# Patient Record
Sex: MALE | Race: WHITE | NOT HISPANIC OR LATINO | Employment: OTHER | URBAN - METROPOLITAN AREA
[De-identification: names, ages, dates, MRNs, and addresses within clinical notes are randomized per-mention and may not be internally consistent; named-entity substitution may affect disease eponyms.]

---

## 2017-01-04 ENCOUNTER — INITIAL CONSULT (OUTPATIENT)
Dept: DERMATOLOGY | Facility: CLINIC | Age: 82
End: 2017-01-04
Payer: MEDICARE

## 2017-01-04 ENCOUNTER — CLINICAL SUPPORT (OUTPATIENT)
Dept: FAMILY MEDICINE | Facility: CLINIC | Age: 82
End: 2017-01-04
Payer: MEDICARE

## 2017-01-04 VITALS
WEIGHT: 263 LBS | HEIGHT: 75 IN | BODY MASS INDEX: 32.7 KG/M2 | SYSTOLIC BLOOD PRESSURE: 160 MMHG | HEART RATE: 64 BPM | DIASTOLIC BLOOD PRESSURE: 88 MMHG

## 2017-01-04 DIAGNOSIS — E23.0 HYPOPITUITARISM: ICD-10-CM

## 2017-01-04 DIAGNOSIS — D03.4 MELANOMA IN SITU OF SCALP: Primary | ICD-10-CM

## 2017-01-04 PROCEDURE — 96372 THER/PROPH/DIAG INJ SC/IM: CPT | Mod: S$GLB,,,

## 2017-01-04 PROCEDURE — 1160F RVW MEDS BY RX/DR IN RCRD: CPT | Mod: S$GLB,,, | Performed by: DERMATOLOGY

## 2017-01-04 PROCEDURE — 3079F DIAST BP 80-89 MM HG: CPT | Mod: S$GLB,,, | Performed by: DERMATOLOGY

## 2017-01-04 PROCEDURE — 1126F AMNT PAIN NOTED NONE PRSNT: CPT | Mod: S$GLB,,, | Performed by: DERMATOLOGY

## 2017-01-04 PROCEDURE — 99999 PR PBB SHADOW E&M-EST. PATIENT-LVL III: CPT | Mod: PBBFAC,,, | Performed by: DERMATOLOGY

## 2017-01-04 PROCEDURE — 1159F MED LIST DOCD IN RCRD: CPT | Mod: S$GLB,,, | Performed by: DERMATOLOGY

## 2017-01-04 PROCEDURE — 99499 UNLISTED E&M SERVICE: CPT | Mod: S$PBB,,, | Performed by: DERMATOLOGY

## 2017-01-04 PROCEDURE — 1157F ADVNC CARE PLAN IN RCRD: CPT | Mod: S$GLB,,, | Performed by: DERMATOLOGY

## 2017-01-04 PROCEDURE — 3077F SYST BP >= 140 MM HG: CPT | Mod: S$GLB,,, | Performed by: DERMATOLOGY

## 2017-01-04 PROCEDURE — 99214 OFFICE O/P EST MOD 30 MIN: CPT | Mod: 25,S$GLB,, | Performed by: DERMATOLOGY

## 2017-01-04 RX ADMIN — TESTOSTERONE CYPIONATE 150 MG: 200 INJECTION, SOLUTION INTRAMUSCULAR at 09:01

## 2017-01-04 NOTE — PROGRESS NOTES
REFERRING MD:  Kamini Wheeler M.D.    CHIEF COMPLAINT:  New patient being consulted for Mohs' surgery evaluation.    HISTORY OF PRESENT ILLNESS:  81 y.o. male presents with a 6+ months history of growth on the crown of scalp. Pt thinks he may have had cryosurgery to the area. Pt is on Coumadin and last INR on 12/7/16 was 2.3.     Positive for scabbing.  Positive for crusting.  Negative for bleeding.  Negative for itching.    Biopsy consistent with melanoma in situ.      Possible prior treatment with cryosurgery.    Pacemaker: No  Defibrillator: No  Artificial joints: No  Artificial heart valves: No    PAST MEDICAL HISTORY:  Past Medical History   Diagnosis Date    Acute coronary syndrome 2000     NSTEMI    Anticoagulant long-term use     Basal cell carcinoma excised      left scalp vertex    Bilateral leg edema 1/6/2014    Coronary artery disease     DVT of leg (deep venous thrombosis) 09/08/2014     On coumadin for years    Encounter for blood transfusion     Hyperlipidemia     Hypertension     Melanoma 01/04/2017     crown of scalp    MI (myocardial infarction)     Panhypopituitarism     SQUAMOUS CELL CARCINOMA excised      left posterior scalp    Thyroid disease        PAST SURGICAL HISTORY:  Past Surgical History   Procedure Laterality Date    Pituitatry       hypophysectomy    Cardiac catheterization  05/09/2000     S/P stent to RCA,    Coronary angioplasty  5/9/2000     RCA    Esophageal dilation      Left femur surgery      Tonsillectomy      Appendectomy      Cardiac catheterization       stents placed    Coronary angioplasty with stent placement      Hernia repair      Brain surgery       pituitary adenoma removed    Eye surgery Bilateral      cataracts removed and new lenses placed     Fracture surgery Left      leg    Skin biopsy      Vascular surgery          SOCIAL HISTORY:  Dependencies:  never smoked    PERTINENT MEDICATIONS:  See medications list.  aspirin and  Coumadin    ALLERGIES:  Review of patient's allergies indicates no known allergies.    ROS:  Skin: See HPI  Constitutional: No fatigue, fever, malaise, weight loss, or night sweats.  Cardiovascular: No chest pain, palpitations, or edema.  Respiratory: No coughing, wheezing, SOB, or sputum production.    Physical Exam   HENT:   Head:             General: Mood and affect normal. Alert and orient X3. Normal appearance.  Scalp: R superior posterior scalp with a 10 x 10 mm pink bx site located 14 cm superiorly from the right superior ear attachment and 6.5 cm posteriorly. No pigmentation.  Head/Face:  no suspicious lesions  Neck:  no suspicious lesions  Lymph nodes: Bilateral pre-auricular, post-auricular, anterior cervical, posterior cervical, sublingual, submental, and occipital are not enlarged    IMPRESSION:  Biopsy proven superficial spreading melanoma in situ, Crown of scalp, path# [id number].    PLAN:  The diagnosis and the pathology report were discussed in detail with the patient. Treatment options were reviewed, including Mohs Micrographic Surgery, radiation, topical therapy, and standard excision.  After careful review of patient's history and physical exam, and after discussion of treatment options, the decision was made to perform 360 degree continuous en face margin controlled excision with 5 mm margins with rush permanent sections and subsequent delayed reconstruction pending negative margins.    Will schedule patient for slow Mohs. Risks, benefits, and alternatives of Mohs' surgery discussed with the patient. Discussed repair options including complex closure, skin flap, skin graft and second intention healing with the patient. Pre-operative instructions provided to the patient. Okay to stay on aspirin and Coumadin.

## 2017-01-04 NOTE — PROGRESS NOTES
Administered Depo Testosterone 150 mg IM to left ventrogluteal.  Patient tolerated injection well, no adverse reactions noted.

## 2017-01-05 ENCOUNTER — TELEPHONE (OUTPATIENT)
Dept: DERMATOLOGY | Facility: CLINIC | Age: 82
End: 2017-01-05

## 2017-01-05 NOTE — TELEPHONE ENCOUNTER
Pt was called and informed of appointment schedule for 1/23/17 at 8:00 for MMIS crown of scalp. Pt verbally confirmed date and time of procedure. Appointment letter along with preoperative instruction sheet was mailed to pt.

## 2017-01-12 ENCOUNTER — PATIENT MESSAGE (OUTPATIENT)
Dept: DERMATOLOGY | Facility: CLINIC | Age: 82
End: 2017-01-12

## 2017-01-13 ENCOUNTER — INITIAL CONSULT (OUTPATIENT)
Dept: HEMATOLOGY/ONCOLOGY | Facility: CLINIC | Age: 82
End: 2017-01-13
Payer: MEDICARE

## 2017-01-13 VITALS
HEIGHT: 75 IN | OXYGEN SATURATION: 93 % | TEMPERATURE: 98 F | RESPIRATION RATE: 16 BRPM | WEIGHT: 267 LBS | BODY MASS INDEX: 33.2 KG/M2 | SYSTOLIC BLOOD PRESSURE: 174 MMHG | HEART RATE: 55 BPM | DIASTOLIC BLOOD PRESSURE: 86 MMHG

## 2017-01-13 DIAGNOSIS — E23.0 PANHYPOPITUITARISM: Primary | ICD-10-CM

## 2017-01-13 DIAGNOSIS — L98.6 ATYPICAL LYMPHOCYTIC INFILTRATE OF SKIN: ICD-10-CM

## 2017-01-13 PROCEDURE — 99499 UNLISTED E&M SERVICE: CPT | Mod: S$PBB,,, | Performed by: INTERNAL MEDICINE

## 2017-01-13 PROCEDURE — 99999 PR PBB SHADOW E&M-EST. PATIENT-LVL IV: CPT | Mod: PBBFAC,,, | Performed by: INTERNAL MEDICINE

## 2017-01-13 PROCEDURE — 1159F MED LIST DOCD IN RCRD: CPT | Mod: S$GLB,,, | Performed by: INTERNAL MEDICINE

## 2017-01-13 PROCEDURE — 1160F RVW MEDS BY RX/DR IN RCRD: CPT | Mod: S$GLB,,, | Performed by: INTERNAL MEDICINE

## 2017-01-13 PROCEDURE — 1157F ADVNC CARE PLAN IN RCRD: CPT | Mod: S$GLB,,, | Performed by: INTERNAL MEDICINE

## 2017-01-13 PROCEDURE — 99205 OFFICE O/P NEW HI 60 MIN: CPT | Mod: S$GLB,,, | Performed by: INTERNAL MEDICINE

## 2017-01-13 PROCEDURE — 1126F AMNT PAIN NOTED NONE PRSNT: CPT | Mod: S$GLB,,, | Performed by: INTERNAL MEDICINE

## 2017-01-13 PROCEDURE — 3079F DIAST BP 80-89 MM HG: CPT | Mod: S$GLB,,, | Performed by: INTERNAL MEDICINE

## 2017-01-13 PROCEDURE — 3077F SYST BP >= 140 MM HG: CPT | Mod: S$GLB,,, | Performed by: INTERNAL MEDICINE

## 2017-01-13 NOTE — LETTER
January 13, 2017      Kamini Wheeler MD  [address]  [facility] [address]           [facility]  [address]  [facility] [address]  Phone: [phone]          Patient: Linda Skelton   MR Number: [id number]   YOB: 1935   Date of Visit: 1/13/2017       Dear Dr. Kamini Wheeler:    Thank you for referring Linda Skelton to me for evaluation. Attached you will find relevant portions of my assessment and plan of care.    If you have questions, please do not hesitate to call me. I look forward to following Linda Skelton along with you.    Sincerely,    Jean Melgar MD    Enclosure  CC:  No Recipients    If you would like to receive this communication electronically, please contact [email or web address] or [phone] to request more information on [employer] Link access.    For providers and/or their staff who would like to refer a patient to [facility], please contact us through our one-stop-shop provider referral line, [facility] , at [phone].    If you feel you have received this communication in error or would no longer like to receive these types of communications, please e-mail [email or web address]

## 2017-01-13 NOTE — PROGRESS NOTES
Subjective:       Patient ID: Linda Skelton is a 81 y.o. male.    Chief Complaint: No chief complaint on file.    HPI  Mr. 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 He is referred by Dr. Wheeler.  He believes that for about a year he has had red areas on his skin that did not itch or cause him pain.  This was noted first and most significantly on his arms with lesser on trunk, least on legs and never on his face.  No oral lesions.  Topical steroids have been used (triamcinolone) with some response and control.  He underwent skin biopsy at right upper extremity 16 which revealed an atypical dermal lymphocytic infiltrate with prominent epidermotropism.  Attempted evaluation for T-cell receptor gene rearrangement was hampered by inadequate T-cell amplification.  A blood study for T-cell receptor gene rearrangement done 16 was equivocal with overall polytipic T-cell pattern with occasional atypical peaks of uncertain significance.  He continues to use topical steroids.      Aside from some chronic health concerns including cardiac issues with metabolic syndrome and melanoma to undergo resection soon he has been doing well.  The skin rash is more unsightly for him that symptomatic.    PMH  CAD with history of MI and now with intermittent atrial fibrillation but solved post ablation  DVT in ; previous history of trauma to that thigh; wears compression hose and chronically anticoagulation since then with coumadin  Lipid disorder  HTN  Skin cancers  Superficial spreading melanoma at scalp for removal soon    PSH  Esophageal dilation  T&A  Appy  Hernia  Eye  Fracture  Skin cancers removed  Pituitary adenoma post resection    SOC  Smoke: None  EtOH: None  Significant history of sun exposure    FAM  Father with lung cancer  Mother  70s  3 brothers  2 sons A&W    Review of Systems   Constitutional: Negative for appetite change, diaphoresis, fatigue, fever and unexpected weight change.    HENT: Negative for mouth sores, postnasal drip and sore throat.    Respiratory: Negative for cough and shortness of breath.    Cardiovascular: Negative for chest pain and leg swelling.   Gastrointestinal: Negative for anal bleeding, blood in stool, constipation, diarrhea and nausea.   Genitourinary: Negative for dysuria and hematuria.   Musculoskeletal: Positive for back pain (chronic). Negative for arthralgias and neck pain.   Skin: Positive for rash.   Neurological: Negative for tremors, weakness, light-headedness and headaches.   Hematological: Negative for adenopathy.   Psychiatric/Behavioral: Negative for confusion and sleep disturbance. The patient is not nervous/anxious.        Objective:      Physical Exam   Constitutional: He is oriented to person, place, and time. He appears well-developed and well-nourished. No distress.   Obese white make with mild erythematous rash at arms   HENT:   Head: Normocephalic and atraumatic.   Mouth/Throat: Oropharynx is clear and moist. No oropharyngeal exudate.   Eyes: Conjunctivae are normal. Pupils are equal, round, and reactive to light.   Neck: Neck supple.   Cardiovascular: Normal rate and regular rhythm.    Pulmonary/Chest: Effort normal and breath sounds normal. He has no rales.   Abdominal: Soft. Bowel sounds are normal. He exhibits no mass. There is no splenomegaly. There is no tenderness.   Musculoskeletal: Normal range of motion. He exhibits no edema.   Lymphadenopathy:     He has no cervical adenopathy.     He has no axillary adenopathy.        Right: No inguinal adenopathy present.        Left: No inguinal adenopathy present.   Neurological: He is alert and oriented to person, place, and time.   Skin: Skin is warm and dry. Rash (Erythema at upper extremities and mildy at trunk.  None at face or lower extremities) noted.   Psychiatric: He has a normal mood and affect. Thought content normal.       Assessment:       1. Panhypopituitarism    2. Atypical  lymphocytic infiltrate of skin        Plan:       Mr. Skelton has a history over the past year of an erythematous skin rash of varying degree over his extremities and trunk which has responded well to topical steroids.  Previous skin biopsy (done at site of steroid use) was equivocal and blood study for T-cell receptor gene rearrangement did not show clonality.  Hence, it remains unclear whether his skin rash is related to a clonal or non-clonal T-cell process.  I have discussed this uncertainty with him today and have suggested that he stop the use of topical steroids that are likely obscuring biopsy results.  Since he does not have symptoms from his rash, I'd like to allow it to evolve to a brighter red with likely increased T-cell infiltration after which repeat skin biopsy should be more revealing and allow sufficient T-cells for T-cell receptor gene rearrangement studies.      I will discuss this with Dr. Wheeler who will be following and performing the follow up biopsy.  I don't think he needs additional blood tests with normal CBC 12/1/16 or any other imaging studies with the absence of adenopathy or organomegaly on examination today.  I have discussed my impressions in detail with the patient during our one hour visit with over 50% of that time spent in counseling.  All of his questions were answered.    I also discussed his weight and it's impact on his other medical issues and suggesting an exercise program.  He will try to comply.  He will be undergoing resection of his melanoma soon and I don't think this is related to his T-cell process.  I will see him back depending on the result of another biopsy to come.

## 2017-01-16 ENCOUNTER — OFFICE VISIT (OUTPATIENT)
Dept: OPTOMETRY | Facility: CLINIC | Age: 82
End: 2017-01-16
Payer: MEDICARE

## 2017-01-16 DIAGNOSIS — H52.4 PRESBYOPIA OU: ICD-10-CM

## 2017-01-16 DIAGNOSIS — Z96.1 PSEUDOPHAKIA OF BOTH EYES: Primary | ICD-10-CM

## 2017-01-16 DIAGNOSIS — Z13.5 GLAUCOMA SCREENING: ICD-10-CM

## 2017-01-16 PROCEDURE — 99999 PR PBB SHADOW E&M-EST. PATIENT-LVL II: CPT | Mod: PBBFAC,,, | Performed by: OPTOMETRIST

## 2017-01-16 PROCEDURE — 92004 COMPRE OPH EXAM NEW PT 1/>: CPT | Mod: S$GLB,,, | Performed by: OPTOMETRIST

## 2017-01-16 PROCEDURE — 92015 DETERMINE REFRACTIVE STATE: CPT | Mod: S$GLB,,, | Performed by: OPTOMETRIST

## 2017-01-16 NOTE — PROGRESS NOTES
HPI     SANDRA: 4+ years   Pt states no noticeable va changes   Denies f/f    No gtts        Last edited by Glory Troy on 1/16/2017  9:43 AM.     ROS     Positive for: Cardiovascular, Eyes    Negative for: Constitutional, Gastrointestinal, Neurological, Skin,   Genitourinary, Musculoskeletal, HENT, Endocrine, Respiratory, Psychiatric,   Allergic/Imm, Heme/Lymph    Last edited by Rodrigue Flores, OD on 1/16/2017  9:53 AM. (History)        Assessment /Plan     For exam results, see Encounter Report.    Pseudophakia of both eyes    Glaucoma screening    Presbyopia OU      1. Sp MFIOL/YAG OU--pt happy without spex    PLAN:    rtc 1 yr

## 2017-01-18 ENCOUNTER — CLINICAL SUPPORT (OUTPATIENT)
Dept: FAMILY MEDICINE | Facility: CLINIC | Age: 82
End: 2017-01-18
Payer: MEDICARE

## 2017-01-18 DIAGNOSIS — E34.9 TESTOSTERONE INSUFFICIENCY: Primary | ICD-10-CM

## 2017-01-18 PROCEDURE — 96372 THER/PROPH/DIAG INJ SC/IM: CPT | Mod: S$GLB,,,

## 2017-01-18 RX ADMIN — TESTOSTERONE CYPIONATE 150 MG: 200 INJECTION, SOLUTION INTRAMUSCULAR at 09:01

## 2017-01-18 NOTE — PROGRESS NOTES
Pt tolerated injection of depo-testosterone 150mg to right ventrogluteal without difficulty; no adverse reaction noted

## 2017-01-23 ENCOUNTER — PROCEDURE VISIT (OUTPATIENT)
Dept: DERMATOLOGY | Facility: CLINIC | Age: 82
End: 2017-01-23
Payer: MEDICARE

## 2017-01-23 VITALS
BODY MASS INDEX: 33.07 KG/M2 | HEART RATE: 57 BPM | WEIGHT: 266 LBS | HEIGHT: 75 IN | SYSTOLIC BLOOD PRESSURE: 169 MMHG | DIASTOLIC BLOOD PRESSURE: 85 MMHG

## 2017-01-23 DIAGNOSIS — D03.4 MELANOMA IN SITU OF SCALP: Primary | ICD-10-CM

## 2017-01-23 PROCEDURE — 99499 UNLISTED E&M SERVICE: CPT | Mod: S$PBB,,, | Performed by: DERMATOLOGY

## 2017-01-23 PROCEDURE — 11623 EXC S/N/H/F/G MAL+MRG 2.1-3: CPT | Mod: S$GLB,,, | Performed by: DERMATOLOGY

## 2017-01-23 PROCEDURE — 88305 TISSUE EXAM BY PATHOLOGIST: CPT | Performed by: PATHOLOGY

## 2017-01-23 PROCEDURE — 99499 UNLISTED E&M SERVICE: CPT | Mod: S$GLB,,, | Performed by: DERMATOLOGY

## 2017-01-23 RX ORDER — OXYCODONE AND ACETAMINOPHEN 5; 325 MG/1; MG/1
1 TABLET ORAL
Qty: 20 TABLET | Refills: 0 | Status: SHIPPED | OUTPATIENT
Start: 2017-01-23 | End: 2017-02-27

## 2017-01-23 NOTE — PROGRESS NOTES
PROCEDURE: 360-degree continuous en face margin-controlled excision with rush permanent sections and subsequent delayed repair    REFERRING MD: Kamini Wheeler M.D.    ANESTHETIC: 12 cc 1% Lidocaine with Epinephrine 1:100,000, buffered    SURGICAL PREP: Hibiclens    SURGEON: Mis Sesay MD    ASSISTANTS: Annie Slater PA-C and Arnold Yang, Surg Tech    PREOPERATIVE DIAGNOSIS: Melanoma in situ, [facility]# [id number]    POSTOPERATIVE DIAGNOSIS: Melanoma in situ    PATHOLOGIC DIAGNOSIS: Pending    LOCATION: crown of scalp (R superior posterior scalp)    INITIAL LESION SIZE: 0.8 x 0.9 cm    EXCISED MARGINS: 0.5 cm    DEFECT SIZE: 2.3 x 2.4 cm    PREPARATION:  The diagnosis, procedure, alternatives, benefits and risks, including but not limited to: drug reactions, pain, scar or cosmetic defect, local sensation disturbances, and/or recurrence of present condition were explained to the patient. The patient elected to proceed.    PROCEDURE:  The area involved by the melanoma was marked out with a surgical marking pen. The area of right superior posterior scalp was prepped, draped, and anesthetized in the usual sterile fashion. Lesional tissue was carefully marked with at least 5 mm margins of clinically normal skin in all directions. An excision was performed with a #15 blade carried down completely through the dermis to the deep subcutaneous tissue plane and galea. A short suture was placed superiorly at the 12 o'clock position and a long suture was placed right laterally at the 3 o'clock position. The lesion was then removed in total and submitted for histological margin evaluation. Then, electrocoagulation was used to obtain hemostasis. Blood loss was minimal.    The patient tolerated the procedure well.    The area was cleaned and dressed appropriately and the patient was given wound care instructions, as well as an appointment for follow-up evaluation tomorrow for possible further excision pending pathology report.  "Patient was placed on Percocet 5 prn postop pain.    Vitals:    01/23/17 0722   BP: (!) 169/85   BP Location: Left arm   Patient Position: Sitting   BP Method: Automatic   Pulse: (!) 57   Weight: 120.7 kg (266 lb)   Height: 6' 3" (1.905 m)     "

## 2017-01-24 ENCOUNTER — PROCEDURE VISIT (OUTPATIENT)
Dept: DERMATOLOGY | Facility: CLINIC | Age: 82
End: 2017-01-24
Payer: MEDICARE

## 2017-01-24 VITALS — SYSTOLIC BLOOD PRESSURE: 153 MMHG | HEART RATE: 67 BPM | DIASTOLIC BLOOD PRESSURE: 87 MMHG

## 2017-01-24 DIAGNOSIS — D03.4 MELANOMA IN SITU OF SCALP: Primary | ICD-10-CM

## 2017-01-24 PROCEDURE — 99499 UNLISTED E&M SERVICE: CPT | Mod: S$GLB,,, | Performed by: DERMATOLOGY

## 2017-01-24 PROCEDURE — 13121 CMPLX RPR S/A/L 2.6-7.5 CM: CPT | Mod: 79,S$GLB,, | Performed by: DERMATOLOGY

## 2017-01-24 PROCEDURE — 99499 UNLISTED E&M SERVICE: CPT | Mod: S$PBB,,, | Performed by: DERMATOLOGY

## 2017-01-24 RX ORDER — CEPHALEXIN 500 MG/1
500 CAPSULE ORAL 3 TIMES DAILY
Qty: 30 CAPSULE | Refills: 0 | Status: SHIPPED | OUTPATIENT
Start: 2017-01-24 | End: 2017-02-03

## 2017-01-24 NOTE — PROGRESS NOTES
PROCEDURE: Complex Linear Repair    INDICATION: Status post 360 degree continuous en face excision with 5 mm margins for malignant melanoma in situ performed yesterday. Verbal of clear margins per Dr. Collado today.    REFERRING MD: Kamini Wheeler M.D.    SURGEON: Mis Sesay MD    ASSISTANTS: Annie Slater PA-C, Jaqui Argueta MA and Arnold Yang, Surg Tech    ANESTHETIC: 12 cc 1% Lidocaine with Epinephrine 1:100,000, buffered    SURGICAL PREP: Hibiclens    LOCATION: crown of scalp (R superior posterior scalp)    DEFECT SIZE: 2.3 x 2.4 cm    WOUND REPAIR/DISPOSITION:  After the patient's melanoma had been completely removed yesterday, a repair of the surgical defect was undertaken today. The patient was returned to the operating suite where the area of right superior posterior scalp was prepped, draped, and anesthetized in the usual sterile fashion. The wound was widely undermined in all directions. Then, electrocoagulation was used to obtain meticulous hemostasis. 3-0 Vicryl buried vertical mattress sutures were placed into the subcutaneous and dermal plane to close the wound and melo the cutaneous wound edge. Bilateral dog ears were identified and were removed by a standard Burow's triangle technique. The cutaneous wound edges were closed using interrupted 3-0 Prolene suture.    The patient tolerated the procedure well.    The area was cleaned and dressed appropriately and the patient was given wound care instructions, as well as appointment for follow-up evaluation. Pt already has Percocet 5 prn postop pain and was placed on Keflex 500 mg TID x 10 days.    LENGTH OF REPAIR: 6.7 cm    Vitals:    01/24/17 1332   BP: (!) 153/87   BP Location: Left arm   Patient Position: Sitting   BP Method: Automatic   Pulse: 67

## 2017-01-24 NOTE — MR AVS SNAPSHOT
[facility] - Dermatology Surgery  [address] [facility] [address]  Phone: [phone]  Fax: [phone]                  Linda Skelton   2017 10:10 AM   Procedure visit    Description:  Male : 1935   Provider:  Mis Sesay MD   Department:  [facility] - Dermatology Surgery           Diagnoses this Visit        Comments    Melanoma in situ of scalp    -  Primary            To Do List           Future Appointments        Provider Department Dept Phone    2017 9:00 AM LAB, [facility] [phone]    2017 9:15 AM INJECTION, ALG [facility] [phone]    2017 8:10 AM Mis Sesay MD [facility] [phone]    2/15/2017 9:15 AM INJECTION, ALG [facility] [phone]    2017 10:00 AM Kamini Wheeler MD [place] - Dermatology [phone]      Goals (5 Years of Data)     None       These Medications        Disp Refills Start End    cephALEXin (KEFLEX) 500 MG capsule 30 capsule 0 2017 2/3/2017    Take 1 capsule (500 mg total) by mouth 3 (three) times daily. - Oral    Pharmacy: [id number] - [place], [place] - [address] Ph #: [phone]         [facility]     [facility] Nurse Care Line -  Assistance  Registered nurses in the [facility] provide clinical advisement, health education, appointment booking, and other advisory services.  Call for this free service at [phone].             Medications           Message regarding Medications     Verify the changes and/or additions to your medication regime listed below are the same as discussed with your clinician today.  If any of these changes or additions are incorrect, please notify your healthcare provider.        START taking these NEW medications        Refills    cephALEXin (KEFLEX) 500 MG capsule 0    Sig: Take 1 capsule (500 mg total) by mouth 3 (three) times daily.    Class:  Normal    Route: Oral           Verify that the below list of medications is an accurate representation of the medications you are currently taking.  If none reported, the list may be blank. If incorrect, please contact your healthcare provider. Carry this list with you in case of emergency.           Current Medications     aspirin 81 mg Tab Take 81 mg by mouth every evening. 1 Tablet Oral Every night    atenolol (TENORMIN) 25 MG tablet TAKE ONE TABLET BY MOUTH ONCE DAILY IN THE EVENING    cephALEXin (KEFLEX) 500 MG capsule Take 1 capsule (500 mg total) by mouth 3 (three) times daily.    dofetilide (TIKOSYN) 500 MCG Cap Take 1 capsule (500 mcg total) by mouth 2 (two) times daily.    folic acid (FOLVITE) 1 MG tablet TAKE ONE TABLET BY MOUTH ONCE DAILY    levothyroxine (SYNTHROID) 112 MCG tablet TAKE ONE TABLET BY MOUTH BEFORE BREAKFAST    nitroGLYCERIN (NITROSTAT) 0.4 MG SL tablet Place 1 tablet (0.4 mg total) under the tongue every 5 (five) minutes as needed for Chest pain. 1 Tablet, Sublingual Sublingual As directed.  take as directed    omeprazole (PRILOSEC) 20 MG capsule Take 20 mg by mouth every other day.    oxycodone-acetaminophen (PERCOCET) 5-325 mg per tablet Take 1 tablet by mouth every 4 to 6 hours as needed for Pain.    predniSONE (DELTASONE) 5 MG tablet TAKE ONE TABLET BY MOUTH ONCE DAILY    simvastatin (ZOCOR) 80 MG tablet Take 1 tablet (80 mg total) by mouth nightly.    tamsulosin (FLOMAX) 0.4 mg Cp24 TAKE ONE CAPSULE BY MOUTH ONCE DAILY    triamcinolone acetonide 0.1% (KENALOG) 0.1 % cream AAA bid    warfarin (COUMADIN) 5 MG tablet Take 1-1.5 tablets (5-7.5 mg total) by mouth Daily.           Clinical Reference Information           Vital Signs - Last Recorded  Most recent update: 1/24/2017  1:32 PM by Arnold Yang CST    BP Pulse                (!) 153/87 (BP Location: Left arm, Patient Position: Sitting, BP Method: Automatic) 67          Blood Pressure          Most Recent Value    BP  (!)   153/87      Allergies as of 1/24/2017     No Known Allergies      Immunizations Administered on Date of Encounter - 1/24/2017     None

## 2017-02-01 ENCOUNTER — LAB VISIT (OUTPATIENT)
Dept: LAB | Facility: HOSPITAL | Age: 82
End: 2017-02-01
Attending: DERMATOLOGY
Payer: MEDICARE

## 2017-02-01 ENCOUNTER — ANTI-COAG VISIT (OUTPATIENT)
Dept: CARDIOLOGY | Facility: CLINIC | Age: 82
End: 2017-02-01

## 2017-02-01 ENCOUNTER — CLINICAL SUPPORT (OUTPATIENT)
Dept: FAMILY MEDICINE | Facility: CLINIC | Age: 82
End: 2017-02-01
Payer: MEDICARE

## 2017-02-01 DIAGNOSIS — Z79.01 ANTICOAGULATION MONITORING BY PHARMACIST: ICD-10-CM

## 2017-02-01 DIAGNOSIS — I48.19 PERSISTENT ATRIAL FIBRILLATION: ICD-10-CM

## 2017-02-01 DIAGNOSIS — E29.1 TESTICULAR HYPOFUNCTION: Primary | ICD-10-CM

## 2017-02-01 LAB
INR PPP: 2
PROTHROMBIN TIME: 19.9 SEC

## 2017-02-01 PROCEDURE — 96372 THER/PROPH/DIAG INJ SC/IM: CPT | Mod: S$GLB,,,

## 2017-02-01 RX ADMIN — TESTOSTERONE CYPIONATE 150 MG: 200 INJECTION, SOLUTION INTRAMUSCULAR at 09:02

## 2017-02-01 NOTE — PROGRESS NOTES
Pt tolerated injection of depo-testosterone 150mg to left ventrogluteal without difficulty; no adverse reaction noted

## 2017-02-14 ENCOUNTER — OFFICE VISIT (OUTPATIENT)
Dept: DERMATOLOGY | Facility: CLINIC | Age: 82
End: 2017-02-14
Payer: MEDICARE

## 2017-02-14 DIAGNOSIS — Z09 POSTOP CHECK: Primary | ICD-10-CM

## 2017-02-14 PROCEDURE — 99024 POSTOP FOLLOW-UP VISIT: CPT | Mod: S$GLB,,, | Performed by: DERMATOLOGY

## 2017-02-14 PROCEDURE — 99999 PR PBB SHADOW E&M-EST. PATIENT-LVL III: CPT | Mod: PBBFAC,,, | Performed by: DERMATOLOGY

## 2017-02-14 NOTE — PROGRESS NOTES
81 y.o. male patient is here for suture removal following Mohs' surgery.    Patient reports no problems.    WOUND PE:  The scalp sutures intact. Wound healing well. Good skin edges. No signs or symptoms of infection.    IMPRESSION:  Healing operative site s/p 360 degree continuous en face excision of melanoma in situ, scalp with CLC, postop day # 14.    PLAN:  Sutures removed today. Steri-strips applied.  Continue wound care.  Keep moist with Aquaphor.    RTC:  In 1 month.

## 2017-02-15 ENCOUNTER — CLINICAL SUPPORT (OUTPATIENT)
Dept: FAMILY MEDICINE | Facility: CLINIC | Age: 82
End: 2017-02-15
Payer: MEDICARE

## 2017-02-15 ENCOUNTER — PATIENT MESSAGE (OUTPATIENT)
Dept: DERMATOLOGY | Facility: CLINIC | Age: 82
End: 2017-02-15

## 2017-02-15 DIAGNOSIS — E29.1 TESTICULAR HYPOFUNCTION: Primary | ICD-10-CM

## 2017-02-15 PROCEDURE — 96372 THER/PROPH/DIAG INJ SC/IM: CPT | Mod: S$GLB,,,

## 2017-02-15 RX ADMIN — TESTOSTERONE CYPIONATE 150 MG: 200 INJECTION, SOLUTION INTRAMUSCULAR at 10:02

## 2017-02-15 NOTE — PROGRESS NOTES
Pt tolerated injection of depo-testosterone 150mg to right ventrolguteal without difficulty; no adverse reaction noted

## 2017-02-23 ENCOUNTER — OFFICE VISIT (OUTPATIENT)
Dept: DERMATOLOGY | Facility: CLINIC | Age: 82
End: 2017-02-23
Payer: MEDICARE

## 2017-02-23 DIAGNOSIS — D48.9 NEOPLASM OF UNCERTAIN BEHAVIOR: ICD-10-CM

## 2017-02-23 DIAGNOSIS — L57.0 AK (ACTINIC KERATOSIS): ICD-10-CM

## 2017-02-23 DIAGNOSIS — L82.1 SK (SEBORRHEIC KERATOSIS): ICD-10-CM

## 2017-02-23 DIAGNOSIS — C84.00 MYCOSIS FUNGOIDES, UNSPECIFIED BODY REGION: ICD-10-CM

## 2017-02-23 DIAGNOSIS — L90.5 SCAR: Primary | ICD-10-CM

## 2017-02-23 DIAGNOSIS — Z86.006 HISTORY OF MELANOMA IN SITU: ICD-10-CM

## 2017-02-23 DIAGNOSIS — D22.9 NEVUS: ICD-10-CM

## 2017-02-23 DIAGNOSIS — L81.4 LENTIGO: ICD-10-CM

## 2017-02-23 PROCEDURE — 99214 OFFICE O/P EST MOD 30 MIN: CPT | Mod: 25,S$GLB,, | Performed by: DERMATOLOGY

## 2017-02-23 PROCEDURE — 1160F RVW MEDS BY RX/DR IN RCRD: CPT | Mod: S$GLB,,, | Performed by: DERMATOLOGY

## 2017-02-23 PROCEDURE — 99999 PR PBB SHADOW E&M-EST. PATIENT-LVL II: CPT | Mod: PBBFAC,,, | Performed by: DERMATOLOGY

## 2017-02-23 PROCEDURE — 99499 UNLISTED E&M SERVICE: CPT | Mod: S$GLB,,, | Performed by: DERMATOLOGY

## 2017-02-23 PROCEDURE — 88342 IMHCHEM/IMCYTCHM 1ST ANTB: CPT | Mod: 26,,, | Performed by: PATHOLOGY

## 2017-02-23 PROCEDURE — 1159F MED LIST DOCD IN RCRD: CPT | Mod: S$GLB,,, | Performed by: DERMATOLOGY

## 2017-02-23 PROCEDURE — 88305 TISSUE EXAM BY PATHOLOGIST: CPT | Mod: 26,,, | Performed by: PATHOLOGY

## 2017-02-23 PROCEDURE — 17000 DESTRUCT PREMALG LESION: CPT | Mod: S$GLB,,, | Performed by: DERMATOLOGY

## 2017-02-23 PROCEDURE — 88305 TISSUE EXAM BY PATHOLOGIST: CPT | Performed by: PATHOLOGY

## 2017-02-23 PROCEDURE — 1157F ADVNC CARE PLAN IN RCRD: CPT | Mod: S$GLB,,, | Performed by: DERMATOLOGY

## 2017-02-23 PROCEDURE — 11100 PR BIOPSY OF SKIN LESION: CPT | Mod: 59,S$GLB,, | Performed by: DERMATOLOGY

## 2017-02-23 PROCEDURE — 17003 DESTRUCT PREMALG LES 2-14: CPT | Mod: S$GLB,,, | Performed by: DERMATOLOGY

## 2017-02-23 NOTE — PROGRESS NOTES
Subjective:       Patient ID:  Linda Skelton is a 81 y.o. male who presents for   Chief Complaint   Patient presents with    Skin Check     HPI Comments: Pt here today for a TBSE. Pt denies any new, dry, scaly or bleeding lesions.   S/p mohs for MIS in scalp diagnosed 1/2017  Pt has mole on left forehead for over 32 years.  Pt looked at photographs and it has not changed .   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  Arms have improved with tac 0.1% cream and so has trunk.  Pt felt he was using tac 0.1% cream all over and felt it was making him anxious.  He stopped the tac cream for over 2 weeks and rash has not recurred.    Pt c/o rash in groin.  Pt using clotrimazole cream and it is much improved.        Review of Systems     Objective:    Physical Exam   Constitutional: He appears well-developed and well-nourished. No distress.   Lymphadenopathy: No supraclavicular adenopathy is present.     He has no cervical adenopathy.     He has no axillary adenopathy.   Neurological: He is alert and oriented to person, place, and time. He is not disoriented.   Psychiatric: He has a normal mood and affect.   Skin:   Areas Examined (abnormalities noted in diagram):   Scalp / Hair Palpated and Inspected  Head / Face Inspection Performed  Neck Inspection Performed  Chest / Axilla Inspection Performed  Abdomen Inspection Performed  Genitals / Buttocks / Groin Inspection Performed  Back Inspection Performed  RUE Inspected  LUE Inspection Performed  RLE Inspected  LLE Inspection Performed  Nails and Digits Inspection Performed                       Diagram Legend     Erythematous scaling macule/papule c/w actinic keratosis       Vascular papule c/w angioma      Pigmented verrucoid papule/plaque c/w seborrheic keratosis      Yellow umbilicated papule c/w sebaceous hyperplasia      Irregularly shaped tan macule c/w lentigo     1-2 mm smooth white papules consistent with Milia      Movable subcutaneous cyst with punctum c/w  epidermal inclusion cyst      Subcutaneous movable cyst c/w pilar cyst      Firm pink to brown papule c/w dermatofibroma      Pedunculated fleshy papule(s) c/w skin tag(s)      Evenly pigmented macule c/w junctional nevus     Mildly variegated pigmented, slightly irregular-bordered macule c/w mildly atypical nevus      Flesh colored to evenly pigmented papule c/w intradermal nevus       Pink pearly papule/plaque c/w basal cell carcinoma      Erythematous hyperkeratotic cursted plaque c/w SCC      Surgical scar with no sign of skin cancer recurrence      Open and closed comedones      Inflammatory papules and pustules      Verrucoid papule consistent consistent with wart     Erythematous eczematous patches and plaques     Dystrophic onycholytic nail with subungual debris c/w onychomycosis     Umbilicated papule    Erythematous-base heme-crusted tan verrucoid plaque consistent with inflamed seborrheic keratosis     Erythematous Silvery Scaling Plaque c/w Psoriasis     See annotation      Assessment / Plan:      Pathology Orders:      Normal Orders This Visit    Tissue Specimen To Pathology, Dermatology     Questions:    Directional Terms:  Other(comment)    Clinical information:  r/o atypical nevus    Specific Site:  left upper arm        Scar  History of melanoma in situ- scalp  tx with mohs   Area of previous melanoma examined. Site well healed with no signs of recurrence.    Total body skin examination performed today including at least 12 points as noted in physical examination. lesion suspicious for malignancy noted.    Neoplasm of uncertain behavior  Punch biopsy procedure note:  Punch biopsy performed after verbal consent obtained. Area marked and prepped with alcohol. Approximately 1cc of 1% lidocaine with epinephrine injected. 6 mm disposable punch used to remove lesion. Hemostasis obtained and biopsy site closed with 1 - 2 Prolene sutures. Wound care instructions reviewed with patient and handout given.    -      Tissue Specimen To Pathology, Dermatology    Mycosis fungoides, unspecified body region  Continue tac cream 0.1% to affected areas.  Avoid left upper volar forearm as we will consider repeat bx for Gene rearrangement in the future since was unable to be tested in the past     Lentigo  This is a benign hyperpigmented sun induced lesion. Daily sun protection will reduce the number of new lesions. Treatment of these benign lesions are considered cosmetic.    AK (actinic keratosis)  Cryosurgery Procedure Note    Verbal consent from the patient is obtained and the patient is aware of the precancerous quality and need for treatment of these lesions. Liquid nitrogen cryosurgery is applied to the 7 actinic keratoses, as detailed in the physical exam, to produce a freeze injury. The patient is aware that blisters may form and is instructed on wound care with gentle cleansing and use of vaseline ointment to keep moist until healed. The patient is supplied a handout on cryosurgery and is instructed to call if lesions do not completely resolve.    SK (seborrheic keratosis)  These are benign inherited growths without a malignant potential. Reassurance given to patient. No treatment is necessary.     Nevus  Discussed ABCDE's of nevi.  Monitor for new mole or moles that are becoming bigger, darker, irritated, or developing irregular borders. Brochure provided.           Return in about 2 weeks (around 3/9/2017) for po and 3 mo.

## 2017-02-27 ENCOUNTER — OFFICE VISIT (OUTPATIENT)
Dept: FAMILY MEDICINE | Facility: CLINIC | Age: 82
End: 2017-02-27
Payer: MEDICARE

## 2017-02-27 VITALS
SYSTOLIC BLOOD PRESSURE: 168 MMHG | HEART RATE: 60 BPM | DIASTOLIC BLOOD PRESSURE: 79 MMHG | OXYGEN SATURATION: 95 % | BODY MASS INDEX: 32.81 KG/M2 | WEIGHT: 263.88 LBS | HEIGHT: 75 IN | TEMPERATURE: 98 F

## 2017-02-27 DIAGNOSIS — J31.0 RHINITIS, UNSPECIFIED TYPE: Primary | ICD-10-CM

## 2017-02-27 PROCEDURE — 1160F RVW MEDS BY RX/DR IN RCRD: CPT | Mod: S$GLB,,, | Performed by: NURSE PRACTITIONER

## 2017-02-27 PROCEDURE — 99214 OFFICE O/P EST MOD 30 MIN: CPT | Mod: S$GLB,,, | Performed by: NURSE PRACTITIONER

## 2017-02-27 PROCEDURE — 1125F AMNT PAIN NOTED PAIN PRSNT: CPT | Mod: S$GLB,,, | Performed by: NURSE PRACTITIONER

## 2017-02-27 PROCEDURE — 1157F ADVNC CARE PLAN IN RCRD: CPT | Mod: S$GLB,,, | Performed by: NURSE PRACTITIONER

## 2017-02-27 PROCEDURE — 3077F SYST BP >= 140 MM HG: CPT | Mod: S$GLB,,, | Performed by: NURSE PRACTITIONER

## 2017-02-27 PROCEDURE — 3078F DIAST BP <80 MM HG: CPT | Mod: S$GLB,,, | Performed by: NURSE PRACTITIONER

## 2017-02-27 PROCEDURE — 99999 PR PBB SHADOW E&M-EST. PATIENT-LVL IV: CPT | Mod: PBBFAC,,, | Performed by: NURSE PRACTITIONER

## 2017-02-27 PROCEDURE — 99499 UNLISTED E&M SERVICE: CPT | Mod: S$GLB,,, | Performed by: NURSE PRACTITIONER

## 2017-02-27 PROCEDURE — 1159F MED LIST DOCD IN RCRD: CPT | Mod: S$GLB,,, | Performed by: NURSE PRACTITIONER

## 2017-02-27 RX ORDER — AZELASTINE 1 MG/ML
1 SPRAY, METERED NASAL 2 TIMES DAILY
Qty: 30 ML | Refills: 0 | Status: SHIPPED | OUTPATIENT
Start: 2017-02-27 | End: 2017-04-26

## 2017-02-27 RX ORDER — LEVOCETIRIZINE DIHYDROCHLORIDE 5 MG/1
5 TABLET, FILM COATED ORAL NIGHTLY
Qty: 30 TABLET | Refills: 0 | Status: SHIPPED | OUTPATIENT
Start: 2017-02-27 | End: 2017-04-26

## 2017-02-27 NOTE — MR AVS SNAPSHOT
[facility]  [address] [place] [place] [address]  Phone: [phone]  Fax: [phone]                  Linda Skelton   2017 10:30 AM   Office Visit    Description:  Male : 1935   Provider:  CRISTIAN MOROCHO IN   Department:  [facility] - Family Medicine           Reason for Visit     URI                To Do List           Future Appointments        Provider Department Dept Phone    3/1/2017 9:15 AM INJECTION, ALG [facility] [phone]    3/9/2017 9:00 AM JGD NURSE, DERM [place] - Dermatology [phone]    3/14/2017 1:30 PM Mis Sesay MD [facility] - Dermatology Surgery [phone]    3/15/2017 9:15 AM INJECTION, ALG [facility] [phone]    3/29/2017 8:00 AM LAB, ALGIERS [facility] [phone]      Goals (5 Years of Data)     None      Follow-Up and Disposition     Return if symptoms worsen or fail to improve.       These Medications        Disp Refills Start End    levocetirizine (XYZAL) 5 MG tablet 30 tablet 0 2017    Take 1 tablet (5 mg total) by mouth every evening. - Oral    Pharmacy: [id number] - [place], [place] - [address] Ph #: [phone]       azelastine (ASTELIN) 137 mcg (0.1 %) nasal spray 30 mL 0 2017    1 spray (137 mcg total) by Nasal route 2 (two) times daily. - Nasal    Pharmacy: [facility] - [place], [place] - [address] Ph #: [phone]         [facility] On Call     [facility] Nurse Care Line -  Assistance  Registered nurses in the [facility] provide clinical advisement, health education, appointment booking, and other advisory services.  Call for this free service at [phone].             Medications           Message regarding Medications     Verify the changes and/or additions to your medication regime listed below are the same as discussed with your clinician today.  If any of these changes or  additions are incorrect, please notify your healthcare provider.        START taking these NEW medications        Refills    levocetirizine (XYZAL) 5 MG tablet 0    Sig: Take 1 tablet (5 mg total) by mouth every evening.    Class: Normal    Route: Oral    azelastine (ASTELIN) 137 mcg (0.1 %) nasal spray 0    Si spray (137 mcg total) by Nasal route 2 (two) times daily.    Class: Normal    Route: Nasal      STOP taking these medications     oxycodone-acetaminophen (PERCOCET) 5-325 mg per tablet Take 1 tablet by mouth every 4 to 6 hours as needed for Pain.    triamcinolone acetonide injection 40 mg     testosterone cypionate injection 150 mg            Verify that the below list of medications is an accurate representation of the medications you are currently taking.  If none reported, the list may be blank. If incorrect, please contact your healthcare provider. Carry this list with you in case of emergency.           Current Medications     aspirin 81 mg Tab Take 81 mg by mouth every evening. 1 Tablet Oral Every night    atenolol (TENORMIN) 25 MG tablet TAKE ONE TABLET BY MOUTH ONCE DAILY IN THE EVENING    dofetilide (TIKOSYN) 500 MCG Cap Take 1 capsule (500 mcg total) by mouth 2 (two) times daily.    folic acid (FOLVITE) 1 MG tablet TAKE ONE TABLET BY MOUTH ONCE DAILY    levothyroxine (SYNTHROID) 112 MCG tablet TAKE ONE TABLET BY MOUTH BEFORE BREAKFAST    nitroGLYCERIN (NITROSTAT) 0.4 MG SL tablet Place 1 tablet (0.4 mg total) under the tongue every 5 (five) minutes as needed for Chest pain. 1 Tablet, Sublingual Sublingual As directed.  take as directed    omeprazole (PRILOSEC) 20 MG capsule Take 20 mg by mouth every other day.    predniSONE (DELTASONE) 5 MG tablet TAKE ONE TABLET BY MOUTH ONCE DAILY    simvastatin (ZOCOR) 80 MG tablet Take 1 tablet (80 mg total) by mouth nightly.    tamsulosin (FLOMAX) 0.4 mg Cp24 TAKE ONE CAPSULE BY MOUTH ONCE DAILY    triamcinolone acetonide 0.1% (KENALOG) 0.1 % cream AAA bid     warfarin (COUMADIN) 5 MG tablet Take 1-1.5 Tablets (5-7.5mg) by mouth daily, Or as directed by [facility]    azelastine (ASTELIN) 137 mcg (0.1 %) nasal spray 1 spray (137 mcg total) by Nasal route 2 (two) times daily.    levocetirizine (XYZAL) 5 MG tablet Take 1 tablet (5 mg total) by mouth every evening.           Clinical Reference Information           Your Vitals Were     BP                   168/79 (BP Location: Left arm, Patient Position: Sitting, BP Method: Manual)           Blood Pressure          Most Recent Value    BP  (!)  168/79      Allergies as of 2/27/2017     No Known Allergies      Immunizations Administered on Date of Encounter - 2/27/2017     None      Instructions      Viral Upper Respiratory Illness (Adult)  You have a viral upper respiratory illness (URI), which is another term for the common cold. This illness is contagious during the first few days. It is spread through the air by coughing and sneezing. It may also be spread by direct contact (touching the sick person and then touching your own eyes, nose, or mouth). Frequent handwashing will decrease risk of spread. Most viral illnesses go away within 7 to 10 days with rest and simple home remedies. Sometimes the illness may last for several weeks. Antibiotics will not kill a virus, and they are generally not prescribed for this condition.    Home care  · If symptoms are severe, rest at home for the first 2 to 3 days. When you resume activity, don't let yourself get too tired.  · Avoid being exposed to cigarette smoke (yours or others).  · You may use acetaminophen or ibuprofen to control pain and fever, unless another medicine was prescribed. (Note: If you have chronic liver or kidney disease, have ever had a stomach ulcer or gastrointestinal bleeding, or are taking blood-thinning medicines, talk with your healthcare provider before using these medicines.) Aspirin should never be given to anyone under 18 years of age who is ill  with a viral infection or fever. It may cause severe liver or brain damage.  · Your appetite may be poor, so a light diet is fine. Avoid dehydration by drinking 6 to 8 glasses of fluids per day (water, soft drinks, juices, tea, or soup). Extra fluids will help loosen secretions in the nose and lungs.  · Over-the-counter cold medicines will not shorten the length of time youre sick, but they may be helpful for the following symptoms: cough, sore throat, and nasal and sinus congestion. (Note: Do not use decongestants if you have high blood pressure.)  Follow-up care  Follow up with your healthcare provider, or as advised.  When to seek medical advice  Call your healthcare provider right away if any of these occur:  · Cough with lots of colored sputum (mucus)  · Severe headache; face, neck, or ear pain  · Difficulty swallowing due to throat pain  · Fever of 100.4°F (38°C)  Call 911, or get immediate medical care  Call emergency services right away if any of these occur:  · Chest pain, shortness of breath, wheezing, or difficulty breathing  · Coughing up blood  · Inability to swallow due to throat pain  Date Last Reviewed: 9/13/2015  © 1099-4194 [employer]. [address] [place] [address]. All rights reserved. This information is not intended as a substitute for professional medical care. Always follow your healthcare professional's instructions.             Language Assistance Services     ATTENTION: Language assistance services are available, free of charge. Please call [phone].      ATENCIÓN: Si habla español, tiene a mcneil disposición servicios gratuitos de asistencia lingüística. Llame al [phone].     [facility] Ý: N?u b?n nói Ti?ng Vi?t, có các d?ch v? h? tr? ngôn ng? mi?n phí dành cho b?n. G?i s? [phone].         [facility] [place] - Family Medicine complies with applicable Federal civil rights laws and does not discriminate on the basis of race, color, national origin, age, disability,  or sex.

## 2017-02-27 NOTE — PROGRESS NOTES
Subjective:       Patient ID: Linda Skelton is a 81 y.o. male.    Chief Complaint: URI (cough,runny nose,clear mucus)    URI    This is a new problem. The current episode started in the past 7 days. The problem has been gradually worsening. There has been no fever. Associated symptoms include headaches, rhinorrhea and sneezing. Pertinent negatives include no congestion, coughing, ear pain, plugged ear sensation, sinus pain, sore throat or wheezing. He has tried acetaminophen for the symptoms. The treatment provided mild relief.       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     Social History Main Topics    Smoking status: Never Smoker    Smokeless tobacco: Never Used      Comment: .  Two sons.  Shinto .      Alcohol use No    Drug use: No    Sexual activity: Not on file     Other Topics Concern    Not on file     Social History Narrative       Past Surgical History:   Procedure Laterality Date    APPENDECTOMY      BRAIN SURGERY      pituitary adenoma removed    CARDIAC CATHETERIZATION  05/09/2000    S/P stent to RCA,    CARDIAC CATHETERIZATION      stents placed    CORONARY ANGIOPLASTY   "5/9/2000    RCA    CORONARY ANGIOPLASTY WITH STENT PLACEMENT      ESOPHAGEAL DILATION      EYE SURGERY Bilateral     cataracts removed and new lenses placed     FRACTURE SURGERY Left     leg    HERNIA REPAIR      left femur surgery      pituitatry      hypophysectomy    SKIN BIOPSY      TONSILLECTOMY      VASCULAR SURGERY         Review of Systems   Constitutional: Negative for chills and fever.   HENT: Positive for postnasal drip, rhinorrhea and sneezing. Negative for congestion, ear pain, sinus pressure and sore throat.    Respiratory: Negative for cough, shortness of breath and wheezing.    Neurological: Positive for headaches.   All other systems reviewed and are negative.      Objective:   BP (!) 168/79 (BP Location: Left arm, Patient Position: Sitting, BP Method: Manual)  Pulse 60  Temp 98 °F (36.7 °C) (Oral)   Ht 6' 3" (1.905 m)  Wt 119.7 kg (263 lb 14.3 oz)  SpO2 95%  BMI 32.98 kg/m2     Physical Exam   Constitutional: He is oriented to person, place, and time. He appears well-developed and well-nourished.   HENT:   Head: Normocephalic and atraumatic.   Right Ear: Hearing, tympanic membrane, external ear and ear canal normal.   Left Ear: Hearing, tympanic membrane, external ear and ear canal normal.   Nose: Rhinorrhea present.   Mouth/Throat: No posterior oropharyngeal erythema.   Cardiovascular: Normal rate and regular rhythm.    Pulmonary/Chest: Effort normal and breath sounds normal. No respiratory distress. He has no decreased breath sounds.   Neurological: He is alert and oriented to person, place, and time.   Skin: Skin is warm, dry and intact. He is not diaphoretic. No pallor.   Psychiatric: He has a normal mood and affect. His speech is normal and behavior is normal.       Assessment:       1. Rhinitis, unspecified type        Plan:       Lnida was seen today for uri.    Diagnoses and all orders for this visit:    Rhinitis, unspecified type  -     levocetirizine (XYZAL) 5 MG tablet; " "Take 1 tablet (5 mg total) by mouth every evening.  -     azelastine (ASTELIN) 137 mcg (0.1 %) nasal spray; 1 spray (137 mcg total) by Nasal route 2 (two) times daily.  -     Suggested symptomatic OTC remedies.   -     Nasal Saline spray (Over the counter [place] spray or [place])  2 sprays each nostril 2-3 times a day for nasal congestion  -     Tylenol 500 mg 2 tablets or Ibuprofen 200 mg 2 tablets every 4-6 hours as needed for fever, headaches, sore throat, ear pain, bodyaches, and/or nasal/sinus inflammation  -     Warm salt water gargles with 1/2 cup water and 1 tablespoon salt every 4 hours  -     Warm tea with honey and lemon      Return if symptoms worsen or fail to improve.  "This note will not be shared with the patient."  "

## 2017-02-27 NOTE — PATIENT INSTRUCTIONS
Viral Upper Respiratory Illness (Adult)  You have a viral upper respiratory illness (URI), which is another term for the common cold. This illness is contagious during the first few days. It is spread through the air by coughing and sneezing. It may also be spread by direct contact (touching the sick person and then touching your own eyes, nose, or mouth). Frequent handwashing will decrease risk of spread. Most viral illnesses go away within 7 to 10 days with rest and simple home remedies. Sometimes the illness may last for several weeks. Antibiotics will not kill a virus, and they are generally not prescribed for this condition.    Home care  · If symptoms are severe, rest at home for the first 2 to 3 days. When you resume activity, don't let yourself get too tired.  · Avoid being exposed to cigarette smoke (yours or others).  · You may use acetaminophen or ibuprofen to control pain and fever, unless another medicine was prescribed. (Note: If you have chronic liver or kidney disease, have ever had a stomach ulcer or gastrointestinal bleeding, or are taking blood-thinning medicines, talk with your healthcare provider before using these medicines.) Aspirin should never be given to anyone under 18 years of age who is ill with a viral infection or fever. It may cause severe liver or brain damage.  · Your appetite may be poor, so a light diet is fine. Avoid dehydration by drinking 6 to 8 glasses of fluids per day (water, soft drinks, juices, tea, or soup). Extra fluids will help loosen secretions in the nose and lungs.  · Over-the-counter cold medicines will not shorten the length of time youre sick, but they may be helpful for the following symptoms: cough, sore throat, and nasal and sinus congestion. (Note: Do not use decongestants if you have high blood pressure.)  Follow-up care  Follow up with your healthcare provider, or as advised.  When to seek medical advice  Call your healthcare provider right away if any  of these occur:  · Cough with lots of colored sputum (mucus)  · Severe headache; face, neck, or ear pain  · Difficulty swallowing due to throat pain  · Fever of 100.4°F (38°C)  Call 911, or get immediate medical care  Call emergency services right away if any of these occur:  · Chest pain, shortness of breath, wheezing, or difficulty breathing  · Coughing up blood  · Inability to swallow due to throat pain  Date Last Reviewed: 9/13/2015  © 0593-5764 [employer]. [address] [place] [address]. All rights reserved. This information is not intended as a substitute for professional medical care. Always follow your healthcare professional's instructions.

## 2017-03-01 ENCOUNTER — PATIENT MESSAGE (OUTPATIENT)
Dept: FAMILY MEDICINE | Facility: CLINIC | Age: 82
End: 2017-03-01

## 2017-03-01 ENCOUNTER — CLINICAL SUPPORT (OUTPATIENT)
Dept: FAMILY MEDICINE | Facility: CLINIC | Age: 82
End: 2017-03-01
Payer: MEDICARE

## 2017-03-01 ENCOUNTER — TELEPHONE (OUTPATIENT)
Dept: FAMILY MEDICINE | Facility: CLINIC | Age: 82
End: 2017-03-01

## 2017-03-01 DIAGNOSIS — E29.1 TESTICULAR HYPOFUNCTION: Primary | ICD-10-CM

## 2017-03-01 RX ORDER — TESTOSTERONE CYPIONATE 200 MG/ML
150 INJECTION, SOLUTION INTRAMUSCULAR
Status: CANCELLED | OUTPATIENT
Start: 2017-03-01 | End: 2017-08-16

## 2017-03-02 PROCEDURE — 96372 THER/PROPH/DIAG INJ SC/IM: CPT | Mod: S$GLB,,,

## 2017-03-02 RX ORDER — TESTOSTERONE CYPIONATE 200 MG/ML
150 INJECTION, SOLUTION INTRAMUSCULAR
Status: DISCONTINUED | OUTPATIENT
Start: 2017-03-02 | End: 2017-07-13

## 2017-03-02 RX ADMIN — TESTOSTERONE CYPIONATE 150 MG: 200 INJECTION, SOLUTION INTRAMUSCULAR at 07:03

## 2017-03-06 ENCOUNTER — PATIENT MESSAGE (OUTPATIENT)
Dept: DERMATOLOGY | Facility: CLINIC | Age: 82
End: 2017-03-06

## 2017-03-14 ENCOUNTER — OFFICE VISIT (OUTPATIENT)
Dept: DERMATOLOGY | Facility: CLINIC | Age: 82
End: 2017-03-14
Payer: MEDICARE

## 2017-03-14 DIAGNOSIS — Z09 POSTOP CHECK: Primary | ICD-10-CM

## 2017-03-14 PROCEDURE — 99024 POSTOP FOLLOW-UP VISIT: CPT | Mod: S$GLB,,, | Performed by: DERMATOLOGY

## 2017-03-14 PROCEDURE — 99999 PR PBB SHADOW E&M-EST. PATIENT-LVL III: CPT | Mod: PBBFAC,,, | Performed by: DERMATOLOGY

## 2017-03-14 NOTE — PROGRESS NOTES
81 y.o. male patient is here for wound check after surgery.    Patient reports no problems.    WOUND PE:  The right crown of scalp incision is well healed. Mild firmness and erythema of scar. No nodularity or satellite pigmentation.    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    PLAN:   Recommended massage tid.  Reassured patient that redness and firmness will fade with time.  Daily SPF.  Regular skin checks.    RTC:  In 3 months with Kamini Wheeler M.D. for skin check or sooner if new concern arises.

## 2017-03-15 ENCOUNTER — CLINICAL SUPPORT (OUTPATIENT)
Dept: FAMILY MEDICINE | Facility: CLINIC | Age: 82
End: 2017-03-15
Payer: MEDICARE

## 2017-03-15 DIAGNOSIS — E29.1 TESTICULAR HYPOFUNCTION: Primary | ICD-10-CM

## 2017-03-15 PROCEDURE — 96372 THER/PROPH/DIAG INJ SC/IM: CPT | Mod: S$GLB,,,

## 2017-03-15 RX ADMIN — TESTOSTERONE CYPIONATE 150 MG: 200 INJECTION, SOLUTION INTRAMUSCULAR at 09:03

## 2017-03-29 ENCOUNTER — LAB VISIT (OUTPATIENT)
Dept: LAB | Facility: HOSPITAL | Age: 82
End: 2017-03-29
Attending: INTERNAL MEDICINE
Payer: MEDICARE

## 2017-03-29 ENCOUNTER — CLINICAL SUPPORT (OUTPATIENT)
Dept: FAMILY MEDICINE | Facility: CLINIC | Age: 82
End: 2017-03-29
Payer: MEDICARE

## 2017-03-29 ENCOUNTER — ANTI-COAG VISIT (OUTPATIENT)
Dept: CARDIOLOGY | Facility: CLINIC | Age: 82
End: 2017-03-29

## 2017-03-29 DIAGNOSIS — E29.1 TESTICULAR HYPOFUNCTION: Primary | ICD-10-CM

## 2017-03-29 DIAGNOSIS — I48.0 PAROXYSMAL ATRIAL FIBRILLATION: ICD-10-CM

## 2017-03-29 DIAGNOSIS — Z79.01 ANTICOAGULATION MONITORING BY PHARMACIST: ICD-10-CM

## 2017-03-29 LAB
INR PPP: 2.3
PROTHROMBIN TIME: 22.7 SEC

## 2017-03-29 PROCEDURE — 85610 PROTHROMBIN TIME: CPT

## 2017-03-29 PROCEDURE — 36415 COLL VENOUS BLD VENIPUNCTURE: CPT | Mod: PO

## 2017-03-29 PROCEDURE — 96372 THER/PROPH/DIAG INJ SC/IM: CPT | Mod: S$GLB,,,

## 2017-03-29 RX ORDER — WARFARIN SODIUM 5 MG/1
TABLET ORAL
Qty: 145 TABLET | Refills: 3 | Status: CANCELLED
Start: 2017-03-29

## 2017-03-29 RX ADMIN — TESTOSTERONE CYPIONATE 150 MG: 200 INJECTION, SOLUTION INTRAMUSCULAR at 09:03

## 2017-03-30 RX ORDER — WARFARIN SODIUM 5 MG/1
7.5 TABLET ORAL DAILY
Qty: 40 TABLET | Refills: 0 | Status: SHIPPED | OUTPATIENT
Start: 2017-03-30 | End: 2018-01-10 | Stop reason: SDUPTHER

## 2017-04-12 ENCOUNTER — CLINICAL SUPPORT (OUTPATIENT)
Dept: FAMILY MEDICINE | Facility: CLINIC | Age: 82
End: 2017-04-12
Payer: MEDICARE

## 2017-04-12 DIAGNOSIS — E29.1 TESTICULAR HYPOFUNCTION: Primary | ICD-10-CM

## 2017-04-12 PROCEDURE — 96372 THER/PROPH/DIAG INJ SC/IM: CPT | Mod: S$GLB,,,

## 2017-04-12 RX ADMIN — TESTOSTERONE CYPIONATE 150 MG: 200 INJECTION, SOLUTION INTRAMUSCULAR at 09:04

## 2017-04-12 NOTE — PROGRESS NOTES
..Patient given testosterone 150 mg injection left ventrogluteal, tolerated well, no complaints, no reaction noted

## 2017-04-20 ENCOUNTER — LAB VISIT (OUTPATIENT)
Dept: LAB | Facility: HOSPITAL | Age: 82
End: 2017-04-20
Attending: INTERNAL MEDICINE
Payer: MEDICARE

## 2017-04-20 ENCOUNTER — PATIENT MESSAGE (OUTPATIENT)
Dept: CARDIOLOGY | Facility: CLINIC | Age: 82
End: 2017-04-20

## 2017-04-20 DIAGNOSIS — I25.10 CORONARY ARTERY DISEASE INVOLVING NATIVE CORONARY ARTERY OF NATIVE HEART WITHOUT ANGINA PECTORIS: ICD-10-CM

## 2017-04-20 DIAGNOSIS — I10 ESSENTIAL HYPERTENSION: ICD-10-CM

## 2017-04-20 DIAGNOSIS — Z79.01 LONG TERM CURRENT USE OF ANTICOAGULANT: ICD-10-CM

## 2017-04-20 LAB
ALBUMIN SERPL BCP-MCNC: 3.7 G/DL
ALP SERPL-CCNC: 53 U/L
ALT SERPL W/O P-5'-P-CCNC: 17 U/L
ANION GAP SERPL CALC-SCNC: 5 MMOL/L
AST SERPL-CCNC: 21 U/L
BASOPHILS # BLD AUTO: 0 K/UL
BASOPHILS NFR BLD: 0 %
BILIRUB SERPL-MCNC: 1.1 MG/DL
BUN SERPL-MCNC: 18 MG/DL
CALCIUM SERPL-MCNC: 9 MG/DL
CHLORIDE SERPL-SCNC: 104 MMOL/L
CHOLEST/HDLC SERPL: 3.5 {RATIO}
CO2 SERPL-SCNC: 30 MMOL/L
CREAT SERPL-MCNC: 1.3 MG/DL
DIFFERENTIAL METHOD: ABNORMAL
EOSINOPHIL # BLD AUTO: 0.1 K/UL
EOSINOPHIL NFR BLD: 1.2 %
ERYTHROCYTE [DISTWIDTH] IN BLOOD BY AUTOMATED COUNT: 13.9 %
EST. GFR  (AFRICAN AMERICAN): 59.2 ML/MIN/1.73 M^2
EST. GFR  (NON AFRICAN AMERICAN): 51.2 ML/MIN/1.73 M^2
GLUCOSE SERPL-MCNC: 99 MG/DL
HCT VFR BLD AUTO: 46.8 %
HDL/CHOLESTEROL RATIO: 28.6 %
HDLC SERPL-MCNC: 126 MG/DL
HDLC SERPL-MCNC: 36 MG/DL
HGB BLD-MCNC: 15.3 G/DL
LDLC SERPL CALC-MCNC: 65 MG/DL
LYMPHOCYTES # BLD AUTO: 1.2 K/UL
LYMPHOCYTES NFR BLD: 27.6 %
MCH RBC QN AUTO: 31.5 PG
MCHC RBC AUTO-ENTMCNC: 32.7 %
MCV RBC AUTO: 96 FL
MONOCYTES # BLD AUTO: 0.9 K/UL
MONOCYTES NFR BLD: 19.9 %
NEUTROPHILS # BLD AUTO: 2.2 K/UL
NEUTROPHILS NFR BLD: 51.3 %
NONHDLC SERPL-MCNC: 90 MG/DL
PLATELET # BLD AUTO: 111 K/UL
PMV BLD AUTO: 12.9 FL
POTASSIUM SERPL-SCNC: 4.6 MMOL/L
PROT SERPL-MCNC: 6.6 G/DL
RBC # BLD AUTO: 4.86 M/UL
SODIUM SERPL-SCNC: 139 MMOL/L
TRIGL SERPL-MCNC: 125 MG/DL
WBC # BLD AUTO: 4.27 K/UL

## 2017-04-20 PROCEDURE — 85025 COMPLETE CBC W/AUTO DIFF WBC: CPT

## 2017-04-20 PROCEDURE — 36415 COLL VENOUS BLD VENIPUNCTURE: CPT | Mod: PO

## 2017-04-20 PROCEDURE — 80053 COMPREHEN METABOLIC PANEL: CPT

## 2017-04-20 PROCEDURE — 80061 LIPID PANEL: CPT

## 2017-04-26 ENCOUNTER — OFFICE VISIT (OUTPATIENT)
Dept: FAMILY MEDICINE | Facility: CLINIC | Age: 82
End: 2017-04-26
Payer: MEDICARE

## 2017-04-26 ENCOUNTER — CLINICAL SUPPORT (OUTPATIENT)
Dept: FAMILY MEDICINE | Facility: CLINIC | Age: 82
End: 2017-04-26
Payer: MEDICARE

## 2017-04-26 VITALS
TEMPERATURE: 98 F | OXYGEN SATURATION: 96 % | WEIGHT: 266.13 LBS | HEIGHT: 75 IN | BODY MASS INDEX: 33.09 KG/M2 | HEART RATE: 52 BPM | SYSTOLIC BLOOD PRESSURE: 142 MMHG | DIASTOLIC BLOOD PRESSURE: 74 MMHG

## 2017-04-26 DIAGNOSIS — I48.20 CHRONIC ATRIAL FIBRILLATION: ICD-10-CM

## 2017-04-26 DIAGNOSIS — E29.1 TESTICULAR HYPOFUNCTION: ICD-10-CM

## 2017-04-26 DIAGNOSIS — I82.401 ACUTE DEEP VEIN THROMBOSIS (DVT) OF RIGHT LOWER EXTREMITY, UNSPECIFIED VEIN: ICD-10-CM

## 2017-04-26 DIAGNOSIS — I10 ESSENTIAL HYPERTENSION: ICD-10-CM

## 2017-04-26 DIAGNOSIS — E23.0 PANHYPOPITUITARISM: ICD-10-CM

## 2017-04-26 DIAGNOSIS — Z00.00 ROUTINE MEDICAL EXAM: Primary | ICD-10-CM

## 2017-04-26 PROCEDURE — 3077F SYST BP >= 140 MM HG: CPT | Mod: S$GLB,,,

## 2017-04-26 PROCEDURE — 99499 UNLISTED E&M SERVICE: CPT | Mod: S$GLB,,,

## 2017-04-26 PROCEDURE — 96372 THER/PROPH/DIAG INJ SC/IM: CPT | Mod: S$GLB,,,

## 2017-04-26 PROCEDURE — 99397 PER PM REEVAL EST PAT 65+ YR: CPT | Mod: S$GLB,,,

## 2017-04-26 PROCEDURE — 3078F DIAST BP <80 MM HG: CPT | Mod: S$GLB,,,

## 2017-04-26 PROCEDURE — 99999 PR PBB SHADOW E&M-EST. PATIENT-LVL III: CPT | Mod: PBBFAC,,,

## 2017-04-26 RX ADMIN — TESTOSTERONE CYPIONATE 150 MG: 200 INJECTION, SOLUTION INTRAMUSCULAR at 10:04

## 2017-04-26 NOTE — PROGRESS NOTES
Chief Complaint   Patient presents with    Annual Exam       HPI  Linda Skelton is a 81 y.o. male who presents to the office for annual physical examination and health care review.  Patient has history of panhypopituitarism, he has coronary artery disease, had NSTEMI in the past, well-controlled hypertension, hyperlipidemia, and is basically in stable condition.  All of his endocrine abnormalities have been appropriately treated.  Pt is known to me.    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    SQUAMOUS CELL CARCINOMA excised     left posterior scalp    Thyroid disease        PAST SURGICAL HISTORY:  Past Surgical History:   Procedure Laterality Date    APPENDECTOMY      BRAIN SURGERY      pituitary adenoma removed    CARDIAC CATHETERIZATION  05/09/2000    S/P stent to RCA,    CARDIAC CATHETERIZATION      stents placed    CORONARY ANGIOPLASTY  5/9/2000    RCA    CORONARY ANGIOPLASTY WITH STENT PLACEMENT      ESOPHAGEAL DILATION      EYE SURGERY Bilateral     cataracts removed and new lenses placed     FRACTURE SURGERY Left     leg    HERNIA REPAIR      left femur surgery      pituitatry      hypophysectomy    SKIN BIOPSY      TONSILLECTOMY      VASCULAR SURGERY         SOCIAL HISTORY:  Social History     Social History    Marital status:      Spouse name: N/A    Number of children: N/A    Years of education: N/A      Occupational History    Not on file.     Social History Main Topics    Smoking status: Never Smoker    Smokeless tobacco: Never Used      Comment: .  Two sons.  Judaism .      Alcohol use No    Drug use: No    Sexual activity: Not on file     Other Topics Concern    Not on file     Social History Narrative     and a retired [employer] .  Non smoker.        FAMILY HISTORY:  Family History   Problem Relation Age of Onset    Cancer Father     Skin cancer Neg Hx     Melanoma Neg Hx     Heart disease Neg Hx     Hypertension Neg Hx        ALLERGIES AND MEDICATIONS: updated and reviewed.  Review of patient's allergies indicates:  No Known Allergies  Current Outpatient Prescriptions   Medication Sig Dispense Refill    aspirin 81 mg Tab Take 81 mg by mouth every evening. 1 Tablet Oral Every night      atenolol (TENORMIN) 25 MG tablet TAKE ONE TABLET BY MOUTH ONCE DAILY IN THE EVENING 90 tablet 1    dofetilide (TIKOSYN) 500 MCG Cap Take 1 capsule (500 mcg total) by mouth 2 (two) times daily. 180 capsule 3    folic acid (FOLVITE) 1 MG tablet TAKE ONE TABLET BY MOUTH ONCE DAILY 90 tablet 1    levothyroxine (SYNTHROID) 112 MCG tablet TAKE ONE TABLET BY MOUTH BEFORE BREAKFAST 90 tablet 3    nitroGLYCERIN (NITROSTAT) 0.4 MG SL tablet Place 1 tablet (0.4 mg total) under the tongue every 5 (five) minutes as needed for Chest pain. 1 Tablet, Sublingual Sublingual As directed.  take as directed 25 tablet 3    omeprazole (PRILOSEC) 20 MG capsule Take 20 mg by mouth every other day.      predniSONE (DELTASONE) 5 MG tablet TAKE ONE TABLET BY MOUTH ONCE DAILY 90 tablet 3    simvastatin (ZOCOR) 80 MG tablet Take 1 tablet (80 mg total) by mouth nightly. 90 tablet 3    tamsulosin (FLOMAX) 0.4 mg Cp24 TAKE ONE CAPSULE BY MOUTH ONCE DAILY 90 capsule 1    triamcinolone acetonide 0.1% (KENALOG) 0.1 % cream AAA bid 454 g 3    warfarin (COUMADIN) 5 MG tablet Take 1.5 tablets (7.5 mg total) by  "mouth Daily. Current Dose ( 5 mg on Tue; 7.5 mg all other days) or as directed by coumadin clinic 40 tablet 0     Current Facility-Administered Medications   Medication Dose Route Frequency Provider Last Rate Last Dose    testosterone cypionate injection 150 mg  150 mg Intramuscular Q14 Days Chun Boudreaux Jr., MD   150 mg at 04/26/17 1018       ROS  Review of Systems   Constitutional: Negative.    HENT: Negative.    Eyes: Negative.  Negative for visual disturbance.   Respiratory: Negative.    Cardiovascular: Negative.    Gastrointestinal: Negative.    Genitourinary: Negative.    Musculoskeletal: Negative.    Neurological: Negative for headaches.   Psychiatric/Behavioral: Negative.        Physical Exam  Vitals:    04/26/17 1003   BP: (!) 142/74   Pulse:    Temp:     Body mass index is 33.26 kg/(m^2).  Weight: 120.7 kg (266 lb 1.5 oz)   Height: 6' 3" (190.5 cm)     Physical Exam   Constitutional: He is oriented to person, place, and time. He appears well-developed and well-nourished. No distress.   HENT:   TMs normal.  Oropharynx is clear.   Eyes: Conjunctivae are normal. Pupils are equal, round, and reactive to light.   Neck: Normal range of motion. Neck supple. No thyromegaly present.   Cardiovascular: Normal rate and regular rhythm.    Pulmonary/Chest: Effort normal and breath sounds normal.   Abdominal: Soft. Bowel sounds are normal.   Genitourinary:   Genitourinary Comments: NORMA: Prostate is enlarged, no nodules or masses identified.  Nontender.   Musculoskeletal: Normal range of motion. He exhibits no edema.   Neurological: He is alert and oriented to person, place, and time.   Psychiatric: He has a normal mood and affect. His behavior is normal.   Vitals reviewed.      Health Maintenance       Date Due Completion Date    Zoster Vaccine 5/31/1995 ---    Pneumococcal (65+) (2 of 2 - PPSV23) 10/30/2016 10/30/2015    Lipid Panel 4/20/2022 4/20/2017    TETANUS VACCINE 8/31/2025 8/31/2015          Assessment & " Plan    1. Routine medical exam  Non diagnostic.    2. Chronic atrial fibrillation  Patient follows with cardiology.    3. Essential hypertension      4. Panhypopituitarism  Recent laboratory work satisfactory.    5. Acute deep vein thrombosis (DVT) of right lower extremity, unspecified vein  On anticoagulation.      No Follow-up on file.   Continue present healthy lifestyle, medication, etc.

## 2017-04-26 NOTE — MR AVS SNAPSHOT
[facility]  [address]  [place] [address]  Phone: [phone]  Fax: [phone]                  Linda Skelton   2017 9:00 AM   Office Visit    Description:  Male : 1935   Provider:  Chun Boudreaux Jr., MD   Department:  [facility]           Reason for Visit     Annual Exam           Diagnoses this Visit        Comments    Routine medical exam    -  Primary     Chronic atrial fibrillation         Essential hypertension         Panhypopituitarism         Acute deep vein thrombosis (DVT) of right lower extremity, unspecified vein                To Do List           Future Appointments        Provider Department Dept Phone    5/10/2017 9:15 AM INJECTION, [facility] [phone]    2017 9:00 AM Kamini Wheeler MD [place] - Dermatology [phone]    2017 8:30 AM LAB, [facility] [phone]    2017 9:15 AM INJECTION, [facility] [phone]    2017 8:00 AM Boyd Ahuja MD [address] - Arrhythmia [phone]      Goals (5 Years of Data)     None      [facility]     [facility] Care [facility] -  Assistance  Unless otherwise directed by your provider, please contact [facility], our nurse care line that is available for  assistance.     Registered nurses in the [facility] provide: appointment scheduling, clinical advisement, health education, and other advisory services.  Call: [phone] (toll free)               Medications           Message regarding Medications     Verify the changes and/or additions to your medication regime listed below are the same as discussed with your clinician today.  If any of these changes or additions are incorrect, please notify your healthcare provider.        STOP taking these medications     azelastine (ASTELIN) 137 mcg (0.1 %) nasal spray 1 spray (137 mcg total) by Nasal route 2 (two) times  "daily.    levocetirizine (XYZAL) 5 MG tablet Take 1 tablet (5 mg total) by mouth every evening.           Verify that the below list of medications is an accurate representation of the medications you are currently taking.  If none reported, the list may be blank. If incorrect, please contact your healthcare provider. Carry this list with you in case of emergency.           Current Medications     aspirin 81 mg Tab Take 81 mg by mouth every evening. 1 Tablet Oral Every night    atenolol (TENORMIN) 25 MG tablet TAKE ONE TABLET BY MOUTH ONCE DAILY IN THE EVENING    dofetilide (TIKOSYN) 500 MCG Cap Take 1 capsule (500 mcg total) by mouth 2 (two) times daily.    folic acid (FOLVITE) 1 MG tablet TAKE ONE TABLET BY MOUTH ONCE DAILY    levothyroxine (SYNTHROID) 112 MCG tablet TAKE ONE TABLET BY MOUTH BEFORE BREAKFAST    nitroGLYCERIN (NITROSTAT) 0.4 MG SL tablet Place 1 tablet (0.4 mg total) under the tongue every 5 (five) minutes as needed for Chest pain. 1 Tablet, Sublingual Sublingual As directed.  take as directed    omeprazole (PRILOSEC) 20 MG capsule Take 20 mg by mouth every other day.    predniSONE (DELTASONE) 5 MG tablet TAKE ONE TABLET BY MOUTH ONCE DAILY    simvastatin (ZOCOR) 80 MG tablet Take 1 tablet (80 mg total) by mouth nightly.    tamsulosin (FLOMAX) 0.4 mg Cp24 TAKE ONE CAPSULE BY MOUTH ONCE DAILY    triamcinolone acetonide 0.1% (KENALOG) 0.1 % cream AAA bid    warfarin (COUMADIN) 5 MG tablet Take 1.5 tablets (7.5 mg total) by mouth Daily. Current Dose ( 5 mg on Tue; 7.5 mg all other days) or as directed by coumadin clinic           Clinical Reference Information           Your Vitals Were     BP Pulse Temp Height Weight SpO2    142/74 52 98.4 °F (36.9 °C) (Oral) 6' 3" (1.905 m) 120.7 kg (266 lb 1.5 oz) 96%    BMI                33.26 kg/m2          Blood Pressure          Most Recent Value    BP  (!)  142/74      Allergies as of 4/26/2017     No Known Allergies      Immunizations Administered on Date of " Encounter - 4/26/2017     None      Language Assistance Services     ATTENTION: Language assistance services are available, free of charge. Please call [phone].      ATENCIÓN: Si habla yevgeniy, tiene a mcneil disposición servicios gratuitos de asistencia lingüística. Llame al [phone].     CHÚ Ý: N?u b?n nói Ti?ng Vi?t, có các d?ch v? h? tr? ngôn ng? mi?n phí dành cho b?n. G?i s? [phone].         [facility] complies with applicable Federal civil rights laws and does not discriminate on the basis of race, color, national origin, age, disability, or sex.

## 2017-04-29 DIAGNOSIS — E78.5 HYPERLIPIDEMIA: ICD-10-CM

## 2017-04-30 RX ORDER — TAMSULOSIN HYDROCHLORIDE 0.4 MG/1
CAPSULE ORAL
Qty: 90 CAPSULE | Refills: 1 | Status: SHIPPED | OUTPATIENT
Start: 2017-04-30 | End: 2017-06-29 | Stop reason: SDUPTHER

## 2017-05-01 RX ORDER — SIMVASTATIN 80 MG/1
TABLET, FILM COATED ORAL
Qty: 90 TABLET | Refills: 0 | Status: SHIPPED | OUTPATIENT
Start: 2017-05-01 | End: 2017-07-30 | Stop reason: SDUPTHER

## 2017-05-10 ENCOUNTER — CLINICAL SUPPORT (OUTPATIENT)
Dept: FAMILY MEDICINE | Facility: CLINIC | Age: 82
End: 2017-05-10
Payer: MEDICARE

## 2017-05-10 PROCEDURE — 96372 THER/PROPH/DIAG INJ SC/IM: CPT | Mod: S$GLB,,,

## 2017-05-10 RX ADMIN — TESTOSTERONE CYPIONATE 150 MG: 200 INJECTION, SOLUTION INTRAMUSCULAR at 09:05

## 2017-05-10 NOTE — PROGRESS NOTES
Administered Depo-Testosterone 150 mg IM to left ventrogluteal.  Patient tolerated injection well, no adverse reactions noted.

## 2017-05-22 ENCOUNTER — OFFICE VISIT (OUTPATIENT)
Dept: DERMATOLOGY | Facility: CLINIC | Age: 82
End: 2017-05-22
Payer: MEDICARE

## 2017-05-22 DIAGNOSIS — L81.4 LENTIGO: ICD-10-CM

## 2017-05-22 DIAGNOSIS — C84.A0 PLEOMORPHIC SMALL OR MEDIUM-SIZED CELL CUTANEOUS T-CELL LYMPHOMA: ICD-10-CM

## 2017-05-22 DIAGNOSIS — Z86.006 HISTORY OF MELANOMA IN SITU: ICD-10-CM

## 2017-05-22 DIAGNOSIS — L82.1 SK (SEBORRHEIC KERATOSIS): ICD-10-CM

## 2017-05-22 DIAGNOSIS — Z85.828 PERSONAL HISTORY OF SKIN CANCER: ICD-10-CM

## 2017-05-22 DIAGNOSIS — L90.5 SCAR: ICD-10-CM

## 2017-05-22 DIAGNOSIS — L57.0 AK (ACTINIC KERATOSIS): Primary | ICD-10-CM

## 2017-05-22 DIAGNOSIS — D18.00 ANGIOMA: ICD-10-CM

## 2017-05-22 PROCEDURE — 99999 PR PBB SHADOW E&M-EST. PATIENT-LVL III: CPT | Mod: PBBFAC,,, | Performed by: DERMATOLOGY

## 2017-05-22 PROCEDURE — 1159F MED LIST DOCD IN RCRD: CPT | Mod: S$GLB,,, | Performed by: DERMATOLOGY

## 2017-05-22 PROCEDURE — 99499 UNLISTED E&M SERVICE: CPT | Mod: S$GLB,,, | Performed by: DERMATOLOGY

## 2017-05-22 PROCEDURE — 17003 DESTRUCT PREMALG LES 2-14: CPT | Mod: S$GLB,,, | Performed by: DERMATOLOGY

## 2017-05-22 PROCEDURE — 99214 OFFICE O/P EST MOD 30 MIN: CPT | Mod: 25,S$GLB,, | Performed by: DERMATOLOGY

## 2017-05-22 PROCEDURE — 1160F RVW MEDS BY RX/DR IN RCRD: CPT | Mod: S$GLB,,, | Performed by: DERMATOLOGY

## 2017-05-22 PROCEDURE — 17000 DESTRUCT PREMALG LESION: CPT | Mod: S$GLB,,, | Performed by: DERMATOLOGY

## 2017-05-22 NOTE — PATIENT INSTRUCTIONS
Natural sunlight one time per day before 10 am or after 3 pm for 10 minutes for the rash  . Keep head and face protected

## 2017-05-22 NOTE — PROGRESS NOTES
Subjective:       Patient ID:  Linda Skelton is a 81 y.o. male who presents for   Chief Complaint   Patient presents with    Skin Check     tbse     Very high risk pt with hx lentigo maligna crown of scalp  and hx NMSC here to check for the development of new lesions.  C/o lesions on scalp x 3 months.  Denies pain or bleeding. No tx.    Pt has been using tac 0.1% cream on all flared areas except right volar forearm. Does feel rash is stable and thinks area he did not treat is actually better,.         Review of Systems   Constitutional: Negative for fever, chills, weight loss, weight gain, fatigue, night sweats and malaise.   Skin: Negative for daily sunscreen use and activity-related sunscreen use.   Hematologic/Lymphatic: Bruises/bleeds easily.        Objective:    Physical Exam   Constitutional: He appears well-developed and well-nourished. No distress.   Lymphadenopathy: No supraclavicular adenopathy is present.     He has no cervical adenopathy.     He has no axillary adenopathy.     He has no inguinal adenopathy.   Neurological: He is alert and oriented to person, place, and time. He is not disoriented.   Psychiatric: He has a normal mood and affect.   Skin:   Areas Examined (abnormalities noted in diagram):   Scalp / Hair Palpated and Inspected  Head / Face Inspection Performed  Neck Inspection Performed  Chest / Axilla Inspection Performed  Abdomen Inspection Performed  Genitals / Buttocks / Groin Inspection Performed  Back Inspection Performed  RUE Inspected  LUE Inspection Performed  RLE Inspected  LLE Inspection Performed  Nails and Digits Inspection Performed                       Diagram Legend     Erythematous scaling macule/papule c/w actinic keratosis       Vascular papule c/w angioma      Pigmented verrucoid papule/plaque c/w seborrheic keratosis      Yellow umbilicated papule c/w sebaceous hyperplasia      Irregularly shaped tan macule c/w lentigo     1-2 mm smooth white papules  consistent with Milia      Movable subcutaneous cyst with punctum c/w epidermal inclusion cyst      Subcutaneous movable cyst c/w pilar cyst      Firm pink to brown papule c/w dermatofibroma      Pedunculated fleshy papule(s) c/w skin tag(s)      Evenly pigmented macule c/w junctional nevus     Mildly variegated pigmented, slightly irregular-bordered macule c/w mildly atypical nevus      Flesh colored to evenly pigmented papule c/w intradermal nevus       Pink pearly papule/plaque c/w basal cell carcinoma      Erythematous hyperkeratotic cursted plaque c/w SCC      Surgical scar with no sign of skin cancer recurrence      Open and closed comedones      Inflammatory papules and pustules      Verrucoid papule consistent consistent with wart     Erythematous eczematous patches and plaques     Dystrophic onycholytic nail with subungual debris c/w onychomycosis     Umbilicated papule    Erythematous-base heme-crusted tan verrucoid plaque consistent with inflamed seborrheic keratosis     Erythematous Silvery Scaling Plaque c/w Psoriasis     See annotation      Assessment / Plan:        AK (actinic keratosis)  Cryosurgery Procedure Note    Verbal consent from the patient is obtained and the patient is aware of the precancerous quality and need for treatment of these lesions. Liquid nitrogen cryosurgery is applied to the 8 actinic keratoses, as detailed in the physical exam, to produce a freeze injury. The patient is aware that blisters may form and is instructed on wound care with gentle cleansing and use of vaseline ointment to keep moist until healed. The patient is supplied a handout on cryosurgery and is instructed to call if lesions do not completely resolve.    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 Keep head and face protected   Original bx 11/2016.    Continue  tac cream 0.1% bid to affected areas and will consider valachlor on f/u    Lentigo  This is a benign hyperpigmented sun induced lesion. Daily sun protection will reduce the number of new lesions. Treatment of these benign lesions are considered cosmetic.  The nature of sun-induced photo-aging and skin cancers is discussed.  Sun avoidance, protective clothing, and the use of 30-SPF sunscreens is advised. Observe closely for skin damage/changes, and call if such occurs.    Angioma  These are benign vascular lesions that are inherited.  Treatment is not necessary.    SK (seborrheic keratosis)  These are benign inherited growths without a malignant potential. Reassurance given to patient. No treatment is necessary.     History of melanoma in situ  Personal history of skin cancer  Scar  Area of previous melanoma examined. Site well healed with no signs of recurrence.    Total body skin examination performed today including at least 12 points as noted in physical examination. No lesions suspicious for malignancy noted.             Return in about 3 months (around 8/22/2017).

## 2017-05-24 ENCOUNTER — ANTI-COAG VISIT (OUTPATIENT)
Dept: CARDIOLOGY | Facility: CLINIC | Age: 82
End: 2017-05-24

## 2017-05-24 ENCOUNTER — LAB VISIT (OUTPATIENT)
Dept: LAB | Facility: HOSPITAL | Age: 82
End: 2017-05-24
Attending: INTERNAL MEDICINE
Payer: MEDICARE

## 2017-05-24 ENCOUNTER — CLINICAL SUPPORT (OUTPATIENT)
Dept: FAMILY MEDICINE | Facility: CLINIC | Age: 82
End: 2017-05-24
Payer: MEDICARE

## 2017-05-24 DIAGNOSIS — I48.20 CHRONIC ATRIAL FIBRILLATION: ICD-10-CM

## 2017-05-24 DIAGNOSIS — E29.1 TESTICULAR HYPOFUNCTION: Primary | ICD-10-CM

## 2017-05-24 DIAGNOSIS — Z79.01 ANTICOAGULATION MONITORING BY PHARMACIST: ICD-10-CM

## 2017-05-24 LAB
INR PPP: 2.4
PROTHROMBIN TIME: 23.6 SEC

## 2017-05-24 PROCEDURE — 36415 COLL VENOUS BLD VENIPUNCTURE: CPT | Mod: PO

## 2017-05-24 PROCEDURE — 96372 THER/PROPH/DIAG INJ SC/IM: CPT | Mod: S$GLB,,,

## 2017-05-24 PROCEDURE — 85610 PROTHROMBIN TIME: CPT

## 2017-05-24 RX ADMIN — TESTOSTERONE CYPIONATE 150 MG: 200 INJECTION, SOLUTION INTRAMUSCULAR at 09:05

## 2017-05-24 NOTE — PROGRESS NOTES
..Patient given depo testosterone 150 mg injection right ventrogluteal, tolerated well, no complaints, no reaction noted

## 2017-05-25 NOTE — ADDENDUM NOTE
Encounter addended by: Ela Ross PharmD on: 5/25/2017  8:27 AM<BR>    Actions taken:  activity accessed

## 2017-06-07 ENCOUNTER — CLINICAL SUPPORT (OUTPATIENT)
Dept: FAMILY MEDICINE | Facility: CLINIC | Age: 82
End: 2017-06-07
Payer: MEDICARE

## 2017-06-07 DIAGNOSIS — E29.1 TESTICULAR HYPOFUNCTION: Primary | ICD-10-CM

## 2017-06-07 PROCEDURE — 96372 THER/PROPH/DIAG INJ SC/IM: CPT | Mod: S$GLB,,,

## 2017-06-07 RX ADMIN — TESTOSTERONE CYPIONATE 150 MG: 200 INJECTION, SOLUTION INTRAMUSCULAR at 08:06

## 2017-06-21 ENCOUNTER — CLINICAL SUPPORT (OUTPATIENT)
Dept: FAMILY MEDICINE | Facility: CLINIC | Age: 82
End: 2017-06-21
Payer: MEDICARE

## 2017-06-21 ENCOUNTER — TELEPHONE (OUTPATIENT)
Dept: FAMILY MEDICINE | Facility: CLINIC | Age: 82
End: 2017-06-21

## 2017-06-21 DIAGNOSIS — N52.9 ERECTILE DYSFUNCTION, UNSPECIFIED ERECTILE DYSFUNCTION TYPE: Primary | ICD-10-CM

## 2017-06-21 PROCEDURE — 96372 THER/PROPH/DIAG INJ SC/IM: CPT | Mod: S$GLB,,,

## 2017-06-21 RX ADMIN — TESTOSTERONE CYPIONATE 150 MG: 200 INJECTION, SOLUTION INTRAMUSCULAR at 08:06

## 2017-06-21 NOTE — PROGRESS NOTES
..Patient given testosterone 150 mg injection right ventrogluteal, tolerated well, no complaints, no reaction noted

## 2017-06-21 NOTE — TELEPHONE ENCOUNTER
----- Message from Chioma Emmanuel sent at 2017  8:59 AM CDT -----  Pt requesting order for testosterone injection, he states Dr. Boudreaux is leaving next month and his ordrers  in August so he wants to get orders for another 6 months until he get settled with a PCP.

## 2017-06-29 ENCOUNTER — PATIENT MESSAGE (OUTPATIENT)
Dept: FAMILY MEDICINE | Facility: CLINIC | Age: 82
End: 2017-06-29

## 2017-06-29 RX ORDER — TAMSULOSIN HYDROCHLORIDE 0.4 MG/1
1 CAPSULE ORAL DAILY
Qty: 90 CAPSULE | Refills: 1 | Status: SHIPPED | OUTPATIENT
Start: 2017-06-29 | End: 2017-07-05 | Stop reason: SDUPTHER

## 2017-06-29 RX ORDER — ATENOLOL 25 MG/1
TABLET ORAL
Qty: 90 TABLET | Refills: 1 | Status: SHIPPED | OUTPATIENT
Start: 2017-06-29 | End: 2017-07-05 | Stop reason: SDUPTHER

## 2017-06-29 RX ORDER — FOLIC ACID 1 MG/1
1000 TABLET ORAL DAILY
Qty: 90 TABLET | Refills: 1 | Status: SHIPPED | OUTPATIENT
Start: 2017-06-29 | End: 2018-01-03 | Stop reason: SDUPTHER

## 2017-07-05 ENCOUNTER — OFFICE VISIT (OUTPATIENT)
Dept: FAMILY MEDICINE | Facility: CLINIC | Age: 82
End: 2017-07-05
Payer: MEDICARE

## 2017-07-05 ENCOUNTER — CLINICAL SUPPORT (OUTPATIENT)
Dept: FAMILY MEDICINE | Facility: CLINIC | Age: 82
End: 2017-07-05
Payer: MEDICARE

## 2017-07-05 VITALS
DIASTOLIC BLOOD PRESSURE: 74 MMHG | HEART RATE: 66 BPM | OXYGEN SATURATION: 98 % | TEMPERATURE: 98 F | BODY MASS INDEX: 32.62 KG/M2 | WEIGHT: 262.38 LBS | SYSTOLIC BLOOD PRESSURE: 124 MMHG | HEIGHT: 75 IN

## 2017-07-05 DIAGNOSIS — E23.0 PANHYPOPITUITARISM: ICD-10-CM

## 2017-07-05 DIAGNOSIS — E29.1 HYPOGONADISM IN MALE: ICD-10-CM

## 2017-07-05 DIAGNOSIS — E29.1 TESTICULAR HYPOFUNCTION: Primary | ICD-10-CM

## 2017-07-05 DIAGNOSIS — S39.012A LOW BACK STRAIN, INITIAL ENCOUNTER: Primary | ICD-10-CM

## 2017-07-05 DIAGNOSIS — I10 ESSENTIAL HYPERTENSION: ICD-10-CM

## 2017-07-05 DIAGNOSIS — Z23 NEED FOR SHINGLES VACCINE: ICD-10-CM

## 2017-07-05 DIAGNOSIS — N40.0 BENIGN NON-NODULAR PROSTATIC HYPERPLASIA WITHOUT LOWER URINARY TRACT SYMPTOMS: ICD-10-CM

## 2017-07-05 PROCEDURE — 1125F AMNT PAIN NOTED PAIN PRSNT: CPT | Mod: S$GLB,,, | Performed by: INTERNAL MEDICINE

## 2017-07-05 PROCEDURE — 99999 PR PBB SHADOW E&M-EST. PATIENT-LVL III: CPT | Mod: PBBFAC,,, | Performed by: INTERNAL MEDICINE

## 2017-07-05 PROCEDURE — 99214 OFFICE O/P EST MOD 30 MIN: CPT | Mod: S$GLB,,, | Performed by: INTERNAL MEDICINE

## 2017-07-05 PROCEDURE — 96372 THER/PROPH/DIAG INJ SC/IM: CPT | Mod: S$GLB,,,

## 2017-07-05 PROCEDURE — 1159F MED LIST DOCD IN RCRD: CPT | Mod: S$GLB,,, | Performed by: INTERNAL MEDICINE

## 2017-07-05 PROCEDURE — 99499 UNLISTED E&M SERVICE: CPT | Mod: S$GLB,,, | Performed by: INTERNAL MEDICINE

## 2017-07-05 RX ORDER — CYCLOBENZAPRINE HCL 10 MG
10 TABLET ORAL 2 TIMES DAILY PRN
Qty: 14 TABLET | Refills: 0 | Status: SHIPPED | OUTPATIENT
Start: 2017-07-05 | End: 2017-07-12

## 2017-07-05 RX ORDER — PREDNISONE 5 MG/1
5 TABLET ORAL DAILY
Qty: 90 TABLET | Refills: 3 | Status: SHIPPED | OUTPATIENT
Start: 2017-07-05 | End: 2018-04-27 | Stop reason: SDUPTHER

## 2017-07-05 RX ORDER — ATENOLOL 25 MG/1
TABLET ORAL
Qty: 90 TABLET | Refills: 1 | Status: SHIPPED | OUTPATIENT
Start: 2017-07-05 | End: 2017-10-31 | Stop reason: SDUPTHER

## 2017-07-05 RX ORDER — LEVOTHYROXINE SODIUM 112 UG/1
112 TABLET ORAL
Qty: 90 TABLET | Refills: 3 | Status: SHIPPED | OUTPATIENT
Start: 2017-07-05 | End: 2018-04-27 | Stop reason: SDUPTHER

## 2017-07-05 RX ORDER — TAMSULOSIN HYDROCHLORIDE 0.4 MG/1
1 CAPSULE ORAL DAILY
Qty: 90 CAPSULE | Refills: 1 | Status: SHIPPED | OUTPATIENT
Start: 2017-07-05 | End: 2018-04-27 | Stop reason: SDUPTHER

## 2017-07-05 RX ADMIN — TESTOSTERONE CYPIONATE 150 MG: 200 INJECTION, SOLUTION INTRAMUSCULAR at 08:07

## 2017-07-05 NOTE — PROGRESS NOTES
This note was created by combination of typed  and Dragon dictation.  Transcription errors may be present.  If there are any questions, please contact me.    Assessment & Plan  Low back strain, initial encounter-gets episodic flares of low back pain, has found Flexeril helpful in the past, prescription sent to the pharmacy.  Cautioned him regarding side effect profile including sedation.  He does not drink alcohol.  -     cyclobenzaprine (FLEXERIL) 10 MG tablet; Take 1 tablet (10 mg total) by mouth 2 (two) times daily as needed for Muscle spasms.  Dispense: 14 tablet; Refill: 0    Need for shingles vaccine  -     zoster vaccine live, PF, (ZOSTAVAX, PF,) 19,400 unit/0.65 mL injection; Inject 19,400 Units into the skin once.  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  Also refilled his levothyroxine.  -     predniSONE (DELTASONE) 5 MG tablet; Take 1 tablet (5 mg total) by mouth once daily.  Dispense: 90 tablet; Refill: 3  -     levothyroxine (SYNTHROID) 112 MCG tablet; Take 1 tablet (112 mcg total) by mouth before breakfast.  Dispense: 90 tablet; Refill: 3    Essential hypertension-refilled his atenolol.  -     atenolol (TENORMIN) 25 MG tablet; TAKE ONE TABLET BY MOUTH ONCE DAILY IN THE EVENING  Dispense: 90 tablet; Refill: 1    Benign non-nodular prostatic hyperplasia without lower urinary tract symptoms-refilled tamsulosin  -     tamsulosin (FLOMAX) 0.4 mg Cp24; Take 1 capsule (0.4 mg total) by mouth once daily.  Dispense: 90 capsule; Refill: 1    Hypogonadism in male-he gets testosterone injections every 2 weeks, 150 mg.  He got his last dose this morning.  It's been a while since we checked labs so in one week return for lab check CBC, CMP and testosterone level on 150 mg every 2 weeks.  -     CBC auto differential; Future; Expected date: 07/12/2017  -     Comprehensive metabolic panel; Future; Expected date: 07/12/2017  -     Testosterone; Future;  Expected date: 07/12/2017        Medications Discontinued During This Encounter   Medication Reason    atenolol (TENORMIN) 25 MG tablet Reorder    tamsulosin (FLOMAX) 0.4 mg Cp24 Reorder    predniSONE (DELTASONE) 5 MG tablet Reorder    levothyroxine (SYNTHROID) 112 MCG tablet Reorder       Follow-up: No Follow-up on file. plan for follow-up 6 months      =================================================================      Chief Complaint   Patient presents with    Back Pain       HPI  ROSALEE is a 82 y.o. male, last appointment with this clinic was 4/26/2017.    history of panhypopituitarism, he has coronary artery disease s/p stenting to RCA, had NSTEMI in the past, well-controlled hypertension, hyperlipidemia, AFib on anticoagulation.    Has had episodic back pain flares, starting since his teenage years.  Will have a flare every few years. Unclear trigger this time.  Sometimes with tweaking his back with movements with yardwork but this time - no reason.  This episode, started several days with low level pain and this morning more intense with difficulty getting out of bed.  On the R side of the back which is typical.  No radiation, this time.  No stomach pain or radiation in to the groin.  No fever.  Has found cyclobenzaprine helpful.     Thinks he had pneumovax 23     Needs refills on his medications.  His previous doctor, Dr. Boudreaux, has retired.  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  Takes chronic steroid, L-thyroxine, and gets testosterone injection.  He had his testosterone injection done this morning.    Also needs refills on his atenolol for his hypertension as well as the tamsulosin for BPH.    Patient Care Team:  Chun Boudreaux Jr., MD as PCP - General (Internal Medicine)    Patient Active Problem List    Diagnosis Date Noted    History of melanoma in situ 02/23/2017     Scalp 1/2017 tx with mohs       Atypical lymphocytic infiltrate of skin 01/13/2017    Status post catheter ablation of  atrial fibrillation 07/08/2016    Hyperlipidemia 10/23/2014    Sore throat 09/15/2014    DVT (deep venous thrombosis) 09/08/2014    Post PTCA 04/04/2014    Obesity 04/04/2014    ED (erectile dysfunction) 04/04/2014    HTN (hypertension) 04/04/2014    Bilateral leg edema 01/06/2014    Mitral regurgitation 02/14/2013    Coronary artery disease     MI (myocardial infarction) 12/26/2012    Chronic kidney disease, stage II (mild) 12/26/2012    Thyroid nodule 08/06/2012    Panhypopituitarism 08/06/2012    Fatigue 08/06/2012    Atrial fibrillation 07/27/2012    Anticoagulation monitoring by pharmacist 07/27/2012       PAST MEDICAL HISTORY:  Past Medical History:   Diagnosis Date    Acute coronary syndrome 2000    NSTEMI    Anticoagulant long-term use     Basal cell carcinoma excised     left scalp vertex    Bilateral leg edema 1/6/2014    Coronary artery disease     DVT of leg (deep venous thrombosis) 09/08/2014    On coumadin for years    Encounter for blood transfusion     Hyperlipidemia     Hypertension     Melanoma 01/04/2017     in situ crown of scalp    MI (myocardial infarction)     Panhypopituitarism     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    SQUAMOUS CELL CARCINOMA excised     left posterior scalp    Thyroid disease        PAST SURGICAL HISTORY:  Past Surgical History:   Procedure Laterality Date    APPENDECTOMY      BRAIN SURGERY      pituitary adenoma removed    CARDIAC CATHETERIZATION  05/09/2000    S/P stent to RCA,    CARDIAC CATHETERIZATION      stents placed    CORONARY ANGIOPLASTY  5/9/2000    RCA    CORONARY ANGIOPLASTY WITH STENT PLACEMENT      ESOPHAGEAL DILATION      EYE SURGERY Bilateral     cataracts removed and new lenses placed     FRACTURE SURGERY Left     leg    HERNIA REPAIR      left femur surgery      pituitatry      hypophysectomy    SKIN BIOPSY      TONSILLECTOMY      VASCULAR SURGERY          SOCIAL HISTORY:  Social History     Social History    Marital status:      Spouse name: N/A    Number of children: N/A    Years of education: N/A     Occupational History    Not on file.     Social History Main Topics    Smoking status: Never Smoker    Smokeless tobacco: Never Used      Comment: .  Two sons.  Bahai .      Alcohol use No    Drug use: No    Sexual activity: Not on file     Other Topics Concern    Not on file     Social History Narrative     and a retired [employer] .  Non smoker.        ALLERGIES AND MEDICATIONS: updated and reviewed.  Review of patient's allergies indicates:  No Known Allergies  Current Outpatient Prescriptions   Medication Sig Dispense Refill    aspirin 81 mg Tab Take 81 mg by mouth every evening. 1 Tablet Oral Every night      atenolol (TENORMIN) 25 MG tablet TAKE ONE TABLET BY MOUTH ONCE DAILY IN THE EVENING 90 tablet 1    dofetilide (TIKOSYN) 500 MCG Cap Take 1 capsule (500 mcg total) by mouth 2 (two) times daily. 180 capsule 3    folic acid (FOLVITE) 1 MG tablet Take 1 tablet (1,000 mcg total) by mouth once daily. 90 tablet 1    levothyroxine (SYNTHROID) 112 MCG tablet TAKE ONE TABLET BY MOUTH BEFORE BREAKFAST 90 tablet 3    nitroGLYCERIN (NITROSTAT) 0.4 MG SL tablet Place 1 tablet (0.4 mg total) under the tongue every 5 (five) minutes as needed for Chest pain. 1 Tablet, Sublingual Sublingual As directed.  take as directed 25 tablet 3    omeprazole (PRILOSEC) 20 MG capsule Take 20 mg by mouth every other day.      predniSONE (DELTASONE) 5 MG tablet TAKE ONE TABLET BY MOUTH ONCE DAILY 90 tablet 3    simvastatin (ZOCOR) 80 MG tablet TAKE ONE TABLET BY MOUTH IN THE EVENING 90 tablet 0    tamsulosin (FLOMAX) 0.4 mg Cp24 Take 1 capsule (0.4 mg total) by mouth once daily. 90 capsule 1    triamcinolone acetonide 0.1% (KENALOG) 0.1 % cream AAA bid 454 g 3    warfarin (COUMADIN) 5 MG tablet Take 1.5 tablets (7.5 mg total) by  "mouth Daily. Current Dose ( 5 mg on Tue; 7.5 mg all other days) or as directed by coumadin clinic 40 tablet 0     Current Facility-Administered Medications   Medication Dose Route Frequency Provider Last Rate Last Dose    testosterone cypionate injection 150 mg  150 mg Intramuscular Q14 Days Chun Boudreaux Jr., MD   150 mg at 07/05/17 0850       Review of Systems   Constitutional: Negative for chills and fever.   Gastrointestinal: Negative for abdominal pain, constipation and diarrhea.   Genitourinary: Negative for dysuria.   Musculoskeletal: Positive for back pain.       Physical Exam   Vitals:    07/05/17 1036   BP: 124/74   Pulse: 66   Temp: 98.4 °F (36.9 °C)   SpO2: 98%   Weight: 119 kg (262 lb 5.6 oz)   Height: 6' 3" (1.905 m)    Body mass index is 32.79 kg/m².  Weight: 119 kg (262 lb 5.6 oz)   Height: 6' 3" (190.5 cm)     Physical Exam   Constitutional: He is oriented to person, place, and time. He appears well-developed and well-nourished. No distress.   Eyes: EOM are normal.   Cardiovascular: Normal rate, regular rhythm and normal heart sounds.    No murmur heard.  Pulmonary/Chest: Effort normal and breath sounds normal.   Musculoskeletal: Normal range of motion.   Gingerly transferring from sitting to standing.  Some mild tenderness on palpation of the paraspinal muscles to the right of the lumbar spine, the right SI joint area is also tender.  Straight leg raise is actually pretty unremarkable   Neurological: He is alert and oriented to person, place, and time. Coordination normal.   Skin: Skin is warm and dry.   Psychiatric: He has a normal mood and affect. His behavior is normal. Thought content normal.     "

## 2017-07-06 NOTE — PROGRESS NOTES
Patient, Linda Skelton (MRN #[id number]), presented with a recent Platelet count less than 150 K/uL consistent with the definition of thrombocytopenia (ICD10 - D69.6).    Platelets   Date Value Ref Range Status   04/20/2017 111 (L) 150 - 350 K/uL Final     The patient's thrombocytopenia was monitored, evaluated, addressed and/or treated. This addendum to the medical record is made on 07/06/2017.

## 2017-07-12 ENCOUNTER — LAB VISIT (OUTPATIENT)
Dept: LAB | Facility: HOSPITAL | Age: 82
End: 2017-07-12
Attending: INTERNAL MEDICINE
Payer: MEDICARE

## 2017-07-12 DIAGNOSIS — E29.1 HYPOGONADISM IN MALE: ICD-10-CM

## 2017-07-12 LAB
ALBUMIN SERPL BCP-MCNC: 3.5 G/DL
ALP SERPL-CCNC: 47 U/L
ALT SERPL W/O P-5'-P-CCNC: 15 U/L
ANION GAP SERPL CALC-SCNC: 8 MMOL/L
AST SERPL-CCNC: 21 U/L
BASOPHILS # BLD AUTO: 0 K/UL
BASOPHILS NFR BLD: 0 %
BILIRUB SERPL-MCNC: 0.9 MG/DL
BUN SERPL-MCNC: 19 MG/DL
CALCIUM SERPL-MCNC: 8.8 MG/DL
CHLORIDE SERPL-SCNC: 102 MMOL/L
CO2 SERPL-SCNC: 27 MMOL/L
CREAT SERPL-MCNC: 1.3 MG/DL
DIFFERENTIAL METHOD: ABNORMAL
EOSINOPHIL # BLD AUTO: 0.1 K/UL
EOSINOPHIL NFR BLD: 1.2 %
ERYTHROCYTE [DISTWIDTH] IN BLOOD BY AUTOMATED COUNT: 13.6 %
EST. GFR  (AFRICAN AMERICAN): 58.7 ML/MIN/1.73 M^2
EST. GFR  (NON AFRICAN AMERICAN): 50.8 ML/MIN/1.73 M^2
GLUCOSE SERPL-MCNC: 92 MG/DL
HCT VFR BLD AUTO: 46.6 %
HGB BLD-MCNC: 15.4 G/DL
LYMPHOCYTES # BLD AUTO: 1.1 K/UL
LYMPHOCYTES NFR BLD: 22.4 %
MCH RBC QN AUTO: 32.2 PG
MCHC RBC AUTO-ENTMCNC: 33 %
MCV RBC AUTO: 98 FL
MONOCYTES # BLD AUTO: 0.9 K/UL
MONOCYTES NFR BLD: 17.6 %
NEUTROPHILS # BLD AUTO: 2.9 K/UL
NEUTROPHILS NFR BLD: 58.6 %
PLATELET # BLD AUTO: 118 K/UL
PMV BLD AUTO: 12.5 FL
POTASSIUM SERPL-SCNC: 3.8 MMOL/L
PROT SERPL-MCNC: 6.5 G/DL
RBC # BLD AUTO: 4.78 M/UL
SODIUM SERPL-SCNC: 137 MMOL/L
TESTOST SERPL-MCNC: 1251 NG/DL
WBC # BLD AUTO: 4.9 K/UL

## 2017-07-12 PROCEDURE — 36415 COLL VENOUS BLD VENIPUNCTURE: CPT | Mod: PO

## 2017-07-12 PROCEDURE — 84403 ASSAY OF TOTAL TESTOSTERONE: CPT

## 2017-07-12 PROCEDURE — 80053 COMPREHEN METABOLIC PANEL: CPT

## 2017-07-12 PROCEDURE — 85025 COMPLETE CBC W/AUTO DIFF WBC: CPT

## 2017-07-13 DIAGNOSIS — E29.1 TESTICULAR HYPOFUNCTION: ICD-10-CM

## 2017-07-13 RX ORDER — TESTOSTERONE CYPIONATE 200 MG/ML
100 INJECTION, SOLUTION INTRAMUSCULAR
Status: COMPLETED | OUTPATIENT
Start: 2017-07-20 | End: 2017-11-08

## 2017-07-13 NOTE — PROGRESS NOTES
Testosterone level high on 150 mg q2 weeks; decrease to 100 mg every 2 weeks and repeat labs in 3 months.

## 2017-07-13 NOTE — PROGRESS NOTES
Testosterone high on 150 mg every 2 weeks.  Decrease to 100 mg every 2 weeks.  CBC with thrombocytopenia, mild, chronic, no change.  CKD seen previously otherwise CMP stable.    Plan to repeat in 3 months

## 2017-07-19 ENCOUNTER — CLINICAL SUPPORT (OUTPATIENT)
Dept: FAMILY MEDICINE | Facility: CLINIC | Age: 82
End: 2017-07-19
Payer: MEDICARE

## 2017-07-19 ENCOUNTER — TELEPHONE (OUTPATIENT)
Dept: FAMILY MEDICINE | Facility: CLINIC | Age: 82
End: 2017-07-19

## 2017-07-19 ENCOUNTER — LAB VISIT (OUTPATIENT)
Dept: LAB | Facility: HOSPITAL | Age: 82
End: 2017-07-19
Attending: INTERNAL MEDICINE
Payer: MEDICARE

## 2017-07-19 ENCOUNTER — ANTI-COAG VISIT (OUTPATIENT)
Dept: CARDIOLOGY | Facility: CLINIC | Age: 82
End: 2017-07-19

## 2017-07-19 DIAGNOSIS — I48.20 CHRONIC ATRIAL FIBRILLATION: ICD-10-CM

## 2017-07-19 DIAGNOSIS — E29.1 OTHER TESTICULAR HYPOFUNCTION: Primary | ICD-10-CM

## 2017-07-19 DIAGNOSIS — Z79.01 ANTICOAGULATION MONITORING BY PHARMACIST: ICD-10-CM

## 2017-07-19 LAB
INR PPP: 2.3
PROTHROMBIN TIME: 23.3 SEC

## 2017-07-19 PROCEDURE — 36415 COLL VENOUS BLD VENIPUNCTURE: CPT | Mod: PO

## 2017-07-19 PROCEDURE — 96372 THER/PROPH/DIAG INJ SC/IM: CPT | Mod: S$GLB,,, | Performed by: INTERNAL MEDICINE

## 2017-07-19 PROCEDURE — 85610 PROTHROMBIN TIME: CPT

## 2017-07-19 RX ADMIN — TESTOSTERONE CYPIONATE 100 MG: 200 INJECTION, SOLUTION INTRAMUSCULAR at 08:07

## 2017-07-19 NOTE — TELEPHONE ENCOUNTER
Dear Mr. Skelton,     Your labs are back and they show that your testosterone level was a little high.  You're getting 150 mg every 2 weeks, and I would decrease this to 100 mg every 2 weeks.     The rest of your labs were stable.     Let me know if you have any questions.     Wishing you good health,     Anderson Jerome MD     Result mailed to home address, patient did not read his e-mail from portal.

## 2017-07-20 ENCOUNTER — HOSPITAL ENCOUNTER (OUTPATIENT)
Dept: CARDIOLOGY | Facility: CLINIC | Age: 82
Discharge: HOME | End: 2017-07-20
Payer: MEDICARE

## 2017-07-20 ENCOUNTER — OFFICE VISIT (OUTPATIENT)
Dept: ELECTROPHYSIOLOGY | Facility: CLINIC | Age: 82
End: 2017-07-20
Payer: MEDICARE

## 2017-07-20 VITALS
BODY MASS INDEX: 32.58 KG/M2 | SYSTOLIC BLOOD PRESSURE: 136 MMHG | DIASTOLIC BLOOD PRESSURE: 80 MMHG | HEART RATE: 56 BPM | WEIGHT: 262 LBS | HEIGHT: 75 IN

## 2017-07-20 DIAGNOSIS — I10 ESSENTIAL HYPERTENSION: ICD-10-CM

## 2017-07-20 DIAGNOSIS — I25.2 HISTORY OF MYOCARDIAL INFARCTION: ICD-10-CM

## 2017-07-20 DIAGNOSIS — I82.401 ACUTE DEEP VEIN THROMBOSIS (DVT) OF RIGHT LOWER EXTREMITY, UNSPECIFIED VEIN: ICD-10-CM

## 2017-07-20 DIAGNOSIS — I25.10 CORONARY ARTERY DISEASE INVOLVING NATIVE CORONARY ARTERY OF NATIVE HEART WITHOUT ANGINA PECTORIS: ICD-10-CM

## 2017-07-20 DIAGNOSIS — I48.20 CHRONIC ATRIAL FIBRILLATION: Primary | ICD-10-CM

## 2017-07-20 DIAGNOSIS — I34.0 MITRAL VALVE INSUFFICIENCY, UNSPECIFIED ETIOLOGY: ICD-10-CM

## 2017-07-20 DIAGNOSIS — E66.9 OBESITY, CLASS I, BMI 30-34.9: ICD-10-CM

## 2017-07-20 DIAGNOSIS — Z98.61 POST PTCA: ICD-10-CM

## 2017-07-20 DIAGNOSIS — E78.5 HYPERLIPIDEMIA, UNSPECIFIED HYPERLIPIDEMIA TYPE: ICD-10-CM

## 2017-07-20 DIAGNOSIS — Z79.01 CURRENT USE OF LONG TERM ANTICOAGULATION: ICD-10-CM

## 2017-07-20 DIAGNOSIS — N18.2 CHRONIC KIDNEY DISEASE, STAGE II (MILD): ICD-10-CM

## 2017-07-20 DIAGNOSIS — Z79.899 VISIT FOR MONITORING TIKOSYN THERAPY: ICD-10-CM

## 2017-07-20 DIAGNOSIS — I48.91 ATRIAL FIBRILLATION, UNSPECIFIED TYPE: ICD-10-CM

## 2017-07-20 DIAGNOSIS — Z51.81 VISIT FOR MONITORING TIKOSYN THERAPY: ICD-10-CM

## 2017-07-20 DIAGNOSIS — Z98.890 STATUS POST CATHETER ABLATION OF ATRIAL FIBRILLATION: ICD-10-CM

## 2017-07-20 PROCEDURE — 1159F MED LIST DOCD IN RCRD: CPT | Mod: S$GLB,,, | Performed by: NURSE PRACTITIONER

## 2017-07-20 PROCEDURE — 93000 ELECTROCARDIOGRAM COMPLETE: CPT | Mod: S$GLB,,, | Performed by: INTERNAL MEDICINE

## 2017-07-20 PROCEDURE — 1126F AMNT PAIN NOTED NONE PRSNT: CPT | Mod: S$GLB,,, | Performed by: NURSE PRACTITIONER

## 2017-07-20 PROCEDURE — 99214 OFFICE O/P EST MOD 30 MIN: CPT | Mod: S$GLB,,, | Performed by: NURSE PRACTITIONER

## 2017-07-20 PROCEDURE — 99999 PR PBB SHADOW E&M-EST. PATIENT-LVL III: CPT | Mod: PBBFAC,,, | Performed by: NURSE PRACTITIONER

## 2017-07-20 PROCEDURE — 99499 UNLISTED E&M SERVICE: CPT | Mod: S$GLB,,, | Performed by: NURSE PRACTITIONER

## 2017-07-20 NOTE — PROGRESS NOTES
Subjective:    Patient ID:  Linda Skelton is a 82 y.o. male who presents for follow-up of paroxysmal atrial fibrillation    Linda Skelton is a patient of Dr. Acacia Lopez.      HPI     Mr. Skelton is an 82 y.o. male with a hx of AF s/p PVI, CAD s/p NSTEMI s/p PTCA, a hx of DVT, HTN, panhypopituitarism, CKD II, HLD, and obesity.  Mr. Skelton was initially diagnosed with AF in 2001, and has been on Tikosyn since 2004. CAD-s/p stent to RCA. on BB, statin, ASA-- EF 60, some diastolic dysfunction. On Coumadin, CHADS2 score=2 (HTN, age), CHADSVASC=3.   He reports being in AF (09/29/15); longest time consistently in AF>>tired/fatigued, SOB/HSIEH, not happy with quality of life.     Mr. Skelton underwent a PVI (02/26/16) with successful cryoablative isolation of all four pulmonary veins with demonstration of disappearance of pulmonary vein firing as well as bidirectional conduction block across the sleeves with large WACA type ablation as demonstrated by scar level of 0.3 millivolts. Following the procedure, Mr. Skelton reported symptomatic improvement and an increase in his energy level. He continued to experience stable HSIEH.     Mr. Skelton discontinued Tikosyn on 11/30/16. As directed, but experienced AF recurrence shortly thereafter, and Tikosyn was re-started. Since re-starting the medication, Mr. Skelton reports feeling well; he only had recurrence in May (<2 day duration); no recurrence since. He denies experiencing chest pain, SOB/HSIEH, dizziness, palpitations, or syncope. Stable energy.     Recent cardiac studies:  [facility] (02/24/16) revealed an EF of 55-60%, biatrial enlargement, mild MS with evidence of posterior leaflet prolapse and flail P 3 segement, mild MR, mild TR, and grade 2 atheroma disease of aorta.     I reviewed today's ECG which demonstrated SB at 56 bpm; , , and QTc 447.      Review of Systems   Constitution: Negative for diaphoresis and malaise/fatigue.   HENT: Negative for headaches and  nosebleeds.    Eyes: Negative for double vision.   Cardiovascular: Positive for irregular heartbeat. Negative for chest pain, dyspnea on exertion, near-syncope, palpitations and syncope.   Respiratory: Negative for shortness of breath.    Skin: Negative.    Musculoskeletal: Positive for stiffness.   Gastrointestinal: Negative for abdominal pain, hematemesis and hematochezia.   Genitourinary: Negative for hematuria.   Neurological: Negative for dizziness and light-headedness.   Psychiatric/Behavioral: Negative for altered mental status.        Objective:    Physical Exam   Constitutional: He is oriented to person, place, and time. He appears well-developed and well-nourished.   HENT:   Head: Normocephalic and atraumatic.   Eyes: Pupils are equal, round, and reactive to light.   Cardiovascular: Normal rate, regular rhythm, normal heart sounds and intact distal pulses.    Pulmonary/Chest: Effort normal and breath sounds normal.   Neurological: He is alert and oriented to person, place, and time.   Vitals reviewed.        Assessment:       1. Chronic atrial fibrillation    2. Status post catheter ablation of atrial fibrillation    3. Visit for monitoring Tikosyn therapy    4. Current use of long term anticoagulation    5. Coronary artery disease involving native coronary artery of native heart without angina pectoris    6. History of myocardial infarction    7. Post PTCA    8. Mitral valve insufficiency, unspecified etiology    9. Acute deep vein thrombosis (DVT) of right lower extremity, unspecified vein    10. Hyperlipidemia, unspecified hyperlipidemia type    11. Essential hypertension    12. Chronic kidney disease, stage II (mild)    13. Obesity, Class I, BMI 30-34.9         Plan:       In summary, Mr. Skelton is an 82 y.o. male with a hx of AF s/p PVI, CAD s/p NSTEMI s/p PTCA, a hx of DVT, HTN, panhypopituitarism, CKD II, HLD, and obesity. He is doing well from a rhythm perspective s/p PVI, with only one short-lived  episode; rhythm-controlled on Tikosyn and anticoagulated on warfarin.     Continue current medication regimen.   Follow up in clinic in 1 year, sooner as needed.     AAMIR Aceves, APRN, FNP-C        (A copy of today's note was sent to Dr. Acacia Lopez.)

## 2017-07-21 ENCOUNTER — TELEPHONE (OUTPATIENT)
Dept: ELECTROPHYSIOLOGY | Facility: CLINIC | Age: 82
End: 2017-07-21

## 2017-07-21 DIAGNOSIS — I48.91 ATRIAL FIBRILLATION, UNSPECIFIED TYPE: Primary | ICD-10-CM

## 2017-07-30 DIAGNOSIS — E78.5 HYPERLIPIDEMIA: ICD-10-CM

## 2017-07-30 RX ORDER — SIMVASTATIN 80 MG/1
TABLET, FILM COATED ORAL
Qty: 90 TABLET | Refills: 0 | Status: SHIPPED | OUTPATIENT
Start: 2017-07-30 | End: 2017-10-30 | Stop reason: SDUPTHER

## 2017-08-02 ENCOUNTER — CLINICAL SUPPORT (OUTPATIENT)
Dept: FAMILY MEDICINE | Facility: CLINIC | Age: 82
End: 2017-08-02
Payer: MEDICARE

## 2017-08-02 PROCEDURE — 96372 THER/PROPH/DIAG INJ SC/IM: CPT | Mod: S$GLB,,, | Performed by: INTERNAL MEDICINE

## 2017-08-02 RX ADMIN — TESTOSTERONE CYPIONATE 100 MG: 200 INJECTION, SOLUTION INTRAMUSCULAR at 09:08

## 2017-08-02 NOTE — PROGRESS NOTES
Administered Depo Testosterone 100 mg IM to left ventrogluteal.  Patient tolerated injection well, no adverse reactions noted.

## 2017-08-16 ENCOUNTER — CLINICAL SUPPORT (OUTPATIENT)
Dept: FAMILY MEDICINE | Facility: CLINIC | Age: 82
End: 2017-08-16
Payer: MEDICARE

## 2017-08-16 PROCEDURE — 96372 THER/PROPH/DIAG INJ SC/IM: CPT | Mod: S$GLB,,, | Performed by: INTERNAL MEDICINE

## 2017-08-16 RX ADMIN — TESTOSTERONE CYPIONATE 100 MG: 200 INJECTION, SOLUTION INTRAMUSCULAR at 09:08

## 2017-08-16 NOTE — PROGRESS NOTES
Administered Depo - Testosterone 100 mg IM to right ventrogluteal.  Patient tolerated injection well, no adverse reactions noted.

## 2017-08-21 ENCOUNTER — OFFICE VISIT (OUTPATIENT)
Dept: DERMATOLOGY | Facility: CLINIC | Age: 82
End: 2017-08-21
Payer: MEDICARE

## 2017-08-21 DIAGNOSIS — D18.00 ANGIOMA: ICD-10-CM

## 2017-08-21 DIAGNOSIS — C84.A0 PLEOMORPHIC SMALL OR MEDIUM-SIZED CELL CUTANEOUS T-CELL LYMPHOMA: Primary | ICD-10-CM

## 2017-08-21 DIAGNOSIS — L82.1 SK (SEBORRHEIC KERATOSIS): ICD-10-CM

## 2017-08-21 DIAGNOSIS — L90.5 SCAR: ICD-10-CM

## 2017-08-21 DIAGNOSIS — Z85.828 PERSONAL HISTORY OF SKIN CANCER: ICD-10-CM

## 2017-08-21 DIAGNOSIS — Z86.006 HISTORY OF MELANOMA IN SITU: ICD-10-CM

## 2017-08-21 DIAGNOSIS — L57.0 AK (ACTINIC KERATOSIS): ICD-10-CM

## 2017-08-21 DIAGNOSIS — D22.9 NEVUS: ICD-10-CM

## 2017-08-21 PROCEDURE — 99499 UNLISTED E&M SERVICE: CPT | Mod: S$GLB,,, | Performed by: DERMATOLOGY

## 2017-08-21 PROCEDURE — 99999 PR PBB SHADOW E&M-EST. PATIENT-LVL II: CPT | Mod: PBBFAC,,, | Performed by: DERMATOLOGY

## 2017-08-21 PROCEDURE — 1159F MED LIST DOCD IN RCRD: CPT | Mod: S$GLB,,, | Performed by: DERMATOLOGY

## 2017-08-21 PROCEDURE — 17000 DESTRUCT PREMALG LESION: CPT | Mod: S$GLB,,, | Performed by: DERMATOLOGY

## 2017-08-21 PROCEDURE — 99214 OFFICE O/P EST MOD 30 MIN: CPT | Mod: 25,S$GLB,, | Performed by: DERMATOLOGY

## 2017-08-21 PROCEDURE — 17003 DESTRUCT PREMALG LES 2-14: CPT | Mod: S$GLB,,, | Performed by: DERMATOLOGY

## 2017-08-21 PROCEDURE — 3008F BODY MASS INDEX DOCD: CPT | Mod: S$GLB,,, | Performed by: DERMATOLOGY

## 2017-08-21 NOTE — PROGRESS NOTES
Subjective:       Patient ID:  Linda Skelton is a 82 y.o. male who presents for   Chief Complaint   Patient presents with    Lesion     Pt here today for a TBSE. Pt c/o lesion on right thigh. No bleeding or pain. No pre vtx.      Lesion         Review of Systems   Constitutional: Negative for fever, chills, weight loss, weight gain, fatigue, night sweats and malaise.   HENT: Negative for headaches.    Respiratory: Negative for cough and shortness of breath.    Gastrointestinal: Negative for indigestion.   Skin: Positive for wears hat. Negative for daily sunscreen use and recent sunburn.   Neurological: Negative for headaches.   Hematologic/Lymphatic: Negative for adenopathy. Does not bruise/bleed easily.        Objective:    Physical Exam   Constitutional: He appears well-developed and well-nourished. No distress.   Lymphadenopathy: No supraclavicular adenopathy is present.     He has no cervical adenopathy.     He has no axillary adenopathy.     He has no inguinal adenopathy.   Neurological: He is alert and oriented to person, place, and time. He is not disoriented.   Psychiatric: He has a normal mood and affect.   Skin:   Areas Examined (abnormalities noted in diagram):   Scalp / Hair Palpated and Inspected  Head / Face Inspection Performed  Neck Inspection Performed  Chest / Axilla Inspection Performed  Abdomen Inspection Performed  Genitals / Buttocks / Groin Inspection Performed  Back Inspection Performed  RUE Inspected  LUE Inspection Performed  RLE Inspected  LLE Inspection Performed  Nails and Digits Inspection Performed                           Diagram Legend     Erythematous scaling macule/papule c/w actinic keratosis       Vascular papule c/w angioma      Pigmented verrucoid papule/plaque c/w seborrheic keratosis      Yellow umbilicated papule c/w sebaceous hyperplasia      Irregularly shaped tan macule c/w lentigo     1-2 mm smooth white papules consistent with Milia      Movable subcutaneous  cyst with punctum c/w epidermal inclusion cyst      Subcutaneous movable cyst c/w pilar cyst      Firm pink to brown papule c/w dermatofibroma      Pedunculated fleshy papule(s) c/w skin tag(s)      Evenly pigmented macule c/w junctional nevus     Mildly variegated pigmented, slightly irregular-bordered macule c/w mildly atypical nevus      Flesh colored to evenly pigmented papule c/w intradermal nevus       Pink pearly papule/plaque c/w basal cell carcinoma      Erythematous hyperkeratotic cursted plaque c/w SCC      Surgical scar with no sign of skin cancer recurrence      Open and closed comedones      Inflammatory papules and pustules      Verrucoid papule consistent consistent with wart     Erythematous eczematous patches and plaques     Dystrophic onycholytic nail with subungual debris c/w onychomycosis     Umbilicated papule    Erythematous-base heme-crusted tan verrucoid plaque consistent with inflamed seborrheic keratosis     Erythematous Silvery Scaling Plaque c/w Psoriasis     See annotation      Assessment / Plan:        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 Liquid nitrogen cryosurgery is applied to the 5 actinic keratoses, as detailed in the physical exam, to produce a freeze injury. The patient is aware that blisters may form and is instructed on wound care with gentle cleansing and use of vaseline ointment to keep moist until healed. The patient is supplied a handout on cryosurgery and is instructed to call if lesions do not  completely resolve.    Angioma  These are benign vascular lesions that are inherited.  Treatment is not necessary.    SK (seborrheic keratosis)  These are benign inherited growths without a malignant potential. Reassurance given to patient. No treatment is necessary.     Nevus  Discussed ABCDE's of nevi.  Monitor for new mole or moles that are becoming bigger, darker, irritated, or developing irregular borders. Brochure provided.    History of melanoma in situ- crown of scalp 1/2017  Personal history of skin cancer  Scar  Area of previous melanoma examined. Site well healed with no signs of recurrence.    Total body skin examination performed today including at least 12 points as noted in physical examination. No lesions suspicious for malignancy noted.             Return in about 3 months (around 11/21/2017).

## 2017-08-28 ENCOUNTER — OFFICE VISIT (OUTPATIENT)
Dept: FAMILY MEDICINE | Facility: CLINIC | Age: 82
End: 2017-08-28
Payer: MEDICARE

## 2017-08-28 VITALS
HEIGHT: 75 IN | BODY MASS INDEX: 33 KG/M2 | OXYGEN SATURATION: 94 % | SYSTOLIC BLOOD PRESSURE: 160 MMHG | DIASTOLIC BLOOD PRESSURE: 80 MMHG | HEART RATE: 56 BPM | WEIGHT: 265.44 LBS

## 2017-08-28 DIAGNOSIS — E29.1 HYPOGONADISM IN MALE: ICD-10-CM

## 2017-08-28 DIAGNOSIS — R07.89 MUSCULOSKELETAL CHEST PAIN: Primary | ICD-10-CM

## 2017-08-28 DIAGNOSIS — C84.A0 PLEOMORPHIC SMALL OR MEDIUM-SIZED CELL CUTANEOUS T-CELL LYMPHOMA: ICD-10-CM

## 2017-08-28 DIAGNOSIS — I25.10 CORONARY ARTERY DISEASE INVOLVING NATIVE CORONARY ARTERY OF NATIVE HEART WITHOUT ANGINA PECTORIS: ICD-10-CM

## 2017-08-28 DIAGNOSIS — E23.0 PANHYPOPITUITARISM: ICD-10-CM

## 2017-08-28 PROCEDURE — 99999 PR PBB SHADOW E&M-EST. PATIENT-LVL III: CPT | Mod: PBBFAC,,, | Performed by: INTERNAL MEDICINE

## 2017-08-28 PROCEDURE — 3079F DIAST BP 80-89 MM HG: CPT | Mod: S$GLB,,, | Performed by: INTERNAL MEDICINE

## 2017-08-28 PROCEDURE — 3008F BODY MASS INDEX DOCD: CPT | Mod: S$GLB,,, | Performed by: INTERNAL MEDICINE

## 2017-08-28 PROCEDURE — 99499 UNLISTED E&M SERVICE: CPT | Mod: S$GLB,,, | Performed by: INTERNAL MEDICINE

## 2017-08-28 PROCEDURE — 1126F AMNT PAIN NOTED NONE PRSNT: CPT | Mod: S$GLB,,, | Performed by: INTERNAL MEDICINE

## 2017-08-28 PROCEDURE — 99214 OFFICE O/P EST MOD 30 MIN: CPT | Mod: S$GLB,,, | Performed by: INTERNAL MEDICINE

## 2017-08-28 PROCEDURE — 1159F MED LIST DOCD IN RCRD: CPT | Mod: S$GLB,,, | Performed by: INTERNAL MEDICINE

## 2017-08-28 PROCEDURE — 3077F SYST BP >= 140 MM HG: CPT | Mod: S$GLB,,, | Performed by: INTERNAL MEDICINE

## 2017-08-28 NOTE — PROGRESS NOTES
This note was created by combination of typed  and Dragon dictation.  Transcription errors may be present.  If there are any questions, please contact me.    Assessment & Plan  Musculoskeletal chest pain-exam inconsistent with cardiac etiology, pulmonary etiology, or hepatic etiology.  No flank pain.  Reassurance.  He is on multiple medications for numerous conditions and is okay for no additional medications but just knowing that this is musculoskeletal.    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  -     Testosterone; Future; Expected date: 09/20/2017    Coronary artery disease involving native coronary artery of native heart without angina pectoris- he notes upcoming cardiology eval in Oct and I'll put in orders for CMP and FLP to get done all at once with lab draw.  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  -     TSH; Future; Expected date: 09/20/2017  -     T4, free; Future; Expected date: 09/20/2017        There are no discontinued medications.    Follow-up: No Follow-up on file.      =================================================================      Chief Complaint   Patient presents with    pain in chest due to lifting       HPI  ROSALEE is a 82 y.o. male, last appointment with this clinic was 7/5/2017.    I previously saw him early July.  history of panhypopituitarism  coronary artery disease s/p stenting to RCA, had NSTEMI in the past  Hypertension  Hyperlipidemia  AFib on anticoagulation.    2 weeks ago - granddaughter came to visit - 40 pounds.  She kept jumping in to his arms.  2 days after leaving, he started getting pain int he RUQ area.  Pain with getting up; not with bending over.  Pulling  sensation. Maybe some in the back too.  No pain with eating.  BMs are normal.  No pain with deep inspiration.  No pain with physical exertion.  Though with walking can feel pulling.  Heat pad and ice pack with temporary relief.  No bruising no rash. No swelling. Intensity of pain - 3/10. No pain with rest.  Only with certain movements.  Twisting the trunk - pain.    Home BP cuff readings - systolic 130s.      Not interested in medicine for this but to make sure/confirm that it is musculoskeletal.     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    Visit for monitoring Tikosyn therapy 07/20/2017    Current use of long term anticoagulation 07/20/2017    Benign non-nodular prostatic hyperplasia without lower urinary tract symptoms 07/05/2017    Hypogonadism in male 07/05/2017    History of melanoma in situ 02/23/2017     Scalp 1/2017 tx with mohs       Atypical lymphocytic infiltrate of skin 01/13/2017    Status post catheter ablation of atrial fibrillation 07/08/2016    Hyperlipidemia 10/23/2014    Sore throat 09/15/2014    DVT (deep venous thrombosis) 09/08/2014    Post PTCA 04/04/2014    Obesity, Class I, BMI 30-34.9 04/04/2014    ED (erectile dysfunction) 04/04/2014    HTN (hypertension) 04/04/2014    Bilateral leg edema 01/06/2014    Mitral regurgitation 02/14/2013    Coronary artery disease     History of myocardial infarction 12/26/2012    Chronic kidney disease, stage II (mild) 12/26/2012    Thyroid nodule 08/06/2012    Panhypopituitarism 08/06/2012    Fatigue 08/06/2012    Atrial fibrillation 07/27/2012       PAST MEDICAL HISTORY:  Past Medical History:   Diagnosis Date    Acute coronary syndrome 2000    NSTEMI    Anticoagulant long-term use     Basal cell carcinoma excised     left scalp vertex    Bilateral leg  edema 1/6/2014    Coronary artery disease     DVT of leg (deep venous thrombosis) 09/08/2014    On coumadin for years    Encounter for blood transfusion     Hyperlipidemia     Hypertension     Melanoma 01/04/2017     in situ crown of scalp    MI (myocardial infarction)     Panhypopituitarism     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    SQUAMOUS CELL CARCINOMA excised     left posterior scalp    Thyroid disease        PAST SURGICAL HISTORY:  Past Surgical History:   Procedure Laterality Date    APPENDECTOMY      BRAIN SURGERY      pituitary adenoma removed    CARDIAC CATHETERIZATION  05/09/2000    S/P stent to RCA,    CARDIAC CATHETERIZATION      stents placed    CORONARY ANGIOPLASTY  5/9/2000    RCA    CORONARY ANGIOPLASTY WITH STENT PLACEMENT      ESOPHAGEAL DILATION      EYE SURGERY Bilateral     cataracts removed and new lenses placed     FRACTURE SURGERY Left     leg    HERNIA REPAIR      left femur surgery      pituitatry      hypophysectomy    SKIN BIOPSY      TONSILLECTOMY      VASCULAR SURGERY         SOCIAL HISTORY:  Social History     Social History    Marital status:      Spouse name: N/A    Number of children: N/A    Years of education: N/A     Occupational History    Not on file.     Social History Main Topics    Smoking status: Never Smoker    Smokeless tobacco: Never Used      Comment: .  Two sons.  [employer] .      Alcohol use No    Drug use: No    Sexual activity: Not on file     Other Topics Concern    Not on file     Social History Narrative     and a retired [employer] .  Non smoker.        ALLERGIES AND MEDICATIONS: updated and reviewed.  Review of patient's allergies indicates:  No Known Allergies  Current Outpatient Prescriptions   Medication Sig Dispense Refill    aspirin 81 mg Tab Take 81 mg by mouth every evening. 1 Tablet Oral Every night      atenolol (TENORMIN) 25 MG tablet  TAKE ONE TABLET BY MOUTH ONCE DAILY IN THE EVENING 90 tablet 1    dofetilide (TIKOSYN) 500 MCG Cap Take 1 capsule (500 mcg total) by mouth 2 (two) times daily. 180 capsule 3    folic acid (FOLVITE) 1 MG tablet Take 1 tablet (1,000 mcg total) by mouth once daily. 90 tablet 1    levothyroxine (SYNTHROID) 112 MCG tablet Take 1 tablet (112 mcg total) by mouth before breakfast. 90 tablet 3    nitroGLYCERIN (NITROSTAT) 0.4 MG SL tablet Place 1 tablet (0.4 mg total) under the tongue every 5 (five) minutes as needed for Chest pain. 1 Tablet, Sublingual Sublingual As directed.  take as directed 25 tablet 3    omeprazole (PRILOSEC) 20 MG capsule Take 20 mg by mouth every other day.      predniSONE (DELTASONE) 5 MG tablet Take 1 tablet (5 mg total) by mouth once daily. 90 tablet 3    simvastatin (ZOCOR) 80 MG tablet TAKE ONE TABLET BY MOUTH IN THE EVENING 90 tablet 0    tamsulosin (FLOMAX) 0.4 mg Cp24 Take 1 capsule (0.4 mg total) by mouth once daily. 90 capsule 1    triamcinolone acetonide 0.1% (KENALOG) 0.1 % cream AAA bid 454 g 3    warfarin (COUMADIN) 5 MG tablet Take 1.5 tablets (7.5 mg total) by mouth Daily. Current Dose ( 5 mg on Tue; 7.5 mg all other days) or as directed by coumadin clinic 40 tablet 0     Current Facility-Administered Medications   Medication Dose Route Frequency Provider Last Rate Last Dose    testosterone cypionate injection 100 mg  100 mg Intramuscular Q14 Days Anderson Jerome MD   100 mg at 08/16/17 0928       Review of Systems   Constitutional: Negative for fever, malaise/fatigue and weight loss.   HENT: Negative for congestion.    Eyes: Negative for blurred vision and pain.   Respiratory: Negative for shortness of breath and wheezing.    Cardiovascular: Negative for chest pain, palpitations and leg swelling.   Gastrointestinal: Negative for abdominal pain, blood in stool, constipation, diarrhea and heartburn.   Genitourinary: Negative for dysuria, hematuria and urgency.  "  Musculoskeletal: Negative for joint pain.   Neurological: Negative for tingling, focal weakness, weakness and headaches.   Psychiatric/Behavioral: Negative for depression. The patient is not nervous/anxious.        Physical Exam   Vitals:    08/28/17 0853 08/28/17 0905   BP: (!) 179/89 (!) 160/80   BP Location:  Left arm   Patient Position:  Sitting   BP Method:  Large (Manual)   Pulse: (!) 56    SpO2: (!) 94%    Weight: 120.4 kg (265 lb 6.9 oz)    Height: 6' 3" (1.905 m)     Body mass index is 33.18 kg/m².  Weight: 120.4 kg (265 lb 6.9 oz)   Height: 6' 3" (190.5 cm)     Physical Exam   Constitutional: He is oriented to person, place, and time. He appears well-developed and well-nourished. No distress.   Eyes: EOM are normal.   Cardiovascular: Normal rate, regular rhythm and normal heart sounds.    No murmur heard.  Pulmonary/Chest: Effort normal and breath sounds normal.   Abdominal: Soft. He exhibits no distension and no mass. There is no tenderness. There is no guarding.   Basurto sign negative.  Right flank area nontender   Musculoskeletal: Normal range of motion.   No tenderness on palpation of the thoracic or lumbar spine, no tenderness on palpation of the paraspinal muscles  The ribs of the right lower rib cage are unremarkable without tenderness or deformity or crepitus or instability or asymmetry   Neurological: He is alert and oriented to person, place, and time. Coordination normal.   Skin: Skin is warm and dry.   Specifically no rash over the area of complaint   Psychiatric: He has a normal mood and affect. His behavior is normal. Thought content normal.     "

## 2017-08-30 ENCOUNTER — CLINICAL SUPPORT (OUTPATIENT)
Dept: FAMILY MEDICINE | Facility: CLINIC | Age: 82
End: 2017-08-30
Payer: MEDICARE

## 2017-08-30 DIAGNOSIS — E29.1 TESTICULAR HYPOFUNCTION: Primary | ICD-10-CM

## 2017-08-30 PROCEDURE — 96372 THER/PROPH/DIAG INJ SC/IM: CPT | Mod: S$GLB,,, | Performed by: INTERNAL MEDICINE

## 2017-08-30 RX ADMIN — TESTOSTERONE CYPIONATE 100 MG: 200 INJECTION, SOLUTION INTRAMUSCULAR at 09:08

## 2017-08-30 NOTE — PROGRESS NOTES
Pt tolerated injection of depo-testosterone 100mg to left ventrogluteal without difficulty; no adverse reaction noted

## 2017-09-05 ENCOUNTER — PATIENT MESSAGE (OUTPATIENT)
Dept: FAMILY MEDICINE | Facility: CLINIC | Age: 82
End: 2017-09-05

## 2017-09-13 ENCOUNTER — ANTI-COAG VISIT (OUTPATIENT)
Dept: CARDIOLOGY | Facility: CLINIC | Age: 82
End: 2017-09-13

## 2017-09-13 ENCOUNTER — CLINICAL SUPPORT (OUTPATIENT)
Dept: FAMILY MEDICINE | Facility: CLINIC | Age: 82
End: 2017-09-13
Payer: MEDICARE

## 2017-09-13 ENCOUNTER — LAB VISIT (OUTPATIENT)
Dept: LAB | Facility: HOSPITAL | Age: 82
End: 2017-09-13
Attending: INTERNAL MEDICINE
Payer: MEDICARE

## 2017-09-13 DIAGNOSIS — Z79.01 ANTICOAGULATION MONITORING BY PHARMACIST: ICD-10-CM

## 2017-09-13 DIAGNOSIS — I48.91 ATRIAL FIBRILLATION, UNSPECIFIED TYPE: ICD-10-CM

## 2017-09-13 DIAGNOSIS — I48.20 CHRONIC ATRIAL FIBRILLATION: ICD-10-CM

## 2017-09-13 LAB
INR PPP: 2
PROTHROMBIN TIME: 20.5 SEC

## 2017-09-13 PROCEDURE — 96372 THER/PROPH/DIAG INJ SC/IM: CPT | Mod: S$GLB,,, | Performed by: INTERNAL MEDICINE

## 2017-09-13 PROCEDURE — 85610 PROTHROMBIN TIME: CPT

## 2017-09-13 PROCEDURE — 36415 COLL VENOUS BLD VENIPUNCTURE: CPT | Mod: PO

## 2017-09-13 RX ORDER — DOFETILIDE 0.5 MG/1
CAPSULE ORAL
Qty: 180 CAPSULE | Refills: 3 | Status: SHIPPED | OUTPATIENT
Start: 2017-09-13 | End: 2018-10-02 | Stop reason: SDUPTHER

## 2017-09-13 RX ADMIN — TESTOSTERONE CYPIONATE 100 MG: 200 INJECTION, SOLUTION INTRAMUSCULAR at 08:09

## 2017-09-13 NOTE — PROGRESS NOTES
Administered Depo Testosterone 100 mg IM to right ventrogluteal.  Patient tolerated injection well, no adverse reactions noted.

## 2017-09-20 ENCOUNTER — LAB VISIT (OUTPATIENT)
Dept: LAB | Facility: HOSPITAL | Age: 82
End: 2017-09-20
Attending: INTERNAL MEDICINE
Payer: MEDICARE

## 2017-09-20 DIAGNOSIS — E23.0 PANHYPOPITUITARISM: ICD-10-CM

## 2017-09-20 DIAGNOSIS — E29.1 TESTICULAR HYPOFUNCTION: ICD-10-CM

## 2017-09-20 DIAGNOSIS — I25.10 CORONARY ARTERY DISEASE INVOLVING NATIVE CORONARY ARTERY OF NATIVE HEART WITHOUT ANGINA PECTORIS: ICD-10-CM

## 2017-09-20 DIAGNOSIS — E29.1 HYPOGONADISM IN MALE: ICD-10-CM

## 2017-09-20 LAB
ALBUMIN SERPL BCP-MCNC: 3.7 G/DL
ALP SERPL-CCNC: 54 U/L
ALT SERPL W/O P-5'-P-CCNC: 14 U/L
ANION GAP SERPL CALC-SCNC: 7 MMOL/L
AST SERPL-CCNC: 20 U/L
BASOPHILS # BLD AUTO: 0 K/UL
BASOPHILS NFR BLD: 0 %
BILIRUB SERPL-MCNC: 0.9 MG/DL
BUN SERPL-MCNC: 21 MG/DL
CALCIUM SERPL-MCNC: 9.1 MG/DL
CHLORIDE SERPL-SCNC: 105 MMOL/L
CHOLEST SERPL-MCNC: 121 MG/DL
CHOLEST/HDLC SERPL: 3.8 {RATIO}
CO2 SERPL-SCNC: 28 MMOL/L
CREAT SERPL-MCNC: 1.2 MG/DL
DIFFERENTIAL METHOD: ABNORMAL
EOSINOPHIL # BLD AUTO: 0 K/UL
EOSINOPHIL NFR BLD: 1 %
ERYTHROCYTE [DISTWIDTH] IN BLOOD BY AUTOMATED COUNT: 13.8 %
EST. GFR  (AFRICAN AMERICAN): >60 ML/MIN/1.73 M^2
EST. GFR  (NON AFRICAN AMERICAN): 56 ML/MIN/1.73 M^2
GLUCOSE SERPL-MCNC: 87 MG/DL
HCT VFR BLD AUTO: 47.4 %
HDLC SERPL-MCNC: 32 MG/DL
HDLC SERPL: 26.4 %
HGB BLD-MCNC: 15.7 G/DL
LDLC SERPL CALC-MCNC: 59 MG/DL
LYMPHOCYTES # BLD AUTO: 1.3 K/UL
LYMPHOCYTES NFR BLD: 31.7 %
MCH RBC QN AUTO: 32 PG
MCHC RBC AUTO-ENTMCNC: 33.1 G/DL
MCV RBC AUTO: 97 FL
MONOCYTES # BLD AUTO: 0.8 K/UL
MONOCYTES NFR BLD: 18.8 %
NEUTROPHILS # BLD AUTO: 2 K/UL
NEUTROPHILS NFR BLD: 48.5 %
NONHDLC SERPL-MCNC: 89 MG/DL
PLATELET # BLD AUTO: 105 K/UL
PMV BLD AUTO: 12.2 FL
POTASSIUM SERPL-SCNC: 4.1 MMOL/L
PROT SERPL-MCNC: 6.7 G/DL
RBC # BLD AUTO: 4.91 M/UL
SODIUM SERPL-SCNC: 140 MMOL/L
T4 FREE SERPL-MCNC: 0.96 NG/DL
TESTOST SERPL-MCNC: 954 NG/DL
TRIGL SERPL-MCNC: 150 MG/DL
TSH SERPL DL<=0.005 MIU/L-ACNC: 0.53 UIU/ML
WBC # BLD AUTO: 4.1 K/UL

## 2017-09-20 PROCEDURE — 80053 COMPREHEN METABOLIC PANEL: CPT

## 2017-09-20 PROCEDURE — 84443 ASSAY THYROID STIM HORMONE: CPT

## 2017-09-20 PROCEDURE — 85025 COMPLETE CBC W/AUTO DIFF WBC: CPT

## 2017-09-20 PROCEDURE — 84403 ASSAY OF TOTAL TESTOSTERONE: CPT

## 2017-09-20 PROCEDURE — 80061 LIPID PANEL: CPT

## 2017-09-20 PROCEDURE — 36415 COLL VENOUS BLD VENIPUNCTURE: CPT | Mod: PO

## 2017-09-20 PROCEDURE — 84439 ASSAY OF FREE THYROXINE: CPT

## 2017-09-27 ENCOUNTER — CLINICAL SUPPORT (OUTPATIENT)
Dept: FAMILY MEDICINE | Facility: CLINIC | Age: 82
End: 2017-09-27
Payer: MEDICARE

## 2017-09-27 DIAGNOSIS — Z23 NEED FOR INFLUENZA VACCINATION: Primary | ICD-10-CM

## 2017-09-27 PROCEDURE — 96372 THER/PROPH/DIAG INJ SC/IM: CPT | Mod: S$GLB,,, | Performed by: FAMILY MEDICINE

## 2017-09-27 PROCEDURE — 90662 IIV NO PRSV INCREASED AG IM: CPT | Mod: S$GLB,,, | Performed by: FAMILY MEDICINE

## 2017-09-27 PROCEDURE — G0008 ADMIN INFLUENZA VIRUS VAC: HCPCS | Mod: 59,S$GLB,, | Performed by: FAMILY MEDICINE

## 2017-09-27 RX ADMIN — TESTOSTERONE CYPIONATE 100 MG: 200 INJECTION, SOLUTION INTRAMUSCULAR at 09:09

## 2017-09-27 NOTE — PROGRESS NOTES
..Patient given testosterone 100 mg left ventrogluteal  Injection and influenza vaccine right deltoid, tolerated well, no complaints, no reaction noted

## 2017-10-11 ENCOUNTER — CLINICAL SUPPORT (OUTPATIENT)
Dept: FAMILY MEDICINE | Facility: CLINIC | Age: 82
End: 2017-10-11
Payer: MEDICARE

## 2017-10-11 PROCEDURE — 96372 THER/PROPH/DIAG INJ SC/IM: CPT | Mod: S$GLB,,, | Performed by: INTERNAL MEDICINE

## 2017-10-11 RX ADMIN — TESTOSTERONE CYPIONATE 100 MG: 200 INJECTION, SOLUTION INTRAMUSCULAR at 09:10

## 2017-10-20 ENCOUNTER — OFFICE VISIT (OUTPATIENT)
Dept: CARDIOLOGY | Facility: CLINIC | Age: 82
End: 2017-10-20
Payer: MEDICARE

## 2017-10-20 VITALS
HEIGHT: 75 IN | HEART RATE: 55 BPM | WEIGHT: 264.75 LBS | DIASTOLIC BLOOD PRESSURE: 75 MMHG | BODY MASS INDEX: 32.92 KG/M2 | SYSTOLIC BLOOD PRESSURE: 157 MMHG

## 2017-10-20 DIAGNOSIS — I25.2 HISTORY OF MYOCARDIAL INFARCTION: ICD-10-CM

## 2017-10-20 DIAGNOSIS — E78.00 PURE HYPERCHOLESTEROLEMIA: ICD-10-CM

## 2017-10-20 DIAGNOSIS — Z98.61 POST PTCA: ICD-10-CM

## 2017-10-20 DIAGNOSIS — E78.5 HYPERLIPIDEMIA, UNSPECIFIED HYPERLIPIDEMIA TYPE: ICD-10-CM

## 2017-10-20 DIAGNOSIS — I48.20 CHRONIC ATRIAL FIBRILLATION: ICD-10-CM

## 2017-10-20 DIAGNOSIS — N18.2 CHRONIC KIDNEY DISEASE, STAGE II (MILD): ICD-10-CM

## 2017-10-20 DIAGNOSIS — I25.10 CORONARY ARTERY DISEASE INVOLVING NATIVE CORONARY ARTERY OF NATIVE HEART WITHOUT ANGINA PECTORIS: ICD-10-CM

## 2017-10-20 DIAGNOSIS — I10 ESSENTIAL HYPERTENSION: Primary | ICD-10-CM

## 2017-10-20 DIAGNOSIS — Z79.01 CURRENT USE OF LONG TERM ANTICOAGULATION: ICD-10-CM

## 2017-10-20 DIAGNOSIS — I34.0 MITRAL VALVE INSUFFICIENCY, UNSPECIFIED ETIOLOGY: ICD-10-CM

## 2017-10-20 PROCEDURE — 99214 OFFICE O/P EST MOD 30 MIN: CPT | Mod: S$GLB,,, | Performed by: INTERNAL MEDICINE

## 2017-10-20 PROCEDURE — 99999 PR PBB SHADOW E&M-EST. PATIENT-LVL IV: CPT | Mod: PBBFAC,,, | Performed by: INTERNAL MEDICINE

## 2017-10-20 PROCEDURE — 99499 UNLISTED E&M SERVICE: CPT | Mod: S$GLB,,, | Performed by: INTERNAL MEDICINE

## 2017-10-20 RX ORDER — TESTOSTERONE CYPIONATE 1000 MG/10ML
INJECTION, SOLUTION INTRAMUSCULAR
COMMUNITY
End: 2017-11-22 | Stop reason: SDUPTHER

## 2017-10-20 NOTE — PROGRESS NOTES
Subjective:    Patient ID:  Linda Skelton is a 82 y.o. male who presents for follow-up of Paroxysmal Atrial Fibrillation (1 yr fu)      HPI   81 year old male followed with CAD post NSTEMI/PCI to RCA 2000, hypertension, hyperlipidemia and paroxsymal arial fibrillation. He under went AF ablation 2/25/16. He has had 2 episodes of AF and had resumed coumadin and tikosyn. He denies chest pain or HSIEH. No symptoms of TOLU.  Lab Results   Component Value Date     09/20/2017    K 4.1 09/20/2017     09/20/2017    CO2 28 09/20/2017    BUN 21 09/20/2017    CREATININE 1.2 09/20/2017    GLU 87 09/20/2017    HGBA1C 6.1 09/18/2013    MG 2.2 05/11/2005    AST 20 09/20/2017    ALT 14 09/20/2017    ALBUMIN 3.7 09/20/2017    PROT 6.7 09/20/2017    BILITOT 0.9 09/20/2017    WBC 4.10 09/20/2017    HGB 15.7 09/20/2017    HCT 47.4 09/20/2017    MCV 97 09/20/2017     (L) 09/20/2017    INR 2.0 (H) 09/13/2017    INR 2.6 06/08/2016    INR 2.3 (H) 07/25/2012    PSA 10.6 (H) 04/25/2016    TSH 0.531 09/20/2017         Lab Results   Component Value Date    CHOL 121 09/20/2017    HDL 32 (L) 09/20/2017    TRIG 150 09/20/2017       Lab Results   Component Value Date    LDLCALC 59.0 (L) 09/20/2017       Past Medical History:   Diagnosis Date    Acute coronary syndrome 2000    NSTEMI    Anticoagulant long-term use     Basal cell carcinoma excised     left scalp vertex    Bilateral leg edema 1/6/2014    Coronary artery disease     DVT of leg (deep venous thrombosis) 09/08/2014    On coumadin for years    Encounter for blood transfusion     Hyperlipidemia     Hypertension     Melanoma 01/04/2017     in situ crown of scalp    MI (myocardial infarction)     Panhypopituitarism     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 1 Tablet Oral Every night, Disp: , Rfl:     atenolol (TENORMIN) 25 MG tablet, TAKE ONE TABLET BY MOUTH ONCE DAILY IN THE EVENING, Disp: 90 tablet, Rfl: 1    dofetilide (TIKOSYN) 500 MCG Cap, TAKE 1 CAPSULE 2 TIMES DAILY, Disp: 180 capsule, Rfl: 3    folic acid (FOLVITE) 1 MG tablet, Take 1 tablet (1,000 mcg total) by mouth once daily., Disp: 90 tablet, Rfl: 1    levothyroxine (SYNTHROID) 112 MCG tablet, Take 1 tablet (112 mcg total) by mouth before breakfast., Disp: 90 tablet, Rfl: 3    nitroGLYCERIN (NITROSTAT) 0.4 MG SL tablet, Place 1 tablet (0.4 mg total) under the tongue every 5 (five) minutes as needed for Chest pain. 1 Tablet, Sublingual Sublingual As directed.  take as directed, Disp: 25 tablet, Rfl: 3    omeprazole (PRILOSEC) 20 MG capsule, Take 20 mg by mouth every other day., Disp: , Rfl:     predniSONE (DELTASONE) 5 MG tablet, Take 1 tablet (5 mg total) by mouth once daily., Disp: 90 tablet, Rfl: 3    simvastatin (ZOCOR) 80 MG tablet, TAKE ONE TABLET BY MOUTH IN THE EVENING, Disp: 90 tablet, Rfl: 0    tamsulosin (FLOMAX) 0.4 mg Cp24, Take 1 capsule (0.4 mg total) by mouth once daily., Disp: 90 capsule, Rfl: 1    testosterone cypionate (DEPOTESTOTERONE CYPIONATE) 100 mg/mL injection, Inject into the muscle every 14 (fourteen) days., Disp: , Rfl:     triamcinolone acetonide 0.1% (KENALOG) 0.1 % cream, AAA bid, Disp: 454 g, Rfl: 3    warfarin (COUMADIN) 5 MG tablet, Take 1.5 tablets (7.5 mg total) by mouth Daily. Current Dose ( 5 mg on Tue; 7.5 mg all other days) or as directed by coumadin clinic, Disp: 40 tablet, Rfl: 0    Current Facility-Administered Medications:     testosterone cypionate injection 100 mg, 100 mg, Intramuscular, Q14 Days, Anderson Jerome MD, 100 mg at 10/11/17 0903        Review of Systems   Constitution: Negative for decreased appetite, diaphoresis, fever, weakness, malaise/fatigue, weight gain and weight loss.   HENT: Negative for congestion, ear  "discharge, ear pain and nosebleeds.    Eyes: Negative for blurred vision, double vision and visual disturbance.   Cardiovascular: Negative for chest pain, claudication, cyanosis, dyspnea on exertion, irregular heartbeat, leg swelling, near-syncope, orthopnea, palpitations, paroxysmal nocturnal dyspnea and syncope.   Respiratory: Negative for cough, hemoptysis, shortness of breath, sleep disturbances due to breathing, snoring, sputum production and wheezing.    Endocrine: Negative for polydipsia, polyphagia and polyuria.   Hematologic/Lymphatic: Negative for adenopathy and bleeding problem. Does not bruise/bleed easily.   Skin: Negative for color change, nail changes, poor wound healing and rash.   Musculoskeletal: Negative for muscle cramps and muscle weakness.   Gastrointestinal: Negative for abdominal pain, anorexia, change in bowel habit, hematochezia, nausea and vomiting.   Genitourinary: Negative for dysuria, frequency and hematuria.   Neurological: Negative for brief paralysis, difficulty with concentration, excessive daytime sleepiness, dizziness, focal weakness, headaches, light-headedness, seizures and vertigo.   Psychiatric/Behavioral: Negative for altered mental status and depression.   Allergic/Immunologic: Negative for persistent infections.        Objective:BP (!) 157/75 (BP Location: Left arm, Patient Position: Sitting, BP Method: X-Large (Automatic))   Pulse (!) 55   Ht 6' 3" (1.905 m)   Wt 120.1 kg (264 lb 12.4 oz)   BMI 33.09 kg/m²           Physical Exam   Constitutional: He is oriented to person, place, and time. He appears well-developed and well-nourished.   obese   HENT:   Head: Normocephalic.   Right Ear: External ear normal.   Left Ear: External ear normal.   Nose: Nose normal.   Inspection of lips, teeth and gums normal   Eyes: EOM are normal. Pupils are equal, round, and reactive to light. No scleral icterus.   Neck: Normal range of motion. Neck supple. No JVD present. No tracheal " deviation present. No thyromegaly present.   Cardiovascular: Normal rate, regular rhythm and intact distal pulses.  Exam reveals no gallop and no friction rub.    No murmur heard.  Pulses:       Carotid pulses are 2+ on the right side, and 2+ on the left side.       Dorsalis pedis pulses are 2+ on the right side, and 2+ on the left side.        Posterior tibial pulses are 2+ on the right side, and 2+ on the left side.   Pulmonary/Chest: Effort normal and breath sounds normal.   Abdominal: Bowel sounds are normal. He exhibits no distension. There is no hepatosplenomegaly. There is no tenderness. There is no guarding.   Musculoskeletal: Normal range of motion. He exhibits no edema or tenderness.   Lymphadenopathy:   Palpation of neck and groin lymph nodes normal   Neurological: He is alert and oriented to person, place, and time. No cranial nerve deficit. He exhibits normal muscle tone. Coordination normal.   Skin: Skin is dry.   Palpation of skin normal   Psychiatric: His behavior is normal. Judgment and thought content normal.         Assessment:       1. Essential hypertension    2. Coronary artery disease involving native coronary artery of native heart without angina pectoris    3. History of myocardial infarction    4. Chronic atrial fibrillation    5. Hyperlipidemia, unspecified hyperlipidemia type    6. Post PTCA    7. Mitral valve insufficiency, unspecified etiology    8. Chronic kidney disease, stage II (mild)    9. Current use of long term anticoagulation    10. Pure hypercholesterolemia         Plan:       Linda was seen today for paroxysmal atrial fibrillation.    Diagnoses and all orders for this visit:    Essential hypertension  -     Comprehensive metabolic panel; Future; Expected date: 10/20/2018    Coronary artery disease involving native coronary artery of native heart without angina pectoris  -     Lipid panel; Future; Expected date: 10/20/2018    History of myocardial infarction    Chronic atrial  fibrillation    Hyperlipidemia, unspecified hyperlipidemia type    Post PTCA    Mitral valve insufficiency, unspecified etiology    Chronic kidney disease, stage II (mild)    Current use of long term anticoagulation  -     CBC auto differential; Future; Expected date: 10/20/2018    Pure hypercholesterolemia  -     Lipid panel; Future; Expected date: 10/20/2018    Other orders  -     testosterone cypionate (DEPOTESTOTERONE CYPIONATE) 100 mg/mL injection; Inject into the muscle every 14 (fourteen) days.

## 2017-10-24 ENCOUNTER — OFFICE VISIT (OUTPATIENT)
Dept: FAMILY MEDICINE | Facility: CLINIC | Age: 82
End: 2017-10-24
Payer: MEDICARE

## 2017-10-24 VITALS
BODY MASS INDEX: 32.83 KG/M2 | TEMPERATURE: 98 F | HEART RATE: 88 BPM | SYSTOLIC BLOOD PRESSURE: 134 MMHG | WEIGHT: 264 LBS | DIASTOLIC BLOOD PRESSURE: 70 MMHG | HEIGHT: 75 IN

## 2017-10-24 DIAGNOSIS — Z23 NEED FOR VACCINATION FOR STREP PNEUMONIAE: ICD-10-CM

## 2017-10-24 DIAGNOSIS — S39.012A STRAIN OF LUMBAR REGION, INITIAL ENCOUNTER: Primary | ICD-10-CM

## 2017-10-24 PROCEDURE — 99213 OFFICE O/P EST LOW 20 MIN: CPT | Mod: 25,S$GLB,, | Performed by: INTERNAL MEDICINE

## 2017-10-24 PROCEDURE — 90732 PPSV23 VACC 2 YRS+ SUBQ/IM: CPT | Mod: S$GLB,,, | Performed by: INTERNAL MEDICINE

## 2017-10-24 PROCEDURE — 96372 THER/PROPH/DIAG INJ SC/IM: CPT | Mod: S$GLB,,, | Performed by: INTERNAL MEDICINE

## 2017-10-24 PROCEDURE — G0009 ADMIN PNEUMOCOCCAL VACCINE: HCPCS | Mod: 59,S$GLB,, | Performed by: INTERNAL MEDICINE

## 2017-10-24 PROCEDURE — 99999 PR PBB SHADOW E&M-EST. PATIENT-LVL III: CPT | Mod: PBBFAC,,, | Performed by: INTERNAL MEDICINE

## 2017-10-24 PROCEDURE — 99499 UNLISTED E&M SERVICE: CPT | Mod: S$GLB,,, | Performed by: INTERNAL MEDICINE

## 2017-10-24 RX ORDER — KETOROLAC TROMETHAMINE 30 MG/ML
30 INJECTION, SOLUTION INTRAMUSCULAR; INTRAVENOUS
Status: COMPLETED | OUTPATIENT
Start: 2017-10-24 | End: 2017-10-24

## 2017-10-24 RX ORDER — CYCLOBENZAPRINE HCL 10 MG
10 TABLET ORAL 3 TIMES DAILY PRN
Qty: 30 TABLET | Refills: 1 | Status: SHIPPED | OUTPATIENT
Start: 2017-10-24 | End: 2017-11-03

## 2017-10-24 RX ADMIN — KETOROLAC TROMETHAMINE 30 MG: 30 INJECTION, SOLUTION INTRAMUSCULAR; INTRAVENOUS at 03:10

## 2017-10-24 NOTE — PROGRESS NOTES
Pneumonia-23 vaccine administered, mynor well. Instructed to wait 15mins for observation, no reaction noted @ time of discharge.

## 2017-10-24 NOTE — PROGRESS NOTES
This note was created by combination of typed  and Dragon dictation.  Transcription errors may be present.  If there are any questions, please contact me.    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 He's requesting injection.  One time injection of toradol 30.  Flexeril TID caution as has sedating effects.  Tylenol may continue.  If persisting - XR.  -     ketorolac injection 30 mg; Inject 1 mL (30 mg total) into the muscle one time.  -     cyclobenzaprine (FLEXERIL) 10 MG tablet; Take 1 tablet (10 mg total) by mouth 3 (three) times daily as needed for Muscle spasms.  Dispense: 30 tablet; Refill: 1    Need for vaccination for Strep pneumoniae  -     Pneumococcal Polysaccharide Vaccine (23 Valent) (SQ/IM)        There are no discontinued medications.    Follow-up: No Follow-up on file.      =================================================================      Chief Complaint   Patient presents with    Back Pain       CHAGO TURK is a 82 y.o. male, last appointment with this clinic was 8/28/2017.    history of panhypopituitarism  coronary artery disease s/p NSTEMI/PCI to RCA 2000  Hypertension  Hyperlipidemia  AFib on anticoagulation. 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    9/20/2017 testosterone level high end of normal on testosterone.    Last PSA 4/2016 10.6.  Diagnostic PSA 7/2016 2.0. Last seen by urology summer 2016.    Complaining of low back pain, gets this episodically and has done so for the past several decades actually.  Typically his flares last for 5 days and this episode has lasted about a week.  Has not been this severe for over 10 years he thinks.  It's in the midline back radiating to the sides, no associated incontinence, no numbness or tingling, no radiation into his legs.  No fevers no chills.  Has some leftover  Flexeril and has been taking it twice a day along with Tylenol without relief.  Severe pain.    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    Visit for monitoring Tikosyn therapy 07/20/2017    Current use of long term anticoagulation 07/20/2017    Benign non-nodular prostatic hyperplasia without lower urinary tract symptoms 07/05/2017    Hypogonadism in male 07/05/2017    History of melanoma in situ 02/23/2017     Scalp 1/2017 tx with mohs       Atypical lymphocytic infiltrate of skin 01/13/2017    Status post catheter ablation of atrial fibrillation 07/08/2016    Hyperlipidemia 10/23/2014    Sore throat 09/15/2014    DVT (deep venous thrombosis) 09/08/2014    Post PTCA 04/04/2014    Obesity, Class I, BMI 30-34.9 04/04/2014    ED (erectile dysfunction) 04/04/2014    HTN (hypertension) 04/04/2014    Bilateral leg edema 01/06/2014    Mitral regurgitation 02/14/2013    Coronary artery disease     History of myocardial infarction 12/26/2012    Chronic kidney disease, stage II (mild) 12/26/2012    Thyroid nodule 08/06/2012    Panhypopituitarism 08/06/2012    Fatigue 08/06/2012    Atrial fibrillation 07/27/2012       PAST MEDICAL HISTORY:  Past Medical History:   Diagnosis Date    Acute coronary syndrome 2000    NSTEMI    Anticoagulant long-term use     Basal cell carcinoma excised     left scalp vertex    Bilateral leg edema 1/6/2014    Coronary artery disease     DVT of leg (deep venous thrombosis) 09/08/2014    On coumadin for years    Encounter for blood transfusion     Hyperlipidemia     Hypertension     Melanoma 01/04/2017     in situ crown of scalp    MI (myocardial infarction)     Panhypopituitarism     Pleomorphic small or medium-sized cell  cutaneous T-cell lymphoma 01/2017    SQUAMOUS CELL CARCINOMA excised     left posterior scalp    Thyroid disease        PAST SURGICAL HISTORY:  Past Surgical History:   Procedure Laterality Date    APPENDECTOMY      BRAIN SURGERY      pituitary adenoma removed    CARDIAC CATHETERIZATION  05/09/2000    S/P stent to RCA,    CARDIAC CATHETERIZATION      stents placed    CORONARY ANGIOPLASTY  5/9/2000    RCA    CORONARY ANGIOPLASTY WITH STENT PLACEMENT      ESOPHAGEAL DILATION      EYE SURGERY Bilateral     cataracts removed and new lenses placed     FRACTURE SURGERY Left     leg    HERNIA REPAIR      left femur surgery      pituitatry      hypophysectomy    SKIN BIOPSY      TONSILLECTOMY      VASCULAR SURGERY         SOCIAL HISTORY:  Social History     Social History    Marital status:      Spouse name: N/A    Number of children: N/A    Years of education: N/A     Occupational History    Not on file.     Social History Main Topics    Smoking status: Never Smoker    Smokeless tobacco: Never Used      Comment: .  Two sons.  [employer] .      Alcohol use No    Drug use: No    Sexual activity: Not on file     Other Topics Concern    Not on file     Social History Narrative     and a retired [employer] .  Non smoker.        ALLERGIES AND MEDICATIONS: updated and reviewed.  Review of patient's allergies indicates:  No Known Allergies  Current Outpatient Prescriptions   Medication Sig Dispense Refill    aspirin 81 mg Tab Take 81 mg by mouth every evening. 1 Tablet Oral Every night      atenolol (TENORMIN) 25 MG tablet TAKE ONE TABLET BY MOUTH ONCE DAILY IN THE EVENING 90 tablet 1    dofetilide (TIKOSYN) 500 MCG Cap TAKE 1 CAPSULE 2 TIMES DAILY 180 capsule 3    folic acid (FOLVITE) 1 MG tablet Take 1 tablet (1,000 mcg total) by mouth once daily. 90 tablet 1    levothyroxine (SYNTHROID) 112 MCG tablet Take 1 tablet (112 mcg total) by mouth before breakfast.  "90 tablet 3    nitroGLYCERIN (NITROSTAT) 0.4 MG SL tablet Place 1 tablet (0.4 mg total) under the tongue every 5 (five) minutes as needed for Chest pain. 1 Tablet, Sublingual Sublingual As directed.  take as directed 25 tablet 3    omeprazole (PRILOSEC) 20 MG capsule Take 20 mg by mouth every other day.      predniSONE (DELTASONE) 5 MG tablet Take 1 tablet (5 mg total) by mouth once daily. 90 tablet 3    simvastatin (ZOCOR) 80 MG tablet TAKE ONE TABLET BY MOUTH IN THE EVENING 90 tablet 0    tamsulosin (FLOMAX) 0.4 mg Cp24 Take 1 capsule (0.4 mg total) by mouth once daily. 90 capsule 1    testosterone cypionate (DEPOTESTOTERONE CYPIONATE) 100 mg/mL injection Inject into the muscle every 14 (fourteen) days.      triamcinolone acetonide 0.1% (KENALOG) 0.1 % cream AAA bid 454 g 3    warfarin (COUMADIN) 5 MG tablet Take 1.5 tablets (7.5 mg total) by mouth Daily. Current Dose ( 5 mg on Tue; 7.5 mg all other days) or as directed by coumadin clinic 40 tablet 0     Current Facility-Administered Medications   Medication Dose Route Frequency Provider Last Rate Last Dose    testosterone cypionate injection 100 mg  100 mg Intramuscular Q14 Days Anderson Jerome MD   100 mg at 10/11/17 0903       Review of Systems   Constitutional: Negative for chills and fever.   Gastrointestinal: Negative for abdominal pain.   Musculoskeletal: Positive for back pain. Negative for joint pain.       Physical Exam   Vitals:    10/24/17 1440   BP: 134/70   Pulse: 88   Temp: 98.4 °F (36.9 °C)   Weight: 119.7 kg (264 lb)   Height: 6' 3" (1.905 m)    Body mass index is 33 kg/m².  Weight: 119.7 kg (264 lb)   Height: 6' 3" (190.5 cm)     Physical Exam   Constitutional: He appears well-developed and well-nourished. No distress.   Cardiovascular: Normal rate and regular rhythm.    Pulmonary/Chest: Effort normal and breath sounds normal.   Musculoskeletal:   Tender on moderate pressure palpation of the lower lumbar spine around L4-L5, no deformity, " some mild tenderness on left and right paraspinal muscles.  Straight leg raise on the right elicits pain, not on the left.

## 2017-10-25 ENCOUNTER — LAB VISIT (OUTPATIENT)
Dept: LAB | Facility: HOSPITAL | Age: 82
End: 2017-10-25
Attending: INTERNAL MEDICINE
Payer: MEDICARE

## 2017-10-25 ENCOUNTER — CLINICAL SUPPORT (OUTPATIENT)
Dept: FAMILY MEDICINE | Facility: CLINIC | Age: 82
End: 2017-10-25
Payer: MEDICARE

## 2017-10-25 DIAGNOSIS — E04.1 THYROID NODULE: ICD-10-CM

## 2017-10-25 DIAGNOSIS — E29.1 HYPOGONADISM MALE: ICD-10-CM

## 2017-10-25 DIAGNOSIS — E29.1 TESTICULAR HYPOFUNCTION: Primary | ICD-10-CM

## 2017-10-25 LAB
ALBUMIN SERPL BCP-MCNC: 3.5 G/DL
ALP SERPL-CCNC: 54 U/L
ALT SERPL W/O P-5'-P-CCNC: 12 U/L
ANION GAP SERPL CALC-SCNC: 8 MMOL/L
AST SERPL-CCNC: 18 U/L
BASOPHILS # BLD AUTO: 0.01 K/UL
BASOPHILS NFR BLD: 0.2 %
BILIRUB SERPL-MCNC: 0.8 MG/DL
BUN SERPL-MCNC: 25 MG/DL
CALCIUM SERPL-MCNC: 9.2 MG/DL
CHLORIDE SERPL-SCNC: 104 MMOL/L
CO2 SERPL-SCNC: 28 MMOL/L
CREAT SERPL-MCNC: 1.3 MG/DL
DIFFERENTIAL METHOD: ABNORMAL
EOSINOPHIL # BLD AUTO: 0.1 K/UL
EOSINOPHIL NFR BLD: 1.1 %
ERYTHROCYTE [DISTWIDTH] IN BLOOD BY AUTOMATED COUNT: 13.8 %
EST. GFR  (AFRICAN AMERICAN): 58.7 ML/MIN/1.73 M^2
EST. GFR  (NON AFRICAN AMERICAN): 50.8 ML/MIN/1.73 M^2
GLUCOSE SERPL-MCNC: 86 MG/DL
HCT VFR BLD AUTO: 47.7 %
HGB BLD-MCNC: 15.5 G/DL
IMM GRANULOCYTES # BLD AUTO: 0.01 K/UL
IMM GRANULOCYTES NFR BLD AUTO: 0.2 %
LYMPHOCYTES # BLD AUTO: 1.2 K/UL
LYMPHOCYTES NFR BLD: 20.4 %
MCH RBC QN AUTO: 32.3 PG
MCHC RBC AUTO-ENTMCNC: 32.5 G/DL
MCV RBC AUTO: 99 FL
MONOCYTES # BLD AUTO: 1.1 K/UL
MONOCYTES NFR BLD: 17.2 %
NEUTROPHILS # BLD AUTO: 3.7 K/UL
NEUTROPHILS NFR BLD: 60.9 %
NRBC BLD-RTO: 0 /100 WBC
PLATELET # BLD AUTO: 108 K/UL
PMV BLD AUTO: 12.4 FL
POTASSIUM SERPL-SCNC: 3.7 MMOL/L
PROT SERPL-MCNC: 6.6 G/DL
RBC # BLD AUTO: 4.8 M/UL
SODIUM SERPL-SCNC: 140 MMOL/L
T4 FREE SERPL-MCNC: 0.92 NG/DL
WBC # BLD AUTO: 6.09 K/UL

## 2017-10-25 PROCEDURE — 84439 ASSAY OF FREE THYROXINE: CPT

## 2017-10-25 PROCEDURE — 36415 COLL VENOUS BLD VENIPUNCTURE: CPT | Mod: PO

## 2017-10-25 PROCEDURE — 96372 THER/PROPH/DIAG INJ SC/IM: CPT | Mod: S$GLB,,, | Performed by: INTERNAL MEDICINE

## 2017-10-25 PROCEDURE — 85025 COMPLETE CBC W/AUTO DIFF WBC: CPT

## 2017-10-25 PROCEDURE — 80053 COMPREHEN METABOLIC PANEL: CPT

## 2017-10-25 RX ADMIN — TESTOSTERONE CYPIONATE 100 MG: 200 INJECTION, SOLUTION INTRAMUSCULAR at 08:10

## 2017-10-25 NOTE — PROGRESS NOTES
..Patient given testosterone 100 mg injection left ventrogluteal, tolerated well, no complaints, no reaction noted

## 2017-10-30 DIAGNOSIS — E78.5 HYPERLIPIDEMIA: ICD-10-CM

## 2017-10-30 RX ORDER — SIMVASTATIN 80 MG/1
TABLET, FILM COATED ORAL
Qty: 90 TABLET | Refills: 3 | Status: SHIPPED | OUTPATIENT
Start: 2017-10-30 | End: 2018-10-29 | Stop reason: SDUPTHER

## 2017-10-31 ENCOUNTER — HOSPITAL ENCOUNTER (OUTPATIENT)
Dept: ENDOCRINOLOGY | Facility: CLINIC | Age: 82
Discharge: HOME | End: 2017-10-31
Attending: INTERNAL MEDICINE
Payer: MEDICARE

## 2017-10-31 ENCOUNTER — OFFICE VISIT (OUTPATIENT)
Dept: ENDOCRINOLOGY | Facility: CLINIC | Age: 82
End: 2017-10-31
Payer: MEDICARE

## 2017-10-31 VITALS
SYSTOLIC BLOOD PRESSURE: 186 MMHG | DIASTOLIC BLOOD PRESSURE: 100 MMHG | HEART RATE: 64 BPM | BODY MASS INDEX: 32.81 KG/M2 | WEIGHT: 263.88 LBS | HEIGHT: 75 IN

## 2017-10-31 DIAGNOSIS — I10 ESSENTIAL HYPERTENSION: ICD-10-CM

## 2017-10-31 DIAGNOSIS — E04.1 THYROID NODULE: ICD-10-CM

## 2017-10-31 PROCEDURE — 99214 OFFICE O/P EST MOD 30 MIN: CPT | Mod: S$GLB,,, | Performed by: INTERNAL MEDICINE

## 2017-10-31 PROCEDURE — 99499 UNLISTED E&M SERVICE: CPT | Mod: S$GLB,,, | Performed by: INTERNAL MEDICINE

## 2017-10-31 PROCEDURE — 99999 PR PBB SHADOW E&M-EST. PATIENT-LVL III: CPT | Mod: PBBFAC,,, | Performed by: INTERNAL MEDICINE

## 2017-10-31 PROCEDURE — 76536 US EXAM OF HEAD AND NECK: CPT | Mod: S$GLB,,, | Performed by: INTERNAL MEDICINE

## 2017-10-31 RX ORDER — ATENOLOL 25 MG/1
TABLET ORAL
Qty: 90 TABLET | Refills: 3 | Status: SHIPPED | OUTPATIENT
Start: 2017-10-31 | End: 2018-10-29 | Stop reason: SDUPTHER

## 2017-10-31 NOTE — PROGRESS NOTES
Subjective:      Patient ID: Linda Skelton is a 82 y.o. male.    Chief Complaint:  Thyroid Nodule      History of Present Illness  Last seen 10/26/15  Thyroid nodule FNA benign x1 Thyroid last done 2013 and had microcalcifications but lesion smaller than originally    Panhypopit   Taking thyroid and taking prednisone and testosterone   No sex    Since least seen ablation for atrial fibrillation    HTN high today, at home variable but good    Obesity     Review of Systems   Constitutional: Negative for fatigue.   HENT: Positive for hearing loss.    Eyes: Negative for visual disturbance.   Respiratory: Negative for cough, chest tightness and shortness of breath.    Cardiovascular: Negative for palpitations and leg swelling.   Gastrointestinal: Negative for constipation and diarrhea.   Genitourinary: Positive for enuresis. Negative for dysuria, hematuria and urgency.   Musculoskeletal: Positive for back pain. Negative for arthralgias, joint swelling and myalgias.   Neurological: Negative for syncope, speech difficulty and numbness.   Psychiatric/Behavioral: Negative for sleep disturbance. The patient is not nervous/anxious.        Objective:   Physical Exam   Constitutional: He is oriented to person, place, and time. He appears well-developed and well-nourished.   Eyes: Conjunctivae and EOM are normal. No scleral icterus.   Neck: No tracheal deviation present.   Cardiovascular: Normal rate, regular rhythm and normal heart sounds.  Exam reveals no gallop and no friction rub.    No murmur heard.  Pulmonary/Chest: No respiratory distress. He has no wheezes. He has no rales.   Abdominal: He exhibits no distension and no mass. There is no tenderness. There is no guarding. A hernia is present.   No abdominal bruits  Umbilical hernia   Musculoskeletal: Normal range of motion.   Lymphadenopathy:     He has no cervical adenopathy.   Neurological: He is alert and oriented to person, place, and time. He has normal reflexes.    Skin: Skin is warm and dry.   Psychiatric: He has a normal mood and affect. His behavior is normal.   Vitals reviewed.    R /100  L /100  After lying down 186/88  Lab Review:   Results for orders placed or performed in visit on 10/25/17   CBC auto differential   Result Value Ref Range    WBC 6.09 3.90 - 12.70 K/uL    RBC 4.80 4.60 - 6.20 M/uL    Hemoglobin 15.5 14.0 - 18.0 g/dL    Hematocrit 47.7 40.0 - 54.0 %    MCV 99 (H) 82 - 98 fL    MCH 32.3 (H) 27.0 - 31.0 pg    MCHC 32.5 32.0 - 36.0 g/dL    RDW 13.8 11.5 - 14.5 %    Platelets 108 (L) 150 - 350 K/uL    MPV 12.4 9.2 - 12.9 fL    Immature Granulocytes 0.2 0.0 - 0.5 %    Gran # 3.7 1.8 - 7.7 K/uL    Immature Grans (Abs) 0.01 0.00 - 0.04 K/uL    Lymph # 1.2 1.0 - 4.8 K/uL    Mono # 1.1 (H) 0.3 - 1.0 K/uL    Eos # 0.1 0.0 - 0.5 K/uL    Baso # 0.01 0.00 - 0.20 K/uL    nRBC 0 0 /100 WBC    Gran% 60.9 38.0 - 73.0 %    Lymph% 20.4 18.0 - 48.0 %    Mono% 17.2 (H) 4.0 - 15.0 %    Eosinophil% 1.1 0.0 - 8.0 %    Basophil% 0.2 0.0 - 1.9 %    Differential Method Automated    Comprehensive metabolic panel   Result Value Ref Range    Sodium 140 136 - 145 mmol/L    Potassium 3.7 3.5 - 5.1 mmol/L    Chloride 104 95 - 110 mmol/L    CO2 28 23 - 29 mmol/L    Glucose 86 70 - 110 mg/dL    BUN, Bld 25 (H) 8 - 23 mg/dL    Creatinine 1.3 0.5 - 1.4 mg/dL    Calcium 9.2 8.7 - 10.5 mg/dL    Total Protein 6.6 6.0 - 8.4 g/dL    Albumin 3.5 3.5 - 5.2 g/dL    Total Bilirubin 0.8 0.1 - 1.0 mg/dL    Alkaline Phosphatase 54 (L) 55 - 135 U/L    AST 18 10 - 40 U/L    ALT 12 10 - 44 U/L    Anion Gap 8 8 - 16 mmol/L    eGFR if African American 58.7 (A) >60 mL/min/1.73 m^2    eGFR if non African American 50.8 (A) >60 mL/min/1.73 m^2   T4, free   Result Value Ref Range    Free T4 0.92 0.71 - 1.51 ng/dL         Assessment:     Thyroid nodule   Rec Thyroid US today stable  Patient preference to get next year  Panhypopit   Discussed stopping testosterone   Patient preference to continue    Recent prostate normal and Hb normal  Hypertension increase atenolol to BID and follow up with primary.      Chol controlled on statin    Hypogonadism on testosterone and doing well    Plan:     No orders of the defined types were placed in this encounter.

## 2017-11-08 ENCOUNTER — CLINICAL SUPPORT (OUTPATIENT)
Dept: FAMILY MEDICINE | Facility: CLINIC | Age: 82
End: 2017-11-08
Payer: MEDICARE

## 2017-11-08 ENCOUNTER — ANTI-COAG VISIT (OUTPATIENT)
Dept: CARDIOLOGY | Facility: CLINIC | Age: 82
End: 2017-11-08

## 2017-11-08 ENCOUNTER — LAB VISIT (OUTPATIENT)
Dept: LAB | Facility: HOSPITAL | Age: 82
End: 2017-11-08
Attending: INTERNAL MEDICINE
Payer: MEDICARE

## 2017-11-08 DIAGNOSIS — I48.0 PAROXYSMAL ATRIAL FIBRILLATION: ICD-10-CM

## 2017-11-08 DIAGNOSIS — E29.1 HYPOGONADISM IN MALE: Primary | ICD-10-CM

## 2017-11-08 DIAGNOSIS — Z79.01 ANTICOAGULATION MONITORING BY PHARMACIST: ICD-10-CM

## 2017-11-08 DIAGNOSIS — I48.20 CHRONIC ATRIAL FIBRILLATION: ICD-10-CM

## 2017-11-08 LAB
INR PPP: 2.2
PROTHROMBIN TIME: 22.3 SEC

## 2017-11-08 PROCEDURE — 36415 COLL VENOUS BLD VENIPUNCTURE: CPT | Mod: PO

## 2017-11-08 PROCEDURE — 85610 PROTHROMBIN TIME: CPT

## 2017-11-08 PROCEDURE — 96372 THER/PROPH/DIAG INJ SC/IM: CPT | Mod: S$GLB,,, | Performed by: INTERNAL MEDICINE

## 2017-11-08 RX ADMIN — TESTOSTERONE CYPIONATE 100 MG: 200 INJECTION, SOLUTION INTRAMUSCULAR at 08:11

## 2017-11-08 NOTE — PROGRESS NOTES
Testosterone vaccine administered IM to Right ventrogluteal. Patient tolerated well. Advised to wait 15 minutes to monitor for possible reactions.

## 2017-11-16 ENCOUNTER — OFFICE VISIT (OUTPATIENT)
Dept: DERMATOLOGY | Facility: CLINIC | Age: 82
End: 2017-11-16
Payer: MEDICARE

## 2017-11-16 DIAGNOSIS — L81.4 LENTIGO: ICD-10-CM

## 2017-11-16 DIAGNOSIS — D22.9 NEVUS: ICD-10-CM

## 2017-11-16 DIAGNOSIS — C84.A0 PLEOMORPHIC SMALL OR MEDIUM-SIZED CELL CUTANEOUS T-CELL LYMPHOMA: ICD-10-CM

## 2017-11-16 DIAGNOSIS — L82.1 SK (SEBORRHEIC KERATOSIS): ICD-10-CM

## 2017-11-16 DIAGNOSIS — L57.0 AK (ACTINIC KERATOSIS): Primary | ICD-10-CM

## 2017-11-16 DIAGNOSIS — D18.00 ANGIOMA: ICD-10-CM

## 2017-11-16 DIAGNOSIS — Z86.006 HISTORY OF MELANOMA IN SITU: ICD-10-CM

## 2017-11-16 PROCEDURE — 17000 DESTRUCT PREMALG LESION: CPT | Mod: S$GLB,,, | Performed by: DERMATOLOGY

## 2017-11-16 PROCEDURE — 99214 OFFICE O/P EST MOD 30 MIN: CPT | Mod: 25,S$GLB,, | Performed by: DERMATOLOGY

## 2017-11-16 PROCEDURE — 99499 UNLISTED E&M SERVICE: CPT | Mod: S$GLB,,, | Performed by: DERMATOLOGY

## 2017-11-16 PROCEDURE — 99999 PR PBB SHADOW E&M-EST. PATIENT-LVL II: CPT | Mod: PBBFAC,,, | Performed by: DERMATOLOGY

## 2017-11-16 PROCEDURE — 17003 DESTRUCT PREMALG LES 2-14: CPT | Mod: S$GLB,,, | Performed by: DERMATOLOGY

## 2017-11-16 NOTE — PROGRESS NOTES
Subjective:       Patient ID:  Linda Skelton is a 82 y.o. male who presents for   Chief Complaint   Patient presents with    Skin Check     High risk pt with CTCL, hx MIS 1-2017 here for 3 month tbse.  Still with rash on legs. Worse on left than right.  Pt uses cerave cream then tac cream to affected areas. Helps minimally              Review of Systems   Constitutional: Negative for fever, chills, weight loss, weight gain, fatigue, night sweats and malaise.   Skin: Positive for itching, rash, dry skin and activity-related sunscreen use. Negative for daily sunscreen use.   Hematologic/Lymphatic: Bruises/bleeds easily.        Objective:    Physical Exam   Constitutional: He appears well-developed and well-nourished. No distress.   Lymphadenopathy: No supraclavicular adenopathy is present.     He has no cervical adenopathy.     He has no axillary adenopathy.     He has no inguinal adenopathy.   Neurological: He is alert and oriented to person, place, and time. He is not disoriented.   Psychiatric: He has a normal mood and affect.   Skin:   Areas Examined (abnormalities noted in diagram):   Scalp / Hair Palpated and Inspected  Head / Face Inspection Performed  Neck Inspection Performed  Chest / Axilla Inspection Performed  Abdomen Inspection Performed  Genitals / Buttocks / Groin Inspection Performed  Back Inspection Performed  RUE Inspected  LUE Inspection Performed  RLE Inspected  LLE Inspection Performed  Nails and Digits Inspection Performed                       Diagram Legend     Erythematous scaling macule/papule c/w actinic keratosis       Vascular papule c/w angioma      Pigmented verrucoid papule/plaque c/w seborrheic keratosis      Yellow umbilicated papule c/w sebaceous hyperplasia      Irregularly shaped tan macule c/w lentigo     1-2 mm smooth white papules consistent with Milia      Movable subcutaneous cyst with punctum c/w epidermal inclusion cyst      Subcutaneous movable cyst c/w pilar cyst       Firm pink to brown papule c/w dermatofibroma      Pedunculated fleshy papule(s) c/w skin tag(s)      Evenly pigmented macule c/w junctional nevus     Mildly variegated pigmented, slightly irregular-bordered macule c/w mildly atypical nevus      Flesh colored to evenly pigmented papule c/w intradermal nevus       Pink pearly papule/plaque c/w basal cell carcinoma      Erythematous hyperkeratotic cursted plaque c/w SCC      Surgical scar with no sign of skin cancer recurrence      Open and closed comedones      Inflammatory papules and pustules      Verrucoid papule consistent consistent with wart     Erythematous eczematous patches and plaques     Dystrophic onycholytic nail with subungual debris c/w onychomycosis     Umbilicated papule    Erythematous-base heme-crusted tan verrucoid plaque consistent with inflamed seborrheic keratosis     Erythematous Silvery Scaling Plaque c/w Psoriasis     See annotation      Assessment / Plan:        AK (actinic keratosis)  Cryosurgery Procedure Note    Verbal consent from the patient is obtained and the patient is aware of the precancerous quality and need for treatment of these lesions. Liquid nitrogen cryosurgery is applied to the 4 actinic keratoses, as detailed in the physical exam, to produce a freeze injury. The patient is aware that blisters may form and is instructed on wound care with gentle cleansing and use of vaseline ointment to keep moist until healed. The patient is supplied a handout on cryosurgery and is instructed to call if lesions do not completely resolve.    Nevus  Discussed ABCDE's of nevi.  Monitor for new mole or moles that are becoming bigger, darker, irritated, or developing irregular borders. Brochure provided.    Lentigo  This is a benign hyperpigmented sun induced lesion. Daily sun protection will reduce the number of new lesions. Treatment of these benign lesions are considered cosmetic.  The nature of sun-induced photo-aging and skin cancers is  discussed.  Sun avoidance, protective clothing, and the use of 30-SPF sunscreens is advised. Observe closely for skin damage/changes, and call if such occurs.    SK (seborrheic keratosis)  These are benign inherited growths without a malignant potential. Reassurance given to patient. No treatment is necessary.     Angioma  These are benign vascular lesions that are inherited.  Treatment is not necessary.    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 Site well healed with no signs of recurrence.    Total body skin examination performed today including at least 12 points as noted in physical examination. No lesions suspicious for malignancy noted.             Return in about 3 months (around 2/16/2018). recheck nevus left  Upper back

## 2017-11-22 ENCOUNTER — CLINICAL SUPPORT (OUTPATIENT)
Dept: FAMILY MEDICINE | Facility: CLINIC | Age: 82
End: 2017-11-22
Payer: MEDICARE

## 2017-11-22 ENCOUNTER — TELEPHONE (OUTPATIENT)
Dept: FAMILY MEDICINE | Facility: CLINIC | Age: 82
End: 2017-11-22

## 2017-11-22 DIAGNOSIS — E29.1 HYPOGONADISM MALE: Primary | ICD-10-CM

## 2017-11-22 DIAGNOSIS — E29.1 HYPOGONADISM IN MALE: Primary | ICD-10-CM

## 2017-11-22 PROCEDURE — 96372 THER/PROPH/DIAG INJ SC/IM: CPT | Mod: S$GLB,,, | Performed by: INTERNAL MEDICINE

## 2017-11-22 RX ORDER — TESTOSTERONE CYPIONATE 200 MG/ML
100 INJECTION, SOLUTION INTRAMUSCULAR
Status: CANCELLED | OUTPATIENT
Start: 2017-11-22

## 2017-11-22 RX ORDER — TESTOSTERONE CYPIONATE 200 MG/ML
100 INJECTION, SOLUTION INTRAMUSCULAR
Status: COMPLETED | OUTPATIENT
Start: 2017-11-22 | End: 2018-04-25

## 2017-11-22 RX ORDER — TESTOSTERONE CYPIONATE 1000 MG/10ML
100 INJECTION, SOLUTION INTRAMUSCULAR
Qty: 10 ML | Refills: 2 | Status: ON HOLD | OUTPATIENT
Start: 2017-11-22 | End: 2019-06-12 | Stop reason: ALTCHOICE

## 2017-11-22 RX ADMIN — TESTOSTERONE CYPIONATE 100 MG: 200 INJECTION, SOLUTION INTRAMUSCULAR at 09:11

## 2017-11-22 NOTE — PROGRESS NOTES
Patient given testosterone 100 mg injection right ventrogluteal, tolerated well, no complaints, no reaction noted

## 2017-12-06 ENCOUNTER — CLINICAL SUPPORT (OUTPATIENT)
Dept: FAMILY MEDICINE | Facility: CLINIC | Age: 82
End: 2017-12-06
Payer: MEDICARE

## 2017-12-06 DIAGNOSIS — E29.1 HYPOGONADISM IN MALE: Primary | ICD-10-CM

## 2017-12-06 PROCEDURE — 96372 THER/PROPH/DIAG INJ SC/IM: CPT | Mod: S$GLB,,, | Performed by: INTERNAL MEDICINE

## 2017-12-06 RX ADMIN — TESTOSTERONE CYPIONATE 100 MG: 200 INJECTION, SOLUTION INTRAMUSCULAR at 08:12

## 2017-12-20 ENCOUNTER — CLINICAL SUPPORT (OUTPATIENT)
Dept: FAMILY MEDICINE | Facility: CLINIC | Age: 82
End: 2017-12-20
Payer: MEDICARE

## 2017-12-20 DIAGNOSIS — E29.1 HYPOGONADISM IN MALE: Primary | ICD-10-CM

## 2017-12-20 PROCEDURE — 96372 THER/PROPH/DIAG INJ SC/IM: CPT | Mod: S$GLB,,, | Performed by: INTERNAL MEDICINE

## 2017-12-20 RX ADMIN — TESTOSTERONE CYPIONATE 100 MG: 200 INJECTION, SOLUTION INTRAMUSCULAR at 09:12

## 2017-12-20 NOTE — PROGRESS NOTES
Pt tolerated injection of depo-testosterone 100mg to right ventrogluteal without difficulty; no adverse reaction noted

## 2018-01-03 ENCOUNTER — CLINICAL SUPPORT (OUTPATIENT)
Dept: FAMILY MEDICINE | Facility: CLINIC | Age: 83
End: 2018-01-03
Payer: MEDICARE

## 2018-01-03 DIAGNOSIS — E29.1 HYPOGONADISM IN MALE: Primary | ICD-10-CM

## 2018-01-03 PROCEDURE — 96372 THER/PROPH/DIAG INJ SC/IM: CPT | Mod: S$GLB,,, | Performed by: INTERNAL MEDICINE

## 2018-01-03 RX ORDER — FOLIC ACID 1 MG/1
1000 TABLET ORAL DAILY
Qty: 90 TABLET | Refills: 1 | Status: SHIPPED | OUTPATIENT
Start: 2018-01-03 | End: 2018-06-30 | Stop reason: SDUPTHER

## 2018-01-03 RX ADMIN — TESTOSTERONE CYPIONATE 100 MG: 200 INJECTION, SOLUTION INTRAMUSCULAR at 08:01

## 2018-01-10 ENCOUNTER — LAB VISIT (OUTPATIENT)
Dept: LAB | Facility: HOSPITAL | Age: 83
End: 2018-01-10
Attending: INTERNAL MEDICINE
Payer: MEDICARE

## 2018-01-10 DIAGNOSIS — I48.20 CHRONIC ATRIAL FIBRILLATION: ICD-10-CM

## 2018-01-10 DIAGNOSIS — Z79.01 ANTICOAGULATION MONITORING BY PHARMACIST: ICD-10-CM

## 2018-01-10 LAB
INR PPP: 2.3
PROTHROMBIN TIME: 23 SEC

## 2018-01-10 PROCEDURE — 85610 PROTHROMBIN TIME: CPT

## 2018-01-10 PROCEDURE — 36415 COLL VENOUS BLD VENIPUNCTURE: CPT | Mod: PO

## 2018-01-10 RX ORDER — WARFARIN SODIUM 5 MG/1
7.5 TABLET ORAL DAILY
Qty: 135 TABLET | Refills: 3 | Status: SHIPPED | OUTPATIENT
Start: 2018-01-10 | End: 2018-12-28 | Stop reason: SDUPTHER

## 2018-01-11 ENCOUNTER — ANTI-COAG VISIT (OUTPATIENT)
Dept: CARDIOLOGY | Facility: CLINIC | Age: 83
End: 2018-01-11

## 2018-01-11 DIAGNOSIS — Z79.01 LONG TERM (CURRENT) USE OF ANTICOAGULANTS: Primary | ICD-10-CM

## 2018-01-19 ENCOUNTER — CLINICAL SUPPORT (OUTPATIENT)
Dept: FAMILY MEDICINE | Facility: CLINIC | Age: 83
End: 2018-01-19
Payer: MEDICARE

## 2018-01-19 DIAGNOSIS — E29.1 HYPOGONADISM IN MALE: Primary | ICD-10-CM

## 2018-01-19 PROCEDURE — 96372 THER/PROPH/DIAG INJ SC/IM: CPT | Mod: S$GLB,,, | Performed by: INTERNAL MEDICINE

## 2018-01-19 RX ADMIN — TESTOSTERONE CYPIONATE 100 MG: 200 INJECTION, SOLUTION INTRAMUSCULAR at 09:01

## 2018-01-31 ENCOUNTER — CLINICAL SUPPORT (OUTPATIENT)
Dept: FAMILY MEDICINE | Facility: CLINIC | Age: 83
End: 2018-01-31
Payer: MEDICARE

## 2018-01-31 DIAGNOSIS — E29.1 HYPOGONADISM IN MALE: Primary | ICD-10-CM

## 2018-01-31 PROCEDURE — 96372 THER/PROPH/DIAG INJ SC/IM: CPT | Mod: S$GLB,,, | Performed by: FAMILY MEDICINE

## 2018-01-31 RX ADMIN — TESTOSTERONE CYPIONATE 100 MG: 200 INJECTION, SOLUTION INTRAMUSCULAR at 09:01

## 2018-01-31 NOTE — PROGRESS NOTES
Depo-testosterone 100 mg administered IM to left ventrogluteal. Tolerated well. No adverse reaction noted.

## 2018-02-14 ENCOUNTER — CLINICAL SUPPORT (OUTPATIENT)
Dept: FAMILY MEDICINE | Facility: CLINIC | Age: 83
End: 2018-02-14
Payer: MEDICARE

## 2018-02-14 DIAGNOSIS — E29.1 HYPOGONADISM IN MALE: Primary | ICD-10-CM

## 2018-02-14 PROCEDURE — 96372 THER/PROPH/DIAG INJ SC/IM: CPT | Mod: S$GLB,,, | Performed by: FAMILY MEDICINE

## 2018-02-14 RX ADMIN — TESTOSTERONE CYPIONATE 100 MG: 200 INJECTION, SOLUTION INTRAMUSCULAR at 09:02

## 2018-02-14 NOTE — PROGRESS NOTES
Depo-Testosterone 100 mg administered IM to right ventrolgluteal. Tolerated well. No adverse reaction noted.

## 2018-02-28 ENCOUNTER — CLINICAL SUPPORT (OUTPATIENT)
Dept: FAMILY MEDICINE | Facility: CLINIC | Age: 83
End: 2018-02-28
Payer: MEDICARE

## 2018-02-28 DIAGNOSIS — E29.1 HYPOGONADISM IN MALE: Primary | ICD-10-CM

## 2018-02-28 PROCEDURE — 96372 THER/PROPH/DIAG INJ SC/IM: CPT | Mod: S$GLB,,, | Performed by: INTERNAL MEDICINE

## 2018-02-28 RX ADMIN — TESTOSTERONE CYPIONATE 100 MG: 200 INJECTION, SOLUTION INTRAMUSCULAR at 08:02

## 2018-03-08 ENCOUNTER — OFFICE VISIT (OUTPATIENT)
Dept: DERMATOLOGY | Facility: CLINIC | Age: 83
End: 2018-03-08
Payer: MEDICARE

## 2018-03-08 DIAGNOSIS — Z86.006 HISTORY OF MELANOMA IN SITU: ICD-10-CM

## 2018-03-08 DIAGNOSIS — L82.1 SEBORRHEIC KERATOSES: ICD-10-CM

## 2018-03-08 DIAGNOSIS — C84.A0 CUTANEOUS T-CELL LYMPHOMA, UNSPECIFIED BODY REGION: Primary | ICD-10-CM

## 2018-03-08 DIAGNOSIS — Z85.828 PERSONAL HISTORY OF SKIN CANCER: ICD-10-CM

## 2018-03-08 DIAGNOSIS — L90.5 SCAR: ICD-10-CM

## 2018-03-08 DIAGNOSIS — D22.9 NEVUS: ICD-10-CM

## 2018-03-08 DIAGNOSIS — L57.0 AK (ACTINIC KERATOSIS): ICD-10-CM

## 2018-03-08 DIAGNOSIS — D18.00 ANGIOMA: ICD-10-CM

## 2018-03-08 PROCEDURE — 99214 OFFICE O/P EST MOD 30 MIN: CPT | Mod: 25,S$GLB,, | Performed by: DERMATOLOGY

## 2018-03-08 PROCEDURE — 99999 PR PBB SHADOW E&M-EST. PATIENT-LVL II: CPT | Mod: PBBFAC,,, | Performed by: DERMATOLOGY

## 2018-03-08 PROCEDURE — 99499 UNLISTED E&M SERVICE: CPT | Mod: S$GLB,,, | Performed by: DERMATOLOGY

## 2018-03-08 PROCEDURE — 17000 DESTRUCT PREMALG LESION: CPT | Mod: S$GLB,,, | Performed by: DERMATOLOGY

## 2018-03-08 PROCEDURE — 17003 DESTRUCT PREMALG LES 2-14: CPT | Mod: S$GLB,,, | Performed by: DERMATOLOGY

## 2018-03-08 NOTE — PROGRESS NOTES
Subjective:       Patient ID:  Linda Skelton is a 82 y.o. male who presents for   Chief Complaint   Patient presents with    Skin Check    Lesion     High risk pt with hx MIS 1-2017 here to check for the dvelopment of new lesions.  C/o lesion forehead x 2 weeks.  Asymptomatic.  tx with TAC. No change.    Pt also has hx of mycosis fungoides.  Using tac cream.  Has also been getting natural sunlight. Still has rash over trunk B LE , B UE. Not itching. No systemic symptoms.  Has not wanted to do NBUVB In the past bc of cost and transportation issues.       Lesion         Review of Systems   Constitutional: Negative for fever, chills, weight loss, fatigue, night sweats and malaise.   HENT: Negative for headaches.    Respiratory: Negative for cough and shortness of breath.    Gastrointestinal: Negative for indigestion.   Skin: Positive for activity-related sunscreen use and wears hat. Negative for daily sunscreen use and recent sunburn.   Neurological: Negative for headaches.   Hematologic/Lymphatic: Negative for adenopathy. Does not bruise/bleed easily.        Objective:    Physical Exam   Constitutional: He appears well-developed and well-nourished. No distress.   Lymphadenopathy: No supraclavicular adenopathy is present.     He has no cervical adenopathy.     He has no axillary adenopathy.     He has no inguinal adenopathy.   Neurological: He is alert and oriented to person, place, and time. He is not disoriented.   Psychiatric: He has a normal mood and affect.   Skin:   Areas Examined (abnormalities noted in diagram):   Scalp / Hair Palpated and Inspected  Head / Face Inspection Performed  Neck Inspection Performed  Chest / Axilla Inspection Performed  Abdomen Inspection Performed  Genitals / Buttocks / Groin Inspection Performed  Back Inspection Performed  RUE Inspected  LUE Inspection Performed  RLE Inspected  LLE Inspection Performed  Nails and Digits Inspection Performed                       Diagram  Legend     Erythematous scaling macule/papule c/w actinic keratosis       Vascular papule c/w angioma      Pigmented verrucoid papule/plaque c/w seborrheic keratosis      Yellow umbilicated papule c/w sebaceous hyperplasia      Irregularly shaped tan macule c/w lentigo     1-2 mm smooth white papules consistent with Milia      Movable subcutaneous cyst with punctum c/w epidermal inclusion cyst      Subcutaneous movable cyst c/w pilar cyst      Firm pink to brown papule c/w dermatofibroma      Pedunculated fleshy papule(s) c/w skin tag(s)      Evenly pigmented macule c/w junctional nevus     Mildly variegated pigmented, slightly irregular-bordered macule c/w mildly atypical nevus      Flesh colored to evenly pigmented papule c/w intradermal nevus       Pink pearly papule/plaque c/w basal cell carcinoma      Erythematous hyperkeratotic cursted plaque c/w SCC      Surgical scar with no sign of skin cancer recurrence      Open and closed comedones      Inflammatory papules and pustules      Verrucoid papule consistent consistent with wart     Erythematous eczematous patches and plaques     Dystrophic onycholytic nail with subungual debris c/w onychomycosis     Umbilicated papule    Erythematous-base heme-crusted tan verrucoid plaque consistent with inflamed seborrheic keratosis     Erythematous Silvery Scaling Plaque c/w Psoriasis     See annotation      Assessment / Plan:        Cutaneous T-cell lymphoma, unspecified body region  Pt has diffuse disease,, stage 1B.  He is not much improved with tac cream 0.1% though he is stable.  No LAD and no systemic symptoms  I do think he would improve with NBUVB. I will try to get home light unit approved.    Continue tac cream for now and natural sun light before 10 am and after 3 pm for 10-15 min and strict sun precautions to face and scalp where he had MIS and NMSC    AK (actinic keratosis)  Cryosurgery Procedure Note    Verbal consent from the patient is obtained and the  patient is aware of the precancerous quality and need for treatment of these lesions. Liquid nitrogen cryosurgery is applied to the 4 actinic keratoses, as detailed in the physical exam, to produce a freeze injury. The patient is aware that blisters may form and is instructed on wound care with gentle cleansing and use of vaseline ointment to keep moist until healed. The patient is supplied a handout on cryosurgery and is instructed to call if lesions do not completely resolve.    Nevus  Discussed ABCDE's of nevi.  Monitor for new mole or moles that are becoming bigger, darker, irritated, or developing irregular borders. Brochure provided.    Seborrheic keratoses  These are benign inherited growths without a malignant potential. Reassurance given to patient. No treatment is necessary.     Angioma  These are benign vascular lesions that are inherited.  Treatment is not necessary.    History of melanoma in situ- scalp  Personal history of skin cancer- scalp bcc, scc  Scar  ,Area of previous melanoma  And NMSC examined. Site well healed with no signs of recurrence.    Total body skin examination performed today including at least 12 points as noted in physical examination. No lesions suspicious for malignancy noted.             Follow-up in about 4 months (around 7/8/2018).

## 2018-03-14 ENCOUNTER — CLINICAL SUPPORT (OUTPATIENT)
Dept: FAMILY MEDICINE | Facility: CLINIC | Age: 83
End: 2018-03-14
Payer: MEDICARE

## 2018-03-14 ENCOUNTER — LAB VISIT (OUTPATIENT)
Dept: LAB | Facility: HOSPITAL | Age: 83
End: 2018-03-14
Attending: INTERNAL MEDICINE
Payer: MEDICARE

## 2018-03-14 DIAGNOSIS — E29.1 HYPOGONADISM IN MALE: Primary | ICD-10-CM

## 2018-03-14 DIAGNOSIS — Z79.01 LONG TERM (CURRENT) USE OF ANTICOAGULANTS: ICD-10-CM

## 2018-03-14 LAB
INR PPP: 2.7
PROTHROMBIN TIME: 25.8 SEC

## 2018-03-14 PROCEDURE — 36415 COLL VENOUS BLD VENIPUNCTURE: CPT | Mod: PO

## 2018-03-14 PROCEDURE — 96372 THER/PROPH/DIAG INJ SC/IM: CPT | Mod: S$GLB,,, | Performed by: INTERNAL MEDICINE

## 2018-03-14 PROCEDURE — 85610 PROTHROMBIN TIME: CPT

## 2018-03-14 RX ADMIN — TESTOSTERONE CYPIONATE 100 MG: 200 INJECTION, SOLUTION INTRAMUSCULAR at 08:03

## 2018-03-15 ENCOUNTER — ANTI-COAG VISIT (OUTPATIENT)
Dept: CARDIOLOGY | Facility: CLINIC | Age: 83
End: 2018-03-15

## 2018-03-28 ENCOUNTER — CLINICAL SUPPORT (OUTPATIENT)
Dept: FAMILY MEDICINE | Facility: CLINIC | Age: 83
End: 2018-03-28
Payer: MEDICARE

## 2018-03-28 DIAGNOSIS — E29.1 HYPOGONADISM IN MALE: Primary | ICD-10-CM

## 2018-03-28 PROCEDURE — 96372 THER/PROPH/DIAG INJ SC/IM: CPT | Mod: S$GLB,,, | Performed by: INTERNAL MEDICINE

## 2018-03-28 RX ADMIN — TESTOSTERONE CYPIONATE 100 MG: 200 INJECTION, SOLUTION INTRAMUSCULAR at 09:03

## 2018-03-28 NOTE — PROGRESS NOTES
Patient given testosterone 100 mg injection left ventrogluteal, tolerated well, no complaints, no reaction noted

## 2018-04-05 DIAGNOSIS — I25.10 CORONARY ARTERY DISEASE: ICD-10-CM

## 2018-04-06 RX ORDER — NITROGLYCERIN 0.4 MG/1
TABLET SUBLINGUAL
Qty: 25 TABLET | Refills: 3 | Status: SHIPPED | OUTPATIENT
Start: 2018-04-06

## 2018-04-09 DIAGNOSIS — C84.00 MYCOSIS FUNGOIDES, UNSPECIFIED BODY REGION: ICD-10-CM

## 2018-04-11 ENCOUNTER — CLINICAL SUPPORT (OUTPATIENT)
Dept: FAMILY MEDICINE | Facility: CLINIC | Age: 83
End: 2018-04-11
Payer: MEDICARE

## 2018-04-11 DIAGNOSIS — E29.1 HYPOGONADISM IN MALE: Primary | ICD-10-CM

## 2018-04-11 PROCEDURE — 96372 THER/PROPH/DIAG INJ SC/IM: CPT | Mod: S$GLB,,, | Performed by: INTERNAL MEDICINE

## 2018-04-11 RX ORDER — TRIAMCINOLONE ACETONIDE 1 MG/G
CREAM TOPICAL
Qty: 454 G | Refills: 3 | Status: SHIPPED | OUTPATIENT
Start: 2018-04-11 | End: 2018-07-26

## 2018-04-11 RX ADMIN — TESTOSTERONE CYPIONATE 100 MG: 200 INJECTION, SOLUTION INTRAMUSCULAR at 08:04

## 2018-04-25 ENCOUNTER — CLINICAL SUPPORT (OUTPATIENT)
Dept: FAMILY MEDICINE | Facility: CLINIC | Age: 83
End: 2018-04-25
Payer: MEDICARE

## 2018-04-25 DIAGNOSIS — E29.1 HYPOGONADISM IN MALE: Primary | ICD-10-CM

## 2018-04-25 PROCEDURE — 96372 THER/PROPH/DIAG INJ SC/IM: CPT | Mod: S$GLB,,, | Performed by: INTERNAL MEDICINE

## 2018-04-25 RX ADMIN — TESTOSTERONE CYPIONATE 100 MG: 200 INJECTION, SOLUTION INTRAMUSCULAR at 08:04

## 2018-04-26 PROBLEM — E03.8 OTHER SPECIFIED HYPOTHYROIDISM: Status: ACTIVE | Noted: 2018-04-26

## 2018-04-26 NOTE — PROGRESS NOTES
This note was created by combination of typed  and Dragon dictation.  Transcription errors may be present.  If there are any questions, please contact me.    Assessment & Plan:   Normal physical exam    Essential hypertension  Permanent atrial fibrillation  Mixed hyperlipidemia  Coronary artery disease involving native coronary artery of native heart without angina pectoris  -managed by cardiology. On anticoagulation with warfarin.  On dofetilide, low-dose atenolol, simvastatin.  Check upcoming labs  -     Comprehensive metabolic panel; Future; Expected date: 04/27/2018  -     Lipid panel; Future; Expected date: 04/27/2018    Chronic kidney disease, stage 3-discussed avoiding nephrotoxic over-the-counter medications.  Also discussed limiting protein intake with meals.  -     CBC auto differential; Future; Expected date: 04/27/2018  -     Comprehensive metabolic panel; Future; Expected date: 04/27/2018    History of deep 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, 1997  - on anticoagulation for this and the AFib.    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  He also takes prednisone, refilled.  -     TSH; Future; Expected date: 04/27/2018  -     levothyroxine (SYNTHROID) 112 MCG tablet; Take 1 tablet (112 mcg total) by mouth before breakfast.  Dispense: 90 tablet; Refill: 3  -     predniSONE (DELTASONE) 5 MG tablet; Take 1 tablet (5 mg total) by mouth once daily.  Dispense: 90 tablet; Refill: 3  -     TSH; Future; Expected date: 04/27/2018    Hypogonadism in male - he felt better with testosterone 200 every 14 days.  Currently 100 mg every 14 days.  Last dose this Wed, return for labs next Wed (mid point of labs). If low, increase to 200.  -     Testosterone; Future; Expected date: 04/27/2018    Prostate nodule  Benign non-nodular prostatic hyperplasia without lower urinary tract symptoms - refilled flomax  Last check with urology normal NORMA but I felt a nodule left  of midline.  On testo therapy. Refer back to urology for examination.  -     tamsulosin (FLOMAX) 0.4 mg Cp24; Take 1 capsule (0.4 mg total) by mouth once daily.  Dispense: 90 capsule; Refill: 3    Medications Discontinued During This Encounter   Medication Reason    levothyroxine (SYNTHROID) 112 MCG tablet Reorder    predniSONE (DELTASONE) 5 MG tablet Reorder    tamsulosin (FLOMAX) 0.4 mg Cp24 Reorder     Modified Medications    Modified Medication Previous Medication    LEVOTHYROXINE (SYNTHROID) 112 MCG TABLET levothyroxine (SYNTHROID) 112 MCG tablet       Take 1 tablet (112 mcg total) by mouth before breakfast.    Take 1 tablet (112 mcg total) by mouth before breakfast.    PREDNISONE (DELTASONE) 5 MG TABLET predniSONE (DELTASONE) 5 MG tablet       Take 1 tablet (5 mg total) by mouth once daily.    Take 1 tablet (5 mg total) by mouth once daily.    TAMSULOSIN (FLOMAX) 0.4 MG CP24 tamsulosin (FLOMAX) 0.4 mg Cp24       Take 1 capsule (0.4 mg total) by mouth once daily.    Take 1 capsule (0.4 mg total) by mouth once daily.     New Prescriptions    No medications on file       Follow Up: No Follow-up on file.        Subjective:     Chief Complaint   Patient presents with    Annual Exam       HPI  ROSALEE is a 82 y.o. male, last appointment with this clinic was Visit date not found.    history of panhypopituitarism  hypothyroid  coronary artery disease s/p NSTEMI/PCI to RCA 2000  Hypertension  Hyperlipidemia  AFib on anticoagulation. 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  CKD stage 3.    Last night after going to the bathroom he went in to AFib.  He feels miserable when this happens.  But checked his pulse earlier this morning and was back in sinus by his estimate.  Occurs 2-3 times a year.  Fairplay duration after the ablation, fortunately.      Cut down on testosterone and resultant weakness.  Currently on testosterone 100.  Gets it every 2 weeks.  Last dose was this  past Wednesday.     Needs NORMA.  Urology back in 2016 recommended no more PSA testing.     Answers for HPI/ROS submitted by the patient on 4/25/2018   activity change: No  unexpected weight change: No  rhinorrhea: No  trouble swallowing: No  visual disturbance: No  chest tightness: No  polyuria: No  difficulty urinating: No  joint swelling: No  arthralgias: No  confusion: No  dysphoric mood: No      Patient Care Team:  Anderson Jerome MD as PCP - General (Internal Medicine)  Octaviano Núñez Jr., MD as Consulting Physician (Urology)  Jac Madison II, MD as Consulting Physician (Endocrinology)    Patient Active Problem List    Diagnosis Date Noted    Other specified hypothyroidism 04/26/2018    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    Visit for monitoring Tikosyn therapy 07/20/2017    Current use of long term anticoagulation 07/20/2017    Benign non-nodular prostatic hyperplasia without lower urinary tract symptoms 07/05/2017    Hypogonadism in male 07/05/2017    History of melanoma in situ 02/23/2017     Scalp 1/2017 tx with mohs       Atypical lymphocytic infiltrate of skin 01/13/2017    Status post catheter ablation of atrial fibrillation 07/08/2016    Mixed hyperlipidemia 10/23/2014    Sore throat 09/15/2014    History of deep 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, 1997 09/08/2014    Post PTCA 04/04/2014    Obesity, Class I, BMI 30-34.9 04/04/2014    ED (erectile dysfunction) 04/04/2014    Essential hypertension 04/04/2014    Bilateral leg edema 01/06/2014    Mitral regurgitation 02/14/2013    Coronary artery disease with hx of MI     Chronic kidney disease, stage II (mild) 12/26/2012    Thyroid nodule 08/06/2012     10/31/2017 thyroid US: Stable left lobe nodule      Panhypopituitarism 08/06/2012    Atrial fibrillation 07/27/2012       PAST MEDICAL HISTORY:  Past Medical History:   Diagnosis Date    Acute coronary syndrome 2000    NSTEMI     Anticoagulant long-term use     Basal cell carcinoma excised     left scalp vertex    Bilateral leg edema 1/6/2014    Coronary artery disease     DVT of leg (deep venous thrombosis) 09/08/2014    On coumadin for years    Encounter for blood transfusion     Hyperlipidemia     Hypertension     Melanoma 01/04/2017     in situ crown of scalp    MI (myocardial infarction)     Panhypopituitarism     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    SQUAMOUS CELL CARCINOMA excised     left posterior scalp    Thyroid disease        PAST SURGICAL HISTORY:  Past Surgical History:   Procedure Laterality Date    APPENDECTOMY      BRAIN SURGERY      pituitary adenoma removed    CARDIAC CATHETERIZATION  05/09/2000    S/P stent to RCA,    CARDIAC CATHETERIZATION      stents placed    CORONARY ANGIOPLASTY  5/9/2000    RCA    CORONARY ANGIOPLASTY WITH STENT PLACEMENT      ESOPHAGEAL DILATION      EYE SURGERY Bilateral     cataracts removed and new lenses placed     FRACTURE SURGERY Left     leg    HERNIA REPAIR      left femur surgery      pituitatry      hypophysectomy    SKIN BIOPSY      TONSILLECTOMY      VASCULAR SURGERY         SOCIAL HISTORY:  Social History     Social History    Marital status:      Spouse name: N/A    Number of children: N/A    Years of education: N/A     Occupational History    Not on file.     Social History Main Topics    Smoking status: Never Smoker    Smokeless tobacco: Never Used      Comment: .  Two sons.  [employer] .      Alcohol use No    Drug use: No    Sexual activity: Not on file     Other Topics Concern    Not on file     Social History Narrative     and a retired [employer] .  Non smoker.        ALLERGIES AND MEDICATIONS: updated and reviewed.  Review of patient's allergies indicates:  No Known Allergies  Current Outpatient Prescriptions   Medication Sig Dispense Refill    aspirin  81 mg Tab Take 81 mg by mouth every evening. 1 Tablet Oral Every night      atenolol (TENORMIN) 25 MG tablet TAKE ONE TABLET BY MOUTH twice DAILY 90 tablet 3    dofetilide (TIKOSYN) 500 MCG Cap TAKE 1 CAPSULE 2 TIMES DAILY 180 capsule 3    folic acid (FOLVITE) 1 MG tablet Take 1 tablet (1,000 mcg total) by mouth once daily. 90 tablet 1    levothyroxine (SYNTHROID) 112 MCG tablet Take 1 tablet (112 mcg total) by mouth before breakfast. 90 tablet 3    NITROSTAT 0.4 mg SL tablet DISSOLVE ONE TABLET UNDER THE TONGUE EVERY 5 MINUTES AS NEEDED FOR CHEST PAIN.  DO NOT EXCEED A TOTAL OF 3 DOSES IN 15 MINUTES NOW 25 tablet 3    omeprazole (PRILOSEC) 20 MG capsule Take 20 mg by mouth every other day.      predniSONE (DELTASONE) 5 MG tablet Take 1 tablet (5 mg total) by mouth once daily. 90 tablet 3    simvastatin (ZOCOR) 80 MG tablet TAKE ONE TABLET BY MOUTH IN THE EVENING 90 tablet 3    tamsulosin (FLOMAX) 0.4 mg Cp24 Take 1 capsule (0.4 mg total) by mouth once daily. 90 capsule 1    testosterone cypionate (DEPOTESTOTERONE CYPIONATE) 100 mg/mL injection Inject 1 mL (100 mg total) into the muscle every 14 (fourteen) days. 10 mL 2    triamcinolone acetonide 0.1% (KENALOG) 0.1 % cream APPLY  CREAM TOPICALLY TO AFFECTED AREA TWICE DAILY 454 g 3    warfarin (COUMADIN) 5 MG tablet Take 1.5 tablets (7.5 mg total) by mouth Daily. Current Dose ( 5 mg on Tue; 7.5 mg all other days) or as directed by coumadin clinic 135 tablet 3     No current facility-administered medications for this visit.        Review of Systems   HENT: Negative for hearing loss.    Eyes: Negative for discharge.   Respiratory: Negative for wheezing.    Cardiovascular: Negative for chest pain and palpitations.   Gastrointestinal: Negative for blood in stool, constipation, diarrhea and vomiting.   Genitourinary: Negative for hematuria and urgency.   Musculoskeletal: Negative for neck pain.   Neurological: Positive for weakness. Negative for headaches.  "  Endo/Heme/Allergies: Negative for polydipsia.       Objective:   Physical Exam   Vitals:    04/27/18 0914 04/27/18 1002   BP:  110/70   BP Location:  Left arm   Patient Position:  Sitting   BP Method:  Large (Manual)   Pulse: 64    Temp: 97.6 °F (36.4 °C)    SpO2: 96%    Weight: 118.5 kg (261 lb 2.2 oz)    Height: 6' 3" (1.905 m)     Body mass index is 32.64 kg/m².            Physical Exam   Constitutional: He is oriented to person, place, and time. He appears well-developed and well-nourished. No distress.   Eyes: EOM are normal.   Cardiovascular: Normal rate, regular rhythm and normal heart sounds.    No murmur heard.  Pulmonary/Chest: Effort normal and breath sounds normal.   Abdominal: He exhibits no distension. There is no tenderness. There is no guarding.   Genitourinary: Rectum normal.   Genitourinary Comments: Prostate moderately enlarged but nodule just to the left of midline   Musculoskeletal: Normal range of motion.   Neurological: He is alert and oriented to person, place, and time. Coordination normal.   Skin: Skin is warm and dry.   Psychiatric: He has a normal mood and affect. His behavior is normal. Thought content normal.     "

## 2018-04-27 ENCOUNTER — OFFICE VISIT (OUTPATIENT)
Dept: FAMILY MEDICINE | Facility: CLINIC | Age: 83
End: 2018-04-27
Payer: MEDICARE

## 2018-04-27 VITALS
SYSTOLIC BLOOD PRESSURE: 110 MMHG | OXYGEN SATURATION: 96 % | BODY MASS INDEX: 32.47 KG/M2 | HEART RATE: 64 BPM | DIASTOLIC BLOOD PRESSURE: 70 MMHG | WEIGHT: 261.13 LBS | HEIGHT: 75 IN | TEMPERATURE: 98 F

## 2018-04-27 DIAGNOSIS — E03.8 OTHER SPECIFIED HYPOTHYROIDISM: ICD-10-CM

## 2018-04-27 DIAGNOSIS — Z86.718 HISTORY OF DEEP VEIN THROMBOSIS (DVT) OF LOWER EXTREMITY: ICD-10-CM

## 2018-04-27 DIAGNOSIS — I25.10 CORONARY ARTERY DISEASE INVOLVING NATIVE CORONARY ARTERY OF NATIVE HEART WITHOUT ANGINA PECTORIS: ICD-10-CM

## 2018-04-27 DIAGNOSIS — E78.2 MIXED HYPERLIPIDEMIA: ICD-10-CM

## 2018-04-27 DIAGNOSIS — Z00.00 NORMAL PHYSICAL EXAM: Primary | ICD-10-CM

## 2018-04-27 DIAGNOSIS — N40.2 PROSTATE NODULE: ICD-10-CM

## 2018-04-27 DIAGNOSIS — E23.0 PANHYPOPITUITARISM: ICD-10-CM

## 2018-04-27 DIAGNOSIS — N40.0 BENIGN NON-NODULAR PROSTATIC HYPERPLASIA WITHOUT LOWER URINARY TRACT SYMPTOMS: ICD-10-CM

## 2018-04-27 DIAGNOSIS — I48.21 PERMANENT ATRIAL FIBRILLATION: ICD-10-CM

## 2018-04-27 DIAGNOSIS — E29.1 HYPOGONADISM IN MALE: ICD-10-CM

## 2018-04-27 DIAGNOSIS — I10 ESSENTIAL HYPERTENSION: ICD-10-CM

## 2018-04-27 DIAGNOSIS — N18.30 CHRONIC KIDNEY DISEASE, STAGE 3: ICD-10-CM

## 2018-04-27 PROCEDURE — 99214 OFFICE O/P EST MOD 30 MIN: CPT | Mod: S$GLB,,, | Performed by: INTERNAL MEDICINE

## 2018-04-27 PROCEDURE — 3074F SYST BP LT 130 MM HG: CPT | Mod: CPTII,S$GLB,, | Performed by: INTERNAL MEDICINE

## 2018-04-27 PROCEDURE — 3078F DIAST BP <80 MM HG: CPT | Mod: CPTII,S$GLB,, | Performed by: INTERNAL MEDICINE

## 2018-04-27 PROCEDURE — 99499 UNLISTED E&M SERVICE: CPT | Mod: S$GLB,,, | Performed by: INTERNAL MEDICINE

## 2018-04-27 PROCEDURE — 99999 PR PBB SHADOW E&M-EST. PATIENT-LVL III: CPT | Mod: PBBFAC,,, | Performed by: INTERNAL MEDICINE

## 2018-04-27 RX ORDER — PREDNISONE 5 MG/1
5 TABLET ORAL DAILY
Qty: 90 TABLET | Refills: 3 | Status: SHIPPED | OUTPATIENT
Start: 2018-04-27 | End: 2019-04-29 | Stop reason: SDUPTHER

## 2018-04-27 RX ORDER — TAMSULOSIN HYDROCHLORIDE 0.4 MG/1
1 CAPSULE ORAL DAILY
Qty: 90 CAPSULE | Refills: 3 | Status: SHIPPED | OUTPATIENT
Start: 2018-04-27 | End: 2019-06-22 | Stop reason: SDUPTHER

## 2018-04-27 RX ORDER — LEVOTHYROXINE SODIUM 112 UG/1
112 TABLET ORAL
Qty: 90 TABLET | Refills: 3 | Status: SHIPPED | OUTPATIENT
Start: 2018-04-27 | End: 2019-06-22 | Stop reason: SDUPTHER

## 2018-05-01 ENCOUNTER — OFFICE VISIT (OUTPATIENT)
Dept: UROLOGY | Facility: CLINIC | Age: 83
End: 2018-05-01
Payer: MEDICARE

## 2018-05-01 VITALS
BODY MASS INDEX: 32.95 KG/M2 | WEIGHT: 265 LBS | HEART RATE: 59 BPM | HEIGHT: 75 IN | DIASTOLIC BLOOD PRESSURE: 79 MMHG | SYSTOLIC BLOOD PRESSURE: 154 MMHG

## 2018-05-01 DIAGNOSIS — I49.9 CARDIAC ARRHYTHMIA, UNSPECIFIED CARDIAC ARRHYTHMIA TYPE: ICD-10-CM

## 2018-05-01 DIAGNOSIS — N40.2 PROSTATE NODULE: Primary | ICD-10-CM

## 2018-05-01 PROCEDURE — 99999 PR PBB SHADOW E&M-EST. PATIENT-LVL III: CPT | Mod: PBBFAC,,, | Performed by: UROLOGY

## 2018-05-01 PROCEDURE — 99214 OFFICE O/P EST MOD 30 MIN: CPT | Mod: S$GLB,,, | Performed by: UROLOGY

## 2018-05-01 PROCEDURE — 3077F SYST BP >= 140 MM HG: CPT | Mod: CPTII,S$GLB,, | Performed by: UROLOGY

## 2018-05-01 PROCEDURE — 99499 UNLISTED E&M SERVICE: CPT | Mod: S$GLB,,, | Performed by: UROLOGY

## 2018-05-01 PROCEDURE — 3078F DIAST BP <80 MM HG: CPT | Mod: CPTII,S$GLB,, | Performed by: UROLOGY

## 2018-05-01 RX ORDER — SULFAMETHOXAZOLE AND TRIMETHOPRIM 800; 160 MG/1; MG/1
TABLET ORAL
Qty: 4 TABLET | Refills: 0 | Status: ON HOLD | OUTPATIENT
Start: 2018-05-01 | End: 2018-06-06 | Stop reason: HOSPADM

## 2018-05-01 NOTE — PROGRESS NOTES
Subjective:       Patient ID: Linda Skelton is a 82 y.o. male.    Chief Complaint: prostate nodule (referred by DR Jerome)    HPI.  Patient is referred for evaluation of a prostate nodule.  He has hypopituitarism and Dr. Berman found a small prostate nodule.  Recently his testosterone level was elevated and he had a dose adjustment in the testosterone and the testosterone levels will be repeated tomorrow.  He takes Coumadin for atrial fibrillation.  He is voiding well and complains of feeling like he has low testosterone with the new dose.  We discussed the pros and cons of testosterone and adenocarcinoma prostate.  I think he needs to have a truss with biopsy and of course we will need to hold the Coumadin for at least a week     Past Medical History:   Diagnosis Date    Acute coronary syndrome 2000    NSTEMI    Anticoagulant long-term use     Basal cell carcinoma excised     left scalp vertex    Bilateral leg edema 1/6/2014    Coronary artery disease     DVT of leg (deep venous thrombosis) 09/08/2014    On coumadin for years    Encounter for blood transfusion     Hyperlipidemia     Hypertension     Melanoma 01/04/2017     in situ crown of scalp    MI (myocardial infarction)     Panhypopituitarism     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    SQUAMOUS CELL CARCINOMA excised     left posterior scalp    Thyroid disease        Past Surgical History:   Procedure Laterality Date    APPENDECTOMY      BRAIN SURGERY      pituitary adenoma removed    CARDIAC CATHETERIZATION  05/09/2000    S/P stent to RCA,    CARDIAC CATHETERIZATION      stents placed    CORONARY ANGIOPLASTY  5/9/2000    RCA    CORONARY ANGIOPLASTY WITH STENT PLACEMENT      ESOPHAGEAL DILATION      EYE SURGERY Bilateral     cataracts removed and new lenses placed     FRACTURE SURGERY Left     leg    HERNIA REPAIR      left femur surgery      pituitatry      hypophysectomy    SKIN  BIOPSY      TONSILLECTOMY      VASCULAR SURGERY         Family History   Problem Relation Age of Onset    Cancer Father     Skin cancer Neg Hx     Melanoma Neg Hx     Heart disease Neg Hx     Hypertension Neg Hx        Social History     Social History    Marital status:      Spouse name: N/A    Number of children: N/A    Years of education: N/A     Occupational History    Not on file.     Social History Main Topics    Smoking status: Never Smoker    Smokeless tobacco: Never Used      Comment: .  Two sons.  [employer] .      Alcohol use No    Drug use: No    Sexual activity: Not on file     Other Topics Concern    Not on file     Social History Narrative     and a retired [employer] .  Non smoker.        Allergies:  Patient has no known allergies.    Medications:    Current Outpatient Prescriptions:     aspirin 81 mg Tab, Take 81 mg by mouth every evening. 1 Tablet Oral Every night, Disp: , Rfl:     atenolol (TENORMIN) 25 MG tablet, TAKE ONE TABLET BY MOUTH twice DAILY, Disp: 90 tablet, Rfl: 3    dofetilide (TIKOSYN) 500 MCG Cap, TAKE 1 CAPSULE 2 TIMES DAILY, Disp: 180 capsule, Rfl: 3    folic acid (FOLVITE) 1 MG tablet, Take 1 tablet (1,000 mcg total) by mouth once daily., Disp: 90 tablet, Rfl: 1    levothyroxine (SYNTHROID) 112 MCG tablet, Take 1 tablet (112 mcg total) by mouth before breakfast., Disp: 90 tablet, Rfl: 3    NITROSTAT 0.4 mg SL tablet, DISSOLVE ONE TABLET UNDER THE TONGUE EVERY 5 MINUTES AS NEEDED FOR CHEST PAIN.  DO NOT EXCEED A TOTAL OF 3 DOSES IN 15 MINUTES NOW, Disp: 25 tablet, Rfl: 3    omeprazole (PRILOSEC) 20 MG capsule, Take 20 mg by mouth every other day., Disp: , Rfl:     predniSONE (DELTASONE) 5 MG tablet, Take 1 tablet (5 mg total) by mouth once daily., Disp: 90 tablet, Rfl: 3    simvastatin (ZOCOR) 80 MG tablet, TAKE ONE TABLET BY MOUTH IN THE EVENING, Disp: 90 tablet, Rfl: 3    tamsulosin (FLOMAX) 0.4 mg Cp24, Take 1 capsule  (0.4 mg total) by mouth once daily., Disp: 90 capsule, Rfl: 3    testosterone cypionate (DEPOTESTOTERONE CYPIONATE) 100 mg/mL injection, Inject 1 mL (100 mg total) into the muscle every 14 (fourteen) days., Disp: 10 mL, Rfl: 2    triamcinolone acetonide 0.1% (KENALOG) 0.1 % cream, APPLY  CREAM TOPICALLY TO AFFECTED AREA TWICE DAILY, Disp: 454 g, Rfl: 3    warfarin (COUMADIN) 5 MG tablet, Take 1.5 tablets (7.5 mg total) by mouth Daily. Current Dose ( 5 mg on Tue; 7.5 mg all other days) or as directed by coumadin clinic, Disp: 135 tablet, Rfl: 3    sulfamethoxazole-trimethoprim 800-160mg (BACTRIM DS) 800-160 mg Tab, Start night before prostate biopsy every 12 hours for 4 doses only, Disp: 4 tablet, Rfl: 0    Review of Systems   Constitutional: Negative for activity change, appetite change, chills, diaphoresis, fatigue, fever and unexpected weight change.   HENT: Negative for congestion, dental problem, hearing loss, mouth sores, postnasal drip, rhinorrhea, sinus pressure and trouble swallowing.    Eyes: Negative for pain, discharge and itching.   Respiratory: Negative for apnea, cough, choking, chest tightness, shortness of breath and wheezing.    Cardiovascular: Negative for chest pain, palpitations and leg swelling.   Gastrointestinal: Negative for abdominal distention, abdominal pain, anal bleeding, blood in stool, constipation, diarrhea, nausea, rectal pain and vomiting.   Endocrine: Negative for polydipsia and polyuria.   Genitourinary: Negative for decreased urine volume, difficulty urinating, discharge, dysuria, enuresis, flank pain, frequency, genital sores, hematuria, penile pain, penile swelling, scrotal swelling, testicular pain and urgency.   Musculoskeletal: Negative for arthralgias, back pain and myalgias.   Skin: Negative for color change, rash and wound.   Neurological: Negative for dizziness, syncope, speech difficulty, light-headedness and headaches.   Hematological: Negative for adenopathy. Does  not bruise/bleed easily.   Psychiatric/Behavioral: Negative for behavioral problems, confusion, hallucinations and sleep disturbance.       Objective:      Physical Exam   Constitutional: He appears well-developed.   HENT:   Head: Normocephalic.   Cardiovascular: Normal rate.    Pulmonary/Chest: Effort normal.   Abdominal: Soft.   Genitourinary: Prostate normal.   Genitourinary Comments: A midline tiny nodule.  Prostate 30 g.   Neurological: He is alert.   Skin: Skin is warm.     Psychiatric: He has a normal mood and affect.       Assessment:       1. Prostate nodule        Plan:       Linda was seen today for prostate nodule.    Diagnoses and all orders for this visit:    Prostate nodule  -     Transrectal Ultrasound w/ Biopsy    Other orders  -     sulfamethoxazole-trimethoprim 800-160mg (BACTRIM DS) 800-160 mg Tab; Start night before prostate biopsy every 12 hours for 4 doses only        will need to hold Coumadin for 7 days prior if okay with cardiology and will need an INR 1 day prior

## 2018-05-02 ENCOUNTER — LAB VISIT (OUTPATIENT)
Dept: LAB | Facility: HOSPITAL | Age: 83
End: 2018-05-02
Attending: INTERNAL MEDICINE
Payer: MEDICARE

## 2018-05-02 DIAGNOSIS — N13.8 BPH WITH URINARY OBSTRUCTION: ICD-10-CM

## 2018-05-02 DIAGNOSIS — E23.0 PANHYPOPITUITARISM: ICD-10-CM

## 2018-05-02 DIAGNOSIS — E29.1 HYPOGONADISM IN MALE: ICD-10-CM

## 2018-05-02 DIAGNOSIS — E29.1 MALE HYPOGONADISM: ICD-10-CM

## 2018-05-02 DIAGNOSIS — N13.8 BPH WITH URINARY OBSTRUCTION: Primary | ICD-10-CM

## 2018-05-02 DIAGNOSIS — I10 ESSENTIAL HYPERTENSION: ICD-10-CM

## 2018-05-02 DIAGNOSIS — E03.8 OTHER SPECIFIED HYPOTHYROIDISM: ICD-10-CM

## 2018-05-02 DIAGNOSIS — N40.1 BPH WITH URINARY OBSTRUCTION: Primary | ICD-10-CM

## 2018-05-02 DIAGNOSIS — E78.2 MIXED HYPERLIPIDEMIA: ICD-10-CM

## 2018-05-02 DIAGNOSIS — N40.1 BPH WITH URINARY OBSTRUCTION: ICD-10-CM

## 2018-05-02 DIAGNOSIS — N18.30 CHRONIC KIDNEY DISEASE, STAGE 3: ICD-10-CM

## 2018-05-02 LAB
ALBUMIN SERPL BCP-MCNC: 3.7 G/DL
ALP SERPL-CCNC: 52 U/L
ALT SERPL W/O P-5'-P-CCNC: 14 U/L
ANION GAP SERPL CALC-SCNC: 4 MMOL/L
AST SERPL-CCNC: 19 U/L
BASOPHILS # BLD AUTO: 0.01 K/UL
BASOPHILS NFR BLD: 0.2 %
BILIRUB SERPL-MCNC: 1 MG/DL
BUN SERPL-MCNC: 19 MG/DL
CALCIUM SERPL-MCNC: 9.6 MG/DL
CHLORIDE SERPL-SCNC: 104 MMOL/L
CHOLEST SERPL-MCNC: 138 MG/DL
CHOLEST/HDLC SERPL: 4.3 {RATIO}
CO2 SERPL-SCNC: 32 MMOL/L
COMPLEXED PSA SERPL-MCNC: 3 NG/ML
CREAT SERPL-MCNC: 1.3 MG/DL
DIFFERENTIAL METHOD: ABNORMAL
EOSINOPHIL # BLD AUTO: 0 K/UL
EOSINOPHIL NFR BLD: 1 %
ERYTHROCYTE [DISTWIDTH] IN BLOOD BY AUTOMATED COUNT: 14 %
EST. GFR  (AFRICAN AMERICAN): 58.7 ML/MIN/1.73 M^2
EST. GFR  (NON AFRICAN AMERICAN): 50.8 ML/MIN/1.73 M^2
GLUCOSE SERPL-MCNC: 100 MG/DL
HCT VFR BLD AUTO: 47 %
HDLC SERPL-MCNC: 32 MG/DL
HDLC SERPL: 23.2 %
HGB BLD-MCNC: 15.2 G/DL
IMM GRANULOCYTES # BLD AUTO: 0.02 K/UL
IMM GRANULOCYTES NFR BLD AUTO: 0.5 %
LDLC SERPL CALC-MCNC: 80.2 MG/DL
LYMPHOCYTES # BLD AUTO: 1 K/UL
LYMPHOCYTES NFR BLD: 22.8 %
MCH RBC QN AUTO: 32.2 PG
MCHC RBC AUTO-ENTMCNC: 32.3 G/DL
MCV RBC AUTO: 100 FL
MONOCYTES # BLD AUTO: 0.8 K/UL
MONOCYTES NFR BLD: 19.7 %
NEUTROPHILS # BLD AUTO: 2.4 K/UL
NEUTROPHILS NFR BLD: 55.8 %
NONHDLC SERPL-MCNC: 106 MG/DL
NRBC BLD-RTO: 0 /100 WBC
PLATELET # BLD AUTO: 113 K/UL
PMV BLD AUTO: 12.8 FL
POTASSIUM SERPL-SCNC: 4.9 MMOL/L
PROT SERPL-MCNC: 6.7 G/DL
RBC # BLD AUTO: 4.72 M/UL
SODIUM SERPL-SCNC: 140 MMOL/L
TESTOST SERPL-MCNC: 922 NG/DL
TRIGL SERPL-MCNC: 129 MG/DL
TSH SERPL DL<=0.005 MIU/L-ACNC: 0.57 UIU/ML
WBC # BLD AUTO: 4.21 K/UL

## 2018-05-02 PROCEDURE — 36415 COLL VENOUS BLD VENIPUNCTURE: CPT | Mod: PO

## 2018-05-02 PROCEDURE — 85025 COMPLETE CBC W/AUTO DIFF WBC: CPT

## 2018-05-02 PROCEDURE — 80053 COMPREHEN METABOLIC PANEL: CPT

## 2018-05-02 PROCEDURE — 84153 ASSAY OF PSA TOTAL: CPT

## 2018-05-02 PROCEDURE — 84403 ASSAY OF TOTAL TESTOSTERONE: CPT

## 2018-05-02 PROCEDURE — 80061 LIPID PANEL: CPT

## 2018-05-02 PROCEDURE — 84443 ASSAY THYROID STIM HORMONE: CPT

## 2018-05-03 DIAGNOSIS — D53.1 OTHER MEGALOBLASTIC ANEMIAS, NOT ELSEWHERE CLASSIFIED: ICD-10-CM

## 2018-05-03 DIAGNOSIS — E29.1 HYPOGONADISM IN MALE: ICD-10-CM

## 2018-05-03 DIAGNOSIS — E23.0 PANHYPOPITUITARISM: Primary | ICD-10-CM

## 2018-05-03 DIAGNOSIS — D75.89 MACROCYTOSIS: ICD-10-CM

## 2018-05-03 NOTE — PROGRESS NOTES
Testosterone WNL on current dose.  Felt poorly on current lower dose but would not increase the dose.    Macrocytosis, takes folate, recommended OTC B12 as well would check with next labs.    PSA stable.  Seen by urology - prostate nodule.    FLP OK on statin    Results to pt via [facility]. Labs 3 months CBC, B12, CMP, testosterone

## 2018-05-04 ENCOUNTER — TELEPHONE (OUTPATIENT)
Dept: UROLOGY | Facility: CLINIC | Age: 83
End: 2018-05-04

## 2018-05-04 NOTE — PROGRESS NOTES
Received request for patient to hold coumadin for prostate biopsy. CHADS-vasc=3 (age, htn, CAD/MI) He also has a h/o DVT in 1997. No need for bridging. He can hold 5 days per guidelines. Date not set.

## 2018-05-04 NOTE — TELEPHONE ENCOUNTER
Dr bustamante would like to do a prostate biopsy on my natalia. He is on coumadin. Dr bustamante would like to hold coumadin x 7 days.he can continue his asa. Please review and advise

## 2018-05-09 ENCOUNTER — ANTI-COAG VISIT (OUTPATIENT)
Dept: CARDIOLOGY | Facility: CLINIC | Age: 83
End: 2018-05-09

## 2018-05-09 ENCOUNTER — TELEPHONE (OUTPATIENT)
Dept: FAMILY MEDICINE | Facility: CLINIC | Age: 83
End: 2018-05-09

## 2018-05-09 ENCOUNTER — CLINICAL SUPPORT (OUTPATIENT)
Dept: FAMILY MEDICINE | Facility: CLINIC | Age: 83
End: 2018-05-09
Payer: MEDICARE

## 2018-05-09 ENCOUNTER — LAB VISIT (OUTPATIENT)
Dept: LAB | Facility: HOSPITAL | Age: 83
End: 2018-05-09
Attending: INTERNAL MEDICINE
Payer: MEDICARE

## 2018-05-09 DIAGNOSIS — E29.1 HYPOGONADISM IN MALE: Primary | ICD-10-CM

## 2018-05-09 DIAGNOSIS — Z79.01 LONG TERM (CURRENT) USE OF ANTICOAGULANTS: ICD-10-CM

## 2018-05-09 LAB
INR PPP: 3.1
PROTHROMBIN TIME: 29.6 SEC

## 2018-05-09 PROCEDURE — 36415 COLL VENOUS BLD VENIPUNCTURE: CPT | Mod: PO

## 2018-05-09 PROCEDURE — 85610 PROTHROMBIN TIME: CPT

## 2018-05-09 PROCEDURE — 96372 THER/PROPH/DIAG INJ SC/IM: CPT | Mod: S$GLB,,, | Performed by: INTERNAL MEDICINE

## 2018-05-09 RX ORDER — TESTOSTERONE CYPIONATE 200 MG/ML
100 INJECTION, SOLUTION INTRAMUSCULAR
Status: COMPLETED | OUTPATIENT
Start: 2018-05-09 | End: 2018-10-24

## 2018-05-09 RX ADMIN — TESTOSTERONE CYPIONATE 100 MG: 200 INJECTION, SOLUTION INTRAMUSCULAR at 08:05

## 2018-05-09 NOTE — TELEPHONE ENCOUNTER
Pt here at [facility] office for testosterone injection but orders have ; please enter new orders,

## 2018-05-09 NOTE — TELEPHONE ENCOUNTER
I put in order for clinic administered testosterone.  Do you need me to order rx for testosterone?

## 2018-05-10 NOTE — TELEPHONE ENCOUNTER
Received: 6 days ago   Message Contents   Ольга Rich, SandieD  Rosalina Conn, LPN   Caller: Unspecified (6 days ago, 11:48 AM)             Patient can hold coumadin 5 days. Please let us know once procedure date is scheduled. Thanks!    Previous Messages

## 2018-05-23 ENCOUNTER — ANTI-COAG VISIT (OUTPATIENT)
Dept: CARDIOLOGY | Facility: CLINIC | Age: 83
End: 2018-05-23

## 2018-05-23 ENCOUNTER — CLINICAL SUPPORT (OUTPATIENT)
Dept: FAMILY MEDICINE | Facility: CLINIC | Age: 83
End: 2018-05-23
Payer: MEDICARE

## 2018-05-23 ENCOUNTER — LAB VISIT (OUTPATIENT)
Dept: LAB | Facility: HOSPITAL | Age: 83
End: 2018-05-23
Attending: INTERNAL MEDICINE
Payer: MEDICARE

## 2018-05-23 DIAGNOSIS — Z79.01 LONG TERM (CURRENT) USE OF ANTICOAGULANTS: ICD-10-CM

## 2018-05-23 DIAGNOSIS — E29.1 HYPOGONADISM IN MALE: Primary | ICD-10-CM

## 2018-05-23 LAB
INR PPP: 2.4
PROTHROMBIN TIME: 22.9 SEC

## 2018-05-23 PROCEDURE — 36415 COLL VENOUS BLD VENIPUNCTURE: CPT | Mod: PO

## 2018-05-23 PROCEDURE — 85610 PROTHROMBIN TIME: CPT

## 2018-05-23 PROCEDURE — 96372 THER/PROPH/DIAG INJ SC/IM: CPT | Mod: S$GLB,,, | Performed by: INTERNAL MEDICINE

## 2018-05-23 RX ADMIN — TESTOSTERONE CYPIONATE 100 MG: 200 INJECTION, SOLUTION INTRAMUSCULAR at 08:05

## 2018-05-30 ENCOUNTER — HOSPITAL ENCOUNTER (INPATIENT)
Facility: HOSPITAL | Age: 83
LOS: 7 days | Discharge: HOME HEALTH | DRG: 853 | End: 2018-06-06
Attending: EMERGENCY MEDICINE | Admitting: HOSPITALIST
Payer: MEDICARE

## 2018-05-30 DIAGNOSIS — K81.0 GANGRENOUS CHOLECYSTITIS: Primary | ICD-10-CM

## 2018-05-30 DIAGNOSIS — R07.9 CHEST PAIN, UNSPECIFIED TYPE: ICD-10-CM

## 2018-05-30 DIAGNOSIS — R10.13 ACUTE EPIGASTRIC PAIN: ICD-10-CM

## 2018-05-30 DIAGNOSIS — R79.1 ELEVATED INR: ICD-10-CM

## 2018-05-30 DIAGNOSIS — I48.0 PAROXYSMAL ATRIAL FIBRILLATION: ICD-10-CM

## 2018-05-30 DIAGNOSIS — R55 SYNCOPE: ICD-10-CM

## 2018-05-30 DIAGNOSIS — I48.91 A-FIB: ICD-10-CM

## 2018-05-30 LAB
ALBUMIN SERPL BCP-MCNC: 3.7 G/DL
ALP SERPL-CCNC: 81 U/L
ALT SERPL W/O P-5'-P-CCNC: 95 U/L
ANION GAP SERPL CALC-SCNC: 7 MMOL/L
APTT BLDCRRT: 32.4 SEC
AST SERPL-CCNC: 205 U/L
BASOPHILS # BLD AUTO: 0 K/UL
BASOPHILS NFR BLD: 0 %
BILIRUB SERPL-MCNC: 1.4 MG/DL
BILIRUB UR QL STRIP: NEGATIVE
BUN SERPL-MCNC: 22 MG/DL
CALCIUM SERPL-MCNC: 8.9 MG/DL
CHLORIDE SERPL-SCNC: 108 MMOL/L
CLARITY UR: CLEAR
CO2 SERPL-SCNC: 26 MMOL/L
COLOR UR: YELLOW
CREAT SERPL-MCNC: 1.2 MG/DL
DIFFERENTIAL METHOD: ABNORMAL
EOSINOPHIL # BLD AUTO: 0 K/UL
EOSINOPHIL NFR BLD: 0.1 %
ERYTHROCYTE [DISTWIDTH] IN BLOOD BY AUTOMATED COUNT: 14 %
EST. GFR  (AFRICAN AMERICAN): >60 ML/MIN/1.73 M^2
EST. GFR  (NON AFRICAN AMERICAN): 56 ML/MIN/1.73 M^2
GLUCOSE SERPL-MCNC: 124 MG/DL
GLUCOSE UR QL STRIP: NEGATIVE
HCT VFR BLD AUTO: 44.1 %
HGB BLD-MCNC: 14.7 G/DL
HGB UR QL STRIP: NEGATIVE
INR PPP: 2.2
KETONES UR QL STRIP: NEGATIVE
LEUKOCYTE ESTERASE UR QL STRIP: NEGATIVE
LIPASE SERPL-CCNC: 43 U/L
LYMPHOCYTES # BLD AUTO: 0.6 K/UL
LYMPHOCYTES NFR BLD: 8.5 %
MCH RBC QN AUTO: 32.2 PG
MCHC RBC AUTO-ENTMCNC: 33.3 G/DL
MCV RBC AUTO: 97 FL
MONOCYTES # BLD AUTO: 1.5 K/UL
MONOCYTES NFR BLD: 21.5 %
NEUTROPHILS # BLD AUTO: 5 K/UL
NEUTROPHILS NFR BLD: 69.9 %
NITRITE UR QL STRIP: NEGATIVE
PH UR STRIP: 7 [PH] (ref 5–8)
PLATELET # BLD AUTO: 113 K/UL
PMV BLD AUTO: 12.7 FL
POTASSIUM SERPL-SCNC: 4 MMOL/L
PROT SERPL-MCNC: 6.3 G/DL
PROT UR QL STRIP: NEGATIVE
PROTHROMBIN TIME: 23.3 SEC
RBC # BLD AUTO: 4.57 M/UL
SODIUM SERPL-SCNC: 141 MMOL/L
SP GR UR STRIP: 1.02 (ref 1–1.03)
TROPONIN I SERPL DL<=0.01 NG/ML-MCNC: <0.006 NG/ML
URN SPEC COLLECT METH UR: ABNORMAL
UROBILINOGEN UR STRIP-ACNC: ABNORMAL EU/DL
WBC # BLD AUTO: 7.17 K/UL

## 2018-05-30 PROCEDURE — 21400001 HC TELEMETRY ROOM

## 2018-05-30 PROCEDURE — 81003 URINALYSIS AUTO W/O SCOPE: CPT

## 2018-05-30 PROCEDURE — 96374 THER/PROPH/DIAG INJ IV PUSH: CPT

## 2018-05-30 PROCEDURE — 83690 ASSAY OF LIPASE: CPT

## 2018-05-30 PROCEDURE — 93005 ELECTROCARDIOGRAM TRACING: CPT

## 2018-05-30 PROCEDURE — 80320 DRUG SCREEN QUANTALCOHOLS: CPT

## 2018-05-30 PROCEDURE — 93010 ELECTROCARDIOGRAM REPORT: CPT | Mod: ,,, | Performed by: INTERNAL MEDICINE

## 2018-05-30 PROCEDURE — 99285 EMERGENCY DEPT VISIT HI MDM: CPT | Mod: 25

## 2018-05-30 PROCEDURE — 96375 TX/PRO/DX INJ NEW DRUG ADDON: CPT

## 2018-05-30 PROCEDURE — 85730 THROMBOPLASTIN TIME PARTIAL: CPT

## 2018-05-30 PROCEDURE — 80053 COMPREHEN METABOLIC PANEL: CPT

## 2018-05-30 PROCEDURE — 25000003 PHARM REV CODE 250: Performed by: EMERGENCY MEDICINE

## 2018-05-30 PROCEDURE — 84484 ASSAY OF TROPONIN QUANT: CPT

## 2018-05-30 PROCEDURE — G0378 HOSPITAL OBSERVATION PER HR: HCPCS

## 2018-05-30 PROCEDURE — 63600175 PHARM REV CODE 636 W HCPCS: Performed by: EMERGENCY MEDICINE

## 2018-05-30 PROCEDURE — 80307 DRUG TEST PRSMV CHEM ANLYZR: CPT

## 2018-05-30 PROCEDURE — 85025 COMPLETE CBC W/AUTO DIFF WBC: CPT

## 2018-05-30 PROCEDURE — 85610 PROTHROMBIN TIME: CPT

## 2018-05-30 PROCEDURE — 25500020 PHARM REV CODE 255: Performed by: EMERGENCY MEDICINE

## 2018-05-30 RX ORDER — MORPHINE SULFATE 10 MG/ML
6 INJECTION INTRAMUSCULAR; INTRAVENOUS; SUBCUTANEOUS
Status: COMPLETED | OUTPATIENT
Start: 2018-05-30 | End: 2018-05-30

## 2018-05-30 RX ORDER — FOLIC ACID 1 MG/1
1000 TABLET ORAL DAILY
Status: DISCONTINUED | OUTPATIENT
Start: 2018-05-31 | End: 2018-06-06 | Stop reason: HOSPADM

## 2018-05-30 RX ORDER — ATENOLOL 25 MG/1
25 TABLET ORAL DAILY
Status: DISCONTINUED | OUTPATIENT
Start: 2018-05-31 | End: 2018-06-06 | Stop reason: HOSPADM

## 2018-05-30 RX ORDER — SIMVASTATIN 40 MG/1
80 TABLET, FILM COATED ORAL NIGHTLY
Status: DISCONTINUED | OUTPATIENT
Start: 2018-05-30 | End: 2018-05-31

## 2018-05-30 RX ORDER — ACETAMINOPHEN 325 MG/1
650 TABLET ORAL EVERY 8 HOURS PRN
Status: DISCONTINUED | OUTPATIENT
Start: 2018-05-30 | End: 2018-05-30

## 2018-05-30 RX ORDER — TAMSULOSIN HYDROCHLORIDE 0.4 MG/1
1 CAPSULE ORAL DAILY
Status: DISCONTINUED | OUTPATIENT
Start: 2018-05-31 | End: 2018-06-06 | Stop reason: HOSPADM

## 2018-05-30 RX ORDER — LEVOTHYROXINE SODIUM 112 UG/1
112 TABLET ORAL
Status: DISCONTINUED | OUTPATIENT
Start: 2018-05-31 | End: 2018-06-06 | Stop reason: HOSPADM

## 2018-05-30 RX ORDER — DOCUSATE SODIUM 100 MG/1
100 CAPSULE, LIQUID FILLED ORAL DAILY
Status: DISCONTINUED | OUTPATIENT
Start: 2018-05-31 | End: 2018-06-06 | Stop reason: HOSPADM

## 2018-05-30 RX ORDER — NAPROXEN SODIUM 220 MG/1
81 TABLET, FILM COATED ORAL DAILY
Status: DISCONTINUED | OUTPATIENT
Start: 2018-05-31 | End: 2018-06-06 | Stop reason: HOSPADM

## 2018-05-30 RX ORDER — DOFETILIDE 0.25 MG/1
500 CAPSULE ORAL EVERY 12 HOURS
Status: DISCONTINUED | OUTPATIENT
Start: 2018-05-30 | End: 2018-06-04

## 2018-05-30 RX ORDER — ACETAMINOPHEN 325 MG/1
650 TABLET ORAL EVERY 6 HOURS PRN
Status: DISCONTINUED | OUTPATIENT
Start: 2018-05-30 | End: 2018-06-06 | Stop reason: HOSPADM

## 2018-05-30 RX ORDER — PANTOPRAZOLE SODIUM 40 MG/1
40 TABLET, DELAYED RELEASE ORAL DAILY
Status: DISCONTINUED | OUTPATIENT
Start: 2018-05-31 | End: 2018-06-06 | Stop reason: HOSPADM

## 2018-05-30 RX ORDER — HYDRALAZINE HYDROCHLORIDE 20 MG/ML
10 INJECTION INTRAMUSCULAR; INTRAVENOUS EVERY 6 HOURS PRN
Status: DISCONTINUED | OUTPATIENT
Start: 2018-05-31 | End: 2018-06-06 | Stop reason: HOSPADM

## 2018-05-30 RX ORDER — HYDRALAZINE HYDROCHLORIDE 20 MG/ML
10 INJECTION INTRAMUSCULAR; INTRAVENOUS
Status: COMPLETED | OUTPATIENT
Start: 2018-05-30 | End: 2018-05-30

## 2018-05-30 RX ORDER — ONDANSETRON 8 MG/1
8 TABLET, ORALLY DISINTEGRATING ORAL EVERY 8 HOURS PRN
Status: DISCONTINUED | OUTPATIENT
Start: 2018-05-30 | End: 2018-05-30

## 2018-05-30 RX ORDER — ONDANSETRON 2 MG/ML
4 INJECTION INTRAMUSCULAR; INTRAVENOUS EVERY 6 HOURS PRN
Status: DISCONTINUED | OUTPATIENT
Start: 2018-05-30 | End: 2018-06-03

## 2018-05-30 RX ORDER — WARFARIN 7.5 MG/1
7.5 TABLET ORAL DAILY
Status: DISCONTINUED | OUTPATIENT
Start: 2018-05-31 | End: 2018-05-30

## 2018-05-30 RX ORDER — MORPHINE SULFATE 10 MG/ML
4 INJECTION INTRAMUSCULAR; INTRAVENOUS; SUBCUTANEOUS EVERY 4 HOURS PRN
Status: DISCONTINUED | OUTPATIENT
Start: 2018-05-30 | End: 2018-05-31

## 2018-05-30 RX ORDER — PROCHLORPERAZINE EDISYLATE 5 MG/ML
10 INJECTION INTRAMUSCULAR; INTRAVENOUS ONCE
Status: COMPLETED | OUTPATIENT
Start: 2018-05-30 | End: 2018-05-30

## 2018-05-30 RX ORDER — MIRTAZAPINE 7.5 MG/1
7.5 TABLET, FILM COATED ORAL NIGHTLY PRN
Status: DISCONTINUED | OUTPATIENT
Start: 2018-05-30 | End: 2018-05-31

## 2018-05-30 RX ORDER — PREDNISONE 5 MG/1
5 TABLET ORAL DAILY
Status: DISCONTINUED | OUTPATIENT
Start: 2018-05-31 | End: 2018-06-06 | Stop reason: HOSPADM

## 2018-05-30 RX ADMIN — SIMVASTATIN 80 MG: 40 TABLET, FILM COATED ORAL at 10:05

## 2018-05-30 RX ADMIN — IOHEXOL 100 ML: 350 INJECTION, SOLUTION INTRAVENOUS at 06:05

## 2018-05-30 RX ADMIN — MORPHINE SULFATE 6 MG: 10 INJECTION INTRAVENOUS at 05:05

## 2018-05-30 RX ADMIN — PROCHLORPERAZINE EDISYLATE 10 MG: 5 INJECTION INTRAMUSCULAR; INTRAVENOUS at 05:05

## 2018-05-30 RX ADMIN — HYDRALAZINE HYDROCHLORIDE 10 MG: 20 INJECTION INTRAMUSCULAR; INTRAVENOUS at 07:05

## 2018-05-30 NOTE — ED PROVIDER NOTES
Encounter Date: 5/30/2018    SCRIBE #1 NOTE: I, Everardo Baldev, am scribing for, and in the presence of, Eric Gonzalez MD. Other sections scribed: HPI, ROS, PE.       History     Chief Complaint   Patient presents with    Abdominal Pain     reports epigastric pain that started at midnight but subsided. Took 1 nitro at 130pm. Pt vomiting while on stretcher     CC: Chest Pain  HPI: This 82 y.o. male with HTN, HLD, CAD s/p PCI, A-Fib s/p ablation, long-term anticoagulation on Coumadin and Hx of DVT, hypophysectomy, presents to the ED via EMS c/o severe constant substernal chest pain radiating into his upper back acute onset 0000 today. Pt states that this first episodes lasted for approximately 15 minutes. He states that the pain came back suddenly at 1530 and has remained constant since. He reports 2 episodes of emesis since activating EMS. He reports associated diaphoresis. He denies any SOB, dizziness, lightheadedness, palpitations. He denies any alcohol, tobacco, or drug use. He states that he was out in he sun working for 6 hours yesterday and believes he overexerted himself. Patient took 4 baby Aspirins at home.      The history is provided by the patient.     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    SQUAMOUS CELL CARCINOMA excised     left posterior scalp    Thyroid disease      Past Surgical History:   Procedure  Laterality Date    APPENDECTOMY      BRAIN SURGERY      pituitary adenoma removed    CARDIAC CATHETERIZATION  05/09/2000    S/P stent to RCA,    CARDIAC CATHETERIZATION      stents placed    CORONARY ANGIOPLASTY  5/9/2000    RCA    CORONARY ANGIOPLASTY WITH STENT PLACEMENT      ESOPHAGEAL DILATION      EYE SURGERY Bilateral     cataracts removed and new lenses placed     FRACTURE SURGERY Left     leg    HERNIA REPAIR      left femur surgery      pituitatry      hypophysectomy    SKIN BIOPSY      TONSILLECTOMY      VASCULAR SURGERY       Family History   Problem Relation Age of Onset    Cancer Father     Skin cancer Neg Hx     Melanoma Neg Hx     Heart disease Neg Hx     Hypertension Neg Hx      Social History   Substance Use Topics    Smoking status: Never Smoker    Smokeless tobacco: Never Used      Comment: .  Two sons.  Jainism .      Alcohol use No     Review of Systems   Constitutional: Positive for diaphoresis. Negative for chills and fever.   HENT: Negative for ear pain, rhinorrhea and sore throat.    Eyes: Negative for pain and visual disturbance.   Respiratory: Negative for cough and shortness of breath.    Cardiovascular: Positive for chest pain. Negative for palpitations.   Gastrointestinal: Positive for nausea and vomiting. Negative for abdominal pain and diarrhea.   Genitourinary: Negative for dysuria and flank pain.   Musculoskeletal: Positive for back pain. Negative for arthralgias, myalgias and neck pain.   Skin: Negative for rash.   Neurological: Negative for dizziness, syncope and light-headedness.       Physical Exam     Initial Vitals [05/30/18 1535]   BP Pulse Resp Temp SpO2   136/67 60 20 97.6 °F (36.4 °C) 95 %      MAP       90         Physical Exam    Vitals reviewed.  Constitutional: He appears well-developed and well-nourished. He is not diaphoretic. He appears distressed.   HENT:   Head: Normocephalic and atraumatic.   Mouth/Throat: Oropharynx is clear  and moist.   Eyes: Conjunctivae and EOM are normal. Pupils are equal, round, and reactive to light.   Neck: Normal range of motion. Neck supple.   Cardiovascular: Normal rate, regular rhythm and normal heart sounds. Exam reveals no gallop.    No murmur heard.  Pulmonary/Chest: Breath sounds normal. No respiratory distress. He exhibits no tenderness.   Abdominal: Soft. Bowel sounds are normal. He exhibits no mass. There is no tenderness. There is no rebound and no guarding.   Musculoskeletal: Normal range of motion. He exhibits no edema or tenderness.   Neurological: He is alert and oriented to person, place, and time. He has normal strength and normal reflexes. No cranial nerve deficit or sensory deficit.   Skin: Skin is warm and dry.   Psychiatric: He has a normal mood and affect. His behavior is normal. Judgment and thought content normal.         ED Course   Procedures  Labs Reviewed   PROTIME-INR - Abnormal; Notable for the following:        Result Value    Prothrombin Time 23.3 (*)     INR 2.2 (*)     All other components within normal limits   CBC W/ AUTO DIFFERENTIAL - Abnormal; Notable for the following:     RBC 4.57 (*)     MCH 32.2 (*)     Platelets 113 (*)     Lymph # 0.6 (*)     Mono # 1.5 (*)     Lymph% 8.5 (*)     Mono% 21.5 (*)     All other components within normal limits   COMPREHENSIVE METABOLIC PANEL - Abnormal; Notable for the following:     Glucose 124 (*)     Total Bilirubin 1.4 (*)      (*)     ALT 95 (*)     Anion Gap 7 (*)     eGFR if non  56 (*)     All other components within normal limits   APTT - Abnormal; Notable for the following:     aPTT 32.4 (*)     All other components within normal limits   TROPONIN I   LIPASE   URINALYSIS, REFLEX TO URINE CULTURE     EKG Readings: (Independently Interpreted)   Initial Reading: No STEMI. Rhythm: Sinus Bradycardia. Heart Rate: 58. Ectopy: No Ectopy. Conduction: LBBB. ST Segments: Normal ST Segments. T Waves: Normal. Axis:  Left Axis Deviation. Other Findings: Prolonged QT Interval. Clinical Impression: Sinus Bradycardia          Medical Decision Making:   Clinical Tests:        <> Summary of Lab: Results for ROSALEE MELVIN (MRN [id number]) as of 5/30/2018 18:24    5/30/2018 16:34    5/30/2018 17:17    5/30/2018 17:28  WBC: 7.17  RBC: 4.57 (L)  Hemoglobin: 14.7  Hematocrit: 44.1  MCV: 97  MCH: 32.2 (H)  MCHC: 33.3  RDW: 14.0  Platelets: 113 (L)  MPV: 12.7  Gran%: 69.9  Gran # (ANC): 5.0  Lymph%: 8.5 (L)  Lymph #: 0.6 (L)  Mono%: 21.5 (H)  Mono #: 1.5 (H)  Eosinophil%: 0.1  Eos #: 0.0  Basophil%: 0.0  Baso #: 0.00  Protime: 23.3 (H)  Coumadin Monitoring INR: 2.2 (H)  aPTT: 32.4 (H)  Sodium: 141  Potassium: 4.0  Chloride: 108  CO2: 26  Anion Gap: 7 (L)  BUN, Bld: 22  Creatinine: 1.2  eGFR if non : 56 (A)  eGFR if African American: >60  Glucose: 124 (H)  Calcium: 8.9  Alkaline Phosphatase: 81  Total Protein: 6.3  Albumin: 3.7  Total Bilirubin: 1.4 (H)  AST: 205 (H)  ALT: 95 (H)  Lipase: 43  Troponin I: <0.006    Radiological Study: Reviewed  ED Management:  EXAMINATION:  CTA CHEST ABDOMEN NON CORONARY (XPD)    CLINICAL HISTORY:  Aortic dz, non-traumatic, known or suspect;    TECHNIQUE:  Axial images of the chest and abdomen were obtained at 2.5 mm intervals during administration of 100 cc omni 350 IV contrast.  Precontrast images were acquired.  Coronal and sagittal reformatted images were reviewed.    COMPARISON:  CT 01/12/2013    FINDINGS:  The structures at the base of the neck are remarkable for a subcentimeter left thyroid nodule, nonspecific.  Degenerative changes are noted at the sternoclavicular articulations.  No significant mediastinal lymphadenopathy.  There is atherosclerotic calcification of the aorta and its branches.  The heart is mildly prominent without significant pericardial effusion.    The airways are grossly patent noting motion artifact limits evaluation of the airways and pulmonary parenchyma.   There is bilateral basilar dependent atelectasis.  No significant volume of pleural fluid.  There is right lower lobe atelectasis or scarring.  No pneumothorax.  No large focal consolidation.    Allowing for motion artifact, no convincing pulmonary arterial filling defect to the level of the proximal segmental arteries bilaterally to suggest pulmonary thromboembolism.  Please note, given motion artifact, especially involving the bilateral lower lobes, pulmonary thromboembolism distal to the segmental arteries cannot be excluded within these regions bilaterally although no definite defect seen.    The spleen, pancreas and adrenal glands are grossly unremarkable.  The gallbladder is mildly distended noting there may be cholelithiasis or sludge near the gallbladder neck.  If there is right upper quadrant pain, consider ultrasound for further evaluation.  There are several lobular low attenuating lesions within the hepatic parenchyma, attenuation of these foci are most consistent with cysts, others are too small for characterization.  Largest within the right hepatic lobe measures approximately 3.4 cm.  Of note, this lesion has decreased in size since the previous exam suggesting waxing and waning cysts.  The a patent parenchyma is low attenuating suggesting steatosis, there is probable sparing about the gallbladder fossa.  The common duct is not dilated.  The pancreatic duct is not dilated.  Scattered shotty periaortic and paracaval lymph nodes are noted.    The kidneys enhance symmetrically.  There is right nonobstructive nephrolithiasis.  Multiple low attenuating lesions are noted throughout the kidneys bilaterally, left greater than right, attenuation of the larger lesions measures that of cystic attenuation, the smaller foci are too small for characterization.  Previously described high attenuating lesions on CT 01/12/2013 may be present although administration of contrast limits evaluation.  Overall, ultrasound  could be performed of the kidneys to confirm cystic nature of the lesions.  Several left parapelvic cysts are additionally noted.  The visualized portions of the ureters are grossly unremarkable.  There is an IVC filter.  The visualized bowel is grossly unremarkable.    There is atherosclerotic calcification of the aorta and its branches.  The thoracic aorta tapers normally without aneurysm.  The SMA bilateral main renal arteries, accessory right renal artery, accessory left renal artery, and SURYA all are patent.  There is stenosis at the origin of the celiac artery, noting post a not dilation.  Distally, the vessel is patent although there is high-grade stenosis at its origin.    Degenerative changes are noted of the spine.  There is dextroscoliotic curvature of the lumbar spine.  No significant axillary lymphadenopathy.  Impression     Impression:    1. Allowing for exam limitations, no convincing pulmonary thromboembolism.  2. 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  3. Hepatic steatosis, correlation with LFTs recommended.  4. Mild indistinctness about the gallbladder noting cholelithiasis or sludge.  If there is right upper quadrant pain, consider ultrasound for further evaluation.  5. Right hepatic cysts and right renal cysts.  The renal lesions can be further evaluated with ultrasound, on an outpatient basis, to confirm cystic nature as warranted.  6. Please see above for several additional findings.      Electronically signed by: Abhijeet Castillo MD  Date: 05/30/2018  Time: 18:40  EXAMINATION:  XR CHEST 1 VIEW    CLINICAL HISTORY:  Epigastric pain    TECHNIQUE:  Single frontal view of the chest was performed.    COMPARISON:  09/15/2014    FINDINGS:  The cardiomediastinal silhouette is not enlarged.  There is no pleural effusion.  The trachea is midline.  The lungs are symmetrically expanded bilaterally with coarse interstitial  attenuation, similar to the previous exam.  There is minimal left basilar subsegmental atelectasis.  There is elevation of the right hemidiaphragm..  No large focal consolidation seen.  There is no pneumothorax.  The osseous structures are remarkable for degenerative changes..  Impression       No acute cardiopulmonary process, stable chronic findings.      Electronically signed by: Abhijeet Castillo MD  Date: 05/30/2018  Time: 17:09  1925h patient is feeling better and resting comfortably his troponin was negative, ECG no acute changes, Chest XRAY NAD and CTA chest abd/pelvis no dissection no pulmonary embolus.  1929h Dr. Kirkland admitted the patient for Dr. Marty Glover Attestation:   Scribe #1: I performed the above scribed service and the documentation accurately describes the services I performed. I attest to the accuracy of the note.    Attending Attestation:           Physician Attestation for Scribe:  Physician Attestation Statement for Scribe #1: I, Eric Gonzalez MD, reviewed documentation, as scribed by Everardo De Paz in my presence, and it is both accurate and complete.                    Clinical Impression:   The primary encounter diagnosis was Chest pain, unspecified type. Diagnoses of Acute epigastric pain and Syncope were also pertinent to this visit.    Disposition:   Disposition: Placed in Observation  Condition: Stable                        Eric Gonzalez MD  05/30/18 1939

## 2018-05-31 PROBLEM — R07.9 CHEST PAIN: Status: ACTIVE | Noted: 2017-08-28

## 2018-05-31 PROBLEM — R10.9 ABDOMINAL PAIN: Status: ACTIVE | Noted: 2018-05-30

## 2018-05-31 PROBLEM — R79.89 ABNORMAL LFTS: Status: ACTIVE | Noted: 2018-05-31

## 2018-05-31 LAB
ALBUMIN SERPL BCP-MCNC: 4 G/DL
ALP SERPL-CCNC: 149 U/L
ALT SERPL W/O P-5'-P-CCNC: 355 U/L
AMPHET+METHAMPHET UR QL: NEGATIVE
AMYLASE SERPL-CCNC: 66 U/L
ANION GAP SERPL CALC-SCNC: 9 MMOL/L
AST SERPL-CCNC: 358 U/L
BARBITURATES UR QL SCN>200 NG/ML: NEGATIVE
BASOPHILS # BLD AUTO: 0 K/UL
BASOPHILS NFR BLD: 0 %
BENZODIAZ UR QL SCN>200 NG/ML: NEGATIVE
BILIRUB SERPL-MCNC: 3.3 MG/DL
BUN SERPL-MCNC: 16 MG/DL
BZE UR QL SCN: NEGATIVE
CALCIUM SERPL-MCNC: 9.3 MG/DL
CANNABINOIDS UR QL SCN: NEGATIVE
CHLORIDE SERPL-SCNC: 101 MMOL/L
CO2 SERPL-SCNC: 26 MMOL/L
CREAT SERPL-MCNC: 1.2 MG/DL
CREAT UR-MCNC: 43.7 MG/DL
CRP SERPL-MCNC: 96.6 MG/L
DIFFERENTIAL METHOD: ABNORMAL
EOSINOPHIL # BLD AUTO: 0 K/UL
EOSINOPHIL NFR BLD: 0.1 %
ERYTHROCYTE [DISTWIDTH] IN BLOOD BY AUTOMATED COUNT: 14.5 %
ERYTHROCYTE [SEDIMENTATION RATE] IN BLOOD BY WESTERGREN METHOD: 7 MM/HR
EST. GFR  (AFRICAN AMERICAN): >60 ML/MIN/1.73 M^2
EST. GFR  (NON AFRICAN AMERICAN): 56 ML/MIN/1.73 M^2
ETHANOL SERPL-MCNC: <10 MG/DL
GLUCOSE SERPL-MCNC: 117 MG/DL
HAV IGM SERPL QL IA: NEGATIVE
HBV CORE IGM SERPL QL IA: NEGATIVE
HBV SURFACE AG SERPL QL IA: NEGATIVE
HCT VFR BLD AUTO: 48.8 %
HCV AB SERPL QL IA: NEGATIVE
HGB BLD-MCNC: 16 G/DL
INR PPP: 2.1
LACTATE SERPL-SCNC: 2.1 MMOL/L
LYMPHOCYTES # BLD AUTO: 0.4 K/UL
LYMPHOCYTES NFR BLD: 5.2 %
MAGNESIUM SERPL-MCNC: 1.9 MG/DL
MCH RBC QN AUTO: 31 PG
MCHC RBC AUTO-ENTMCNC: 32.8 G/DL
MCV RBC AUTO: 95 FL
METHADONE UR QL SCN>300 NG/ML: NEGATIVE
MONOCYTES # BLD AUTO: 2.3 K/UL
MONOCYTES NFR BLD: 30.3 %
NEUTROPHILS # BLD AUTO: 4.8 K/UL
NEUTROPHILS NFR BLD: 64.1 %
OPIATES UR QL SCN: NORMAL
PCP UR QL SCN>25 NG/ML: NEGATIVE
PHOSPHATE SERPL-MCNC: 2.7 MG/DL
PLATELET # BLD AUTO: 105 K/UL
PMV BLD AUTO: 12.3 FL
POCT GLUCOSE: 114 MG/DL (ref 70–110)
POCT GLUCOSE: 120 MG/DL (ref 70–110)
POCT GLUCOSE: 122 MG/DL (ref 70–110)
POCT GLUCOSE: 93 MG/DL (ref 70–110)
POTASSIUM SERPL-SCNC: 3.8 MMOL/L
PROCALCITONIN SERPL IA-MCNC: 0.76 NG/ML
PROT SERPL-MCNC: 7.2 G/DL
PROTHROMBIN TIME: 21.9 SEC
RBC # BLD AUTO: 5.16 M/UL
SODIUM SERPL-SCNC: 136 MMOL/L
TOXICOLOGY INFORMATION: NORMAL
TROPONIN I SERPL DL<=0.01 NG/ML-MCNC: 0.04 NG/ML
TROPONIN I SERPL DL<=0.01 NG/ML-MCNC: 0.04 NG/ML
TROPONIN I SERPL DL<=0.01 NG/ML-MCNC: 0.06 NG/ML
WBC # BLD AUTO: 7.5 K/UL

## 2018-05-31 PROCEDURE — 21400001 HC TELEMETRY ROOM

## 2018-05-31 PROCEDURE — 63600175 PHARM REV CODE 636 W HCPCS: Performed by: EMERGENCY MEDICINE

## 2018-05-31 PROCEDURE — 87077 CULTURE AEROBIC IDENTIFY: CPT

## 2018-05-31 PROCEDURE — 85610 PROTHROMBIN TIME: CPT

## 2018-05-31 PROCEDURE — 63600175 PHARM REV CODE 636 W HCPCS: Performed by: INTERNAL MEDICINE

## 2018-05-31 PROCEDURE — 63600175 PHARM REV CODE 636 W HCPCS: Performed by: HOSPITALIST

## 2018-05-31 PROCEDURE — 84484 ASSAY OF TROPONIN QUANT: CPT

## 2018-05-31 PROCEDURE — 80074 ACUTE HEPATITIS PANEL: CPT

## 2018-05-31 PROCEDURE — 85652 RBC SED RATE AUTOMATED: CPT

## 2018-05-31 PROCEDURE — 87040 BLOOD CULTURE FOR BACTERIA: CPT | Mod: 59

## 2018-05-31 PROCEDURE — 25000003 PHARM REV CODE 250: Performed by: INTERNAL MEDICINE

## 2018-05-31 PROCEDURE — 87186 SC STD MICRODIL/AGAR DIL: CPT

## 2018-05-31 PROCEDURE — 84145 PROCALCITONIN (PCT): CPT

## 2018-05-31 PROCEDURE — 36415 COLL VENOUS BLD VENIPUNCTURE: CPT

## 2018-05-31 PROCEDURE — 25000003 PHARM REV CODE 250: Performed by: HOSPITALIST

## 2018-05-31 PROCEDURE — 83735 ASSAY OF MAGNESIUM: CPT

## 2018-05-31 PROCEDURE — 83605 ASSAY OF LACTIC ACID: CPT

## 2018-05-31 PROCEDURE — 84484 ASSAY OF TROPONIN QUANT: CPT | Mod: 91

## 2018-05-31 PROCEDURE — 80053 COMPREHEN METABOLIC PANEL: CPT

## 2018-05-31 PROCEDURE — 25000003 PHARM REV CODE 250: Performed by: EMERGENCY MEDICINE

## 2018-05-31 PROCEDURE — 63600175 PHARM REV CODE 636 W HCPCS: Performed by: NURSE PRACTITIONER

## 2018-05-31 PROCEDURE — 84100 ASSAY OF PHOSPHORUS: CPT

## 2018-05-31 PROCEDURE — 93005 ELECTROCARDIOGRAM TRACING: CPT

## 2018-05-31 PROCEDURE — 82150 ASSAY OF AMYLASE: CPT

## 2018-05-31 PROCEDURE — 86140 C-REACTIVE PROTEIN: CPT

## 2018-05-31 PROCEDURE — 85025 COMPLETE CBC W/AUTO DIFF WBC: CPT

## 2018-05-31 PROCEDURE — 93010 ELECTROCARDIOGRAM REPORT: CPT | Mod: ,,, | Performed by: INTERNAL MEDICINE

## 2018-05-31 RX ORDER — MORPHINE SULFATE 10 MG/ML
4 INJECTION INTRAMUSCULAR; INTRAVENOUS; SUBCUTANEOUS
Status: DISCONTINUED | OUTPATIENT
Start: 2018-05-31 | End: 2018-06-02

## 2018-05-31 RX ORDER — MORPHINE SULFATE 10 MG/ML
1 INJECTION INTRAMUSCULAR; INTRAVENOUS; SUBCUTANEOUS
Status: DISCONTINUED | OUTPATIENT
Start: 2018-05-31 | End: 2018-06-02

## 2018-05-31 RX ORDER — GLUCAGON 1 MG
1 KIT INJECTION
Status: DISCONTINUED | OUTPATIENT
Start: 2018-05-31 | End: 2018-06-06 | Stop reason: HOSPADM

## 2018-05-31 RX ORDER — SODIUM CHLORIDE 9 MG/ML
INJECTION, SOLUTION INTRAVENOUS CONTINUOUS
Status: DISCONTINUED | OUTPATIENT
Start: 2018-05-31 | End: 2018-06-05

## 2018-05-31 RX ORDER — IBUPROFEN 200 MG
16 TABLET ORAL
Status: DISCONTINUED | OUTPATIENT
Start: 2018-05-31 | End: 2018-06-06 | Stop reason: HOSPADM

## 2018-05-31 RX ORDER — MORPHINE SULFATE 10 MG/ML
4 INJECTION INTRAMUSCULAR; INTRAVENOUS; SUBCUTANEOUS ONCE
Status: DISCONTINUED | OUTPATIENT
Start: 2018-05-31 | End: 2018-05-31

## 2018-05-31 RX ORDER — IBUPROFEN 200 MG
24 TABLET ORAL
Status: DISCONTINUED | OUTPATIENT
Start: 2018-05-31 | End: 2018-06-06 | Stop reason: HOSPADM

## 2018-05-31 RX ADMIN — ACETAMINOPHEN 650 MG: 325 TABLET, FILM COATED ORAL at 07:05

## 2018-05-31 RX ADMIN — PIPERACILLIN AND TAZOBACTAM 4.5 G: 4; .5 INJECTION, POWDER, LYOPHILIZED, FOR SOLUTION INTRAVENOUS; PARENTERAL at 09:05

## 2018-05-31 RX ADMIN — HYDRALAZINE HYDROCHLORIDE 10 MG: 20 INJECTION INTRAMUSCULAR; INTRAVENOUS at 02:05

## 2018-05-31 RX ADMIN — MIRTAZAPINE 7.5 MG: 7.5 TABLET ORAL at 03:05

## 2018-05-31 RX ADMIN — SODIUM CHLORIDE: 0.9 INJECTION, SOLUTION INTRAVENOUS at 02:05

## 2018-05-31 RX ADMIN — LEVOTHYROXINE SODIUM 112 MCG: 112 TABLET ORAL at 07:05

## 2018-05-31 RX ADMIN — DOFETILIDE 500 MCG: 0.25 CAPSULE ORAL at 11:05

## 2018-05-31 RX ADMIN — MORPHINE SULFATE 4 MG: 10 INJECTION INTRAVENOUS at 04:05

## 2018-05-31 RX ADMIN — DOFETILIDE 500 MCG: 0.25 CAPSULE ORAL at 01:05

## 2018-05-31 NOTE — ASSESSMENT & PLAN NOTE
5/31/18 - worsening transaminase in the presence of abnormal US significant for gallbladder involvment + abdominal pain and distension - will obtain a HIDA scan and consult to general surgery for opinion - awaiting GI opinion, escaped stone????/transaminitis   -supportive care for pain - hold all pain medications until patient is fully awake after remeron in early morning  -crp, sed rate, procalcitonin level, lactic acid  -HIDA scan  -GI and general surgery consult  -NPO for now  -NS at 125 cc/hr  -glucose checks/hypoglycemic protocol while NPO    05/30/18-Son  Pt reported mid-epigastric abdominal pain, and not chest pain  Given abnormal LFTS (previously normal 4 weeks ago) along with CTA findings that showed gallbladder is mildly distended noting there may be cholelithiasis or sludge near the gallbladder neck which is concerning for cholecystitis as source of pain  Lipase is normal  Will check RUQ abdominal U/S for initial workup, and follow CMP  Supportive care with IVF, pain control and anti-emetics  Consult GI (canceled consult to Cardiology)

## 2018-05-31 NOTE — ASSESSMENT & PLAN NOTE
S/p ablation  He was therapeutic on coumadin, but will continue to hold coumadin in anticipation for surgery

## 2018-05-31 NOTE — NURSING
Report received from MIRIAN Hess. Patient resting comfortably, no complaints, no acute distress noted. Plan of care reviewed with patient. Instructed patient to call for assistance before ambulating, side rails up x3, bed alarm set, call light in reach, non skid socks in use. Patient verbalized understanding of instructions. Will continue to monitor.    8:05 am- Patient confused, very sleepy, unable to follow simple commands, and appears to be shaking. Upon further awakening of patient, patient now able to answer orientation questions appropriately, NP Lashonda notified of episode. Will continue to monitor.

## 2018-05-31 NOTE — NURSING
Patient transported to Ultrasound via bed. Complaints of abdominal pain noted. No acute distress noted. Will monitor upon return to floor.

## 2018-05-31 NOTE — PROGRESS NOTES
Received report. Patient arrived via stretcher by transport from ED. Patient AAOx4, telemetry monitoring in place, no distress noted. Patient oriented to room and plan of care reviewed. Blue folder at bedside, call light in reach.

## 2018-05-31 NOTE — ASSESSMENT & PLAN NOTE
S/p ablation  Therapeutic on coumadin, but will hold coumadin until abdominal pain source delineated

## 2018-05-31 NOTE — NURSING
Patient Arrived back to room from scan. No complaints, no acute distress noted. Will continue to monitor.

## 2018-05-31 NOTE — H&P
"[facility] Medicine  History & Physical    Patient Name: Linda Skelton  MRN: [id number]  Admission Date: 5/30/2018  Attending Physician: Mehdi Ferguson MD   Primary Care Provider: Anderson Jerome MD         Patient information was obtained from patient and ER records.     Subjective:     Principal Problem:Abdominal pain    Chief Complaint:   Chief Complaint   Patient presents with    Abdominal Pain     reports epigastric pain that started at midnight but subsided. Took 1 nitro at 130pm. Pt vomiting while on stretcher        HPI: 81 y/o male with CAD, Afib s/p ablation, coumadin for anticoagulationn, HTN, HLP, panhypopituitarism, and h/o DVT who presented with c/o mid-epigastric abdominal pain that started last night. He reported an episode of sharp pain last night around midnight that self resolved and he went to sleep. Then around 1:30 this afternoon, the mid-epigastric pain returned, described as "pressure" and intensity 8/10, associated with +N/V and +diaphoresis. He reports subjective chills, but no fever. Denies chest pain and SOB. Reports pain with N/V worse with attempt at eating, and he feels constant nausea and just "feeling bad" and not improved at all since coming to the hospital. No recent sick contacts; and no reported unusual food ingestions.  In the ER, workup was remarkable for negative cardiac enzyme, LFTs with Tbil of 1.4, AST/ALT=205/95, CTA of chest negative for PE but did show that the gallbladder is mildly distended noting there may be cholelithiasis or sludge near the gallbladder neck.     Interval History: lethargy; abdominal pain; elevated transamase, gallbladder sludge    Review of Systems   Constitutional: Negative for appetite change, chills, diaphoresis and fever.   HENT: Negative for congestion, hearing loss, sore throat, tinnitus and trouble swallowing.    Eyes: Negative for photophobia, discharge, itching and visual disturbance.   Respiratory: Positive " for chest tightness. Negative for apnea, cough, wheezing and stridor.    Cardiovascular: Positive for chest pain. Negative for palpitations and leg swelling.   Gastrointestinal: Positive for abdominal distention, abdominal pain, nausea and vomiting. Negative for blood in stool, constipation and diarrhea.   Endocrine: Negative for polydipsia, polyphagia and polyuria.   Genitourinary: Negative for difficulty urinating, dysuria, flank pain and frequency.   Musculoskeletal: Negative for arthralgias, joint swelling and neck stiffness.   Skin: Negative for color change, rash and wound.   Neurological: Negative for dizziness, tremors, seizures, light-headedness, numbness and headaches.   Hematological: Negative for adenopathy.   Psychiatric/Behavioral: Negative for hallucinations and self-injury.     Objective:     Vital Signs (Most Recent):  Temp: 98.8 °F (37.1 °C) (05/31/18 1132)  Pulse: 101 (05/31/18 1132)  Resp: 18 (05/31/18 1132)  BP: (!) 154/88 (05/31/18 1132)  SpO2: 96 % (05/31/18 1132) Vital Signs (24h Range):  Temp:  [97.6 °F (36.4 °C)-99 °F (37.2 °C)] 98.8 °F (37.1 °C)  Pulse:  [] 101  Resp:  [18-20] 18  SpO2:  [93 %-97 %] 96 %  BP: (136-192)/(67-92) 154/88     Weight: 115.2 kg (253 lb 15.5 oz)  Body mass index is 31.74 kg/m².    Intake/Output Summary (Last 24 hours) at 05/31/18 1218  Last data filed at 05/30/18 2300   Gross per 24 hour   Intake                0 ml   Output              300 ml   Net             -300 ml      Physical Exam   Constitutional: He appears well-developed and well-nourished. He is cooperative.   HENT:   Head: Normocephalic and atraumatic.   Eyes: Conjunctivae, EOM and lids are normal. Pupils are equal, round, and reactive to light.   Neck: Normal range of motion and full passive range of motion without pain. Neck supple. No JVD present. No edema present. No thyroid mass present.   Cardiovascular: Normal rate, S1 normal, S2 normal and intact distal pulses.    No murmur  heard.  Pulmonary/Chest: Effort normal.   Abdominal: Soft. Bowel sounds are normal. He exhibits no distension and no abdominal bruit. There is no splenomegaly or hepatomegaly. There is no tenderness. There is no CVA tenderness.   Musculoskeletal: Normal range of motion.   Lymphadenopathy:     He has no cervical adenopathy.     He has no axillary adenopathy.   Neurological: He has normal reflexes. He displays no tremor. He displays no seizure activity.   Slowed responses - arousal with tactile stimuli   Skin: Skin is warm, dry and intact.   Psychiatric: He has a normal mood and affect. His speech is normal. Thought content normal. Cognition and memory are normal.       Significant Labs:   CBC:   Recent Labs  Lab 05/30/18 1728 05/31/18  0804   WBC 7.17 7.50   HGB 14.7 16.0   HCT 44.1 48.8   * 105*     CMP:   Recent Labs  Lab 05/30/18 1728 05/31/18  0804    136   K 4.0 3.8    101   CO2 26 26   * 117*   BUN 22 16   CREATININE 1.2 1.2   CALCIUM 8.9 9.3   PROT 6.3 7.2   ALBUMIN 3.7 4.0   BILITOT 1.4* 3.3*   ALKPHOS 81 149*   * 358*   ALT 95* 355*   ANIONGAP 7* 9   EGFRNONAA 56* 56*     Cardiac Markers: No results for input(s): CKMB, MYOGLOBIN, BNP, TROPISTAT in the last 48 hours.  Lactic Acid: No results for input(s): LACTATE in the last 48 hours.  Lipase:   Recent Labs  Lab 05/30/18  1728   LIPASE 43     Lipid Panel: No results for input(s): CHOL, HDL, LDLCALC, TRIG, CHOLHDL in the last 48 hours.  Magnesium:   Recent Labs  Lab 05/31/18  0230   MG 1.9     Troponin:   Recent Labs  Lab 05/30/18  1728 05/31/18  0230 05/31/18  0804   TROPONINI <0.006 0.036* 0.044*     TSH:   Recent Labs  Lab 05/02/18  0817   TSH 0.573     Urine Culture: No results for input(s): LABURIN in the last 48 hours.  Urine Studies:   Recent Labs  Lab 05/30/18  1923   COLORU Yellow   APPEARANCEUA Clear   PHUR 7.0   SPECGRAV 1.025   PROTEINUA Negative   GLUCUA Negative   KETONESU Negative   BILIRUBINUA Negative    OCCULTUA Negative   NITRITE Negative   UROBILINOGEN 4.0-6.0*   LEUKOCYTESUR Negative       Significant Imaging:   Imaging Results          US Abdomen Complete (Final result)  Result time 05/31/18 10:47:29    Final result by Neil Augustine MD (05/31/18 10:47:29)                 Impression:      Mild hepatomegaly with multiple hepatic cysts.    Bilateral renal cysts.    Cholelithiasis with minimal gallbladder wall thickening.  Note that there is no evidence for sonographic Basurto sign to strongly suggest acute cholecystitis.    Pancreas and portions of the aorta are obscure by overlying bowel gas.      Electronically signed by: Neil Augustine MD  Date:    05/31/2018  Time:    10:47             Narrative:    EXAMINATION:  US ABDOMEN COMPLETE    CLINICAL HISTORY:  abnormal LFTS with CT with gallstones, r/o cholecystitis;    TECHNIQUE:  Complete abdominal ultrasound (including pancreas, liver, gallbladder, common bile duct, spleen, aorta, IVC, and kidneys) was performed.    COMPARISON:  None    FINDINGS:  Liver: Mildly enlarged measuring 19.1 cm in length.  There are multiple hepatic cysts present with the largest located within the right lobe of the liver measuring 3.7 cm in greatest dimension.    Gallbladder: There are multiple small gallstones present.  The gallbladder wall is minimally thickened measuring 3.5 mm.  This is nonspecific.  There is no evidence for sonographic Basurto sign to strongly suggest acute cholecystitis.    Biliary system: The common duct is not dilated, measuring 3 mm.  No intrahepatic ductal dilatation.    Spleen: Normal in size with a homogeneous echotexture, measuring 9.9 cm in length.    Pancreas: The pancreas is obscured by overlying bowel gas.    Right kidney: Normal in size with no hydronephrosis, measuring 12.7 x 6.7 x 6.0 cm.  There is a 7 mm cyst within the mid kidney.    Left kidney:  Normal in size with no hydronephrosis, measuring 14.0 x 8.7 x 6.9 cm.  There are parapelvic renal  cysts present on the left.    Aorta: Much of the abdominal aorta is obscure diet overlying bowel gas.  Visualized portions are normal in caliber.    Inferior vena cava: Normal in appearance.    Miscellaneous: No ascites.                               CTA Chest Abdomen Non Coronary (Final result)  Result time 05/30/18 18:40:21    Final result by Abhijeet Castillo MD (05/30/18 18:40:21)                 Impression:    Impression:    1. Allowing for exam limitations, no convincing pulmonary thromboembolism.  2. 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  3. Hepatic steatosis, correlation with LFTs recommended.  4. Mild indistinctness about the gallbladder noting cholelithiasis or sludge.  If there is right upper quadrant pain, consider ultrasound for further evaluation.  5. Right hepatic cysts and right renal cysts.  The renal lesions can be further evaluated with ultrasound, on an outpatient basis, to confirm cystic nature as warranted.  6. Please see above for several additional findings.      Electronically signed by: Abhijeet Castillo MD  Date:    05/30/2018  Time:    18:40             Narrative:    EXAMINATION:  CTA CHEST ABDOMEN NON CORONARY (XPD)    CLINICAL HISTORY:  Aortic dz, non-traumatic, known or suspect;    TECHNIQUE:  Axial images of the chest and abdomen were obtained at 2.5 mm intervals during administration of 100 cc omni 350 IV contrast.  Precontrast images were acquired.  Coronal and sagittal reformatted images were reviewed.    COMPARISON:  CT 01/12/2013    FINDINGS:  The structures at the base of the neck are remarkable for a subcentimeter left thyroid nodule, nonspecific.  Degenerative changes are noted at the sternoclavicular articulations.  No significant mediastinal lymphadenopathy.  There is atherosclerotic calcification of the aorta and its branches.  The heart is mildly prominent without significant pericardial  effusion.    The airways are grossly patent noting motion artifact limits evaluation of the airways and pulmonary parenchyma.  There is bilateral basilar dependent atelectasis.  No significant volume of pleural fluid.  There is right lower lobe atelectasis or scarring.  No pneumothorax.  No large focal consolidation.    Allowing for motion artifact, no convincing pulmonary arterial filling defect to the level of the proximal segmental arteries bilaterally to suggest pulmonary thromboembolism.  Please note, given motion artifact, especially involving the bilateral lower lobes, pulmonary thromboembolism distal to the segmental arteries cannot be excluded within these regions bilaterally although no definite defect seen.    The spleen, pancreas and adrenal glands are grossly unremarkable.  The gallbladder is mildly distended noting there may be cholelithiasis or sludge near the gallbladder neck.  If there is right upper quadrant pain, consider ultrasound for further evaluation.  There are several lobular low attenuating lesions within the hepatic parenchyma, attenuation of these foci are most consistent with cysts, others are too small for characterization.  Largest within the right hepatic lobe measures approximately 3.4 cm.  Of note, this lesion has decreased in size since the previous exam suggesting waxing and waning cysts.  The a patent parenchyma is low attenuating suggesting steatosis, there is probable sparing about the gallbladder fossa.  The common duct is not dilated.  The pancreatic duct is not dilated.  Scattered shotty periaortic and paracaval lymph nodes are noted.    The kidneys enhance symmetrically.  There is right nonobstructive nephrolithiasis.  Multiple low attenuating lesions are noted throughout the kidneys bilaterally, left greater than right, attenuation of the larger lesions measures that of cystic attenuation, the smaller foci are too small for characterization.  Previously described high  attenuating lesions on CT 01/12/2013 may be present although administration of contrast limits evaluation.  Overall, ultrasound could be performed of the kidneys to confirm cystic nature of the lesions.  Several left parapelvic cysts are additionally noted.  The visualized portions of the ureters are grossly unremarkable.  There is an IVC filter.  The visualized bowel is grossly unremarkable.    There is atherosclerotic calcification of the aorta and its branches.  The thoracic aorta tapers normally without aneurysm.  The SMA bilateral main renal arteries, accessory right renal artery, accessory left renal artery, and SURYA all are patent.  There is stenosis at the origin of the celiac artery, noting post a not dilation.  Distally, the vessel is patent although there is high-grade stenosis at its origin.    Degenerative changes are noted of the spine.  There is dextroscoliotic curvature of the lumbar spine.  No significant axillary lymphadenopathy.                               X-Ray Chest 1 View (Final result)  Result time 05/30/18 17:09:54    Final result by Abhijeet Castillo MD (05/30/18 17:09:54)                 Impression:      No acute cardiopulmonary process, stable chronic findings.      Electronically signed by: Abhijeet Castillo MD  Date:    05/30/2018  Time:    17:09             Narrative:    EXAMINATION:  XR CHEST 1 VIEW    CLINICAL HISTORY:  Epigastric pain    TECHNIQUE:  Single frontal view of the chest was performed.    COMPARISON:  09/15/2014    FINDINGS:  The cardiomediastinal silhouette is not enlarged.  There is no pleural effusion.  The trachea is midline.  The lungs are symmetrically expanded bilaterally with coarse interstitial attenuation, similar to the previous exam.  There is minimal left basilar subsegmental atelectasis.  There is elevation of the right hemidiaphragm..  No large focal consolidation seen.  There is no pneumothorax.  The osseous structures are remarkable for degenerative  changes..                                  Assessment/Plan:     * Abdominal pain    5/31/18 - worsening transaminase in the presence of abnormal US significant for gallbladder involvment + abdominal pain and distension - will obtain a HIDA scan and consult to general surgery for opinion - awaiting GI opinion, escaped stone????/transaminitis   -supportive care for pain - hold all pain medications until patient is fully awake after remeron in early morning  -crp, sed rate, procalcitonin level, lactic acid  -HIDA scan  -GI and general surgery consult  -NPO for now  -NS at 125 cc/hr  -glucose checks/hypoglycemic protocol while NPO    05/30/18-Son  Pt reported mid-epigastric abdominal pain, and not chest pain  Given abnormal LFTS (previously normal 4 weeks ago) along with CTA findings that showed gallbladder is mildly distended noting there may be cholelithiasis or sludge near the gallbladder neck which is concerning for cholecystitis as source of pain  Lipase is normal  Will check RUQ abdominal U/S for initial workup, and follow CMP  Supportive care with IVF, pain control and anti-emetics  Consult GI (canceled consult to Cardiology)        Abnormal LFTs    As discussed above  Will also check acute hepatitis panel for completeness of workup although less likely          Benign non-nodular prostatic hyperplasia without lower urinary tract symptoms    Continue tamulosin        Status post catheter ablation of atrial fibrillation    As discussed above        Mixed hyperlipidemia    Hold statin for now given increased LFTs        History of deep 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, 1997    Treated and currently anticoagulated with coumadin  As discussed above        Essential hypertension    Continue atenolol        Coronary artery disease with hx of MI    Pt denies chest pain and initial cardiac marker is negative  Continue home medication regimen        Chronic kidney disease, stage 3    Creatinine at  baseline  Will monitor        Panhypopituitarism    Continue home medication regimen        Atrial fibrillation    S/p ablation  Therapeutic on coumadin, but will hold coumadin until abdominal pain source delineated - INR 2.1 today            Hold Warfarin 2/2 abdominal fuentes workup  VTE Risk Mitigation     None             CONTRERAS Avery, FNP-C  Hospitalist - Department of Hospital Medicine  [address], [place] [address]  Office [phone]; Pager [phone]

## 2018-05-31 NOTE — H&P
"[facility] Medicine  History & Physical    Patient Name: Linda Skelton  MRN: [id number]  Admission Date: 5/30/2018  Attending Physician: Mehdi Ferguson MD   Primary Care Provider: Anderson Jerome MD         Patient information was obtained from patient, past medical records and ER records.     Subjective:     Principal Problem:Abdominal pain    Chief Complaint:   Chief Complaint   Patient presents with    Abdominal Pain     reports epigastric pain that started at midnight but subsided. Took 1 nitro at 130pm. Pt vomiting while on stretcher        HPI: 83 y/o male with CAD, Afib s/p ablation, coumadin for anticoagulationn, HTN, HLP, panhypopituitarism, and h/o DVT who presented with c/o mid-epigastric abdominal pain that started last night. He reported an episode of sharp pain last night around midnight that self resolved and he went to sleep. Then around 1:30 this afternoon, the mid-epigastric pain returned, described as "pressure" and intensity 8/10, associated with +N/V and +diaphoresis. He reports subjective chills, but no fever. Denies chest pain and SOB. Reports pain with N/V worse with attempt at eating, and he feels constant nausea and just "feeling bad" and not improved at all since coming to the hospital. No recent sick contacts; and no reported unusual food ingestions.  In the ER, workup was remarkable for negative cardiac enzyme, LFTs with Tbil of 1.4, AST/ALT=205/95, CTA of chest negative for PE but did show that the gallbladder is mildly distended noting there may be cholelithiasis or sludge near the gallbladder neck.     Past Medical History:   Diagnosis Date    ACS (acute coronary syndrome)     Acute coronary syndrome 2000    NSTEMI    Anticoagulant long-term use     Coumadin    Atrial fibrillation     Basal cell carcinoma excised     left scalp vertex    Bilateral leg edema 1/6/2014    BPH (benign prostatic hyperplasia)     CKD (chronic kidney disease)  "    Coronary artery disease     DVT of leg (deep venous thrombosis) 09/08/2014    On coumadin for years    Encounter for blood transfusion     Hyperlipidemia     Hypertension     Hypogonadism, male     Leg fracture, left     Melanoma 01/04/2017     in situ crown of scalp    MI (myocardial infarction)     Panhypopituitarism     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    SQUAMOUS CELL CARCINOMA excised     left posterior scalp    Thyroid disease        Past Surgical History:   Procedure Laterality Date    APPENDECTOMY      BASAL CELL CARCINOMA EXCISION  01/2008    BRAIN SURGERY      pituitary adenoma removed    CARDIAC CATHETERIZATION  05/09/2000    S/P stent to RCA,    CARDIAC CATHETERIZATION      stents placed    CARDIAC ELECTROPHYSIOLOGY STUDY AND ABLATION      CORONARY ANGIOPLASTY  5/9/2000    RCA    CORONARY ANGIOPLASTY WITH STENT PLACEMENT      ESOPHAGEAL DILATION      EYE SURGERY Bilateral     cataracts removed and new lenses placed     FRACTURE SURGERY Left     leg    HERNIA REPAIR      left femur surgery      pituitatry      hypophysectomy    SKIN BIOPSY      TONSILLECTOMY      VASCULAR SURGERY         Review of patient's allergies indicates:  No Known Allergies    No current facility-administered medications on file prior to encounter.      Current Outpatient Prescriptions on File Prior to Encounter   Medication Sig    aspirin 81 mg Tab Take 81 mg by mouth every evening. 1 Tablet Oral Every night    atenolol (TENORMIN) 25 MG tablet TAKE ONE TABLET BY MOUTH twice DAILY    dofetilide (TIKOSYN) 500 MCG Cap TAKE 1 CAPSULE 2 TIMES DAILY    folic acid (FOLVITE) 1 MG tablet Take 1 tablet (1,000 mcg total) by mouth once daily.    levothyroxine (SYNTHROID) 112 MCG tablet Take 1 tablet (112 mcg total) by mouth before breakfast.    NITROSTAT 0.4 mg SL tablet DISSOLVE ONE TABLET UNDER THE TONGUE EVERY 5 MINUTES AS NEEDED FOR CHEST PAIN.  DO NOT  EXCEED A TOTAL OF 3 DOSES IN 15 MINUTES NOW    omeprazole (PRILOSEC) 20 MG capsule Take 20 mg by mouth every other day.    predniSONE (DELTASONE) 5 MG tablet Take 1 tablet (5 mg total) by mouth once daily.    simvastatin (ZOCOR) 80 MG tablet TAKE ONE TABLET BY MOUTH IN THE EVENING    tamsulosin (FLOMAX) 0.4 mg Cp24 Take 1 capsule (0.4 mg total) by mouth once daily.    triamcinolone acetonide 0.1% (KENALOG) 0.1 % cream APPLY  CREAM TOPICALLY TO AFFECTED AREA TWICE DAILY    warfarin (COUMADIN) 5 MG tablet Take 1.5 tablets (7.5 mg total) by mouth Daily. Current Dose ( 5 mg on Tue; 7.5 mg all other days) or as directed by coumadin clinic    sulfamethoxazole-trimethoprim 800-160mg (BACTRIM DS) 800-160 mg Tab Start night before prostate biopsy every 12 hours for 4 doses only    testosterone cypionate (DEPOTESTOTERONE CYPIONATE) 100 mg/mL injection Inject 1 mL (100 mg total) into the muscle every 14 (fourteen) days.     Family History     Problem Relation (Age of Onset)    Cancer Father        Social History Main Topics    Smoking status: Never Smoker    Smokeless tobacco: Never Used      Comment: .  Two sons.  [place] .      Alcohol use No    Drug use: No    Sexual activity: Not Currently     Partners: Female     Review of Systems   Constitutional: Positive for chills and diaphoresis. Negative for fever.   HENT: Negative for sore throat.    Eyes: Negative for visual disturbance.   Respiratory: Negative for shortness of breath.    Cardiovascular: Negative for chest pain.   Gastrointestinal: Positive for abdominal pain, nausea and vomiting.   Endocrine: Negative for polyuria.   Genitourinary: Negative for dysuria.   Musculoskeletal: Negative for arthralgias.   Skin: Negative for rash.   Neurological: Negative for dizziness, syncope and light-headedness.   Hematological: Does not bruise/bleed easily.   Psychiatric/Behavioral: Negative for confusion.     Objective:     Vital Signs (Most  Recent):  Temp: 98 °F (36.7 °C) (05/30/18 2000)  Pulse: 89 (05/30/18 2035)  Resp: 20 (05/30/18 2035)  BP: (!) 172/83 (05/30/18 2035)  SpO2: 97 % (05/30/18 2035) Vital Signs (24h Range):  Temp:  [97.6 °F (36.4 °C)-98 °F (36.7 °C)] 98 °F (36.7 °C)  Pulse:  [] 89  Resp:  [18-20] 20  SpO2:  [95 %-97 %] 97 %  BP: (136-192)/(67-89) 172/83     Weight: (!) 164.2 kg (362 lb)  Body mass index is 45.25 kg/m².    Physical Exam   Constitutional: He is oriented to person, place, and time. He appears well-developed.   Appears uncomfortable and ill feeling, but not in acute distress   HENT:   Head: Normocephalic and atraumatic.   Eyes: EOM are normal. Pupils are equal, round, and reactive to light.   Neck: Normal range of motion. Neck supple.   Cardiovascular: Normal rate and regular rhythm.    Pulmonary/Chest: Effort normal and breath sounds normal. He has no wheezes.   Abdominal:   Soft, ND, + TTP in the mid-epigastric area with extension to the RUQ, no rebound or guarding, +BS   Musculoskeletal: Normal range of motion. He exhibits no edema.   Neurological: He is alert and oriented to person, place, and time.   Skin: Skin is warm and dry. Capillary refill takes less than 2 seconds. He is not diaphoretic. No erythema.   Psychiatric: His behavior is normal. Judgment and thought content normal.         CRANIAL NERVES     CN III, IV, VI   Pupils are equal, round, and reactive to light.  Extraocular motions are normal.        Significant Labs: All pertinent labs within the past 24 hours have been reviewed.    Significant Imaging: I have reviewed and interpreted all pertinent imaging results/findings within the past 24 hours.    Assessment/Plan:     * Abdominal pain    Pt reported mid-epigastric abdominal pain, and not chest pain  Given abnormal LFTS (previously normal 4 weeks ago) along with CTA findings that showed gallbladder is mildly distended noting there may be cholelithiasis or sludge near the gallbladder neck which is  concerning for cholecystitis as source of pain  Lipase is normal  Will check RUQ abdominal U/S for initial workup, and follow CMP  Supportive care with IVF, pain control and anti-emetics  Consult GI (canceled consult to Cardiology)        Abnormal LFTs    As discussed above  Will also check acute hepatitis panel for completeness of workup although less likely        Coronary artery disease with hx of MI    Pt denies chest pain and initial cardiac marker is negative  Continue home medication regimen        Atrial fibrillation    S/p ablation  Therapeutic on coumadin, but will hold coumadin until abdominal pain source delineated        Panhypopituitarism    Continue home medication regimen        Mixed hyperlipidemia    Hold statin for now given increased LFTs        Essential hypertension    Continue atenolol        Benign non-nodular prostatic hyperplasia without lower urinary tract symptoms    Continue tamulosin        Status post catheter ablation of atrial fibrillation    As discussed above        History of deep 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, 1997    Treated and currently anticoagulated with coumadin  As discussed above        Chronic kidney disease, stage 3    Creatinine at baseline  Will monitor          VTE Risk Mitigation     None             Lisa Kirkland MD  Department of Hospital Medicine   [facility] [place]

## 2018-05-31 NOTE — SUBJECTIVE & OBJECTIVE
Interval History: lethargy; abdominal pain; elevated transamase, gallbladder sludge    Review of Systems   Constitutional: Negative for appetite change, chills, diaphoresis and fever.   HENT: Negative for congestion, hearing loss, sore throat, tinnitus and trouble swallowing.    Eyes: Negative for photophobia, discharge, itching and visual disturbance.   Respiratory: Positive for chest tightness. Negative for apnea, cough, wheezing and stridor.    Cardiovascular: Positive for chest pain. Negative for palpitations and leg swelling.   Gastrointestinal: Positive for abdominal distention, abdominal pain, nausea and vomiting. Negative for blood in stool, constipation and diarrhea.   Endocrine: Negative for polydipsia, polyphagia and polyuria.   Genitourinary: Negative for difficulty urinating, dysuria, flank pain and frequency.   Musculoskeletal: Negative for arthralgias, joint swelling and neck stiffness.   Skin: Negative for color change, rash and wound.   Neurological: Negative for dizziness, tremors, seizures, light-headedness, numbness and headaches.   Hematological: Negative for adenopathy.   Psychiatric/Behavioral: Negative for hallucinations and self-injury.     Objective:     Vital Signs (Most Recent):  Temp: 98.8 °F (37.1 °C) (05/31/18 1132)  Pulse: 101 (05/31/18 1132)  Resp: 18 (05/31/18 1132)  BP: (!) 154/88 (05/31/18 1132)  SpO2: 96 % (05/31/18 1132) Vital Signs (24h Range):  Temp:  [97.6 °F (36.4 °C)-99 °F (37.2 °C)] 98.8 °F (37.1 °C)  Pulse:  [] 101  Resp:  [18-20] 18  SpO2:  [93 %-97 %] 96 %  BP: (136-192)/(67-92) 154/88     Weight: 115.2 kg (253 lb 15.5 oz)  Body mass index is 31.74 kg/m².    Intake/Output Summary (Last 24 hours) at 05/31/18 1218  Last data filed at 05/30/18 2300   Gross per 24 hour   Intake                0 ml   Output              300 ml   Net             -300 ml      Physical Exam   Constitutional: He appears well-developed and well-nourished. He is cooperative.   HENT:   Head:  Normocephalic and atraumatic.   Eyes: Conjunctivae, EOM and lids are normal. Pupils are equal, round, and reactive to light.   Neck: Normal range of motion and full passive range of motion without pain. Neck supple. No JVD present. No edema present. No thyroid mass present.   Cardiovascular: Normal rate, S1 normal, S2 normal and intact distal pulses.    No murmur heard.  Pulmonary/Chest: Effort normal.   Abdominal: Soft. Bowel sounds are normal. He exhibits no distension and no abdominal bruit. There is no splenomegaly or hepatomegaly. There is no tenderness. There is no CVA tenderness.   Musculoskeletal: Normal range of motion.   Lymphadenopathy:     He has no cervical adenopathy.     He has no axillary adenopathy.   Neurological: He has normal reflexes. He displays no tremor. He displays no seizure activity.   Slowed responses - arousal with tactile stimuli   Skin: Skin is warm, dry and intact.   Psychiatric: He has a normal mood and affect. His speech is normal. Thought content normal. Cognition and memory are normal.       Significant Labs:   CBC:   Recent Labs  Lab 05/30/18 1728 05/31/18  0804   WBC 7.17 7.50   HGB 14.7 16.0   HCT 44.1 48.8   * 105*     CMP:   Recent Labs  Lab 05/30/18 1728 05/31/18  0804    136   K 4.0 3.8    101   CO2 26 26   * 117*   BUN 22 16   CREATININE 1.2 1.2   CALCIUM 8.9 9.3   PROT 6.3 7.2   ALBUMIN 3.7 4.0   BILITOT 1.4* 3.3*   ALKPHOS 81 149*   * 358*   ALT 95* 355*   ANIONGAP 7* 9   EGFRNONAA 56* 56*     Cardiac Markers: No results for input(s): CKMB, MYOGLOBIN, BNP, TROPISTAT in the last 48 hours.  Lactic Acid: No results for input(s): LACTATE in the last 48 hours.  Lipase:   Recent Labs  Lab 05/30/18  1728   LIPASE 43     Lipid Panel: No results for input(s): CHOL, HDL, LDLCALC, TRIG, CHOLHDL in the last 48 hours.  Magnesium:   Recent Labs  Lab 05/31/18  0230   MG 1.9     Troponin:   Recent Labs  Lab 05/30/18  1728 05/31/18  0230 05/31/18  0804    TROPONINI <0.006 0.036* 0.044*     TSH:   Recent Labs  Lab 05/02/18  0817   TSH 0.573     Urine Culture: No results for input(s): LABURIN in the last 48 hours.  Urine Studies:   Recent Labs  Lab 05/30/18  1923   COLORU Yellow   APPEARANCEUA Clear   PHUR 7.0   SPECGRAV 1.025   PROTEINUA Negative   GLUCUA Negative   KETONESU Negative   BILIRUBINUA Negative   OCCULTUA Negative   NITRITE Negative   UROBILINOGEN 4.0-6.0*   LEUKOCYTESUR Negative       Significant Imaging:   Imaging Results          US Abdomen Complete (Final result)  Result time 05/31/18 10:47:29    Final result by Neil Augustine MD (05/31/18 10:47:29)                 Impression:      Mild hepatomegaly with multiple hepatic cysts.    Bilateral renal cysts.    Cholelithiasis with minimal gallbladder wall thickening.  Note that there is no evidence for sonographic Basurto sign to strongly suggest acute cholecystitis.    Pancreas and portions of the aorta are obscure by overlying bowel gas.      Electronically signed by: Neil Augustine MD  Date:    05/31/2018  Time:    10:47             Narrative:    EXAMINATION:  US ABDOMEN COMPLETE    CLINICAL HISTORY:  abnormal LFTS with CT with gallstones, r/o cholecystitis;    TECHNIQUE:  Complete abdominal ultrasound (including pancreas, liver, gallbladder, common bile duct, spleen, aorta, IVC, and kidneys) was performed.    COMPARISON:  None    FINDINGS:  Liver: Mildly enlarged measuring 19.1 cm in length.  There are multiple hepatic cysts present with the largest located within the right lobe of the liver measuring 3.7 cm in greatest dimension.    Gallbladder: There are multiple small gallstones present.  The gallbladder wall is minimally thickened measuring 3.5 mm.  This is nonspecific.  There is no evidence for sonographic Basurto sign to strongly suggest acute cholecystitis.    Biliary system: The common duct is not dilated, measuring 3 mm.  No intrahepatic ductal dilatation.    Spleen: Normal in size with a  homogeneous echotexture, measuring 9.9 cm in length.    Pancreas: The pancreas is obscured by overlying bowel gas.    Right kidney: Normal in size with no hydronephrosis, measuring 12.7 x 6.7 x 6.0 cm.  There is a 7 mm cyst within the mid kidney.    Left kidney:  Normal in size with no hydronephrosis, measuring 14.0 x 8.7 x 6.9 cm.  There are parapelvic renal cysts present on the left.    Aorta: Much of the abdominal aorta is obscure diet overlying bowel gas.  Visualized portions are normal in caliber.    Inferior vena cava: Normal in appearance.    Miscellaneous: No ascites.                               CTA Chest Abdomen Non Coronary (Final result)  Result time 05/30/18 18:40:21    Final result by Abhijeet Castillo MD (05/30/18 18:40:21)                 Impression:    Impression:    1. Allowing for exam limitations, no convincing pulmonary thromboembolism.  2. 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  3. Hepatic steatosis, correlation with LFTs recommended.  4. Mild indistinctness about the gallbladder noting cholelithiasis or sludge.  If there is right upper quadrant pain, consider ultrasound for further evaluation.  5. Right hepatic cysts and right renal cysts.  The renal lesions can be further evaluated with ultrasound, on an outpatient basis, to confirm cystic nature as warranted.  6. Please see above for several additional findings.      Electronically signed by: Abhijeet Castillo MD  Date:    05/30/2018  Time:    18:40             Narrative:    EXAMINATION:  CTA CHEST ABDOMEN NON CORONARY (XPD)    CLINICAL HISTORY:  Aortic dz, non-traumatic, known or suspect;    TECHNIQUE:  Axial images of the chest and abdomen were obtained at 2.5 mm intervals during administration of 100 cc omni 350 IV contrast.  Precontrast images were acquired.  Coronal and sagittal reformatted images were reviewed.    COMPARISON:  CT 01/12/2013    FINDINGS:  The  structures at the base of the neck are remarkable for a subcentimeter left thyroid nodule, nonspecific.  Degenerative changes are noted at the sternoclavicular articulations.  No significant mediastinal lymphadenopathy.  There is atherosclerotic calcification of the aorta and its branches.  The heart is mildly prominent without significant pericardial effusion.    The airways are grossly patent noting motion artifact limits evaluation of the airways and pulmonary parenchyma.  There is bilateral basilar dependent atelectasis.  No significant volume of pleural fluid.  There is right lower lobe atelectasis or scarring.  No pneumothorax.  No large focal consolidation.    Allowing for motion artifact, no convincing pulmonary arterial filling defect to the level of the proximal segmental arteries bilaterally to suggest pulmonary thromboembolism.  Please note, given motion artifact, especially involving the bilateral lower lobes, pulmonary thromboembolism distal to the segmental arteries cannot be excluded within these regions bilaterally although no definite defect seen.    The spleen, pancreas and adrenal glands are grossly unremarkable.  The gallbladder is mildly distended noting there may be cholelithiasis or sludge near the gallbladder neck.  If there is right upper quadrant pain, consider ultrasound for further evaluation.  There are several lobular low attenuating lesions within the hepatic parenchyma, attenuation of these foci are most consistent with cysts, others are too small for characterization.  Largest within the right hepatic lobe measures approximately 3.4 cm.  Of note, this lesion has decreased in size since the previous exam suggesting waxing and waning cysts.  The a patent parenchyma is low attenuating suggesting steatosis, there is probable sparing about the gallbladder fossa.  The common duct is not dilated.  The pancreatic duct is not dilated.  Scattered shotty periaortic and paracaval lymph nodes  are noted.    The kidneys enhance symmetrically.  There is right nonobstructive nephrolithiasis.  Multiple low attenuating lesions are noted throughout the kidneys bilaterally, left greater than right, attenuation of the larger lesions measures that of cystic attenuation, the smaller foci are too small for characterization.  Previously described high attenuating lesions on CT 01/12/2013 may be present although administration of contrast limits evaluation.  Overall, ultrasound could be performed of the kidneys to confirm cystic nature of the lesions.  Several left parapelvic cysts are additionally noted.  The visualized portions of the ureters are grossly unremarkable.  There is an IVC filter.  The visualized bowel is grossly unremarkable.    There is atherosclerotic calcification of the aorta and its branches.  The thoracic aorta tapers normally without aneurysm.  The SMA bilateral main renal arteries, accessory right renal artery, accessory left renal artery, and SURYA all are patent.  There is stenosis at the origin of the celiac artery, noting post a not dilation.  Distally, the vessel is patent although there is high-grade stenosis at its origin.    Degenerative changes are noted of the spine.  There is dextroscoliotic curvature of the lumbar spine.  No significant axillary lymphadenopathy.                               X-Ray Chest 1 View (Final result)  Result time 05/30/18 17:09:54    Final result by Abhijeet Castillo MD (05/30/18 17:09:54)                 Impression:      No acute cardiopulmonary process, stable chronic findings.      Electronically signed by: Abhijeet Castillo MD  Date:    05/30/2018  Time:    17:09             Narrative:    EXAMINATION:  XR CHEST 1 VIEW    CLINICAL HISTORY:  Epigastric pain    TECHNIQUE:  Single frontal view of the chest was performed.    COMPARISON:  09/15/2014    FINDINGS:  The cardiomediastinal silhouette is not enlarged.  There is no pleural effusion.  The trachea is  midline.  The lungs are symmetrically expanded bilaterally with coarse interstitial attenuation, similar to the previous exam.  There is minimal left basilar subsegmental atelectasis.  There is elevation of the right hemidiaphragm..  No large focal consolidation seen.  There is no pneumothorax.  The osseous structures are remarkable for degenerative changes..

## 2018-05-31 NOTE — SUBJECTIVE & OBJECTIVE
Past Medical History:   Diagnosis Date    ACS (acute coronary syndrome)     Acute coronary syndrome 2000    NSTEMI    Anticoagulant long-term use     Coumadin    Atrial fibrillation     Basal cell carcinoma excised     left scalp vertex    Bilateral leg edema 1/6/2014    BPH (benign prostatic hyperplasia)     CKD (chronic kidney disease)     Coronary artery disease     DVT of leg (deep venous thrombosis) 09/08/2014    On coumadin for years    Encounter for blood transfusion     Hyperlipidemia     Hypertension     Hypogonadism, male     Leg fracture, left     Melanoma 01/04/2017     in situ crown of scalp    MI (myocardial infarction)     Panhypopituitarism     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    SQUAMOUS CELL CARCINOMA excised     left posterior scalp    Thyroid disease        Past Surgical History:   Procedure Laterality Date    APPENDECTOMY      BASAL CELL CARCINOMA EXCISION  01/2008    BRAIN SURGERY      pituitary adenoma removed    CARDIAC CATHETERIZATION  05/09/2000    S/P stent to RCA,    CARDIAC CATHETERIZATION      stents placed    CARDIAC ELECTROPHYSIOLOGY STUDY AND ABLATION      CORONARY ANGIOPLASTY  5/9/2000    RCA    CORONARY ANGIOPLASTY WITH STENT PLACEMENT      ESOPHAGEAL DILATION      EYE SURGERY Bilateral     cataracts removed and new lenses placed     FRACTURE SURGERY Left     leg    HERNIA REPAIR      left femur surgery      pituitatry      hypophysectomy    SKIN BIOPSY      TONSILLECTOMY      VASCULAR SURGERY         Review of patient's allergies indicates:  No Known Allergies    No current facility-administered medications on file prior to encounter.      Current Outpatient Prescriptions on File Prior to Encounter   Medication Sig    aspirin 81 mg Tab Take 81 mg by mouth every evening. 1 Tablet Oral Every night    atenolol (TENORMIN) 25 MG tablet TAKE ONE TABLET BY MOUTH twice DAILY    dofetilide  (TIKOSYN) 500 MCG Cap TAKE 1 CAPSULE 2 TIMES DAILY    folic acid (FOLVITE) 1 MG tablet Take 1 tablet (1,000 mcg total) by mouth once daily.    levothyroxine (SYNTHROID) 112 MCG tablet Take 1 tablet (112 mcg total) by mouth before breakfast.    NITROSTAT 0.4 mg SL tablet DISSOLVE ONE TABLET UNDER THE TONGUE EVERY 5 MINUTES AS NEEDED FOR CHEST PAIN.  DO NOT EXCEED A TOTAL OF 3 DOSES IN 15 MINUTES NOW    omeprazole (PRILOSEC) 20 MG capsule Take 20 mg by mouth every other day.    predniSONE (DELTASONE) 5 MG tablet Take 1 tablet (5 mg total) by mouth once daily.    simvastatin (ZOCOR) 80 MG tablet TAKE ONE TABLET BY MOUTH IN THE EVENING    tamsulosin (FLOMAX) 0.4 mg Cp24 Take 1 capsule (0.4 mg total) by mouth once daily.    triamcinolone acetonide 0.1% (KENALOG) 0.1 % cream APPLY  CREAM TOPICALLY TO AFFECTED AREA TWICE DAILY    warfarin (COUMADIN) 5 MG tablet Take 1.5 tablets (7.5 mg total) by mouth Daily. Current Dose ( 5 mg on Tue; 7.5 mg all other days) or as directed by coumadin clinic    sulfamethoxazole-trimethoprim 800-160mg (BACTRIM DS) 800-160 mg Tab Start night before prostate biopsy every 12 hours for 4 doses only    testosterone cypionate (DEPOTESTOTERONE CYPIONATE) 100 mg/mL injection Inject 1 mL (100 mg total) into the muscle every 14 (fourteen) days.     Family History     Problem Relation (Age of Onset)    Cancer Father        Social History Main Topics    Smoking status: Never Smoker    Smokeless tobacco: Never Used      Comment: .  Two sons.  [facility] .      Alcohol use No    Drug use: No    Sexual activity: Not Currently     Partners: Female     Review of Systems   Constitutional: Positive for chills and diaphoresis. Negative for fever.   HENT: Negative for sore throat.    Eyes: Negative for visual disturbance.   Respiratory: Negative for shortness of breath.    Cardiovascular: Negative for chest pain.   Gastrointestinal: Positive for abdominal pain, nausea and vomiting.    Endocrine: Negative for polyuria.   Genitourinary: Negative for dysuria.   Musculoskeletal: Negative for arthralgias.   Skin: Negative for rash.   Neurological: Negative for dizziness, syncope and light-headedness.   Hematological: Does not bruise/bleed easily.   Psychiatric/Behavioral: Negative for confusion.     Objective:     Vital Signs (Most Recent):  Temp: 98 °F (36.7 °C) (05/30/18 2000)  Pulse: 89 (05/30/18 2035)  Resp: 20 (05/30/18 2035)  BP: (!) 172/83 (05/30/18 2035)  SpO2: 97 % (05/30/18 2035) Vital Signs (24h Range):  Temp:  [97.6 °F (36.4 °C)-98 °F (36.7 °C)] 98 °F (36.7 °C)  Pulse:  [] 89  Resp:  [18-20] 20  SpO2:  [95 %-97 %] 97 %  BP: (136-192)/(67-89) 172/83     Weight: (!) 164.2 kg (362 lb)  Body mass index is 45.25 kg/m².    Physical Exam   Constitutional: He is oriented to person, place, and time. He appears well-developed.   Appears uncomfortable and ill feeling, but not in acute distress   HENT:   Head: Normocephalic and atraumatic.   Eyes: EOM are normal. Pupils are equal, round, and reactive to light.   Neck: Normal range of motion. Neck supple.   Cardiovascular: Normal rate and regular rhythm.    Pulmonary/Chest: Effort normal and breath sounds normal. He has no wheezes.   Abdominal:   Soft, ND, + TTP in the mid-epigastric area with extension to the RUQ, no rebound or guarding, +BS   Musculoskeletal: Normal range of motion. He exhibits no edema.   Neurological: He is alert and oriented to person, place, and time.   Skin: Skin is warm and dry. Capillary refill takes less than 2 seconds. He is not diaphoretic. No erythema.   Psychiatric: His behavior is normal. Judgment and thought content normal.         CRANIAL NERVES     CN III, IV, VI   Pupils are equal, round, and reactive to light.  Extraocular motions are normal.        Significant Labs: All pertinent labs within the past 24 hours have been reviewed.    Significant Imaging: I have reviewed and interpreted all pertinent imaging  results/findings within the past 24 hours.

## 2018-05-31 NOTE — PLAN OF CARE
Problem: Cardiac Rhythm Management Device (Adult)  Intervention: Prevent/Manage DVT/VTE Risk   05/31/18 0823   OTHER   VTE Required Core Measure Pharmacological prophylaxis initiated/maintained   Minimize Embolism Risk   VTE Prevention/Management bleeding precautions maintained;bleeding risk assessed;bleeding risk factor(s) identified, provider notified;remove, assess skin and reapply compression stockings;dorsiflexion/plantar flexion performed;intravenous hydration;ROM (active) performed;ROM (passive) performed     Intervention: Monitor/Manage Pain Considering Device Effect   05/31/18 0823   Pain/Comfort/Sleep Interventions   Pain Management Interventions medication;provider informed;quiet environment facilitated;relaxation promoted   Safety Interventions   Medication Review/Management medications reviewed;high risk medications identified;infusion initiated     Intervention: Support/Optimize Psychosocial Response to Condition   05/31/18 0823   Coping/Psychosocial Interventions   Supportive Measures counseling provided;positive reinforcement provided;relaxation techniques promoted;self-care encouraged;verbalization of feelings encouraged   Environmental Support calm environment promoted;distractions minimized;environmental consistency promoted;rest periods encouraged   Psychosocial Support   Family/Support System Care support provided

## 2018-05-31 NOTE — CONSULTS
"    [facility]          General Surgery Consultation Report    05/31/2018   2:38 PM     Linda Skelton  [id number]    Consultant: Dr. Jacinto    Consultation For: Cholelithiasis. R/O Cholecystitis.    HPI: Case of 83 y.o. male with h/o CAD, AMI, and HLD who experienced acute-onset chest pain on 05/29. As per wife, patient took several nitro, which alleviated his pain. Later during the day, patient began to experience similar pain with some nausea. Wife denies him complaining of associated abdominal pain, vomiting (prior to admission to B, diarrhea, fevers, chills, changes in eye or urine color, bowel habit changes. She does note he complained of chest pain many years ago at which time he was diagnosed with an AMI.     SUBJECTIVE:                                                                                                   "I am not doing well."    ROS - Unable to get a full ROS due to patient somnolence. Most pertinent positives/negatives answered and all listed in HPI.    OBJECTIVE:                                                                                                     Past Medical History:   Diagnosis Date    ACS (acute coronary syndrome)     Acute coronary syndrome 2000    NSTEMI    Anticoagulant long-term use     Coumadin    Atrial fibrillation     Basal cell carcinoma excised     left scalp vertex    Bilateral leg edema 1/6/2014    BPH (benign prostatic hyperplasia)     CKD (chronic kidney disease)     Coronary artery disease     DVT of leg (deep venous thrombosis) 09/08/2014    On coumadin for years    Encounter for blood transfusion     Hyperlipidemia     Hypertension     Hypogonadism, male     Leg fracture, left     Melanoma 01/04/2017     in situ crown of scalp    MI (myocardial infarction)     Panhypopituitarism     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    SQUAMOUS CELL CARCINOMA excised     left posterior " scalp    Thyroid disease        Past Surgical History:   Procedure Laterality Date    APPENDECTOMY      BASAL CELL CARCINOMA EXCISION  01/2008    BRAIN SURGERY      pituitary adenoma removed    CARDIAC CATHETERIZATION  05/09/2000    S/P stent to RCA,    CARDIAC CATHETERIZATION      stents placed    CARDIAC ELECTROPHYSIOLOGY STUDY AND ABLATION      CORONARY ANGIOPLASTY  5/9/2000    RCA    CORONARY ANGIOPLASTY WITH STENT PLACEMENT      ESOPHAGEAL DILATION      EYE SURGERY Bilateral     cataracts removed and new lenses placed     FRACTURE SURGERY Left     leg    HERNIA REPAIR      left femur surgery      pituitatry      hypophysectomy    SKIN BIOPSY      TONSILLECTOMY      VASCULAR SURGERY         Social History     Social History    Marital status:      Spouse name: N/A    Number of children: N/A    Years of education: N/A     Occupational History    Not on file.     Social History Main Topics    Smoking status: Never Smoker    Smokeless tobacco: Never Used      Comment: .  Two sons.  [employer] .      Alcohol use No    Drug use: No    Sexual activity: Not Currently     Partners: Female     Other Topics Concern    Not on file     Social History Narrative     and a retired [employer] .  Non smoker.        Family History   Problem Relation Age of Onset    Cancer Father     Skin cancer Neg Hx     Melanoma Neg Hx     Heart disease Neg Hx     Hypertension Neg Hx        Review of patient's allergies indicates:  No Known Allergies    Vitals:    05/31/18 1132   BP: (!) 154/88   Pulse: 101   Resp: 18   Temp: 98.8 °F (37.1 °C)       Recent Labs      05/31/18   0804   WBC  7.50   HGB  16.0   HCT  48.8   PLT  105*        Recent Labs      05/31/18   0804   NA  136   K  3.8   CL  101   CO2  26   BUN  16   CREATININE  1.2   AST  358*   ALT  355*        Imaging Results          US Abdomen Complete (Final result)  Result time 05/31/18 10:47:29    Final result by Neil MOYA  MD Davide (05/31/18 10:47:29)                 Impression:      Mild hepatomegaly with multiple hepatic cysts.    Bilateral renal cysts.    Cholelithiasis with minimal gallbladder wall thickening.  Note that there is no evidence for sonographic Basurto sign to strongly suggest acute cholecystitis.    Pancreas and portions of the aorta are obscure by overlying bowel gas.      Electronically signed by: Neil Augustine MD  Date:    05/31/2018  Time:    10:47             Narrative:    EXAMINATION:  US ABDOMEN COMPLETE    CLINICAL HISTORY:  abnormal LFTS with CT with gallstones, r/o cholecystitis;    TECHNIQUE:  Complete abdominal ultrasound (including pancreas, liver, gallbladder, common bile duct, spleen, aorta, IVC, and kidneys) was performed.    COMPARISON:  None    FINDINGS:  Liver: Mildly enlarged measuring 19.1 cm in length.  There are multiple hepatic cysts present with the largest located within the right lobe of the liver measuring 3.7 cm in greatest dimension.    Gallbladder: There are multiple small gallstones present.  The gallbladder wall is minimally thickened measuring 3.5 mm.  This is nonspecific.  There is no evidence for sonographic Basurto sign to strongly suggest acute cholecystitis.    Biliary system: The common duct is not dilated, measuring 3 mm.  No intrahepatic ductal dilatation.    Spleen: Normal in size with a homogeneous echotexture, measuring 9.9 cm in length.    Pancreas: The pancreas is obscured by overlying bowel gas.    Right kidney: Normal in size with no hydronephrosis, measuring 12.7 x 6.7 x 6.0 cm.  There is a 7 mm cyst within the mid kidney.    Left kidney:  Normal in size with no hydronephrosis, measuring 14.0 x 8.7 x 6.9 cm.  There are parapelvic renal cysts present on the left.    Aorta: Much of the abdominal aorta is obscure diet overlying bowel gas.  Visualized portions are normal in caliber.    Inferior vena cava: Normal in appearance.    Miscellaneous: No ascites.                                CTA Chest Abdomen Non Coronary (Final result)  Result time 05/30/18 18:40:21    Final result by Abhijeet Castillo MD (05/30/18 18:40:21)                 Impression:    Impression:    1. Allowing for exam limitations, no convincing pulmonary thromboembolism.  2. 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  3. Hepatic steatosis, correlation with LFTs recommended.  4. Mild indistinctness about the gallbladder noting cholelithiasis or sludge.  If there is right upper quadrant pain, consider ultrasound for further evaluation.  5. Right hepatic cysts and right renal cysts.  The renal lesions can be further evaluated with ultrasound, on an outpatient basis, to confirm cystic nature as warranted.  6. Please see above for several additional findings.      Electronically signed by: Abhijeet Castillo MD  Date:    05/30/2018  Time:    18:40             Narrative:    EXAMINATION:  CTA CHEST ABDOMEN NON CORONARY (XPD)    CLINICAL HISTORY:  Aortic dz, non-traumatic, known or suspect;    TECHNIQUE:  Axial images of the chest and abdomen were obtained at 2.5 mm intervals during administration of 100 cc omni 350 IV contrast.  Precontrast images were acquired.  Coronal and sagittal reformatted images were reviewed.    COMPARISON:  CT 01/12/2013    FINDINGS:  The structures at the base of the neck are remarkable for a subcentimeter left thyroid nodule, nonspecific.  Degenerative changes are noted at the sternoclavicular articulations.  No significant mediastinal lymphadenopathy.  There is atherosclerotic calcification of the aorta and its branches.  The heart is mildly prominent without significant pericardial effusion.    The airways are grossly patent noting motion artifact limits evaluation of the airways and pulmonary parenchyma.  There is bilateral basilar dependent atelectasis.  No significant volume of pleural fluid.  There is right lower lobe  atelectasis or scarring.  No pneumothorax.  No large focal consolidation.    Allowing for motion artifact, no convincing pulmonary arterial filling defect to the level of the proximal segmental arteries bilaterally to suggest pulmonary thromboembolism.  Please note, given motion artifact, especially involving the bilateral lower lobes, pulmonary thromboembolism distal to the segmental arteries cannot be excluded within these regions bilaterally although no definite defect seen.    The spleen, pancreas and adrenal glands are grossly unremarkable.  The gallbladder is mildly distended noting there may be cholelithiasis or sludge near the gallbladder neck.  If there is right upper quadrant pain, consider ultrasound for further evaluation.  There are several lobular low attenuating lesions within the hepatic parenchyma, attenuation of these foci are most consistent with cysts, others are too small for characterization.  Largest within the right hepatic lobe measures approximately 3.4 cm.  Of note, this lesion has decreased in size since the previous exam suggesting waxing and waning cysts.  The a patent parenchyma is low attenuating suggesting steatosis, there is probable sparing about the gallbladder fossa.  The common duct is not dilated.  The pancreatic duct is not dilated.  Scattered shotty periaortic and paracaval lymph nodes are noted.    The kidneys enhance symmetrically.  There is right nonobstructive nephrolithiasis.  Multiple low attenuating lesions are noted throughout the kidneys bilaterally, left greater than right, attenuation of the larger lesions measures that of cystic attenuation, the smaller foci are too small for characterization.  Previously described high attenuating lesions on CT 01/12/2013 may be present although administration of contrast limits evaluation.  Overall, ultrasound could be performed of the kidneys to confirm cystic nature of the lesions.  Several left parapelvic cysts are  additionally noted.  The visualized portions of the ureters are grossly unremarkable.  There is an IVC filter.  The visualized bowel is grossly unremarkable.    There is atherosclerotic calcification of the aorta and its branches.  The thoracic aorta tapers normally without aneurysm.  The SMA bilateral main renal arteries, accessory right renal artery, accessory left renal artery, and SURYA all are patent.  There is stenosis at the origin of the celiac artery, noting post a not dilation.  Distally, the vessel is patent although there is high-grade stenosis at its origin.    Degenerative changes are noted of the spine.  There is dextroscoliotic curvature of the lumbar spine.  No significant axillary lymphadenopathy.                               X-Ray Chest 1 View (Final result)  Result time 05/30/18 17:09:54    Final result by Abhijeet Castillo MD (05/30/18 17:09:54)                 Impression:      No acute cardiopulmonary process, stable chronic findings.      Electronically signed by: Abhijeet Castillo MD  Date:    05/30/2018  Time:    17:09             Narrative:    EXAMINATION:  XR CHEST 1 VIEW    CLINICAL HISTORY:  Epigastric pain    TECHNIQUE:  Single frontal view of the chest was performed.    COMPARISON:  09/15/2014    FINDINGS:  The cardiomediastinal silhouette is not enlarged.  There is no pleural effusion.  The trachea is midline.  The lungs are symmetrically expanded bilaterally with coarse interstitial attenuation, similar to the previous exam.  There is minimal left basilar subsegmental atelectasis.  There is elevation of the right hemidiaphragm..  No large focal consolidation seen.  There is no pneumothorax.  The osseous structures are remarkable for degenerative changes..                                 Physical Exam:    GEN: Very somnolent borderline lethargic. NAD. No apparent pain/discomfort at rest.   HEENT: NC. AT. EOMI. Anicteric sclera. DOM.   RESP: No SOB, dyspnea or tachypnea  CV: RRR  ABD:  Soft, depressible, and non-distended. Reducible umbilical hernia without any overlying skin changes. Abdomen is NTTP throughout. Basurto's negative. There is a round, firm, smooth, round, non-tender palpable mass at the RUQ right at the liver edge   EXT: No asymmetries. No calf tenderness.  SKIN: No appreciable jaundice, rashes or lacerations.   NEURO: No speech impediments. Comprehends parts of the conversation he is alert and awake for.   PSYCH: Unable to assess due to present sedated state.     ASSESSMENT / PLAN:                                                                                      Case of 83 y.o. male with above history and findings. Patient, labs and imaging reviewed. History is unclear, as patient alleges he had chest pain and not abdominal pain. Abdominal exam is very unremarkable other than what I believe is a distended gallbladder on exam.    Pt definitely has cholelithiasis on imaging and may have possible choledocholithiasis given bilirubinemia and transaminitis, although this too is questionable with a CBD diameter of 3mm. Agree with GI consult and evaluation. Cholecystitis is lower in the differential given no signs of wall thickening (3.5 mm is not thickened) or pericholecystic fluid on ultrasound and a negative basurto's sign. Nonetheless, patient also presented with elevated troponins that have increased on repeat. Given his cardiac history, these findings warrant cardiology evaluation without question. Coumadin is presently being held due to concerns for gallbladder pathology and would continue to hold it, but cardiology input on this matter warranted as well. Will continue to follow and monitor trends with AM labs.      Aliya Beckett MD  [facility] Surgery PGY-IV

## 2018-05-31 NOTE — ASSESSMENT & PLAN NOTE
5/31/18 - worsening transaminase in the presence of abnormal US significant for gallbladder involvment + abdominal pain and distension - will obtain a HIDA scan and consult to general surgery for opinion - awaiting GI opinion, escaped stone????/transaminitis   -supportive care for pain - hold all pain medications until patient is fully awake after remeron in early morning  -crp, sed rate, procalcitonin level, lactic acid  -HIDA scan  -GI and general surgery consult  -NPO for now  -NS at 125 cc/hr  -glucose checks/hypoglycemic protocol while NPO      Pt reported mid-epigastric abdominal pain, and not chest pain  Given abnormal LFTS (previously normal 4 weeks ago) along with CTA findings that showed gallbladder is mildly distended noting there may be cholelithiasis or sludge near the gallbladder neck which is concerning for cholecystitis as source of pain  Lipase is normal  Will check RUQ abdominal U/S for initial workup, and follow CMP  Supportive care with IVF, pain control and anti-emetics  Consult GI (canceled consult to Cardiology)

## 2018-05-31 NOTE — HOSPITAL COURSE
Mr. Skelton was admitted with abdominal pain with CTA that was negative for PE, but showed gallbladder that was mildly distended noting there may be cholelithiasis or sludge near the gallbladder neck which was concerning for cholecystitis as source of pain. He was worked up with U/S which was remarkable for cholelithiasis with minimal gallbladder wall thickening but then HIDA scan that was consistent with cholecysititis. GI and Surgery are following. Pt the spiked a fever and he was started on Zosyn. Blood cultures returned with GNR. He had his coumadin reversed and was worked up with MRCP which was negative. Plan to take patient to OR for cholecystectomy on 6/2.  Patient's blood cultures from 6/2 were positive as well and repeat blood cultures were sent on 6/3 that were positive as well.  ID was consulted.  Renal was consulted on 6/4 for worsening renal function. The patient's renal failure was likely ATN from dye. It improved over the course of several days. Blood cultures remained negative.  PT/OT rec: home with H/H. The patient will be discharged to home today. Activity as tolerated. Diet- low NA. Follow up with PCP and surgery in one week.  Follow up with Dr. Pepper in one week.     I will call the patient at home on 6/7 to check on.   New Medication:  Cipro x 12 days.

## 2018-05-31 NOTE — CONSULTS
Reason for Consult:  Abd. Pain, Abnormal LFT's    HPI:  Pt is a 83 y.o. male who presents with abd. Pain x 2 days.  Pt. States his symptoms began acutely, without obvious precipitating factor.  Located across upper abdomen, severe, sharp, non-radiating.  + associated N/V, non-bloody.  Now alleviated with conservative therapy.  No associated F/C, wt. Loss, dysphagia, GERD sx's, yellowing of eyes/skin.  No diarrhea/constipation.  No blood in his stool nor black/tarry stools.      Past Medical History:   Diagnosis Date    ACS (acute coronary syndrome)     Acute coronary syndrome 2000    NSTEMI    Anticoagulant long-term use     Coumadin    Atrial fibrillation     Basal cell carcinoma excised     left scalp vertex    Bilateral leg edema 1/6/2014    BPH (benign prostatic hyperplasia)     CKD (chronic kidney disease)     Coronary artery disease     DVT of leg (deep venous thrombosis) 09/08/2014    On coumadin for years    Encounter for blood transfusion     Hyperlipidemia     Hypertension     Hypogonadism, male     Leg fracture, left     Melanoma 01/04/2017     in situ crown of scalp    MI (myocardial infarction)     Panhypopituitarism     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    SQUAMOUS CELL CARCINOMA excised     left posterior scalp    Thyroid disease        Past Surgical History:   Procedure Laterality Date    APPENDECTOMY      BASAL CELL CARCINOMA EXCISION  01/2008    BRAIN SURGERY      pituitary adenoma removed    CARDIAC CATHETERIZATION  05/09/2000    S/P stent to RCA,    CARDIAC CATHETERIZATION      stents placed    CARDIAC ELECTROPHYSIOLOGY STUDY AND ABLATION      CORONARY ANGIOPLASTY  5/9/2000    RCA    CORONARY ANGIOPLASTY WITH STENT PLACEMENT      ESOPHAGEAL DILATION      EYE SURGERY Bilateral     cataracts removed and new lenses placed     FRACTURE SURGERY Left     leg    HERNIA REPAIR      left femur surgery      pituitatry       hypophysectomy    SKIN BIOPSY      TONSILLECTOMY      VASCULAR SURGERY         Family History   Problem Relation Age of Onset    Cancer Father     Skin cancer Neg Hx     Melanoma Neg Hx     Heart disease Neg Hx     Hypertension Neg Hx        Social History     Social History    Marital status:      Spouse name: N/A    Number of children: N/A    Years of education: N/A     Occupational History    Not on file.     Social History Main Topics    Smoking status: Never Smoker    Smokeless tobacco: Never Used      Comment: .  Two sons.  [employer] .      Alcohol use No    Drug use: No    Sexual activity: Not Currently     Partners: Female     Other Topics Concern    Not on file     Social History Narrative     and a retired [employer] .  Non smoker.        Endoscopic History:  C-scope - 01/2014 - Few small polyps    Review of patient's allergies indicates:  No Known Allergies    No current facility-administered medications on file prior to encounter.      Current Outpatient Prescriptions on File Prior to Encounter   Medication Sig Dispense Refill    aspirin 81 mg Tab Take 81 mg by mouth every evening. 1 Tablet Oral Every night      atenolol (TENORMIN) 25 MG tablet TAKE ONE TABLET BY MOUTH twice DAILY 90 tablet 3    dofetilide (TIKOSYN) 500 MCG Cap TAKE 1 CAPSULE 2 TIMES DAILY 180 capsule 3    folic acid (FOLVITE) 1 MG tablet Take 1 tablet (1,000 mcg total) by mouth once daily. 90 tablet 1    levothyroxine (SYNTHROID) 112 MCG tablet Take 1 tablet (112 mcg total) by mouth before breakfast. 90 tablet 3    NITROSTAT 0.4 mg SL tablet DISSOLVE ONE TABLET UNDER THE TONGUE EVERY 5 MINUTES AS NEEDED FOR CHEST PAIN.  DO NOT EXCEED A TOTAL OF 3 DOSES IN 15 MINUTES NOW 25 tablet 3    omeprazole (PRILOSEC) 20 MG capsule Take 20 mg by mouth every other day.      predniSONE (DELTASONE) 5 MG tablet Take 1 tablet (5 mg total) by mouth once daily. 90 tablet 3    simvastatin  (ZOCOR) 80 MG tablet TAKE ONE TABLET BY MOUTH IN THE EVENING 90 tablet 3    tamsulosin (FLOMAX) 0.4 mg Cp24 Take 1 capsule (0.4 mg total) by mouth once daily. 90 capsule 3    triamcinolone acetonide 0.1% (KENALOG) 0.1 % cream APPLY  CREAM TOPICALLY TO AFFECTED AREA TWICE DAILY 454 g 3    warfarin (COUMADIN) 5 MG tablet Take 1.5 tablets (7.5 mg total) by mouth Daily. Current Dose ( 5 mg on Tue; 7.5 mg all other days) or as directed by coumadin clinic 135 tablet 3    sulfamethoxazole-trimethoprim 800-160mg (BACTRIM DS) 800-160 mg Tab Start night before prostate biopsy every 12 hours for 4 doses only 4 tablet 0    testosterone cypionate (DEPOTESTOTERONE CYPIONATE) 100 mg/mL injection Inject 1 mL (100 mg total) into the muscle every 14 (fourteen) days. 10 mL 2     Scheduled Meds:   aspirin  81 mg Oral Daily    atenolol  25 mg Oral Daily    docusate sodium  100 mg Oral Daily    dofetilide  500 mcg Oral Q12H    folic acid  1,000 mcg Oral Daily    levothyroxine  112 mcg Oral Before breakfast    pantoprazole  40 mg Oral Daily    predniSONE  5 mg Oral Daily    tamsulosin  1 capsule Oral Daily     Continuous Infusions:   sodium chloride 0.9% 100 mL/hr at 05/31/18 0250     PRN Meds:.acetaminophen, dextrose 50%, dextrose 50%, glucagon (human recombinant), glucose, glucose, hydrALAZINE, ondansetron    Review of Systems:  CONSTITUTIONAL: Negative for weakness, fever, weight loss, weight gain.  HEENT: Eyes: Negative for double/blurred vision. Ears: Negative pain or loss of hearing. Nose:Negative for nasal congestion. Negative for rhinorrhea Mouth: Negative for dry mouth/pain Throat: Negative for masses or hoarseness.  CARDIOVASCULAR: Negative for chest pain or palpitations.  RESPIRATORY: Negative for SOB, wheezes  GASTROINTESTINAL: See HPI  GENITOURINARY: Negative for dysuria/hematuria.  MUSCULOSKELETAL: Negative for osteoarthritis, muscle pain.  SKIN: Negative for rashes, + Skin Ca  NEUROLOGIC: Negative for  "headaches, numbness/tingling.  ENDOCRINE: Negative for diabetes, +  thyroid abnormalities.  HEMATOLOGIC: Negative for anemia or blood dyscrasias.    Patient Vitals for the past 24 hrs:   BP Temp Temp src Pulse Resp SpO2 Height Weight   05/31/18 1132 (!) 154/88 98.8 °F (37.1 °C) - 101 18 96 % - -   05/31/18 0715 - - - 100 - - - -   05/31/18 0713 (!) 172/92 98.2 °F (36.8 °C) - 99 18 95 % - -   05/31/18 0422 (!) 179/90 98.5 °F (36.9 °C) Oral 85 18 95 % - 115.2 kg (253 lb 15.5 oz)   05/31/18 0144 (!) 181/92 99 °F (37.2 °C) Oral 93 18 (!) 93 % - -   05/30/18 2300 - - - - - - 6' 3" (1.905 m) -   05/30/18 2035 (!) 172/83 - - 89 20 97 % - -   05/30/18 2000 (!) 192/89 98 °F (36.7 °C) Oral 102 18 97 % 6' 3" (1.905 m) (!) 164.2 kg (362 lb)   05/30/18 1946 (!) 192/89 - - - - - - -   05/30/18 1739 (!) 178/85 - - 76 19 95 % - -   05/30/18 1732 - - - 77 20 95 % - -   05/30/18 1535 136/67 97.6 °F (36.4 °C) Oral 60 20 95 % 6' 3" (1.905 m) (!) 164.2 kg (362 lb)       Gen: Well developed, well nourished, no apparent distress, cooperative  HEENT: Anicteric, PERRLA, normocephalic, neck symmetrical  CV: S1, S2, no murmers/rubs, non-displaced PMI  Lungs: CTA-B, normal excursion  Abd: Soft, NT, ND, normal BS's, no HSM  Ext: No c/c/e, 1+ DP pulses to BLE's  Neuro: CN II-XII grossly intact, no asterixis.  Skin: No rashes/lesions, normal texture  Psych: AA&O x 4, normal affect    Labs:    Recent Labs  Lab 05/31/18  0804   CALCIUM 9.3   PROT 7.2      K 3.8   CO2 26      BUN 16   CREATININE 1.2   ALKPHOS 149*   *   *   BILITOT 3.3*     Recent Results (from the past 336 hour(s))   CBC auto differential    Collection Time: 05/31/18  8:04 AM   Result Value Ref Range    WBC 7.50 3.90 - 12.70 K/uL    Hemoglobin 16.0 14.0 - 18.0 g/dL    Hematocrit 48.8 40.0 - 54.0 %    Platelets 105 (L) 150 - 350 K/uL   CBC auto differential    Collection Time: 05/30/18  5:28 PM   Result Value Ref Range    WBC 7.17 3.90 - 12.70 K/uL    " Hemoglobin 14.7 14.0 - 18.0 g/dL    Hematocrit 44.1 40.0 - 54.0 %    Platelets 113 (L) 150 - 350 K/uL       Recent Labs  Lab 05/30/18  1728 05/31/18  0230   INR 2.2* 2.1*   APTT 32.4*  --      Lipase - 43  Negative Urine EtOH  Negative Hep A/B/C    Imaging:  U/S:  FINDINGS:  Liver: Mildly enlarged measuring 19.1 cm in length.  There are multiple hepatic cysts present with the largest located within the right lobe of the liver measuring 3.7 cm in greatest dimension.    Gallbladder: There are multiple small gallstones present.  The gallbladder wall is minimally thickened measuring 3.5 mm.  This is nonspecific.  There is no evidence for sonographic Basurto sign to strongly suggest acute cholecystitis.    Biliary system: The common duct is not dilated, measuring 3 mm.  No intrahepatic ductal dilatation.    Spleen: Normal in size with a homogeneous echotexture, measuring 9.9 cm in length.    Pancreas: The pancreas is obscured by overlying bowel gas.    Right kidney: Normal in size with no hydronephrosis, measuring 12.7 x 6.7 x 6.0 cm.  There is a 7 mm cyst within the mid kidney.    Left kidney:  Normal in size with no hydronephrosis, measuring 14.0 x 8.7 x 6.9 cm.  There are parapelvic renal cysts present on the left.    Aorta: Much of the abdominal aorta is obscure diet overlying bowel gas.  Visualized portions are normal in caliber.    Inferior vena cava: Normal in appearance.    Miscellaneous: No ascites.    Assessment:  Pt. Is a 83 y.o. male with:  1. Abnormal LFT's - Unclear etiology.  Possibly related to cholecystitis.  No imaging evidence of choledocholithiasis, though Bili rising to 3.3.  Viral Hep serologies, Negative.    2. Abd. Pain - Improved  3. CAD, A-fib (On Coumadin), HTN, Hyperlipidemia, Panhypopit, Hx of DVT.....    Recommendations:  1. Monitor abd. Exam.  2. Follow LFT's.  3. F/U with HIDA results.  4. Consider MRI/MRCP.  5. Gen Surg evaluation for possible cholecystectomy.    6. Would hold coumadin, if  OK with PCP, in preparation for possible intervention.        I would like to take this opportunity to thank you for this consult.  If you have any questions or concerns, please do not hesitate to contact me.

## 2018-05-31 NOTE — ASSESSMENT & PLAN NOTE
As discussed above  Will also check acute hepatitis panel for completeness of workup although less likely

## 2018-05-31 NOTE — HPI
"83 y/o male with CAD, Afib s/p ablation, coumadin for anticoagulationn, HTN, HLP, panhypopituitarism, and h/o DVT who presented with c/o mid-epigastric abdominal pain that started last night. He reported an episode of sharp pain last night around midnight that self resolved and he went to sleep. Then around 1:30 this afternoon, the mid-epigastric pain returned, described as "pressure" and intensity 8/10, associated with +N/V and +diaphoresis. He reports subjective chills, but no fever. Denies chest pain and SOB. Reports pain with N/V worse with attempt at eating, and he feels constant nausea and just "feeling bad" and not improved at all since coming to the hospital. No recent sick contacts; and no reported unusual food ingestions.  In the ER, workup was remarkable for negative cardiac enzyme, LFTs with Tbil of 1.4, AST/ALT=205/95, CTA of chest negative for PE but did show that the gallbladder is mildly distended noting there may be cholelithiasis or sludge near the gallbladder neck.   "

## 2018-06-01 PROBLEM — K80.00 CHOLELITHIASIS WITH ACUTE CHOLECYSTITIS: Status: ACTIVE | Noted: 2018-05-30

## 2018-06-01 LAB
ABO + RH BLD: NORMAL
ALBUMIN SERPL BCP-MCNC: 3.3 G/DL
ALBUMIN SERPL BCP-MCNC: 3.4 G/DL
ALP SERPL-CCNC: 125 U/L
ALP SERPL-CCNC: 127 U/L
ALT SERPL W/O P-5'-P-CCNC: 196 U/L
ALT SERPL W/O P-5'-P-CCNC: 198 U/L
ANION GAP SERPL CALC-SCNC: 8 MMOL/L
AST SERPL-CCNC: 124 U/L
AST SERPL-CCNC: 125 U/L
BASOPHILS # BLD AUTO: 0.01 K/UL
BASOPHILS NFR BLD: 0.1 %
BILIRUB DIRECT SERPL-MCNC: 2.3 MG/DL
BILIRUB SERPL-MCNC: 4.9 MG/DL
BILIRUB SERPL-MCNC: 4.9 MG/DL
BLD GP AB SCN CELLS X3 SERPL QL: NORMAL
BLD PROD TYP BPU: NORMAL
BLOOD UNIT EXPIRATION DATE: NORMAL
BLOOD UNIT TYPE CODE: 8400
BLOOD UNIT TYPE: NORMAL
BUN SERPL-MCNC: 24 MG/DL
CALCIUM SERPL-MCNC: 9 MG/DL
CHLORIDE SERPL-SCNC: 101 MMOL/L
CO2 SERPL-SCNC: 27 MMOL/L
CODING SYSTEM: NORMAL
CREAT SERPL-MCNC: 1.6 MG/DL
DIASTOLIC DYSFUNCTION: NO
DIFFERENTIAL METHOD: ABNORMAL
DISPENSE STATUS: NORMAL
EOSINOPHIL # BLD AUTO: 0 K/UL
EOSINOPHIL NFR BLD: 0.1 %
ERYTHROCYTE [DISTWIDTH] IN BLOOD BY AUTOMATED COUNT: 14.9 %
EST. GFR  (AFRICAN AMERICAN): 45 ML/MIN/1.73 M^2
EST. GFR  (NON AFRICAN AMERICAN): 39 ML/MIN/1.73 M^2
ESTIMATED PA SYSTOLIC PRESSURE: 8.86
GLUCOSE SERPL-MCNC: 119 MG/DL
HCT VFR BLD AUTO: 47.3 %
HGB BLD-MCNC: 16.3 G/DL
INR PPP: 1.4
INR PPP: 1.8
LACTATE SERPL-SCNC: 1.7 MMOL/L
LYMPHOCYTES # BLD AUTO: 0.5 K/UL
LYMPHOCYTES NFR BLD: 4.1 %
MAGNESIUM SERPL-MCNC: 1.7 MG/DL
MCH RBC QN AUTO: 32.1 PG
MCHC RBC AUTO-ENTMCNC: 34.5 G/DL
MCV RBC AUTO: 93 FL
MITRAL VALVE REGURGITATION: NORMAL
MONOCYTES # BLD AUTO: 3 K/UL
MONOCYTES NFR BLD: 23 %
NEUTROPHILS # BLD AUTO: 9.5 K/UL
NEUTROPHILS NFR BLD: 72.2 %
NUM UNITS TRANS FFP: NORMAL
PHOSPHATE SERPL-MCNC: 2.2 MG/DL
PLATELET # BLD AUTO: 93 K/UL
PMV BLD AUTO: 12.6 FL
POCT GLUCOSE: 111 MG/DL (ref 70–110)
POCT GLUCOSE: 118 MG/DL (ref 70–110)
POCT GLUCOSE: 120 MG/DL (ref 70–110)
POCT GLUCOSE: 124 MG/DL (ref 70–110)
POTASSIUM SERPL-SCNC: 4.2 MMOL/L
PROT SERPL-MCNC: 6.6 G/DL
PROT SERPL-MCNC: 6.6 G/DL
PROTHROMBIN TIME: 14.5 SEC
PROTHROMBIN TIME: 19.1 SEC
RBC # BLD AUTO: 5.07 M/UL
RETIRED EF AND QEF - SEE NOTES: 60 (ref 55–65)
SODIUM SERPL-SCNC: 136 MMOL/L
TRICUSPID VALVE REGURGITATION: NORMAL
WBC # BLD AUTO: 13.1 K/UL

## 2018-06-01 PROCEDURE — 85025 COMPLETE CBC W/AUTO DIFF WBC: CPT

## 2018-06-01 PROCEDURE — 85610 PROTHROMBIN TIME: CPT

## 2018-06-01 PROCEDURE — 63600175 PHARM REV CODE 636 W HCPCS: Performed by: INTERNAL MEDICINE

## 2018-06-01 PROCEDURE — 85610 PROTHROMBIN TIME: CPT | Mod: 91

## 2018-06-01 PROCEDURE — 25000003 PHARM REV CODE 250: Performed by: SURGERY

## 2018-06-01 PROCEDURE — 63600175 PHARM REV CODE 636 W HCPCS: Performed by: NURSE PRACTITIONER

## 2018-06-01 PROCEDURE — 63600175 PHARM REV CODE 636 W HCPCS: Performed by: EMERGENCY MEDICINE

## 2018-06-01 PROCEDURE — 93306 TTE W/DOPPLER COMPLETE: CPT | Mod: 26,,, | Performed by: INTERNAL MEDICINE

## 2018-06-01 PROCEDURE — 25000003 PHARM REV CODE 250: Performed by: INTERNAL MEDICINE

## 2018-06-01 PROCEDURE — 83605 ASSAY OF LACTIC ACID: CPT

## 2018-06-01 PROCEDURE — 25000003 PHARM REV CODE 250: Performed by: EMERGENCY MEDICINE

## 2018-06-01 PROCEDURE — 86850 RBC ANTIBODY SCREEN: CPT

## 2018-06-01 PROCEDURE — 80076 HEPATIC FUNCTION PANEL: CPT

## 2018-06-01 PROCEDURE — 99223 1ST HOSP IP/OBS HIGH 75: CPT | Mod: ,,, | Performed by: INTERNAL MEDICINE

## 2018-06-01 PROCEDURE — 21400001 HC TELEMETRY ROOM

## 2018-06-01 PROCEDURE — 63600175 PHARM REV CODE 636 W HCPCS: Performed by: SURGERY

## 2018-06-01 PROCEDURE — 25000003 PHARM REV CODE 250: Performed by: HOSPITALIST

## 2018-06-01 PROCEDURE — 83735 ASSAY OF MAGNESIUM: CPT

## 2018-06-01 PROCEDURE — P9017 PLASMA 1 DONOR FRZ W/IN 8 HR: HCPCS

## 2018-06-01 PROCEDURE — 93306 TTE W/DOPPLER COMPLETE: CPT

## 2018-06-01 PROCEDURE — 80053 COMPREHEN METABOLIC PANEL: CPT

## 2018-06-01 PROCEDURE — 84100 ASSAY OF PHOSPHORUS: CPT

## 2018-06-01 PROCEDURE — 87086 URINE CULTURE/COLONY COUNT: CPT

## 2018-06-01 PROCEDURE — 36415 COLL VENOUS BLD VENIPUNCTURE: CPT

## 2018-06-01 RX ORDER — HYDROCODONE BITARTRATE AND ACETAMINOPHEN 500; 5 MG/1; MG/1
TABLET ORAL
Status: DISCONTINUED | OUTPATIENT
Start: 2018-06-01 | End: 2018-06-03

## 2018-06-01 RX ADMIN — PREDNISONE 5 MG: 5 TABLET ORAL at 09:06

## 2018-06-01 RX ADMIN — FOLIC ACID 1000 MCG: 1 TABLET ORAL at 09:06

## 2018-06-01 RX ADMIN — PIPERACILLIN AND TAZOBACTAM 4.5 G: 4; .5 INJECTION, POWDER, LYOPHILIZED, FOR SOLUTION INTRAVENOUS; PARENTERAL at 05:06

## 2018-06-01 RX ADMIN — PANTOPRAZOLE SODIUM 40 MG: 40 TABLET, DELAYED RELEASE ORAL at 09:06

## 2018-06-01 RX ADMIN — DOCUSATE SODIUM 100 MG: 100 CAPSULE, LIQUID FILLED ORAL at 09:06

## 2018-06-01 RX ADMIN — DOFETILIDE 500 MCG: 0.25 CAPSULE ORAL at 09:06

## 2018-06-01 RX ADMIN — MORPHINE SULFATE 1 MG: 10 INJECTION INTRAVENOUS at 09:06

## 2018-06-01 RX ADMIN — TAMSULOSIN HYDROCHLORIDE 0.4 MG: 0.4 CAPSULE ORAL at 09:06

## 2018-06-01 RX ADMIN — LEVOTHYROXINE SODIUM 112 MCG: 112 TABLET ORAL at 05:06

## 2018-06-01 RX ADMIN — SODIUM CHLORIDE: 0.9 INJECTION, SOLUTION INTRAVENOUS at 09:06

## 2018-06-01 RX ADMIN — PIPERACILLIN AND TAZOBACTAM 4.5 G: 4; .5 INJECTION, POWDER, LYOPHILIZED, FOR SOLUTION INTRAVENOUS; PARENTERAL at 06:06

## 2018-06-01 RX ADMIN — PHYTONADIONE 10 MG: 10 INJECTION, EMULSION INTRAMUSCULAR; INTRAVENOUS; SUBCUTANEOUS at 11:06

## 2018-06-01 RX ADMIN — ATENOLOL 25 MG: 25 TABLET ORAL at 09:06

## 2018-06-01 NOTE — SUBJECTIVE & OBJECTIVE
Past Medical History:   Diagnosis Date    ACS (acute coronary syndrome)     Acute coronary syndrome 2000    NSTEMI    Anticoagulant long-term use     Coumadin    Atrial fibrillation     Basal cell carcinoma excised     left scalp vertex    Bilateral leg edema 1/6/2014    BPH (benign prostatic hyperplasia)     CKD (chronic kidney disease)     Coronary artery disease     DVT of leg (deep venous thrombosis) 09/08/2014    On coumadin for years    Encounter for blood transfusion     Hyperlipidemia     Hypertension     Hypogonadism, male     Leg fracture, left     Melanoma 01/04/2017     in situ crown of scalp    MI (myocardial infarction)     Panhypopituitarism     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    SQUAMOUS CELL CARCINOMA excised     left posterior scalp    Thyroid disease        Past Surgical History:   Procedure Laterality Date    APPENDECTOMY      BASAL CELL CARCINOMA EXCISION  01/2008    BRAIN SURGERY      pituitary adenoma removed    CARDIAC CATHETERIZATION  05/09/2000    S/P stent to RCA,    CARDIAC CATHETERIZATION      stents placed    CARDIAC ELECTROPHYSIOLOGY STUDY AND ABLATION      CORONARY ANGIOPLASTY  5/9/2000    RCA    CORONARY ANGIOPLASTY WITH STENT PLACEMENT      ESOPHAGEAL DILATION      EYE SURGERY Bilateral     cataracts removed and new lenses placed     FRACTURE SURGERY Left     leg    HERNIA REPAIR      left femur surgery      pituitatry      hypophysectomy    SKIN BIOPSY      TONSILLECTOMY      VASCULAR SURGERY         Review of patient's allergies indicates:  No Known Allergies    No current facility-administered medications on file prior to encounter.      Current Outpatient Prescriptions on File Prior to Encounter   Medication Sig    aspirin 81 mg Tab Take 81 mg by mouth every evening. 1 Tablet Oral Every night    atenolol (TENORMIN) 25 MG tablet TAKE ONE TABLET BY MOUTH twice DAILY    dofetilide  (TIKOSYN) 500 MCG Cap TAKE 1 CAPSULE 2 TIMES DAILY    folic acid (FOLVITE) 1 MG tablet Take 1 tablet (1,000 mcg total) by mouth once daily.    levothyroxine (SYNTHROID) 112 MCG tablet Take 1 tablet (112 mcg total) by mouth before breakfast.    NITROSTAT 0.4 mg SL tablet DISSOLVE ONE TABLET UNDER THE TONGUE EVERY 5 MINUTES AS NEEDED FOR CHEST PAIN.  DO NOT EXCEED A TOTAL OF 3 DOSES IN 15 MINUTES NOW    omeprazole (PRILOSEC) 20 MG capsule Take 20 mg by mouth every other day.    predniSONE (DELTASONE) 5 MG tablet Take 1 tablet (5 mg total) by mouth once daily.    simvastatin (ZOCOR) 80 MG tablet TAKE ONE TABLET BY MOUTH IN THE EVENING    tamsulosin (FLOMAX) 0.4 mg Cp24 Take 1 capsule (0.4 mg total) by mouth once daily.    triamcinolone acetonide 0.1% (KENALOG) 0.1 % cream APPLY  CREAM TOPICALLY TO AFFECTED AREA TWICE DAILY    warfarin (COUMADIN) 5 MG tablet Take 1.5 tablets (7.5 mg total) by mouth Daily. Current Dose ( 5 mg on Tue; 7.5 mg all other days) or as directed by coumadin clinic    sulfamethoxazole-trimethoprim 800-160mg (BACTRIM DS) 800-160 mg Tab Start night before prostate biopsy every 12 hours for 4 doses only    testosterone cypionate (DEPOTESTOTERONE CYPIONATE) 100 mg/mL injection Inject 1 mL (100 mg total) into the muscle every 14 (fourteen) days.     Family History     Problem Relation (Age of Onset)    Cancer Father        Social History Main Topics    Smoking status: Never Smoker    Smokeless tobacco: Never Used      Comment: .  Two sons.  [facility], [place] .      Alcohol use No    Drug use: No    Sexual activity: Not Currently     Partners: Female     Review of Systems   Constitution: Negative for decreased appetite.   HENT: Negative for ear discharge.    Eyes: Negative for blurred vision.   Endocrine: Negative for polyphagia.   Skin: Negative for nail changes.   Neurological: Negative for aphonia.   Psychiatric/Behavioral: Negative for hallucinations.     Objective:     Vital  Signs (Most Recent):  Temp: 97.6 °F (36.4 °C) (06/01/18 0734)  Pulse: 97 (06/01/18 0734)  Resp: 18 (06/01/18 0734)  BP: 133/64 (06/01/18 0734)  SpO2: 96 % (06/01/18 0734) Vital Signs (24h Range):  Temp:  [97.6 °F (36.4 °C)-101.4 °F (38.6 °C)] 97.6 °F (36.4 °C)  Pulse:  [] 97  Resp:  [18-24] 18  SpO2:  [94 %-99 %] 96 %  BP: ()/(57-88) 133/64     Weight: 115 kg (253 lb 8.5 oz)  Body mass index is 31.69 kg/m².    SpO2: 96 %  O2 Device (Oxygen Therapy): nasal cannula      Intake/Output Summary (Last 24 hours) at 06/01/18 0801  Last data filed at 05/31/18 1900   Gross per 24 hour   Intake              780 ml   Output              825 ml   Net              -45 ml       Lines/Drains/Airways     Peripheral Intravenous Line                 Peripheral IV - Single Lumen 05/30/18 1537 Left Antecubital 1 day         Peripheral IV - Single Lumen 05/30/18 1700 Left Antecubital 1 day                Physical Exam   Constitutional: He is oriented to person, place, and time. He appears well-developed and well-nourished.   HENT:   Head: Normocephalic and atraumatic.   Eyes: Conjunctivae are normal. Pupils are equal, round, and reactive to light.   Neck: Normal range of motion. Neck supple.   Cardiovascular: Normal rate, normal heart sounds and intact distal pulses.    Pulmonary/Chest: Effort normal and breath sounds normal.   Abdominal: Soft. Bowel sounds are normal.   Musculoskeletal: Normal range of motion.   Neurological: He is alert and oriented to person, place, and time.   Skin: Skin is warm and dry.       Significant Labs: All pertinent lab results from the last 24 hours have been reviewed.    Significant Imaging: Echocardiogram:   2D echo with color flow doppler:   Results for orders placed or performed during the hospital encounter of 01/28/13   2D echo with color flow doppler   Result Value Ref Range    EF  60

## 2018-06-01 NOTE — PROGRESS NOTES
Discuss patient with staff, Dr. Bell.    Updated Plan:    - Transfuse 1 U FFP  - Vit K IV  - Repeat INR  - If INR < 1.5, patient on-call to the OR for lap vs open jaiden.    Discussed indications and risks versus benefits with patient. Consents for transfusion of blood products and surgery obtained.     Aliya Beckett MD  [facility] Surgery PGY-IV

## 2018-06-01 NOTE — PLAN OF CARE
Problem: Cardiac: ACS (Acute Coronary Syndrome) (Adult)  Intervention: Manage Acute Chest Pain   06/01/18 0820   Chest Pain Assessment   Chest Pain Location epigastric;upper back, left;upper back, right;substernal   Chest Pain Radiation shoulder;abdomen;back   Character aching;pressure;sharp;squeezing;throbbing;tightness   Precipitating Factors activity;at rest;eating   Associated Signs/Symptoms dizziness;dyspnea;hypertension;nausea;rhythm change;tachycardia   Intervention cardiac enzymes drawn;cardiac monitoring continued;cardiac monitor placed;12-lead ECG obtained;activity minimized;aspirin given;morphine given;nitroglycerin drip;nitroglycerin SL given;oxygen applied     Intervention: Monitor ECG for Changes/Signs of Ischemia   06/01/18 0820   ECG   Lead Monitored Lead II   Rhythm atrial rhythm   Atrial Rhythm atrial fibrillation     Intervention: Optimize Myocardial Oxygenation/Perfusion   06/01/18 0820   Activity   Activity Type activity clustered for rest period;bedrest with commode;dorsiflexion, plantar flexion encouraged;ROM, active encouraged;stand at bedside   Respiratory Interventions   Airway/Ventilation Management calming measures promoted     Intervention: Prevent/Manage Thrombi/Emboli   06/01/18 0820   OTHER   VTE Required Core Measure Pharmacological prophylaxis initiated/maintained   Minimize Embolism Risk   VTE Prevention/Management bleeding precautions maintained;bleeding risk assessed;dorsiflexion/plantar flexion performed;ROM (passive) performed;ROM (active) performed;intravenous hydration   Safety Interventions   Bleeding Precautions blood pressure closely monitored;coagulation study results reviewed;monitored for signs of bleeding     Intervention: Monitor/Manage Fluid Balance   06/01/18 0820   Nutrition Interventions   Fluid/Electrolyte Management intravenous fluid replacement initiated

## 2018-06-01 NOTE — NURSING
Patient IV re-started by house supervisor. FFP now infusing. Will re-adjust times for antibiotics.

## 2018-06-01 NOTE — ASSESSMENT & PLAN NOTE
Remote PCI 2000. Denies CP. Minimal troponin increase - likely demand ischemia and not ACS. Repeat echo. Consider out patient stress test. If GB surgery needed - cleared at moderate risk

## 2018-06-01 NOTE — PROGRESS NOTES
Received bedside report. Patient resting quietly in bed, arouses easily to voice, No distress noted. IV Lt AC infusing, site intact. Telemetry monitoring in place, bed in low position w/ bed alarm on, call light in reach.

## 2018-06-01 NOTE — NURSING
Patient arrived back to unit from MRI. No complaints, no acute distress noted.     11:48 am - patient transported to 2d-echo. No complaints, no acute distress noted. Vitamin K infusing.

## 2018-06-01 NOTE — NURSING
Report received from MIRIAN Hess. Patient resting comfortably, no complaints, no acute distress noted. Plan of care reviewed with patient. Instructed patient to call for assistance before getting out of bed, side rails up x3, bed alarm set, call light in reach, non skid socks in use. Patient verbalized understanding of instructions.

## 2018-06-01 NOTE — PLAN OF CARE
Problem: Fall Risk (Adult)  Intervention: Reduce Risk/Promote Restraint Free Environment   18   Safety Interventions   Environmental Safety Modification --  assistive device/personal items within reach;clutter free environment maintained;lighting adjusted;room organization consistent;room near unit station   Prevent  Drop/Fall   Safety/Security Measures --  bed alarm set   Safety Interventions   Safety Precautions emergency equipment at bedside --      Intervention: Review Medications/Identify Contributors to Fall Risk   18 0823   Safety Interventions   Medication Review/Management medications reviewed;high risk medications identified;infusion initiated     Intervention: Patient Rounds   18   Safety Interventions   Patient Rounds bed in low position;bed wheels locked;call light in reach     Intervention: Safety Promotion/Fall Prevention   18   Safety Interventions   Safety Promotion/Fall Prevention side rails raised x 2;bed alarm set       Goal: Identify Related Risk Factors and Signs and Symptoms  Related risk factors and signs and symptoms are identified upon initiation of Human Response Clinical Practice Guideline (CPG)   Outcome: Ongoing (interventions implemented as appropriate)   18   Fall Risk   Related Risk Factors (Fall Risk) age-related changes;fatigue/slow reaction;gait/mobility problems;slipper/uneven surfaces;environment unfamiliar   Signs and Symptoms (Fall Risk) presence of risk factors     Goal: Absence of Falls  Patient will demonstrate the desired outcomes by discharge/transition of care.   Outcome: Ongoing (interventions implemented as appropriate)   18   Fall Risk (Adult)   Absence of Falls making progress toward outcome

## 2018-06-01 NOTE — HPI
"   HPI: 83 y/o male with CAD, Afib s/p ablation, coumadin for anticoagulationn, HTN, HLP, panhypopituitarism, and h/o DVT who presented with c/o mid-epigastric abdominal pain that started last night. He reported an episode of sharp pain last night around midnight that self resolved and he went to sleep. Then around 1:30 this afternoon, the mid-epigastric pain returned, described as "pressure" and intensity 8/10, associated with +N/V and +diaphoresis. He reports subjective chills, but no fever. Denies chest pain and SOB. Reports pain with N/V worse with attempt at eating, and he feels constant nausea and just "feeling bad" and not improved at all since coming to the hospital. No recent sick contacts; and no reported unusual food ingestions.  In the ER, workup was remarkable for negative cardiac enzyme, LFTs with Tbil of 1.4, AST/ALT=205/95, CTA of chest negative for PE but did show that the gallbladder is mildly distended noting there may be cholelithiasis or sludge near the gallbladder neck.     Denies CP  EKG NSR IVCD  Peak troponin 0.06    Followed at [facility] [place] - Dr Manning  81 year old male followed with CAD post NSTEMI/PCI to RCA 2000, hypertension, hyperlipidemia and paroxsymal arial fibrillation. He under went AF ablation 2/25/16. He has had 2 episodes of AF and had resumed coumadin and tikosyn. He denies chest pain or HSIEH. No symptoms of TOLU.    EUGENE 2/24/16    1 - Normal left ventricular systolic function (EF 55-60%).     2 - Left atrial appendage is single lobed with no visualized thrombus.     3 - Biatrial enlargement.     4 - The mitral valve is mildly sclerotic with evidence of posterior leaflet prolapse and flail P 3 segement.     5 - Mild mitral regurgitation.     6 - Mild tricuspid regurgitation.     7 - Grade 2 atheroma disease of aorta.      "

## 2018-06-01 NOTE — PROGRESS NOTES
"[facility] Medicine  Progress Note    Patient Name: Linda Skelton  MRN: [id number]  Patient Class: IP- Inpatient   Admission Date: 5/30/2018  Length of Stay: 0  Attending Physician: Lisa Kirkland MD  Primary Care Provider: Anderson Jerome MD        Subjective:     Principal Problem:Abdominal pain    HPI:  83 y/o male with CAD, Afib s/p ablation, coumadin for anticoagulationn, HTN, HLP, panhypopituitarism, and h/o DVT who presented with c/o mid-epigastric abdominal pain that started last night. He reported an episode of sharp pain last night around midnight that self resolved and he went to sleep. Then around 1:30 this afternoon, the mid-epigastric pain returned, described as "pressure" and intensity 8/10, associated with +N/V and +diaphoresis. He reports subjective chills, but no fever. Denies chest pain and SOB. Reports pain with N/V worse with attempt at eating, and he feels constant nausea and just "feeling bad" and not improved at all since coming to the hospital. No recent sick contacts; and no reported unusual food ingestions.  In the ER, workup was remarkable for negative cardiac enzyme, LFTs with Tbil of 1.4, AST/ALT=205/95, CTA of chest negative for PE but did show that the gallbladder is mildly distended noting there may be cholelithiasis or sludge near the gallbladder neck.     Hospital Course:  83 y.o. elderly male placed in observation for evaluation of epigastric/chest pain that extended across his upper epigstric area and radiated straight to his back. CT abdomen without evidence of PE or dissection of aneurysm, however, high-grade stenosis without occlusion of the celiac artery is noted - in addition concerning mild indistinctness about the gallbladder noting cholelithiasis or sludge -  Worsening transamidase AST//95-->358/355 respectively    Patient remains lethargic 2/2 Remeron received at 330am but is easily aroused, he does fall back asleep immediately after " answering questions - difficult to assess pain - Holding oral medications until more awake. oxygen saturations remain >95% - He is on warfarin/INR=2.1 (this will need to be held if procedures are planned); awaiting GI and Surgery input.     Interval History: lethargic; worsening transaminates; abnormal inflammatory markers (procalcitonin)    Review of Systems   Constitutional: Positive for activity change, appetite change and fatigue. Negative for chills, diaphoresis and fever.   HENT: Negative for congestion, hearing loss, sore throat, tinnitus and trouble swallowing.    Eyes: Negative for photophobia, discharge, itching and visual disturbance.   Respiratory: Positive for chest tightness. Negative for apnea, cough, wheezing and stridor.    Cardiovascular: Negative for chest pain, palpitations and leg swelling.   Gastrointestinal: Positive for abdominal pain and nausea. Negative for abdominal distention, blood in stool and constipation.   Endocrine: Negative for polydipsia, polyphagia and polyuria.   Genitourinary: Negative for difficulty urinating, dysuria, flank pain and frequency.   Musculoskeletal: Negative for arthralgias, joint swelling and neck stiffness.   Skin: Negative for color change, rash and wound.   Neurological: Positive for weakness. Negative for dizziness, tremors, seizures, light-headedness, numbness and headaches.   Hematological: Negative for adenopathy.   Psychiatric/Behavioral: Negative for hallucinations and self-injury.     Objective:     Vital Signs (Most Recent):  Temp: 97.6 °F (36.4 °C) (06/01/18 0734)  Pulse: 97 (06/01/18 0734)  Resp: 18 (06/01/18 0734)  BP: 133/64 (06/01/18 0734)  SpO2: 96 % (06/01/18 0734) Vital Signs (24h Range):  Temp:  [97.6 °F (36.4 °C)-101.4 °F (38.6 °C)] 97.6 °F (36.4 °C)  Pulse:  [] 97  Resp:  [18-24] 18  SpO2:  [94 %-99 %] 96 %  BP: ()/(57-88) 133/64     Weight: 115 kg (253 lb 8.5 oz)  Body mass index is 31.69 kg/m².    Intake/Output Summary (Last  24 hours) at 06/01/18 0839  Last data filed at 05/31/18 1900   Gross per 24 hour   Intake              720 ml   Output              725 ml   Net               -5 ml      Physical Exam   Constitutional: He is oriented to person, place, and time. He appears well-developed and well-nourished. He is cooperative.   HENT:   Head: Normocephalic and atraumatic.   Eyes: Conjunctivae, EOM and lids are normal. Pupils are equal, round, and reactive to light.   Neck: Normal range of motion and full passive range of motion without pain. Neck supple. No JVD present. No edema present. No thyroid mass present.   Cardiovascular: Normal rate, S1 normal, S2 normal and intact distal pulses.    No murmur heard.  Abdominal: He exhibits distension. He exhibits no abdominal bruit. There is no splenomegaly or hepatomegaly. There is tenderness. There is no CVA tenderness.   Musculoskeletal: Normal range of motion.   Lymphadenopathy:     He has no cervical adenopathy.     He has no axillary adenopathy.   Neurological: He is alert and oriented to person, place, and time. He has normal reflexes. He displays no tremor. He displays no seizure activity.   Skin: Skin is warm, dry and intact.   Psychiatric: He has a normal mood and affect. His speech is normal. Thought content normal. Cognition and memory are normal.       Significant Labs:   CBC:   Recent Labs  Lab 05/30/18  1728 05/31/18  0804 06/01/18  0638   WBC 7.17 7.50 13.10*   HGB 14.7 16.0 16.3   HCT 44.1 48.8 47.3   * 105* 93*     CMP:   Recent Labs  Lab 05/30/18  1728 05/31/18  0804 06/01/18  0638    136 136   K 4.0 3.8 4.2    101 101   CO2 26 26 27   * 117* 119*   BUN 22 16 24*   CREATININE 1.2 1.2 1.6*   CALCIUM 8.9 9.3 9.0   PROT 6.3 7.2 6.6  6.6   ALBUMIN 3.7 4.0 3.4*  3.3*   BILITOT 1.4* 3.3* 4.9*  4.9*   ALKPHOS 81 149* 125  127   * 358* 125*  124*   ALT 95* 355* 198*  196*   ANIONGAP 7* 9 8   EGFRNONAA 56* 56* 39*     Cardiac Markers: No  results for input(s): CKMB, MYOGLOBIN, BNP, TROPISTAT in the last 48 hours.  Lipase:   Recent Labs  Lab 05/30/18  1728   LIPASE 43     Troponin:   Recent Labs  Lab 05/31/18  0230 05/31/18  0804 05/31/18  1827   TROPONINI 0.036* 0.044* 0.060*     TSH:   Recent Labs  Lab 05/02/18  0817   TSH 0.573     Urine Culture: No results for input(s): LABURIN in the last 48 hours.  Urine Studies:   Recent Labs  Lab 05/30/18  1923   COLORU Yellow   APPEARANCEUA Clear   PHUR 7.0   SPECGRAV 1.025   PROTEINUA Negative   GLUCUA Negative   KETONESU Negative   BILIRUBINUA Negative   OCCULTUA Negative   NITRITE Negative   UROBILINOGEN 4.0-6.0*   LEUKOCYTESUR Negative       Significant Imaging:   Imaging Results          US Abdomen Complete (Final result)  Result time 05/31/18 10:47:29    Final result by Neil Augustine MD (05/31/18 10:47:29)                 Impression:      Mild hepatomegaly with multiple hepatic cysts.    Bilateral renal cysts.    Cholelithiasis with minimal gallbladder wall thickening.  Note that there is no evidence for sonographic Basurto sign to strongly suggest acute cholecystitis.    Pancreas and portions of the aorta are obscure by overlying bowel gas.      Electronically signed by: Neil Augustine MD  Date:    05/31/2018  Time:    10:47             Narrative:    EXAMINATION:  US ABDOMEN COMPLETE    CLINICAL HISTORY:  abnormal LFTS with CT with gallstones, r/o cholecystitis;    TECHNIQUE:  Complete abdominal ultrasound (including pancreas, liver, gallbladder, common bile duct, spleen, aorta, IVC, and kidneys) was performed.    COMPARISON:  None    FINDINGS:  Liver: Mildly enlarged measuring 19.1 cm in length.  There are multiple hepatic cysts present with the largest located within the right lobe of the liver measuring 3.7 cm in greatest dimension.    Gallbladder: There are multiple small gallstones present.  The gallbladder wall is minimally thickened measuring 3.5 mm.  This is nonspecific.  There is no evidence  for sonographic Basurto sign to strongly suggest acute cholecystitis.    Biliary system: The common duct is not dilated, measuring 3 mm.  No intrahepatic ductal dilatation.    Spleen: Normal in size with a homogeneous echotexture, measuring 9.9 cm in length.    Pancreas: The pancreas is obscured by overlying bowel gas.    Right kidney: Normal in size with no hydronephrosis, measuring 12.7 x 6.7 x 6.0 cm.  There is a 7 mm cyst within the mid kidney.    Left kidney:  Normal in size with no hydronephrosis, measuring 14.0 x 8.7 x 6.9 cm.  There are parapelvic renal cysts present on the left.    Aorta: Much of the abdominal aorta is obscure diet overlying bowel gas.  Visualized portions are normal in caliber.    Inferior vena cava: Normal in appearance.    Miscellaneous: No ascites.                               CTA Chest Abdomen Non Coronary (Final result)  Result time 05/30/18 18:40:21    Final result by Abhijeet Castillo MD (05/30/18 18:40:21)                 Impression:    Impression:    1. Allowing for exam limitations, no convincing pulmonary thromboembolism.  2. 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  3. Hepatic steatosis, correlation with LFTs recommended.  4. Mild indistinctness about the gallbladder noting cholelithiasis or sludge.  If there is right upper quadrant pain, consider ultrasound for further evaluation.  5. Right hepatic cysts and right renal cysts.  The renal lesions can be further evaluated with ultrasound, on an outpatient basis, to confirm cystic nature as warranted.  6. Please see above for several additional findings.      Electronically signed by: Abhijeet Castillo MD  Date:    05/30/2018  Time:    18:40             Narrative:    EXAMINATION:  CTA CHEST ABDOMEN NON CORONARY (XPD)    CLINICAL HISTORY:  Aortic dz, non-traumatic, known or suspect;    TECHNIQUE:  Axial images of the chest and abdomen were obtained at 2.5 mm  intervals during administration of 100 cc omni 350 IV contrast.  Precontrast images were acquired.  Coronal and sagittal reformatted images were reviewed.    COMPARISON:  CT 01/12/2013    FINDINGS:  The structures at the base of the neck are remarkable for a subcentimeter left thyroid nodule, nonspecific.  Degenerative changes are noted at the sternoclavicular articulations.  No significant mediastinal lymphadenopathy.  There is atherosclerotic calcification of the aorta and its branches.  The heart is mildly prominent without significant pericardial effusion.    The airways are grossly patent noting motion artifact limits evaluation of the airways and pulmonary parenchyma.  There is bilateral basilar dependent atelectasis.  No significant volume of pleural fluid.  There is right lower lobe atelectasis or scarring.  No pneumothorax.  No large focal consolidation.    Allowing for motion artifact, no convincing pulmonary arterial filling defect to the level of the proximal segmental arteries bilaterally to suggest pulmonary thromboembolism.  Please note, given motion artifact, especially involving the bilateral lower lobes, pulmonary thromboembolism distal to the segmental arteries cannot be excluded within these regions bilaterally although no definite defect seen.    The spleen, pancreas and adrenal glands are grossly unremarkable.  The gallbladder is mildly distended noting there may be cholelithiasis or sludge near the gallbladder neck.  If there is right upper quadrant pain, consider ultrasound for further evaluation.  There are several lobular low attenuating lesions within the hepatic parenchyma, attenuation of these foci are most consistent with cysts, others are too small for characterization.  Largest within the right hepatic lobe measures approximately 3.4 cm.  Of note, this lesion has decreased in size since the previous exam suggesting waxing and waning cysts.  The a patent parenchyma is low attenuating  suggesting steatosis, there is probable sparing about the gallbladder fossa.  The common duct is not dilated.  The pancreatic duct is not dilated.  Scattered shotty periaortic and paracaval lymph nodes are noted.    The kidneys enhance symmetrically.  There is right nonobstructive nephrolithiasis.  Multiple low attenuating lesions are noted throughout the kidneys bilaterally, left greater than right, attenuation of the larger lesions measures that of cystic attenuation, the smaller foci are too small for characterization.  Previously described high attenuating lesions on CT 01/12/2013 may be present although administration of contrast limits evaluation.  Overall, ultrasound could be performed of the kidneys to confirm cystic nature of the lesions.  Several left parapelvic cysts are additionally noted.  The visualized portions of the ureters are grossly unremarkable.  There is an IVC filter.  The visualized bowel is grossly unremarkable.    There is atherosclerotic calcification of the aorta and its branches.  The thoracic aorta tapers normally without aneurysm.  The SMA bilateral main renal arteries, accessory right renal artery, accessory left renal artery, and SURYA all are patent.  There is stenosis at the origin of the celiac artery, noting post a not dilation.  Distally, the vessel is patent although there is high-grade stenosis at its origin.    Degenerative changes are noted of the spine.  There is dextroscoliotic curvature of the lumbar spine.  No significant axillary lymphadenopathy.                               X-Ray Chest 1 View (Final result)  Result time 05/30/18 17:09:54    Final result by Abhijeet Castillo MD (05/30/18 17:09:54)                 Impression:      No acute cardiopulmonary process, stable chronic findings.      Electronically signed by: Abhijeet Castillo MD  Date:    05/30/2018  Time:    17:09             Narrative:    EXAMINATION:  XR CHEST 1 VIEW    CLINICAL HISTORY:  Epigastric  pain    TECHNIQUE:  Single frontal view of the chest was performed.    COMPARISON:  09/15/2014    FINDINGS:  The cardiomediastinal silhouette is not enlarged.  There is no pleural effusion.  The trachea is midline.  The lungs are symmetrically expanded bilaterally with coarse interstitial attenuation, similar to the previous exam.  There is minimal left basilar subsegmental atelectasis.  There is elevation of the right hemidiaphragm..  No large focal consolidation seen.  There is no pneumothorax.  The osseous structures are remarkable for degenerative changes..                                  Assessment/Plan:      * Abdominal pain    5/31/18 - worsening transaminase in the presence of abnormal US significant for gallbladder involvment + abdominal pain and distension - will obtain a HIDA scan and consult to general surgery for opinion - awaiting GI opinion, escaped stone????/transaminitis   -supportive care for pain - hold all pain medications until patient is fully awake after remeron in early morning  -crp, sed rate, procalcitonin level, lactic acid  -HIDA scan  -GI and general surgery consult  -NPO for now  -NS at 125 cc/hr  -glucose checks/hypoglycemic protocol while NPO    05/30/18-Son  Pt reported mid-epigastric abdominal pain, and not chest pain  Given abnormal LFTS (previously normal 4 weeks ago) along with CTA findings that showed gallbladder is mildly distended noting there may be cholelithiasis or sludge near the gallbladder neck which is concerning for cholecystitis as source of pain  Lipase is normal  Will check RUQ abdominal U/S for initial workup, and follow CMP  Supportive care with IVF, pain control and anti-emetics  Consult GI (canceled consult to Cardiology)        Abnormal LFTs    As discussed above  Will also check acute hepatitis panel for completeness of workup although less likely          Benign non-nodular prostatic hyperplasia without lower urinary tract symptoms    Continue  tamulosin        Status post catheter ablation of atrial fibrillation    As discussed above        Mixed hyperlipidemia    Hold statin for now given increased LFTs        History of deep 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, 1997    Treated and currently anticoagulated with coumadin  As discussed above        Essential hypertension    Continue atenolol        Coronary artery disease with hx of MI    Pt denies chest pain and initial cardiac marker is negative  Continue home medication regimen        Chronic kidney disease, stage 3    Creatinine at baseline  Will monitor        Panhypopituitarism    Continue home medication regimen        Atrial fibrillation    S/p ablation  Therapeutic on coumadin, but will hold coumadin until abdominal pain source delineated - INR 2.1 today          Fully anticoagulated was on coumadin - holding?? procedures  VTE Risk Mitigation     None            CONTRERAS Avery, FNP-C  Hospitalist - Department of Hospital Medicine  [address], [place] [address]  Office [phone]; Pager [phone]

## 2018-06-01 NOTE — NURSING
Patient transported to Scheduled MRI. No complaints, no acute distress noted. Will monitor upon return to unit.

## 2018-06-01 NOTE — PROGRESS NOTES
"Gastroenterology    CC: abd pain    HPI 83 y.o. male with persistent pain at RUQ.  No bleeding.  No N/V.         Past Medical History:   Diagnosis Date    ACS (acute coronary syndrome)     Acute coronary syndrome 2000    NSTEMI    Anticoagulant long-term use     Coumadin    Atrial fibrillation     Basal cell carcinoma excised     left scalp vertex    Bilateral leg edema 1/6/2014    BPH (benign prostatic hyperplasia)     CKD (chronic kidney disease)     Coronary artery disease     DVT of leg (deep venous thrombosis) 09/08/2014    On coumadin for years    Encounter for blood transfusion     Hyperlipidemia     Hypertension     Hypogonadism, male     Leg fracture, left     Melanoma 01/04/2017     in situ crown of scalp    MI (myocardial infarction)     Panhypopituitarism     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    SQUAMOUS CELL CARCINOMA excised     left posterior scalp    Thyroid disease          Review of Systems  General ROS: negative for chills, + fever  Cardiovascular ROS: no chest pain or dyspnea     Physical Examination  /64   Pulse 97   Temp 97.6 °F (36.4 °C)   Resp 18   Ht 6' 3" (1.905 m)   Wt 115 kg (253 lb 8.5 oz)   SpO2 96%   BMI 31.69 kg/m²   General appearance: alert, cooperative, no distress  HENT: Normocephalic, atraumatic, neck symmetrical, no nasal discharge   Eyes: conjunctivae/corneas clear, no icterus, EOM's intact  Lungs: resp non-labored  Heart: regular rate   Abdomen: soft, TTP upper/RUQ, no rebound  Extremities: extremities symmetric; no clubbing, cyanosis, or edema  Neurologic: Alert and oriented X 3, MAEW    Labs:  Bili up    Imaging:  HIDA reviewed    Assessment:     Pt. Is a 83 y.o. male with:  1. Abnormal LFT's - Cholecystitis based on HIDA.  No imaging evidence of choledocholithiasis, though Bili rising.  Viral Hep serologies, Negative.  CBD 3mm and patent CBD on HIDA  2. Abd. Pain - Improved  3. CAD, A-fib " (On Coumadin), HTN, Hyperlipidemia, Panhypopit, Hx of DVT.....    Plan:   - Discussed with Dr. Bell regarding cholecystectomy with IOC vs pre-op MRCP.  MRCP ordered.  Cont abx.

## 2018-06-01 NOTE — CONSULTS
"[facility]  Cardiology  Consult Note    Patient Name: Linda Skelton  MRN: [id number]  Admission Date: 5/30/2018  Hospital Length of Stay: 1 days  Code Status: Full Code   Attending Provider: Lisa Kirkland MD   Consulting Provider: Jac Walker MD  Primary Care Physician: Anderson Jerome MD  Principal Problem:Abdominal pain    Patient information was obtained from patient and ER records.     Consults  Subjective:     Chief Complaint:  Elevated troponin        HPI: 81 y/o male with CAD, Afib s/p ablation, coumadin for anticoagulationn, HTN, HLP, panhypopituitarism, and h/o DVT who presented with c/o mid-epigastric abdominal pain that started last night. He reported an episode of sharp pain last night around midnight that self resolved and he went to sleep. Then around 1:30 this afternoon, the mid-epigastric pain returned, described as "pressure" and intensity 8/10, associated with +N/V and +diaphoresis. He reports subjective chills, but no fever. Denies chest pain and SOB. Reports pain with N/V worse with attempt at eating, and he feels constant nausea and just "feeling bad" and not improved at all since coming to the hospital. No recent sick contacts; and no reported unusual food ingestions.  In the ER, workup was remarkable for negative cardiac enzyme, LFTs with Tbil of 1.4, AST/ALT=205/95, CTA of chest negative for PE but did show that the gallbladder is mildly distended noting there may be cholelithiasis or sludge near the gallbladder neck.     Denies CP  EKG NSR IVCD  Peak troponin 0.06    Followed at [facility] - Dr Manning  81 year old male followed with CAD post NSTEMI/PCI to RCA 2000, hypertension, hyperlipidemia and paroxsymal arial fibrillation. He under went AF ablation 2/25/16. He has had 2 episodes of AF and had resumed coumadin and tikosyn. He denies chest pain or HSIEH. No symptoms of TOLU.    EUGENE 2/24/16    1 - Normal left ventricular systolic function (EF 55-60%).     2 - Left atrial " appendage is single lobed with no visualized thrombus.     3 - Biatrial enlargement.     4 - The mitral valve is mildly sclerotic with evidence of posterior leaflet prolapse and flail P 3 segement.     5 - Mild mitral regurgitation.     6 - Mild tricuspid regurgitation.     7 - Grade 2 atheroma disease of aorta.        Past Medical History:   Diagnosis Date    ACS (acute coronary syndrome)     Acute coronary syndrome 2000    NSTEMI    Anticoagulant long-term use     Coumadin    Atrial fibrillation     Basal cell carcinoma excised     left scalp vertex    Bilateral leg edema 1/6/2014    BPH (benign prostatic hyperplasia)     CKD (chronic kidney disease)     Coronary artery disease     DVT of leg (deep venous thrombosis) 09/08/2014    On coumadin for years    Encounter for blood transfusion     Hyperlipidemia     Hypertension     Hypogonadism, male     Leg fracture, left     Melanoma 01/04/2017     in situ crown of scalp    MI (myocardial infarction)     Panhypopituitarism     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    SQUAMOUS CELL CARCINOMA excised     left posterior scalp    Thyroid disease        Past Surgical History:   Procedure Laterality Date    APPENDECTOMY      BASAL CELL CARCINOMA EXCISION  01/2008    BRAIN SURGERY      pituitary adenoma removed    CARDIAC CATHETERIZATION  05/09/2000    S/P stent to RCA,    CARDIAC CATHETERIZATION      stents placed    CARDIAC ELECTROPHYSIOLOGY STUDY AND ABLATION      CORONARY ANGIOPLASTY  5/9/2000    RCA    CORONARY ANGIOPLASTY WITH STENT PLACEMENT      ESOPHAGEAL DILATION      EYE SURGERY Bilateral     cataracts removed and new lenses placed     FRACTURE SURGERY Left     leg    HERNIA REPAIR      left femur surgery      pituitatry      hypophysectomy    SKIN BIOPSY      TONSILLECTOMY      VASCULAR SURGERY         Review of patient's allergies indicates:  No Known Allergies    No current  facility-administered medications on file prior to encounter.      Current Outpatient Prescriptions on File Prior to Encounter   Medication Sig    aspirin 81 mg Tab Take 81 mg by mouth every evening. 1 Tablet Oral Every night    atenolol (TENORMIN) 25 MG tablet TAKE ONE TABLET BY MOUTH twice DAILY    dofetilide (TIKOSYN) 500 MCG Cap TAKE 1 CAPSULE 2 TIMES DAILY    folic acid (FOLVITE) 1 MG tablet Take 1 tablet (1,000 mcg total) by mouth once daily.    levothyroxine (SYNTHROID) 112 MCG tablet Take 1 tablet (112 mcg total) by mouth before breakfast.    NITROSTAT 0.4 mg SL tablet DISSOLVE ONE TABLET UNDER THE TONGUE EVERY 5 MINUTES AS NEEDED FOR CHEST PAIN.  DO NOT EXCEED A TOTAL OF 3 DOSES IN 15 MINUTES NOW    omeprazole (PRILOSEC) 20 MG capsule Take 20 mg by mouth every other day.    predniSONE (DELTASONE) 5 MG tablet Take 1 tablet (5 mg total) by mouth once daily.    simvastatin (ZOCOR) 80 MG tablet TAKE ONE TABLET BY MOUTH IN THE EVENING    tamsulosin (FLOMAX) 0.4 mg Cp24 Take 1 capsule (0.4 mg total) by mouth once daily.    triamcinolone acetonide 0.1% (KENALOG) 0.1 % cream APPLY  CREAM TOPICALLY TO AFFECTED AREA TWICE DAILY    warfarin (COUMADIN) 5 MG tablet Take 1.5 tablets (7.5 mg total) by mouth Daily. Current Dose ( 5 mg on Tue; 7.5 mg all other days) or as directed by coumadin clinic    sulfamethoxazole-trimethoprim 800-160mg (BACTRIM DS) 800-160 mg Tab Start night before prostate biopsy every 12 hours for 4 doses only    testosterone cypionate (DEPOTESTOTERONE CYPIONATE) 100 mg/mL injection Inject 1 mL (100 mg total) into the muscle every 14 (fourteen) days.     Family History     Problem Relation (Age of Onset)    Cancer Father        Social History Main Topics    Smoking status: Never Smoker    Smokeless tobacco: Never Used      Comment: .  Two sons.  Evangelical .      Alcohol use No    Drug use: No    Sexual activity: Not Currently     Partners: Female     Review of Systems    Constitution: Negative for decreased appetite.   HENT: Negative for ear discharge.    Eyes: Negative for blurred vision.   Endocrine: Negative for polyphagia.   Skin: Negative for nail changes.   Neurological: Negative for aphonia.   Psychiatric/Behavioral: Negative for hallucinations.     Objective:     Vital Signs (Most Recent):  Temp: 97.6 °F (36.4 °C) (06/01/18 0734)  Pulse: 97 (06/01/18 0734)  Resp: 18 (06/01/18 0734)  BP: 133/64 (06/01/18 0734)  SpO2: 96 % (06/01/18 0734) Vital Signs (24h Range):  Temp:  [97.6 °F (36.4 °C)-101.4 °F (38.6 °C)] 97.6 °F (36.4 °C)  Pulse:  [] 97  Resp:  [18-24] 18  SpO2:  [94 %-99 %] 96 %  BP: ()/(57-88) 133/64     Weight: 115 kg (253 lb 8.5 oz)  Body mass index is 31.69 kg/m².    SpO2: 96 %  O2 Device (Oxygen Therapy): nasal cannula      Intake/Output Summary (Last 24 hours) at 06/01/18 0801  Last data filed at 05/31/18 1900   Gross per 24 hour   Intake              780 ml   Output              825 ml   Net              -45 ml       Lines/Drains/Airways     Peripheral Intravenous Line                 Peripheral IV - Single Lumen 05/30/18 1537 Left Antecubital 1 day         Peripheral IV - Single Lumen 05/30/18 1700 Left Antecubital 1 day                Physical Exam   Constitutional: He is oriented to person, place, and time. He appears well-developed and well-nourished.   HENT:   Head: Normocephalic and atraumatic.   Eyes: Conjunctivae are normal. Pupils are equal, round, and reactive to light.   Neck: Normal range of motion. Neck supple.   Cardiovascular: Normal rate, normal heart sounds and intact distal pulses.    Pulmonary/Chest: Effort normal and breath sounds normal.   Abdominal: Soft. Bowel sounds are normal.   Musculoskeletal: Normal range of motion.   Neurological: He is alert and oriented to person, place, and time.   Skin: Skin is warm and dry.       Significant Labs: All pertinent lab results from the last 24 hours have been reviewed.    Significant  Imaging: Echocardiogram:   2D echo with color flow doppler:   Results for orders placed or performed during the hospital encounter of 01/28/13   2D echo with color flow doppler   Result Value Ref Range    EF  60      Assessment and Plan:     * Abdominal pain    Per GI        Mixed hyperlipidemia             History of deep 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, 1997    Coumadin on hold as above        Essential hypertension             Coronary artery disease with hx of MI    Remote PCI 2000. Denies CP. Minimal troponin increase - likely demand ischemia and not ACS. Repeat echo. Consider out patient stress test. If GB surgery needed - cleared at moderate risk        Atrial fibrillation    Currently NSR. On Tikosyn. Coumadin on hold for possible GB surgery            VTE Risk Mitigation     None          Thank you for your consult. I will follow-up with patient. Please contact us if you have any additional questions.    Jac Walker MD  Cardiology   [facility]

## 2018-06-01 NOTE — NURSING
Lab called to notify that Plasma is ready. IV site that was continuously infusing is now noted to have leaking around site. Attempted new IV, unsuccessful, Charge nurse Letty attempted, un-successful. House supervisor notified of need for IV. Will start plasma as soon as IV access started.

## 2018-06-01 NOTE — PLAN OF CARE
TN explained role of CM//Discharge Planner in detail; importance of keeping all scheduled appts; taking all medicines as prescribed and the importance of who will help manage his care at home. Explained the Discharge Planning Help at Home Sheet in detail and placed in Health information Packet.    Pt will schedule his appts.      [facility] - [place], [place] - [address]  [address] [place] [address]  Phone: [phone] Fax: [phone]    [facility] - [place], [place] - [address] [place] [address]  [address],  [place] [address]  [facility] [address]  Phone: [phone] Fax: [phone]    [facility] - [facility] [address] [facility]  [address] [facility] [address]  Phone: [phone] Fax: [phone]       06/01/18 1152   Discharge Assessment   Assessment Type Discharge Planning Assessment   Confirmed/corrected address and phone number on facesheet? Yes   Assessment information obtained from? Patient   Communicated expected length of stay with patient/caregiver no   Prior to hospitilization cognitive status: Alert/Oriented   Prior to hospitalization functional status: Independent   Current cognitive status: Alert/Oriented   Current Functional Status: Independent   Lives With spouse   Able to Return to Prior Arrangements yes   Is patient able to care for self after discharge? Yes;Unable to determine at this time (comments)   Who are your caregiver(s) and their phone number(s)? (Spouse, Jessica Skelton [phone])   Patient's perception of discharge disposition home or selfcare   Readmission Within The Last 30 Days no previous admission in last 30 days   Patient currently being followed by outpatient case management? No   Patient currently receives any other outside agency services? No   Equipment Currently Used at Home none   Do you have any problems affording any of your prescribed medications? No   Is the patient taking  medications as prescribed? yes   Does the patient have transportation home? Yes   Transportation Available car;family or friend will provide   Does the patient receive services at the Coumadin Clinic? No  (Pt is on warfarin; followed by Dr. Angel Cruz )   Discharge Plan A Home with family   Patient/Family In Agreement With Plan yes

## 2018-06-01 NOTE — NURSING
Report given to MIRIAN Hess. Patient resting comfortably, patient still very sleepy, PRN Morphine given for HIDA scan. Patient awakens easily. No acute distress noted.

## 2018-06-01 NOTE — SUBJECTIVE & OBJECTIVE
Interval History: lethargic; worsening transaminates; abnormal inflammatory markers (procalcitonin)    Review of Systems   Constitutional: Positive for activity change, appetite change and fatigue. Negative for chills, diaphoresis and fever.   HENT: Negative for congestion, hearing loss, sore throat, tinnitus and trouble swallowing.    Eyes: Negative for photophobia, discharge, itching and visual disturbance.   Respiratory: Positive for chest tightness. Negative for apnea, cough, wheezing and stridor.    Cardiovascular: Negative for chest pain, palpitations and leg swelling.   Gastrointestinal: Positive for abdominal pain and nausea. Negative for abdominal distention, blood in stool and constipation.   Endocrine: Negative for polydipsia, polyphagia and polyuria.   Genitourinary: Negative for difficulty urinating, dysuria, flank pain and frequency.   Musculoskeletal: Negative for arthralgias, joint swelling and neck stiffness.   Skin: Negative for color change, rash and wound.   Neurological: Positive for weakness. Negative for dizziness, tremors, seizures, light-headedness, numbness and headaches.   Hematological: Negative for adenopathy.   Psychiatric/Behavioral: Negative for hallucinations and self-injury.     Objective:     Vital Signs (Most Recent):  Temp: 97.6 °F (36.4 °C) (06/01/18 0734)  Pulse: 97 (06/01/18 0734)  Resp: 18 (06/01/18 0734)  BP: 133/64 (06/01/18 0734)  SpO2: 96 % (06/01/18 0734) Vital Signs (24h Range):  Temp:  [97.6 °F (36.4 °C)-101.4 °F (38.6 °C)] 97.6 °F (36.4 °C)  Pulse:  [] 97  Resp:  [18-24] 18  SpO2:  [94 %-99 %] 96 %  BP: ()/(57-88) 133/64     Weight: 115 kg (253 lb 8.5 oz)  Body mass index is 31.69 kg/m².    Intake/Output Summary (Last 24 hours) at 06/01/18 0839  Last data filed at 05/31/18 1900   Gross per 24 hour   Intake              720 ml   Output              725 ml   Net               -5 ml      Physical Exam   Constitutional: He is oriented to person, place, and  time. He appears well-developed and well-nourished. He is cooperative.   HENT:   Head: Normocephalic and atraumatic.   Eyes: Conjunctivae, EOM and lids are normal. Pupils are equal, round, and reactive to light.   Neck: Normal range of motion and full passive range of motion without pain. Neck supple. No JVD present. No edema present. No thyroid mass present.   Cardiovascular: Normal rate, S1 normal, S2 normal and intact distal pulses.    No murmur heard.  Abdominal: He exhibits distension. He exhibits no abdominal bruit. There is no splenomegaly or hepatomegaly. There is tenderness. There is no CVA tenderness.   Musculoskeletal: Normal range of motion.   Lymphadenopathy:     He has no cervical adenopathy.     He has no axillary adenopathy.   Neurological: He is alert and oriented to person, place, and time. He has normal reflexes. He displays no tremor. He displays no seizure activity.   Skin: Skin is warm, dry and intact.   Psychiatric: He has a normal mood and affect. His speech is normal. Thought content normal. Cognition and memory are normal.       Significant Labs:   CBC:   Recent Labs  Lab 05/30/18 1728 05/31/18  0804 06/01/18  0638   WBC 7.17 7.50 13.10*   HGB 14.7 16.0 16.3   HCT 44.1 48.8 47.3   * 105* 93*     CMP:   Recent Labs  Lab 05/30/18 1728 05/31/18  0804 06/01/18  0638    136 136   K 4.0 3.8 4.2    101 101   CO2 26 26 27   * 117* 119*   BUN 22 16 24*   CREATININE 1.2 1.2 1.6*   CALCIUM 8.9 9.3 9.0   PROT 6.3 7.2 6.6  6.6   ALBUMIN 3.7 4.0 3.4*  3.3*   BILITOT 1.4* 3.3* 4.9*  4.9*   ALKPHOS 81 149* 125  127   * 358* 125*  124*   ALT 95* 355* 198*  196*   ANIONGAP 7* 9 8   EGFRNONAA 56* 56* 39*     Cardiac Markers: No results for input(s): CKMB, MYOGLOBIN, BNP, TROPISTAT in the last 48 hours.  Lipase:   Recent Labs  Lab 05/30/18  1728   LIPASE 43     Troponin:   Recent Labs  Lab 05/31/18  0230 05/31/18  0804 05/31/18  1827   TROPONINI 0.036* 0.044* 0.060*      TSH:   Recent Labs  Lab 05/02/18  0817   TSH 0.573     Urine Culture: No results for input(s): LABURIN in the last 48 hours.  Urine Studies:   Recent Labs  Lab 05/30/18  1923   COLORU Yellow   APPEARANCEUA Clear   PHUR 7.0   SPECGRAV 1.025   PROTEINUA Negative   GLUCUA Negative   KETONESU Negative   BILIRUBINUA Negative   OCCULTUA Negative   NITRITE Negative   UROBILINOGEN 4.0-6.0*   LEUKOCYTESUR Negative       Significant Imaging:   Imaging Results          US Abdomen Complete (Final result)  Result time 05/31/18 10:47:29    Final result by Neil Augustine MD (05/31/18 10:47:29)                 Impression:      Mild hepatomegaly with multiple hepatic cysts.    Bilateral renal cysts.    Cholelithiasis with minimal gallbladder wall thickening.  Note that there is no evidence for sonographic Basurto sign to strongly suggest acute cholecystitis.    Pancreas and portions of the aorta are obscure by overlying bowel gas.      Electronically signed by: Neil Augustine MD  Date:    05/31/2018  Time:    10:47             Narrative:    EXAMINATION:  US ABDOMEN COMPLETE    CLINICAL HISTORY:  abnormal LFTS with CT with gallstones, r/o cholecystitis;    TECHNIQUE:  Complete abdominal ultrasound (including pancreas, liver, gallbladder, common bile duct, spleen, aorta, IVC, and kidneys) was performed.    COMPARISON:  None    FINDINGS:  Liver: Mildly enlarged measuring 19.1 cm in length.  There are multiple hepatic cysts present with the largest located within the right lobe of the liver measuring 3.7 cm in greatest dimension.    Gallbladder: There are multiple small gallstones present.  The gallbladder wall is minimally thickened measuring 3.5 mm.  This is nonspecific.  There is no evidence for sonographic Basurto sign to strongly suggest acute cholecystitis.    Biliary system: The common duct is not dilated, measuring 3 mm.  No intrahepatic ductal dilatation.    Spleen: Normal in size with a homogeneous echotexture, measuring  9.9 cm in length.    Pancreas: The pancreas is obscured by overlying bowel gas.    Right kidney: Normal in size with no hydronephrosis, measuring 12.7 x 6.7 x 6.0 cm.  There is a 7 mm cyst within the mid kidney.    Left kidney:  Normal in size with no hydronephrosis, measuring 14.0 x 8.7 x 6.9 cm.  There are parapelvic renal cysts present on the left.    Aorta: Much of the abdominal aorta is obscure diet overlying bowel gas.  Visualized portions are normal in caliber.    Inferior vena cava: Normal in appearance.    Miscellaneous: No ascites.                               CTA Chest Abdomen Non Coronary (Final result)  Result time 05/30/18 18:40:21    Final result by Abhijeet Castillo MD (05/30/18 18:40:21)                 Impression:    Impression:    1. Allowing for exam limitations, no convincing pulmonary thromboembolism.  2. 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  3. Hepatic steatosis, correlation with LFTs recommended.  4. Mild indistinctness about the gallbladder noting cholelithiasis or sludge.  If there is right upper quadrant pain, consider ultrasound for further evaluation.  5. Right hepatic cysts and right renal cysts.  The renal lesions can be further evaluated with ultrasound, on an outpatient basis, to confirm cystic nature as warranted.  6. Please see above for several additional findings.      Electronically signed by: Abhijeet Castillo MD  Date:    05/30/2018  Time:    18:40             Narrative:    EXAMINATION:  CTA CHEST ABDOMEN NON CORONARY (XPD)    CLINICAL HISTORY:  Aortic dz, non-traumatic, known or suspect;    TECHNIQUE:  Axial images of the chest and abdomen were obtained at 2.5 mm intervals during administration of 100 cc omni 350 IV contrast.  Precontrast images were acquired.  Coronal and sagittal reformatted images were reviewed.    COMPARISON:  CT 01/12/2013    FINDINGS:  The structures at the base of the neck  are remarkable for a subcentimeter left thyroid nodule, nonspecific.  Degenerative changes are noted at the sternoclavicular articulations.  No significant mediastinal lymphadenopathy.  There is atherosclerotic calcification of the aorta and its branches.  The heart is mildly prominent without significant pericardial effusion.    The airways are grossly patent noting motion artifact limits evaluation of the airways and pulmonary parenchyma.  There is bilateral basilar dependent atelectasis.  No significant volume of pleural fluid.  There is right lower lobe atelectasis or scarring.  No pneumothorax.  No large focal consolidation.    Allowing for motion artifact, no convincing pulmonary arterial filling defect to the level of the proximal segmental arteries bilaterally to suggest pulmonary thromboembolism.  Please note, given motion artifact, especially involving the bilateral lower lobes, pulmonary thromboembolism distal to the segmental arteries cannot be excluded within these regions bilaterally although no definite defect seen.    The spleen, pancreas and adrenal glands are grossly unremarkable.  The gallbladder is mildly distended noting there may be cholelithiasis or sludge near the gallbladder neck.  If there is right upper quadrant pain, consider ultrasound for further evaluation.  There are several lobular low attenuating lesions within the hepatic parenchyma, attenuation of these foci are most consistent with cysts, others are too small for characterization.  Largest within the right hepatic lobe measures approximately 3.4 cm.  Of note, this lesion has decreased in size since the previous exam suggesting waxing and waning cysts.  The a patent parenchyma is low attenuating suggesting steatosis, there is probable sparing about the gallbladder fossa.  The common duct is not dilated.  The pancreatic duct is not dilated.  Scattered shotty periaortic and paracaval lymph nodes are noted.    The kidneys enhance  symmetrically.  There is right nonobstructive nephrolithiasis.  Multiple low attenuating lesions are noted throughout the kidneys bilaterally, left greater than right, attenuation of the larger lesions measures that of cystic attenuation, the smaller foci are too small for characterization.  Previously described high attenuating lesions on CT 01/12/2013 may be present although administration of contrast limits evaluation.  Overall, ultrasound could be performed of the kidneys to confirm cystic nature of the lesions.  Several left parapelvic cysts are additionally noted.  The visualized portions of the ureters are grossly unremarkable.  There is an IVC filter.  The visualized bowel is grossly unremarkable.    There is atherosclerotic calcification of the aorta and its branches.  The thoracic aorta tapers normally without aneurysm.  The SMA bilateral main renal arteries, accessory right renal artery, accessory left renal artery, and SURYA all are patent.  There is stenosis at the origin of the celiac artery, noting post a not dilation.  Distally, the vessel is patent although there is high-grade stenosis at its origin.    Degenerative changes are noted of the spine.  There is dextroscoliotic curvature of the lumbar spine.  No significant axillary lymphadenopathy.                               X-Ray Chest 1 View (Final result)  Result time 05/30/18 17:09:54    Final result by Abhijeet Castillo MD (05/30/18 17:09:54)                 Impression:      No acute cardiopulmonary process, stable chronic findings.      Electronically signed by: Abhijeet Castillo MD  Date:    05/30/2018  Time:    17:09             Narrative:    EXAMINATION:  XR CHEST 1 VIEW    CLINICAL HISTORY:  Epigastric pain    TECHNIQUE:  Single frontal view of the chest was performed.    COMPARISON:  09/15/2014    FINDINGS:  The cardiomediastinal silhouette is not enlarged.  There is no pleural effusion.  The trachea is midline.  The lungs are symmetrically  expanded bilaterally with coarse interstitial attenuation, similar to the previous exam.  There is minimal left basilar subsegmental atelectasis.  There is elevation of the right hemidiaphragm..  No large focal consolidation seen.  There is no pneumothorax.  The osseous structures are remarkable for degenerative changes..

## 2018-06-01 NOTE — PROGRESS NOTES
06/01/18 1100   Critical Value Communication   Notified Physician/Designee Dr. Kirkland   Date Result Received 06/01/18   Time Result Received 1116   Resulting Department of Critical Value lab   Who communicated critical value from resulting department? suzanne Gonzalez   Critical Test #1 3/4 bottles pos. for gram negative rods   Date Notified 06/01/18   Time Notified 1129   Read Back Verification Yes   Physician Directive continue current antibiotic therapy    Notified MD Kirkland of positive blood culture 3/4 bottles with gram negative rods. OK to continue zosyn therapy.

## 2018-06-01 NOTE — NURSING
Transport at bedside to transport patient to IR. Patient states he does not want to go, he wishes to wait until surgery can be performed to remove gallbladder. IR notified

## 2018-06-01 NOTE — PROGRESS NOTES
"   [facility]      General Surgery Progress Note    06/01/2018  8:32 AM      Patient: Linda Skelton  MRN: [id number]  Admit Date: 5/30/2018  LOS: 1    RUQ Pain. R/O Cholecystitis vs Choledocholithiasis.     Subjective                                                                                                        "I am not feeling too good." Pt alleges having RUQ abdominal discomfort.     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    Mitral regurgitation    Bilateral leg edema    Post PTCA    Obesity, Class I, BMI 30-34.9    ED (erectile dysfunction)    Essential hypertension    History of deep 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, 1997    Sore throat    Mixed hyperlipidemia    Status post catheter ablation of atrial fibrillation    Atypical lymphocytic infiltrate of skin    History of melanoma in situ    Benign non-nodular prostatic hyperplasia without lower urinary tract symptoms    Hypogonadism in male    Visit for monitoring Tikosyn therapy    Current use of long term anticoagulation    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      Current Outpatient Prescriptions on File Prior to Encounter   Medication Sig Dispense Refill    aspirin 81 mg Tab Take 81 mg by mouth every evening. 1 Tablet Oral Every night      atenolol (TENORMIN) 25 MG tablet TAKE ONE TABLET BY MOUTH twice DAILY 90 tablet 3    dofetilide (TIKOSYN) 500 MCG Cap TAKE 1 CAPSULE 2 TIMES DAILY 180 capsule 3    folic acid (FOLVITE) 1 MG tablet Take 1 tablet (1,000 mcg total) by mouth once daily. 90 tablet 1    " levothyroxine (SYNTHROID) 112 MCG tablet Take 1 tablet (112 mcg total) by mouth before breakfast. 90 tablet 3    NITROSTAT 0.4 mg SL tablet DISSOLVE ONE TABLET UNDER THE TONGUE EVERY 5 MINUTES AS NEEDED FOR CHEST PAIN.  DO NOT EXCEED A TOTAL OF 3 DOSES IN 15 MINUTES NOW 25 tablet 3    omeprazole (PRILOSEC) 20 MG capsule Take 20 mg by mouth every other day.      predniSONE (DELTASONE) 5 MG tablet Take 1 tablet (5 mg total) by mouth once daily. 90 tablet 3    simvastatin (ZOCOR) 80 MG tablet TAKE ONE TABLET BY MOUTH IN THE EVENING 90 tablet 3    tamsulosin (FLOMAX) 0.4 mg Cp24 Take 1 capsule (0.4 mg total) by mouth once daily. 90 capsule 3    triamcinolone acetonide 0.1% (KENALOG) 0.1 % cream APPLY  CREAM TOPICALLY TO AFFECTED AREA TWICE DAILY 454 g 3    warfarin (COUMADIN) 5 MG tablet Take 1.5 tablets (7.5 mg total) by mouth Daily. Current Dose ( 5 mg on Tue; 7.5 mg all other days) or as directed by coumadin clinic 135 tablet 3    sulfamethoxazole-trimethoprim 800-160mg (BACTRIM DS) 800-160 mg Tab Start night before prostate biopsy every 12 hours for 4 doses only 4 tablet 0    testosterone cypionate (DEPOTESTOTERONE CYPIONATE) 100 mg/mL injection Inject 1 mL (100 mg total) into the muscle every 14 (fourteen) days. 10 mL 2       Objective                                                                                                          Vitals:    05/31/18 2250 06/01/18 0417 06/01/18 0715 06/01/18 0734   BP: (!) 98/57 (!) 153/79  133/64   BP Location: Left arm Left arm     Patient Position: Lying Lying     Pulse: 82 88 94 97   Resp: 20 20  18   Temp: 99.1 °F (37.3 °C) 97.7 °F (36.5 °C)  97.6 °F (36.4 °C)   TempSrc: Oral Oral     SpO2: 97% 96%  96%   Weight:  115 kg (253 lb 8.5 oz)     Height:           CBC   Recent Labs  Lab 05/31/18  0804 06/01/18  0638   WBC 7.50 13.10*   HGB 16.0 16.3   HCT 48.8 47.3   * 93*       CHEM   Recent Labs  Lab 05/31/18  0804 06/01/18  0638    136   K 3.8 4.2     101   CO2 26 27   BUN 16 24*   CREATININE 1.2 1.6*   * 119*       PHYSICAL EXAM:    GEN: Initially asleep. Later on alert and awake. NAD. Appears to be uncomfortable at rest.   HEENT: NC. AT. EOMI. Anicteric sclera.   RESP: No SOB, dyspnea or tachypnea.  CV: RRR  ABD: Prominent and mildly distended. Soft, depressible and TTP at RUQ. Round fullness by liver edge is felt once again. Negative Basurto's. No voluntary guarding, rebound or peritoneal signs.     Assessment / Plan:                                                                                           A/P: Case of 83 y.o. M with the above history and findings. Pt febrile overnight to 101.4 F. Labs also show worsening of bilirubin and acute rise in WBC. Pt is more awake today for exam and complains of increased RUQ pain. Pt is moderate risk for surgical intervention as per cardiology. Will discuss with staff whether he still would want to obtain MRCP to r/o choledocholithiasis in spite of HIDA scan not showing CBD obstruction (since it is not most sensitive) and whether he believes safest option is to acutely decompress and plan on interval cholecystectomy vs cholecystectomy today.      Aliya Beckett MD  [facility] Surgery PGY-IV

## 2018-06-02 ENCOUNTER — ANESTHESIA (OUTPATIENT)
Dept: SURGERY | Facility: HOSPITAL | Age: 83
DRG: 853 | End: 2018-06-02
Payer: MEDICARE

## 2018-06-02 ENCOUNTER — ANESTHESIA EVENT (OUTPATIENT)
Dept: SURGERY | Facility: HOSPITAL | Age: 83
DRG: 853 | End: 2018-06-02
Payer: MEDICARE

## 2018-06-02 PROBLEM — K81.0 GANGRENOUS CHOLECYSTITIS: Status: ACTIVE | Noted: 2018-06-02

## 2018-06-02 LAB
ALBUMIN SERPL BCP-MCNC: 3 G/DL
ALP SERPL-CCNC: 105 U/L
ALT SERPL W/O P-5'-P-CCNC: 111 U/L
ANION GAP SERPL CALC-SCNC: 7 MMOL/L
AST SERPL-CCNC: 56 U/L
BASOPHILS # BLD AUTO: 0 K/UL
BASOPHILS NFR BLD: 0 %
BILIRUB SERPL-MCNC: 2.4 MG/DL
BUN SERPL-MCNC: 30 MG/DL
CALCIUM SERPL-MCNC: 8.8 MG/DL
CHLORIDE SERPL-SCNC: 103 MMOL/L
CO2 SERPL-SCNC: 26 MMOL/L
CREAT SERPL-MCNC: 1.6 MG/DL
DIFFERENTIAL METHOD: ABNORMAL
EOSINOPHIL # BLD AUTO: 0 K/UL
EOSINOPHIL NFR BLD: 0.1 %
ERYTHROCYTE [DISTWIDTH] IN BLOOD BY AUTOMATED COUNT: 14.6 %
EST. GFR  (AFRICAN AMERICAN): 45 ML/MIN/1.73 M^2
EST. GFR  (NON AFRICAN AMERICAN): 39 ML/MIN/1.73 M^2
GLUCOSE SERPL-MCNC: 112 MG/DL
HCT VFR BLD AUTO: 44 %
HGB BLD-MCNC: 14.9 G/DL
INR PPP: 1.2
LYMPHOCYTES # BLD AUTO: 1 K/UL
LYMPHOCYTES NFR BLD: 8.7 %
MAGNESIUM SERPL-MCNC: 2 MG/DL
MCH RBC QN AUTO: 32.3 PG
MCHC RBC AUTO-ENTMCNC: 33.9 G/DL
MCV RBC AUTO: 95 FL
MONOCYTES # BLD AUTO: 1.4 K/UL
MONOCYTES NFR BLD: 11.9 %
NEUTROPHILS # BLD AUTO: 9.2 K/UL
NEUTROPHILS NFR BLD: 79.3 %
PHOSPHATE SERPL-MCNC: 1.6 MG/DL
PLATELET # BLD AUTO: 73 K/UL
PMV BLD AUTO: 12.9 FL
POCT GLUCOSE: 109 MG/DL (ref 70–110)
POCT GLUCOSE: 121 MG/DL (ref 70–110)
POCT GLUCOSE: 124 MG/DL (ref 70–110)
POCT GLUCOSE: 133 MG/DL (ref 70–110)
POTASSIUM SERPL-SCNC: 3.7 MMOL/L
PROT SERPL-MCNC: 6.4 G/DL
PROTHROMBIN TIME: 12.3 SEC
RBC # BLD AUTO: 4.61 M/UL
SODIUM SERPL-SCNC: 136 MMOL/L
WBC # BLD AUTO: 11.64 K/UL

## 2018-06-02 PROCEDURE — 63600175 PHARM REV CODE 636 W HCPCS: Performed by: EMERGENCY MEDICINE

## 2018-06-02 PROCEDURE — 21400001 HC TELEMETRY ROOM

## 2018-06-02 PROCEDURE — 63600175 PHARM REV CODE 636 W HCPCS: Performed by: INTERNAL MEDICINE

## 2018-06-02 PROCEDURE — 27201423 OPTIME MED/SURG SUP & DEVICES STERILE SUPPLY: Performed by: SURGERY

## 2018-06-02 PROCEDURE — 83735 ASSAY OF MAGNESIUM: CPT

## 2018-06-02 PROCEDURE — 63600175 PHARM REV CODE 636 W HCPCS: Performed by: ANESTHESIOLOGY

## 2018-06-02 PROCEDURE — 25000003 PHARM REV CODE 250: Performed by: NURSE ANESTHETIST, CERTIFIED REGISTERED

## 2018-06-02 PROCEDURE — 80053 COMPREHEN METABOLIC PANEL: CPT

## 2018-06-02 PROCEDURE — 87040 BLOOD CULTURE FOR BACTERIA: CPT

## 2018-06-02 PROCEDURE — 25000003 PHARM REV CODE 250: Performed by: HOSPITALIST

## 2018-06-02 PROCEDURE — 36000708 HC OR TIME LEV III 1ST 15 MIN: Performed by: SURGERY

## 2018-06-02 PROCEDURE — 88304 TISSUE EXAM BY PATHOLOGIST: CPT | Performed by: PATHOLOGY

## 2018-06-02 PROCEDURE — 25000003 PHARM REV CODE 250: Performed by: INTERNAL MEDICINE

## 2018-06-02 PROCEDURE — 84100 ASSAY OF PHOSPHORUS: CPT

## 2018-06-02 PROCEDURE — 71000033 HC RECOVERY, INTIAL HOUR: Performed by: SURGERY

## 2018-06-02 PROCEDURE — 25000003 PHARM REV CODE 250: Performed by: SURGERY

## 2018-06-02 PROCEDURE — 87186 SC STD MICRODIL/AGAR DIL: CPT

## 2018-06-02 PROCEDURE — 88304 TISSUE EXAM BY PATHOLOGIST: CPT | Mod: 26,,, | Performed by: PATHOLOGY

## 2018-06-02 PROCEDURE — 85610 PROTHROMBIN TIME: CPT

## 2018-06-02 PROCEDURE — 37000008 HC ANESTHESIA 1ST 15 MINUTES: Performed by: SURGERY

## 2018-06-02 PROCEDURE — 63600175 PHARM REV CODE 636 W HCPCS: Performed by: NURSE ANESTHETIST, CERTIFIED REGISTERED

## 2018-06-02 PROCEDURE — S0020 INJECTION, BUPIVICAINE HYDRO: HCPCS | Performed by: SURGERY

## 2018-06-02 PROCEDURE — 37000009 HC ANESTHESIA EA ADD 15 MINS: Performed by: SURGERY

## 2018-06-02 PROCEDURE — 25000003 PHARM REV CODE 250: Performed by: EMERGENCY MEDICINE

## 2018-06-02 PROCEDURE — 85025 COMPLETE CBC W/AUTO DIFF WBC: CPT

## 2018-06-02 PROCEDURE — 87077 CULTURE AEROBIC IDENTIFY: CPT

## 2018-06-02 PROCEDURE — 36000709 HC OR TIME LEV III EA ADD 15 MIN: Performed by: SURGERY

## 2018-06-02 PROCEDURE — D9220A PRA ANESTHESIA: Mod: ANES,,, | Performed by: ANESTHESIOLOGY

## 2018-06-02 PROCEDURE — D9220A PRA ANESTHESIA: Mod: CRNA,,, | Performed by: NURSE ANESTHETIST, CERTIFIED REGISTERED

## 2018-06-02 PROCEDURE — 0FT44ZZ RESECTION OF GALLBLADDER, PERCUTANEOUS ENDOSCOPIC APPROACH: ICD-10-PCS | Performed by: SURGERY

## 2018-06-02 PROCEDURE — 63600175 PHARM REV CODE 636 W HCPCS: Performed by: SURGERY

## 2018-06-02 PROCEDURE — 71000039 HC RECOVERY, EACH ADD'L HOUR: Performed by: SURGERY

## 2018-06-02 RX ORDER — MORPHINE SULFATE 10 MG/ML
1 INJECTION INTRAMUSCULAR; INTRAVENOUS; SUBCUTANEOUS
Status: DISCONTINUED | OUTPATIENT
Start: 2018-06-02 | End: 2018-06-06 | Stop reason: HOSPADM

## 2018-06-02 RX ORDER — ACETAMINOPHEN 10 MG/ML
1000 INJECTION, SOLUTION INTRAVENOUS ONCE
Status: COMPLETED | OUTPATIENT
Start: 2018-06-02 | End: 2018-06-02

## 2018-06-02 RX ORDER — NEOSTIGMINE METHYLSULFATE 1 MG/ML
INJECTION, SOLUTION INTRAVENOUS
Status: DISCONTINUED | OUTPATIENT
Start: 2018-06-02 | End: 2018-06-02

## 2018-06-02 RX ORDER — SUCCINYLCHOLINE CHLORIDE 20 MG/ML
INJECTION INTRAMUSCULAR; INTRAVENOUS
Status: DISCONTINUED | OUTPATIENT
Start: 2018-06-02 | End: 2018-06-02

## 2018-06-02 RX ORDER — HYDROMORPHONE HYDROCHLORIDE 1 MG/ML
0.2 INJECTION, SOLUTION INTRAMUSCULAR; INTRAVENOUS; SUBCUTANEOUS EVERY 5 MIN PRN
Status: DISCONTINUED | OUTPATIENT
Start: 2018-06-02 | End: 2018-06-02 | Stop reason: HOSPADM

## 2018-06-02 RX ORDER — MORPHINE SULFATE 10 MG/ML
3 INJECTION INTRAMUSCULAR; INTRAVENOUS; SUBCUTANEOUS
Status: DISCONTINUED | OUTPATIENT
Start: 2018-06-02 | End: 2018-06-03

## 2018-06-02 RX ORDER — MORPHINE SULFATE 10 MG/ML
2 INJECTION INTRAMUSCULAR; INTRAVENOUS; SUBCUTANEOUS
Status: DISCONTINUED | OUTPATIENT
Start: 2018-06-02 | End: 2018-06-06 | Stop reason: HOSPADM

## 2018-06-02 RX ORDER — FENTANYL CITRATE 50 UG/ML
25 INJECTION, SOLUTION INTRAMUSCULAR; INTRAVENOUS EVERY 5 MIN PRN
Status: DISCONTINUED | OUTPATIENT
Start: 2018-06-02 | End: 2018-06-02 | Stop reason: HOSPADM

## 2018-06-02 RX ORDER — BUPIVACAINE HYDROCHLORIDE 2.5 MG/ML
INJECTION, SOLUTION INFILTRATION; PERINEURAL
Status: DISCONTINUED | OUTPATIENT
Start: 2018-06-02 | End: 2018-06-02 | Stop reason: HOSPADM

## 2018-06-02 RX ORDER — ONDANSETRON 2 MG/ML
INJECTION INTRAMUSCULAR; INTRAVENOUS
Status: DISCONTINUED | OUTPATIENT
Start: 2018-06-02 | End: 2018-06-02

## 2018-06-02 RX ORDER — GLYCOPYRROLATE 0.2 MG/ML
INJECTION INTRAMUSCULAR; INTRAVENOUS
Status: DISCONTINUED | OUTPATIENT
Start: 2018-06-02 | End: 2018-06-02

## 2018-06-02 RX ORDER — FENTANYL CITRATE 50 UG/ML
INJECTION, SOLUTION INTRAMUSCULAR; INTRAVENOUS
Status: DISCONTINUED | OUTPATIENT
Start: 2018-06-02 | End: 2018-06-02

## 2018-06-02 RX ORDER — MORPHINE SULFATE 10 MG/ML
1 INJECTION INTRAMUSCULAR; INTRAVENOUS; SUBCUTANEOUS
Status: DISCONTINUED | OUTPATIENT
Start: 2018-06-02 | End: 2018-06-03

## 2018-06-02 RX ORDER — SODIUM CHLORIDE, SODIUM LACTATE, POTASSIUM CHLORIDE, CALCIUM CHLORIDE 600; 310; 30; 20 MG/100ML; MG/100ML; MG/100ML; MG/100ML
INJECTION, SOLUTION INTRAVENOUS CONTINUOUS PRN
Status: DISCONTINUED | OUTPATIENT
Start: 2018-06-02 | End: 2018-06-02

## 2018-06-02 RX ORDER — SODIUM CHLORIDE 0.9 % (FLUSH) 0.9 %
3 SYRINGE (ML) INJECTION
Status: DISCONTINUED | OUTPATIENT
Start: 2018-06-02 | End: 2018-06-02 | Stop reason: HOSPADM

## 2018-06-02 RX ORDER — PHENYLEPHRINE HYDROCHLORIDE 10 MG/ML
INJECTION INTRAVENOUS
Status: DISCONTINUED | OUTPATIENT
Start: 2018-06-02 | End: 2018-06-02

## 2018-06-02 RX ORDER — LIDOCAINE HCL/PF 100 MG/5ML
SYRINGE (ML) INTRAVENOUS
Status: DISCONTINUED | OUTPATIENT
Start: 2018-06-02 | End: 2018-06-02

## 2018-06-02 RX ORDER — ROCURONIUM BROMIDE 10 MG/ML
INJECTION, SOLUTION INTRAVENOUS
Status: DISCONTINUED | OUTPATIENT
Start: 2018-06-02 | End: 2018-06-02

## 2018-06-02 RX ORDER — EPHEDRINE SULFATE 50 MG/ML
INJECTION, SOLUTION INTRAVENOUS
Status: DISCONTINUED | OUTPATIENT
Start: 2018-06-02 | End: 2018-06-02

## 2018-06-02 RX ORDER — PROPOFOL 10 MG/ML
VIAL (ML) INTRAVENOUS
Status: DISCONTINUED | OUTPATIENT
Start: 2018-06-02 | End: 2018-06-02

## 2018-06-02 RX ADMIN — EPHEDRINE SULFATE 10 MG: 50 INJECTION, SOLUTION INTRAMUSCULAR; INTRAVENOUS; SUBCUTANEOUS at 01:06

## 2018-06-02 RX ADMIN — ROCURONIUM BROMIDE 20 MG: 10 INJECTION, SOLUTION INTRAVENOUS at 01:06

## 2018-06-02 RX ADMIN — PHENYLEPHRINE HYDROCHLORIDE 200 MCG: 10 INJECTION INTRAVENOUS at 01:06

## 2018-06-02 RX ADMIN — PHENYLEPHRINE HYDROCHLORIDE 200 MCG: 10 INJECTION INTRAVENOUS at 02:06

## 2018-06-02 RX ADMIN — FENTANYL CITRATE 25 MCG: 50 INJECTION, SOLUTION INTRAMUSCULAR; INTRAVENOUS at 04:06

## 2018-06-02 RX ADMIN — PHENYLEPHRINE HYDROCHLORIDE 300 MCG: 10 INJECTION INTRAVENOUS at 01:06

## 2018-06-02 RX ADMIN — FOLIC ACID 1000 MCG: 1 TABLET ORAL at 09:06

## 2018-06-02 RX ADMIN — DOFETILIDE 500 MCG: 0.25 CAPSULE ORAL at 09:06

## 2018-06-02 RX ADMIN — FENTANYL CITRATE 100 MCG: 50 INJECTION INTRAMUSCULAR; INTRAVENOUS at 12:06

## 2018-06-02 RX ADMIN — ONDANSETRON 4 MG: 2 INJECTION, SOLUTION INTRAMUSCULAR; INTRAVENOUS at 01:06

## 2018-06-02 RX ADMIN — ACETAMINOPHEN 650 MG: 325 TABLET, FILM COATED ORAL at 10:06

## 2018-06-02 RX ADMIN — ROCURONIUM BROMIDE 15 MG: 10 INJECTION, SOLUTION INTRAVENOUS at 01:06

## 2018-06-02 RX ADMIN — PHENYLEPHRINE HYDROCHLORIDE 100 MCG: 10 INJECTION INTRAVENOUS at 01:06

## 2018-06-02 RX ADMIN — Medication 0.2 MG: at 03:06

## 2018-06-02 RX ADMIN — ATENOLOL 25 MG: 25 TABLET ORAL at 09:06

## 2018-06-02 RX ADMIN — ACETAMINOPHEN 1000 MG: 10 INJECTION, SOLUTION INTRAVENOUS at 04:06

## 2018-06-02 RX ADMIN — NEOSTIGMINE METHYLSULFATE 4 MG: 1 INJECTION INTRAVENOUS at 03:06

## 2018-06-02 RX ADMIN — FENTANYL CITRATE 50 MCG: 50 INJECTION INTRAMUSCULAR; INTRAVENOUS at 03:06

## 2018-06-02 RX ADMIN — ROCURONIUM BROMIDE 25 MG: 10 INJECTION, SOLUTION INTRAVENOUS at 01:06

## 2018-06-02 RX ADMIN — FENTANYL CITRATE 50 MCG: 50 INJECTION INTRAMUSCULAR; INTRAVENOUS at 02:06

## 2018-06-02 RX ADMIN — SODIUM CHLORIDE, SODIUM LACTATE, POTASSIUM CHLORIDE, AND CALCIUM CHLORIDE: .6; .31; .03; .02 INJECTION, SOLUTION INTRAVENOUS at 12:06

## 2018-06-02 RX ADMIN — PROPOFOL 100 MG: 10 INJECTION, EMULSION INTRAVENOUS at 12:06

## 2018-06-02 RX ADMIN — EPHEDRINE SULFATE 15 MG: 50 INJECTION, SOLUTION INTRAMUSCULAR; INTRAVENOUS; SUBCUTANEOUS at 01:06

## 2018-06-02 RX ADMIN — GLYCOPYRROLATE 0.8 MG: 0.2 INJECTION, SOLUTION INTRAMUSCULAR; INTRAVENOUS at 03:06

## 2018-06-02 RX ADMIN — FENTANYL CITRATE 25 MCG: 50 INJECTION, SOLUTION INTRAMUSCULAR; INTRAVENOUS at 03:06

## 2018-06-02 RX ADMIN — PIPERACILLIN AND TAZOBACTAM 4.5 G: 4; .5 INJECTION, POWDER, LYOPHILIZED, FOR SOLUTION INTRAVENOUS; PARENTERAL at 11:06

## 2018-06-02 RX ADMIN — LEVOTHYROXINE SODIUM 112 MCG: 112 TABLET ORAL at 06:06

## 2018-06-02 RX ADMIN — PIPERACILLIN AND TAZOBACTAM 4.5 G: 4; .5 INJECTION, POWDER, LYOPHILIZED, FOR SOLUTION INTRAVENOUS; PARENTERAL at 08:06

## 2018-06-02 RX ADMIN — PANTOPRAZOLE SODIUM 40 MG: 40 TABLET, DELAYED RELEASE ORAL at 09:06

## 2018-06-02 RX ADMIN — ROCURONIUM BROMIDE 10 MG: 10 INJECTION, SOLUTION INTRAVENOUS at 12:06

## 2018-06-02 RX ADMIN — PIPERACILLIN AND TAZOBACTAM 4.5 G: 4; .5 INJECTION, POWDER, LYOPHILIZED, FOR SOLUTION INTRAVENOUS; PARENTERAL at 03:06

## 2018-06-02 RX ADMIN — LIDOCAINE HYDROCHLORIDE 75 MG: 20 INJECTION, SOLUTION INTRAVENOUS at 12:06

## 2018-06-02 RX ADMIN — SUCCINYLCHOLINE CHLORIDE 160 MG: 20 INJECTION, SOLUTION INTRAMUSCULAR; INTRAVENOUS at 12:06

## 2018-06-02 RX ADMIN — TAMSULOSIN HYDROCHLORIDE 0.4 MG: 0.4 CAPSULE ORAL at 09:06

## 2018-06-02 RX ADMIN — DOFETILIDE 500 MCG: 0.25 CAPSULE ORAL at 10:06

## 2018-06-02 RX ADMIN — PREDNISONE 5 MG: 5 TABLET ORAL at 09:06

## 2018-06-02 NOTE — NURSING
Called MD Marie for update on patients plan of care per patients request. MD orders IR consult for tube placement. Consult ordered.

## 2018-06-02 NOTE — ASSESSMENT & PLAN NOTE
Pt denies chest pain and initial cardiac marker is negative  Subsequent repeat troponin with mild elevation  Cleared by Cardiology with moderate risk for surgery  Continue home medication regimen

## 2018-06-02 NOTE — PLAN OF CARE
Problem: Fall Risk (Adult)  Intervention: Reduce Risk/Promote Restraint Free Environment   18   Safety Interventions   Environmental Safety Modification --  assistive device/personal items within reach;clutter free environment maintained;lighting adjusted;room organization consistent;room near unit station   Prevent  Drop/Fall   Safety/Security Measures --  bed alarm set   Safety Interventions   Safety Precautions emergency equipment at bedside --      Intervention: Review Medications/Identify Contributors to Fall Risk   18 0823   Safety Interventions   Medication Review/Management medications reviewed;high risk medications identified;infusion initiated     Intervention: Patient Rounds   18 1700   Safety Interventions   Patient Rounds bed in low position;bed wheels locked;clutter free environment maintained;call light in reach;ID band on;placement of personal items at bedside;toileting offered;visualized patient     Intervention: Safety Promotion/Fall Prevention   18 1700   Safety Interventions   Safety Promotion/Fall Prevention assistive device/personal item within reach;bed alarm set;commode/urinal/bedpan at bedside;Fall Risk reviewed with patient/family;lighting adjusted;medications reviewed;nonskid shoes/socks when out of bed;instructed to call staff for mobility       Goal: Identify Related Risk Factors and Signs and Symptoms  Related risk factors and signs and symptoms are identified upon initiation of Human Response Clinical Practice Guideline (CPG)   Outcome: Ongoing (interventions implemented as appropriate)   18   Fall Risk   Related Risk Factors (Fall Risk) age-related changes;fatigue/slow reaction;gait/mobility problems;slipper/uneven surfaces;environment unfamiliar   Signs and Symptoms (Fall Risk) presence of risk factors     Goal: Absence of Falls  Patient will demonstrate the desired outcomes by discharge/transition of care.   Outcome:  Ongoing (interventions implemented as appropriate)   05/31/18 1930   Fall Risk (Adult)   Absence of Falls making progress toward outcome

## 2018-06-02 NOTE — PROGRESS NOTES
"[facility] Medicine  Progress Note    Patient Name: Linda Skelton  MRN: [id number]  Patient Class: IP- Inpatient   Admission Date: 5/30/2018  Length of Stay: 2 days  Attending Physician: Lisa Kirkland MD  Primary Care Provider: Anderson Jerome MD        Subjective:     Principal Problem:Cholelithiasis with acute cholecystitis    HPI:  81 y/o male with CAD, Afib s/p ablation, coumadin for anticoagulationn, HTN, HLP, panhypopituitarism, and h/o DVT who presented with c/o mid-epigastric abdominal pain that started last night. He reported an episode of sharp pain last night around midnight that self resolved and he went to sleep. Then around 1:30 this afternoon, the mid-epigastric pain returned, described as "pressure" and intensity 8/10, associated with +N/V and +diaphoresis. He reports subjective chills, but no fever. Denies chest pain and SOB. Reports pain with N/V worse with attempt at eating, and he feels constant nausea and just "feeling bad" and not improved at all since coming to the hospital. No recent sick contacts; and no reported unusual food ingestions.  In the ER, workup was remarkable for negative cardiac enzyme, LFTs with Tbil of 1.4, AST/ALT=205/95, CTA of chest negative for PE but did show that the gallbladder is mildly distended noting there may be cholelithiasis or sludge near the gallbladder neck.     Hospital Course:  Mr. Skelton was admitted with abdominal pain with CTA that was negative for PE, but showed gallbladder that was mildly distended noting there may be cholelithiasis or sludge near the gallbladder neck which was concerning for cholecystitis as source of pain. He was worked up with U/S which was remarkable for cholelithiasis with minimal gallbladder wall thickening but then HIDA scan that was consistent with cholecysititis. GI and Surgery are following. Pt the spiked a fever and he was started on Zosyn. Blood cultures returned with GNR. He had his coumadin " reversed and was worked up with MRCP which was negative. Plan to take patient to OR for cholecystectomy.    Interval History: Pt still with RUQ pain and nausea, little better today. Still with some chills.    Review of Systems   Constitutional: Positive for chills. Negative for fever.   Respiratory: Negative for shortness of breath.    Cardiovascular: Negative for chest pain.   Gastrointestinal: Positive for abdominal pain and nausea. Negative for vomiting.     Objective:     Vital Signs (Most Recent):  Temp: 100 °F (37.8 °C) (06/02/18 1119)  Pulse: 84 (06/02/18 1119)  Resp: 16 (06/02/18 1119)  BP: 115/62 (06/02/18 1119)  SpO2: (!) 87 % (06/02/18 1119) Vital Signs (24h Range):  Temp:  [97.7 °F (36.5 °C)-100 °F (37.8 °C)] 100 °F (37.8 °C)  Pulse:  [80-98] 84  Resp:  [16-18] 16  SpO2:  [87 %-98 %] 87 %  BP: ()/(53-74) 115/62     Weight: 114.5 kg (252 lb 6.8 oz)  Body mass index is 31.55 kg/m².    Intake/Output Summary (Last 24 hours) at 06/02/18 1259  Last data filed at 06/02/18 0900   Gross per 24 hour   Intake           891.33 ml   Output              600 ml   Net           291.33 ml      Physical Exam   Constitutional: He is oriented to person, place, and time. He appears well-developed.   Appears uncomfortable, but not in acute distress and improved from yesterday   HENT:   Head: Normocephalic and atraumatic.   Eyes: EOM are normal. Pupils are equal, round, and reactive to light.   Neck: Normal range of motion. Neck supple.   Cardiovascular: Normal rate and regular rhythm.    Pulmonary/Chest: Effort normal and breath sounds normal. He has no wheezes.   Abdominal:   Soft, ND, + TTP in the mid-epigastric area with extension to the RUQ, no rebound or guarding, +BS   Musculoskeletal: Normal range of motion. He exhibits no edema.   Neurological: He is alert and oriented to person, place, and time.   Skin: Skin is warm and dry. Capillary refill takes less than 2 seconds. He is not diaphoretic. No erythema.    Psychiatric: His behavior is normal. Judgment and thought content normal.       Significant Labs: All pertinent labs within the past 24 hours have been reviewed.    Significant Imaging: I have reviewed and interpreted all pertinent imaging results/findings within the past 24 hours.    Assessment/Plan:      * Cholelithiasis with acute cholecystitis    Pt reported mid-epigastric abdominal pain, and not chest pain  Given abnormal LFTS (previously normal 4 weeks ago) along with CTA findings that showed gallbladder is mildly distended noting there may be cholelithiasis or sludge near the gallbladder neck which is concerning for cholecystitis as source of pain  Lipase was normal  RUQ abdominal U/S and HIDA consistent with cholecystitis  Pt spiked fever and was started on empiric Zosyn  Blood culture positive for GNR from 5/31  Supportive care with IVF, pain control and anti-emetics  Appreciate GI following and MRCP was negative for any retained stones  Plan was for cholecystectomy yesterday but delayed until today  Repeat blood culture were done today to assess for clearance        Abnormal LFTs    As discussed above  Hepatitis panel negative        Coronary artery disease with hx of MI    Pt denies chest pain and initial cardiac marker is negative  Subsequent repeat troponin with mild elevation  Cleared by Cardiology with moderate risk for surgery  Continue home medication regimen        Atrial fibrillation    S/p ablation  He was therapeutic on coumadin on admission  S/p reversal with Vitamin K and FFP for surgery  Will resume coumadin after appropriate time as passed post op        Panhypopituitarism    Continue home medication regimen        Mixed hyperlipidemia    Hold statin for now given increased LFTs        Essential hypertension    Continue atenolol        Benign non-nodular prostatic hyperplasia without lower urinary tract symptoms    Continue tamulosin        Status post catheter ablation of atrial  fibrillation    As discussed above        History of deep 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, 1997    Treated and he was anticoagulated with coumadin  As discussed above        Chronic kidney disease, stage 3    Creatinine at baseline  Will monitor          VTE Risk Mitigation     None              Lisa Kirkland MD  Department of Hospital Medicine   [facility]

## 2018-06-02 NOTE — SUBJECTIVE & OBJECTIVE
Interval History: Pt still with RUQ pain and nausea, little better today. Still with some chills.    Review of Systems   Constitutional: Positive for chills. Negative for fever.   Respiratory: Negative for shortness of breath.    Cardiovascular: Negative for chest pain.   Gastrointestinal: Positive for abdominal pain and nausea. Negative for vomiting.     Objective:     Vital Signs (Most Recent):  Temp: 100 °F (37.8 °C) (06/02/18 1119)  Pulse: 84 (06/02/18 1119)  Resp: 16 (06/02/18 1119)  BP: 115/62 (06/02/18 1119)  SpO2: (!) 87 % (06/02/18 1119) Vital Signs (24h Range):  Temp:  [97.7 °F (36.5 °C)-100 °F (37.8 °C)] 100 °F (37.8 °C)  Pulse:  [80-98] 84  Resp:  [16-18] 16  SpO2:  [87 %-98 %] 87 %  BP: ()/(53-74) 115/62     Weight: 114.5 kg (252 lb 6.8 oz)  Body mass index is 31.55 kg/m².    Intake/Output Summary (Last 24 hours) at 06/02/18 1259  Last data filed at 06/02/18 0900   Gross per 24 hour   Intake           891.33 ml   Output              600 ml   Net           291.33 ml      Physical Exam   Constitutional: He is oriented to person, place, and time. He appears well-developed.   Appears uncomfortable, but not in acute distress and improved from yesterday   HENT:   Head: Normocephalic and atraumatic.   Eyes: EOM are normal. Pupils are equal, round, and reactive to light.   Neck: Normal range of motion. Neck supple.   Cardiovascular: Normal rate and regular rhythm.    Pulmonary/Chest: Effort normal and breath sounds normal. He has no wheezes.   Abdominal:   Soft, ND, + TTP in the mid-epigastric area with extension to the RUQ, no rebound or guarding, +BS   Musculoskeletal: Normal range of motion. He exhibits no edema.   Neurological: He is alert and oriented to person, place, and time.   Skin: Skin is warm and dry. Capillary refill takes less than 2 seconds. He is not diaphoretic. No erythema.   Psychiatric: His behavior is normal. Judgment and thought content normal.       Significant Labs: All pertinent  labs within the past 24 hours have been reviewed.    Significant Imaging: I have reviewed and interpreted all pertinent imaging results/findings within the past 24 hours.

## 2018-06-02 NOTE — NURSING
Received pt from OR per bed. Respirations even and unlabored. Abdomen soft , distended, jazmin drains X 2 on right lower abdomen with small amt of bloody drainage,  dressings to sites dry and intact. Pt alert, drowsy but easily aroused at this time. Vitals stable.

## 2018-06-02 NOTE — ASSESSMENT & PLAN NOTE
Pt reported mid-epigastric abdominal pain, and not chest pain  Given abnormal LFTS (previously normal 4 weeks ago) along with CTA findings that showed gallbladder is mildly distended noting there may be cholelithiasis or sludge near the gallbladder neck which is concerning for cholecystitis as source of pain  Lipase is normal  RUQ abdominal U/S and HIDA consistent with cholecystitis  Continue empiric Zosyn  Blood culture positive for GNR, and will repeat cultures  Supportive care with IVF, pain control and anti-emetics  Appreciate GI and Surgery input with plans for surgery today

## 2018-06-02 NOTE — PROGRESS NOTES
"[facility] Medicine  Progress Note    Patient Name: Linda Skelton  MRN: [id number]  Patient Class: IP- Inpatient   Admission Date: 5/30/2018  Length of Stay: 1 days  Attending Physician: Lisa Kirkland MD  Primary Care Provider: Anderson Jerome MD        Subjective:     Principal Problem:Cholelithiasis with acute cholecystitis    HPI:  83 y/o male with CAD, Afib s/p ablation, coumadin for anticoagulationn, HTN, HLP, panhypopituitarism, and h/o DVT who presented with c/o mid-epigastric abdominal pain that started last night. He reported an episode of sharp pain last night around midnight that self resolved and he went to sleep. Then around 1:30 this afternoon, the mid-epigastric pain returned, described as "pressure" and intensity 8/10, associated with +N/V and +diaphoresis. He reports subjective chills, but no fever. Denies chest pain and SOB. Reports pain with N/V worse with attempt at eating, and he feels constant nausea and just "feeling bad" and not improved at all since coming to the hospital. No recent sick contacts; and no reported unusual food ingestions.  In the ER, workup was remarkable for negative cardiac enzyme, LFTs with Tbil of 1.4, AST/ALT=205/95, CTA of chest negative for PE but did show that the gallbladder is mildly distended noting there may be cholelithiasis or sludge near the gallbladder neck.     Hospital Course:  Mr. Skelton was admitted with abdominal pain with CTA that was negative for PE, but showed gallbladder is mildly distended noting there may be cholelithiasis or sludge near the gallbladder neck which is concerning for cholecystitis as source of pain. He was worked up with U/S which was remarkable for cholelithiasis with minimal gallbladder wall thickening but then HIDA scan was consistent with cholecysititis. GI and Surgery are following. Pt the spiked a fever and he was started on Zosyn. Blood cultures returned with GNR.    Interval History: Pt still with " RUQ pain and nausea, little better today.    Review of Systems   Constitutional: Positive for chills. Negative for fever.   Respiratory: Negative for shortness of breath.    Cardiovascular: Negative for chest pain.   Gastrointestinal: Positive for abdominal pain and nausea. Negative for vomiting.     Objective:     Vital Signs (Most Recent):  Temp: 99.7 °F (37.6 °C) (06/01/18 1930)  Pulse: 98 (06/01/18 1930)  Resp: 18 (06/01/18 1930)  BP: 134/74 (06/01/18 2100)  SpO2: 96 % (06/01/18 1930) Vital Signs (24h Range):  Temp:  [97.6 °F (36.4 °C)-99.7 °F (37.6 °C)] 99.7 °F (37.6 °C)  Pulse:  [87-98] 98  Resp:  [16-20] 18  SpO2:  [94 %-98 %] 96 %  BP: ()/(53-79) 134/74     Weight: 115 kg (253 lb 8.5 oz)  Body mass index is 31.69 kg/m².    Intake/Output Summary (Last 24 hours) at 06/01/18 2313  Last data filed at 06/01/18 1846   Gross per 24 hour   Intake          1011.33 ml   Output              700 ml   Net           311.33 ml      Physical Exam   Constitutional: He is oriented to person, place, and time. He appears well-developed.   Appears uncomfortable and ill feeling, but not in acute distress   HENT:   Head: Normocephalic and atraumatic.   Eyes: EOM are normal. Pupils are equal, round, and reactive to light.   Neck: Normal range of motion. Neck supple.   Cardiovascular: Normal rate and regular rhythm.    Pulmonary/Chest: Effort normal and breath sounds normal. He has no wheezes.   Abdominal:   Soft, ND, + TTP in the mid-epigastric area with extension to the RUQ, no rebound or guarding, +BS   Musculoskeletal: Normal range of motion. He exhibits no edema.   Neurological: He is alert and oriented to person, place, and time.   Skin: Skin is warm and dry. Capillary refill takes less than 2 seconds. He is not diaphoretic. No erythema.   Psychiatric: His behavior is normal. Judgment and thought content normal.       Significant Labs: All pertinent labs within the past 24 hours have been reviewed.    Significant  Imaging: I have reviewed and interpreted all pertinent imaging results/findings within the past 24 hours.    Assessment/Plan:      * Cholelithiasis with acute cholecystitis    Pt reported mid-epigastric abdominal pain, and not chest pain  Given abnormal LFTS (previously normal 4 weeks ago) along with CTA findings that showed gallbladder is mildly distended noting there may be cholelithiasis or sludge near the gallbladder neck which is concerning for cholecystitis as source of pain  Lipase is normal  RUQ abdominal U/S and HIDA consistent with cholecystitis  Continue empiric Zosyn  Blood culture positive for GNR, and will repeat cultures  Supportive care with IVF, pain control and anti-emetics  Appreciate GI and Surgery input with plans for surgery today        Abnormal LFTs    As discussed above  F/u on acute hepatitis panel for completeness of workup although less likely          Coronary artery disease with hx of MI    Pt denies chest pain and initial cardiac marker is negative  Subsequent repeat troponin with mild elevation  Cleared by Cardiology with moderate risk for surgery  Continue home medication regimen        Atrial fibrillation    S/p ablation  He was therapeutic on coumadin, but will continue to hold coumadin in anticipation for surgery        Panhypopituitarism    Continue home medication regimen        Mixed hyperlipidemia    Hold statin for now given increased LFTs        Essential hypertension    Continue atenolol        Benign non-nodular prostatic hyperplasia without lower urinary tract symptoms    Continue tamulosin        Status post catheter ablation of atrial fibrillation    As discussed above        History of deep 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, 1997    Treated and currently anticoagulated with coumadin  As discussed above        Chronic kidney disease, stage 3    Creatinine at baseline  Will monitor          VTE Risk Mitigation     None              Lisa Kirkland,  MD  Department of Hospital Medicine   [facility]

## 2018-06-02 NOTE — NURSING
Report given to MIRIAN Brothers. Patients FFP just finished, ordered lab to draw INR. Patient resting comfortably, no complaints, no acute distress noted.

## 2018-06-02 NOTE — PLAN OF CARE
Problem: Patient Care Overview  Goal: Plan of Care Review  Outcome: Ongoing (interventions implemented as appropriate)   06/02/18 1617   Coping/Psychosocial   Plan Of Care Reviewed With patient;spouse     Dereck 8/10. VSS per flow sheet. Drowsy; awakens to voice. Oriented x 3. Lap sites to abdomen x 2 with island dressings cdi + FRANCISCA drains x 2 intact. See chart for full assessment.   Pain level tolerable per PAINAD score. Spouse updated in waiting room per RN. Transport pending.     Problem: Cholecystectomy (Adult)  Goal: Signs and Symptoms of Listed Potential Problems Will be Absent, Minimized or Managed (Cholecystectomy)  Signs and symptoms of listed potential problems will be absent, minimized or managed by discharge/transition of care (reference Cholecystectomy (Adult) CPG).  Outcome: Ongoing (interventions implemented as appropriate)   06/02/18 1617   Cholecystectomy   Problems Assessed (Cholecystectomy) bleeding;pain;postoperative nausea and vomiting;respiratory compromise;situational response   Problems Present (Cholecystectomy) pain     Goal: Anesthesia/Sedation Recovery  Outcome: Outcome(s) achieved Date Met: 06/02/18 06/02/18 1617   Goal/Outcome Evaluation   Anesthesia/Sedation Recovery progressing toward baseline;criteria met for transfer

## 2018-06-02 NOTE — TRANSFER OF CARE
"Anesthesia Transfer of Care Note    Patient: Linda Skelton    Procedure(s) Performed: Procedure(s) (LRB):  CHOLECYSTECTOMY, LAPAROSCOPIC (N/A)    Patient location: PACU    Anesthesia Type: general    Transport from OR: Transported from OR on room air with adequate spontaneous ventilation    Post pain: adequate analgesia    Post assessment: no apparent anesthetic complications and tolerated procedure well    Post vital signs: stable    Level of consciousness: sedated and responds to stimulation    Nausea/Vomiting: no nausea/vomiting    Complications: none    Transfer of care protocol was followed      Last vitals:   Visit Vitals  BP (!) 98/58 (BP Location: Right arm, Patient Position: Lying)   Pulse 88   Temp 36.4 °C (97.5 °F) (Tympanic)   Resp 16   Ht 6' 3" (1.905 m)   Wt 114.5 kg (252 lb 6.8 oz)   SpO2 95%   BMI 31.55 kg/m²     "

## 2018-06-02 NOTE — BRIEF OP NOTE
[facility]          General Surgery Brief Post-Op Note    06/02/2018  3:05 PM    Linda Skelton  [id number]    SURGEON: Dr. Smith    ASSISTANT(S): Dr. AIDAN Beckett    PRE-PROCEDURE DX: Acute Cholecystitis     POST-PROCEDURE DX:  Gangrenous Cholecystitis    PROCEDURE: Laparoscopic Cholecystectomy    ANESTHESIA: GETA    EBL: 20 mL    COMPLICATIONS: None    SPECIMENS: Gallbladder    FINDINGS: Gangrenous gallbladder and cystic duct. Extremely friable tissue. See dictated report by Dr. Smith for more details.    Aliya Beckett MD  [facility] Surgery PGY-IV

## 2018-06-02 NOTE — PLAN OF CARE
Problem: Cardiac Rhythm Management Device (Adult)  Intervention: Prevent/Manage DVT/VTE Risk   06/01/18 1930   OTHER   VTE Required Core Measure Pharmacological prophylaxis initiated/maintained   Minimize Embolism Risk   VTE Prevention/Management bleeding precautions maintained     Intervention: Monitor/Manage Pain Considering Device Effect   05/31/18 0823   Pain/Comfort/Sleep Interventions   Pain Management Interventions medication;provider informed;quiet environment facilitated;relaxation promoted   Safety Interventions   Medication Review/Management medications reviewed;high risk medications identified;infusion initiated     Intervention: Support/Optimize Psychosocial Response to Condition   05/31/18 0823 06/01/18 0715   Coping/Psychosocial Interventions   Supportive Measures --  active listening utilized;positive reinforcement provided;relaxation techniques promoted;verbalization of feelings encouraged   Environmental Support --  calm environment promoted   Psychosocial Support   Family/Support System Care support provided --

## 2018-06-02 NOTE — ASSESSMENT & PLAN NOTE
Pt reported mid-epigastric abdominal pain, and not chest pain  Given abnormal LFTS (previously normal 4 weeks ago) along with CTA findings that showed gallbladder is mildly distended noting there may be cholelithiasis or sludge near the gallbladder neck which is concerning for cholecystitis as source of pain  Lipase was normal  RUQ abdominal U/S and HIDA consistent with cholecystitis  Pt spiked fever and was started on empiric Zosyn  Blood culture positive for GNR from 5/31  Supportive care with IVF, pain control and anti-emetics  Appreciate GI following and MRCP was negative for any retained stones  Plan was for cholecystectomy yesterday but delayed until today  Repeat blood culture were done today to assess for clearance

## 2018-06-02 NOTE — ASSESSMENT & PLAN NOTE
S/p ablation  He was therapeutic on coumadin on admission  S/p reversal with Vitamin K and FFP for surgery  Will resume coumadin after appropriate time as passed post op

## 2018-06-02 NOTE — PROGRESS NOTES
"   [facility]      General Surgery Progress Note    06/02/2018  7:39 AM      Patient: Linda Skelton  MRN: [id number]  Admit Date: 5/30/2018  LOS: 2    RUQ Pain. Cholecystitis. Elevated INR.     Subjective                                                                                                        "How long would I have to keep the tube?"    Events Overnight: None    Interval Events: Pt received Vit K and FFP in attempts to normalize the INR for procecedure. INR last night was down to 1.4.     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    Mitral regurgitation    Bilateral leg edema    Post PTCA    Obesity, Class I, BMI 30-34.9    ED (erectile dysfunction)    Essential hypertension    History of deep 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, 1997    Sore throat    Mixed hyperlipidemia    Status post catheter ablation of atrial fibrillation    Atypical lymphocytic infiltrate of skin    History of melanoma in situ    Benign non-nodular prostatic hyperplasia without lower urinary tract symptoms    Hypogonadism in male    Visit for monitoring Tikosyn therapy    Current use of long term anticoagulation    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      Current Outpatient Prescriptions on File Prior to Encounter   Medication Sig Dispense Refill    aspirin 81 mg Tab Take 81 mg by mouth every evening. 1 Tablet Oral Every night      atenolol (TENORMIN) 25 MG tablet TAKE ONE TABLET BY MOUTH twice DAILY 90 tablet 3    dofetilide (TIKOSYN) 500 MCG Cap TAKE 1 CAPSULE 2 TIMES DAILY 180 capsule 3    folic acid (FOLVITE) 1 MG " tablet Take 1 tablet (1,000 mcg total) by mouth once daily. 90 tablet 1    levothyroxine (SYNTHROID) 112 MCG tablet Take 1 tablet (112 mcg total) by mouth before breakfast. 90 tablet 3    NITROSTAT 0.4 mg SL tablet DISSOLVE ONE TABLET UNDER THE TONGUE EVERY 5 MINUTES AS NEEDED FOR CHEST PAIN.  DO NOT EXCEED A TOTAL OF 3 DOSES IN 15 MINUTES NOW 25 tablet 3    omeprazole (PRILOSEC) 20 MG capsule Take 20 mg by mouth every other day.      predniSONE (DELTASONE) 5 MG tablet Take 1 tablet (5 mg total) by mouth once daily. 90 tablet 3    simvastatin (ZOCOR) 80 MG tablet TAKE ONE TABLET BY MOUTH IN THE EVENING 90 tablet 3    tamsulosin (FLOMAX) 0.4 mg Cp24 Take 1 capsule (0.4 mg total) by mouth once daily. 90 capsule 3    triamcinolone acetonide 0.1% (KENALOG) 0.1 % cream APPLY  CREAM TOPICALLY TO AFFECTED AREA TWICE DAILY 454 g 3    warfarin (COUMADIN) 5 MG tablet Take 1.5 tablets (7.5 mg total) by mouth Daily. Current Dose ( 5 mg on Tue; 7.5 mg all other days) or as directed by coumadin clinic 135 tablet 3    sulfamethoxazole-trimethoprim 800-160mg (BACTRIM DS) 800-160 mg Tab Start night before prostate biopsy every 12 hours for 4 doses only 4 tablet 0    testosterone cypionate (DEPOTESTOTERONE CYPIONATE) 100 mg/mL injection Inject 1 mL (100 mg total) into the muscle every 14 (fourteen) days. 10 mL 2       Objective                                                                                                          Vitals:    06/01/18 2100 06/01/18 2340 06/02/18 0100 06/02/18 0449   BP: 134/74 (!) 113/56  (!) 107/58   BP Location: Right arm Left arm  Right arm   Patient Position: Sitting Lying  Lying   Pulse:  86  82   Resp:  16  16   Temp:  98.5 °F (36.9 °C)  98.7 °F (37.1 °C)   TempSrc:  Oral  Oral   SpO2:  (!) 92% 97% 97%   Weight:    114.5 kg (252 lb 6.8 oz)   Height:           CBC   Recent Labs  Lab 05/31/18  0804 06/01/18  0638   WBC 7.50 13.10*   HGB 16.0 16.3   HCT 48.8 47.3   * 93*       CHEM    Recent Labs  Lab 05/31/18  0804 06/01/18  0638    136   K 3.8 4.2    101   CO2 26 27   BUN 16 24*   CREATININE 1.2 1.6*   * 119*       PHYSICAL EXAM:    GEN: Initially asleep. Later on alert and awake. NAD. Appears to be uncomfortable at rest.   HEENT: NC. AT. EOMI. Anicteric sclera.   RESP: No SOB, dyspnea or tachypnea.  CV: RRR  ABD: Prominent and mildly distended. Soft, depressible and mildly-TTP at RUQ. Round fullness by liver edge is felt once again. Negative Basurto's. No voluntary guarding, rebound or peritoneal signs.          Assessment / Plan:                                                                                           A/P: Case of 83 y.o. M with acute cholecystitis and elevated INR. Transfused FFP and gave Vit K to correct his hypocoagulability. Repeat INR and labs pending this morning. Exam unchanged. Plan on discussing with staff cholecystectomy vs cholecystostomy today. Keep on strict NPO.    Aliya Beckett MD  [facility] Surgery PGY-IV

## 2018-06-02 NOTE — PROGRESS NOTES
"Progress Note    Subjective:  MRI completed with cbd clear, remains with some pain    /71 (BP Location: Left arm, Patient Position: Lying)   Pulse 85   Temp 98.8 °F (37.1 °C) (Oral)   Resp 16   Ht 6' 3" (1.905 m)   Wt 114.5 kg (252 lb 6.8 oz)   SpO2 (!) 93%   BMI 31.55 kg/m²     Current Facility-Administered Medications   Medication Dose Route Frequency Provider Last Rate Last Dose    0.9%  NaCl infusion (for blood administration)   Intravenous Q24H PRN Aliya Beckett MD        0.9%  NaCl infusion   Intravenous Continuous Lisa Kirkland  mL/hr at 06/01/18 0915      acetaminophen tablet 650 mg  650 mg Oral Q6H PRN Lisa Kirkland MD   650 mg at 05/31/18 1958    aspirin chewable tablet 81 mg  81 mg Oral Daily Eric Gonzalez MD        atenolol tablet 25 mg  25 mg Oral Daily Eric Gonzalez MD   25 mg at 06/01/18 0907    dextrose 50% injection 12.5 g  12.5 g Intravenous PRN Lashonda V. Timmons, FNP        dextrose 50% injection 25 g  25 g Intravenous PRN Lashonda V. Timmons, FNP        docusate sodium capsule 100 mg  100 mg Oral Daily Lisa Kirkland MD   100 mg at 06/01/18 0907    dofetilide capsule 500 mcg  500 mcg Oral Q12H Eric Gonzalez MD   500 mcg at 06/01/18 2111    folic acid tablet 1,000 mcg  1,000 mcg Oral Daily Eric Gonzalez MD   1,000 mcg at 06/01/18 0907    glucagon (human recombinant) injection 1 mg  1 mg Intramuscular PRN Lashonda V. Timmons, FNP        glucose chewable tablet 16 g  16 g Oral PRN Lashonda V. Timmons, FNP        glucose chewable tablet 24 g  24 g Oral PRN Lashonda V. Timmons, FNP        hydrALAZINE injection 10 mg  10 mg Intravenous Q6H PRN Lisa Kirkland MD   10 mg at 05/31/18 0249    levothyroxine tablet 112 mcg  112 mcg Oral Before breakfast Eric Gonzalez MD   112 mcg at 06/02/18 0612    morphine injection 1 mg  1 mg Intravenous On Call Procedure TAL Avery   1 mg at 06/01/18 0916    morphine injection 4 mg  4 mg Intravenous On " Call Procedure Lashonda Timmons, FNP   4 mg at 05/31/18 1653    ondansetron injection 4 mg  4 mg Intravenous Q6H PRN Lisa Kirkland MD        pantoprazole EC tablet 40 mg  40 mg Oral Daily Eric Gonzalez MD   40 mg at 06/01/18 0907    piperacillin-tazobactam 4.5 g in sodium chloride 0.9% 100 mL IVPB (ready to mix system)  4.5 g Intravenous Q8H Brian Valverde MD 25 mL/hr at 06/02/18 0315 4.5 g at 06/02/18 0315    predniSONE tablet 5 mg  5 mg Oral Daily Eric Gonzalez MD   5 mg at 06/01/18 0907    tamsulosin 24 hr capsule 0.4 mg  1 capsule Oral Daily Eric Gonzalez MD   0.4 mg at 06/01/18 0907       Objective:  gen alert no acute distress  abd- soft,mild tender,no rebound or guarding, no masses      Labs:  Lab Results   Component Value Date    WBC 11.64 06/02/2018    HGB 14.9 06/02/2018    HCT 44.0 06/02/2018    MCV 95 06/02/2018    PLT 73 (L) 06/02/2018       CMP    Recent Labs  Lab 06/02/18  0716   *   CALCIUM 8.8   ALBUMIN 3.0*   PROT 6.4      K 3.7   CO2 26      BUN 30*   CREATININE 1.6*   ALKPHOS 105   *   AST 56*   BILITOT 2.4*       Lab Results   Component Value Date    LIPASE 43 05/30/2018       Lab Results   Component Value Date    AMYLASE 66 05/31/2018       Assessment:   1. cholycystitis  2. MRCP clear cbd  3. abd pain  4.ab liver panel      Plan:  1.As per surgery  2.reconsult as needed

## 2018-06-02 NOTE — ASSESSMENT & PLAN NOTE
As discussed above  F/u on acute hepatitis panel for completeness of workup although less likely

## 2018-06-02 NOTE — SUBJECTIVE & OBJECTIVE
Interval History: Pt still with RUQ pain and nausea, little better today.    Review of Systems   Constitutional: Positive for chills. Negative for fever.   Respiratory: Negative for shortness of breath.    Cardiovascular: Negative for chest pain.   Gastrointestinal: Positive for abdominal pain and nausea. Negative for vomiting.     Objective:     Vital Signs (Most Recent):  Temp: 99.7 °F (37.6 °C) (06/01/18 1930)  Pulse: 98 (06/01/18 1930)  Resp: 18 (06/01/18 1930)  BP: 134/74 (06/01/18 2100)  SpO2: 96 % (06/01/18 1930) Vital Signs (24h Range):  Temp:  [97.6 °F (36.4 °C)-99.7 °F (37.6 °C)] 99.7 °F (37.6 °C)  Pulse:  [87-98] 98  Resp:  [16-20] 18  SpO2:  [94 %-98 %] 96 %  BP: ()/(53-79) 134/74     Weight: 115 kg (253 lb 8.5 oz)  Body mass index is 31.69 kg/m².    Intake/Output Summary (Last 24 hours) at 06/01/18 2313  Last data filed at 06/01/18 1846   Gross per 24 hour   Intake          1011.33 ml   Output              700 ml   Net           311.33 ml      Physical Exam   Constitutional: He is oriented to person, place, and time. He appears well-developed.   Appears uncomfortable and ill feeling, but not in acute distress   HENT:   Head: Normocephalic and atraumatic.   Eyes: EOM are normal. Pupils are equal, round, and reactive to light.   Neck: Normal range of motion. Neck supple.   Cardiovascular: Normal rate and regular rhythm.    Pulmonary/Chest: Effort normal and breath sounds normal. He has no wheezes.   Abdominal:   Soft, ND, + TTP in the mid-epigastric area with extension to the RUQ, no rebound or guarding, +BS   Musculoskeletal: Normal range of motion. He exhibits no edema.   Neurological: He is alert and oriented to person, place, and time.   Skin: Skin is warm and dry. Capillary refill takes less than 2 seconds. He is not diaphoretic. No erythema.   Psychiatric: His behavior is normal. Judgment and thought content normal.       Significant Labs: All pertinent labs within the past 24 hours have been  reviewed.    Significant Imaging: I have reviewed and interpreted all pertinent imaging results/findings within the past 24 hours.

## 2018-06-03 PROBLEM — K81.0 GANGRENOUS CHOLECYSTITIS: Status: ACTIVE | Noted: 2018-06-03

## 2018-06-03 LAB
ALBUMIN SERPL BCP-MCNC: 2.6 G/DL
ALP SERPL-CCNC: 82 U/L
ALT SERPL W/O P-5'-P-CCNC: 88 U/L
ANION GAP SERPL CALC-SCNC: 8 MMOL/L
AST SERPL-CCNC: 61 U/L
BACTERIA BLD CULT: NORMAL
BACTERIA UR CULT: NO GROWTH
BASOPHILS # BLD AUTO: 0 K/UL
BASOPHILS NFR BLD: 0 %
BILIRUB SERPL-MCNC: 1.6 MG/DL
BUN SERPL-MCNC: 41 MG/DL
CALCIUM SERPL-MCNC: 8.6 MG/DL
CHLORIDE SERPL-SCNC: 104 MMOL/L
CO2 SERPL-SCNC: 25 MMOL/L
CREAT SERPL-MCNC: 2.6 MG/DL
DIFFERENTIAL METHOD: ABNORMAL
EOSINOPHIL # BLD AUTO: 0 K/UL
EOSINOPHIL NFR BLD: 0 %
ERYTHROCYTE [DISTWIDTH] IN BLOOD BY AUTOMATED COUNT: 14.7 %
EST. GFR  (AFRICAN AMERICAN): 25 ML/MIN/1.73 M^2
EST. GFR  (NON AFRICAN AMERICAN): 22 ML/MIN/1.73 M^2
GLUCOSE SERPL-MCNC: 124 MG/DL
HCT VFR BLD AUTO: 40.5 %
HGB BLD-MCNC: 13.7 G/DL
INR PPP: 1.1
LYMPHOCYTES # BLD AUTO: 0.8 K/UL
LYMPHOCYTES NFR BLD: 8.9 %
MAGNESIUM SERPL-MCNC: 2 MG/DL
MCH RBC QN AUTO: 32.5 PG
MCHC RBC AUTO-ENTMCNC: 33.8 G/DL
MCV RBC AUTO: 96 FL
MONOCYTES # BLD AUTO: 1.6 K/UL
MONOCYTES NFR BLD: 17.9 %
NEUTROPHILS # BLD AUTO: 6.4 K/UL
NEUTROPHILS NFR BLD: 73.2 %
PHOSPHATE SERPL-MCNC: 2.5 MG/DL
PLATELET # BLD AUTO: 93 K/UL
PMV BLD AUTO: 13.4 FL
POCT GLUCOSE: 117 MG/DL (ref 70–110)
POCT GLUCOSE: 133 MG/DL (ref 70–110)
POCT GLUCOSE: 151 MG/DL (ref 70–110)
POTASSIUM SERPL-SCNC: 4.3 MMOL/L
PROT SERPL-MCNC: 6 G/DL
PROTHROMBIN TIME: 11.4 SEC
RBC # BLD AUTO: 4.22 M/UL
SODIUM SERPL-SCNC: 137 MMOL/L
WBC # BLD AUTO: 8.78 K/UL

## 2018-06-03 PROCEDURE — 85610 PROTHROMBIN TIME: CPT

## 2018-06-03 PROCEDURE — 84100 ASSAY OF PHOSPHORUS: CPT

## 2018-06-03 PROCEDURE — 63600175 PHARM REV CODE 636 W HCPCS: Performed by: SURGERY

## 2018-06-03 PROCEDURE — 25000003 PHARM REV CODE 250: Performed by: EMERGENCY MEDICINE

## 2018-06-03 PROCEDURE — 80053 COMPREHEN METABOLIC PANEL: CPT

## 2018-06-03 PROCEDURE — 87186 SC STD MICRODIL/AGAR DIL: CPT

## 2018-06-03 PROCEDURE — 21400001 HC TELEMETRY ROOM

## 2018-06-03 PROCEDURE — 25000003 PHARM REV CODE 250: Performed by: INTERNAL MEDICINE

## 2018-06-03 PROCEDURE — S0030 INJECTION, METRONIDAZOLE: HCPCS | Performed by: SURGERY

## 2018-06-03 PROCEDURE — 83735 ASSAY OF MAGNESIUM: CPT

## 2018-06-03 PROCEDURE — 87040 BLOOD CULTURE FOR BACTERIA: CPT

## 2018-06-03 PROCEDURE — 63600175 PHARM REV CODE 636 W HCPCS: Performed by: INTERNAL MEDICINE

## 2018-06-03 PROCEDURE — 25000003 PHARM REV CODE 250: Performed by: HOSPITALIST

## 2018-06-03 PROCEDURE — 25000003 PHARM REV CODE 250: Performed by: SURGERY

## 2018-06-03 PROCEDURE — 36415 COLL VENOUS BLD VENIPUNCTURE: CPT

## 2018-06-03 PROCEDURE — 63600175 PHARM REV CODE 636 W HCPCS: Performed by: EMERGENCY MEDICINE

## 2018-06-03 PROCEDURE — 85025 COMPLETE CBC W/AUTO DIFF WBC: CPT

## 2018-06-03 RX ORDER — OXYCODONE AND ACETAMINOPHEN 5; 325 MG/1; MG/1
1 TABLET ORAL EVERY 4 HOURS PRN
Status: DISCONTINUED | OUTPATIENT
Start: 2018-06-03 | End: 2018-06-06 | Stop reason: HOSPADM

## 2018-06-03 RX ORDER — ENOXAPARIN SODIUM 100 MG/ML
40 INJECTION SUBCUTANEOUS EVERY 24 HOURS
Status: DISCONTINUED | OUTPATIENT
Start: 2018-06-03 | End: 2018-06-03

## 2018-06-03 RX ORDER — OXYCODONE AND ACETAMINOPHEN 7.5; 325 MG/1; MG/1
1 TABLET ORAL EVERY 4 HOURS PRN
Status: DISCONTINUED | OUTPATIENT
Start: 2018-06-03 | End: 2018-06-06 | Stop reason: HOSPADM

## 2018-06-03 RX ORDER — METRONIDAZOLE 500 MG/100ML
500 INJECTION, SOLUTION INTRAVENOUS ONCE
Status: COMPLETED | OUTPATIENT
Start: 2018-06-03 | End: 2018-06-03

## 2018-06-03 RX ORDER — ENOXAPARIN SODIUM 100 MG/ML
30 INJECTION SUBCUTANEOUS EVERY 24 HOURS
Status: DISCONTINUED | OUTPATIENT
Start: 2018-06-03 | End: 2018-06-06 | Stop reason: HOSPADM

## 2018-06-03 RX ORDER — SODIUM,POTASSIUM PHOSPHATES 280-250MG
2 POWDER IN PACKET (EA) ORAL ONCE
Status: COMPLETED | OUTPATIENT
Start: 2018-06-03 | End: 2018-06-03

## 2018-06-03 RX ORDER — CEFOXITIN SODIUM 2 G/50ML
2 INJECTION, SOLUTION INTRAVENOUS ONCE
Status: COMPLETED | OUTPATIENT
Start: 2018-06-03 | End: 2018-06-03

## 2018-06-03 RX ORDER — OXYCODONE AND ACETAMINOPHEN 10; 325 MG/1; MG/1
1 TABLET ORAL EVERY 4 HOURS PRN
Status: DISCONTINUED | OUTPATIENT
Start: 2018-06-03 | End: 2018-06-06 | Stop reason: HOSPADM

## 2018-06-03 RX ADMIN — SODIUM CHLORIDE 500 ML: 0.9 INJECTION, SOLUTION INTRAVENOUS at 09:06

## 2018-06-03 RX ADMIN — PREDNISONE 5 MG: 5 TABLET ORAL at 09:06

## 2018-06-03 RX ADMIN — DOFETILIDE 500 MCG: 0.25 CAPSULE ORAL at 08:06

## 2018-06-03 RX ADMIN — ASPIRIN 81 MG 81 MG: 81 TABLET ORAL at 09:06

## 2018-06-03 RX ADMIN — POTASSIUM & SODIUM PHOSPHATES POWDER PACK 280-160-250 MG 2 PACKET: 280-160-250 PACK at 12:06

## 2018-06-03 RX ADMIN — TAMSULOSIN HYDROCHLORIDE 0.4 MG: 0.4 CAPSULE ORAL at 09:06

## 2018-06-03 RX ADMIN — ENOXAPARIN SODIUM 30 MG: 100 INJECTION SUBCUTANEOUS at 05:06

## 2018-06-03 RX ADMIN — PIPERACILLIN AND TAZOBACTAM 4.5 G: 4; .5 INJECTION, POWDER, LYOPHILIZED, FOR SOLUTION INTRAVENOUS; PARENTERAL at 04:06

## 2018-06-03 RX ADMIN — OXYCODONE HYDROCHLORIDE AND ACETAMINOPHEN 1 TABLET: 5; 325 TABLET ORAL at 09:06

## 2018-06-03 RX ADMIN — CEFOXITIN SODIUM 2 G: 2 INJECTION, SOLUTION INTRAVENOUS at 04:06

## 2018-06-03 RX ADMIN — DOCUSATE SODIUM 100 MG: 100 CAPSULE, LIQUID FILLED ORAL at 09:06

## 2018-06-03 RX ADMIN — PANTOPRAZOLE SODIUM 40 MG: 40 TABLET, DELAYED RELEASE ORAL at 09:06

## 2018-06-03 RX ADMIN — METRONIDAZOLE 500 MG: 500 INJECTION, SOLUTION INTRAVENOUS at 05:06

## 2018-06-03 RX ADMIN — OXYCODONE HYDROCHLORIDE AND ACETAMINOPHEN 1 TABLET: 7.5; 325 TABLET ORAL at 08:06

## 2018-06-03 RX ADMIN — LEVOTHYROXINE SODIUM 112 MCG: 112 TABLET ORAL at 06:06

## 2018-06-03 RX ADMIN — FOLIC ACID 1000 MCG: 1 TABLET ORAL at 09:06

## 2018-06-03 RX ADMIN — DOFETILIDE 500 MCG: 0.25 CAPSULE ORAL at 09:06

## 2018-06-03 RX ADMIN — ATENOLOL 25 MG: 25 TABLET ORAL at 09:06

## 2018-06-03 NOTE — NURSING
Bedside rounding report received from iris sun rn on patients progress and updated handoff report sheet received too. Assessment completed and plan of care reviewed with patient. Drain #1 intact Drain # 2 intact two scope sites above belly button of abdomen. Serous drainage noted. Tolerating iv fluids.

## 2018-06-03 NOTE — SUBJECTIVE & OBJECTIVE
Interval History: No new issues.     Review of Systems   Constitutional: Negative for activity change.   Respiratory: Negative for chest tightness.    Cardiovascular: Negative for chest pain.   Gastrointestinal: Positive for abdominal pain. Negative for nausea and vomiting.   Genitourinary: Negative for difficulty urinating.   Musculoskeletal: Negative for arthralgias.     Objective:     Vital Signs (Most Recent):  Temp: 98.3 °F (36.8 °C) (06/03/18 0814)  Pulse: 76 (06/03/18 0814)  Resp: 18 (06/03/18 0814)  BP: 128/75 (06/03/18 0814)  SpO2: 96 % (06/03/18 0814) Vital Signs (24h Range):  Temp:  [96.5 °F (35.8 °C)-100 °F (37.8 °C)] 98.3 °F (36.8 °C)  Pulse:  [64-88] 76  Resp:  [16-28] 18  SpO2:  [87 %-97 %] 96 %  BP: ()/(58-79) 128/75     Weight: 117.8 kg (259 lb 11.2 oz)  Body mass index is 32.46 kg/m².    Intake/Output Summary (Last 24 hours) at 06/03/18 1116  Last data filed at 06/03/18 1000   Gross per 24 hour   Intake             1900 ml   Output             1120 ml   Net              780 ml      Physical Exam   Constitutional: He appears well-developed and well-nourished.   HENT:   Head: Normocephalic and atraumatic.   Abdominal: There is tenderness.   Neurological: He is alert.   Psychiatric: He has a normal mood and affect. His behavior is normal.   Vitals reviewed.      Significant Labs:   CBC:   Recent Labs  Lab 06/02/18  0717 06/03/18  0520   WBC 11.64 8.78   HGB 14.9 13.7*   HCT 44.0 40.5   PLT 73* 93*     CMP:   Recent Labs  Lab 06/02/18  0716 06/03/18  0520    137   K 3.7 4.3    104   CO2 26 25   * 124*   BUN 30* 41*   CREATININE 1.6* 2.6*   CALCIUM 8.8 8.6*   PROT 6.4 6.0   ALBUMIN 3.0* 2.6*   BILITOT 2.4* 1.6*   ALKPHOS 105 82   AST 56* 61*   * 88*   ANIONGAP 7* 8   EGFRNONAA 39* 22*       Significant Imaging:

## 2018-06-03 NOTE — OP NOTE
DATE OF PROCEDURE:  06/02/2018    SURGEON:  Dickson Smith Jr, MD    ASSISTANT:  Dr. Aliya Marie.    PREOPERATIVE DIAGNOSES:  Acute cholecystitis and cholelithiasis.    POSTOPERATIVE DIAGNOSES:  Acute cholecystitis and cholelithiasis with gangrenous   cholecystitis.    PROCEDURE PERFORMED:  Laparoscopic cholecystectomy.    PROCEDURE IN DETAIL:  The patient was taken to the Operating Room after   obtaining informed consent and placed on the operating table in the supine   position.  Following the achievement of adequate general endotracheal   anesthesia, the patient's abdomen was prepped in its entirety with ChloraPrep   solution and sterilely draped.    A 4-port standard technique was used after insufflating with carbon dioxide gas   through a Veress needle.  The gallbladder was virtually completely gangrenous,   but had not ruptured.  Adhesions to the gallbladder surface from the omentum   were  bluntly.  Grasping the gallbladder was facilitated by aspirating   the fluid contents prior to grasping.  With the fluid contents removed, and   grasping the gallbladder reflecting cephalad, the infundibulum was dissected   from the fatty tissues with great difficulty and tedious dissection.    We were able to dissect down to the junction of the gallbladder and the cystic   duct and the cystic duct was clipped and divided at that point.  The cystic   artery was probably obliterated, but there were several structures in the   triangle of Calot, which were anatomically identified as possible cystic artery   or swollen lymphatics.  These structures were all clipped and divided.  The   gallbladder was then taken down from its bed in the liver utilizing blunt and   electrocautery dissection.  Several gallstones that had been spilled into the   subhepatic space by the deteriorating gallbladder were retrieved.  Search for   additional gallstones was negative.    The gallbladder was extracted from the abdominal cavity  through the 10 mm trocar   site within a specimen retrieval bag.  Two drains were brought out through the   lateral trocar sites, one was placed between the abdominal wall and the right   lobe of the liver and the second drain was placed in the subhepatic space.    The pneumoperitoneum was evacuated.  The trocars and instruments were withdrawn.    The wounds were then closed at the skin level with a subcuticular Vicryl   suture and Steri-Strips.    The patient tolerated the procedure well, was awakened from general anesthesia   without complication and transferred to the Recovery Room in satisfactory   condition.      DCT/IN  dd: 06/02/2018 15:12:28 (CDT)  td: 06/02/2018 20:25:20 (CDT)  Doc ID   #[id number]  Job ID #[id number]    CC:

## 2018-06-03 NOTE — PLAN OF CARE
Problem: Fall Risk (Adult)  Goal: Identify Related Risk Factors and Signs and Symptoms  Related risk factors and signs and symptoms are identified upon initiation of Human Response Clinical Practice Guideline (CPG)   Outcome: Ongoing (interventions implemented as appropriate)   05/31/18 1939   Fall Risk   Related Risk Factors (Fall Risk) age-related changes;fatigue/slow reaction;gait/mobility problems;slipper/uneven surfaces;environment unfamiliar   Signs and Symptoms (Fall Risk) presence of risk factors     Goal: Absence of Falls  Patient will demonstrate the desired outcomes by discharge/transition of care.   Outcome: Ongoing (interventions implemented as appropriate)   05/31/18 1939   Fall Risk (Adult)   Absence of Falls making progress toward outcome       Problem: Pain, Acute (Adult)  Goal: Identify Related Risk Factors and Signs and Symptoms  Related risk factors and signs and symptoms are identified upon initiation of Human Response Clinical Practice Guideline (CPG)  Outcome: Ongoing (interventions implemented as appropriate)   06/03/18 0755   Pain, Acute   Related Risk Factors (Acute Pain) positioning;persistent pain;patient perception;knowledge deficit;procedure/treatment   Signs and Symptoms (Acute Pain) verbalization of pain descriptors;questions meaning of pain;fatigue/weakness;facial mask of pain/grimace;guarding/abnormal posturing/positioning     Goal: Acceptable Pain Control/Comfort Level  Patient will demonstrate the desired outcomes by discharge/transition of care.  Outcome: Ongoing (interventions implemented as appropriate)   06/03/18 0755   Pain, Acute (Adult)   Acceptable Pain Control/Comfort Level making progress toward outcome

## 2018-06-03 NOTE — PROGRESS NOTES
"[facility] Medicine  Progress Note    Patient Name: Linda Skelton  MRN: [id number]  Patient Class: IP- Inpatient   Admission Date: 5/30/2018  Length of Stay: 3 days  Attending Physician: Angel Mckinnon MD  Primary Care Provider: Anderson Jerome MD        Subjective:     Principal Problem:Gangrenous cholecystitis    HPI:  81 y/o male with CAD, Afib s/p ablation, coumadin for anticoagulationn, HTN, HLP, panhypopituitarism, and h/o DVT who presented with c/o mid-epigastric abdominal pain that started last night. He reported an episode of sharp pain last night around midnight that self resolved and he went to sleep. Then around 1:30 this afternoon, the mid-epigastric pain returned, described as "pressure" and intensity 8/10, associated with +N/V and +diaphoresis. He reports subjective chills, but no fever. Denies chest pain and SOB. Reports pain with N/V worse with attempt at eating, and he feels constant nausea and just "feeling bad" and not improved at all since coming to the hospital. No recent sick contacts; and no reported unusual food ingestions.  In the ER, workup was remarkable for negative cardiac enzyme, LFTs with Tbil of 1.4, AST/ALT=205/95, CTA of chest negative for PE but did show that the gallbladder is mildly distended noting there may be cholelithiasis or sludge near the gallbladder neck.     Hospital Course:  Mr. Skelton was admitted with abdominal pain with CTA that was negative for PE, but showed gallbladder that was mildly distended noting there may be cholelithiasis or sludge near the gallbladder neck which was concerning for cholecystitis as source of pain. He was worked up with U/S which was remarkable for cholelithiasis with minimal gallbladder wall thickening but then HIDA scan that was consistent with cholecysititis. GI and Surgery are following. Pt the spiked a fever and he was started on Zosyn. Blood cultures returned with GNR. He had his coumadin reversed and " was worked up with MRCP which was negative. Plan to take patient to OR for cholecystectomy on 6/2.  Patient's blood cultures from 6/2 were positive as well and repeat blood cultures were sent on 6/3.     Interval History: No new issues.     Review of Systems   Constitutional: Negative for activity change.   Respiratory: Negative for chest tightness.    Cardiovascular: Negative for chest pain.   Gastrointestinal: Positive for abdominal pain. Negative for nausea and vomiting.   Genitourinary: Negative for difficulty urinating.   Musculoskeletal: Negative for arthralgias.     Objective:     Vital Signs (Most Recent):  Temp: 98.3 °F (36.8 °C) (06/03/18 0814)  Pulse: 76 (06/03/18 0814)  Resp: 18 (06/03/18 0814)  BP: 128/75 (06/03/18 0814)  SpO2: 96 % (06/03/18 0814) Vital Signs (24h Range):  Temp:  [96.5 °F (35.8 °C)-100 °F (37.8 °C)] 98.3 °F (36.8 °C)  Pulse:  [64-88] 76  Resp:  [16-28] 18  SpO2:  [87 %-97 %] 96 %  BP: ()/(58-79) 128/75     Weight: 117.8 kg (259 lb 11.2 oz)  Body mass index is 32.46 kg/m².    Intake/Output Summary (Last 24 hours) at 06/03/18 1116  Last data filed at 06/03/18 1000   Gross per 24 hour   Intake             1900 ml   Output             1120 ml   Net              780 ml      Physical Exam   Constitutional: He appears well-developed and well-nourished.   HENT:   Head: Normocephalic and atraumatic.   Abdominal: There is tenderness.   Neurological: He is alert.   Psychiatric: He has a normal mood and affect. His behavior is normal.   Vitals reviewed.      Significant Labs:   CBC:   Recent Labs  Lab 06/02/18  0717 06/03/18  0520   WBC 11.64 8.78   HGB 14.9 13.7*   HCT 44.0 40.5   PLT 73* 93*     CMP:   Recent Labs  Lab 06/02/18  0716 06/03/18  0520    137   K 3.7 4.3    104   CO2 26 25   * 124*   BUN 30* 41*   CREATININE 1.6* 2.6*   CALCIUM 8.8 8.6*   PROT 6.4 6.0   ALBUMIN 3.0* 2.6*   BILITOT 2.4* 1.6*   ALKPHOS 105 82   AST 56* 61*   * 88*   ANIONGAP 7* 8    EGFRNONAA 39* 22*       Significant Imaging:     Assessment/Plan:      * Gangrenous cholecystitis    S/p surgery on 6/2.           Cholelithiasis with acute cholecystitis    Pt reported mid-epigastric abdominal pain, and not chest pain  Given abnormal LFTS (previously normal 4 weeks ago) along with CTA findings that showed gallbladder is mildly distended noting there may be cholelithiasis or sludge near the gallbladder neck which is concerning for cholecystitis as source of pain  Lipase was normal  RUQ abdominal U/S and HIDA consistent with cholecystitis  Pt spiked fever and was started on empiric Zosyn  Blood culture positive for GNR from 5/31  Supportive care with IVF, pain control and anti-emetics  Appreciate GI following and MRCP was negative for any retained stones  Plan was for cholecystectomy yesterday but delayed until today  Repeat blood culture were done today to assess for clearance- + on 6/2. Will repeat today- 6/3.         Abnormal LFTs    As discussed above  Hepatitis panel negative        Benign non-nodular prostatic hyperplasia without lower urinary tract symptoms    Continue tamulosin        Status post catheter ablation of atrial fibrillation    As discussed above        Mixed hyperlipidemia    Hold statin for now given increased LFTs        History of deep 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, 1997    Treated and he was anticoagulated with coumadin  As discussed above        Essential hypertension    Continue atenolol        Coronary artery disease with hx of MI    Pt denies chest pain and initial cardiac marker is negative  Subsequent repeat troponin with mild elevation  Cleared by Cardiology with moderate risk for surgery  Continue home medication regimen        Chronic kidney disease, stage 3    Creatinine at baseline  Will monitor        Panhypopituitarism    Continue home medication regimen        Atrial fibrillation    S/p ablation  He was therapeutic on coumadin on admission  S/p  reversal with Vitamin K and FFP for surgery  Will resume coumadin after appropriate time as passed post op        hypophosphatemia- will replace.   VTE Risk Mitigation     None              Angel Garcia MD  Department of Hospital Medicine   [facility]

## 2018-06-03 NOTE — PROGRESS NOTES
"   [facility]      General Surgery Progress Note    06/03/2018  9:15 AM      Patient: Linda Skelton  MRN: [id number]  Admit Date: 5/30/2018  LOS: 3    POD # 1. Laparoscopic Cholecystectomy     Subjective                                                                                                        "I am very cold." Denies N/V, PO intolerance. Alleges pain that is suboptimally controlled.     Events Overnight: None    Interval Events: As per RN, patient has been struggling with incisional pain because he refuses to take MSO4 and Tylnol isn't strong enough.     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    Mitral regurgitation    Bilateral leg edema    Post PTCA    Obesity, Class I, BMI 30-34.9    ED (erectile dysfunction)    Essential hypertension    History of deep 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, 1997    Sore throat    Mixed hyperlipidemia    Status post catheter ablation of atrial fibrillation    Atypical lymphocytic infiltrate of skin    History of melanoma in situ    Benign non-nodular prostatic hyperplasia without lower urinary tract symptoms    Hypogonadism in male    Visit for monitoring Tikosyn therapy    Current use of long term anticoagulation    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      Current Outpatient Prescriptions on File Prior to Encounter   Medication Sig Dispense Refill    aspirin 81 mg Tab Take 81 mg by mouth every evening. 1 Tablet Oral Every night      atenolol (TENORMIN) 25 MG tablet TAKE ONE TABLET BY MOUTH twice DAILY 90 tablet 3    dofetilide " (TIKOSYN) 500 MCG Cap TAKE 1 CAPSULE 2 TIMES DAILY 180 capsule 3    folic acid (FOLVITE) 1 MG tablet Take 1 tablet (1,000 mcg total) by mouth once daily. 90 tablet 1    levothyroxine (SYNTHROID) 112 MCG tablet Take 1 tablet (112 mcg total) by mouth before breakfast. 90 tablet 3    NITROSTAT 0.4 mg SL tablet DISSOLVE ONE TABLET UNDER THE TONGUE EVERY 5 MINUTES AS NEEDED FOR CHEST PAIN.  DO NOT EXCEED A TOTAL OF 3 DOSES IN 15 MINUTES NOW 25 tablet 3    omeprazole (PRILOSEC) 20 MG capsule Take 20 mg by mouth every other day.      predniSONE (DELTASONE) 5 MG tablet Take 1 tablet (5 mg total) by mouth once daily. 90 tablet 3    simvastatin (ZOCOR) 80 MG tablet TAKE ONE TABLET BY MOUTH IN THE EVENING 90 tablet 3    tamsulosin (FLOMAX) 0.4 mg Cp24 Take 1 capsule (0.4 mg total) by mouth once daily. 90 capsule 3    triamcinolone acetonide 0.1% (KENALOG) 0.1 % cream APPLY  CREAM TOPICALLY TO AFFECTED AREA TWICE DAILY 454 g 3    warfarin (COUMADIN) 5 MG tablet Take 1.5 tablets (7.5 mg total) by mouth Daily. Current Dose ( 5 mg on Tue; 7.5 mg all other days) or as directed by coumadin clinic 135 tablet 3    sulfamethoxazole-trimethoprim 800-160mg (BACTRIM DS) 800-160 mg Tab Start night before prostate biopsy every 12 hours for 4 doses only 4 tablet 0    testosterone cypionate (DEPOTESTOTERONE CYPIONATE) 100 mg/mL injection Inject 1 mL (100 mg total) into the muscle every 14 (fourteen) days. 10 mL 2       Objective                                                                                                          Vitals:    06/1935 06/03/18 0003 06/03/18 0417 06/03/18 0814   BP:  126/60 133/79 128/75   BP Location:  Right arm Right arm Right arm   Patient Position:  Lying Lying Lying   Pulse: 64 73 73 76   Resp:  20 18 18   Temp:  96.5 °F (35.8 °C) 98.2 °F (36.8 °C) 98.3 °F (36.8 °C)   TempSrc:  Oral Oral Oral   SpO2:  95% 95% 96%   Weight:   117.8 kg (259 lb 11.2 oz)    Height:           CBC   Recent  Labs  Lab 06/02/18  0717 06/03/18  0520   WBC 11.64 8.78   HGB 14.9 13.7*   HCT 44.0 40.5   PLT 73* 93*       CHEM   Recent Labs  Lab 06/02/18  0716 06/03/18  0520    137   K 3.7 4.3    104   CO2 26 25   BUN 30* 41*   CREATININE 1.6* 2.6*   * 124*       PHYSICAL EXAM:    GEN: AAOx3. NAD. Appears to be in some discomfort/pain at rest.   HEENT: NC. AT. No scleral icterus. EOMI. MOM.   RESP: Is mildly tachypneic and SOB after speaking. CTAB. No wheezing, rhonchi, rales, or crackles. Marginally decreased BS on the L in comparison to the R.   CV: RRR  ABD: Prominent and distended. Island dressings over laparoscopic incisions are clean, dry and intact. FRANCISCA drains from RUQ are draining SS fluid. No evidence of bile within drains. Abdomen is soft and depressible. There is appropriate TTP at RUQ.  EXT:  Full range of motion. Warm.     Assessment / Plan:                                                                                           A/P: Case of 83 y.o. s/p lap cholecystectomy POD #1 where he was found to have a necrotic gallbladder. Pt has been afebrile and hemodynamically stable overnight. His leukocytosis has resolved, but his creatinine has acutely-increased, which may be from some intra-vascular volume contraction. Will give NS 500cc bolus and monitor for response. Will get discontinue Zosyn (nephrotoxic) and give one dose of Flagyl/Cipro as part of ender-operative antibiotics. No concerning findings on exam. Pt wishes to be left on CLD, so will continue to monitor intake and tolerance. Otherwise, will try switching to Percocet as he refuses to take MSO4 for pain. Will advance patient slowly. Do not expect him to progress as a typical acute cholecystitis patient would. Please call/contact with any questions or concerns.     Aliya Beckett MD  [facility] Surgery PGY-IV

## 2018-06-03 NOTE — NURSING
No acute events. Drain number #1 decreasing in output and number #2 draining more than number one. Scope sites to abdomen had intact dressings. Tolerating iv fluid bolus and antibiotic. Report on patient given to iveth lpn on patients progress and updated handoff report sheet given to her.

## 2018-06-04 PROBLEM — A41.59 KLEBSIELLA SEPSIS: Status: ACTIVE | Noted: 2018-06-04

## 2018-06-04 LAB
ALBUMIN SERPL BCP-MCNC: 2.3 G/DL
ALP SERPL-CCNC: 69 U/L
ALT SERPL W/O P-5'-P-CCNC: 65 U/L
AMORPH CRY URNS QL MICRO: ABNORMAL
ANION GAP SERPL CALC-SCNC: 9 MMOL/L
AST SERPL-CCNC: 45 U/L
BACTERIA #/AREA URNS HPF: ABNORMAL /HPF
BACTERIA BLD CULT: NORMAL
BILIRUB SERPL-MCNC: 1 MG/DL
BILIRUB UR QL STRIP: NEGATIVE
BUN SERPL-MCNC: 47 MG/DL
CALCIUM SERPL-MCNC: 8.2 MG/DL
CHLORIDE SERPL-SCNC: 104 MMOL/L
CLARITY UR: CLEAR
CO2 SERPL-SCNC: 23 MMOL/L
COLOR UR: YELLOW
CREAT SERPL-MCNC: 3.1 MG/DL
EOSINOPHIL URNS QL WRIGHT STN: NORMAL
EST. GFR  (AFRICAN AMERICAN): 20 ML/MIN/1.73 M^2
EST. GFR  (NON AFRICAN AMERICAN): 18 ML/MIN/1.73 M^2
GLUCOSE SERPL-MCNC: 94 MG/DL
GLUCOSE UR QL STRIP: NEGATIVE
HGB UR QL STRIP: ABNORMAL
HYALINE CASTS #/AREA URNS LPF: 0 /LPF
INR PPP: 1.1
KETONES UR QL STRIP: NEGATIVE
LEUKOCYTE ESTERASE UR QL STRIP: NEGATIVE
MAGNESIUM SERPL-MCNC: 2 MG/DL
MICROSCOPIC COMMENT: ABNORMAL
NITRITE UR QL STRIP: NEGATIVE
PH UR STRIP: 5 [PH] (ref 5–8)
PHOSPHATE SERPL-MCNC: 3 MG/DL
POCT GLUCOSE: 103 MG/DL (ref 70–110)
POCT GLUCOSE: 119 MG/DL (ref 70–110)
POCT GLUCOSE: 120 MG/DL (ref 70–110)
POCT GLUCOSE: 94 MG/DL (ref 70–110)
POTASSIUM SERPL-SCNC: 4.6 MMOL/L
PROT SERPL-MCNC: 5.5 G/DL
PROT UR QL STRIP: ABNORMAL
PROTHROMBIN TIME: 11.2 SEC
RBC #/AREA URNS HPF: 2 /HPF (ref 0–4)
SODIUM SERPL-SCNC: 136 MMOL/L
SP GR UR STRIP: 1.01 (ref 1–1.03)
SQUAMOUS #/AREA URNS HPF: 4 /HPF
URN SPEC COLLECT METH UR: ABNORMAL
UROBILINOGEN UR STRIP-ACNC: NEGATIVE EU/DL
WBC #/AREA URNS HPF: 1 /HPF (ref 0–5)

## 2018-06-04 PROCEDURE — 80053 COMPREHEN METABOLIC PANEL: CPT

## 2018-06-04 PROCEDURE — 83735 ASSAY OF MAGNESIUM: CPT

## 2018-06-04 PROCEDURE — G8979 MOBILITY GOAL STATUS: HCPCS | Mod: CH

## 2018-06-04 PROCEDURE — 87205 SMEAR GRAM STAIN: CPT

## 2018-06-04 PROCEDURE — 36415 COLL VENOUS BLD VENIPUNCTURE: CPT

## 2018-06-04 PROCEDURE — 25000003 PHARM REV CODE 250: Performed by: INTERNAL MEDICINE

## 2018-06-04 PROCEDURE — 63600175 PHARM REV CODE 636 W HCPCS: Performed by: EMERGENCY MEDICINE

## 2018-06-04 PROCEDURE — 84100 ASSAY OF PHOSPHORUS: CPT

## 2018-06-04 PROCEDURE — 81000 URINALYSIS NONAUTO W/SCOPE: CPT

## 2018-06-04 PROCEDURE — 63600175 PHARM REV CODE 636 W HCPCS: Performed by: INTERNAL MEDICINE

## 2018-06-04 PROCEDURE — 85610 PROTHROMBIN TIME: CPT

## 2018-06-04 PROCEDURE — 99223 1ST HOSP IP/OBS HIGH 75: CPT | Mod: ,,, | Performed by: INTERNAL MEDICINE

## 2018-06-04 PROCEDURE — 97165 OT EVAL LOW COMPLEX 30 MIN: CPT

## 2018-06-04 PROCEDURE — G8988 SELF CARE GOAL STATUS: HCPCS | Mod: CI

## 2018-06-04 PROCEDURE — G8987 SELF CARE CURRENT STATUS: HCPCS | Mod: CJ

## 2018-06-04 PROCEDURE — 87040 BLOOD CULTURE FOR BACTERIA: CPT

## 2018-06-04 PROCEDURE — 25000003 PHARM REV CODE 250: Performed by: HOSPITALIST

## 2018-06-04 PROCEDURE — 21400001 HC TELEMETRY ROOM

## 2018-06-04 PROCEDURE — G8978 MOBILITY CURRENT STATUS: HCPCS | Mod: CJ

## 2018-06-04 PROCEDURE — 25000003 PHARM REV CODE 250: Performed by: EMERGENCY MEDICINE

## 2018-06-04 PROCEDURE — 97161 PT EVAL LOW COMPLEX 20 MIN: CPT

## 2018-06-04 RX ORDER — WARFARIN SODIUM 5 MG/1
5 TABLET ORAL DAILY
Status: DISCONTINUED | OUTPATIENT
Start: 2018-06-04 | End: 2018-06-06 | Stop reason: HOSPADM

## 2018-06-04 RX ORDER — DOFETILIDE 0.12 MG/1
125 CAPSULE ORAL EVERY 12 HOURS
Status: DISCONTINUED | OUTPATIENT
Start: 2018-06-04 | End: 2018-06-06 | Stop reason: HOSPADM

## 2018-06-04 RX ADMIN — DOFETILIDE 500 MCG: 0.25 CAPSULE ORAL at 08:06

## 2018-06-04 RX ADMIN — ASPIRIN 81 MG 81 MG: 81 TABLET ORAL at 08:06

## 2018-06-04 RX ADMIN — SODIUM CHLORIDE: 0.9 INJECTION, SOLUTION INTRAVENOUS at 07:06

## 2018-06-04 RX ADMIN — CEFTRIAXONE 2 G: 2 INJECTION, SOLUTION INTRAVENOUS at 11:06

## 2018-06-04 RX ADMIN — FOLIC ACID 1000 MCG: 1 TABLET ORAL at 08:06

## 2018-06-04 RX ADMIN — PREDNISONE 5 MG: 5 TABLET ORAL at 08:06

## 2018-06-04 RX ADMIN — PIPERACILLIN AND TAZOBACTAM 4.5 G: 4; .5 INJECTION, POWDER, LYOPHILIZED, FOR SOLUTION INTRAVENOUS; PARENTERAL at 08:06

## 2018-06-04 RX ADMIN — PANTOPRAZOLE SODIUM 40 MG: 40 TABLET, DELAYED RELEASE ORAL at 08:06

## 2018-06-04 RX ADMIN — ATENOLOL 25 MG: 25 TABLET ORAL at 08:06

## 2018-06-04 RX ADMIN — ENOXAPARIN SODIUM 30 MG: 100 INJECTION SUBCUTANEOUS at 05:06

## 2018-06-04 RX ADMIN — WARFARIN SODIUM 5 MG: 5 TABLET ORAL at 05:06

## 2018-06-04 RX ADMIN — TAMSULOSIN HYDROCHLORIDE 0.4 MG: 0.4 CAPSULE ORAL at 08:06

## 2018-06-04 RX ADMIN — DOFETILIDE 125 MCG: 0.12 CAPSULE ORAL at 07:06

## 2018-06-04 RX ADMIN — LEVOTHYROXINE SODIUM 112 MCG: 112 TABLET ORAL at 07:06

## 2018-06-04 RX ADMIN — DOCUSATE SODIUM 100 MG: 100 CAPSULE, LIQUID FILLED ORAL at 08:06

## 2018-06-04 RX ADMIN — PIPERACILLIN AND TAZOBACTAM 4.5 G: 4; .5 INJECTION, POWDER, LYOPHILIZED, FOR SOLUTION INTRAVENOUS; PARENTERAL at 01:06

## 2018-06-04 NOTE — PLAN OF CARE
Problem: Physical Therapy Goal  Goal: Physical Therapy Goal  Goals to be met by: 18     Patient will increase functional independence with mobility by performin. Supine to sit with Modified [place]  2. Rolling to Left and Right with Modified [place]  3. Sit to stand transfer with Modified [place]  4. Bed to chair transfer with Modified [place]   5. Gait  x 250 feet with Modified [place] with or without Rolling Walker   6. Lower extremity exercise program x 30 reps per handout, with independence    Pt ambulated ~80 ft with CGA-SBA/RW.

## 2018-06-04 NOTE — ASSESSMENT & PLAN NOTE
Continued positive blood cultures.  ID consulted. Repeated blood cultures on 6/4.  Abx changed to Ceftriaxone

## 2018-06-04 NOTE — PLAN OF CARE
Problem: Cholecystectomy (Adult)  Intervention: Monitor/Manage Acute Postoperative Pain   06/04/18 0210   Pain/Comfort/Sleep Interventions   Pain Management Interventions pain management plan reviewed with patient/caregiver     Intervention: Prevent/Manage DVT/VTE Risk   06/1935 06/03/18 1930   OTHER   VTE Required Core Measure --  Pharmacological prophylaxis initiated/maintained   Minimize Embolism Risk   VTE Prevention/Management ROM (active) performed;ROM (passive) performed;intravenous hydration;fluids promoted --      Intervention: Prevent/Manage Impaired Postoperative Elimination   06/04/18 0210   Manage Acute Burn Pain   Bowel Intervention ambulation promoted;commode/bedpan at bedside   Genitourinary () Interventions   Urinary Elimination Promotion frequent voiding encouraged;toileting offered;toileting device within reach     Intervention: Optimize Psychosocial Response to the Surgical Experience   06/04/18 0210   Psychosocial Support   Trust Relationship/Rapport care explained;choices provided;emotional support provided;empathic listening provided;questions answered;questions encouraged;reassurance provided;thoughts/feelings acknowledged       Goal: Signs and Symptoms of Listed Potential Problems Will be Absent, Minimized or Managed (Cholecystectomy)  Signs and symptoms of listed potential problems will be absent, minimized or managed by discharge/transition of care (reference Cholecystectomy (Adult) CPG).   Outcome: Ongoing (interventions implemented as appropriate)   06/04/18 0210   Cholecystectomy   Problems Assessed (Cholecystectomy) all   Problems Present (Cholecystectomy) infection;pain

## 2018-06-04 NOTE — CONSULTS
REQUESTING CONSULT:  Angel Mckinnon M.D.    REASON FOR CONSULTATION:  Renal failure.    HISTORY OF PRESENT ILLNESS:  This is an 83-year-old gentleman who presented with   concerns of abdominal pain on 05/31/2018 at [facility].  He has a   history of pain and also history of DVT and history of atrial fibrillation.  He   is currently on anticoagulation.  Subsequently, he had significant epigastric   pain and was found to have concerns of cholelithiasis.  Subsequently, he was   admitted to the hospital and evaluated by Surgery.  He was found to have   concerns of Klebsiella sepsis of gangrenous cholecystitis.  Subsequently, he was   taken to the OR with a cholecystectomy. During his hospital course, he also had   a CT of the chest and abdomen with contrast on 05/30/2018 with approximately   100 mL of contrast.  Subsequently after his initial presentation, his BUN and   creatinine continued to rise, his BUN and creatinine on the date of consult was   47/3.1.  He is currently getting IV fluids.  He has noticed he is feeling   thirsty and drinking a lot , but he notes that his urine output has not been   that much.  He is currently now receiving antibiotics and is postop.    PAST MEDICAL HISTORY:  Significant for ACS, history of non-STEMI, AFib, basal   cell carcinoma, history of leg edema, BPH, CKD stage III, hyperlipidemia,   hypertension, left leg fracture melanoma of the head, hypopituitarism   pleomorphic small medium-sized cutaneous T-cell lymphoma, squamous cell   carcinoma, thyroid disease.    PAST SURGICAL HISTORY:  Appendectomy, basal cell of brain cardiac   catheterization, coronary angio with stent, eye surgery, esophageal dilation,   hernia repair, skin biopsy, tonsillectomy, vascular surgery.    HOME MEDICATIONS:  Pertinent for PPI.    REVIEW OF SYSTEMS:  A 10-point review of systems, initially noted for abdominal   pain, chest tightness, nausea and vomiting.  He currently notes feeling better.     Notes fatigue, some depressed affect, decreased urine output, thirst.    CURRENT MEDICATIONS:  Aspirin, atenolol, Rocephin, Dofetilide, Lovenox, folic   acid, Synthroid, Protonix,  Prednisone, Flomax, Coumadin, normal saline.    PHYSICAL EXAMINATION:  VITAL SIGNS:  98.4, heart rate 68, respirations 18, pulse 60, blood pressure   133/60.  GENERAL APPEARANCE:  Well-developed male who appears fatigued.  HEENT:  Normocephalic.  Extraocular movements are intact.  Moist mucous   membranes.  NECK:  Supple.  CARDIOVASCULAR:  S1, S2, regular rhythm.  PULMONARY:  Diminished bases.  ABDOMEN:  Positive bowel sounds, soft, slightly distended.  EXTREMITIES:  Shows 1+ edema.  NEUROLOGICAL:  Alert.  Affect flat.    LABORATORY DATA:  White count 8.7, H and H 13.7 and 40.7, platelet count of 93   as of 06/03/2018.  INR 1.1.  Sodium 136, potassium 4.6, bicarbonate 23, BUN and   creatinine 47 and 3.1, glucose of 94, calcium 8.3, phosphorus is 3, albumin is   2.3.  UA showed no protein.    MRI without contrast shows cholelithiasis, moderate pericolonic fat stranding,   concerning for cholecystitis.  HIDA scan positive.  Ultrasound of the abdomen   showed right kidney 12.7 and left kidney is 14.  CTA of the chest, no PE, no   dissection.    ASSESSMENT AND PLAN:  1.  Acute kidney injury, history of CKD stage III.  No acute indication for   dialysis.  Most like secondary to IV contrast and also ATN.  I would continue   gentle hydration for now if he is not eating.  We will see how he does with   swelling.  No ACE or ARBs currently indicated for this patient.  Avoid   nephrotoxins.  No history of NSAIDs, avoid NSAIDs.  2.  Klebsiella sepsis.  Blood cultures from 06/04/2018 positive.  Continue   antibiotics per ID, renal dose medications appropriately to decrease in GFR.  3.  Moderate malnutrition.  Will follow clinically.  I would not push foot as   much and try to encourage patient drink as much as possible and come tomorrow.  4.   History of hypertension.  No acute indication for ACE or ARBs.  Will keep on   Tylenol for now.  5.  History of panhypopituitarism.  The patient is currently on prednisone.  I   am not sure what his home regimen is.  He may need further steroids if we are   worried about adrenal insufficency.        / [id number] blank(s)        LUISITO/KENDALL  dd: 06/04/2018 14:50:12 (CDT)  td: 06/05/2018 00:24:28 (CDT)  Doc ID   #[id number]  Job ID #[id number]    CC:     [id number], on ckd3, klebsiella lotion, moderate maln, htn, afib- needs drug adjusted with low gfr.  bo with contrast. Ck urine eos. Cont ivfs.  Avoid toxins. Should get better in 1-2days. WAtch for bph.

## 2018-06-04 NOTE — NURSING
0768- bedside report received from lian rn on patients progress and updated handoff report sheet received too. Assessment completed and plan of care updated on board in room and reviewed with patient.       0945- ate breakfast meal tolerated well. No acute changes.       1026- wife at bedside updated on patients status. Denies pain when a asked. Both drains intact and patent. Dressings clean dry and intact. No acute change on telemetry .

## 2018-06-04 NOTE — PROGRESS NOTES
"[facility]  Progress Note    Patient Name: Linda Skelton  MRN: [id number]  Patient Class: IP- Inpatient   Admission Date: 5/30/2018  Length of Stay: 4 days  Attending Physician: Angel Mckinnon MD  Primary Care Provider: Anderson Jerome MD        Subjective:     Principal Problem:Klebsiella sepsis    HPI:  81 y/o male with CAD, Afib s/p ablation, coumadin for anticoagulationn, HTN, HLP, panhypopituitarism, and h/o DVT who presented with c/o mid-epigastric abdominal pain that started last night. He reported an episode of sharp pain last night around midnight that self resolved and he went to sleep. Then around 1:30 this afternoon, the mid-epigastric pain returned, described as "pressure" and intensity 8/10, associated with +N/V and +diaphoresis. He reports subjective chills, but no fever. Denies chest pain and SOB. Reports pain with N/V worse with attempt at eating, and he feels constant nausea and just "feeling bad" and not improved at all since coming to the hospital. No recent sick contacts; and no reported unusual food ingestions.  In the ER, workup was remarkable for negative cardiac enzyme, LFTs with Tbil of 1.4, AST/ALT=205/95, CTA of chest negative for PE but did show that the gallbladder is mildly distended noting there may be cholelithiasis or sludge near the gallbladder neck.     Hospital Course:  Mr. Skelton was admitted with abdominal pain with CTA that was negative for PE, but showed gallbladder that was mildly distended noting there may be cholelithiasis or sludge near the gallbladder neck which was concerning for cholecystitis as source of pain. He was worked up with U/S which was remarkable for cholelithiasis with minimal gallbladder wall thickening but then HIDA scan that was consistent with cholecysititis. GI and Surgery are following. Pt the spiked a fever and he was started on Zosyn. Blood cultures returned with GNR. He had his coumadin reversed and was " worked up with MRCP which was negative. Plan to take patient to OR for cholecystectomy on 6/2.  Patient's blood cultures from 6/2 were positive as well and repeat blood cultures were sent on 6/3 that were positive as well.  ID was consulted.  Renal was consulted on 6/4 for worsening renal function.     Interval History: No new issues     Review of Systems   Constitutional: Negative for activity change.   Respiratory: Negative for chest tightness.    Cardiovascular: Negative for chest pain.   Gastrointestinal: Positive for abdominal pain. Negative for nausea and vomiting.   Genitourinary: Negative for difficulty urinating.   Musculoskeletal: Negative for arthralgias.     Objective:     Vital Signs (Most Recent):  Temp: 98.2 °F (36.8 °C) (06/04/18 0721)  Pulse: 68 (06/04/18 0721)  Resp: 18 (06/04/18 0721)  BP: 136/73 (06/04/18 0721)  SpO2: (!) 91 % (06/04/18 0721) Vital Signs (24h Range):  Temp:  [97.7 °F (36.5 °C)-98.7 °F (37.1 °C)] 98.2 °F (36.8 °C)  Pulse:  [63-78] 68  Resp:  [18-20] 18  SpO2:  [90 %-95 %] 91 %  BP: ()/(54-73) 136/73     Weight: 119.9 kg (264 lb 5.3 oz)  Body mass index is 33.04 kg/m².    Intake/Output Summary (Last 24 hours) at 06/04/18 1104  Last data filed at 06/04/18 0700   Gross per 24 hour   Intake             1030 ml   Output              585 ml   Net              445 ml      Physical Exam   Constitutional: He appears well-developed and well-nourished.   HENT:   Head: Normocephalic and atraumatic.   Abdominal: There is tenderness.   Neurological: He is alert.   Psychiatric: He has a normal mood and affect. His behavior is normal.   Vitals reviewed.      Significant Labs:   CBC:   Recent Labs  Lab 06/03/18 0520   WBC 8.78   HGB 13.7*   HCT 40.5   PLT 93*     CMP:   Recent Labs  Lab 06/03/18  0520 06/04/18  0528    136   K 4.3 4.6    104   CO2 25 23   * 94   BUN 41* 47*   CREATININE 2.6* 3.1*   CALCIUM 8.6* 8.2*   PROT 6.0 5.5*   ALBUMIN 2.6* 2.3*   BILITOT 1.6* 1.0    ALKPHOS 82 69   AST 61* 45*   ALT 88* 65*   ANIONGAP 8 9   EGFRNONAA 22* 18*       Significant Imaging:     Assessment/Plan:      * Klebsiella sepsis    Continued positive blood cultures.  ID consulted. Repeated blood cultures on 6/4.  Abx changed to Ceftriaxone            Gangrenous cholecystitis    S/p surgery on 6/2.           Cholelithiasis with acute cholecystitis    Pt reported mid-epigastric abdominal pain, and not chest pain  Given abnormal LFTS (previously normal 4 weeks ago) along with CTA findings that showed gallbladder is mildly distended noting there may be cholelithiasis or sludge near the gallbladder neck which is concerning for cholecystitis as source of pain  Lipase was normal  RUQ abdominal U/S and HIDA consistent with cholecystitis  Pt spiked fever and was started on empiric Zosyn  Blood culture positive for GNR from 5/31  Supportive care with IVF, pain control and anti-emetics  Appreciate GI following and MRCP was negative for any retained stones  Plan was for cholecystectomy yesterday but delayed until today  Repeat blood culture were done today to assess for clearance- + on 6/2. Will repeat today- 6/3.+.         Abnormal LFTs    As discussed above  Hepatitis panel negative        Benign non-nodular prostatic hyperplasia without lower urinary tract symptoms    Continue tamulosin        Status post catheter ablation of atrial fibrillation    As discussed above        Mixed hyperlipidemia    Hold statin for now given increased LFTs        History of deep 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, 1997    Treated and he was anticoagulated with coumadin  As discussed above        Essential hypertension    Continue atenolol        Coronary artery disease with hx of MI    Pt denies chest pain and initial cardiac marker is negative  Subsequent repeat troponin with mild elevation  Cleared by Cardiology with moderate risk for surgery  Continue home medication regimen        Chronic kidney disease,  stage 3    Creatinine at baseline  Will monitor  With acute renal failure.  Will consult nephrology. Increase IV fluids.         Panhypopituitarism    Continue home medication regimen        Atrial fibrillation    S/p ablation  He was therapeutic on coumadin on admission  S/p reversal with Vitamin K and FFP for surgery  Will resume coumadin after appropriate time as passed post op          VTE Risk Mitigation         Ordered     enoxaparin injection 30 mg  Daily      06/03/18 1119        Will start back on coumadin.  Renal consult. Increase fluids. ID consulted. Repeated blood cultures today.     Angel Garcia MD  Department of Hospital Medicine   [facility]

## 2018-06-04 NOTE — SUBJECTIVE & OBJECTIVE
Past Medical History:   Diagnosis Date    ACS (acute coronary syndrome)     Acute coronary syndrome 2000    NSTEMI    Anticoagulant long-term use     Coumadin    Atrial fibrillation     Basal cell carcinoma excised     left scalp vertex    Bilateral leg edema 1/6/2014    BPH (benign prostatic hyperplasia)     CKD (chronic kidney disease)     Coronary artery disease     DVT of leg (deep venous thrombosis) 09/08/2014    On coumadin for years    Encounter for blood transfusion     Hyperlipidemia     Hypertension     Hypogonadism, male     Leg fracture, left     Melanoma 01/04/2017     in situ crown of scalp    MI (myocardial infarction)     Panhypopituitarism     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    SQUAMOUS CELL CARCINOMA excised     left posterior scalp    Thyroid disease        Past Surgical History:   Procedure Laterality Date    APPENDECTOMY      BASAL CELL CARCINOMA EXCISION  01/2008    BRAIN SURGERY      pituitary adenoma removed    CARDIAC CATHETERIZATION  05/09/2000    S/P stent to RCA,    CARDIAC CATHETERIZATION      stents placed    CARDIAC ELECTROPHYSIOLOGY STUDY AND ABLATION      CORONARY ANGIOPLASTY  5/9/2000    RCA    CORONARY ANGIOPLASTY WITH STENT PLACEMENT      ESOPHAGEAL DILATION      EYE SURGERY Bilateral     cataracts removed and new lenses placed     FRACTURE SURGERY Left     leg    HERNIA REPAIR      left femur surgery      pituitatry      hypophysectomy    SKIN BIOPSY      TONSILLECTOMY      VASCULAR SURGERY         Review of patient's allergies indicates:  No Known Allergies    Medications:  Facility-Administered Medications Prior to Admission   Medication    testosterone cypionate injection 100 mg     Prescriptions Prior to Admission   Medication Sig    aspirin 81 mg Tab Take 81 mg by mouth every evening. 1 Tablet Oral Every night    atenolol (TENORMIN) 25 MG tablet TAKE ONE TABLET BY MOUTH twice  DAILY    dofetilide (TIKOSYN) 500 MCG Cap TAKE 1 CAPSULE 2 TIMES DAILY    folic acid (FOLVITE) 1 MG tablet Take 1 tablet (1,000 mcg total) by mouth once daily.    levothyroxine (SYNTHROID) 112 MCG tablet Take 1 tablet (112 mcg total) by mouth before breakfast.    NITROSTAT 0.4 mg SL tablet DISSOLVE ONE TABLET UNDER THE TONGUE EVERY 5 MINUTES AS NEEDED FOR CHEST PAIN.  DO NOT EXCEED A TOTAL OF 3 DOSES IN 15 MINUTES NOW    omeprazole (PRILOSEC) 20 MG capsule Take 20 mg by mouth every other day.    predniSONE (DELTASONE) 5 MG tablet Take 1 tablet (5 mg total) by mouth once daily.    simvastatin (ZOCOR) 80 MG tablet TAKE ONE TABLET BY MOUTH IN THE EVENING    tamsulosin (FLOMAX) 0.4 mg Cp24 Take 1 capsule (0.4 mg total) by mouth once daily.    triamcinolone acetonide 0.1% (KENALOG) 0.1 % cream APPLY  CREAM TOPICALLY TO AFFECTED AREA TWICE DAILY    warfarin (COUMADIN) 5 MG tablet Take 1.5 tablets (7.5 mg total) by mouth Daily. Current Dose ( 5 mg on Tue; 7.5 mg all other days) or as directed by coumadin clinic    sulfamethoxazole-trimethoprim 800-160mg (BACTRIM DS) 800-160 mg Tab Start night before prostate biopsy every 12 hours for 4 doses only    testosterone cypionate (DEPOTESTOTERONE CYPIONATE) 100 mg/mL injection Inject 1 mL (100 mg total) into the muscle every 14 (fourteen) days.     Antibiotics     Start     Stop Route Frequency Ordered    06/04/18 0900  piperacillin-tazobactam 4.5 g in sodium chloride 0.9% 100 mL IVPB (ready to mix system)      -- IV Every 8 hours (non-standard times) 06/04/18 0532        Antifungals     None        Antivirals     None           Immunization History   Administered Date(s) Administered    Influenza 10/10/2007, 10/15/2008, 10/01/2009, 10/06/2010, 10/05/2011, 10/16/2013    Influenza - High Dose 10/17/2012, 10/08/2014, 10/30/2015, 10/26/2016, 09/27/2017    Influenza A (H1N1) 2009 Monovalent - IM - PF 12/14/2009    Influenza Split 10/16/2013    Pneumococcal Conjugate -  13 Valent 10/30/2015    Pneumococcal Polysaccharide - 23 Valent 10/24/2017    Tdap 08/31/2015       Family History     Problem Relation (Age of Onset)    Cancer Father        Social History     Social History    Marital status:      Spouse name: N/A    Number of children: N/A    Years of education: N/A     Social History Main Topics    Smoking status: Never Smoker    Smokeless tobacco: Never Used      Comment: .  Two sons.  Christian .      Alcohol use No    Drug use: No    Sexual activity: Not Currently     Partners: Female     Other Topics Concern    None     Social History Narrative     and a retired Christian .  Non smoker.      Review of Systems   Constitutional: Negative for chills and fever.   Gastrointestinal: Positive for abdominal pain.   All other systems reviewed and are negative.    Objective:     Vital Signs (Most Recent):  Temp: 98.2 °F (36.8 °C) (06/04/18 0721)  Pulse: 68 (06/04/18 0721)  Resp: 18 (06/04/18 0721)  BP: 136/73 (06/04/18 0721)  SpO2: (!) 91 % (06/04/18 0721) Vital Signs (24h Range):  Temp:  [97.7 °F (36.5 °C)-98.7 °F (37.1 °C)] 98.2 °F (36.8 °C)  Pulse:  [63-78] 68  Resp:  [18-20] 18  SpO2:  [90 %-95 %] 91 %  BP: ()/(54-73) 136/73     Weight: 119.9 kg (264 lb 5.3 oz)  Body mass index is 33.04 kg/m².    Estimated Creatinine Clearance: 25.2 mL/min (A) (based on SCr of 3.1 mg/dL (H)).    Physical Exam   Constitutional: He is oriented to person, place, and time. He appears well-developed and well-nourished. No distress.   HENT:   Head: Normocephalic and atraumatic.   Right Ear: External ear normal.   Left Ear: External ear normal.   Eyes: Conjunctivae and EOM are normal. Pupils are equal, round, and reactive to light.   Cardiovascular: Normal rate and normal heart sounds.    Pulmonary/Chest: Effort normal and breath sounds normal. No respiratory distress.   Abdominal: Soft. Bowel sounds are normal. He exhibits no distension. There is no  tenderness.   Musculoskeletal: Normal range of motion.   Neurological: He is alert and oriented to person, place, and time. No cranial nerve deficit.   Skin: Skin is warm and dry. No rash noted. He is not diaphoretic.   Psychiatric: He has a normal mood and affect. His behavior is normal. Judgment and thought content normal.   Nursing note and vitals reviewed.      Significant Labs:   Blood Culture:   Recent Labs  Lab 05/31/18  2125 06/02/18  0717 06/02/18  0720 06/03/18  1135 06/03/18  1141   LABBLOO Gram stain nannette bottle: Gram negative rods   Results called to and read back by:Clif costa 06/01/2018  11:16  Gram stain aer bottle: Gram negative rods   Positive results previously called  KLEBSIELLA PNEUMONIAE Gram stain aer bottle: Gram negative rods   Results called to and read back by:  Ajay KEY  Gram stain nannette bottle: Gram negative rods   Positive results previously called t6o Ajay KEY  KLEBSIELLA PNEUMONIAE Gram stain aer bottle: Gram negative rods   Positive results previously called to Ajay KEY    Gram stain nannette bottle: Gram negative rods   Positive results previously called to Ajay KEY  KLEBSIELLA PNEUMONIAEFor susceptibility see order #[id number] Gram stain aer bottle: Gram negative rods   Results called to and read back by: Elisha KEY Gram stain aer bottle: Gram negative rods   Positive results previously called to Elisha KEY     BMP:   Recent Labs  Lab 06/04/18  0528   GLU 94      K 4.6      CO2 23   BUN 47*   CREATININE 3.1*   CALCIUM 8.2*   MG 2.0     CBC:   Recent Labs  Lab 06/03/18  0520   WBC 8.78   HGB 13.7*   HCT 40.5   PLT 93*     Wound Culture: No results for input(s): LABAERO in the last 4320 hours.    Significant Imaging: I have reviewed all pertinent imaging results/findings within the past 24 hours.

## 2018-06-04 NOTE — HPI
"81 y/o male with CAD, Afib s/p ablation, coumadin for anticoagulationn, HTN, HLP, panhypopituitarism, and h/o DVT, s/p cholecystectomy for gangrenous cholecystitis. The patient has Klebsiella bacteremia. The patient presented via EMS with c/o mid-epigastric abdominal pain that started the night PTA. He reported an episode of sharp pain the night prior around midnight that self resolved with NTG. Then around 1:30 this afternoon, the mid-epigastric pain returned, described as "pressure" and intensity 8/10, associated with +N/V and +diaphoresis. He reports subjective chills, but no fever. Denies chest pain and SOB. Reports pain with N/V worse with attempt at eating, and he feels constant nausea and just "feeling bad" and not improved at all since coming to the hospital. No recent sick contacts; and no reported unusual food ingestions.  In the ER, workup was remarkable for negative cardiac enzyme, LFTs with Tbil of 1.4, AST/ALT=205/95, CTA of chest negative for PE but did show that the gallbladder is mildly distended noting there may be cholelithiasis or sludge near the gallbladder neck. A HIDA and MRCP was performed. He was ultimately taken to surgery and was noted to have a gangrenous gallbladder. He underwent jaiden without complications. Because of persistent bacteremia, I am consulted. The patient reports no rigors since surgery.  "

## 2018-06-04 NOTE — PLAN OF CARE
Problem: Occupational Therapy Goal  Goal: Occupational Therapy Goal  Goals to be met by: 6/11/2018     Patient will increase functional independence with ADLs by performing:    LE Dressing with Supervision.  Grooming while standing at sink with Stand-by Assistance.  Toileting from toilet with Modified [place] for hygiene and clothing management.   Toilet transfer to toilet with Supervision.  Upper extremity exercise program with assistance as needed.    Outcome: Ongoing (interventions implemented as appropriate)  Patient tolerated evaluation well, good participation.  Patient will benefit from skilled OT services to address the above mentioned goals. LAMONTE Dugan, MS

## 2018-06-04 NOTE — PROGRESS NOTES
"   [facility]      General Surgery Progress Note    06/04/2018  10:45 AM      Patient: Linda Skelton  MRN: [id number]  Admit Date: 5/30/2018  LOS: 4    POD # 2. Laparoscopic Cholecystectomy.     Subjective                                                                                                        "I feel better." Pt alleges pain is much improved and he feels ready for regular diet. Denies F/C, N/V, PO intolerance to CLD.    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    Mitral regurgitation    Bilateral leg edema    Post PTCA    Obesity, Class I, BMI 30-34.9    ED (erectile dysfunction)    Essential hypertension    History of deep 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, 1997    Sore throat    Mixed hyperlipidemia    Status post catheter ablation of atrial fibrillation    Atypical lymphocytic infiltrate of skin    History of melanoma in situ    Benign non-nodular prostatic hyperplasia without lower urinary tract symptoms    Hypogonadism in male    Visit for monitoring Tikosyn therapy    Current use of long term anticoagulation    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      Current Outpatient Prescriptions on File Prior to Encounter   Medication Sig Dispense Refill    aspirin 81 mg Tab Take 81 mg by mouth every evening. 1 Tablet Oral Every night      atenolol (TENORMIN) 25 MG tablet TAKE ONE TABLET BY MOUTH twice DAILY 90 tablet 3    dofetilide (TIKOSYN) 500 MCG Cap TAKE 1 CAPSULE 2 TIMES DAILY 180 capsule 3 "    folic acid (FOLVITE) 1 MG tablet Take 1 tablet (1,000 mcg total) by mouth once daily. 90 tablet 1    levothyroxine (SYNTHROID) 112 MCG tablet Take 1 tablet (112 mcg total) by mouth before breakfast. 90 tablet 3    NITROSTAT 0.4 mg SL tablet DISSOLVE ONE TABLET UNDER THE TONGUE EVERY 5 MINUTES AS NEEDED FOR CHEST PAIN.  DO NOT EXCEED A TOTAL OF 3 DOSES IN 15 MINUTES NOW 25 tablet 3    omeprazole (PRILOSEC) 20 MG capsule Take 20 mg by mouth every other day.      predniSONE (DELTASONE) 5 MG tablet Take 1 tablet (5 mg total) by mouth once daily. 90 tablet 3    simvastatin (ZOCOR) 80 MG tablet TAKE ONE TABLET BY MOUTH IN THE EVENING 90 tablet 3    tamsulosin (FLOMAX) 0.4 mg Cp24 Take 1 capsule (0.4 mg total) by mouth once daily. 90 capsule 3    triamcinolone acetonide 0.1% (KENALOG) 0.1 % cream APPLY  CREAM TOPICALLY TO AFFECTED AREA TWICE DAILY 454 g 3    warfarin (COUMADIN) 5 MG tablet Take 1.5 tablets (7.5 mg total) by mouth Daily. Current Dose ( 5 mg on Tue; 7.5 mg all other days) or as directed by coumadin clinic 135 tablet 3    sulfamethoxazole-trimethoprim 800-160mg (BACTRIM DS) 800-160 mg Tab Start night before prostate biopsy every 12 hours for 4 doses only 4 tablet 0    testosterone cypionate (DEPOTESTOTERONE CYPIONATE) 100 mg/mL injection Inject 1 mL (100 mg total) into the muscle every 14 (fourteen) days. 10 mL 2       Objective                                                                                                          Vitals:    06/03/18 2003 06/03/18 2342 06/04/18 0456 06/04/18 0721   BP: 129/70 (!) 92/54 126/69 136/73   BP Location: Right arm Left arm Right arm    Patient Position: Lying Lying     Pulse: 68 63 66 68   Resp: 18 18 18 18   Temp: 97.7 °F (36.5 °C) 98.7 °F (37.1 °C) 98 °F (36.7 °C) 98.2 °F (36.8 °C)   TempSrc: Oral Oral Oral    SpO2: 95% 95% 95% (!) 91%   Weight:   119.9 kg (264 lb 5.3 oz)    Height:           CBC   Recent Labs  Lab 06/02/18  0717 06/03/18  0520    WBC 11.64 8.78   HGB 14.9 13.7*   HCT 44.0 40.5   PLT 73* 93*       CHEM   Recent Labs  Lab 06/03/18  0520 06/04/18  0528    136   K 4.3 4.6    104   CO2 25 23   BUN 41* 47*   CREATININE 2.6* 3.1*   * 94       PHYSICAL EXAM:    GEN: AAOx3. NAD. Appears to be in some discomfort/pain at rest.   HEENT: NC. AT. No scleral icterus. EOMI. MOM.   RESP: No SOB or labored breathing.    CV: RRR  ABD: Prominent and distended. [place] dressings over laparoscopic incisions are clean, dry and intact. Upon removal, incisions confirmed to be intact with well-opposed wound edges, no active drainage or bleeding and no surrounding skin changes. FRANCISCA drains from RUQ are draining serous fluid. No evidence of bile within drains. Abdomen is soft and depressible. There is appropriate mild-TTP at RUQ.  EXT:  Full range of motion. Warm. No significant pitting edema.     Assessment / Plan:                                                                                           A/P: Case of 83 y.o.  M s/p laparoscopic cholecystectomy POD #2 with the above history and findings. He is afebrile, hemodynamically stable and having vitals within normal limits. No CBC this morning. Creatinine continues to climb, which could be a result of his sepsis vs some borderline low BP. Pt didn't respond to IVF. Switch from Zosyn to CTX should help with this matter as well. BCx to be repeated today and see if now negative. Incisions are healing well. Drainage is more serous today. Will likely discontinue FRANCISCA's tomorrow. Will continue to follow.     Aliya Beckett MD  [facility] Surgery PGY-IV

## 2018-06-04 NOTE — CONSULTS
"[facility]  Infectious Disease  Consult Note    Patient Name: Linda Skelton  MRN: [id number]  Admission Date: 5/30/2018  Hospital Length of Stay: 4 days  Attending Physician: Angel Mckinnon MD  Primary Care Provider: Anderson Jerome MD     Isolation Status: No active isolations    Patient information was obtained from patient, spouse/SO, past medical records and ER records.      Inpatient consult to Infectious Diseases  Consult performed by: SANKET GASTELUM  Consult ordered by: ANGEL MCKINNON        Assessment/Plan:     * Klebsiella sepsis    - likely due to gangrenous cholecystitis  - blood cultures are persistently positive s/p cholecystectomy  - given complicated cardiac history, would consider a EUGENE  - will de-escalate to CTX and repeat blood cultures today            Thank you for your consult. I will follow-up with patient. Please contact us if you have any additional questions.    Sanket Gastelum MD  Infectious Disease  [facility]    Subjective:     Principal Problem: Klebsiella sepsis    HPI: 81 y/o male with CAD, Afib s/p ablation, coumadin for anticoagulationn, HTN, HLP, panhypopituitarism, and h/o DVT, s/p cholecystectomy for gangrenous cholecystitis. The patient has Klebsiella bacteremia. The patient presented via EMS with c/o mid-epigastric abdominal pain that started the night PTA. He reported an episode of sharp pain the night prior around midnight that self resolved with NTG. Then around 1:30 this afternoon, the mid-epigastric pain returned, described as "pressure" and intensity 8/10, associated with +N/V and +diaphoresis. He reports subjective chills, but no fever. Denies chest pain and SOB. Reports pain with N/V worse with attempt at eating, and he feels constant nausea and just "feeling bad" and not improved at all since coming to the hospital. No recent sick contacts; and no reported unusual food ingestions.  In the ER, workup was " remarkable for negative cardiac enzyme, LFTs with Tbil of 1.4, AST/ALT=205/95, CTA of chest negative for PE but did show that the gallbladder is mildly distended noting there may be cholelithiasis or sludge near the gallbladder neck. A HIDA and MRCP was performed. He was ultimately taken to surgery and was noted to have a gangrenous gallbladder. He underwent jaiden without complications. Because of persistent bacteremia, I am consulted. The patient reports no rigors since surgery.    Past Medical History:   Diagnosis Date    ACS (acute coronary syndrome)     Acute coronary syndrome 2000    NSTEMI    Anticoagulant long-term use     Coumadin    Atrial fibrillation     Basal cell carcinoma excised     left scalp vertex    Bilateral leg edema 1/6/2014    BPH (benign prostatic hyperplasia)     CKD (chronic kidney disease)     Coronary artery disease     DVT of leg (deep venous thrombosis) 09/08/2014    On coumadin for years    Encounter for blood transfusion     Hyperlipidemia     Hypertension     Hypogonadism, male     Leg fracture, left     Melanoma 01/04/2017     in situ crown of scalp    MI (myocardial infarction)     Panhypopituitarism     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    SQUAMOUS CELL CARCINOMA excised     left posterior scalp    Thyroid disease        Past Surgical History:   Procedure Laterality Date    APPENDECTOMY      BASAL CELL CARCINOMA EXCISION  01/2008    BRAIN SURGERY      pituitary adenoma removed    CARDIAC CATHETERIZATION  05/09/2000    S/P stent to RCA,    CARDIAC CATHETERIZATION      stents placed    CARDIAC ELECTROPHYSIOLOGY STUDY AND ABLATION      CORONARY ANGIOPLASTY  5/9/2000    RCA    CORONARY ANGIOPLASTY WITH STENT PLACEMENT      ESOPHAGEAL DILATION      EYE SURGERY Bilateral     cataracts removed and new lenses placed     FRACTURE SURGERY Left     leg    HERNIA REPAIR      left femur surgery       pituitatry      hypophysectomy    SKIN BIOPSY      TONSILLECTOMY      VASCULAR SURGERY         Review of patient's allergies indicates:  No Known Allergies    Medications:  Facility-Administered Medications Prior to Admission   Medication    testosterone cypionate injection 100 mg     Prescriptions Prior to Admission   Medication Sig    aspirin 81 mg Tab Take 81 mg by mouth every evening. 1 Tablet Oral Every night    atenolol (TENORMIN) 25 MG tablet TAKE ONE TABLET BY MOUTH twice DAILY    dofetilide (TIKOSYN) 500 MCG Cap TAKE 1 CAPSULE 2 TIMES DAILY    folic acid (FOLVITE) 1 MG tablet Take 1 tablet (1,000 mcg total) by mouth once daily.    levothyroxine (SYNTHROID) 112 MCG tablet Take 1 tablet (112 mcg total) by mouth before breakfast.    NITROSTAT 0.4 mg SL tablet DISSOLVE ONE TABLET UNDER THE TONGUE EVERY 5 MINUTES AS NEEDED FOR CHEST PAIN.  DO NOT EXCEED A TOTAL OF 3 DOSES IN 15 MINUTES NOW    omeprazole (PRILOSEC) 20 MG capsule Take 20 mg by mouth every other day.    predniSONE (DELTASONE) 5 MG tablet Take 1 tablet (5 mg total) by mouth once daily.    simvastatin (ZOCOR) 80 MG tablet TAKE ONE TABLET BY MOUTH IN THE EVENING    tamsulosin (FLOMAX) 0.4 mg Cp24 Take 1 capsule (0.4 mg total) by mouth once daily.    triamcinolone acetonide 0.1% (KENALOG) 0.1 % cream APPLY  CREAM TOPICALLY TO AFFECTED AREA TWICE DAILY    warfarin (COUMADIN) 5 MG tablet Take 1.5 tablets (7.5 mg total) by mouth Daily. Current Dose ( 5 mg on Tue; 7.5 mg all other days) or as directed by coumadin clinic    sulfamethoxazole-trimethoprim 800-160mg (BACTRIM DS) 800-160 mg Tab Start night before prostate biopsy every 12 hours for 4 doses only    testosterone cypionate (DEPOTESTOTERONE CYPIONATE) 100 mg/mL injection Inject 1 mL (100 mg total) into the muscle every 14 (fourteen) days.     Antibiotics     Start     Stop Route Frequency Ordered    06/04/18 0900  piperacillin-tazobactam 4.5 g in sodium chloride 0.9% 100 mL IVPB  (ready to mix system)      -- IV Every 8 hours (non-standard times) 06/04/18 0532        Antifungals     None        Antivirals     None           Immunization History   Administered Date(s) Administered    Influenza 10/10/2007, 10/15/2008, 10/01/2009, 10/06/2010, 10/05/2011, 10/16/2013    Influenza - High Dose 10/17/2012, 10/08/2014, 10/30/2015, 10/26/2016, 09/27/2017    Influenza A (H1N1) 2009 Monovalent - IM - PF 12/14/2009    Influenza Split 10/16/2013    Pneumococcal Conjugate - 13 Valent 10/30/2015    Pneumococcal Polysaccharide - 23 Valent 10/24/2017    Tdap 08/31/2015       Family History     Problem Relation (Age of Onset)    Cancer Father        Social History     Social History    Marital status:      Spouse name: N/A    Number of children: N/A    Years of education: N/A     Social History Main Topics    Smoking status: Never Smoker    Smokeless tobacco: Never Used      Comment: .  Two sons.  [employer] .      Alcohol use No    Drug use: No    Sexual activity: Not Currently     Partners: Female     Other Topics Concern    None     Social History Narrative     and a retired [employer] .  Non smoker.      Review of Systems   Constitutional: Negative for chills and fever.   Gastrointestinal: Positive for abdominal pain.   All other systems reviewed and are negative.    Objective:     Vital Signs (Most Recent):  Temp: 98.2 °F (36.8 °C) (06/04/18 0721)  Pulse: 68 (06/04/18 0721)  Resp: 18 (06/04/18 0721)  BP: 136/73 (06/04/18 0721)  SpO2: (!) 91 % (06/04/18 0721) Vital Signs (24h Range):  Temp:  [97.7 °F (36.5 °C)-98.7 °F (37.1 °C)] 98.2 °F (36.8 °C)  Pulse:  [63-78] 68  Resp:  [18-20] 18  SpO2:  [90 %-95 %] 91 %  BP: ()/(54-73) 136/73     Weight: 119.9 kg (264 lb 5.3 oz)  Body mass index is 33.04 kg/m².    Estimated Creatinine Clearance: 25.2 mL/min (A) (based on SCr of 3.1 mg/dL (H)).    Physical Exam   Constitutional: He is oriented to person, place, and  time. He appears well-developed and well-nourished. No distress.   HENT:   Head: Normocephalic and atraumatic.   Right Ear: External ear normal.   Left Ear: External ear normal.   Eyes: Conjunctivae and EOM are normal. Pupils are equal, round, and reactive to light.   Cardiovascular: Normal rate and normal heart sounds.    Pulmonary/Chest: Effort normal and breath sounds normal. No respiratory distress.   Abdominal: Soft. Bowel sounds are normal. He exhibits no distension. There is no tenderness.   Musculoskeletal: Normal range of motion.   Neurological: He is alert and oriented to person, place, and time. No cranial nerve deficit.   Skin: Skin is warm and dry. No rash noted. He is not diaphoretic.   Psychiatric: He has a normal mood and affect. His behavior is normal. Judgment and thought content normal.   Nursing note and vitals reviewed.      Significant Labs:   Blood Culture:   Recent Labs  Lab 05/31/18  2125 06/02/18  0717 06/02/18  0720 06/03/18  1135 06/03/18  1141   LABBLOO Gram stain nannette bottle: Gram negative rods   Results called to and read back by:Clif Richey east 06/01/2018  11:16  Gram stain aer bottle: Gram negative rods   Positive results previously called  KLEBSIELLA PNEUMONIAE Gram stain aer bottle: Gram negative rods   Results called to and read back by:  Ajay Dey 3E  Gram stain nannette bottle: Gram negative rods   Positive results previously called t6o Ajay KEY  KLEBSIELLA PNEUMONIAE Gram stain aer bottle: Gram negative rods   Positive results previously called to Ajay Dey 3E    Gram stain nannette bottle: Gram negative rods   Positive results previously called to Ajay KEY  KLEBSIELLA PNEUMONIAEFor susceptibility see order #[id number] Gram stain aer bottle: Gram negative rods   Results called to and read back by: Elisha Cat 3E Gram stain aer bottle: Gram negative rods   Positive results previously called to Elisha Cat 3E     BMP:   Recent  Labs  Lab 06/04/18  0528   GLU 94      K 4.6      CO2 23   BUN 47*   CREATININE 3.1*   CALCIUM 8.2*   MG 2.0     CBC:   Recent Labs  Lab 06/03/18  0520   WBC 8.78   HGB 13.7*   HCT 40.5   PLT 93*     Wound Culture: No results for input(s): LABAERO in the last 4320 hours.    Significant Imaging: I have reviewed all pertinent imaging results/findings within the past 24 hours.

## 2018-06-04 NOTE — ASSESSMENT & PLAN NOTE
- likely due to gangrenous cholecystitis  - blood cultures are persistently positive s/p cholecystectomy  - given complicated cardiac history, would consider a EUGENE  - will de-escalate to CTX and repeat blood cultures today

## 2018-06-04 NOTE — PT/OT/SLP EVAL
Physical Therapy Evaluation    Patient Name:  Linda Skelton   MRN:  [id number]    Recommendations:     Discharge Recommendations:  home health PT (with family assistance)   Discharge Equipment Recommendations: none   Barriers to discharge: None    Assessment:     Linda Skelton is a 83 y.o. male admitted with a medical diagnosis of Klebsiella sepsis.  He presents with the following impairments/functional limitations:  weakness, impaired endurance, impaired self care skills, impaired functional mobilty, gait instability, impaired balance, decreased safety awareness, impaired cardiopulmonary response to activity.    Rehab Prognosis:  good; patient would benefit from acute skilled PT services to address these deficits and reach maximum level of function.      Recent Surgery: Procedure(s) (LRB):  CHOLECYSTECTOMY, LAPAROSCOPIC (N/A) 2 Days Post-Op    Plan:     During this hospitalization, patient to be seen 5 x/week (M-F) to address the above listed problems via gait training, therapeutic activities, therapeutic exercises  · Plan of Care Expires:  06/18/18   Plan of Care Reviewed with: patient    Subjective     Patient found in bed upon PT entry to room, agreeable to evaluation.      Chief Complaint: weakness  Patient comments/goals: to be independent and not use a RW  Pain/Comfort:  · Pain Rating 1: 0/10    Living Environment:  Pt reported living with spouse in a 2 story house with his bedroom on the 1st floor.   Prior to admission, patients level of function was independent and driving.  Patient has the following equipment: none.  Upon discharge, patient will have assistance from spouse.    Objective:     Patient found with: peripheral IV, telemetry, FRANCISCA drain (2)     General Precautions: Standard, fall   Orthopedic Precautions:N/A   Braces: N/A     Exams:  · Cognitive Exam:  Patient was able to follow multiple commands.   · Gross Motor Coordination:  WFL  · Postural Exam:  Patient presented with the following  abnormalities:    · -       Rounded shoulders  · -       obesity  · Sensation:    · -       Intact  light/touch BLE  · Skin Integrity/Edema:      · -       Skin integrity: Visible skin intact  · -       Edema: Mild BLE  · RLE ROM: WFL  · RLE Strength: WFL  · LLE ROM: WFL  · LLE Strength: WFL     Functional Mobility:  · Bed Mobility:     · Scooting: stand by assistance  · Supine to Sit: stand by assistance  · Transfers:     · Sit to Stand:  stand by assistance with no AD  · Bed to Chair: stand by assistance with  no AD  using  Step Transfer  · Gait: Pt ambulated ~80 ft with CGA-SBA/RW.  Pt with no major deviations, however easily fatigued.   · Balance: Pt with good balance.     AM-PAC 6 CLICK MOBILITY  Total Score:22     Patient left up in chair with all lines intact, call button in reach and nurse Rose notified.    GOALS:    Physical Therapy Goals        Problem: Physical Therapy Goal    Goal Priority Disciplines Outcome Goal Variances Interventions   Physical Therapy Goal     PT/OT, PT      Description:  Goals to be met by: 18     Patient will increase functional independence with mobility by performin. Supine to sit with Modified [place]  2. Rolling to Left and Right with Modified [place]  3. Sit to stand transfer with Modified [place]  4. Bed to chair transfer with Modified [place]   5. Gait  x 250 feet with Modified [place] with or without Rolling Walker   6. Lower extremity exercise program x 30 reps per handout, with independence                      History:     Past Medical History:   Diagnosis Date    ACS (acute coronary syndrome)     Acute coronary syndrome     NSTEMI    Anticoagulant long-term use     Coumadin    Atrial fibrillation     Basal cell carcinoma excised     left scalp vertex    Bilateral leg edema 2014    BPH (benign prostatic hyperplasia)     CKD (chronic kidney disease)     Coronary artery disease     DVT of leg (deep venous  thrombosis) 09/08/2014    On coumadin for years    Encounter for blood transfusion     Hyperlipidemia     Hypertension     Hypogonadism, male     Leg fracture, left     Melanoma 01/04/2017     in situ crown of scalp    MI (myocardial infarction)     Panhypopituitarism     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    SQUAMOUS CELL CARCINOMA excised     left posterior scalp    Thyroid disease        Past Surgical History:   Procedure Laterality Date    APPENDECTOMY      BASAL CELL CARCINOMA EXCISION  01/2008    BRAIN SURGERY      pituitary adenoma removed    CARDIAC CATHETERIZATION  05/09/2000    S/P stent to RCA,    CARDIAC CATHETERIZATION      stents placed    CARDIAC ELECTROPHYSIOLOGY STUDY AND ABLATION      CORONARY ANGIOPLASTY  5/9/2000    RCA    CORONARY ANGIOPLASTY WITH STENT PLACEMENT      ESOPHAGEAL DILATION      EYE SURGERY Bilateral     cataracts removed and new lenses placed     FRACTURE SURGERY Left     leg    HERNIA REPAIR      LAPAROSCOPIC CHOLECYSTECTOMY N/A 6/2/2018    Procedure: CHOLECYSTECTOMY, LAPAROSCOPIC;  Surgeon: Dickson Smith Jr., MD;  Location: [facility];  Service: General;  Laterality: N/A;    left femur surgery      pituitatry      hypophysectomy    SKIN BIOPSY      TONSILLECTOMY      VASCULAR SURGERY         Clinical Decision Making:     History  Co-morbidities and personal factors that may impact the plan of care Examination  Body Structures and Functions, activity limitations and participation restrictions that may impact the plan of care Clinical Presentation   Decision Making/ Complexity Score   Co-morbidities:   [] Time since onset of injury / illness / exacerbation  [x] Status of current condition  [x]Patient's cognitive status and safety concerns    [x] Multiple Medical Problems (see med hx)  Personal Factors:   [] Patient's age  [] Prior Level of function   [] Patient's home situation (environment and family  support)  [] Patient's level of motivation  [] Expected progression of patient      HISTORY:(criteria)    [] 87149 - no personal factors/history    [] 61673 - has 1-2 personal factor/comorbidity     [x] 98644 - has >3 personal factor/comorbidity     Body Regions:  [] Objective examination findings  [] Head     []  Neck  [] Trunk   [] Upper Extremity  [] Lower Extremity    Body Systems:  [x] For communication ability, affect, cognition, language, and learning style: the assessment of the ability to make needs known, consciousness, orientation (person, place, and time), expected emotional /behavioral responses, and learning preferences (eg, learning barriers, education  needs)  [x] For the neuromuscular system: a general assessment of gross coordinated movement (eg, balance, gait, locomotion, transfers, and transitions) and motor function  (motor control and motor learning)  [x] For the musculoskeletal system: the assessment of gross symmetry, gross range of motion, gross strength, height, and weight  [x] For the integumentary system: the assessment of pliability(texture), presence of scar formation, skin color, and skin integrity  [] For cardiovascular/pulmonary system: the assessment of heart rate, respiratory rate, blood pressure, and edema     Activity limitations:    [x] Patient's cognitive status and saf ety concerns          [x] Status of current condition      [] Weight bearing restriction  [] Cardiopulmunary Restriction    Participation Restrictions:   [] Goals and goal agreement with the patient     [] Rehab potential (prognosis) and probable outcome      Examination of Body System: (criteria)    [] 57591 - addressing 1-2 elements    [] 04822 - addressing a total of 3 or more elements     [x] 09224 -  Addressing a total of 4 or more elements         Clinical Presentation: (criteria)  Stable - 12761     On examination of body system using standardized tests and measures patient presents with 4 or more  elements from any of the following: body structures and functions, activity limitations, and/or participation restrictions.  Leading to a clinical presentation that is considered stable and/or uncomplicated                              Clinical Decision Making  (Eval Complexity):  Low- 71135     Time Tracking:     PT Received On: 06/04/18  PT Start Time: 1419     PT Stop Time: 1436  PT Total Time (min): 17 min     Billable Minutes: Evaluation 17 min co-eval with AVERY Lema, PT  06/04/2018

## 2018-06-04 NOTE — NURSING
1245- wife at bedside patient wanted me to explain to her about his kidney disease I explained to her using epic handoff on chronic kidney disease how kidney function decreases with age and general health affects the kidneys . Patient voiced his appreciation for my input and as did his wife.     1330- patient sat up in chair with assistance from therapy tolerating well. Denies pain when asked or at times patient will report pain but refuses to take anything even if he does experience pain.     1605-drains #1 -#2 removed per md patient tolerated fairly well. No acute drainage sterile drain sponge applied to both sites and covered with paper tape. Very minimal drainage in device. No changes on telemetry .

## 2018-06-04 NOTE — ASSESSMENT & PLAN NOTE
Creatinine at baseline  Will monitor  With acute renal failure.  Will consult nephrology. Increase IV fluids.

## 2018-06-04 NOTE — NURSING
No acute distress patient on phone speaking to his wife . Tolerating iv fluids well. Denies needs. Dressings to abdomen x 2 intact scant breakthrough drainage , 5 staples above the naval intact well approximated , 3 staples at proximal naval intact and well approximated. No acute changes on telemetry .

## 2018-06-04 NOTE — PT/OT/SLP EVAL
Occupational Therapy   Evaluation    Name: Linda Skelton  MRN: [id number]  Admitting Diagnosis:  Klebsiella sepsis 2 Days Post-Op    Recommendations:     Discharge Recommendations: home with home health  Discharge Equipment Recommendations:  none  Barriers to discharge:  None    History:     Occupational Profile:  Living Environment: lives with his spouse, has no equipment, drives  Previous level of function: Independent with ADL's  Equipment Owned:  none  Assistance upon Discharge: from his wife as needed    Past Medical History:   Diagnosis Date    ACS (acute coronary syndrome)     Acute coronary syndrome 2000    NSTEMI    Anticoagulant long-term use     Coumadin    Atrial fibrillation     Basal cell carcinoma excised     left scalp vertex    Bilateral leg edema 1/6/2014    BPH (benign prostatic hyperplasia)     CKD (chronic kidney disease)     Coronary artery disease     DVT of leg (deep venous thrombosis) 09/08/2014    On coumadin for years    Encounter for blood transfusion     Hyperlipidemia     Hypertension     Hypogonadism, male     Leg fracture, left     Melanoma 01/04/2017     in situ crown of scalp    MI (myocardial infarction)     Panhypopituitarism     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    SQUAMOUS CELL CARCINOMA excised     left posterior scalp    Thyroid disease        Past Surgical History:   Procedure Laterality Date    APPENDECTOMY      BASAL CELL CARCINOMA EXCISION  01/2008    BRAIN SURGERY      pituitary adenoma removed    CARDIAC CATHETERIZATION  05/09/2000    S/P stent to RCA,    CARDIAC CATHETERIZATION      stents placed    CARDIAC ELECTROPHYSIOLOGY STUDY AND ABLATION      CORONARY ANGIOPLASTY  5/9/2000    RCA    CORONARY ANGIOPLASTY WITH STENT PLACEMENT      ESOPHAGEAL DILATION      EYE SURGERY Bilateral     cataracts removed and new lenses placed     FRACTURE SURGERY Left     leg    HERNIA REPAIR       "LAPAROSCOPIC CHOLECYSTECTOMY N/A 6/2/2018    Procedure: CHOLECYSTECTOMY, LAPAROSCOPIC;  Surgeon: Dickson Smith Jr., MD;  Location: [facility];  Service: General;  Laterality: N/A;    left femur surgery      pituitatry      hypophysectomy    SKIN BIOPSY      TONSILLECTOMY      VASCULAR SURGERY         Subjective     Chief Complaint: "I need to comb my hair before I leave the room."  Patient/Family stated goals: to go home  Communicated with: nurse prior to session.  Pain/Comfort:  · Pain Rating 1: other (see comments) (c/o gas pains while sitting EOB)    Patients cultural, spiritual, Jew conflicts given the current situation: none    Objective:     Patient found with: peripheral IV, telemetry, FRANCISCA drain (2 drains)    General Precautions: Standard, fall   Orthopedic Precautions:N/A   Braces: N/A     Occupational Performance:    Bed Mobility:    · Patient completed Rolling/Turning to Right with modified independence and with side rail  · Patient completed Scooting/Bridging with modified independence and with side rail  · Patient completed Supine to Sit with modified independence and with side rail    Functional Mobility/Transfers:  · Patient completed Sit <> Stand Transfer with contact guard assistance  with  no assistive device   · Functional Mobility: ambulated to the door without an AD, with balance issues, once to door handed him the RW, able to ambulated with CGA, no LOB    Activities of Daily Living:  · Feeding:  independence    · Grooming: contact guard assistance standing at the sink  · UB Dressing: stand by assistance seated EOB  · LB Dressing: moderate assistance able to don his L sock seated EOB, but requested assististance with the R sock    Cognitive/Visual Perceptual:  Cognitive/Psychosocial Skills:     -       Oriented to: Person, Place and Situation   -       Follows Commands/attention:Follows one-step commands  -       Communication: clear/fluent  -       Memory: No Deficits noted  -       " "Safety awareness/insight to disability: impaired   -       Mood/Affect/Coping skills/emotional control: Appropriate to situation   Patient stated that he doesn't want to use a walker, however patient with LOB without AD and able to ambulated without LOB with a RW.    Physical Exam:  Balance:    -       sit balance good; standing balance fair plus with a RW  Postural examination/scapula alignment:    -       Rounded shoulders  Skin integrity: Visible skin intact  Upper Extremity Range of Motion:     -       Right Upper Extremity: WFL  -       Left Upper Extremity: WFL  Upper Extremity Strength:    -       Right Upper Extremity: WFL  -       Left Upper Extremity: WNL    Patient left up in chair with all lines intact, call button in reach and nurse Breonna notified    [facility] 6 Click:  [facility] Total Score: 22    Treatment & Education:  Evaluation    Education:    Assessment:     Linda Skelton is a 83 y.o. male with a medical diagnosis of Klebsiella sepsis.  He presents with  independence for self-care and functional transfers.  Performance deficits affecting function are weakness, impaired endurance, impaired self care skills, impaired functional mobilty, gait instability, impaired balance, decreased safety awareness, impaired cardiopulmonary response to activity.      Rehab Prognosis:  Good; patient would benefit from acute skilled OT services to address these deficits and reach maximum level of function.         Clinical Decision Makin.  OT Low:  "Pt evaluation falls under low complexity for evaluation coding due to performance deficits noted in 1-3 areas as stated above and 0 co-morbities affecting current functional status. Data obtained from problem focused assessments. No modifications or assistance was required for completion of evaluation. Only brief occupational profile and history review completed."     Plan:     Patient to be seen 3 x/week to address the above listed problems via self-care/home " management, therapeutic activities, therapeutic exercises  · Plan of Care Expires: 06/11/18  · Plan of Care Reviewed with: patient    This Plan of care has been discussed with the patient who was involved in its development and understands and is in agreement with the identified goals and treatment plan    GOALS:    Occupational Therapy Goals        Problem: Occupational Therapy Goal    Goal Priority Disciplines Outcome Interventions   Occupational Therapy Goal     OT, PT/OT Ongoing (interventions implemented as appropriate)    Description:  Goals to be met by: 6/11/2018     Patient will increase functional independence with ADLs by performing:    LE Dressing with Supervision.  Grooming while standing at sink with Stand-by Assistance.  Toileting from toilet with Modified [place] for hygiene and clothing management.   Toilet transfer to toilet with Supervision.  Upper extremity exercise program with assistance as needed.                      Time Tracking:     OT Date of Treatment: 06/04/18  OT Start Time: 1420  OT Stop Time: 1437  OT Total Time (min): 17 min    Billable Minutes:Evaluation 17 minutes with PT    LAMONTE Dugan MS  6/4/2018

## 2018-06-04 NOTE — SUBJECTIVE & OBJECTIVE
Interval History: No new issues     Review of Systems   Constitutional: Negative for activity change.   Respiratory: Negative for chest tightness.    Cardiovascular: Negative for chest pain.   Gastrointestinal: Positive for abdominal pain. Negative for nausea and vomiting.   Genitourinary: Negative for difficulty urinating.   Musculoskeletal: Negative for arthralgias.     Objective:     Vital Signs (Most Recent):  Temp: 98.2 °F (36.8 °C) (06/04/18 0721)  Pulse: 68 (06/04/18 0721)  Resp: 18 (06/04/18 0721)  BP: 136/73 (06/04/18 0721)  SpO2: (!) 91 % (06/04/18 0721) Vital Signs (24h Range):  Temp:  [97.7 °F (36.5 °C)-98.7 °F (37.1 °C)] 98.2 °F (36.8 °C)  Pulse:  [63-78] 68  Resp:  [18-20] 18  SpO2:  [90 %-95 %] 91 %  BP: ()/(54-73) 136/73     Weight: 119.9 kg (264 lb 5.3 oz)  Body mass index is 33.04 kg/m².    Intake/Output Summary (Last 24 hours) at 06/04/18 1104  Last data filed at 06/04/18 0700   Gross per 24 hour   Intake             1030 ml   Output              585 ml   Net              445 ml      Physical Exam   Constitutional: He appears well-developed and well-nourished.   HENT:   Head: Normocephalic and atraumatic.   Abdominal: There is tenderness.   Neurological: He is alert.   Psychiatric: He has a normal mood and affect. His behavior is normal.   Vitals reviewed.      Significant Labs:   CBC:   Recent Labs  Lab 06/03/18  0520   WBC 8.78   HGB 13.7*   HCT 40.5   PLT 93*     CMP:   Recent Labs  Lab 06/03/18  0520 06/04/18  0528    136   K 4.3 4.6    104   CO2 25 23   * 94   BUN 41* 47*   CREATININE 2.6* 3.1*   CALCIUM 8.6* 8.2*   PROT 6.0 5.5*   ALBUMIN 2.6* 2.3*   BILITOT 1.6* 1.0   ALKPHOS 82 69   AST 61* 45*   ALT 88* 65*   ANIONGAP 8 9   EGFRNONAA 22* 18*       Significant Imaging:

## 2018-06-04 NOTE — NURSING
No acute distress or changes on telemetry no adverse side effects to antibiotics. Denies pain and I have offered pain medication . Tolerating iv fluid . Drain #1 serous colored scant drainage , drain #2 has small amount both drains are intact dressings to scope sites x 2 intact old drainage. No swelling or streaking.

## 2018-06-05 LAB
ALBUMIN SERPL BCP-MCNC: 2.2 G/DL
ALP SERPL-CCNC: 71 U/L
ALT SERPL W/O P-5'-P-CCNC: 53 U/L
ANION GAP SERPL CALC-SCNC: 8 MMOL/L
AST SERPL-CCNC: 36 U/L
BACTERIA BLD CULT: NORMAL
BILIRUB SERPL-MCNC: 0.7 MG/DL
BUN SERPL-MCNC: 46 MG/DL
CALCIUM SERPL-MCNC: 8.1 MG/DL
CHLORIDE SERPL-SCNC: 107 MMOL/L
CO2 SERPL-SCNC: 20 MMOL/L
CREAT SERPL-MCNC: 2.8 MG/DL
EST. GFR  (AFRICAN AMERICAN): 23 ML/MIN/1.73 M^2
EST. GFR  (NON AFRICAN AMERICAN): 20 ML/MIN/1.73 M^2
GLUCOSE SERPL-MCNC: 84 MG/DL
INR PPP: 1.1
MAGNESIUM SERPL-MCNC: 1.8 MG/DL
PHOSPHATE SERPL-MCNC: 2.4 MG/DL
POCT GLUCOSE: 104 MG/DL (ref 70–110)
POCT GLUCOSE: 145 MG/DL (ref 70–110)
POCT GLUCOSE: 98 MG/DL (ref 70–110)
POCT GLUCOSE: 99 MG/DL (ref 70–110)
POTASSIUM SERPL-SCNC: 4 MMOL/L
PROT SERPL-MCNC: 5.4 G/DL
PROTHROMBIN TIME: 11.1 SEC
SODIUM SERPL-SCNC: 135 MMOL/L

## 2018-06-05 PROCEDURE — 25000003 PHARM REV CODE 250: Performed by: INTERNAL MEDICINE

## 2018-06-05 PROCEDURE — 93005 ELECTROCARDIOGRAM TRACING: CPT

## 2018-06-05 PROCEDURE — 83735 ASSAY OF MAGNESIUM: CPT

## 2018-06-05 PROCEDURE — 80053 COMPREHEN METABOLIC PANEL: CPT

## 2018-06-05 PROCEDURE — 25000003 PHARM REV CODE 250: Performed by: HOSPITALIST

## 2018-06-05 PROCEDURE — 27000221 HC OXYGEN, UP TO 24 HOURS

## 2018-06-05 PROCEDURE — 93010 ELECTROCARDIOGRAM REPORT: CPT | Mod: ,,, | Performed by: INTERNAL MEDICINE

## 2018-06-05 PROCEDURE — G8979 MOBILITY GOAL STATUS: HCPCS | Mod: CH

## 2018-06-05 PROCEDURE — 21400001 HC TELEMETRY ROOM

## 2018-06-05 PROCEDURE — 63600175 PHARM REV CODE 636 W HCPCS: Performed by: EMERGENCY MEDICINE

## 2018-06-05 PROCEDURE — 84100 ASSAY OF PHOSPHORUS: CPT

## 2018-06-05 PROCEDURE — 97530 THERAPEUTIC ACTIVITIES: CPT

## 2018-06-05 PROCEDURE — 63600175 PHARM REV CODE 636 W HCPCS: Performed by: INTERNAL MEDICINE

## 2018-06-05 PROCEDURE — G8978 MOBILITY CURRENT STATUS: HCPCS | Mod: CJ

## 2018-06-05 PROCEDURE — 94640 AIRWAY INHALATION TREATMENT: CPT

## 2018-06-05 PROCEDURE — 36415 COLL VENOUS BLD VENIPUNCTURE: CPT

## 2018-06-05 PROCEDURE — 97116 GAIT TRAINING THERAPY: CPT

## 2018-06-05 PROCEDURE — 25000003 PHARM REV CODE 250: Performed by: EMERGENCY MEDICINE

## 2018-06-05 PROCEDURE — 94761 N-INVAS EAR/PLS OXIMETRY MLT: CPT

## 2018-06-05 PROCEDURE — G8980 MOBILITY D/C STATUS: HCPCS | Mod: CJ

## 2018-06-05 PROCEDURE — 97161 PT EVAL LOW COMPLEX 20 MIN: CPT

## 2018-06-05 PROCEDURE — 85610 PROTHROMBIN TIME: CPT

## 2018-06-05 PROCEDURE — 25000242 PHARM REV CODE 250 ALT 637 W/ HCPCS: Performed by: INTERNAL MEDICINE

## 2018-06-05 RX ORDER — SODIUM CHLORIDE 9 MG/ML
INJECTION, SOLUTION INTRAVENOUS CONTINUOUS
Status: DISCONTINUED | OUTPATIENT
Start: 2018-06-05 | End: 2018-06-05

## 2018-06-05 RX ORDER — ONDANSETRON 8 MG/1
8 TABLET, ORALLY DISINTEGRATING ORAL EVERY 8 HOURS PRN
Status: DISCONTINUED | OUTPATIENT
Start: 2018-06-05 | End: 2018-06-06 | Stop reason: HOSPADM

## 2018-06-05 RX ORDER — IPRATROPIUM BROMIDE AND ALBUTEROL SULFATE 2.5; .5 MG/3ML; MG/3ML
3 SOLUTION RESPIRATORY (INHALATION) ONCE
Status: COMPLETED | OUTPATIENT
Start: 2018-06-05 | End: 2018-06-05

## 2018-06-05 RX ADMIN — ENOXAPARIN SODIUM 30 MG: 100 INJECTION SUBCUTANEOUS at 04:06

## 2018-06-05 RX ADMIN — CEFTRIAXONE 2 G: 2 INJECTION, SOLUTION INTRAVENOUS at 12:06

## 2018-06-05 RX ADMIN — FOLIC ACID 1000 MCG: 1 TABLET ORAL at 09:06

## 2018-06-05 RX ADMIN — LEVOTHYROXINE SODIUM 112 MCG: 112 TABLET ORAL at 05:06

## 2018-06-05 RX ADMIN — DOFETILIDE 125 MCG: 0.12 CAPSULE ORAL at 11:06

## 2018-06-05 RX ADMIN — ASPIRIN 81 MG 81 MG: 81 TABLET ORAL at 09:06

## 2018-06-05 RX ADMIN — IPRATROPIUM BROMIDE AND ALBUTEROL SULFATE 3 ML: .5; 2.5 SOLUTION RESPIRATORY (INHALATION) at 11:06

## 2018-06-05 RX ADMIN — PREDNISONE 5 MG: 5 TABLET ORAL at 09:06

## 2018-06-05 RX ADMIN — WARFARIN SODIUM 5 MG: 5 TABLET ORAL at 04:06

## 2018-06-05 RX ADMIN — DOCUSATE SODIUM 100 MG: 100 CAPSULE, LIQUID FILLED ORAL at 09:06

## 2018-06-05 RX ADMIN — PANTOPRAZOLE SODIUM 40 MG: 40 TABLET, DELAYED RELEASE ORAL at 09:06

## 2018-06-05 RX ADMIN — ONDANSETRON 8 MG: 8 TABLET, ORALLY DISINTEGRATING ORAL at 12:06

## 2018-06-05 RX ADMIN — TAMSULOSIN HYDROCHLORIDE 0.4 MG: 0.4 CAPSULE ORAL at 09:06

## 2018-06-05 RX ADMIN — ATENOLOL 25 MG: 25 TABLET ORAL at 09:06

## 2018-06-05 RX ADMIN — SODIUM CHLORIDE: 0.9 INJECTION, SOLUTION INTRAVENOUS at 04:06

## 2018-06-05 RX ADMIN — DOFETILIDE 125 MCG: 0.12 CAPSULE ORAL at 09:06

## 2018-06-05 NOTE — ANESTHESIA PREPROCEDURE EVALUATION
06/05/2018  Linda Skelton is a 83 y.o., male.    Anesthesia Evaluation         Review of Systems  Social:  Non-Smoker   Cardiovascular:   Past MI CAD  CABG/stent Dysrhythmias atrial fibrillation hyperlipidemia ECG has been reviewed. Echo 6/1/2018  EF 60%  No signif valve dz   Renal/:   Chronic Renal Disease, CRI BPH    Endocrine:   Hypothyroidism hypopituitarism       Physical Exam  General:  Morbid Obesity    Airway/Jaw/Neck:  AIRWAY FINDINGS: Normal           Mental Status:  Mental Status Findings: Normal        Anesthesia Plan  Type of Anesthesia, risks & benefits discussed:  Anesthesia Type:  general  Patient's Preference:   Intra-op Monitoring Plan: standard ASA monitors  Intra-op Monitoring Plan Comments:   Post Op Pain Control Plan:   Post Op Pain Control Plan Comments:   Induction:   IV  Beta Blocker:  Patient is on a Beta-Blocker and has received one dose within the past 24 hours (No further documentation required).       Informed Consent: Patient understands risks and agrees with Anesthesia plan.  Questions answered. Anesthesia consent signed with patient.  ASA Score: 3     Day of Surgery Review of History & Physical:    H&P update referred to the surgeon.         Ready For Surgery From Anesthesia Perspective.

## 2018-06-05 NOTE — PROGRESS NOTES
"   [facility] [place]      General Surgery Progress Note    06/05/2018  8:21 AM      Patient: Linda Skelton  MRN: [id number]  Admit Date: 5/30/2018  LOS: 5    POD # 3. Laparoscopic Cholecystectomy.     Subjective                                                                                                        "I am feeling better than I have been since I got here." Denies N/V, PO intolerance, although he alleges his appetite isn't back to baseline.     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    Mitral regurgitation    Bilateral leg edema    Post PTCA    Obesity, Class I, BMI 30-34.9    ED (erectile dysfunction)    Essential hypertension    History of deep 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, 1997    Sore throat    Mixed hyperlipidemia    Status post catheter ablation of atrial fibrillation    Atypical lymphocytic infiltrate of skin    History of melanoma in situ    Benign non-nodular prostatic hyperplasia without lower urinary tract symptoms    Hypogonadism in male    Visit for monitoring Tikosyn therapy    Current use of long term anticoagulation    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      Current Outpatient Prescriptions on File Prior to Encounter   Medication Sig Dispense Refill    aspirin 81 mg Tab Take 81 mg by mouth every evening. 1 Tablet Oral Every night      atenolol (TENORMIN) 25 MG tablet TAKE ONE TABLET BY MOUTH twice DAILY 90 tablet 3    dofetilide (TIKOSYN) 500 MCG Cap TAKE 1 CAPSULE 2 TIMES DAILY " 180 capsule 3    folic acid (FOLVITE) 1 MG tablet Take 1 tablet (1,000 mcg total) by mouth once daily. 90 tablet 1    levothyroxine (SYNTHROID) 112 MCG tablet Take 1 tablet (112 mcg total) by mouth before breakfast. 90 tablet 3    NITROSTAT 0.4 mg SL tablet DISSOLVE ONE TABLET UNDER THE TONGUE EVERY 5 MINUTES AS NEEDED FOR CHEST PAIN.  DO NOT EXCEED A TOTAL OF 3 DOSES IN 15 MINUTES NOW 25 tablet 3    omeprazole (PRILOSEC) 20 MG capsule Take 20 mg by mouth every other day.      predniSONE (DELTASONE) 5 MG tablet Take 1 tablet (5 mg total) by mouth once daily. 90 tablet 3    simvastatin (ZOCOR) 80 MG tablet TAKE ONE TABLET BY MOUTH IN THE EVENING 90 tablet 3    tamsulosin (FLOMAX) 0.4 mg Cp24 Take 1 capsule (0.4 mg total) by mouth once daily. 90 capsule 3    triamcinolone acetonide 0.1% (KENALOG) 0.1 % cream APPLY  CREAM TOPICALLY TO AFFECTED AREA TWICE DAILY 454 g 3    warfarin (COUMADIN) 5 MG tablet Take 1.5 tablets (7.5 mg total) by mouth Daily. Current Dose ( 5 mg on Tue; 7.5 mg all other days) or as directed by coumadin clinic 135 tablet 3    sulfamethoxazole-trimethoprim 800-160mg (BACTRIM DS) 800-160 mg Tab Start night before prostate biopsy every 12 hours for 4 doses only 4 tablet 0    testosterone cypionate (DEPOTESTOTERONE CYPIONATE) 100 mg/mL injection Inject 1 mL (100 mg total) into the muscle every 14 (fourteen) days. 10 mL 2       Objective                                                                                                          Vitals:    06/04/18 1933 06/05/18 0003 06/05/18 0506 06/05/18 0706   BP: (!) 155/86 (!) 145/70 119/65 (!) 144/81   BP Location: Right arm Right arm Right arm    Patient Position: Lying Lying Lying    Pulse: 69 74 72 71   Resp: 18 18 18 18   Temp: 98.8 °F (37.1 °C) 98.9 °F (37.2 °C) 99.6 °F (37.6 °C) 98.9 °F (37.2 °C)   TempSrc: Oral Oral Oral    SpO2: (!) 91% (!) 91% (!) 90% (!) 90%   Weight:   120.3 kg (265 lb 3.4 oz)    Height:           CBC   Recent  Labs  Lab 06/02/18  0717 06/03/18  0520   WBC 11.64 8.78   HGB 14.9 13.7*   HCT 44.0 40.5   PLT 73* 93*       CHEM   Recent Labs  Lab 06/04/18  0528 06/05/18  0418    135*   K 4.6 4.0    107   CO2 23 20*   BUN 47* 46*   CREATININE 3.1* 2.8*   GLU 94 84       PHYSICAL EXAM:    GEN: AAOx3. NAD. Appears to be in some discomfort/pain at rest.   HEENT: NC. AT. No scleral icterus. EOMI. MOM.   RESP: No SOB or labored breathing.    CV: RRR  ABD: Prominent and distended. Incisions intact with staples in place well-opposed wound edges, no active drainage or bleeding and no surrounding skin changes. Gauze over previous FRANCISCA drains are mildly stained with serous fluid. Abdomen is soft and depressible. There is minimal TTP around incisions.  EXT:  Full range of motion. Warm. No significant pitting edema.   Assessment / Plan:                                                                                           A/P: Case of 83 y.o. s/p laparoscopic cholecystectomy POD # 3 found to have gangrenous cholecystitis. No acute events. Pt's CMP shows normal total bilirubin and normalization of LFT's. Abdominal exam is appropriate for POD # 3 and without of any signs of superimposed infection. Pt is being managed for bacteremia. Pt doing well from a surgical standpoint. Tamara will be taken out 2 weeks post-op. He may shower normally, no submerging wound in water or applying ointments/creams to the incision. Dressings over previous FRANCISCA sites can be changed as needed to prevent staining of clothing until completely healed. We will sign-off at this time. Please call/contact with any questions. Thank you for allowing us to participate in this patient's care.     Aliya Beckett MD  [facility] Surgery PGY-IV

## 2018-06-05 NOTE — PROGRESS NOTES
"[facility] Medicine  Progress Note    Patient Name: Linda Skelton  MRN: [id number]  Patient Class: IP- Inpatient   Admission Date: 5/30/2018  Length of Stay: 5 days  Attending Physician: Angel Mckinnon MD  Primary Care Provider: Anderson Jerome MD        Subjective:     Principal Problem:Klebsiella sepsis    HPI:  81 y/o male with CAD, Afib s/p ablation, coumadin for anticoagulationn, HTN, HLP, panhypopituitarism, and h/o DVT who presented with c/o mid-epigastric abdominal pain that started last night. He reported an episode of sharp pain last night around midnight that self resolved and he went to sleep. Then around 1:30 this afternoon, the mid-epigastric pain returned, described as "pressure" and intensity 8/10, associated with +N/V and +diaphoresis. He reports subjective chills, but no fever. Denies chest pain and SOB. Reports pain with N/V worse with attempt at eating, and he feels constant nausea and just "feeling bad" and not improved at all since coming to the hospital. No recent sick contacts; and no reported unusual food ingestions.  In the ER, workup was remarkable for negative cardiac enzyme, LFTs with Tbil of 1.4, AST/ALT=205/95, CTA of chest negative for PE but did show that the gallbladder is mildly distended noting there may be cholelithiasis or sludge near the gallbladder neck.     Hospital Course:  Mr. Skelton was admitted with abdominal pain with CTA that was negative for PE, but showed gallbladder that was mildly distended noting there may be cholelithiasis or sludge near the gallbladder neck which was concerning for cholecystitis as source of pain. He was worked up with U/S which was remarkable for cholelithiasis with minimal gallbladder wall thickening but then HIDA scan that was consistent with cholecysititis. GI and Surgery are following. Pt the spiked a fever and he was started on Zosyn. Blood cultures returned with GNR. He had his coumadin reversed and was " worked up with MRCP which was negative. Plan to take patient to OR for cholecystectomy on 6/2.  Patient's blood cultures from 6/2 were positive as well and repeat blood cultures were sent on 6/3 that were positive as well.  ID was consulted.  Renal was consulted on 6/4 for worsening renal function.     Interval History: No new issues.     Review of Systems   Constitutional: Negative for activity change.   Respiratory: Negative for chest tightness.    Cardiovascular: Negative for chest pain.   Gastrointestinal: Positive for abdominal pain. Negative for nausea and vomiting.   Genitourinary: Negative for difficulty urinating.   Musculoskeletal: Negative for arthralgias.     Objective:     Vital Signs (Most Recent):  Temp: 98.9 °F (37.2 °C) (06/05/18 0706)  Pulse: 71 (06/05/18 0706)  Resp: 18 (06/05/18 0706)  BP: (!) 144/81 (06/05/18 0706)  SpO2: (!) 90 % (06/05/18 0706) Vital Signs (24h Range):  Temp:  [98.4 °F (36.9 °C)-99.6 °F (37.6 °C)] 98.9 °F (37.2 °C)  Pulse:  [68-74] 71  Resp:  [18] 18  SpO2:  [89 %-94 %] 90 %  BP: (112-155)/(56-86) 144/81     Weight: 120.3 kg (265 lb 3.4 oz)  Body mass index is 33.15 kg/m².    Intake/Output Summary (Last 24 hours) at 06/05/18 1030  Last data filed at 06/05/18 0506   Gross per 24 hour   Intake             3855 ml   Output             1515 ml   Net             2340 ml      Physical Exam   Constitutional: He appears well-developed and well-nourished.   HENT:   Head: Normocephalic and atraumatic.   Abdominal: There is tenderness.   Neurological: He is alert.   Psychiatric: He has a normal mood and affect. His behavior is normal.   Vitals reviewed.      Significant Labs:   CBC: No results for input(s): WBC, HGB, HCT, PLT in the last 48 hours.  CMP:   Recent Labs  Lab 06/04/18  0528 06/05/18  0418    135*   K 4.6 4.0    107   CO2 23 20*   GLU 94 84   BUN 47* 46*   CREATININE 3.1* 2.8*   CALCIUM 8.2* 8.1*   PROT 5.5* 5.4*   ALBUMIN 2.3* 2.2*   BILITOT 1.0 0.7   ALKPHOS 69  71   AST 45* 36   ALT 65* 53*   ANIONGAP 9 8   EGFRNONAA 18* 20*       Significant Imaging:     Assessment/Plan:      * Klebsiella sepsis    Continued positive blood cultures.  ID consulted. Repeated blood cultures on 6/4.  Abx changed to Ceftriaxone    So far- blood cultures from 6/4 NG.          Gangrenous cholecystitis    S/p surgery on 6/2.           Cholelithiasis with acute cholecystitis    Pt reported mid-epigastric abdominal pain, and not chest pain  Given abnormal LFTS (previously normal 4 weeks ago) along with CTA findings that showed gallbladder is mildly distended noting there may be cholelithiasis or sludge near the gallbladder neck which is concerning for cholecystitis as source of pain  Lipase was normal  RUQ abdominal U/S and HIDA consistent with cholecystitis  Pt spiked fever and was started on empiric Zosyn  Blood culture positive for GNR from 5/31  Supportive care with IVF, pain control and anti-emetics  Appreciate GI following and MRCP was negative for any retained stones  Plan was for cholecystectomy yesterday but delayed until today  Repeat blood culture were done today to assess for clearance- + on 6/2. Will repeat today- 6/3.+.         Abnormal LFTs    As discussed above  Hepatitis panel negative        Benign non-nodular prostatic hyperplasia without lower urinary tract symptoms    Continue tamulosin        Status post catheter ablation of atrial fibrillation    As discussed above        Mixed hyperlipidemia    Hold statin for now given increased LFTs        History of deep 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, 1997    Treated and he was anticoagulated with coumadin  As discussed above        Essential hypertension    Continue atenolol        Coronary artery disease with hx of MI    Pt denies chest pain and initial cardiac marker is negative  Subsequent repeat troponin with mild elevation  Cleared by Cardiology with moderate risk for surgery  Continue home medication regimen         Chronic kidney disease, stage 3    Creatinine at baseline  Will monitor  With acute renal failure.  Will consult nephrology. Increase IV fluids.  Now leveled off.         Panhypopituitarism    Continue home medication regimen        Atrial fibrillation    S/p ablation  He was therapeutic on coumadin on admission  S/p reversal with Vitamin K and FFP for surgery  Will resume coumadin after appropriate time as passed post op          VTE Risk Mitigation         Ordered     warfarin (COUMADIN) tablet 5 mg  Daily      06/04/18 1108     enoxaparin injection 30 mg  Daily      06/03/18 1119              Angel Garcia MD  Department of Hospital Medicine   [facility]

## 2018-06-05 NOTE — PT/OT/SLP PROGRESS
Physical Therapy Treatment    Patient Name:  Linda Skelton   MRN:  [id number]    Recommendations:     Discharge Recommendations:  home with home health   Discharge Equipment Recommendations: none   Barriers to discharge: None    Assessment:     Linda Skelton is a 83 y.o. male admitted with a medical diagnosis of Klebsiella sepsis.  He presents with the following impairments/functional limitations:  weakness, impaired endurance, gait instability, impaired balance, decreased safety awareness, impaired cardiopulmonary response to activity .    Rehab Prognosis:  Good, patient would benefit from acute skilled PT services to address these deficits and reach maximum level of function.      Recent Surgery: Procedure(s) (LRB):  CHOLECYSTECTOMY, LAPAROSCOPIC (N/A) 3 Days Post-Op    Plan:     During this hospitalization, patient to be seen 5 x/week to address the above listed problems via gait training, therapeutic activities, therapeutic exercises  · Plan of Care Expires:  06/18/18   Plan of Care Reviewed with: patient    Subjective     Communicated with nurse prior to session.  Patient found lying in bed upon PT entry to room, agreeable to treatment.      Chief Complaint: Pt states that he is confused, AxO to person, place, date  Patient comments/goals: wants to go home  Pain/Comfort:  · Pain Rating 1: 0/10    Patients cultural, spiritual, Faith conflicts given the current situation: none    Objective:     Patient found with: peripheral IV     General Precautions: Standard, fall   Orthopedic Precautions:N/A   Braces: N/A     Functional Mobility:  · Bed Mobility:     · Scooting: stand by assistance  · Supine to Sit: stand by assistance  · Transfers:     · Sit to Stand:  stand by assistance with no AD  · Gait: Pt ambulated ~170 ft with RW and CGA with multiple restbreaks given secondary to fatigue. Pt with minimal VC required for safety awareness and coordination with RW.  · Balance: good sitting, good  standing.      AM-PAC 6 CLICK MOBILITY  Turning over in bed (including adjusting bedclothes, sheets and blankets)?: 4  Sitting down on and standing up from a chair with arms (e.g., wheelchair, bedside commode, etc.): 4  Moving from lying on back to sitting on the side of the bed?: 4  Moving to and from a bed to a chair (including a wheelchair)?: 4  Need to walk in hospital room?: 3  Climbing 3-5 steps with a railing?: 3  Total Score: 22       Therapeutic Activities and Exercises:  Pt performed bed mobility, transfers, and gait as listed above  Pt performed seated exercises x20: AP, LAQs, HS, hip flexion    Patient left EOB with call button in reach and nurse notified..    GOALS:    Physical Therapy Goals        Problem: Physical Therapy Goal    Goal Priority Disciplines Outcome Goal Variances Interventions   Physical Therapy Goal     PT/OT, PT      Description:  Goals to be met by: 18     Patient will increase functional independence with mobility by performin. Supine to sit with Modified [place]  2. Rolling to Left and Right with Modified [place]  3. Sit to stand transfer with Modified [place]  4. Bed to chair transfer with Modified [place]   5. Gait  x 250 feet with Modified [place] with or without Rolling Walker   6. Lower extremity exercise program x 30 reps per handout, with independence                      Time Tracking:     PT Received On: 18  PT Start Time: 1359     PT Stop Time: 1432  PT Total Time (min): 33 min     Billable Minutes: Gait Training 18 and Therapeutic Exercise 15    Treatment Type: Treatment  PT/PTA: PTA     PTA Visit Number: 1     Jonas Saenz PTA  2018

## 2018-06-05 NOTE — NURSING
Bedside rounding report given to sweetie ramírez on patient progress and updated handoff report sheet given to him as well. Patient denies needs at this time. Iv site clean dry and intact x 2 sites. Tolerating iv fluids. Call light in reach and head of bed elevated side rails up x 2 .

## 2018-06-05 NOTE — PLAN OF CARE
Problem: Fall Risk (Adult)  Goal: Identify Related Risk Factors and Signs and Symptoms  Related risk factors and signs and symptoms are identified upon initiation of Human Response Clinical Practice Guideline (CPG)    06/04/18 2216   Fall Risk   Related Risk Factors (Fall Risk) age-related changes;confusion/agitation;culprit medication(s);fatigue/slow reaction;gait/mobility problems   Signs and Symptoms (Fall Risk) presence of risk factors

## 2018-06-05 NOTE — PLAN OF CARE
Problem: Patient Care Overview  Goal: Plan of Care Review   06/05/18 1435   Coping/Psychosocial   Plan Of Care Reviewed With patient     Goal: Interdisciplinary Rounds/Family Conf   06/05/18 1435   Interdisciplinary Rounds/Family Conf   Participants nursing;patient       Problem: Cardiac Rhythm Management Device (Adult)  Intervention: Monitor/Manage Pain Considering Device Effect   06/05/18 1435   Safety Interventions   Medication Review/Management medications reviewed     Intervention: Support/Optimize Psychosocial Response to Condition   06/05/18 1435   Coping/Psychosocial Interventions   Supportive Measures active listening utilized;relaxation techniques promoted;positive reinforcement provided         Problem: Pain, Acute (Adult)  Intervention: Monitor/Manage Analgesia   06/05/18 1435   Safety Interventions   Medication Review/Management medications reviewed     Intervention: Mutually Develop/Implement Acute Pain Management Plan   06/05/18 1435   Cognitive Interventions   Sensory Stimulation Regulation music/television provided for relaxation;quiet environment promoted     Intervention: Support/Optimize Psychosocial Response to Acute Pain   06/05/18 1435   Coping/Psychosocial Interventions   Supportive Measures active listening utilized;relaxation techniques promoted;positive reinforcement provided   Psychosocial Support   Trust Relationship/Rapport care explained;choices provided;questions encouraged;questions answered;emotional support provided;thoughts/feelings acknowledged;reassurance provided;empathic listening provided       Goal: Acceptable Pain Control/Comfort Level  Patient will demonstrate the desired outcomes by discharge/transition of care.    06/05/18 1435   Pain, Acute (Adult)   Acceptable Pain Control/Comfort Level making progress toward outcome

## 2018-06-05 NOTE — NURSING
Informed Dr. Garcia that pt's O2 sat is 90% on room air and that pt is wheezing. Order given for Duone[facility]. Will place order in EPIC and continue to monitor pt. Pt denies complaints.

## 2018-06-05 NOTE — PLAN OF CARE
Problem: Physical Therapy Goal  Goal: Physical Therapy Goal  Goals to be met by: 18     Patient will increase functional independence with mobility by performin. Supine to sit with Modified [place]  2. Rolling to Left and Right with Modified [place]  3. Sit to stand transfer with Modified [place]  4. Bed to chair transfer with Modified [place]   5. Gait  x 250 feet with Modified [place] with or without Rolling Walker   6. Lower extremity exercise program x 30 reps per handout, with independence     Pt able to increase distance when ambulating with RW and CGA. Pt progressing well towards PT goals

## 2018-06-05 NOTE — PROGRESS NOTES
"Linda Skelton is a 83 y.o. male patient.    Active Hospital Problems    Diagnosis  POA    *Klebsiella sepsis [A41.4]  Yes    Gangrenous cholecystitis [K81.0]  Yes    Abnormal LFTs [R94.5]  Yes    Cholelithiasis with acute cholecystitis [K80.00]  Yes    Benign non-nodular prostatic hyperplasia without lower urinary tract symptoms [N40.0]  Yes    Status post catheter ablation of atrial fibrillation [Z98.890]  Not Applicable    Mixed hyperlipidemia [E78.2]  Yes    History of deep 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, 1997 [Z86.718]  Not Applicable    Essential hypertension [I10]  Yes    Coronary artery disease with hx of MI [I25.10]  Yes    Chronic kidney disease, stage 3 [N18.3]  Yes    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     Temp: 98.7 °F (37.1 °C) (06/05/18 1516)  Pulse: 67 (06/05/18 1516)  Resp: 18 (06/05/18 1516)  BP: 130/67 (06/05/18 1516)  SpO2: (!) 90 % (06/05/18 1516)  Weight: 120.3 kg (265 lb 3.4 oz) (06/05/18 0506)  Height: 6' 3" (190.5 cm) (05/30/18 2300)    Subjective:  Symptoms:  Stable.      Objective:  General Appearance:  Comfortable and ill-appearing.    Vital signs: (most recent): Blood pressure 130/67, pulse 67, temperature 98.7 °F (37.1 °C), resp. rate 18, height 6' 3" (1.905 m), weight 120.3 kg (265 lb 3.4 oz), SpO2 (!) 90 %.    HEENT: Normal HEENT exam.    Lungs:  There are decreased breath sounds.    Heart: S1 normal and S2 normal.    Abdomen: Abdomen is soft and distended.  (Dressings).  Bowel sounds are normal.   There is generalized tenderness.     Extremities: There is dependent edema.    Neurological: Patient is alert.    Skin:  Warm and dry.      Intake/Output - Last 3 Shifts       06/03 0700 - 06/04 0659 06/04 0700 - 06/05 0659 06/05 0700 - 06/06 0659    P.O. 1260 1440 1000    I.V. (mL/kg)  2751 (22.9)     IV Piggyback 250 150     " Total Intake(mL/kg) 1510 (12.6) 4341 (36.1) 1000 (8.3)    Urine (mL/kg/hr) 1200 (0.4) 1900 (0.7) 800 (0.7)    Emesis/NG output 0 (0) 0 (0)     Drains 105 (0) 50 (0)     Stool 0 (0) 0 (0)     Blood 0 (0) 0 (0)     Total Output 1305 1950 800    Net +205 +2391 +200           Urine Occurrence 3 x            Lab Results   Component Value Date    WBC 8.78 06/03/2018    HGB 13.7 (L) 06/03/2018    HCT 40.5 06/03/2018    MCV 96 06/03/2018    PLT 93 (L) 06/03/2018     BMP  Lab Results   Component Value Date     (L) 06/05/2018    K 4.0 06/05/2018     06/05/2018    CO2 20 (L) 06/05/2018    BUN 46 (H) 06/05/2018    CREATININE 2.8 (H) 06/05/2018    CALCIUM 8.1 (L) 06/05/2018    ANIONGAP 8 06/05/2018    ESTGFRAFRICA 23 (A) 06/05/2018    EGFRNONAA 20 (A) 06/05/2018     Current Facility-Administered Medications   Medication    acetaminophen tablet 650 mg    aspirin chewable tablet 81 mg    atenolol tablet 25 mg    cefTRIAXone (ROCEPHIN) 2 g in dextrose 5 % 50 mL IVPB    dextrose 50% injection 12.5 g    dextrose 50% injection 25 g    docusate sodium capsule 100 mg    dofetilide capsule 125 mcg    enoxaparin injection 30 mg    folic acid tablet 1,000 mcg    glucagon (human recombinant) injection 1 mg    glucose chewable tablet 16 g    glucose chewable tablet 24 g    hydrALAZINE injection 10 mg    levothyroxine tablet 112 mcg    morphine injection 1 mg    morphine injection 2 mg    ondansetron disintegrating tablet 8 mg    oxyCODONE-acetaminophen  mg per tablet 1 tablet    oxyCODONE-acetaminophen 5-325 mg per tablet 1 tablet    oxyCODONE-acetaminophen 7.5-325 mg per tablet 1 tablet    pantoprazole EC tablet 40 mg    predniSONE tablet 5 mg    tamsulosin 24 hr capsule 0.4 mg    warfarin (COUMADIN) tablet 5 mg       Assessment & Plan  1.NADIA. Creatinine is better. CKD 3. ATN better.  2.Klebsiella sepsis. Blood cx ngtd. Cont tx per primary team.  3.moderate maln. Eating more. Following.  4.HTN. SBP  ok. Dc ivfs.  5.afib. Tx per cards. Watch tx with decrease gfr.  6.met aciosis. Dc ivfs. Folllowing lytes.    If creatinine is better in am, reasonable to dc pt with bmp next week sent to me from [facility].    Meghana Pepper MD  6/5/2018

## 2018-06-05 NOTE — PLAN OF CARE
Problem: Fall Risk (Adult)  Goal: Absence of Falls  Patient will demonstrate the desired outcomes by discharge/transition of care.    06/04/18 2212   Fall Risk (Adult)   Absence of Falls making progress toward outcome

## 2018-06-05 NOTE — ASSESSMENT & PLAN NOTE
Creatinine at baseline  Will monitor  With acute renal failure.  Will consult nephrology. Increase IV fluids.  Now leveled off.

## 2018-06-05 NOTE — ASSESSMENT & PLAN NOTE
Continued positive blood cultures.  ID consulted. Repeated blood cultures on 6/4.  Abx changed to Ceftriaxone    So far- blood cultures from 6/4 NG.

## 2018-06-05 NOTE — SUBJECTIVE & OBJECTIVE
Interval History: No new issues.     Review of Systems   Constitutional: Negative for activity change.   Respiratory: Negative for chest tightness.    Cardiovascular: Negative for chest pain.   Gastrointestinal: Positive for abdominal pain. Negative for nausea and vomiting.   Genitourinary: Negative for difficulty urinating.   Musculoskeletal: Negative for arthralgias.     Objective:     Vital Signs (Most Recent):  Temp: 98.9 °F (37.2 °C) (06/05/18 0706)  Pulse: 71 (06/05/18 0706)  Resp: 18 (06/05/18 0706)  BP: (!) 144/81 (06/05/18 0706)  SpO2: (!) 90 % (06/05/18 0706) Vital Signs (24h Range):  Temp:  [98.4 °F (36.9 °C)-99.6 °F (37.6 °C)] 98.9 °F (37.2 °C)  Pulse:  [68-74] 71  Resp:  [18] 18  SpO2:  [89 %-94 %] 90 %  BP: (112-155)/(56-86) 144/81     Weight: 120.3 kg (265 lb 3.4 oz)  Body mass index is 33.15 kg/m².    Intake/Output Summary (Last 24 hours) at 06/05/18 1030  Last data filed at 06/05/18 0506   Gross per 24 hour   Intake             3855 ml   Output             1515 ml   Net             2340 ml      Physical Exam   Constitutional: He appears well-developed and well-nourished.   HENT:   Head: Normocephalic and atraumatic.   Abdominal: There is tenderness.   Neurological: He is alert.   Psychiatric: He has a normal mood and affect. His behavior is normal.   Vitals reviewed.      Significant Labs:   CBC: No results for input(s): WBC, HGB, HCT, PLT in the last 48 hours.  CMP:   Recent Labs  Lab 06/04/18  0528 06/05/18  0418    135*   K 4.6 4.0    107   CO2 23 20*   GLU 94 84   BUN 47* 46*   CREATININE 3.1* 2.8*   CALCIUM 8.2* 8.1*   PROT 5.5* 5.4*   ALBUMIN 2.3* 2.2*   BILITOT 1.0 0.7   ALKPHOS 69 71   AST 45* 36   ALT 65* 53*   ANIONGAP 9 8   EGFRNONAA 18* 20*       Significant Imaging:

## 2018-06-05 NOTE — PROGRESS NOTES
Placed patient on room air after breathing treatment.  Patient's sats are 95% on room air.  No complaint of SOB.

## 2018-06-06 VITALS
TEMPERATURE: 99 F | WEIGHT: 264.75 LBS | BODY MASS INDEX: 32.92 KG/M2 | SYSTOLIC BLOOD PRESSURE: 125 MMHG | DIASTOLIC BLOOD PRESSURE: 66 MMHG | OXYGEN SATURATION: 97 % | RESPIRATION RATE: 18 BRPM | HEART RATE: 61 BPM | HEIGHT: 75 IN

## 2018-06-06 PROBLEM — A41.59 KLEBSIELLA SEPSIS: Status: RESOLVED | Noted: 2018-06-04 | Resolved: 2018-06-06

## 2018-06-06 PROBLEM — K80.00 CHOLELITHIASIS WITH ACUTE CHOLECYSTITIS: Status: RESOLVED | Noted: 2018-05-30 | Resolved: 2018-06-06

## 2018-06-06 PROBLEM — K81.0 GANGRENOUS CHOLECYSTITIS: Status: RESOLVED | Noted: 2018-06-03 | Resolved: 2018-06-06

## 2018-06-06 PROBLEM — R79.89 ABNORMAL LFTS: Status: RESOLVED | Noted: 2018-05-31 | Resolved: 2018-06-06

## 2018-06-06 LAB
ALBUMIN SERPL BCP-MCNC: 2.3 G/DL
ALP SERPL-CCNC: 79 U/L
ALT SERPL W/O P-5'-P-CCNC: 49 U/L
ANION GAP SERPL CALC-SCNC: 7 MMOL/L
AST SERPL-CCNC: 41 U/L
BILIRUB SERPL-MCNC: 0.6 MG/DL
BUN SERPL-MCNC: 42 MG/DL
CALCIUM SERPL-MCNC: 8.8 MG/DL
CHLORIDE SERPL-SCNC: 107 MMOL/L
CO2 SERPL-SCNC: 25 MMOL/L
CREAT SERPL-MCNC: 2.5 MG/DL
EST. GFR  (AFRICAN AMERICAN): 26 ML/MIN/1.73 M^2
EST. GFR  (NON AFRICAN AMERICAN): 23 ML/MIN/1.73 M^2
GLUCOSE SERPL-MCNC: 91 MG/DL
INR PPP: 1.1
MAGNESIUM SERPL-MCNC: 1.9 MG/DL
PHOSPHATE SERPL-MCNC: 2.6 MG/DL
POCT GLUCOSE: 120 MG/DL (ref 70–110)
POCT GLUCOSE: 99 MG/DL (ref 70–110)
POTASSIUM SERPL-SCNC: 4.2 MMOL/L
PROT SERPL-MCNC: 5.7 G/DL
PROTHROMBIN TIME: 11.8 SEC
SODIUM SERPL-SCNC: 139 MMOL/L

## 2018-06-06 PROCEDURE — 83735 ASSAY OF MAGNESIUM: CPT

## 2018-06-06 PROCEDURE — 80053 COMPREHEN METABOLIC PANEL: CPT

## 2018-06-06 PROCEDURE — 25000003 PHARM REV CODE 250: Performed by: HOSPITALIST

## 2018-06-06 PROCEDURE — 85610 PROTHROMBIN TIME: CPT

## 2018-06-06 PROCEDURE — 63600175 PHARM REV CODE 636 W HCPCS: Performed by: EMERGENCY MEDICINE

## 2018-06-06 PROCEDURE — 36415 COLL VENOUS BLD VENIPUNCTURE: CPT

## 2018-06-06 PROCEDURE — 84100 ASSAY OF PHOSPHORUS: CPT

## 2018-06-06 PROCEDURE — 25000003 PHARM REV CODE 250: Performed by: INTERNAL MEDICINE

## 2018-06-06 PROCEDURE — 63600175 PHARM REV CODE 636 W HCPCS: Performed by: HOSPITALIST

## 2018-06-06 PROCEDURE — 25000003 PHARM REV CODE 250: Performed by: EMERGENCY MEDICINE

## 2018-06-06 RX ORDER — LACTULOSE 10 G/15ML
30 SOLUTION ORAL ONCE
Status: DISCONTINUED | OUTPATIENT
Start: 2018-06-06 | End: 2018-06-06 | Stop reason: HOSPADM

## 2018-06-06 RX ORDER — AMOXICILLIN AND CLAVULANATE POTASSIUM 875; 125 MG/1; MG/1
1 TABLET, FILM COATED ORAL 2 TIMES DAILY
Qty: 24 TABLET | Refills: 0 | Status: SHIPPED | OUTPATIENT
Start: 2018-06-06 | End: 2018-06-18

## 2018-06-06 RX ADMIN — DOFETILIDE 125 MCG: 0.12 CAPSULE ORAL at 08:06

## 2018-06-06 RX ADMIN — ASPIRIN 81 MG 81 MG: 81 TABLET ORAL at 08:06

## 2018-06-06 RX ADMIN — FOLIC ACID 1000 MCG: 1 TABLET ORAL at 08:06

## 2018-06-06 RX ADMIN — PREDNISONE 5 MG: 5 TABLET ORAL at 08:06

## 2018-06-06 RX ADMIN — PANTOPRAZOLE SODIUM 40 MG: 40 TABLET, DELAYED RELEASE ORAL at 08:06

## 2018-06-06 RX ADMIN — LEVOTHYROXINE SODIUM 112 MCG: 112 TABLET ORAL at 05:06

## 2018-06-06 RX ADMIN — HYDRALAZINE HYDROCHLORIDE 10 MG: 20 INJECTION INTRAMUSCULAR; INTRAVENOUS at 08:06

## 2018-06-06 RX ADMIN — TAMSULOSIN HYDROCHLORIDE 0.4 MG: 0.4 CAPSULE ORAL at 08:06

## 2018-06-06 RX ADMIN — ONDANSETRON 8 MG: 8 TABLET, ORALLY DISINTEGRATING ORAL at 05:06

## 2018-06-06 RX ADMIN — DOCUSATE SODIUM 100 MG: 100 CAPSULE, LIQUID FILLED ORAL at 08:06

## 2018-06-06 RX ADMIN — ATENOLOL 25 MG: 25 TABLET ORAL at 08:06

## 2018-06-06 NOTE — PROGRESS NOTES
"[facility] Medicine  Progress Note    Patient Name: Linda Skelton  MRN: [id number]  Patient Class: IP- Inpatient   Admission Date: 5/30/2018  Length of Stay: 6 days  Attending Physician: Angel Mckinnon MD  Primary Care Provider: Anderson Jerome MD        Subjective:     Principal Problem:Klebsiella sepsis    HPI:  81 y/o male with CAD, Afib s/p ablation, coumadin for anticoagulationn, HTN, HLP, panhypopituitarism, and h/o DVT who presented with c/o mid-epigastric abdominal pain that started last night. He reported an episode of sharp pain last night around midnight that self resolved and he went to sleep. Then around 1:30 this afternoon, the mid-epigastric pain returned, described as "pressure" and intensity 8/10, associated with +N/V and +diaphoresis. He reports subjective chills, but no fever. Denies chest pain and SOB. Reports pain with N/V worse with attempt at eating, and he feels constant nausea and just "feeling bad" and not improved at all since coming to the hospital. No recent sick contacts; and no reported unusual food ingestions.  In the ER, workup was remarkable for negative cardiac enzyme, LFTs with Tbil of 1.4, AST/ALT=205/95, CTA of chest negative for PE but did show that the gallbladder is mildly distended noting there may be cholelithiasis or sludge near the gallbladder neck.     Hospital Course:  Mr. Skelton was admitted with abdominal pain with CTA that was negative for PE, but showed gallbladder that was mildly distended noting there may be cholelithiasis or sludge near the gallbladder neck which was concerning for cholecystitis as source of pain. He was worked up with U/S which was remarkable for cholelithiasis with minimal gallbladder wall thickening but then HIDA scan that was consistent with cholecysititis. GI and Surgery are following. Pt the spiked a fever and he was started on Zosyn. Blood cultures returned with GNR. He had his coumadin reversed and was " worked up with MRCP which was negative. Plan to take patient to OR for cholecystectomy on 6/2.  Patient's blood cultures from 6/2 were positive as well and repeat blood cultures were sent on 6/3 that were positive as well.  ID was consulted.  Renal was consulted on 6/4 for worsening renal function. The patient's renal failure was likely ATN from dye. It improved over the course of several days. Blood cultures remained negative.  PT/OT rec: home with H/H. The patient will be discharged to home today. Activity as tolerated. Diet- low NA. Follow up with PCP and surgery in one week.     Interval History: No new issues.    Review of Systems   Constitutional: Negative for activity change.   Respiratory: Negative for chest tightness.    Cardiovascular: Negative for chest pain.   Gastrointestinal: Negative for abdominal pain, nausea and vomiting.   Genitourinary: Negative for difficulty urinating.   Musculoskeletal: Negative for arthralgias.     Objective:     Vital Signs (Most Recent):  Temp: 98.6 °F (37 °C) (06/06/18 0722)  Pulse: 71 (06/06/18 0722)  Resp: 18 (06/06/18 0722)  BP: (!) 182/83 (06/06/18 0722)  SpO2: (!) 92 % (06/06/18 0722) Vital Signs (24h Range):  Temp:  [97.6 °F (36.4 °C)-99.2 °F (37.3 °C)] 98.6 °F (37 °C)  Pulse:  [63-71] 71  Resp:  [18-20] 18  SpO2:  [90 %-97 %] 92 %  BP: (130-182)/(67-85) 182/83     Weight: 120.1 kg (264 lb 12.4 oz)  Body mass index is 33.09 kg/m².    Intake/Output Summary (Last 24 hours) at 06/06/18 0935  Last data filed at 06/06/18 0700   Gross per 24 hour   Intake             1200 ml   Output             2125 ml   Net             -925 ml      Physical Exam   Constitutional: He appears well-developed and well-nourished.   HENT:   Head: Normocephalic and atraumatic.   Abdominal: There is no tenderness.   Neurological: He is alert.   Psychiatric: He has a normal mood and affect. His behavior is normal.   Vitals reviewed.       Significant Labs:   CBC: No results for input(s): WBC, HGB,  HCT, PLT in the last 48 hours.  CMP:   Recent Labs  Lab 06/05/18  0418 06/06/18  0547   * 139   K 4.0 4.2    107   CO2 20* 25   GLU 84 91   BUN 46* 42*   CREATININE 2.8* 2.5*   CALCIUM 8.1* 8.8   PROT 5.4* 5.7*   ALBUMIN 2.2* 2.3*   BILITOT 0.7 0.6   ALKPHOS 71 79   AST 36 41*   ALT 53* 49*   ANIONGAP 8 7*   EGFRNONAA 20* 23*       Significant Imaging:     Assessment/Plan:      * Klebsiella sepsis    Continued positive blood cultures.  ID consulted. Repeated blood cultures on 6/4.  Abx changed to Ceftriaxone    So far- blood cultures from 6/4 NG.  Will treat with 2 weeks of cipro from 6/4.            Gangrenous cholecystitis    S/p surgery on 6/2.           Cholelithiasis with acute cholecystitis    Pt reported mid-epigastric abdominal pain, and not chest pain  Given abnormal LFTS (previously normal 4 weeks ago) along with CTA findings that showed gallbladder is mildly distended noting there may be cholelithiasis or sludge near the gallbladder neck which is concerning for cholecystitis as source of pain  Lipase was normal  RUQ abdominal U/S and HIDA consistent with cholecystitis  Pt spiked fever and was started on empiric Zosyn  Blood culture positive for GNR from 5/31  Supportive care with IVF, pain control and anti-emetics  Appreciate GI following and MRCP was negative for any retained stones  Plan was for cholecystectomy yesterday but delayed until today  Repeat blood culture were done today to assess for clearance- + on 6/2. Will repeat today- 6/3.+.         Abnormal LFTs    As discussed above  Hepatitis panel negative        Benign non-nodular prostatic hyperplasia without lower urinary tract symptoms    Continue tamulosin        Status post catheter ablation of atrial fibrillation    As discussed above        Mixed hyperlipidemia    Hold statin for now given increased LFTs        History of deep 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, 1997    Treated and he was anticoagulated with  coumadin  As discussed above        Essential hypertension    Continue atenolol        Coronary artery disease with hx of MI    Pt denies chest pain and initial cardiac marker is negative  Subsequent repeat troponin with mild elevation  Cleared by Cardiology with moderate risk for surgery  Continue home medication regimen        Chronic kidney disease, stage 3    Creatinine at baseline  Will monitor  With acute renal failure.  Will consult nephrology. Increase IV fluids.  Improving. Should improve daily. Will d/c to home. Follow up with Dr. Pepper.         Gabhypopituitarism    Continue home medication regimen        Atrial fibrillation    S/p ablation  He was therapeutic on coumadin on admission  S/p reversal with Vitamin K and FFP for surgery  Will resume coumadin after appropriate time as passed post op        moderate malnutrition- will discuss with patient  Debility- PT/OT rec: H/H.     VTE Risk Mitigation         Ordered     warfarin (COUMADIN) tablet 5 mg  Daily      06/04/18 1108     enoxaparin injection 30 mg  Daily      06/03/18 1119        Will d/c to home.       Angel Garcia MD  Department of Hospital Medicine   [facility]

## 2018-06-06 NOTE — PLAN OF CARE
Problem: Arrhythmia/Dysrhythmia (Symptomatic) (Adult)  Intervention: Prevent/Manage Thrombi/Emboli   06/06/18 0458   OTHER   VTE Required Core Measure Pharmacological prophylaxis initiated/maintained   Minimize Embolism Risk   VTE Prevention/Management bleeding precautions maintained;bleeding risk assessed;dorsiflexion/plantar flexion performed;fluids promoted;ROM (passive) performed;ROM (active) performed   Safety Interventions   Bleeding Precautions blood pressure closely monitored;coagulation study results reviewed;monitored for signs of bleeding     Intervention: Promote Hemodynamic Stability   06/06/18 0458   Cognitive Interventions   Sensory Stimulation Regulation care clustered;lighting decreased;quiet environment promoted   Positioning   Head of Bed (HOB) HOB elevated

## 2018-06-06 NOTE — ASSESSMENT & PLAN NOTE
Continued positive blood cultures.  ID consulted. Repeated blood cultures on 6/4.  Abx changed to Ceftriaxone    So far- blood cultures from 6/4 NG.  Will treat with 2 weeks of cipro from 6/4.

## 2018-06-06 NOTE — NURSING
Notified DR. Garcia that pt has not had a BM since 6/1; lactulose ordered. Also, informed MD that pt has some redness on left forearm, where IV once was , and that pt has some redness around his abdominal staples. No other orders given. Pt denies complaints. Will continue to monitor.

## 2018-06-06 NOTE — SUBJECTIVE & OBJECTIVE
Interval History: ***    Review of Systems  Objective:     Vital Signs (Most Recent):  Temp: 98.6 °F (37 °C) (06/06/18 0722)  Pulse: 77 (06/06/18 0939)  Resp: 18 (06/06/18 0722)  BP: 132/63 (06/06/18 0939)  SpO2: (!) 92 % (06/06/18 0722) Vital Signs (24h Range):  Temp:  [97.6 °F (36.4 °C)-99.2 °F (37.3 °C)] 98.6 °F (37 °C)  Pulse:  [63-77] 77  Resp:  [18-20] 18  SpO2:  [90 %-97 %] 92 %  BP: (130-182)/(63-85) 132/63     Weight: 120.1 kg (264 lb 12.4 oz)  Body mass index is 33.09 kg/m².    Estimated Creatinine Clearance: 31.3 mL/min (A) (based on SCr of 2.5 mg/dL (H)).    Physical Exam    Significant Labs: {Results:85844}    Significant Imaging: {Imaging Review:87516}

## 2018-06-06 NOTE — PROGRESS NOTES
"Linda Skelton is a 83 y.o. male patient.    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  (Eating better  Looks more active).      Objective:  General Appearance:  Comfortable and ill-appearing.    Vital signs: (most recent): Blood pressure 125/66, pulse 61, temperature 98.6 °F (37 °C), temperature source Oral, resp. rate 18, height 6' 3" (1.905 m), weight 120.1 kg (264 lb 12.4 oz), SpO2 97 %.    HEENT: Normal HEENT exam.    Lungs:  There are decreased breath sounds.    Heart: S1 normal and S2 normal.    Abdomen: Abdomen is soft.  Bowel sounds are normal.   There is generalized tenderness.     Extremities: There is dependent edema.    Neurological: Patient is alert.    Skin:  Warm and dry.      Intake/Output - Last 3 Shifts       06/04 0700 - 06/05 0659 06/05 0700 - 06/06 0659 06/06 0700 - 06/07 0659    P.O. 1440 1200     I.V. (mL/kg) 2751 (22.9)      IV " Piggyback 150      Total Intake(mL/kg) 4341 (36.1) 1200 (10)     Urine (mL/kg/hr) 1900 (0.7) 1925 (0.7) 600 (0.8)    Emesis/NG output 0 (0)      Drains 50 (0)      Stool 0 (0)      Blood 0 (0)      Total Output 1950 1925 600    Net +2391 -725 -600                 Lab Results   Component Value Date    WBC 8.78 06/03/2018    HGB 13.7 (L) 06/03/2018    HCT 40.5 06/03/2018    MCV 96 06/03/2018    PLT 93 (L) 06/03/2018     BMP  Lab Results   Component Value Date     06/06/2018    K 4.2 06/06/2018     06/06/2018    CO2 25 06/06/2018    BUN 42 (H) 06/06/2018    CREATININE 2.5 (H) 06/06/2018    CALCIUM 8.8 06/06/2018    ANIONGAP 7 (L) 06/06/2018    ESTGFRAFRICA 26 (A) 06/06/2018    EGFRNONAA 23 (A) 06/06/2018     Current Facility-Administered Medications   Medication    acetaminophen tablet 650 mg    aspirin chewable tablet 81 mg    atenolol tablet 25 mg    cefTRIAXone (ROCEPHIN) 2 g in dextrose 5 % 50 mL IVPB    dextrose 50% injection 12.5 g    dextrose 50% injection 25 g    docusate sodium capsule 100 mg    dofetilide capsule 125 mcg    enoxaparin injection 30 mg    folic acid tablet 1,000 mcg    glucagon (human recombinant) injection 1 mg    glucose chewable tablet 16 g    glucose chewable tablet 24 g    hydrALAZINE injection 10 mg    lactulose 20 gram/30 mL solution Soln 30 g    levothyroxine tablet 112 mcg    morphine injection 1 mg    morphine injection 2 mg    ondansetron disintegrating tablet 8 mg    oxyCODONE-acetaminophen  mg per tablet 1 tablet    oxyCODONE-acetaminophen 5-325 mg per tablet 1 tablet    oxyCODONE-acetaminophen 7.5-325 mg per tablet 1 tablet    pantoprazole EC tablet 40 mg    predniSONE tablet 5 mg    tamsulosin 24 hr capsule 0.4 mg    warfarin (COUMADIN) tablet 5 mg       Assessment & Plan  1.NADIA. Creatinine is better. CKD 3. Resolving.  2.Klebsiella sepsis. Blood cx ngtd. Cont tx per primary team.  3.moderate maln. Eating more. Following.  4.HTN. SBP  better.  5.afib. Tx per cards. Watch tx with decrease gfr.  6.met aciosis. Doing better.    Please have home health draw bmp next week and send to my [phone]  Yanelis Pepper MD  6/6/2018

## 2018-06-06 NOTE — NURSING
D/C info thoroughly discussed with pt and pt's wife; they deny any needs. Pt waiting for transport.

## 2018-06-06 NOTE — PROGRESS NOTES
"Appointment with PCP scheduled in [facility]- appointment scheduled for Friday June 15th at 10:40am.  Information entered into follow up tab to print on AVS at time of discharge.    1030- call placed to Dr Smith's office to schedule post hospital discharge surgery follow up appointment.  Appointment scheduled for Thursday June 14th at 2pm. Information entered into follow up tab to print on AVS at time of discharge.      1040-EDUCATION:  Pt  provided with educational information on post operative written information.  Information reviewed and placed in :My Healthcare Packet" to be brought home for pt/spouse to use as resource after discharge.  Information included:  signs and symptoms to look for and call the doctor if experiencing, and symptoms that may indicate a medical emergency: CALL 911.      All questions answered.  Teach back method used.  pt verbalized understanding of all information by stating, "  He will make sure that he goes to all scheduled follow up appointments and that he is aware of the s/s of possible infection in regards to surgery and when to call MD and when to return to the ER.        1047-home health referral sent via [facility]. TN indicated that the plan is to d/c the pt to home on today and to contact TN when arranged. TN to continue to follow in [facility] for response    1059- follow up note sent via [facility] to inquire if they will be able to accept pt at time of discharge.    1127- no response as of this time from [facility] via [facility] or phone call.  TN placed call to [phone] was informed that the person whom could assist me was on the other line at this time and requested that TN name and contact be provided so that a message can be passed on for call back.  TN to continue to follow  "

## 2018-06-06 NOTE — PROGRESS NOTES
WRITTEN HEALTHCARE DISCHARGE INFORMATION      Things that YOU are responsible for to Manage Your Care At Home:  1. Getting your prescriptions filled.  2. Taking you medications as directed. DO NOT MISS ANY DOSES!  3. Going to your follow-up doctor appointments. This is important because it allows the doctor to monitor your progress and to determine if any changes need to be made to your treatment plan.     If you are unable to make your follow up appointments, please call the number listed and reschedule this appointment.      After discharge, if you need assistance, you can call [facility] On Call Nurse [facility] Line for 24/7 assistance at [phone]     If you are experience any signs or symptoms, Call your doctor or Call 911 and come to your nearest Emergency Room.     Thank you for choosing [facility] and allowing us to care for you.        You should receive a call from [facility] Discharge Department within 48-72 hours to help manage your care after discharge. Please try to make sure that you answer your phone for this important phone call.       Follow-up Information     Dickson Smith Jr, MD On 6/14/2018.    Specialty:  General Surgery  Why:  Appointment scheduled for Thursday June 14th at 2pm  Contact information:  [address] [place] [address]  [address]  [facility] [address]  [phone]             Anderson Jerome MD On 6/15/2018.    Specialty:  Internal Medicine  Why:  Appointment scheduled for friday June 15th at 10:40am  Contact information:  [address] [facility] [place] [address]  [phone]             Meghana Pepper MD In 1 week.    Specialty:  Nephrology  Why:  you will be contacted with follow up appointment after discharge from hospital  Contact information:  [address] [place] [address] [address]  [place] [address]  [phone]

## 2018-06-06 NOTE — PLAN OF CARE
06/06/18 1457   Final Note   Assessment Type Final Discharge Note   Discharge Disposition Home-[facility]   Hospital Follow Up  Appt(s) scheduled? Yes   Discharge plans and expectations educations in teach back method with documentation complete? Yes   Right Care Referral Info   Post Acute Recommendation Home-care

## 2018-06-06 NOTE — NURSING
Bedside report done with MIRIAN Hess.Pt appears to be resting comfortably and appears to be in no distress. Safety maintained.

## 2018-06-06 NOTE — ASSESSMENT & PLAN NOTE
Creatinine at baseline  Will monitor  With acute renal failure.  Will consult nephrology. Increase IV fluids.  Improving. Should improve daily. Will d/c to home. Follow up with Dr. Pepper.

## 2018-06-06 NOTE — PLAN OF CARE
[facility] HOME HEALTH ORDERS  FACE TO FACE ENCOUNTER    Patient Name: Linda Skelton  YOB: 1935    PCP: Anderson Jerome MD   PCP Address: [address] / [place] [address]  PCP Phone Number: [phone]  PCP Fax: [phone]    Encounter Date: 06/06/2018    Admit to Home Health    Diagnoses:  Active Hospital Problems    Diagnosis  POA    Benign non-nodular prostatic hyperplasia without lower urinary tract symptoms [N40.0]  Yes    Status post catheter ablation of atrial fibrillation [Z98.890]  Not Applicable    Mixed hyperlipidemia [E78.2]  Yes    History of deep 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, 1997 [Z86.718]  Not Applicable    Essential hypertension [I10]  Yes    Coronary artery disease with hx of MI [I25.10]  Yes    Chronic kidney disease, stage 3 [N18.3]  Yes    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]  Yes    Atrial fibrillation [I48.91]  Yes      Resolved Hospital Problems    Diagnosis Date Resolved POA    *Klebsiella sepsis [A41.4] 06/06/2018 Yes     Priority: 1 - High    Gangrenous cholecystitis [K81.0] 06/06/2018 Yes     Priority: 2     Cholelithiasis with acute cholecystitis [K80.00] 06/06/2018 Yes     Priority: 3     Abnormal LFTs [R94.5] 06/06/2018 Yes       Future Appointments  Date Time Provider Department Center   6/20/2018 8:30 AM LAB, [facility] [place]   6/20/2018 9:00 AM INJECTION, ALG [facility] [place]   6/25/2018 1:30 PM ROOMNINE, UROLOGY [facility] CYSTO[facility]   7/3/2018 9:00 AM INJECTION, ALG [facility] [place]   7/18/2018 9:00 AM INJECTION, ALG [facility] [place]   7/26/2018 9:40 AM Kamini Wheeler MD [facility] DER[facility] [place]   7/26/2018 1:00 PM Boyd Ahuja MD [facility] [address]   8/1/2018 9:00 AM INJECTION, ALG [facility] [place]   8/15/2018 9:00 AM INJECTION, ALG [facility] [place]   8/29/2018 9:00 AM INJECTION, ALG [facility] [place]     Follow-up Information     Dickson Smith Jr, MD In 2 weeks.     Specialty:  General Surgery  Contact information:  [address] [place] [address]  [address]  [place] [address] [phone]             Anderson Jerome MD In 1 week.    Specialty:  Internal Medicine  Contact information:  [address]  [place] [address] [phone]             Meghana Pepper MD In 1 week.    Specialty:  Nephrology  Contact information:  [address] [place] [address] [address]  [place] [address] [phone]                     I have seen and examined this patient face to face today. My clinical findings that support the need for the home health skilled services and home bound status are the following:  Weakness/numbness causing balance and gait disturbance due to Weakness/Debility making it taxing to leave home.    Allergies:Review of patient's allergies indicates:  No Known Allergies    Diet: 2 gram sodium diet    Activities: activity as tolerated    Nursing:   SN to complete comprehensive assessment including routine vital signs. Instruct on disease process and s/s of complications to report to MD. Review/verify medication list sent home with the patient at time of discharge  and instruct patient/caregiver as needed. Frequency may be adjusted depending on start of care date.    Notify MD if SBP > 160 or < 90; DBP > 90 or < 50; HR > 120 or < 50; Temp > 101; Other:         CONSULTS:    Physical Therapy to evaluate and treat. Evaluate for home safety and equipment needs; Establish/upgrade home exercise program. Perform / instruct on therapeutic exercises, gait training, transfer training, and Range of Motion.  Occupational Therapy to evaluate and treat. Evaluate home environment for safety and equipment needs. Perform/Instruct on transfers, ADL training, ROM, and therapeutic exercises.  Aide to provide assistance with personal care, ADLs, and vital signs.        Medications: Review discharge medications with patient and family and provide education.      Current Discharge Medication List      START  taking these medications    Details   amoxicillin-clavulanate 875-125mg (AUGMENTIN) 875-125 mg per tablet Take 1 tablet by mouth 2 (two) times daily.  Qty: 24 tablet, Refills: 0         CONTINUE these medications which have NOT CHANGED    Details   aspirin 81 mg Tab Take 81 mg by mouth every evening. 1 Tablet Oral Every night      atenolol (TENORMIN) 25 MG tablet TAKE ONE TABLET BY MOUTH twice DAILY  Qty: 90 tablet, Refills: 3    Associated Diagnoses: Essential hypertension      dofetilide (TIKOSYN) 500 MCG Cap TAKE 1 CAPSULE 2 TIMES DAILY  Qty: 180 capsule, Refills: 3    Associated Diagnoses: Atrial fibrillation, unspecified type      folic acid (FOLVITE) 1 MG tablet Take 1 tablet (1,000 mcg total) by mouth once daily.  Qty: 90 tablet, Refills: 1      levothyroxine (SYNTHROID) 112 MCG tablet Take 1 tablet (112 mcg total) by mouth before breakfast.  Qty: 90 tablet, Refills: 3    Associated Diagnoses: Panhypopituitarism      NITROSTAT 0.4 mg SL tablet DISSOLVE ONE TABLET UNDER THE TONGUE EVERY 5 MINUTES AS NEEDED FOR CHEST PAIN.  DO NOT EXCEED A TOTAL OF 3 DOSES IN 15 MINUTES NOW  Qty: 25 tablet, Refills: 3    Comments: Please consider 90 day supplies to promote better adherence  Associated Diagnoses: Coronary artery disease      omeprazole (PRILOSEC) 20 MG capsule Take 20 mg by mouth every other day.      predniSONE (DELTASONE) 5 MG tablet Take 1 tablet (5 mg total) by mouth once daily.  Qty: 90 tablet, Refills: 3    Associated Diagnoses: Panhypopituitarism      simvastatin (ZOCOR) 80 MG tablet TAKE ONE TABLET BY MOUTH IN THE EVENING  Qty: 90 tablet, Refills: 3    Associated Diagnoses: Hyperlipidemia      tamsulosin (FLOMAX) 0.4 mg Cp24 Take 1 capsule (0.4 mg total) by mouth once daily.  Qty: 90 capsule, Refills: 3    Associated Diagnoses: Benign non-nodular prostatic hyperplasia without lower urinary tract symptoms      triamcinolone acetonide 0.1% (KENALOG) 0.1 % cream APPLY  CREAM TOPICALLY TO AFFECTED AREA TWICE  DAILY  Qty: 454 g, Refills: 3    Comments: Please consider 90 day supplies to promote better adherence  Associated Diagnoses: Mycosis fungoides, unspecified body region      warfarin (COUMADIN) 5 MG tablet Take 1.5 tablets (7.5 mg total) by mouth Daily. Current Dose ( 5 mg on Tue; 7.5 mg all other days) or as directed by coumadin clinic  Qty: 135 tablet, Refills: 3    Comments: Called in prescription to ([facility]) ([phone]) Pharmacist/Lisa) (90/day)  Associated Diagnoses: Anticoagulation monitoring by pharmacist; Paroxysmal atrial fibrillation; Chronic atrial fibrillation      testosterone cypionate (DEPOTESTOTERONE CYPIONATE) 100 mg/mL injection Inject 1 mL (100 mg total) into the muscle every 14 (fourteen) days.  Qty: 10 mL, Refills: 2         STOP taking these medications       sulfamethoxazole-trimethoprim 800-160mg (BACTRIM DS) 800-160 mg Tab Comments:   Reason for Stopping:               I certify that this patient is confined to his home and needs intermittent skilled nursing care, physical therapy and occupational therapy.

## 2018-06-06 NOTE — PLAN OF CARE
06/04/18 1029   Discharge Reassessment   Assessment Type Discharge Planning Reassessment   Do you have any problems affording any of your prescribed medications? No   Discharge Plan A Home with family   Patient choice form signed by patient/caregiver N/A   Can the patient answer the patient profile reliably? Yes, cognitively intact   How does the patient rate their overall health at the present time? Fair   Describe the patient's ability to walk at the present time. No restrictions   How often would a person be available to care for the patient? Whenever needed   Number of comorbid conditions (as recorded on the chart) Five or more   During the past month, has the patient often been bothered by feeling down, depressed or hopeless? No

## 2018-06-06 NOTE — PROGRESS NOTES
Received bedside report. Patient resting quietly in bed, arouses easily to voice, no distress noted. Telemetry monitoring in place with alarms on, IV Rt forearm site intact saline-locked. Safety maintained.

## 2018-06-06 NOTE — DISCHARGE SUMMARY
"[facility]  Discharge Summary      Patient Name: Linda Skelton  MRN: [id number]  Admission Date: 5/30/2018  Hospital Length of Stay: 6 days  Discharge Date and Time:  06/06/2018 9:40 AM  Attending Physician: Angel Mckinnon MD   Discharging Provider: Angel Mckinnon MD  Primary Care Provider: Anderson Jerome MD      HPI:   83 y/o male with CAD, Afib s/p ablation, coumadin for anticoagulationn, HTN, HLP, panhypopituitarism, and h/o DVT who presented with c/o mid-epigastric abdominal pain that started last night. He reported an episode of sharp pain last night around midnight that self resolved and he went to sleep. Then around 1:30 this afternoon, the mid-epigastric pain returned, described as "pressure" and intensity 8/10, associated with +N/V and +diaphoresis. He reports subjective chills, but no fever. Denies chest pain and SOB. Reports pain with N/V worse with attempt at eating, and he feels constant nausea and just "feeling bad" and not improved at all since coming to the hospital. No recent sick contacts; and no reported unusual food ingestions.  In the ER, workup was remarkable for negative cardiac enzyme, LFTs with Tbil of 1.4, AST/ALT=205/95, CTA of chest negative for PE but did show that the gallbladder is mildly distended noting there may be cholelithiasis or sludge near the gallbladder neck.     Procedure(s) (LRB):  CHOLECYSTECTOMY, LAPAROSCOPIC (N/A)      Hospital Course:   Mr. Skelton was admitted with abdominal pain with CTA that was negative for PE, but showed gallbladder that was mildly distended noting there may be cholelithiasis or sludge near the gallbladder neck which was concerning for cholecystitis as source of pain. He was worked up with U/S which was remarkable for cholelithiasis with minimal gallbladder wall thickening but then HIDA scan that was consistent with cholecysititis. GI and Surgery are following. Pt the spiked a fever and he was started " on Zosyn. Blood cultures returned with GNR. He had his coumadin reversed and was worked up with MRCP which was negative. Plan to take patient to OR for cholecystectomy on 6/2.  Patient's blood cultures from 6/2 were positive as well and repeat blood cultures were sent on 6/3 that were positive as well.  ID was consulted.  Renal was consulted on 6/4 for worsening renal function. The patient's renal failure was likely ATN from dye. It improved over the course of several days. Blood cultures remained negative.  PT/OT rec: home with H/H. The patient will be discharged to home today. Activity as tolerated. Diet- low NA. Follow up with PCP and surgery in one week.  Follow up with Dr. Pepper in one week.     I will call the patient at home on 6/7 to check on.     New Medication:  Augmentin bid for 12 days     (this was chosen secondary to cipro with prolonged QT as well as bactrim in renal failure).     Addendum 6/7/18. Called the patient at home to check on. I spoke to his wife who told me he had a swollen R leg/foot. He has a history of DVT's in the past and was mainly bed bound at the hospital.  He is at increased risk. I recommended that she bring him to the ED for an ultrasound.  She expressed understanding but noted her  might not want to come.       Consults:   Consults         Status Ordering Provider     Inpatient consult to Cardiology  Once     Provider:  Burt Fink MD    Acknowledged KAYDEN MIJARES     Inpatient consult to Cardiology  Once     Provider:  Jac Walker MD    Acknowledged JIMMY PEPPER     Inpatient consult to Gastroenterology  Once     Provider:  Brian Rader MD    Completed FRANKLYN PURDY     Inpatient consult to General Surgery  Once     Provider:  Juan Sanchez MD    Completed KAYDEN MIJARES     Inpatient consult to Infectious Diseases  Once     Provider:  (Not yet assigned)    Completed AGNIESZKA PHAM     Inpatient consult to Nephrology  Once     Provider:  (Not  yet assigned)    Completed AGNIESZKA PHAM          No new Assessment & Plan notes have been filed under this hospital service since the last note was generated.  Service: Hospital Medicine    Final Active Diagnoses:    Diagnosis Date Noted POA    Benign non-nodular prostatic hyperplasia without lower urinary tract symptoms [N40.0] 07/05/2017 Yes    Status post catheter ablation of atrial fibrillation [Z98.890] 07/08/2016 Not Applicable    Mixed hyperlipidemia [E78.2] 10/23/2014 Yes    History of deep 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, 1997 [Z86.718] 09/08/2014 Not Applicable    Essential hypertension [I10] 04/04/2014 Yes    Coronary artery disease with hx of MI [I25.10]  Yes    Chronic kidney disease, stage 3 [N18.3] 12/26/2012 Yes    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] 08/06/2012 Yes    Atrial fibrillation [I48.91] 07/27/2012 Yes      Problems Resolved During this Admission:    Diagnosis Date Noted Date Resolved POA    PRINCIPAL PROBLEM:  Klebsiella sepsis [A41.4] 06/04/2018 06/06/2018 Yes    Gangrenous cholecystitis [K81.0] 06/03/2018 06/06/2018 Yes    Cholelithiasis with acute cholecystitis [K80.00] 05/30/2018 06/06/2018 Yes    Abnormal LFTs [R94.5] 05/31/2018 06/06/2018 Yes       Discharged Condition: good    Disposition: Home-Health Care [facility]    Follow Up:  Follow-up Information     Dickson Smith Jr, MD In 2 weeks.    Specialty:  General Surgery  Contact information:  [address] [place] [address]  [address]  [place] [id number] [phone]             Anderson Jerome MD In 1 week.    Specialty:  Internal Medicine  Contact information:  [address] [facility]  [place] [id number] [phone]             Meghana Pepper MD In 1 week.    Specialty:  Nephrology  Contact information:  [address] [place] [address] [address]  [place] [id number] [phone]                 Patient Instructions:   No discharge procedures on file.    Significant Diagnostic Studies:    Pending Diagnostic Studies:     None          Medications:  Reconciled Home Medications:      Medication List      CONTINUE taking these medications    aspirin 81 mg Tab  Take 81 mg by mouth every evening. 1 Tablet Oral Every night     atenolol 25 MG tablet  Commonly known as:  TENORMIN  TAKE ONE TABLET BY MOUTH twice DAILY     dofetilide 500 MCG Cap  Commonly known as:  TIKOSYN  TAKE 1 CAPSULE 2 TIMES DAILY     folic acid 1 MG tablet  Commonly known as:  FOLVITE  Take 1 tablet (1,000 mcg total) by mouth once daily.     levothyroxine 112 MCG tablet  Commonly known as:  SYNTHROID  Take 1 tablet (112 mcg total) by mouth before breakfast.     NITROSTAT 0.4 MG SL tablet  Generic drug:  nitroGLYCERIN  DISSOLVE ONE TABLET UNDER THE TONGUE EVERY 5 MINUTES AS NEEDED FOR CHEST PAIN.  DO NOT EXCEED A TOTAL OF 3 DOSES IN 15 MINUTES NOW     omeprazole 20 MG capsule  Commonly known as:  PRILOSEC  Take 20 mg by mouth every other day.     predniSONE 5 MG tablet  Commonly known as:  DELTASONE  Take 1 tablet (5 mg total) by mouth once daily.     simvastatin 80 MG tablet  Commonly known as:  ZOCOR  TAKE ONE TABLET BY MOUTH IN THE EVENING     tamsulosin 0.4 mg Cp24  Commonly known as:  FLOMAX  Take 1 capsule (0.4 mg total) by mouth once daily.     testosterone cypionate 100 mg/mL injection  Commonly known as:  DEPOTESTOTERONE CYPIONATE  Inject 1 mL (100 mg total) into the muscle every 14 (fourteen) days.     triamcinolone acetonide 0.1% 0.1 % cream  Commonly known as:  KENALOG  APPLY  CREAM TOPICALLY TO AFFECTED AREA TWICE DAILY     warfarin 5 MG tablet  Commonly known as:  COUMADIN  Take 1.5 tablets (7.5 mg total) by mouth Daily. Current Dose ( 5 mg on Tue; 7.5 mg all other days) or as directed by coumadin clinic        STOP taking these medications    sulfamethoxazole-trimethoprim 800-160mg 800-160 mg Tab  Commonly known as:  BACTRIM DS            Indwelling Lines/Drains at time of discharge:   Lines/Drains/Airways          No matching active lines, drains, or airways           Time spent on the discharge of patient:  > 30  minutes  Patient was seen and examined on the date of discharge and determined to be suitable for discharge.         Angel Garcia MD  Department of Hospital Medicine  [facility]

## 2018-06-06 NOTE — SUBJECTIVE & OBJECTIVE
Interval History: No new issues.    Review of Systems   Constitutional: Negative for activity change.   Respiratory: Negative for chest tightness.    Cardiovascular: Negative for chest pain.   Gastrointestinal: Negative for abdominal pain, nausea and vomiting.   Genitourinary: Negative for difficulty urinating.   Musculoskeletal: Negative for arthralgias.     Objective:     Vital Signs (Most Recent):  Temp: 98.6 °F (37 °C) (06/06/18 0722)  Pulse: 71 (06/06/18 0722)  Resp: 18 (06/06/18 0722)  BP: (!) 182/83 (06/06/18 0722)  SpO2: (!) 92 % (06/06/18 0722) Vital Signs (24h Range):  Temp:  [97.6 °F (36.4 °C)-99.2 °F (37.3 °C)] 98.6 °F (37 °C)  Pulse:  [63-71] 71  Resp:  [18-20] 18  SpO2:  [90 %-97 %] 92 %  BP: (130-182)/(67-85) 182/83     Weight: 120.1 kg (264 lb 12.4 oz)  Body mass index is 33.09 kg/m².    Intake/Output Summary (Last 24 hours) at 06/06/18 0935  Last data filed at 06/06/18 0700   Gross per 24 hour   Intake             1200 ml   Output             2125 ml   Net             -925 ml      Physical Exam   Constitutional: He appears well-developed and well-nourished.   HENT:   Head: Normocephalic and atraumatic.   Abdominal: There is no tenderness.   Neurological: He is alert.   Psychiatric: He has a normal mood and affect. His behavior is normal.   Vitals reviewed.       Significant Labs:   CBC: No results for input(s): WBC, HGB, HCT, PLT in the last 48 hours.  CMP:   Recent Labs  Lab 06/05/18  0418 06/06/18  0547   * 139   K 4.0 4.2    107   CO2 20* 25   GLU 84 91   BUN 46* 42*   CREATININE 2.8* 2.5*   CALCIUM 8.1* 8.8   PROT 5.4* 5.7*   ALBUMIN 2.2* 2.3*   BILITOT 0.7 0.6   ALKPHOS 71 79   AST 36 41*   ALT 53* 49*   ANIONGAP 8 7*   EGFRNONAA 20* 23*       Significant Imaging:

## 2018-06-07 ENCOUNTER — HOSPITAL ENCOUNTER (EMERGENCY)
Facility: HOSPITAL | Age: 83
Discharge: HOME | End: 2018-06-07
Attending: EMERGENCY MEDICINE
Payer: MEDICARE

## 2018-06-07 VITALS
WEIGHT: 262 LBS | BODY MASS INDEX: 32.58 KG/M2 | OXYGEN SATURATION: 97 % | RESPIRATION RATE: 16 BRPM | DIASTOLIC BLOOD PRESSURE: 83 MMHG | SYSTOLIC BLOOD PRESSURE: 189 MMHG | HEART RATE: 87 BPM | HEIGHT: 75 IN | TEMPERATURE: 98 F

## 2018-06-07 DIAGNOSIS — M79.89 RIGHT LEG SWELLING: ICD-10-CM

## 2018-06-07 PROBLEM — Z90.49 STATUS POST CHOLECYSTECTOMY: Status: ACTIVE | Noted: 2018-06-07

## 2018-06-07 PROBLEM — B96.1 BACTEREMIA DUE TO KLEBSIELLA PNEUMONIAE: Status: ACTIVE | Noted: 2018-06-07

## 2018-06-07 PROBLEM — R78.81 BACTEREMIA DUE TO KLEBSIELLA PNEUMONIAE: Status: ACTIVE | Noted: 2018-06-07

## 2018-06-07 PROCEDURE — 99284 EMERGENCY DEPT VISIT MOD MDM: CPT

## 2018-06-07 NOTE — ED TRIAGE NOTES
Arrived via personal transportation. Right foot swelling and left hand swelling. Pt reports he was recently discharged from facility. Reports he was told to come here by PCP. Reports PMH of DVT.

## 2018-06-07 NOTE — PT/OT/SLP DISCHARGE
Physical Therapy Discharge Summary    Name: Linda Skelton  MRN: [id number]   Principal Problem: Klebsiella sepsis     Patient Discharged from acute Physical Therapy on 18.  Please refer to prior PT notes for functional status.     Assessment:     Patient was discharged unexpectedly.  Information required to complete an accurate discharge summary is unknown.  Refer to therapy initial evaluation and last progress note for initial and most recent functional status and goal achievement.  Recommendations made may be found in medical record.    Objective:     GOALS:    Physical Therapy Goals        Problem: Physical Therapy Goal    Goal Priority Disciplines Outcome Goal Variances Interventions   Physical Therapy Goal     PT/OT, PT      Description:  Goals to be met by: 18     Patient will increase functional independence with mobility by performin. Supine to sit with Modified [place]  2. Rolling to Left and Right with Modified [place]  3. Sit to stand transfer with Modified [place]  4. Bed to chair transfer with Modified [place]   5. Gait  x 250 feet with Modified [place] with or without Rolling Walker   6. Lower extremity exercise program x 30 reps per handout, with independence                      Reasons for Discontinuation of Therapy Services  Transfer to alternate level of care.      Plan:     Patient Discharged to: Home with Home Health Service.    Iram Lema, PT  2018

## 2018-06-07 NOTE — DISCHARGE INSTRUCTIONS
"Return to the Emergency Department of any acute worsening of your symptoms or for any other concern.     You should return to the ED for fever/chills, shortness of breath, chest pain, weakness or "passing out".     Pt should take all medications as prescribed.    Pt should follow up with PCP as soon as possible.    The risks associated with not taking your medications as prescribed and not following up with your Primary Care doctor or sub specialist includes worsening of your condition, pain, disability, loss of function or livelihood, and death      ANGEL Fishman M.D. 2:37 PM 6/7/2018      Our goal in the emergency department is to always give you outstanding care and exceptional service. You may receive a survey by mail or e-mail in the next week regarding your experience in our ED. We would greatly appreciate your completing and returning the survey. Your feedback provides us with a way to recognize our staff who give very good care and it helps us learn how to improve when your experience was below our aspiration of excellence.     "

## 2018-06-07 NOTE — ED PROVIDER NOTES
"Encounter Date: 6/7/2018    SCRIBE #1 NOTE: I, Cheryl Tanmaynor, am scribing for, and in the presence of,  Trip Fishman MD. I have scribed the following portions of the note - Other sections scribed: HPI and ROS.       History     Chief Complaint   Patient presents with    Leg Swelling     "I've been in the hospital for a week, checked out yesterday. He called me today, didn't like what he heard and ordered me back just now." Per family member "Dr. Mckinnon wanted him to come in for a sonogram." Per chart, pt to receive ultrasound for R foot swelling with hx DVT.     CC: Foot swelling    HPI: This is an 83 y.o. M who has CAD, HTN, Thyroid disease, anticoagulation long-term, CKD, BPH, and Hx of DVT who presents to the ED accompanied by relative for emergent evaluation of acute right foot swelling that began last night. Pt also has acute left hand swelling that began this morning. Per relative, the pt was discharged from the hospital last night, in which the right foot swelling was noticed upon arrival home. Pt received a follow/up call from Dr. Mckinnon and relative informed Dr. Mckinnon of current symptoms. Pt was instructed to report back to ED. Per relative, the pt was discharged from hospital yesterday after Cholecystectomy procedure 5 days ago and antibiotic treatment for Gangrene. Per relative, the pt usually has left foot swelling due to DVT. Pt's Coumadin was stopped during hospital admittance and was recently restarted. He is compliant with medication. Pt has no other complaints.      The history is provided by a relative. No  was used.     Review of patient's allergies indicates:  No Known Allergies  Past Medical History:   Diagnosis Date    ACS (acute coronary syndrome)     Acute coronary syndrome 2000    NSTEMI    Anticoagulant long-term use     Coumadin    Atrial fibrillation     Basal cell carcinoma excised     left scalp vertex    Bilateral leg edema 1/6/2014    BPH " (benign prostatic hyperplasia)     CKD (chronic kidney disease)     Coronary artery disease     DVT of leg (deep venous thrombosis) 09/08/2014    On coumadin for years    Encounter for blood transfusion     Hyperlipidemia     Hypertension     Hypogonadism, male     Leg fracture, left     Melanoma 01/04/2017     in situ crown of scalp    MI (myocardial infarction)     Panhypopituitarism     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    SQUAMOUS CELL CARCINOMA excised     left posterior scalp    Thyroid disease      Past Surgical History:   Procedure Laterality Date    APPENDECTOMY      BASAL CELL CARCINOMA EXCISION  01/2008    BRAIN SURGERY      pituitary adenoma removed    CARDIAC CATHETERIZATION  05/09/2000    S/P stent to RCA,    CARDIAC CATHETERIZATION      stents placed    CARDIAC ELECTROPHYSIOLOGY STUDY AND ABLATION      CORONARY ANGIOPLASTY  5/9/2000    RCA    CORONARY ANGIOPLASTY WITH STENT PLACEMENT      ESOPHAGEAL DILATION      EYE SURGERY Bilateral     cataracts removed and new lenses placed     FRACTURE SURGERY Left     leg    HERNIA REPAIR      LAPAROSCOPIC CHOLECYSTECTOMY N/A 6/2/2018    Procedure: CHOLECYSTECTOMY, LAPAROSCOPIC;  Surgeon: Dickson Smith Jr., MD;  Location: [facility];  Service: General;  Laterality: N/A;    left femur surgery      pituitatry      hypophysectomy    SKIN BIOPSY      TONSILLECTOMY      VASCULAR SURGERY       Family History   Problem Relation Age of Onset    Cancer Father     Skin cancer Neg Hx     Melanoma Neg Hx     Heart disease Neg Hx     Hypertension Neg Hx      Social History   Substance Use Topics    Smoking status: Never Smoker    Smokeless tobacco: Never Used      Comment: .  Two sons.  [employer] .      Alcohol use No     Review of Systems   Constitutional: Negative for chills and fever.   HENT: Negative for ear pain and sore throat.    Eyes: Negative for pain.   Respiratory:  Negative for cough and shortness of breath.    Cardiovascular: Negative for chest pain.   Gastrointestinal: Negative for abdominal pain, diarrhea, nausea and vomiting.   Genitourinary: Negative for dysuria.   Musculoskeletal: Negative for back pain.        (+) R and L foot swelling  (+) L hand swelling   Skin: Negative for rash.   Neurological: Negative for headaches.       Physical Exam     Initial Vitals [06/07/18 1320]   BP Pulse Resp Temp SpO2   (!) 181/90 65 18 97.8 °F (36.6 °C) 96 %      MAP       120.33         Physical Exam    Musculoskeletal:        Left wrist: Normal.        Right knee: Normal.        Left knee: Normal.        Right ankle: He exhibits swelling. He exhibits no ecchymosis, no deformity, no laceration and normal pulse. Achilles tendon normal.        Left ankle: Normal.        Left hand: He exhibits swelling. He exhibits normal range of motion, no tenderness, no bony tenderness, normal two-point discrimination, normal capillary refill, no deformity and no laceration. Normal sensation noted. Normal strength noted.        Right lower leg: Normal.        Left lower leg: Normal.        Right foot: There is swelling. There is normal range of motion, no tenderness, no bony tenderness, normal capillary refill, no crepitus, no deformity and no laceration.        Left foot: Normal.   Skin: Ecchymosis noted.         ED Course   Procedures  Labs Reviewed - No data to display       US Lower Extremity Veins Right   Final Result      No evidence of deep venous thrombosis in the right lower extremity.         Electronically signed by: Hank Briseno MD   Date:    06/07/2018   Time:    14:13           Medical Decision Making:   History:   Old Medical Records: I decided to obtain old medical records.  Old Records Summarized: records from previous admission(s).  ED Management:  No evidence of DVT in the right lower extremity. Otherwise, no new changes.   Discussed with Dr. Mckinnon and no further orders. Continue  discharge plan and follow up.   LUIS M Fishman M.D. 2:48 PM 6/7/2018              Scribe Attestation:   Scribe #1: I performed the above scribed service and the documentation accurately describes the services I performed. I attest to the accuracy of the note.    Attending Attestation:           Physician Attestation for Scribe:  Physician Attestation Statement for Scribe #1: I, Trip Fishman MD, reviewed documentation, as scribed by Cheryl Nunes in my presence, and it is both accurate and complete.                    Clinical Impression:   The encounter diagnosis was Right foot swelling.                             Tien Fishman MD  06/07/18 [id number]

## 2018-06-09 LAB — BACTERIA BLD CULT: NORMAL

## 2018-06-11 ENCOUNTER — TELEPHONE (OUTPATIENT)
Dept: ADMINISTRATIVE | Facility: CLINIC | Age: 83
End: 2018-06-11

## 2018-06-11 NOTE — PATIENT INSTRUCTIONS
Clinician name: BENITO MIRELES RN         Agency: [facility]: Phone [phone], Fax [phone]<br>  Reason for call:       patient refused hh   Level of urgency:, high<br>  Contact info: [phone] [email or web address] <br>  Whom to call back: clinician. And or teamlead [facility]: Phone [phone], Fax [phone]<br>

## 2018-06-14 ENCOUNTER — PES CALL (OUTPATIENT)
Dept: ADMINISTRATIVE | Facility: CLINIC | Age: 83
End: 2018-06-14

## 2018-06-14 NOTE — PROGRESS NOTES
This note was created by combination of typed  and Dragon dictation.  Transcription errors may be present.  If there are any questions, please contact me.    Assessment & Plan:   Acute kidney injury  Chronic kidney disease, stage 3  Bilateral leg edema   - NADIA on CKD, had been improving. Check BMP, will need to send copy to Dr. Pepper with nephrology.  -     Basic metabolic panel; Future; Expected date: 06/15/2018    Status post cholecystectomy 6/2018 with klebsiella bacteremia  Bacteremia due to Klebsiella pneumoniae in setting of cholecystitis - finish the augmentin. Blood cx NGTD    Perforated nasal septum - incidental on exam.  Apply pressure to the nares. No obvious source of bleed.  On anticoagulation.    There are no discontinued medications.  Modified Medications    No medications on file     New Prescriptions    No medications on file       Follow Up: No Follow-up on file.        Subjective:     Chief Complaint   Patient presents with    hospital followup       HPI  ROSALEE is a 83 y.o. male, last appointment with this clinic was 4/27/2018.    history of panhypopituitarism  hypothyroid  coronary artery disease s/p NSTEMI/PCI to RCA 2000  Hypertension  Hyperlipidemia  AFib on anticoagulation. 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  CKD stage 3.  Cholecystitis 5/2018 complicated by Klebsiella bacteremia.  Prostate nodule.    I previously saw him late April.  Prostate nodule, referred to urology.   Hospitalization for cholecystitis.  Complicated by klebsiella bacteremia  Subsequent ER visit for RLE swelling.  US no DVT.    His most recent blood culture was no growth to date.  Was discharged on Augmentin and is still taking that.  No fevers, no chills.  He still has a low appetite and has lost weight unintentionally after the surgery.  Food still tastes bad.  In retrospect, started having symptoms maybe 1 week prior, of abdominal pain while swimming, which  resolved.  Had another episode of pain which did not resolve with nitroglycerin but then ended up resolving an hour later.  He had gone to the emergency room with right leg swelling, that resolved.  Back to baseline.  Hospitalization complicated by acute kidney injury and he was seen by Nephrology while inpatient.  Plan was to follow up as an outpatient and also to get a repeat basic metabolic panel drawn through home health.  However he refused home health.  Wonders if he scratched his nostril because he is bleeding from his nostrils, it is a slight volume, but it is coming out of both nostrils.  Does not have a history of epistaxis.  He is on Coumadin.    He has been monitoring his blood pressures at home and they tend to be low normal.  I think this is result of his weight loss.      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    Chest pain 08/28/2017    Visit for monitoring Tikosyn therapy 07/20/2017    Current use of long term anticoagulation 07/20/2017    Benign non-nodular prostatic hyperplasia without lower urinary tract symptoms 07/05/2017    Hypogonadism in male 07/05/2017    History of melanoma in situ 02/23/2017     Scalp 1/2017 tx with mohs       Atypical lymphocytic infiltrate of skin 01/13/2017    Status post catheter ablation of atrial fibrillation 07/08/2016    Mixed hyperlipidemia 10/23/2014    History of deep 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, 1997 09/08/2014    Post PTCA 04/04/2014     Obesity, Class I, BMI 30-34.9 04/04/2014    ED (erectile dysfunction) 04/04/2014    Essential hypertension 04/04/2014    Bilateral leg edema 01/06/2014    Mitral regurgitation 02/14/2013    Coronary artery disease with hx of MI     Chronic kidney disease, stage 3 12/26/2012    Thyroid nodule 08/06/2012     10/31/2017 thyroid US: Stable left lobe nodule      Panhypopituitarism 08/06/2012    Atrial fibrillation 07/27/2012       PAST MEDICAL HISTORY:  Past Medical History:   Diagnosis Date    ACS (acute coronary syndrome)     Acute coronary syndrome 2000    NSTEMI    Anticoagulant long-term use     Coumadin    Atrial fibrillation     Basal cell carcinoma excised     left scalp vertex    Bilateral leg edema 1/6/2014    BPH (benign prostatic hyperplasia)     CKD (chronic kidney disease)     Coronary artery disease     DVT of leg (deep venous thrombosis) 09/08/2014    On coumadin for years    Encounter for blood transfusion     Hyperlipidemia     Hypertension     Hypogonadism, male     Leg fracture, left     Melanoma 01/04/2017     in situ crown of scalp    MI (myocardial infarction)     Panhypopituitarism     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    SQUAMOUS CELL CARCINOMA excised     left posterior scalp    Thyroid disease        PAST SURGICAL HISTORY:  Past Surgical History:   Procedure Laterality Date    APPENDECTOMY      BASAL CELL CARCINOMA EXCISION  01/2008    BRAIN SURGERY      pituitary adenoma removed    CARDIAC CATHETERIZATION  05/09/2000    S/P stent to RCA,    CARDIAC CATHETERIZATION      stents placed    CARDIAC ELECTROPHYSIOLOGY STUDY AND ABLATION      CORONARY ANGIOPLASTY  5/9/2000    RCA    CORONARY ANGIOPLASTY WITH STENT PLACEMENT      ESOPHAGEAL DILATION      EYE SURGERY Bilateral     cataracts removed and new lenses placed     FRACTURE SURGERY Left     leg    HERNIA REPAIR      LAPAROSCOPIC CHOLECYSTECTOMY N/A  6/2/2018    Procedure: CHOLECYSTECTOMY, LAPAROSCOPIC;  Surgeon: Dickson Smith Jr., MD;  Location: [facility] OR;  Service: General;  Laterality: N/A;    left femur surgery      pituitatry      hypophysectomy    SKIN BIOPSY      TONSILLECTOMY      VASCULAR SURGERY         SOCIAL HISTORY:  Social History     Social History    Marital status:      Spouse name: N/A    Number of children: N/A    Years of education: N/A     Occupational History    Not on file.     Social History Main Topics    Smoking status: Never Smoker    Smokeless tobacco: Never Used      Comment: .  Two sons.  [employer] .      Alcohol use No    Drug use: No    Sexual activity: Not Currently     Partners: Female     Other Topics Concern    Not on file     Social History Narrative     and a retired [employer] .  Non smoker.        ALLERGIES AND MEDICATIONS: updated and reviewed.  Review of patient's allergies indicates:  No Known Allergies  Current Outpatient Prescriptions   Medication Sig Dispense Refill    amoxicillin-clavulanate 875-125mg (AUGMENTIN) 875-125 mg per tablet Take 1 tablet by mouth 2 (two) times daily. 24 tablet 0    aspirin 81 mg Tab Take 81 mg by mouth every evening. 1 Tablet Oral Every night      atenolol (TENORMIN) 25 MG tablet TAKE ONE TABLET BY MOUTH twice DAILY 90 tablet 3    dofetilide (TIKOSYN) 500 MCG Cap TAKE 1 CAPSULE 2 TIMES DAILY 180 capsule 3    folic acid (FOLVITE) 1 MG tablet Take 1 tablet (1,000 mcg total) by mouth once daily. 90 tablet 1    levothyroxine (SYNTHROID) 112 MCG tablet Take 1 tablet (112 mcg total) by mouth before breakfast. 90 tablet 3    NITROSTAT 0.4 mg SL tablet DISSOLVE ONE TABLET UNDER THE TONGUE EVERY 5 MINUTES AS NEEDED FOR CHEST PAIN.  DO NOT EXCEED A TOTAL OF 3 DOSES IN 15 MINUTES NOW 25 tablet 3    omeprazole (PRILOSEC) 20 MG capsule Take 20 mg by mouth every other day.      predniSONE (DELTASONE) 5 MG tablet Take 1 tablet (5 mg total) by mouth  "once daily. 90 tablet 3    simvastatin (ZOCOR) 80 MG tablet TAKE ONE TABLET BY MOUTH IN THE EVENING 90 tablet 3    tamsulosin (FLOMAX) 0.4 mg Cp24 Take 1 capsule (0.4 mg total) by mouth once daily. 90 capsule 3    testosterone cypionate (DEPOTESTOTERONE CYPIONATE) 100 mg/mL injection Inject 1 mL (100 mg total) into the muscle every 14 (fourteen) days. 10 mL 2    triamcinolone acetonide 0.1% (KENALOG) 0.1 % cream APPLY  CREAM TOPICALLY TO AFFECTED AREA TWICE DAILY 454 g 3    warfarin (COUMADIN) 5 MG tablet Take 1.5 tablets (7.5 mg total) by mouth Daily. Current Dose ( 5 mg on Tue; 7.5 mg all other days) or as directed by coumadin clinic 135 tablet 3     Current Facility-Administered Medications   Medication Dose Route Frequency Provider Last Rate Last Dose    testosterone cypionate injection 100 mg  100 mg Intramuscular Q14 Days Anderson Jerome MD   100 mg at 05/23/18 0842       Review of Systems   Constitutional: Negative for chills and fever.   Respiratory: Negative for sputum production.    Cardiovascular: Negative for chest pain.   Gastrointestinal: Negative for abdominal pain.        Stools are soft   Genitourinary: Negative for dysuria.       Objective:   Physical Exam   Vitals:    06/15/18 1028   BP: 116/68   Pulse: (!) 58   Temp: 98.3 °F (36.8 °C)   SpO2: 98%   Weight: 112.1 kg (247 lb 3.9 oz)   Height: 6' 3" (1.905 m)    Body mass index is 30.9 kg/m².            Physical Exam   Constitutional: He is oriented to person, place, and time. He appears well-developed and well-nourished. No distress.   HENT:   The nares have some dried blood.  Incidental finding of a perforated septum   Eyes: EOM are normal.   Cardiovascular: Normal rate, regular rhythm and normal heart sounds.    No murmur heard.  Pulmonary/Chest: Effort normal and breath sounds normal.   Abdominal:   Incisions are clean, dry, intact, no induration, no fluctuance.  Abdomen is soft   Musculoskeletal: Normal range of motion. He exhibits edema. "   1+ edema to the mid tibia bilaterally   Neurological: He is alert and oriented to person, place, and time. Coordination normal.   Skin: Skin is warm and dry.   Psychiatric: He has a normal mood and affect. His behavior is normal. Thought content normal.

## 2018-06-15 ENCOUNTER — LAB VISIT (OUTPATIENT)
Dept: LAB | Facility: HOSPITAL | Age: 83
End: 2018-06-15
Attending: INTERNAL MEDICINE
Payer: MEDICARE

## 2018-06-15 ENCOUNTER — OFFICE VISIT (OUTPATIENT)
Dept: FAMILY MEDICINE | Facility: CLINIC | Age: 83
End: 2018-06-15
Payer: MEDICARE

## 2018-06-15 VITALS
WEIGHT: 247.25 LBS | DIASTOLIC BLOOD PRESSURE: 68 MMHG | HEART RATE: 58 BPM | SYSTOLIC BLOOD PRESSURE: 116 MMHG | OXYGEN SATURATION: 98 % | BODY MASS INDEX: 30.74 KG/M2 | TEMPERATURE: 98 F | HEIGHT: 75 IN

## 2018-06-15 DIAGNOSIS — B96.1 BACTEREMIA DUE TO KLEBSIELLA PNEUMONIAE: ICD-10-CM

## 2018-06-15 DIAGNOSIS — N17.9 ACUTE KIDNEY INJURY: Primary | ICD-10-CM

## 2018-06-15 DIAGNOSIS — N18.30 CHRONIC KIDNEY DISEASE, STAGE 3: ICD-10-CM

## 2018-06-15 DIAGNOSIS — J34.89 PERFORATED NASAL SEPTUM: ICD-10-CM

## 2018-06-15 DIAGNOSIS — R60.0 BILATERAL LEG EDEMA: ICD-10-CM

## 2018-06-15 DIAGNOSIS — R78.81 BACTEREMIA DUE TO KLEBSIELLA PNEUMONIAE: ICD-10-CM

## 2018-06-15 DIAGNOSIS — N17.9 ACUTE KIDNEY INJURY: ICD-10-CM

## 2018-06-15 DIAGNOSIS — Z90.49 STATUS POST CHOLECYSTECTOMY: ICD-10-CM

## 2018-06-15 LAB
ANION GAP SERPL CALC-SCNC: 10 MMOL/L
BUN SERPL-MCNC: 23 MG/DL
CALCIUM SERPL-MCNC: 9.6 MG/DL
CHLORIDE SERPL-SCNC: 99 MMOL/L
CO2 SERPL-SCNC: 28 MMOL/L
CREAT SERPL-MCNC: 2.2 MG/DL
EST. GFR  (AFRICAN AMERICAN): 30.9 ML/MIN/1.73 M^2
EST. GFR  (NON AFRICAN AMERICAN): 26.7 ML/MIN/1.73 M^2
GLUCOSE SERPL-MCNC: 95 MG/DL
POTASSIUM SERPL-SCNC: 4.5 MMOL/L
SODIUM SERPL-SCNC: 137 MMOL/L

## 2018-06-15 PROCEDURE — 3074F SYST BP LT 130 MM HG: CPT | Mod: CPTII,S$GLB,, | Performed by: INTERNAL MEDICINE

## 2018-06-15 PROCEDURE — 36415 COLL VENOUS BLD VENIPUNCTURE: CPT | Mod: PO

## 2018-06-15 PROCEDURE — 99999 PR PBB SHADOW E&M-EST. PATIENT-LVL III: CPT | Mod: PBBFAC,,, | Performed by: INTERNAL MEDICINE

## 2018-06-15 PROCEDURE — 3078F DIAST BP <80 MM HG: CPT | Mod: CPTII,S$GLB,, | Performed by: INTERNAL MEDICINE

## 2018-06-15 PROCEDURE — 99214 OFFICE O/P EST MOD 30 MIN: CPT | Mod: S$GLB,,, | Performed by: INTERNAL MEDICINE

## 2018-06-15 PROCEDURE — 99499 UNLISTED E&M SERVICE: CPT | Mod: ,,, | Performed by: INTERNAL MEDICINE

## 2018-06-15 PROCEDURE — 80048 BASIC METABOLIC PNL TOTAL CA: CPT

## 2018-06-15 PROCEDURE — 99499 UNLISTED E&M SERVICE: CPT | Mod: S$GLB,,, | Performed by: INTERNAL MEDICINE

## 2018-06-15 NOTE — PROGRESS NOTES
Patient, Linda Skelton (MRN #[id number]), presented with a recent Estimated Glumerular Filtration Rate (EGFR) between 15 and 29 consistent with the definition of chronic kidney disease stage 4 (ICD10 - N18.4).    eGFR if non    Date Value Ref Range Status   06/06/2018 23 (A) >60 mL/min/1.73 m^2 Final     Comment:     Calculation used to obtain the estimated glomerular filtration  rate (eGFR) is the CKD-EPI equation.          The patient's chronic kidney disease stage 4 was monitored, evaluated, addressed and/or treated. This addendum to the medical record is made on 06/15/2018.

## 2018-06-18 DIAGNOSIS — N18.30 CHRONIC KIDNEY DISEASE, STAGE 3: Primary | ICD-10-CM

## 2018-06-18 NOTE — PROGRESS NOTES
Creatinine still high compared to baseline but trending down.  Results to pt and copy to nephrology.  Would repeat in 2 weeks.

## 2018-06-20 ENCOUNTER — LAB VISIT (OUTPATIENT)
Dept: LAB | Facility: HOSPITAL | Age: 83
End: 2018-06-20
Attending: INTERNAL MEDICINE
Payer: MEDICARE

## 2018-06-20 ENCOUNTER — ANTI-COAG VISIT (OUTPATIENT)
Dept: CARDIOLOGY | Facility: CLINIC | Age: 83
End: 2018-06-20

## 2018-06-20 ENCOUNTER — CLINICAL SUPPORT (OUTPATIENT)
Dept: FAMILY MEDICINE | Facility: CLINIC | Age: 83
End: 2018-06-20
Payer: MEDICARE

## 2018-06-20 DIAGNOSIS — Z79.01 LONG TERM (CURRENT) USE OF ANTICOAGULANTS: ICD-10-CM

## 2018-06-20 DIAGNOSIS — N52.9 ERECTILE DYSFUNCTION, UNSPECIFIED ERECTILE DYSFUNCTION TYPE: Primary | ICD-10-CM

## 2018-06-20 LAB
INR PPP: 2.1
PROTHROMBIN TIME: 20.3 SEC

## 2018-06-20 PROCEDURE — 96372 THER/PROPH/DIAG INJ SC/IM: CPT | Mod: S$GLB,,, | Performed by: INTERNAL MEDICINE

## 2018-06-20 PROCEDURE — 85610 PROTHROMBIN TIME: CPT

## 2018-06-20 PROCEDURE — 36415 COLL VENOUS BLD VENIPUNCTURE: CPT | Mod: PO

## 2018-06-20 RX ADMIN — TESTOSTERONE CYPIONATE 100 MG: 200 INJECTION, SOLUTION INTRAMUSCULAR at 08:06

## 2018-06-20 NOTE — PROGRESS NOTES
Depo-testosterone 100 mg administered IM to right ventrogluteal. Tolerated well. No adverse reaction noted.

## 2018-06-25 NOTE — PROGRESS NOTES
That was not his next test day. We had to pick a day because we had to close the chart. Patient was pending questioning to determine any changes since he was recently in the hospital and to find out the plans for prostate biopsy. Why was he in the hospital, any med changes since then, any bleeding, confirm current dose?

## 2018-06-27 ENCOUNTER — PATIENT MESSAGE (OUTPATIENT)
Dept: FAMILY MEDICINE | Facility: CLINIC | Age: 83
End: 2018-06-27

## 2018-06-27 DIAGNOSIS — N18.4 CHRONIC KIDNEY DISEASE, STAGE 4 (SEVERE): Primary | ICD-10-CM

## 2018-06-30 RX ORDER — FOLIC ACID 1 MG/1
TABLET ORAL
Qty: 90 TABLET | Refills: 3 | Status: SHIPPED | OUTPATIENT
Start: 2018-06-30 | End: 2019-06-22 | Stop reason: SDUPTHER

## 2018-07-03 ENCOUNTER — CLINICAL SUPPORT (OUTPATIENT)
Dept: FAMILY MEDICINE | Facility: CLINIC | Age: 83
End: 2018-07-03
Payer: MEDICARE

## 2018-07-03 DIAGNOSIS — E29.1 HYPOGONADISM IN MALE: Primary | ICD-10-CM

## 2018-07-03 PROCEDURE — 96372 THER/PROPH/DIAG INJ SC/IM: CPT | Mod: S$GLB,,, | Performed by: INTERNAL MEDICINE

## 2018-07-03 RX ADMIN — TESTOSTERONE CYPIONATE 100 MG: 200 INJECTION, SOLUTION INTRAMUSCULAR at 09:07

## 2018-07-10 ENCOUNTER — LAB VISIT (OUTPATIENT)
Dept: LAB | Facility: HOSPITAL | Age: 83
End: 2018-07-10
Attending: INTERNAL MEDICINE
Payer: MEDICARE

## 2018-07-10 DIAGNOSIS — Z79.01 LONG TERM (CURRENT) USE OF ANTICOAGULANTS: ICD-10-CM

## 2018-07-10 LAB
INR PPP: 2.9
PROTHROMBIN TIME: 28.1 SEC

## 2018-07-10 PROCEDURE — 85610 PROTHROMBIN TIME: CPT

## 2018-07-10 PROCEDURE — 36415 COLL VENOUS BLD VENIPUNCTURE: CPT | Mod: PO

## 2018-07-12 ENCOUNTER — ANTI-COAG VISIT (OUTPATIENT)
Dept: CARDIOLOGY | Facility: CLINIC | Age: 83
End: 2018-07-12

## 2018-07-13 RX ORDER — CIPROFLOXACIN 500 MG/1
500 TABLET ORAL ONCE
Status: CANCELLED | OUTPATIENT
Start: 2018-07-13 | End: 2018-07-13

## 2018-07-13 RX ORDER — LIDOCAINE HYDROCHLORIDE 20 MG/ML
JELLY TOPICAL ONCE
Status: CANCELLED | OUTPATIENT
Start: 2018-07-13 | End: 2018-07-13

## 2018-07-16 ENCOUNTER — HOSPITAL ENCOUNTER (OUTPATIENT)
Dept: UROLOGY | Facility: HOSPITAL | Age: 83
Discharge: HOME | End: 2018-07-16
Attending: UROLOGY
Payer: MEDICARE

## 2018-07-16 VITALS
DIASTOLIC BLOOD PRESSURE: 60 MMHG | BODY MASS INDEX: 29.88 KG/M2 | HEART RATE: 68 BPM | SYSTOLIC BLOOD PRESSURE: 151 MMHG | HEIGHT: 75 IN | TEMPERATURE: 98 F | WEIGHT: 240.31 LBS

## 2018-07-16 DIAGNOSIS — R97.20 ELEVATED PSA: Primary | ICD-10-CM

## 2018-07-16 DIAGNOSIS — N40.2 PROSTATE NODULE: ICD-10-CM

## 2018-07-16 PROCEDURE — 76872 US TRANSRECTAL: CPT

## 2018-07-16 PROCEDURE — 76942 ECHO GUIDE FOR BIOPSY: CPT | Mod: 26,59,, | Performed by: UROLOGY

## 2018-07-16 PROCEDURE — 55700 PR BIOPSY OF PROSTATE,NEEDLE/PUNCH: CPT | Mod: ,,, | Performed by: UROLOGY

## 2018-07-16 PROCEDURE — 88305 TISSUE EXAM BY PATHOLOGIST: CPT | Performed by: PATHOLOGY

## 2018-07-16 PROCEDURE — 88305 TISSUE EXAM BY PATHOLOGIST: CPT | Mod: 26,,, | Performed by: PATHOLOGY

## 2018-07-16 PROCEDURE — 76942 ECHO GUIDE FOR BIOPSY: CPT

## 2018-07-16 PROCEDURE — 52000 CYSTOURETHROSCOPY: CPT

## 2018-07-16 PROCEDURE — 55700 HC BIOPSY OF PROSTATE - NEEDLE OR PUNCH: CPT

## 2018-07-16 PROCEDURE — 76872 US TRANSRECTAL: CPT | Mod: 26,,, | Performed by: UROLOGY

## 2018-07-16 RX ORDER — LIDOCAINE HYDROCHLORIDE 20 MG/ML
JELLY TOPICAL
Status: COMPLETED | OUTPATIENT
Start: 2018-07-16 | End: 2018-07-16

## 2018-07-16 RX ORDER — LIDOCAINE HYDROCHLORIDE 10 MG/ML
10 INJECTION INFILTRATION; PERINEURAL ONCE
Status: COMPLETED | OUTPATIENT
Start: 2018-07-16 | End: 2018-07-16

## 2018-07-16 RX ORDER — LIDOCAINE HYDROCHLORIDE 20 MG/ML
JELLY TOPICAL ONCE
Status: DISCONTINUED | OUTPATIENT
Start: 2018-07-16 | End: 2019-06-12 | Stop reason: ALTCHOICE

## 2018-07-16 RX ADMIN — LIDOCAINE HYDROCHLORIDE: 20 JELLY TOPICAL at 02:07

## 2018-07-16 RX ADMIN — LIDOCAINE HYDROCHLORIDE 10 ML: 10 INJECTION INFILTRATION; PERINEURAL at 02:07

## 2018-07-16 NOTE — OP NOTE
DATE OF PROCEDURE:  07/16/2018    PREOPERATIVE DIAGNOSIS:  Prostate nodule.    POSTOPERATIVE DIAGNOSIS:  Prostate nodule.    OPERATION PERFORMED:  Transrectal prostate ultrasound-guided needle biopsy.    ANESTHESIA:  Bilateral pudendal nerve blocks.    BIOPSIES:  12.    ESTIMATED BLOOD LOSS:  Minimal.    PROCEDURE IN DETAIL:  The patient was placed in left lateral decubitus position.    Sagittal and transverse views of prostate were made.  A small hypoechoic area   was seen near the midline IN the apex.  Bilateral pudendal nerve blocks were   performed using 10 mL of 1% plain Xylocaine.  Random sextant biopsies were made   along with ultrasound-directed needle biopsy of the nodule.  A total of 12   biopsies were made.  The patient tolerated the procedure well.  He was   discharged in satisfactory condition.  His PSA was 3.0, but prostate measured   5.1 x 3.5 x 5.2 cm for a total volume of 50 g.  Biopsy was done since the   patient is taking testosterone.  The patient will be called later this week.    Usual post-biopsy precautions were given.  The patient will call me if he has   any problems whatsoever.      REECE  dd: 07/16/2018 14:52:01 (CDT)  td: 07/16/2018 15:45:50 (CDT)  Doc ID   #[id number]  Job ID #[id number]    CC:

## 2018-07-16 NOTE — PATIENT INSTRUCTIONS

## 2018-07-18 ENCOUNTER — TELEPHONE (OUTPATIENT)
Dept: FAMILY MEDICINE | Facility: CLINIC | Age: 83
End: 2018-07-18

## 2018-07-18 ENCOUNTER — OFFICE VISIT (OUTPATIENT)
Dept: FAMILY MEDICINE | Facility: CLINIC | Age: 83
End: 2018-07-18
Payer: MEDICARE

## 2018-07-18 ENCOUNTER — CLINICAL SUPPORT (OUTPATIENT)
Dept: FAMILY MEDICINE | Facility: CLINIC | Age: 83
End: 2018-07-18
Payer: MEDICARE

## 2018-07-18 VITALS
OXYGEN SATURATION: 96 % | BODY MASS INDEX: 29.9 KG/M2 | SYSTOLIC BLOOD PRESSURE: 94 MMHG | TEMPERATURE: 98 F | HEART RATE: 56 BPM | WEIGHT: 240.44 LBS | DIASTOLIC BLOOD PRESSURE: 52 MMHG | HEIGHT: 75 IN

## 2018-07-18 DIAGNOSIS — E29.1 HYPOGONADISM IN MALE: Primary | ICD-10-CM

## 2018-07-18 DIAGNOSIS — N18.4 CHRONIC KIDNEY DISEASE, STAGE 4 (SEVERE): ICD-10-CM

## 2018-07-18 DIAGNOSIS — R42 DIZZINESS: ICD-10-CM

## 2018-07-18 DIAGNOSIS — R11.0 CHRONIC NAUSEA: Primary | ICD-10-CM

## 2018-07-18 PROCEDURE — 3078F DIAST BP <80 MM HG: CPT | Mod: CPTII,S$GLB,, | Performed by: NURSE PRACTITIONER

## 2018-07-18 PROCEDURE — 96372 THER/PROPH/DIAG INJ SC/IM: CPT | Mod: S$GLB,,, | Performed by: INTERNAL MEDICINE

## 2018-07-18 PROCEDURE — 99999 PR PBB SHADOW E&M-EST. PATIENT-LVL IV: CPT | Mod: PBBFAC,,, | Performed by: NURSE PRACTITIONER

## 2018-07-18 PROCEDURE — 99214 OFFICE O/P EST MOD 30 MIN: CPT | Mod: S$GLB,,, | Performed by: NURSE PRACTITIONER

## 2018-07-18 PROCEDURE — 3074F SYST BP LT 130 MM HG: CPT | Mod: CPTII,S$GLB,, | Performed by: NURSE PRACTITIONER

## 2018-07-18 RX ADMIN — TESTOSTERONE CYPIONATE 100 MG: 200 INJECTION, SOLUTION INTRAMUSCULAR at 09:07

## 2018-07-18 NOTE — PROGRESS NOTES
Subjective:       Patient ID: Linda Skelton is a 83 y.o. male.    Chief Complaint: Nausea (1 year) and Dizziness    83 year old male presents to the clinic with complaint of nausea for over a year. He says he still is eating and drinking good without any difficulty. He has his gall bladder removed on 6/1/2018. He was told by Dr. Jerome that he has CKD and he has a appointment with Dr. Avelar on 8/21/2018. He has also problems with dizziness off and on but not as bad lately. He denies any chest pain, sob, heart palpitations, or swelling to lower extremities. He denies any headaches or blurred vision.       Past Medical History:   Diagnosis Date    ACS (acute coronary syndrome)     Acute coronary syndrome 2000    NSTEMI    Anticoagulant long-term use     Coumadin    Atrial fibrillation     Basal cell carcinoma excised     left scalp vertex    Bilateral leg edema 1/6/2014    BPH (benign prostatic hyperplasia)     CKD (chronic kidney disease)     Coronary artery disease     DVT of leg (deep venous thrombosis) 09/08/2014    On coumadin for years    Encounter for blood transfusion     Hyperlipidemia     Hypertension     Hypogonadism, male     Leg fracture, left     Melanoma 01/04/2017     in situ crown of scalp    MI (myocardial infarction)     Panhypopituitarism     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    SQUAMOUS CELL CARCINOMA excised     left posterior scalp    Thyroid disease      Past Surgical History:   Procedure Laterality Date    APPENDECTOMY      BASAL CELL CARCINOMA EXCISION  01/2008    BRAIN SURGERY      pituitary adenoma removed    CARDIAC CATHETERIZATION  05/09/2000    S/P stent to RCA,    CARDIAC CATHETERIZATION      stents placed    CARDIAC ELECTROPHYSIOLOGY STUDY AND ABLATION      CORONARY ANGIOPLASTY  5/9/2000    RCA    CORONARY ANGIOPLASTY WITH STENT PLACEMENT      ESOPHAGEAL DILATION      EYE SURGERY Bilateral      cataracts removed and new lenses placed     FRACTURE SURGERY Left     leg    HERNIA REPAIR      LAPAROSCOPIC CHOLECYSTECTOMY N/A 6/2/2018    Procedure: CHOLECYSTECTOMY, LAPAROSCOPIC;  Surgeon: Dickson Smith Jr., MD;  Location: [facility];  Service: General;  Laterality: N/A;    left femur surgery      pituitatry      hypophysectomy    SKIN BIOPSY      TONSILLECTOMY      VASCULAR SURGERY        reports that he has never smoked. He has never used smokeless tobacco. He reports that he does not drink alcohol or use drugs.  Review of Systems   Constitutional: Negative for chills, fatigue and fever.   HENT: Negative for congestion, ear discharge, ear pain, postnasal drip, rhinorrhea, sinus pain, sinus pressure and sore throat.    Eyes: Negative for pain, discharge and itching.   Respiratory: Negative for cough, shortness of breath and wheezing.    Cardiovascular: Negative for chest pain, palpitations and leg swelling.   Gastrointestinal: Positive for nausea. Negative for abdominal pain, blood in stool, constipation, diarrhea and vomiting.   Musculoskeletal: Negative for back pain, gait problem, neck pain and neck stiffness.   Skin: Negative for rash.   Neurological: Positive for dizziness. Negative for light-headedness and headaches.       Objective:      Physical Exam   Constitutional: He is oriented to person, place, and time. He appears well-developed and well-nourished. No distress.   HENT:   Head: Normocephalic and atraumatic.   Right Ear: External ear normal.   Left Ear: External ear normal.   Nose: Nose normal.   Mouth/Throat: Oropharynx is clear and moist. No oropharyngeal exudate.   Eyes: Conjunctivae and EOM are normal. Pupils are equal, round, and reactive to light. Right eye exhibits no discharge. Left eye exhibits no discharge. No scleral icterus.   Neck: Normal range of motion. Neck supple.   Cardiovascular: Normal rate and regular rhythm.  Exam reveals no gallop and no friction rub.    No murmur  heard.  Pulmonary/Chest: Effort normal and breath sounds normal. No respiratory distress. He has no wheezes. He has no rales.   Abdominal: Soft. Bowel sounds are normal. There is no tenderness. A hernia is present.   Umbilical hernia noted reducible    Musculoskeletal: Normal range of motion. He exhibits no edema.   Lymphadenopathy:     He has no cervical adenopathy.   Neurological: He is alert and oriented to person, place, and time.   Skin: Skin is warm and dry. No rash noted. He is not diaphoretic.   Psychiatric: He has a normal mood and affect.       Assessment:       1. Chronic nausea    2. Dizziness    3. Chronic kidney disease, stage 4 (severe)        Plan:         Chronic nausea  - he will call and schedule a appointment with Dr. Ulices ESPINOZA at [facility]    Dizziness  - stable observe he does not want a prescription of Antivert    Chronic kidney disease, stage 4 (severe)  - avoid all anti-inflammatories and stay well hydrated   - see Dr. Avelar on 8/21/2018 as scheduled

## 2018-07-18 NOTE — PROGRESS NOTES
Patient given depo testosterone 100 mg injection right ventrogluteal, tolerated well, no complaints, no reaction noted

## 2018-07-18 NOTE — TELEPHONE ENCOUNTER
----- Message from Yeimi Soto sent at 7/18/2018  8:16 AM CDT -----  Contact: Self/ [phone]  Pt requests an urgent appt today with his wife. Says he is feeling fatigued and does not feel well at all. Also requesting an appt today for his wife same time. Please call to advise. Thank you.

## 2018-07-18 NOTE — PATIENT INSTRUCTIONS
Patient will call and schedule a appointment Dr. Ulices ESPINOZA at [facility] for chronic nausea

## 2018-07-20 ENCOUNTER — TELEPHONE (OUTPATIENT)
Dept: UROLOGY | Facility: CLINIC | Age: 83
End: 2018-07-20

## 2018-07-20 NOTE — TELEPHONE ENCOUNTER
----- Message from Octaviano Núñez Jr., MD sent at 7/20/2018  9:37 AM CDT -----  Negative prostate bx

## 2018-07-26 ENCOUNTER — ANTI-COAG VISIT (OUTPATIENT)
Dept: CARDIOLOGY | Facility: CLINIC | Age: 83
End: 2018-07-26
Payer: MEDICARE

## 2018-07-26 ENCOUNTER — OFFICE VISIT (OUTPATIENT)
Dept: ELECTROPHYSIOLOGY | Facility: CLINIC | Age: 83
End: 2018-07-26
Payer: MEDICARE

## 2018-07-26 ENCOUNTER — OFFICE VISIT (OUTPATIENT)
Dept: DERMATOLOGY | Facility: CLINIC | Age: 83
End: 2018-07-26
Payer: MEDICARE

## 2018-07-26 ENCOUNTER — LAB VISIT (OUTPATIENT)
Dept: LAB | Facility: HOSPITAL | Age: 83
End: 2018-07-26
Attending: INTERNAL MEDICINE
Payer: MEDICARE

## 2018-07-26 VITALS
HEIGHT: 75 IN | BODY MASS INDEX: 30.15 KG/M2 | HEART RATE: 63 BPM | SYSTOLIC BLOOD PRESSURE: 138 MMHG | WEIGHT: 242.5 LBS | DIASTOLIC BLOOD PRESSURE: 72 MMHG | OXYGEN SATURATION: 97 %

## 2018-07-26 DIAGNOSIS — D48.5 NEOPLASM OF UNCERTAIN BEHAVIOR OF SKIN: ICD-10-CM

## 2018-07-26 DIAGNOSIS — E66.9 OBESITY, CLASS I, BMI 30-34.9: ICD-10-CM

## 2018-07-26 DIAGNOSIS — I48.91 ATRIAL FIBRILLATION, UNSPECIFIED TYPE: ICD-10-CM

## 2018-07-26 DIAGNOSIS — L57.0 AK (ACTINIC KERATOSIS): Primary | ICD-10-CM

## 2018-07-26 DIAGNOSIS — Z98.61 POST PTCA: ICD-10-CM

## 2018-07-26 DIAGNOSIS — E78.2 MIXED HYPERLIPIDEMIA: ICD-10-CM

## 2018-07-26 DIAGNOSIS — C84.A0 PLEOMORPHIC SMALL OR MEDIUM-SIZED CELL CUTANEOUS T-CELL LYMPHOMA: ICD-10-CM

## 2018-07-26 DIAGNOSIS — D18.00 ANGIOMA: ICD-10-CM

## 2018-07-26 DIAGNOSIS — E23.0 PANHYPOPITUITARISM: ICD-10-CM

## 2018-07-26 DIAGNOSIS — D22.9 NEVUS: ICD-10-CM

## 2018-07-26 DIAGNOSIS — L90.5 SCAR: ICD-10-CM

## 2018-07-26 DIAGNOSIS — I48.0 PAF (PAROXYSMAL ATRIAL FIBRILLATION): ICD-10-CM

## 2018-07-26 DIAGNOSIS — Z79.01 LONG TERM (CURRENT) USE OF ANTICOAGULANTS: Primary | ICD-10-CM

## 2018-07-26 DIAGNOSIS — Z86.006 HISTORY OF MELANOMA IN SITU: ICD-10-CM

## 2018-07-26 DIAGNOSIS — N18.30 CHRONIC KIDNEY DISEASE, STAGE 3: ICD-10-CM

## 2018-07-26 DIAGNOSIS — I25.10 CORONARY ARTERY DISEASE INVOLVING NATIVE CORONARY ARTERY OF NATIVE HEART WITHOUT ANGINA PECTORIS: ICD-10-CM

## 2018-07-26 DIAGNOSIS — I48.0 PAF (PAROXYSMAL ATRIAL FIBRILLATION): Primary | ICD-10-CM

## 2018-07-26 DIAGNOSIS — Z85.828 PERSONAL HISTORY OF SKIN CANCER: ICD-10-CM

## 2018-07-26 DIAGNOSIS — I10 ESSENTIAL HYPERTENSION: ICD-10-CM

## 2018-07-26 DIAGNOSIS — L82.1 SK (SEBORRHEIC KERATOSIS): ICD-10-CM

## 2018-07-26 DIAGNOSIS — E03.8 OTHER SPECIFIED HYPOTHYROIDISM: ICD-10-CM

## 2018-07-26 LAB
ANION GAP SERPL CALC-SCNC: 8 MMOL/L
BUN SERPL-MCNC: 25 MG/DL
CALCIUM SERPL-MCNC: 9.3 MG/DL
CHLORIDE SERPL-SCNC: 105 MMOL/L
CO2 SERPL-SCNC: 27 MMOL/L
CREAT SERPL-MCNC: 1.3 MG/DL
EST. GFR  (AFRICAN AMERICAN): 58.3 ML/MIN/1.73 M^2
EST. GFR  (NON AFRICAN AMERICAN): 50.5 ML/MIN/1.73 M^2
GLUCOSE SERPL-MCNC: 115 MG/DL
INR PPP: 1.8 (ref 2–3)
POTASSIUM SERPL-SCNC: 4.5 MMOL/L
SODIUM SERPL-SCNC: 140 MMOL/L

## 2018-07-26 PROCEDURE — 80048 BASIC METABOLIC PNL TOTAL CA: CPT

## 2018-07-26 PROCEDURE — 11100 PR BIOPSY OF SKIN LESION: CPT | Mod: 59,S$GLB,, | Performed by: DERMATOLOGY

## 2018-07-26 PROCEDURE — 3075F SYST BP GE 130 - 139MM HG: CPT | Mod: CPTII,S$GLB,, | Performed by: INTERNAL MEDICINE

## 2018-07-26 PROCEDURE — 17000 DESTRUCT PREMALG LESION: CPT | Mod: S$GLB,,, | Performed by: DERMATOLOGY

## 2018-07-26 PROCEDURE — 85610 PROTHROMBIN TIME: CPT | Mod: QW,S$GLB,, | Performed by: INTERNAL MEDICINE

## 2018-07-26 PROCEDURE — 88305 TISSUE EXAM BY PATHOLOGIST: CPT | Performed by: PATHOLOGY

## 2018-07-26 PROCEDURE — 93000 ELECTROCARDIOGRAM COMPLETE: CPT | Mod: S$GLB,,, | Performed by: INTERNAL MEDICINE

## 2018-07-26 PROCEDURE — 99999 PR PBB SHADOW E&M-EST. PATIENT-LVL II: CPT | Mod: PBBFAC,,, | Performed by: DERMATOLOGY

## 2018-07-26 PROCEDURE — 99999 PR PBB SHADOW E&M-EST. PATIENT-LVL III: CPT | Mod: PBBFAC,,, | Performed by: INTERNAL MEDICINE

## 2018-07-26 PROCEDURE — 99214 OFFICE O/P EST MOD 30 MIN: CPT | Mod: 25,S$GLB,, | Performed by: DERMATOLOGY

## 2018-07-26 PROCEDURE — 3078F DIAST BP <80 MM HG: CPT | Mod: CPTII,S$GLB,, | Performed by: INTERNAL MEDICINE

## 2018-07-26 PROCEDURE — 17003 DESTRUCT PREMALG LES 2-14: CPT | Mod: S$GLB,,, | Performed by: DERMATOLOGY

## 2018-07-26 PROCEDURE — 99215 OFFICE O/P EST HI 40 MIN: CPT | Mod: S$GLB,,, | Performed by: INTERNAL MEDICINE

## 2018-07-26 PROCEDURE — 36415 COLL VENOUS BLD VENIPUNCTURE: CPT

## 2018-07-26 NOTE — PROGRESS NOTES
Subjective:       Patient ID:  Linda Skelton is a 83 y.o. male who presents for   Chief Complaint   Patient presents with    Skin Check    Lesion     Pt here today for a TBSE. Pt c/o bump on left eyelid noticed by pt this morning. No bleeding pain or prev tx.  Pt c/o bleeding lesion on right knee x a few months. No prev tx.    Pt has hx of MIS and NMSC and CTCL.       Lesion         Review of Systems   Constitutional: Negative for fever, chills, weight loss, fatigue, night sweats and malaise.   HENT: Negative for headaches.    Respiratory: Negative for cough and shortness of breath.    Gastrointestinal: Negative for indigestion.   Skin: Positive for activity-related sunscreen use. Negative for daily sunscreen use, tendency to form keloidal scars, recent sunburn and wears hat.   Neurological: Negative for headaches.   Hematologic/Lymphatic: Negative for adenopathy. Bruises/bleeds easily.        Objective:    Physical Exam   Constitutional: He appears well-developed and well-nourished. No distress.   Lymphadenopathy: No supraclavicular adenopathy is present.     He has no cervical adenopathy.     He has no axillary adenopathy.     He has no inguinal adenopathy.   Neurological: He is alert and oriented to person, place, and time. He is not disoriented.   Psychiatric: He has a normal mood and affect.   Skin:   Areas Examined (abnormalities noted in diagram):   Scalp / Hair Palpated and Inspected  Head / Face Inspection Performed  Neck Inspection Performed  Chest / Axilla Inspection Performed  Abdomen Inspection Performed  Genitals / Buttocks / Groin Inspection Performed  Back Inspection Performed  RUE Inspected  LUE Inspection Performed  RLE Inspected  LLE Inspection Performed  Nails and Digits Inspection Performed                       Diagram Legend     Erythematous scaling macule/papule c/w actinic keratosis       Vascular papule c/w angioma      Pigmented verrucoid papule/plaque c/w seborrheic keratosis       Yellow umbilicated papule c/w sebaceous hyperplasia      Irregularly shaped tan macule c/w lentigo     1-2 mm smooth white papules consistent with Milia      Movable subcutaneous cyst with punctum c/w epidermal inclusion cyst      Subcutaneous movable cyst c/w pilar cyst      Firm pink to brown papule c/w dermatofibroma      Pedunculated fleshy papule(s) c/w skin tag(s)      Evenly pigmented macule c/w junctional nevus     Mildly variegated pigmented, slightly irregular-bordered macule c/w mildly atypical nevus      Flesh colored to evenly pigmented papule c/w intradermal nevus       Pink pearly papule/plaque c/w basal cell carcinoma      Erythematous hyperkeratotic cursted plaque c/w SCC      Surgical scar with no sign of skin cancer recurrence      Open and closed comedones      Inflammatory papules and pustules      Verrucoid papule consistent consistent with wart     Erythematous eczematous patches and plaques     Dystrophic onycholytic nail with subungual debris c/w onychomycosis     Umbilicated papule    Erythematous-base heme-crusted tan verrucoid plaque consistent with inflamed seborrheic keratosis     Erythematous Silvery Scaling Plaque c/w Psoriasis     See annotation      Assessment / Plan:      Pathology Orders:     Normal Orders This Visit    Tissue Specimen To Pathology, Dermatology     Questions:    Directional Terms:  Other(comment)    Clinical information:  r/o angioma    Specific Site:  right knee        AK (actinic keratosis)  Cryosurgery Procedure Note    Verbal consent from the patient is obtained including, but not limited to, risk of hypopigmentation/hyperpigmentation, scar, recurrence of lesion. The patient is aware of the precancerous quality and need for treatment of these lesions. Liquid nitrogen cryosurgery is applied to the 7 actinic keratoses, as detailed in the physical exam, to produce a freeze injury. The patient is aware that blisters may form and is instructed on wound care with  gentle cleansing and use of vaseline ointment to keep moist until healed. The patient is supplied a handout on cryosurgery and is instructed to call if lesions do not completely resolve.    Neoplasm of uncertain behavior of skin  Shave biopsy procedure note:    Shave biopsy performed after verbal consent including risk of infection, scar, recurrence, need for additional treatment of site. Area prepped with alcohol, anesthetized with approximately 1.0cc of 1% lidocaine with epinephrine. Lesional tissue shaved with razor blade. Hemostasis achieved with application of aluminum chloride followed by hyfrecation. No complications. Dressing applied. Wound care explained.      -     Tissue Specimen To Pathology, Dermatology    Nevus  Discussed ABCDE's of nevi.  Monitor for new mole or moles that are becoming bigger, darker, irritated, or developing irregular borders. Brochure provided.      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  Treatment is not necessary.    SK (seborrheic keratosis)  These are benign inherited growths without a malignant potential. Reassurance given to patient. No treatment is necessary.     History of melanoma in situ  Personal history of skin cancer  Scar  Area of previous melanoma  And NMSC examined. Site well healed with no signs of recurrence.    Total body skin examination performed today including at least 12 points as noted in physical examination. No lesions suspicious for malignancy noted.             Follow-up in about 3 months (around 10/26/2018) for prn bx report.

## 2018-07-26 NOTE — PROGRESS NOTES
INR subtherapeutic today with no changes to explain.  Will boost today then resume with follow up on 8/15 at [facility].  Patient refused to return to clinic in 2 weeks and would only agree to go lab draw near home.  Bruising from use but denies bleeding or other changes. ENRIQUE Oconnor Pharm D assisted with dosing

## 2018-07-26 NOTE — PROGRESS NOTES
Subjective:   Patient ID:  Linda Skelton is a 83 y.o. male     Chief complaint:No chief complaint on file.      HPI  Background:  Past History: AF-dx'd in 2001, on Tikosyn since 2004. CAD-s/p stent to RCA. on BB, statin, ASA-- EF 60, some DDysfxn   On Coumadin, CHADS2 score=2(HTN, age), CHADSVASC=3 -- INRs the past year 1.8 to 2.6-- mostly above 2.0   He reports being in AF (09/29/15); longest time consistently in AF>>tired/fatigued, SOB/HSIEH, not happy with quality of life.      Mr. Skelton underwent a PVI (02/26/16) with successful cryoablative isolation of all four pulmonary veins with demonstration of disappearance of pulmonary vein firing as well as bidirectional conduction block across the sleeves with large WACA type ablation as demonstrated by scar level of 0.3 millivolts.    Discontinued Tikosyn on 11/30/16. As directed, but experienced AF recurrence shortly thereafter, and Tikosyn was re-started. Since re-starting the medication, Mr. Skelton reports feeling well; he only had recurrence in May (<2 day duration); no recurrence since.     Recent cardiac studies:  [facility] (02/24/16) revealed an EF of 55-60%, biatrial enlargement, mild MS with evidence of posterior leaflet prolapse and flail P 3 segement, mild MR, mild TR, and grade 2 atheroma disease of aorta.      Update since then:  Feels much better -- has had GB surgery and lost 24 lbs -- his BP is well controlled -- feels well though -- he did have some degree of ARF during the bacteremic episode  I have reviewed the actual image of the ECG tracing obtained today and it shows NSR with normal intervals    Current Outpatient Prescriptions   Medication Sig    aspirin 81 mg Tab Take 81 mg by mouth every evening. 1 Tablet Oral Every night    atenolol (TENORMIN) 25 MG tablet TAKE ONE TABLET BY MOUTH twice DAILY (Patient taking differently: Take 25 mg by mouth once daily. )    dofetilide (TIKOSYN) 500 MCG Cap TAKE 1 CAPSULE 2 TIMES DAILY    folic acid  (FOLVITE) 1 MG tablet TAKE ONE TABLET BY MOUTH ONCE DAILY    levothyroxine (SYNTHROID) 112 MCG tablet Take 1 tablet (112 mcg total) by mouth before breakfast.    NITROSTAT 0.4 mg SL tablet DISSOLVE ONE TABLET UNDER THE TONGUE EVERY 5 MINUTES AS NEEDED FOR CHEST PAIN.  DO NOT EXCEED A TOTAL OF 3 DOSES IN 15 MINUTES NOW    omeprazole (PRILOSEC) 20 MG capsule Take 20 mg by mouth every other day.    predniSONE (DELTASONE) 5 MG tablet Take 1 tablet (5 mg total) by mouth once daily.    simvastatin (ZOCOR) 80 MG tablet TAKE ONE TABLET BY MOUTH IN THE EVENING    tamsulosin (FLOMAX) 0.4 mg Cp24 Take 1 capsule (0.4 mg total) by mouth once daily.    testosterone cypionate (DEPOTESTOTERONE CYPIONATE) 100 mg/mL injection Inject 1 mL (100 mg total) into the muscle every 14 (fourteen) days.    warfarin (COUMADIN) 5 MG tablet Take 1.5 tablets (7.5 mg total) by mouth Daily. Current Dose ( 5 mg on Tue; 7.5 mg all other days) or as directed by coumadin clinic     Current Facility-Administered Medications   Medication    lidocaine HCL 2% jelly    testosterone cypionate injection 100 mg     Review of Systems   Constitution: Negative for decreased appetite, weakness, malaise/fatigue, weight gain and weight loss.   Eyes: Negative for blurred vision.   Cardiovascular: Negative for chest pain, claudication, cyanosis, dyspnea on exertion, irregular heartbeat, leg swelling, near-syncope, orthopnea and palpitations.   Respiratory: Negative for cough, shortness of breath, sleep disturbances due to breathing, snoring and wheezing.    Endocrine: Negative for heat intolerance.   Hematologic/Lymphatic: Does not bruise/bleed easily.   Musculoskeletal: Negative for muscle weakness and myalgias.   Gastrointestinal: Negative for melena, nausea and vomiting.   Genitourinary: Negative for nocturia.   Neurological: Negative for excessive daytime sleepiness, dizziness, headaches and light-headedness.   Psychiatric/Behavioral: Negative for  depression, memory loss and substance abuse. The patient does not have insomnia and is not nervous/anxious.        Objective:   Physical Exam   Constitutional: He is oriented to person, place, and time. He appears well-developed and well-nourished.   HENT:   Head: Normocephalic and atraumatic.   Right Ear: External ear normal.   Left Ear: External ear normal.   Eyes: Conjunctivae are normal. Pupils are equal, round, and reactive to light. Right eye exhibits no discharge. Left eye exhibits no discharge. Right conjunctiva is not injected. Left conjunctiva has no hemorrhage.   Neck: Neck supple. No JVD present. No thyromegaly present.   Cardiovascular: Normal rate, regular rhythm, normal heart sounds and intact distal pulses.  PMI is not displaced.  Exam reveals no gallop, no friction rub, no midsystolic click and no opening snap.    No murmur heard.  Pulses:       Carotid pulses are 2+ on the right side, and 2+ on the left side.       Radial pulses are 2+ on the right side, and 2+ on the left side.        Dorsalis pedis pulses are 2+ on the right side, and 2+ on the left side.        Posterior tibial pulses are 2+ on the right side, and 2+ on the left side.   Pulmonary/Chest: Effort normal and breath sounds normal. No respiratory distress. He has no wheezes. He has no rales. He exhibits no tenderness.   Abdominal: Soft. Normal appearance. He exhibits no pulsatile liver. There is no hepatomegaly. There is no tenderness. There is no rebound and no guarding.   Musculoskeletal: Normal range of motion. He exhibits no edema or tenderness.        Right knee: He exhibits no swelling.        Left knee: He exhibits no swelling.        Right ankle: He exhibits no swelling.        Left ankle: He exhibits no swelling.        Right lower leg: He exhibits no swelling.        Left lower leg: He exhibits no swelling.        Right foot: There is no swelling.        Left foot: There is no swelling.   Neurological: He is alert and  "oriented to person, place, and time. He has normal strength and normal reflexes. No cranial nerve deficit. Coordination normal.   Skin: Skin is warm, dry and intact. No rash noted. He is not diaphoretic. No cyanosis.   Psychiatric: He has a normal mood and affect. His behavior is normal.   Nursing note and vitals reviewed.    /72   Pulse 63   Ht 6' 3" (1.905 m)   Wt 110 kg (242 lb 8 oz)   SpO2 97%   BMI 30.31 kg/m²      Assessment:      1. PAF (paroxysmal atrial fibrillation)    2. Atrial fibrillation, unspecified type    3. Essential hypertension    4. Coronary artery disease involving native coronary artery of native heart without angina pectoris    5. Mixed hyperlipidemia    6. Post PTCA    7. 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 History of melanoma in situ    9. Panhypopituitarism    10. Other specified hypothyroidism    11. Obesity, Class I, BMI 30-34.9    12. Chronic kidney disease, stage 3        Plan:    Doing well but we need to check renal function to decide if Tikosyn dose needs to be reduced (although today's QT is OK)  Orders Placed This Encounter   Procedures    Basic metabolic panel     Standing Status:   Future     Number of Occurrences:   1     Standing Expiration Date:   9/24/2019     Follow-up in about 1 year (around 7/26/2019), or if symptoms worsen or fail to improve.  Medications Discontinued During This Encounter   Medication Reason    triamcinolone acetonide 0.1% (KENALOG) 0.1 % cream Patient no longer taking     New Prescriptions    No medications on file     Modified Medications    No medications on file              "

## 2018-07-27 DIAGNOSIS — I48.91 ATRIAL FIBRILLATION, UNSPECIFIED TYPE: Primary | ICD-10-CM

## 2018-07-30 ENCOUNTER — TELEPHONE (OUTPATIENT)
Dept: ELECTROPHYSIOLOGY | Facility: CLINIC | Age: 83
End: 2018-07-30

## 2018-07-30 NOTE — TELEPHONE ENCOUNTER
Attempt to call patient, called all numbers on demographics- no answer, left message requesting return call. Message sent in Portal with test results and Dr. Seymour tripathi.    Vinny VELA CCM

## 2018-07-30 NOTE — TELEPHONE ENCOUNTER
----- Message from Boyd Ahuja MD sent at 7/28/2018  9:40 PM CDT -----  Please see comments below and call patient.  In general you do not need to route back to me.  Kidney function back to baseline values  Can keep same dose of Tikosyn

## 2018-07-31 ENCOUNTER — TELEPHONE (OUTPATIENT)
Dept: ELECTROPHYSIOLOGY | Facility: CLINIC | Age: 83
End: 2018-07-31

## 2018-07-31 NOTE — TELEPHONE ENCOUNTER
Patient arrived at clinic, notified patient in person that blood work was okay and okay to stay on current dose of Tikosyn. Patient verbalizes understanding.    Thank you,  Vinny VELA CCM

## 2018-07-31 NOTE — TELEPHONE ENCOUNTER
----- Message from Alisha Mckeon MA sent at 7/31/2018  8:50 AM CDT -----  Contact: self  Pt. is returning your call from yesterday. Please call.will be at this phone # until 9:35 am,then will be here @ the hospital seeing his wife. pt.

## 2018-08-01 ENCOUNTER — CLINICAL SUPPORT (OUTPATIENT)
Dept: FAMILY MEDICINE | Facility: CLINIC | Age: 83
End: 2018-08-01
Payer: MEDICARE

## 2018-08-01 DIAGNOSIS — E29.1 HYPOGONADISM IN MALE: Primary | ICD-10-CM

## 2018-08-01 PROCEDURE — 96372 THER/PROPH/DIAG INJ SC/IM: CPT | Mod: S$GLB,,, | Performed by: INTERNAL MEDICINE

## 2018-08-01 RX ADMIN — TESTOSTERONE CYPIONATE 100 MG: 200 INJECTION, SOLUTION INTRAMUSCULAR at 08:08

## 2018-08-13 ENCOUNTER — CLINICAL SUPPORT (OUTPATIENT)
Dept: FAMILY MEDICINE | Facility: CLINIC | Age: 83
End: 2018-08-13
Payer: MEDICARE

## 2018-08-13 ENCOUNTER — LAB VISIT (OUTPATIENT)
Dept: LAB | Facility: HOSPITAL | Age: 83
End: 2018-08-13
Attending: INTERNAL MEDICINE
Payer: MEDICARE

## 2018-08-13 DIAGNOSIS — D75.89 MACROCYTOSIS: ICD-10-CM

## 2018-08-13 DIAGNOSIS — Z79.01 LONG TERM (CURRENT) USE OF ANTICOAGULANTS: ICD-10-CM

## 2018-08-13 DIAGNOSIS — E29.1 HYPOGONADISM IN MALE: ICD-10-CM

## 2018-08-13 DIAGNOSIS — D53.1 OTHER MEGALOBLASTIC ANEMIAS, NOT ELSEWHERE CLASSIFIED: ICD-10-CM

## 2018-08-13 DIAGNOSIS — E29.1 HYPOGONADISM MALE: Primary | ICD-10-CM

## 2018-08-13 LAB
ANION GAP SERPL CALC-SCNC: 9 MMOL/L
BASOPHILS # BLD AUTO: 0.01 K/UL
BASOPHILS NFR BLD: 0.2 %
BUN SERPL-MCNC: 21 MG/DL
CALCIUM SERPL-MCNC: 9.8 MG/DL
CHLORIDE SERPL-SCNC: 105 MMOL/L
CO2 SERPL-SCNC: 25 MMOL/L
CREAT SERPL-MCNC: 1.2 MG/DL
DIFFERENTIAL METHOD: ABNORMAL
EOSINOPHIL # BLD AUTO: 0 K/UL
EOSINOPHIL NFR BLD: 0.4 %
ERYTHROCYTE [DISTWIDTH] IN BLOOD BY AUTOMATED COUNT: 14.6 %
EST. GFR  (AFRICAN AMERICAN): >60 ML/MIN/1.73 M^2
EST. GFR  (NON AFRICAN AMERICAN): 55.6 ML/MIN/1.73 M^2
GLUCOSE SERPL-MCNC: 104 MG/DL
HCT VFR BLD AUTO: 39.5 %
HGB BLD-MCNC: 13 G/DL
IMM GRANULOCYTES # BLD AUTO: 0.02 K/UL
IMM GRANULOCYTES NFR BLD AUTO: 0.4 %
INR PPP: 2.2
LYMPHOCYTES # BLD AUTO: 0.9 K/UL
LYMPHOCYTES NFR BLD: 18.6 %
MCH RBC QN AUTO: 32.1 PG
MCHC RBC AUTO-ENTMCNC: 32.9 G/DL
MCV RBC AUTO: 98 FL
MONOCYTES # BLD AUTO: 0.6 K/UL
MONOCYTES NFR BLD: 12.7 %
NEUTROPHILS # BLD AUTO: 3.1 K/UL
NEUTROPHILS NFR BLD: 67.7 %
NRBC BLD-RTO: 0 /100 WBC
PLATELET # BLD AUTO: 153 K/UL
PMV BLD AUTO: 12.4 FL
POTASSIUM SERPL-SCNC: 4.4 MMOL/L
PROTHROMBIN TIME: 21 SEC
RBC # BLD AUTO: 4.05 M/UL
SODIUM SERPL-SCNC: 139 MMOL/L
VIT B12 SERPL-MCNC: 1167 PG/ML
WBC # BLD AUTO: 4.58 K/UL

## 2018-08-13 PROCEDURE — 80048 BASIC METABOLIC PNL TOTAL CA: CPT

## 2018-08-13 PROCEDURE — 85610 PROTHROMBIN TIME: CPT

## 2018-08-13 PROCEDURE — 96372 THER/PROPH/DIAG INJ SC/IM: CPT | Mod: S$GLB,,, | Performed by: INTERNAL MEDICINE

## 2018-08-13 PROCEDURE — 96373 THER/PROPH/DIAG INJ IA: CPT | Mod: S$GLB,,, | Performed by: INTERNAL MEDICINE

## 2018-08-13 PROCEDURE — 82607 VITAMIN B-12: CPT

## 2018-08-13 PROCEDURE — 99499 UNLISTED E&M SERVICE: CPT | Mod: S$GLB,,, | Performed by: INTERNAL MEDICINE

## 2018-08-13 PROCEDURE — 85025 COMPLETE CBC W/AUTO DIFF WBC: CPT

## 2018-08-13 PROCEDURE — 36415 COLL VENOUS BLD VENIPUNCTURE: CPT | Mod: PO

## 2018-08-13 RX ADMIN — TESTOSTERONE CYPIONATE 100 MG: 200 INJECTION, SOLUTION INTRAMUSCULAR at 02:08

## 2018-08-14 NOTE — PROGRESS NOTES
B12 good, in fact a bit high.  Had instructed him previously to take B12 b/c of macrocytosis.  CBC stable mild anemia.  Macrocytosis resolved  BMP with CKD back to baseline.  Results to pt.  Stop any B12 supplement. Plan to repeat labs in the future maybe 6 months - CBC, BMP

## 2018-08-21 ENCOUNTER — OFFICE VISIT (OUTPATIENT)
Dept: NEPHROLOGY | Facility: CLINIC | Age: 83
End: 2018-08-21
Payer: MEDICARE

## 2018-08-21 ENCOUNTER — ANTI-COAG VISIT (OUTPATIENT)
Dept: CARDIOLOGY | Facility: CLINIC | Age: 83
End: 2018-08-21

## 2018-08-21 VITALS
WEIGHT: 239.88 LBS | BODY MASS INDEX: 29.83 KG/M2 | OXYGEN SATURATION: 96 % | SYSTOLIC BLOOD PRESSURE: 110 MMHG | DIASTOLIC BLOOD PRESSURE: 60 MMHG | HEIGHT: 75 IN | HEART RATE: 70 BPM

## 2018-08-21 DIAGNOSIS — N17.8 ACUTE RENAL FAILURE WITH SPECIFIED LESION: Primary | ICD-10-CM

## 2018-08-21 PROCEDURE — 99999 PR PBB SHADOW E&M-EST. PATIENT-LVL III: CPT | Mod: PBBFAC,,, | Performed by: INTERNAL MEDICINE

## 2018-08-21 PROCEDURE — 99499 UNLISTED E&M SERVICE: CPT | Mod: S$GLB,,, | Performed by: INTERNAL MEDICINE

## 2018-08-21 NOTE — PROGRESS NOTES
Patient had INR added to lab work on 8/13 which was within normal range.  He wishes to have next INR on 10/10 when he has next appointment with lab in 8 weeks.

## 2018-08-21 NOTE — LETTER
August 28, 2018      Anderson Jerome MD  [address]  [place] [address]           [facility]  [address]  [place] [address]  Phone: [phone]          Patient: Linda Skelton   MR Number: [id number]   YOB: 1935   Date of Visit: 8/21/2018       Dear Dr. Anderson Jerome:    Thank you for referring Linda Skelton to me for evaluation. Attached you will find relevant portions of my assessment and plan of care.    If you have questions, please do not hesitate to call me. I look forward to following Linda Skelton along with you.    Sincerely,    Victorino Avelar MD    Enclosure  CC:  No Recipients    If you would like to receive this communication electronically, please contact [email or web address] or [phone] to request more information on [employer] access.    For providers and/or their staff who would like to refer a patient to [facility], please contact us through our one-stop-shop provider referral line, [facility], at [phone].    If you feel you have received this communication in error or would no longer like to receive these types of communications, please e-mail [email or web address]

## 2018-08-29 ENCOUNTER — CLINICAL SUPPORT (OUTPATIENT)
Dept: FAMILY MEDICINE | Facility: CLINIC | Age: 83
End: 2018-08-29
Payer: MEDICARE

## 2018-08-29 DIAGNOSIS — E29.1 HYPOGONADISM MALE: Primary | ICD-10-CM

## 2018-08-29 PROCEDURE — 96372 THER/PROPH/DIAG INJ SC/IM: CPT | Mod: S$GLB,,, | Performed by: INTERNAL MEDICINE

## 2018-08-29 RX ADMIN — TESTOSTERONE CYPIONATE 100 MG: 200 INJECTION, SOLUTION INTRAMUSCULAR at 09:08

## 2018-08-29 NOTE — PROGRESS NOTES
Mr. Skelton is an 83 year old man with medical history of hypertension, coronary artery disease presenting for follow up of acute kidney injury.  Patient recently admitted for management of bacteremia attributed to cholecystitis, noted to have acute kidney injury attributed to contrast.  Creatinine improved and stable at previous baseline since discharge, will continue to monitor.  Answered patient questions, addressed concerns in regards to kidney function/prognosis, patient voiced understanding.  Return to clinic as needed (pending repeat labs/urine studies).

## 2018-09-12 ENCOUNTER — CLINICAL SUPPORT (OUTPATIENT)
Dept: FAMILY MEDICINE | Facility: CLINIC | Age: 83
End: 2018-09-12
Payer: MEDICARE

## 2018-09-12 DIAGNOSIS — N52.9 ERECTILE DYSFUNCTION, UNSPECIFIED ERECTILE DYSFUNCTION TYPE: Primary | ICD-10-CM

## 2018-09-12 PROCEDURE — 96372 THER/PROPH/DIAG INJ SC/IM: CPT | Mod: PBBFAC,PO

## 2018-09-12 RX ADMIN — TESTOSTERONE CYPIONATE 100 MG: 200 INJECTION, SOLUTION INTRAMUSCULAR at 12:09

## 2018-09-12 NOTE — PROGRESS NOTES
Depo-Testosterone 100 mg administered IM to left ventrogluteal. Tolerated well. No complaints. No adverse reaction noted.

## 2018-09-21 ENCOUNTER — TELEPHONE (OUTPATIENT)
Dept: CARDIOLOGY | Facility: CLINIC | Age: 83
End: 2018-09-21

## 2018-09-21 NOTE — TELEPHONE ENCOUNTER
----- Message from Angelita Ponce MA sent at 9/21/2018  8:05 AM CDT -----  Contact: patient called  Ximena please call the patient at [phone] on September 10, 2018 the patient was dizzy and when he walking he had a fainting spell Today he is fine he need to talk to the nurse about his condition and  Is schedule to see  on 10-. He would like to come in sooner. Thank you.

## 2018-09-26 ENCOUNTER — CLINICAL SUPPORT (OUTPATIENT)
Dept: FAMILY MEDICINE | Facility: CLINIC | Age: 83
End: 2018-09-26
Payer: MEDICARE

## 2018-09-26 DIAGNOSIS — E29.1 HYPOGONADISM IN MALE: Primary | ICD-10-CM

## 2018-09-26 PROCEDURE — 96372 THER/PROPH/DIAG INJ SC/IM: CPT | Mod: PBBFAC,PO

## 2018-09-26 RX ADMIN — TESTOSTERONE CYPIONATE 100 MG: 200 INJECTION, SOLUTION INTRAMUSCULAR at 11:09

## 2018-10-02 DIAGNOSIS — I48.91 ATRIAL FIBRILLATION, UNSPECIFIED TYPE: ICD-10-CM

## 2018-10-03 RX ORDER — DOFETILIDE 0.5 MG/1
CAPSULE ORAL
Qty: 180 CAPSULE | Refills: 3 | Status: SHIPPED | OUTPATIENT
Start: 2018-10-03 | End: 2019-05-21 | Stop reason: ALTCHOICE

## 2018-10-10 ENCOUNTER — LAB VISIT (OUTPATIENT)
Dept: LAB | Facility: HOSPITAL | Age: 83
End: 2018-10-10
Attending: INTERNAL MEDICINE
Payer: MEDICARE

## 2018-10-10 ENCOUNTER — ANTI-COAG VISIT (OUTPATIENT)
Dept: CARDIOLOGY | Facility: CLINIC | Age: 83
End: 2018-10-10

## 2018-10-10 ENCOUNTER — CLINICAL SUPPORT (OUTPATIENT)
Dept: FAMILY MEDICINE | Facility: CLINIC | Age: 83
End: 2018-10-10
Payer: MEDICARE

## 2018-10-10 DIAGNOSIS — E78.00 PURE HYPERCHOLESTEROLEMIA: ICD-10-CM

## 2018-10-10 DIAGNOSIS — I25.10 CORONARY ARTERY DISEASE INVOLVING NATIVE CORONARY ARTERY OF NATIVE HEART WITHOUT ANGINA PECTORIS: ICD-10-CM

## 2018-10-10 DIAGNOSIS — Z79.01 CURRENT USE OF LONG TERM ANTICOAGULATION: ICD-10-CM

## 2018-10-10 DIAGNOSIS — Z79.01 LONG TERM (CURRENT) USE OF ANTICOAGULANTS: ICD-10-CM

## 2018-10-10 DIAGNOSIS — I10 ESSENTIAL HYPERTENSION: ICD-10-CM

## 2018-10-10 DIAGNOSIS — Z23 FLU VACCINE NEED: Primary | ICD-10-CM

## 2018-10-10 DIAGNOSIS — E29.1 HYPOGONADISM IN MALE: ICD-10-CM

## 2018-10-10 LAB
ALBUMIN SERPL BCP-MCNC: 3.8 G/DL
ALP SERPL-CCNC: 49 U/L
ALT SERPL W/O P-5'-P-CCNC: 14 U/L
ANION GAP SERPL CALC-SCNC: 6 MMOL/L
AST SERPL-CCNC: 21 U/L
BASOPHILS # BLD AUTO: 0.01 K/UL
BASOPHILS NFR BLD: 0.3 %
BILIRUB SERPL-MCNC: 0.9 MG/DL
BUN SERPL-MCNC: 19 MG/DL
CALCIUM SERPL-MCNC: 9.1 MG/DL
CHLORIDE SERPL-SCNC: 105 MMOL/L
CHOLEST SERPL-MCNC: 130 MG/DL
CHOLEST/HDLC SERPL: 3.7 {RATIO}
CO2 SERPL-SCNC: 28 MMOL/L
CREAT SERPL-MCNC: 1.1 MG/DL
DIFFERENTIAL METHOD: ABNORMAL
EOSINOPHIL # BLD AUTO: 0.1 K/UL
EOSINOPHIL NFR BLD: 1.4 %
ERYTHROCYTE [DISTWIDTH] IN BLOOD BY AUTOMATED COUNT: 13.6 %
EST. GFR  (AFRICAN AMERICAN): >60 ML/MIN/1.73 M^2
EST. GFR  (NON AFRICAN AMERICAN): >60 ML/MIN/1.73 M^2
GLUCOSE SERPL-MCNC: 87 MG/DL
HCT VFR BLD AUTO: 39.6 %
HDLC SERPL-MCNC: 35 MG/DL
HDLC SERPL: 26.9 %
HGB BLD-MCNC: 12.5 G/DL
IMM GRANULOCYTES # BLD AUTO: 0.01 K/UL
IMM GRANULOCYTES NFR BLD AUTO: 0.3 %
INR PPP: 1.9
LDLC SERPL CALC-MCNC: 70.8 MG/DL
LYMPHOCYTES # BLD AUTO: 1 K/UL
LYMPHOCYTES NFR BLD: 26 %
MCH RBC QN AUTO: 32.1 PG
MCHC RBC AUTO-ENTMCNC: 31.6 G/DL
MCV RBC AUTO: 102 FL
MONOCYTES # BLD AUTO: 0.8 K/UL
MONOCYTES NFR BLD: 20.8 %
NEUTROPHILS # BLD AUTO: 1.9 K/UL
NEUTROPHILS NFR BLD: 51.2 %
NONHDLC SERPL-MCNC: 95 MG/DL
NRBC BLD-RTO: 0 /100 WBC
PLATELET # BLD AUTO: 125 K/UL
PMV BLD AUTO: 12.5 FL
POTASSIUM SERPL-SCNC: 4 MMOL/L
PROT SERPL-MCNC: 6.4 G/DL
PROTHROMBIN TIME: 19.6 SEC
RBC # BLD AUTO: 3.89 M/UL
SODIUM SERPL-SCNC: 139 MMOL/L
TRIGL SERPL-MCNC: 121 MG/DL
WBC # BLD AUTO: 3.65 K/UL

## 2018-10-10 PROCEDURE — 36415 COLL VENOUS BLD VENIPUNCTURE: CPT | Mod: PO

## 2018-10-10 PROCEDURE — 99211 OFF/OP EST MAY X REQ PHY/QHP: CPT | Mod: PBBFAC,PO

## 2018-10-10 PROCEDURE — 80061 LIPID PANEL: CPT

## 2018-10-10 PROCEDURE — 96372 THER/PROPH/DIAG INJ SC/IM: CPT | Mod: PBBFAC,59,PO

## 2018-10-10 PROCEDURE — 90662 IIV NO PRSV INCREASED AG IM: CPT | Mod: PBBFAC,PO

## 2018-10-10 PROCEDURE — 85025 COMPLETE CBC W/AUTO DIFF WBC: CPT

## 2018-10-10 PROCEDURE — 99999 PR PBB SHADOW E&M-EST. PATIENT-LVL I: CPT | Mod: PBBFAC,,,

## 2018-10-10 PROCEDURE — 85610 PROTHROMBIN TIME: CPT

## 2018-10-10 PROCEDURE — 80053 COMPREHEN METABOLIC PANEL: CPT

## 2018-10-10 RX ADMIN — TESTOSTERONE CYPIONATE 100 MG: 200 INJECTION, SOLUTION INTRAMUSCULAR at 08:10

## 2018-10-11 ENCOUNTER — PATIENT MESSAGE (OUTPATIENT)
Dept: FAMILY MEDICINE | Facility: CLINIC | Age: 83
End: 2018-10-11

## 2018-10-22 ENCOUNTER — OFFICE VISIT (OUTPATIENT)
Dept: CARDIOLOGY | Facility: CLINIC | Age: 83
End: 2018-10-22
Payer: MEDICARE

## 2018-10-22 VITALS
BODY MASS INDEX: 29.88 KG/M2 | SYSTOLIC BLOOD PRESSURE: 127 MMHG | DIASTOLIC BLOOD PRESSURE: 78 MMHG | HEART RATE: 57 BPM | HEIGHT: 75 IN | WEIGHT: 240.31 LBS

## 2018-10-22 DIAGNOSIS — Z98.61 POST PTCA: ICD-10-CM

## 2018-10-22 DIAGNOSIS — I48.0 PAF (PAROXYSMAL ATRIAL FIBRILLATION): ICD-10-CM

## 2018-10-22 DIAGNOSIS — Z79.01 LONG TERM (CURRENT) USE OF ANTICOAGULANTS: ICD-10-CM

## 2018-10-22 DIAGNOSIS — I10 ESSENTIAL HYPERTENSION: ICD-10-CM

## 2018-10-22 DIAGNOSIS — I25.10 CORONARY ARTERY DISEASE INVOLVING NATIVE CORONARY ARTERY OF NATIVE HEART WITHOUT ANGINA PECTORIS: Primary | ICD-10-CM

## 2018-10-22 PROCEDURE — 99213 OFFICE O/P EST LOW 20 MIN: CPT | Mod: PBBFAC | Performed by: INTERNAL MEDICINE

## 2018-10-22 PROCEDURE — 3078F DIAST BP <80 MM HG: CPT | Mod: CPTII,,, | Performed by: INTERNAL MEDICINE

## 2018-10-22 PROCEDURE — 99499 UNLISTED E&M SERVICE: CPT | Mod: HCNC,S$GLB,, | Performed by: INTERNAL MEDICINE

## 2018-10-22 PROCEDURE — 99999 PR PBB SHADOW E&M-EST. PATIENT-LVL III: CPT | Mod: PBBFAC,,, | Performed by: INTERNAL MEDICINE

## 2018-10-22 PROCEDURE — 99214 OFFICE O/P EST MOD 30 MIN: CPT | Mod: S$PBB,,, | Performed by: INTERNAL MEDICINE

## 2018-10-22 PROCEDURE — 3074F SYST BP LT 130 MM HG: CPT | Mod: CPTII,,, | Performed by: INTERNAL MEDICINE

## 2018-10-22 PROCEDURE — 1101F PT FALLS ASSESS-DOCD LE1/YR: CPT | Mod: CPTII,,, | Performed by: INTERNAL MEDICINE

## 2018-10-22 RX ORDER — TRIAMCINOLONE ACETONIDE 1 MG/G
CREAM TOPICAL
COMMUNITY
Start: 2018-10-09 | End: 2019-05-24

## 2018-10-22 NOTE — PROGRESS NOTES
Subjective:    Patient ID:  Linda Skelton is a 83 y.o. male who presents for follow-up of Essential hypertension (1 yr fu) and Pre Syncope (x 1 months ago 2 episodes)      HPI   83 year old male followed with CAD post NSTEMI/PCI to RCA 2000, hypertension, hyperlipidemia and paroxsymal arial fibrillation. He under went AF ablation 2/25/16. He has had 2 episodes of AF and had resumed coumadin and tikosyn. He is also followed by Dr Seals. He has a cholecystectomy in June.. His wife passed away in September with apparently severe AS and unsuccessful ballon valvuloplasty.  He is noted that his Hgb is decreased. Will repeat. Durning the time of his wife's illness he has episodes of orthostatic hypotension now resolved. He has lost 24# since his cholecystectomy and wife's death.    Lab Results   Component Value Date     10/10/2018    K 4.0 10/10/2018     10/10/2018    CO2 28 10/10/2018    BUN 19 10/10/2018    CREATININE 1.1 10/10/2018    GLU 87 10/10/2018    HGBA1C 6.1 09/18/2013    MG 1.9 06/06/2018    AST 21 10/10/2018    ALT 14 10/10/2018    ALBUMIN 3.8 10/10/2018    PROT 6.4 10/10/2018    BILITOT 0.9 10/10/2018    WBC 3.65 (L) 10/10/2018    HGB 12.5 (L) 10/10/2018    HCT 39.6 (L) 10/10/2018     (H) 10/10/2018     (L) 10/10/2018    INR 1.9 (H) 10/10/2018    INR 2.6 06/08/2016    INR 2.3 (H) 07/25/2012    PSA 10.6 (H) 04/25/2016    TSH 0.573 05/02/2018         Lab Results   Component Value Date    CHOL 130 10/10/2018    HDL 35 (L) 10/10/2018    TRIG 121 10/10/2018       Lab Results   Component Value Date    LDLCALC 70.8 10/10/2018       Past Medical History:   Diagnosis Date    ACS (acute coronary syndrome)     Acute coronary syndrome 2000    NSTEMI    Anticoagulant long-term use     Coumadin    Atrial fibrillation     Basal cell carcinoma excised     left scalp vertex    Bilateral leg edema 1/6/2014    BPH (benign prostatic hyperplasia)     CKD (chronic kidney disease)      Coronary artery disease     DVT of leg (deep venous thrombosis) 09/08/2014    On coumadin for years    Encounter for blood transfusion     Hyperlipidemia     Hypertension     Hypogonadism, male     Leg fracture, left     Melanoma 01/04/2017     in situ crown of scalp    MI (myocardial infarction)     Panhypopituitarism     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 1 Tablet Oral Every night, Disp: , Rfl:     atenolol (TENORMIN) 25 MG tablet, TAKE ONE TABLET BY MOUTH twice DAILY (Patient taking differently: Take 25 mg by mouth once daily. ), Disp: 90 tablet, Rfl: 3    dofetilide (TIKOSYN) 500 MCG Cap, TAKE 1 CAPSULE TWICE DAILY, Disp: 180 capsule, Rfl: 3    folic acid (FOLVITE) 1 MG tablet, TAKE ONE TABLET BY MOUTH ONCE DAILY, Disp: 90 tablet, Rfl: 3    levothyroxine (SYNTHROID) 112 MCG tablet, Take 1 tablet (112 mcg total) by mouth before breakfast., Disp: 90 tablet, Rfl: 3    NITROSTAT 0.4 mg SL tablet, DISSOLVE ONE TABLET UNDER THE TONGUE EVERY 5 MINUTES AS NEEDED FOR CHEST PAIN.  DO NOT EXCEED A TOTAL OF 3 DOSES IN 15 MINUTES NOW, Disp: 25 tablet, Rfl: 3    omeprazole (PRILOSEC) 20 MG capsule, Take 20 mg by mouth every other day., Disp: , Rfl:     predniSONE (DELTASONE) 5 MG tablet, Take 1 tablet (5 mg total) by mouth once daily., Disp: 90 tablet, Rfl: 3    simvastatin (ZOCOR) 80 MG tablet, TAKE ONE TABLET BY MOUTH IN THE EVENING, Disp: 90 tablet, Rfl: 3    tamsulosin (FLOMAX) 0.4 mg Cp24, Take 1 capsule (0.4 mg total) by mouth once daily., Disp: 90 capsule, Rfl: 3    testosterone cypionate (DEPOTESTOTERONE CYPIONATE) 100 mg/mL injection, Inject 1 mL (100 mg total) into the muscle every 14 (fourteen) days., Disp: 10 mL, Rfl: 2    triamcinolone acetonide 0.1% (KENALOG) 0.1 %  cream, , Disp: , Rfl:     warfarin (COUMADIN) 5 MG tablet, Take 1.5 tablets (7.5 mg total) by mouth Daily. Current Dose ( 5 mg on Tue; 7.5 mg all other days) or as directed by coumadin clinic, Disp: 135 tablet, Rfl: 3    Current Facility-Administered Medications:     lidocaine HCL 2% jelly, , Topical (Top), Once, Octaviano Núñez Jr., MD    testosterone cypionate injection 100 mg, 100 mg, Intramuscular, Q14 Days, Anderson Jerome MD, 100 mg at 10/10/18 0838        Review of Systems   Constitution: Negative for decreased appetite, diaphoresis, fever, weakness, malaise/fatigue, weight gain and weight loss.   HENT: Negative for congestion, ear discharge, ear pain and nosebleeds.    Eyes: Negative for blurred vision, double vision and visual disturbance.   Cardiovascular: Negative for chest pain, claudication, cyanosis, dyspnea on exertion, irregular heartbeat, leg swelling, near-syncope, orthopnea, palpitations, paroxysmal nocturnal dyspnea and syncope.   Respiratory: Negative for cough, hemoptysis, shortness of breath, sleep disturbances due to breathing, snoring, sputum production and wheezing.    Endocrine: Negative for polydipsia, polyphagia and polyuria.   Hematologic/Lymphatic: Negative for adenopathy and bleeding problem. Does not bruise/bleed easily.   Skin: Negative for color change, nail changes, poor wound healing and rash.   Musculoskeletal: Negative for muscle cramps and muscle weakness.   Gastrointestinal: Negative for abdominal pain, anorexia, change in bowel habit, hematochezia, nausea and vomiting.   Genitourinary: Negative for dysuria, frequency and hematuria.   Neurological: Positive for light-headedness. Negative for brief paralysis, difficulty with concentration, excessive daytime sleepiness, dizziness, focal weakness, headaches, seizures and vertigo.   Psychiatric/Behavioral: Negative for altered mental status and depression.   Allergic/Immunologic: Negative for persistent infections.       "  Objective:/78 (BP Location: Left arm, Patient Position: Sitting, BP Method: X-Large (Automatic))   Pulse (!) 57   Ht 6' 3" (1.905 m)   Wt 109 kg (240 lb 4.8 oz)   BMI 30.04 kg/m²             Physical Exam   Constitutional: He is oriented to person, place, and time. He appears well-developed and well-nourished.   HENT:   Head: Normocephalic.   Right Ear: External ear normal.   Left Ear: External ear normal.   Nose: Nose normal.   Inspection of lips, teeth and gums normal   Eyes: EOM are normal. Pupils are equal, round, and reactive to light. No scleral icterus.   Neck: Normal range of motion. Neck supple. No JVD present. No tracheal deviation present. No thyromegaly present.   Cardiovascular: Normal rate, regular rhythm and intact distal pulses. Exam reveals no gallop and no friction rub.   No murmur heard.  Pulmonary/Chest: Effort normal and breath sounds normal.   Abdominal: Bowel sounds are normal. He exhibits no distension. There is no hepatosplenomegaly. There is no tenderness. There is no guarding.   Musculoskeletal: Normal range of motion. He exhibits no edema or tenderness.   Lymphadenopathy:   Palpation of neck and groin lymph nodes normal   Neurological: He is alert and oriented to person, place, and time. No cranial nerve deficit. He exhibits normal muscle tone. Coordination normal.   Skin: Skin is dry.   Palpation of skin normal   Psychiatric: His behavior is normal. Judgment and thought content normal.         Assessment:       1. Coronary artery disease involving native coronary artery of native heart without angina pectoris    2. Long term (current) use of anticoagulants    3. PAF (paroxysmal atrial fibrillation)    4. Post PTCA    5. Essential hypertension         Plan:       Linda was seen today for essential hypertension and pre syncope.    Diagnoses and all orders for this visit:    Coronary artery disease involving native coronary artery of native heart without angina pectoris  -     " Lipid panel; Future; Expected date: 10/22/2019    Long term (current) use of anticoagulants  -     CBC auto differential; Future; Expected date: 01/20/2019  -     CBC auto differential; Future; Expected date: 10/22/2019    PAF (paroxysmal atrial fibrillation)    Post PTCA    Essential hypertension  -     Comprehensive metabolic panel; Future; Expected date: 10/22/2019    Other orders  -     triamcinolone acetonide 0.1% (KENALOG) 0.1 % cream

## 2018-10-24 ENCOUNTER — CLINICAL SUPPORT (OUTPATIENT)
Dept: FAMILY MEDICINE | Facility: CLINIC | Age: 83
End: 2018-10-24
Payer: MEDICARE

## 2018-10-24 DIAGNOSIS — E29.1 HYPOGONADISM IN MALE: Primary | ICD-10-CM

## 2018-10-24 PROCEDURE — 96372 THER/PROPH/DIAG INJ SC/IM: CPT | Mod: PBBFAC,PO

## 2018-10-24 RX ADMIN — TESTOSTERONE CYPIONATE 100 MG: 200 INJECTION, SOLUTION INTRAMUSCULAR at 08:10

## 2018-10-29 ENCOUNTER — OFFICE VISIT (OUTPATIENT)
Dept: DERMATOLOGY | Facility: CLINIC | Age: 83
End: 2018-10-29
Payer: MEDICARE

## 2018-10-29 DIAGNOSIS — D22.9 NEVUS: ICD-10-CM

## 2018-10-29 DIAGNOSIS — L82.1 SK (SEBORRHEIC KERATOSIS): ICD-10-CM

## 2018-10-29 DIAGNOSIS — D18.00 ANGIOMA: ICD-10-CM

## 2018-10-29 DIAGNOSIS — Z86.006 HISTORY OF MELANOMA IN SITU: ICD-10-CM

## 2018-10-29 DIAGNOSIS — Z85.828 PERSONAL HISTORY OF SKIN CANCER: ICD-10-CM

## 2018-10-29 DIAGNOSIS — D48.5 NEOPLASM OF UNCERTAIN BEHAVIOR OF SKIN: Primary | ICD-10-CM

## 2018-10-29 DIAGNOSIS — L57.0 AK (ACTINIC KERATOSIS): ICD-10-CM

## 2018-10-29 DIAGNOSIS — I10 ESSENTIAL HYPERTENSION: ICD-10-CM

## 2018-10-29 DIAGNOSIS — C84.A0 CUTANEOUS T-CELL LYMPHOMA, UNSPECIFIED BODY REGION: ICD-10-CM

## 2018-10-29 DIAGNOSIS — L30.9 DERMATITIS: ICD-10-CM

## 2018-10-29 DIAGNOSIS — E78.5 HYPERLIPIDEMIA: ICD-10-CM

## 2018-10-29 DIAGNOSIS — L90.5 SCAR: ICD-10-CM

## 2018-10-29 PROCEDURE — 11101 PR BIOPSY, EACH ADDED LESION: CPT | Mod: S$PBB,,, | Performed by: DERMATOLOGY

## 2018-10-29 PROCEDURE — 88342 IMHCHEM/IMCYTCHM 1ST ANTB: CPT | Mod: 26,,, | Performed by: PATHOLOGY

## 2018-10-29 PROCEDURE — 99999 PR PBB SHADOW E&M-EST. PATIENT-LVL III: CPT | Mod: PBBFAC,,, | Performed by: DERMATOLOGY

## 2018-10-29 PROCEDURE — 1101F PT FALLS ASSESS-DOCD LE1/YR: CPT | Mod: CPTII,,, | Performed by: DERMATOLOGY

## 2018-10-29 PROCEDURE — 88312 SPECIAL STAINS GROUP 1: CPT | Mod: 26,,, | Performed by: PATHOLOGY

## 2018-10-29 PROCEDURE — 11100 PR BIOPSY OF SKIN LESION: CPT | Mod: 59,S$PBB,, | Performed by: DERMATOLOGY

## 2018-10-29 PROCEDURE — 99214 OFFICE O/P EST MOD 30 MIN: CPT | Mod: 25,S$PBB,, | Performed by: DERMATOLOGY

## 2018-10-29 PROCEDURE — 17003 DESTRUCT PREMALG LES 2-14: CPT | Mod: PBBFAC,PO | Performed by: DERMATOLOGY

## 2018-10-29 PROCEDURE — 17000 DESTRUCT PREMALG LESION: CPT | Mod: S$PBB,51,59, | Performed by: DERMATOLOGY

## 2018-10-29 PROCEDURE — 99213 OFFICE O/P EST LOW 20 MIN: CPT | Mod: PBBFAC,PO,25 | Performed by: DERMATOLOGY

## 2018-10-29 PROCEDURE — 88341 IMHCHEM/IMCYTCHM EA ADD ANTB: CPT | Mod: 26,,, | Performed by: PATHOLOGY

## 2018-10-29 PROCEDURE — 11101 PR BIOPSY, EACH ADDED LESION: CPT | Mod: PBBFAC,PO | Performed by: DERMATOLOGY

## 2018-10-29 PROCEDURE — 17003 DESTRUCT PREMALG LES 2-14: CPT | Mod: S$PBB,,, | Performed by: DERMATOLOGY

## 2018-10-29 PROCEDURE — 17000 DESTRUCT PREMALG LESION: CPT | Mod: PBBFAC,PO | Performed by: DERMATOLOGY

## 2018-10-29 PROCEDURE — 11100 PR BIOPSY OF SKIN LESION: CPT | Mod: 59,PBBFAC,PO | Performed by: DERMATOLOGY

## 2018-10-29 PROCEDURE — 88305 TISSUE EXAM BY PATHOLOGIST: CPT | Mod: 59 | Performed by: PATHOLOGY

## 2018-10-29 RX ORDER — ATENOLOL 25 MG/1
TABLET ORAL
Qty: 90 TABLET | Refills: 3 | Status: SHIPPED | OUTPATIENT
Start: 2018-10-29 | End: 2019-04-29 | Stop reason: SDUPTHER

## 2018-10-29 NOTE — PATIENT INSTRUCTIONS
Triamcinolone cream to areas on mid lower back, right lower back, upper posterior thighs/lower buttock areas, right and left sides of abdomen    Use antifungal cream to both bottoms of feet for 3 weeks

## 2018-10-29 NOTE — PROGRESS NOTES
Subjective:       Patient ID:  Linda Skelton is a 83 y.o. male who presents for   Chief Complaint   Patient presents with    Skin Check     Pt here today for a TBSE.  When pt made appt a few weeks ago, had bumps on right ankle.  Was not itching.  Used TAC cream and this helped.  Was not itching.  Feels it has resolved almost completely. Pt does have hx of CTCL, MIS , NMSC. Today Pt denies any new, dry, scaly or bleeding lesions today.        Review of Systems   Constitutional: Negative for fever, chills, weight loss, fatigue, night sweats and malaise.   HENT: Negative for headaches.    Respiratory: Negative for cough and shortness of breath.    Gastrointestinal: Negative for indigestion.   Skin: Positive for activity-related sunscreen use. Negative for itching, rash, daily sunscreen use, tendency to form keloidal scars, recent sunburn and wears hat.   Neurological: Negative for headaches.   Hematologic/Lymphatic: Negative for adenopathy. Bruises/bleeds easily.        Objective:    Physical Exam   Constitutional: He appears well-developed and well-nourished. No distress.   Lymphadenopathy: No supraclavicular adenopathy is present.     He has no cervical adenopathy.     He has no axillary adenopathy.     He has no inguinal adenopathy.   Neurological: He is alert and oriented to person, place, and time. He is not disoriented.   Psychiatric: He has a normal mood and affect.   Skin:   Areas Examined (abnormalities noted in diagram):   Scalp / Hair Palpated and Inspected  Head / Face Inspection Performed  Neck Inspection Performed  Chest / Axilla Inspection Performed  Abdomen Inspection Performed  Genitals / Buttocks / Groin Inspection Performed  Back Inspection Performed  RUE Inspected  LUE Inspection Performed  RLE Inspected  LLE Inspection Performed  Nails and Digits Inspection Performed                           Diagram Legend     Erythematous scaling macule/papule c/w actinic keratosis       Vascular papule  c/w angioma      Pigmented verrucoid papule/plaque c/w seborrheic keratosis      Yellow umbilicated papule c/w sebaceous hyperplasia      Irregularly shaped tan macule c/w lentigo     1-2 mm smooth white papules consistent with Milia      Movable subcutaneous cyst with punctum c/w epidermal inclusion cyst      Subcutaneous movable cyst c/w pilar cyst      Firm pink to brown papule c/w dermatofibroma      Pedunculated fleshy papule(s) c/w skin tag(s)      Evenly pigmented macule c/w junctional nevus     Mildly variegated pigmented, slightly irregular-bordered macule c/w mildly atypical nevus      Flesh colored to evenly pigmented papule c/w intradermal nevus       Pink pearly papule/plaque c/w basal cell carcinoma      Erythematous hyperkeratotic cursted plaque c/w SCC      Surgical scar with no sign of skin cancer recurrence      Open and closed comedones      Inflammatory papules and pustules      Verrucoid papule consistent consistent with wart     Erythematous eczematous patches and plaques     Dystrophic onycholytic nail with subungual debris c/w onychomycosis     Umbilicated papule    Erythematous-base heme-crusted tan verrucoid plaque consistent with inflamed seborrheic keratosis     Erythematous Silvery Scaling Plaque c/w Psoriasis     See annotation              Assessment / Plan:      Pathology Orders:     Normal Orders This Visit    Tissue Specimen To Pathology, Dermatology     Questions:    Directional Terms:  Other(comment)    Clinical information:  r/o atypical nevus    Specific Site:  left upper posterior thigh    Tissue Specimen To Pathology, Dermatology     Questions:    Directional Terms:  Other(comment)    Clinical information:  r/o asteototic eczema v CTCL    Specific Site:  left lateral knee        Neoplasm of uncertain behavior of skin - left upper post thigh  Shave biopsy procedure note:    Shave biopsy performed after verbal consent including risk of infection, scar, recurrence, need for  additional treatment of site. Area prepped with alcohol, anesthetized with approximately 1.0cc of 1% lidocaine with epinephrine. Lesional tissue shaved with razor blade. Hemostasis achieved with application of aluminum chloride followed by hyfrecation. No complications. Dressing applied. Wound care explained.      -     Tissue Specimen To Pathology, Dermatology    Dermatitis- left lat knee  Shave biopsy procedure note:    Shave biopsy performed after verbal consent including risk of infection, scar, recurrence, need for additional treatment of site. Area prepped with alcohol, anesthetized with approximately 1.0cc of 1% lidocaine with epinephrine. Lesional tissue shaved with razor blade. Hemostasis achieved with application of aluminum chloride followed by hyfrecation. No complications. Dressing applied. Wound care explained.      -     Tissue Specimen To Pathology, Dermatology    AK (actinic keratosis)  Cryosurgery Procedure Note    Verbal consent from the patient is obtained including, but not limited to, risk of hypopigmentation/hyperpigmentation, scar, recurrence of lesion. The patient is aware of the precancerous quality and need for treatment of these lesions. Liquid nitrogen cryosurgery is applied to the 3 actinic keratoses, as detailed in the physical exam, to produce a freeze injury. The patient is aware that blisters may form and is instructed on wound care with gentle cleansing and use of vaseline ointment to keep moist until healed. The patient is supplied a handout on cryosurgery and is instructed to call if lesions do not completely resolve.    Cutaneous T-cell lymphoma, unspecified body region  Tac cream -   Natural sunlight  Focus on trunk and upper post thighs with tac cream     Angioma  These are benign vascular lesions that are inherited.  Treatment is not necessary.    SK (seborrheic keratosis)  These are benign inherited growths without a malignant potential. Reassurance given to patient. No  treatment is necessary.     Nevus  Discussed ABCDE's of nevi.  Monitor for new mole or moles that are becoming bigger, darker, irritated, or developing irregular borders. Brochure provided.    Personal history of skin cancer  History of melanoma in situ  Scar  Area of previous melanoma examined. Site well healed with no signs of recurrence.    Total body skin examination performed today including at least 12 points as noted in physical examination. Suspicious lesions noted.           Follow-up for prn bx report.

## 2018-10-30 RX ORDER — SIMVASTATIN 80 MG/1
TABLET, FILM COATED ORAL
Qty: 90 TABLET | Refills: 3 | Status: SHIPPED | OUTPATIENT
Start: 2018-10-30 | End: 2019-05-21 | Stop reason: ALTCHOICE

## 2018-11-07 ENCOUNTER — ANTI-COAG VISIT (OUTPATIENT)
Dept: CARDIOLOGY | Facility: CLINIC | Age: 83
End: 2018-11-07

## 2018-11-07 ENCOUNTER — LAB VISIT (OUTPATIENT)
Dept: LAB | Facility: HOSPITAL | Age: 83
End: 2018-11-07
Attending: INTERNAL MEDICINE
Payer: MEDICARE

## 2018-11-07 ENCOUNTER — CLINICAL SUPPORT (OUTPATIENT)
Dept: FAMILY MEDICINE | Facility: CLINIC | Age: 83
End: 2018-11-07
Payer: MEDICARE

## 2018-11-07 DIAGNOSIS — Z79.01 LONG TERM (CURRENT) USE OF ANTICOAGULANTS: ICD-10-CM

## 2018-11-07 DIAGNOSIS — E29.1 HYPOGONADISM IN MALE: Primary | ICD-10-CM

## 2018-11-07 DIAGNOSIS — I48.0 PAF (PAROXYSMAL ATRIAL FIBRILLATION): ICD-10-CM

## 2018-11-07 LAB
INR PPP: 2.2
PROTHROMBIN TIME: 21.1 SEC

## 2018-11-07 PROCEDURE — 36415 COLL VENOUS BLD VENIPUNCTURE: CPT | Mod: PO

## 2018-11-07 PROCEDURE — 96372 THER/PROPH/DIAG INJ SC/IM: CPT | Mod: S$GLB,,, | Performed by: INTERNAL MEDICINE

## 2018-11-07 PROCEDURE — 85610 PROTHROMBIN TIME: CPT

## 2018-11-07 RX ORDER — TESTOSTERONE CYPIONATE 200 MG/ML
100 INJECTION, SOLUTION INTRAMUSCULAR
Status: COMPLETED | OUTPATIENT
Start: 2018-11-07 | End: 2019-01-16

## 2018-11-07 RX ADMIN — TESTOSTERONE CYPIONATE 100 MG: 200 INJECTION, SOLUTION INTRAMUSCULAR at 09:11

## 2018-11-19 ENCOUNTER — CLINICAL SUPPORT (OUTPATIENT)
Dept: FAMILY MEDICINE | Facility: CLINIC | Age: 83
End: 2018-11-19
Payer: MEDICARE

## 2018-11-19 DIAGNOSIS — E29.1 HYPOGONADISM IN MALE: Primary | ICD-10-CM

## 2018-11-19 PROCEDURE — 96372 THER/PROPH/DIAG INJ SC/IM: CPT | Mod: HCNC,S$GLB,, | Performed by: INTERNAL MEDICINE

## 2018-11-19 RX ADMIN — TESTOSTERONE CYPIONATE 100 MG: 200 INJECTION, SOLUTION INTRAMUSCULAR at 08:11

## 2018-11-28 ENCOUNTER — TELEPHONE (OUTPATIENT)
Dept: DERMATOLOGY | Facility: CLINIC | Age: 83
End: 2018-11-28

## 2018-11-28 NOTE — TELEPHONE ENCOUNTER
----- Message from Destin Nava sent at 11/28/2018  1:05 PM CST -----  Contact: patient   Patient Returning Call from [facility]    Who Left Message for Patient:staff   Communication Preference:[phone]  Additional Information:

## 2018-12-04 ENCOUNTER — TELEPHONE (OUTPATIENT)
Dept: UROLOGY | Facility: CLINIC | Age: 83
End: 2018-12-04

## 2018-12-04 DIAGNOSIS — R97.20 ELEVATED PSA: Primary | ICD-10-CM

## 2018-12-04 NOTE — TELEPHONE ENCOUNTER
----- Message from Chrissy Bryan sent at 12/4/2018  8:33 AM CST -----  Contact: pt: [phone]  Needs Advice    Reason for call: pt's recalled letter call for a PSA order has to be in system         Communication Preference: pt: [phone]    Additional Information: can link order to labs that's being done on 1/30

## 2018-12-05 ENCOUNTER — CLINICAL SUPPORT (OUTPATIENT)
Dept: FAMILY MEDICINE | Facility: CLINIC | Age: 83
End: 2018-12-05
Payer: MEDICARE

## 2018-12-05 DIAGNOSIS — E29.1 HYPOGONADISM IN MALE: Primary | ICD-10-CM

## 2018-12-05 PROCEDURE — 96372 THER/PROPH/DIAG INJ SC/IM: CPT | Mod: HCNC,S$GLB,, | Performed by: INTERNAL MEDICINE

## 2018-12-05 RX ADMIN — TESTOSTERONE CYPIONATE 100 MG: 200 INJECTION, SOLUTION INTRAMUSCULAR at 09:12

## 2018-12-05 NOTE — PROGRESS NOTES
Depo-testosterone 100 mg administered IM to left ventrogluteal.Tolerated well. No adverse reaction noted.

## 2018-12-19 ENCOUNTER — CLINICAL SUPPORT (OUTPATIENT)
Dept: FAMILY MEDICINE | Facility: CLINIC | Age: 83
End: 2018-12-19
Payer: MEDICARE

## 2018-12-19 DIAGNOSIS — E29.1 HYPOGONADISM IN MALE: Primary | ICD-10-CM

## 2018-12-19 PROCEDURE — 96372 THER/PROPH/DIAG INJ SC/IM: CPT | Mod: HCNC,S$GLB,, | Performed by: INTERNAL MEDICINE

## 2018-12-19 RX ADMIN — TESTOSTERONE CYPIONATE 100 MG: 200 INJECTION, SOLUTION INTRAMUSCULAR at 11:12

## 2018-12-28 DIAGNOSIS — I48.20 CHRONIC ATRIAL FIBRILLATION: ICD-10-CM

## 2018-12-28 DIAGNOSIS — I48.0 PAROXYSMAL ATRIAL FIBRILLATION: ICD-10-CM

## 2018-12-28 DIAGNOSIS — Z79.01 ANTICOAGULATION MONITORING BY PHARMACIST: ICD-10-CM

## 2018-12-30 RX ORDER — WARFARIN SODIUM 5 MG/1
TABLET ORAL
Qty: 135 TABLET | Refills: 3 | Status: SHIPPED | OUTPATIENT
Start: 2018-12-30 | End: 2020-01-14 | Stop reason: SDUPTHER

## 2019-01-02 ENCOUNTER — LAB VISIT (OUTPATIENT)
Dept: LAB | Facility: HOSPITAL | Age: 84
End: 2019-01-02
Attending: INTERNAL MEDICINE
Payer: MEDICARE

## 2019-01-02 ENCOUNTER — CLINICAL SUPPORT (OUTPATIENT)
Dept: FAMILY MEDICINE | Facility: CLINIC | Age: 84
End: 2019-01-02
Payer: MEDICARE

## 2019-01-02 DIAGNOSIS — E29.1 HYPOGONADISM IN MALE: Primary | ICD-10-CM

## 2019-01-02 DIAGNOSIS — Z79.01 LONG TERM (CURRENT) USE OF ANTICOAGULANTS: ICD-10-CM

## 2019-01-02 LAB
INR PPP: 2.6
PROTHROMBIN TIME: 25.4 SEC

## 2019-01-02 PROCEDURE — 96372 PR INJECTION,THERAP/PROPH/DIAG2ST, IM OR SUBCUT: ICD-10-PCS | Mod: HCNC,S$GLB,, | Performed by: INTERNAL MEDICINE

## 2019-01-02 PROCEDURE — 36415 COLL VENOUS BLD VENIPUNCTURE: CPT | Mod: HCNC,PO

## 2019-01-02 PROCEDURE — 96372 THER/PROPH/DIAG INJ SC/IM: CPT | Mod: HCNC,S$GLB,, | Performed by: INTERNAL MEDICINE

## 2019-01-02 PROCEDURE — 85610 PROTHROMBIN TIME: CPT | Mod: HCNC

## 2019-01-02 RX ADMIN — TESTOSTERONE CYPIONATE 100 MG: 200 INJECTION, SOLUTION INTRAMUSCULAR at 08:01

## 2019-01-04 ENCOUNTER — ANTI-COAG VISIT (OUTPATIENT)
Dept: CARDIOLOGY | Facility: CLINIC | Age: 84
End: 2019-01-04

## 2019-01-04 DIAGNOSIS — I48.0 PAF (PAROXYSMAL ATRIAL FIBRILLATION): ICD-10-CM

## 2019-01-16 ENCOUNTER — OFFICE VISIT (OUTPATIENT)
Dept: OPTOMETRY | Facility: CLINIC | Age: 84
End: 2019-01-16
Payer: COMMERCIAL

## 2019-01-16 ENCOUNTER — CLINICAL SUPPORT (OUTPATIENT)
Dept: FAMILY MEDICINE | Facility: CLINIC | Age: 84
End: 2019-01-16
Payer: MEDICARE

## 2019-01-16 DIAGNOSIS — Z96.1 PSEUDOPHAKIA OF BOTH EYES: Primary | ICD-10-CM

## 2019-01-16 DIAGNOSIS — Z01.00 ROUTINE EYE EXAM: ICD-10-CM

## 2019-01-16 DIAGNOSIS — E29.1 HYPOGONADISM IN MALE: Primary | ICD-10-CM

## 2019-01-16 DIAGNOSIS — Z13.5 GLAUCOMA SCREENING: ICD-10-CM

## 2019-01-16 PROCEDURE — 99999 PR PBB SHADOW E&M-EST. PATIENT-LVL II: ICD-10-PCS | Mod: PBBFAC,,, | Performed by: OPTOMETRIST

## 2019-01-16 PROCEDURE — 92015 DETERMINE REFRACTIVE STATE: CPT | Mod: S$GLB,,, | Performed by: OPTOMETRIST

## 2019-01-16 PROCEDURE — 96372 THER/PROPH/DIAG INJ SC/IM: CPT | Mod: HCNC,S$GLB,, | Performed by: INTERNAL MEDICINE

## 2019-01-16 PROCEDURE — 92014 PR EYE EXAM, EST PATIENT,COMPREHESV: ICD-10-PCS | Mod: S$GLB,,, | Performed by: OPTOMETRIST

## 2019-01-16 PROCEDURE — 96372 PR INJECTION,THERAP/PROPH/DIAG2ST, IM OR SUBCUT: ICD-10-PCS | Mod: HCNC,S$GLB,, | Performed by: INTERNAL MEDICINE

## 2019-01-16 PROCEDURE — 92014 COMPRE OPH EXAM EST PT 1/>: CPT | Mod: S$GLB,,, | Performed by: OPTOMETRIST

## 2019-01-16 PROCEDURE — 92015 PR REFRACTION: ICD-10-PCS | Mod: S$GLB,,, | Performed by: OPTOMETRIST

## 2019-01-16 PROCEDURE — 99999 PR PBB SHADOW E&M-EST. PATIENT-LVL II: CPT | Mod: PBBFAC,,, | Performed by: OPTOMETRIST

## 2019-01-16 RX ADMIN — TESTOSTERONE CYPIONATE 100 MG: 200 INJECTION, SOLUTION INTRAMUSCULAR at 08:01

## 2019-01-16 NOTE — PROGRESS NOTES
HPI     DLS:1/16/17  Pt state no VA changes  No f/f  No gtts   Doesn't have spex    Last edited by Rodrigue Flores, OD on 1/16/2019 10:38 AM. (History)        ROS     Positive for: Eyes (cat surgery OU)    Negative for: Constitutional, Gastrointestinal, Neurological, Skin,   Genitourinary, Musculoskeletal, HENT, Endocrine, Cardiovascular,   Respiratory, Psychiatric, Allergic/Imm, Heme/Lymph    Last edited by Rodrigue Flores, OD on 1/16/2019 11:07 AM. (History)        Assessment /Plan     For exam results, see Encounter Report.    Pseudophakia of both eyes    Glaucoma screening    Routine eye exam        1. Sp MFIOL/YAG OU--pt happy without spex    PLAN:    rtc 1 yr

## 2019-01-30 ENCOUNTER — LAB VISIT (OUTPATIENT)
Dept: LAB | Facility: HOSPITAL | Age: 84
End: 2019-01-30
Attending: INTERNAL MEDICINE
Payer: MEDICARE

## 2019-01-30 ENCOUNTER — CLINICAL SUPPORT (OUTPATIENT)
Dept: FAMILY MEDICINE | Facility: CLINIC | Age: 84
End: 2019-01-30
Payer: MEDICARE

## 2019-01-30 DIAGNOSIS — E29.1 HYPOGONADISM IN MALE: Primary | ICD-10-CM

## 2019-01-30 DIAGNOSIS — Z79.01 LONG TERM (CURRENT) USE OF ANTICOAGULANTS: ICD-10-CM

## 2019-01-30 DIAGNOSIS — R97.20 ELEVATED PSA: ICD-10-CM

## 2019-01-30 LAB
BASOPHILS # BLD AUTO: 0.01 K/UL
BASOPHILS NFR BLD: 0.2 %
COMPLEXED PSA SERPL-MCNC: 2.3 NG/ML
DIFFERENTIAL METHOD: ABNORMAL
EOSINOPHIL # BLD AUTO: 0.1 K/UL
EOSINOPHIL NFR BLD: 1.3 %
ERYTHROCYTE [DISTWIDTH] IN BLOOD BY AUTOMATED COUNT: 13.3 %
HCT VFR BLD AUTO: 44 %
HGB BLD-MCNC: 13.9 G/DL
IMM GRANULOCYTES # BLD AUTO: 0.07 K/UL
IMM GRANULOCYTES NFR BLD AUTO: 1.3 %
INR PPP: 2
LYMPHOCYTES # BLD AUTO: 1.2 K/UL
LYMPHOCYTES NFR BLD: 21.9 %
MCH RBC QN AUTO: 31.9 PG
MCHC RBC AUTO-ENTMCNC: 31.6 G/DL
MCV RBC AUTO: 101 FL
MONOCYTES # BLD AUTO: 1.1 K/UL
MONOCYTES NFR BLD: 20.2 %
NEUTROPHILS # BLD AUTO: 3 K/UL
NEUTROPHILS NFR BLD: 55.1 %
NRBC BLD-RTO: 0 /100 WBC
PLATELET # BLD AUTO: 112 K/UL
PMV BLD AUTO: 12.7 FL
PROTHROMBIN TIME: 19.6 SEC
RBC # BLD AUTO: 4.36 M/UL
WBC # BLD AUTO: 5.35 K/UL

## 2019-01-30 PROCEDURE — 85025 COMPLETE CBC W/AUTO DIFF WBC: CPT | Mod: HCNC

## 2019-01-30 PROCEDURE — 96372 THER/PROPH/DIAG INJ SC/IM: CPT | Mod: HCNC,S$GLB,, | Performed by: INTERNAL MEDICINE

## 2019-01-30 PROCEDURE — 96372 PR INJECTION,THERAP/PROPH/DIAG2ST, IM OR SUBCUT: ICD-10-PCS | Mod: HCNC,S$GLB,, | Performed by: INTERNAL MEDICINE

## 2019-01-30 PROCEDURE — 84153 ASSAY OF PSA TOTAL: CPT | Mod: HCNC

## 2019-01-30 PROCEDURE — 36415 COLL VENOUS BLD VENIPUNCTURE: CPT | Mod: HCNC,PO

## 2019-01-30 PROCEDURE — 85610 PROTHROMBIN TIME: CPT | Mod: HCNC

## 2019-01-30 RX ORDER — TESTOSTERONE CYPIONATE 200 MG/ML
100 INJECTION, SOLUTION INTRAMUSCULAR
Status: COMPLETED | OUTPATIENT
Start: 2019-01-30 | End: 2019-05-22

## 2019-01-30 RX ADMIN — TESTOSTERONE CYPIONATE 100 MG: 200 INJECTION, SOLUTION INTRAMUSCULAR at 08:01

## 2019-01-30 NOTE — PROGRESS NOTES
Depo-testosterone 200 mg administered IM to left ventrogluteal. Tolerated well. No adverse reaction noted.

## 2019-02-01 ENCOUNTER — ANTI-COAG VISIT (OUTPATIENT)
Dept: CARDIOLOGY | Facility: CLINIC | Age: 84
End: 2019-02-01

## 2019-02-01 DIAGNOSIS — I48.0 PAF (PAROXYSMAL ATRIAL FIBRILLATION): ICD-10-CM

## 2019-02-06 ENCOUNTER — OFFICE VISIT (OUTPATIENT)
Dept: DERMATOLOGY | Facility: CLINIC | Age: 84
End: 2019-02-06
Payer: MEDICARE

## 2019-02-06 DIAGNOSIS — L82.1 SK (SEBORRHEIC KERATOSIS): ICD-10-CM

## 2019-02-06 DIAGNOSIS — L81.4 LENTIGO: ICD-10-CM

## 2019-02-06 DIAGNOSIS — Z86.006 HISTORY OF MELANOMA IN SITU: ICD-10-CM

## 2019-02-06 DIAGNOSIS — Z85.828 PERSONAL HISTORY OF SKIN CANCER: ICD-10-CM

## 2019-02-06 DIAGNOSIS — C84.A0 CUTANEOUS T-CELL LYMPHOMA, UNSPECIFIED BODY REGION: ICD-10-CM

## 2019-02-06 DIAGNOSIS — L90.5 SCAR: ICD-10-CM

## 2019-02-06 DIAGNOSIS — D22.9 NEVUS: ICD-10-CM

## 2019-02-06 DIAGNOSIS — D18.00 ANGIOMA: Primary | ICD-10-CM

## 2019-02-06 PROCEDURE — 99499 RISK ADDL DX/OHS AUDIT: ICD-10-PCS | Mod: HCNC,S$GLB,, | Performed by: DERMATOLOGY

## 2019-02-06 PROCEDURE — 1101F PT FALLS ASSESS-DOCD LE1/YR: CPT | Mod: HCNC,CPTII,S$GLB, | Performed by: DERMATOLOGY

## 2019-02-06 PROCEDURE — 99214 OFFICE O/P EST MOD 30 MIN: CPT | Mod: HCNC,S$GLB,, | Performed by: DERMATOLOGY

## 2019-02-06 PROCEDURE — 1101F PR PT FALLS ASSESS DOC 0-1 FALLS W/OUT INJ PAST YR: ICD-10-PCS | Mod: HCNC,CPTII,S$GLB, | Performed by: DERMATOLOGY

## 2019-02-06 PROCEDURE — 99499 UNLISTED E&M SERVICE: CPT | Mod: HCNC,S$GLB,, | Performed by: DERMATOLOGY

## 2019-02-06 PROCEDURE — 99999 PR PBB SHADOW E&M-EST. PATIENT-LVL II: CPT | Mod: PBBFAC,HCNC,, | Performed by: DERMATOLOGY

## 2019-02-06 PROCEDURE — 99999 PR PBB SHADOW E&M-EST. PATIENT-LVL II: ICD-10-PCS | Mod: PBBFAC,HCNC,, | Performed by: DERMATOLOGY

## 2019-02-06 PROCEDURE — 99214 PR OFFICE/OUTPT VISIT, EST, LEVL IV, 30-39 MIN: ICD-10-PCS | Mod: HCNC,S$GLB,, | Performed by: DERMATOLOGY

## 2019-02-06 RX ORDER — MOMETASONE FUROATE 1 MG/G
OINTMENT TOPICAL
Qty: 60 G | Refills: 1 | Status: SHIPPED | OUTPATIENT
Start: 2019-02-06 | End: 2020-07-12

## 2019-02-06 NOTE — PROGRESS NOTES
Subjective:       Patient ID:  Linda Skelton is a 83 y.o. male who presents for   Chief Complaint   Patient presents with    Skin Check     Pt here today for a TBSE.  Pt denies any new, dry, scaly or bleeding lesions today. Last OV had bx and consistent with CTCL with neg gene rearrangement.  Pt has been getting natural sunlight before 10 am for 10 minutes and using tac cream and feels rash on left leg is better. Denies any new or changing lesions.   Pt has hx of MIS, NMSC, CTCL.         Review of Systems   Constitutional: Negative for fever, chills, weight loss and weight gain.   Skin: Positive for rash and activity-related sunscreen use. Negative for itching, daily sunscreen use, tendency to form keloidal scars and recent sunburn.   Hematologic/Lymphatic: Negative for adenopathy. Bruises/bleeds easily.        Objective:    Physical Exam   Constitutional: He appears well-developed and well-nourished. No distress.   Lymphadenopathy: No supraclavicular adenopathy is present.     He has no cervical adenopathy.     He has no axillary adenopathy.     He has no inguinal adenopathy.   Neurological: He is alert and oriented to person, place, and time. He is not disoriented.   Psychiatric: He has a normal mood and affect.   Skin:   Areas Examined (abnormalities noted in diagram):   Scalp / Hair Palpated and Inspected  Head / Face Inspection Performed  Neck Inspection Performed  Chest / Axilla Inspection Performed  Abdomen Inspection Performed  Genitals / Buttocks / Groin Inspection Performed  Back Inspection Performed  RUE Inspected  LUE Inspection Performed  RLE Inspected  LLE Inspection Performed  Nails and Digits Inspection Performed                           Diagram Legend     Erythematous scaling macule/papule c/w actinic keratosis       Vascular papule c/w angioma      Pigmented verrucoid papule/plaque c/w seborrheic keratosis      Yellow umbilicated papule c/w sebaceous hyperplasia      Irregularly shaped tan  macule c/w lentigo     1-2 mm smooth white papules consistent with Milia      Movable subcutaneous cyst with punctum c/w epidermal inclusion cyst      Subcutaneous movable cyst c/w pilar cyst      Firm pink to brown papule c/w dermatofibroma      Pedunculated fleshy papule(s) c/w skin tag(s)      Evenly pigmented macule c/w junctional nevus     Mildly variegated pigmented, slightly irregular-bordered macule c/w mildly atypical nevus      Flesh colored to evenly pigmented papule c/w intradermal nevus       Pink pearly papule/plaque c/w basal cell carcinoma      Erythematous hyperkeratotic cursted plaque c/w SCC      Surgical scar with no sign of skin cancer recurrence      Open and closed comedones      Inflammatory papules and pustules      Verrucoid papule consistent consistent with wart     Erythematous eczematous patches and plaques     Dystrophic onycholytic nail with subungual debris c/w onychomycosis     Umbilicated papule    Erythematous-base heme-crusted tan verrucoid plaque consistent with inflamed seborrheic keratosis     Erythematous Silvery Scaling Plaque c/w Psoriasis     See annotation      Assessment / Plan:        Angioma  These are benign vascular lesions that are inherited.  Treatment is not necessary.    Lentigo  This is a benign hyperpigmented sun induced lesion. Daily sun protection will reduce the number of new lesions. Treatment of these benign lesions are considered cosmetic.  The nature of sun-induced photo-aging and skin cancers is discussed.  Sun avoidance, protective clothing, and the use of 30-SPF sunscreens is advised. Observe closely for skin damage/changes, and call if such occurs.    SK (seborrheic keratosis)  These are benign inherited growths without a malignant potential. Reassurance given to patient. No treatment is necessary.     Nevus  Discussed ABCDE's of nevi.  Monitor for new mole or moles that are becoming bigger, darker, irritated, or developing irregular borders. Brochure  provided.    Cutaneous T-cell lymphoma, unspecified body region  Continue natural sunlight  Tried 1 year ago and insurance denied paying for home light unit  TAC 0.1% cream to less severe areas  Natural sunlight to affected areas if possible for 10 minutes before 10 am or after 4 pm    -     mometasone (ELOCON) 0.1 % ointment; aaa qd- bid for severe areas.  Avoid use on face and groin  Dispense: 60 g; Refill: 1    Personal history of skin cancer  History of melanoma in situ  Scar  Area of previous melanoma in situ and NMSC examined. Site well healed with no signs of recurrence.    Total body skin examination performed today including at least 12 points as noted in physical examination. No lesions suspicious for malignancy noted.           Follow-up in about 4 months (around 6/6/2019).

## 2019-02-07 ENCOUNTER — OFFICE VISIT (OUTPATIENT)
Dept: UROLOGY | Facility: CLINIC | Age: 84
End: 2019-02-07
Payer: MEDICARE

## 2019-02-07 VITALS
HEIGHT: 75 IN | BODY MASS INDEX: 31.11 KG/M2 | HEART RATE: 50 BPM | SYSTOLIC BLOOD PRESSURE: 147 MMHG | WEIGHT: 250.25 LBS | DIASTOLIC BLOOD PRESSURE: 76 MMHG

## 2019-02-07 DIAGNOSIS — N13.8 BPH WITH URINARY OBSTRUCTION: Primary | ICD-10-CM

## 2019-02-07 DIAGNOSIS — N40.1 BPH WITH URINARY OBSTRUCTION: Primary | ICD-10-CM

## 2019-02-07 PROCEDURE — 99999 PR PBB SHADOW E&M-EST. PATIENT-LVL III: ICD-10-PCS | Mod: PBBFAC,HCNC,, | Performed by: UROLOGY

## 2019-02-07 PROCEDURE — 3078F PR MOST RECENT DIASTOLIC BLOOD PRESSURE < 80 MM HG: ICD-10-PCS | Mod: HCNC,CPTII,S$GLB, | Performed by: UROLOGY

## 2019-02-07 PROCEDURE — 3077F SYST BP >= 140 MM HG: CPT | Mod: HCNC,CPTII,S$GLB, | Performed by: UROLOGY

## 2019-02-07 PROCEDURE — 3078F DIAST BP <80 MM HG: CPT | Mod: HCNC,CPTII,S$GLB, | Performed by: UROLOGY

## 2019-02-07 PROCEDURE — 99213 PR OFFICE/OUTPT VISIT, EST, LEVL III, 20-29 MIN: ICD-10-PCS | Mod: HCNC,S$GLB,, | Performed by: UROLOGY

## 2019-02-07 PROCEDURE — 1101F PR PT FALLS ASSESS DOC 0-1 FALLS W/OUT INJ PAST YR: ICD-10-PCS | Mod: HCNC,CPTII,S$GLB, | Performed by: UROLOGY

## 2019-02-07 PROCEDURE — 99213 OFFICE O/P EST LOW 20 MIN: CPT | Mod: HCNC,S$GLB,, | Performed by: UROLOGY

## 2019-02-07 PROCEDURE — 3077F PR MOST RECENT SYSTOLIC BLOOD PRESSURE >= 140 MM HG: ICD-10-PCS | Mod: HCNC,CPTII,S$GLB, | Performed by: UROLOGY

## 2019-02-07 PROCEDURE — 99999 PR PBB SHADOW E&M-EST. PATIENT-LVL III: CPT | Mod: PBBFAC,HCNC,, | Performed by: UROLOGY

## 2019-02-07 PROCEDURE — 1101F PT FALLS ASSESS-DOCD LE1/YR: CPT | Mod: HCNC,CPTII,S$GLB, | Performed by: UROLOGY

## 2019-02-07 NOTE — PROGRESS NOTES
Subjective:       Patient ID: Linda Skelton is a 83 y.o. male.    Chief Complaint: Follow-up    HPI    Linda Skelton is a 83 y.o. male with PMHx of BPH and HTN here for his annual prostate evaluation. Notes he has a good stream, empties his bladder, and has no urological complaints.     Past Medical History:   Diagnosis Date    ACS (acute coronary syndrome)     Acute coronary syndrome 2000    NSTEMI    Anticoagulant long-term use     Coumadin    Atrial fibrillation     Basal cell carcinoma excised     left scalp vertex    Bilateral leg edema 1/6/2014    BPH (benign prostatic hyperplasia)     CKD (chronic kidney disease)     Coronary artery disease     DVT of leg (deep venous thrombosis) 09/08/2014    On coumadin for years    Encounter for blood transfusion     Hyperlipidemia     Hypertension     Hypogonadism, male     Leg fracture, left     Melanoma 01/04/2017     in situ crown of scalp    MI (myocardial infarction)     Panhypopituitarism     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    Squamous Cell Carcinoma excised     left posterior scalp    Thyroid disease        Past Surgical History:   Procedure Laterality Date    ABLATION N/A 2/25/2016    Performed by Boyd Ahuja [facility] at [facility] CATH LAB    APPENDECTOMY      BASAL CELL CARCINOMA EXCISION  01/2008    BRAIN SURGERY      pituitary adenoma removed    CARDIAC CATHETERIZATION  05/09/2000    S/P stent to RCA,    CARDIAC CATHETERIZATION      stents placed    CARDIAC ELECTROPHYSIOLOGY STUDY AND ABLATION      CHOLECYSTECTOMY, LAPAROSCOPIC N/A 6/2/2018    Per[facility] Dickson Smith Jr[facility] at [facility] OR    COLONOSCOPY N/A 1/7/2014    Performed by Dell Elena MD at [facility] ENDO (4TH FLR)    CORONARY ANGIOPLASTY  5/9/2000    RCA    CORONARY ANGIOPLASTY WITH STENT PLACEMENT      ESOPHAGEAL DILATION      EYE SURGERY Bilateral     cataracts removed and new lenses placed     FRACTURE  SURGERY Left     leg    HERNIA REPAIR      left femur surgery      pituitatry      hypophysectomy    REMOVAL-MASS Left 12/12/2013    Per[facility] by [facility] OR [address] [place] [address]    SKIN BIOPSY      TONSILLECTOMY      VASCULAR SURGERY         Family History   Problem Relation Age of Onset    Cancer Father     Skin cancer Neg Hx     Melanoma Neg Hx     Heart disease Neg Hx     Hypertension Neg Hx        Social History     Socioeconomic History    Marital status:      Spouse name: Not on file    Number of children: Not on file    Years of education: Not on file    Highest education level: Not on file   Social Needs    Financial resource strain: Not on file    Food insecurity - worry: Not on file    Food insecurity - inability: Not on file    Transportation needs - medical: Not on file    Transportation needs - non-medical: Not on file   Occupational History    Not on file   Tobacco Use    Smoking status: Never Smoker    Smokeless tobacco: Never Used    Tobacco comment: .  Two sons.  Mandaen .     Substance and Sexual Activity    Alcohol use: No    Drug use: No    Sexual activity: Not Currently     Partners: Female   Other Topics Concern    Not on file   Social History Narrative     and a retired [employer] .  Non smoker.        Allergies:  Patient has no known allergies.    Medications:    Current Outpatient Medications:     aspirin 81 mg Tab, Take 81 mg by mouth every evening. 1 Tablet Oral Every night, Disp: , Rfl:     atenolol (TENORMIN) 25 MG tablet, TAKE ONE TABLET BY MOUTH TWICE DAILY, Disp: 90 tablet, Rfl: 3    dofetilide (TIKOSYN) 500 MCG Cap, TAKE 1 CAPSULE TWICE DAILY, Disp: 180 capsule, Rfl: 3    folic acid (FOLVITE) 1 MG tablet, TAKE ONE TABLET BY MOUTH ONCE DAILY, Disp: 90 tablet, Rfl: 3    levothyroxine (SYNTHROID) 112 MCG tablet, Take 1 tablet (112 mcg total) by mouth before breakfast., Disp: 90 tablet, Rfl: 3    mometasone  (ELOCON) 0.1 % ointment, aaa qd- bid for severe areas.  Avoid use on face and groin, Disp: 60 g, Rfl: 1    omeprazole (PRILOSEC) 20 MG capsule, Take 20 mg by mouth every other day., Disp: , Rfl:     predniSONE (DELTASONE) 5 MG tablet, Take 1 tablet (5 mg total) by mouth once daily., Disp: 90 tablet, Rfl: 3    simvastatin (ZOCOR) 80 MG tablet, TAKE ONE TABLET BY MOUTH ONCE DAILY IN THE EVENING, Disp: 90 tablet, Rfl: 3    tamsulosin (FLOMAX) 0.4 mg Cp24, Take 1 capsule (0.4 mg total) by mouth once daily., Disp: 90 capsule, Rfl: 3    triamcinolone acetonide 0.1% (KENALOG) 0.1 % cream, , Disp: , Rfl:     warfarin (COUMADIN) 5 MG tablet, TAKE ONE & ONE-HALF TABLETS BY MOUTH ONCE DAILY EXCEPT  ON  TUESDAYS  TAKE  1  TABLET ON TUESDAY, Disp: 135 tablet, Rfl: 3    NITROSTAT 0.4 mg SL tablet, DISSOLVE ONE TABLET UNDER THE TONGUE EVERY 5 MINUTES AS NEEDED FOR CHEST PAIN.  DO NOT EXCEED A TOTAL OF 3 DOSES IN 15 MINUTES NOW, Disp: 25 tablet, Rfl: 3    testosterone cypionate (DEPOTESTOTERONE CYPIONATE) 100 mg/mL injection, Inject 1 mL (100 mg total) into the muscle every 14 (fourteen) days., Disp: 10 mL, Rfl: 2    Current Facility-Administered Medications:     lidocaine HCL 2% jelly, , Topical (Top), Once, Octaviano Núñez Jr., MD    testosterone cypionate injection 100 mg, 100 mg, Intramuscular, Q14 Days, Anderson Jerome MD, 100 mg at 01/30/19 0842    Review of Systems   Constitutional: Negative for activity change, appetite change, chills, diaphoresis, fatigue, fever and unexpected weight change.   HENT: Negative for congestion, dental problem, hearing loss, mouth sores, postnasal drip, rhinorrhea, sinus pressure and trouble swallowing.    Eyes: Negative for pain, discharge and itching.   Respiratory: Negative for apnea, cough, choking, chest tightness, shortness of breath and wheezing.    Cardiovascular: Negative for chest pain, palpitations and leg swelling.   Gastrointestinal: Negative for abdominal distention,  abdominal pain, anal bleeding, blood in stool, constipation, diarrhea, nausea, rectal pain and vomiting.   Endocrine: Negative for polydipsia and polyuria.   Genitourinary: Negative for decreased urine volume, difficulty urinating, discharge, dysuria, enuresis, flank pain, frequency, genital sores, hematuria, penile pain, penile swelling and scrotal swelling.   Musculoskeletal: Negative for arthralgias, back pain and myalgias.   Skin: Negative for color change, rash and wound.   Neurological: Negative for dizziness, syncope, speech difficulty, light-headedness and headaches.   Hematological: Negative for adenopathy. Does not bruise/bleed easily.   Psychiatric/Behavioral: Negative for behavioral problems, confusion and sleep disturbance.       Objective:      Physical Exam   Constitutional: He appears well-developed.   HENT:   Head: Normocephalic.   Neck: Neck supple.   Cardiovascular: Normal rate.    Pulmonary/Chest: Effort normal.   Abdominal: Soft.   Genitourinary:   Genitourinary Comments: Prostate was smooth without nodularity. No rectal masses. 30 grams. Normal perineum.     Neurological: He is alert.   Skin: Skin is warm.     Psychiatric: He has a normal mood and affect.       Labs:     Ref Range & Units 8d ago 9mo ago   PSA DIAGNOSTIC 0.00 - 4.00 ng/mL 2.3  3.0 CM   Comment: PSA Expected levels:      Assessment:       1. BPH with urinary obstruction        Plan:       Linda was seen today for follow-up.    Diagnoses and all orders for this visit:    BPH with urinary obstruction          No PSA needed unless a nodule is palpable.  RTC 1 year for NORMA.    IRichardson, am acting as a scribe on this patient encounter in the presence and under the supervision of Dr. Núñez.    02/07/2019 8:23 AM    IDr. Núñez, personally performed the services described in this documentation.   All medical record entries made by the scribe were at my direction and in my presence.   I have reviewed the chart and agree that the  record is accurate and complete.   Octaviano Núñez MD.  8:26 AM 02/07/2019

## 2019-02-13 ENCOUNTER — CLINICAL SUPPORT (OUTPATIENT)
Dept: FAMILY MEDICINE | Facility: CLINIC | Age: 84
End: 2019-02-13
Payer: MEDICARE

## 2019-02-13 DIAGNOSIS — E29.1 HYPOGONADISM IN MALE: Primary | ICD-10-CM

## 2019-02-13 PROCEDURE — 96372 PR INJECTION,THERAP/PROPH/DIAG2ST, IM OR SUBCUT: ICD-10-PCS | Mod: HCNC,S$GLB,, | Performed by: INTERNAL MEDICINE

## 2019-02-13 PROCEDURE — 96372 THER/PROPH/DIAG INJ SC/IM: CPT | Mod: HCNC,S$GLB,, | Performed by: INTERNAL MEDICINE

## 2019-02-13 RX ADMIN — TESTOSTERONE CYPIONATE 100 MG: 200 INJECTION, SOLUTION INTRAMUSCULAR at 08:02

## 2019-02-28 ENCOUNTER — PATIENT MESSAGE (OUTPATIENT)
Dept: FAMILY MEDICINE | Facility: CLINIC | Age: 84
End: 2019-02-28

## 2019-02-28 NOTE — TELEPHONE ENCOUNTER
Per dr dang.  Patient needs to followup with cardio.  He should do this asap or go to the hospital.  I tried to call the patient but phone just rings.

## 2019-03-01 ENCOUNTER — PATIENT MESSAGE (OUTPATIENT)
Dept: FAMILY MEDICINE | Facility: CLINIC | Age: 84
End: 2019-03-01

## 2019-03-13 ENCOUNTER — CLINICAL SUPPORT (OUTPATIENT)
Dept: FAMILY MEDICINE | Facility: CLINIC | Age: 84
End: 2019-03-13
Payer: MEDICARE

## 2019-03-13 ENCOUNTER — ANTI-COAG VISIT (OUTPATIENT)
Dept: CARDIOLOGY | Facility: CLINIC | Age: 84
End: 2019-03-13

## 2019-03-13 ENCOUNTER — LAB VISIT (OUTPATIENT)
Dept: LAB | Facility: HOSPITAL | Age: 84
End: 2019-03-13
Attending: INTERNAL MEDICINE
Payer: MEDICARE

## 2019-03-13 DIAGNOSIS — E29.1 HYPOGONADISM IN MALE: Primary | ICD-10-CM

## 2019-03-13 DIAGNOSIS — I48.0 PAF (PAROXYSMAL ATRIAL FIBRILLATION): ICD-10-CM

## 2019-03-13 DIAGNOSIS — Z79.01 LONG TERM (CURRENT) USE OF ANTICOAGULANTS: ICD-10-CM

## 2019-03-13 LAB
INR PPP: 2.3
INR PPP: 2.3
PROTHROMBIN TIME: 22.3 SEC

## 2019-03-13 PROCEDURE — 96372 THER/PROPH/DIAG INJ SC/IM: CPT | Mod: HCNC,S$GLB,, | Performed by: INTERNAL MEDICINE

## 2019-03-13 PROCEDURE — 36415 COLL VENOUS BLD VENIPUNCTURE: CPT | Mod: HCNC,PO

## 2019-03-13 PROCEDURE — 85610 PROTHROMBIN TIME: CPT | Mod: HCNC

## 2019-03-13 PROCEDURE — 96372 PR INJECTION,THERAP/PROPH/DIAG2ST, IM OR SUBCUT: ICD-10-PCS | Mod: HCNC,S$GLB,, | Performed by: INTERNAL MEDICINE

## 2019-03-13 RX ADMIN — TESTOSTERONE CYPIONATE 100 MG: 200 INJECTION, SOLUTION INTRAMUSCULAR at 09:03

## 2019-03-19 DIAGNOSIS — C84.00 MYCOSIS FUNGOIDES, UNSPECIFIED BODY REGION: ICD-10-CM

## 2019-03-20 RX ORDER — TRIAMCINOLONE ACETONIDE 1 MG/G
CREAM TOPICAL
Qty: 454 G | Refills: 6 | Status: SHIPPED | OUTPATIENT
Start: 2019-03-20 | End: 2019-05-24

## 2019-03-27 ENCOUNTER — CLINICAL SUPPORT (OUTPATIENT)
Dept: FAMILY MEDICINE | Facility: CLINIC | Age: 84
End: 2019-03-27
Payer: MEDICARE

## 2019-03-27 DIAGNOSIS — E29.1 HYPOGONADISM IN MALE: Primary | ICD-10-CM

## 2019-03-27 PROCEDURE — 96372 THER/PROPH/DIAG INJ SC/IM: CPT | Mod: HCNC,S$GLB,, | Performed by: INTERNAL MEDICINE

## 2019-03-27 PROCEDURE — 96372 PR INJECTION,THERAP/PROPH/DIAG2ST, IM OR SUBCUT: ICD-10-PCS | Mod: HCNC,S$GLB,, | Performed by: INTERNAL MEDICINE

## 2019-03-27 RX ADMIN — TESTOSTERONE CYPIONATE 100 MG: 200 INJECTION, SOLUTION INTRAMUSCULAR at 08:03

## 2019-04-02 PROBLEM — E03.9 ACQUIRED HYPOTHYROIDISM: Status: ACTIVE | Noted: 2018-04-26

## 2019-04-02 PROBLEM — R07.9 CHEST PAIN: Status: RESOLVED | Noted: 2017-08-28 | Resolved: 2019-04-02

## 2019-04-02 PROBLEM — Z87.898 HISTORY OF BACTEREMIA: Status: ACTIVE | Noted: 2018-06-07

## 2019-04-02 PROBLEM — L98.6 ATYPICAL LYMPHOCYTIC INFILTRATE OF SKIN: Status: RESOLVED | Noted: 2017-01-13 | Resolved: 2019-04-02

## 2019-04-03 ENCOUNTER — OFFICE VISIT (OUTPATIENT)
Dept: FAMILY MEDICINE | Facility: CLINIC | Age: 84
End: 2019-04-03
Payer: MEDICARE

## 2019-04-03 ENCOUNTER — LAB VISIT (OUTPATIENT)
Dept: LAB | Facility: HOSPITAL | Age: 84
End: 2019-04-03
Attending: INTERNAL MEDICINE
Payer: MEDICARE

## 2019-04-03 VITALS
BODY MASS INDEX: 30.81 KG/M2 | DIASTOLIC BLOOD PRESSURE: 80 MMHG | TEMPERATURE: 98 F | HEIGHT: 75 IN | SYSTOLIC BLOOD PRESSURE: 126 MMHG | WEIGHT: 247.81 LBS | OXYGEN SATURATION: 96 % | HEART RATE: 68 BPM

## 2019-04-03 DIAGNOSIS — E03.9 ACQUIRED HYPOTHYROIDISM: ICD-10-CM

## 2019-04-03 DIAGNOSIS — E29.1 HYPOGONADISM IN MALE: ICD-10-CM

## 2019-04-03 DIAGNOSIS — I10 ESSENTIAL HYPERTENSION: Primary | ICD-10-CM

## 2019-04-03 DIAGNOSIS — N18.30 CHRONIC KIDNEY DISEASE, STAGE 3: ICD-10-CM

## 2019-04-03 DIAGNOSIS — I48.0 PAF (PAROXYSMAL ATRIAL FIBRILLATION): ICD-10-CM

## 2019-04-03 DIAGNOSIS — E23.0 PANHYPOPITUITARISM: ICD-10-CM

## 2019-04-03 DIAGNOSIS — M54.50 ACUTE LEFT-SIDED LOW BACK PAIN WITHOUT SCIATICA: ICD-10-CM

## 2019-04-03 DIAGNOSIS — D12.6 TUBULAR ADENOMA OF COLON: ICD-10-CM

## 2019-04-03 DIAGNOSIS — I25.119 CORONARY ARTERY DISEASE INVOLVING NATIVE CORONARY ARTERY OF NATIVE HEART WITH ANGINA PECTORIS: ICD-10-CM

## 2019-04-03 LAB
ALBUMIN SERPL BCP-MCNC: 3.8 G/DL (ref 3.5–5.2)
ALP SERPL-CCNC: 57 U/L (ref 55–135)
ALT SERPL W/O P-5'-P-CCNC: 17 U/L (ref 10–44)
ANION GAP SERPL CALC-SCNC: 4 MMOL/L (ref 8–16)
ANION GAP SERPL CALC-SCNC: 4 MMOL/L (ref 8–16)
AST SERPL-CCNC: 21 U/L (ref 10–40)
BASOPHILS # BLD AUTO: 0.01 K/UL (ref 0–0.2)
BASOPHILS NFR BLD: 0.3 % (ref 0–1.9)
BILIRUB SERPL-MCNC: 0.7 MG/DL (ref 0.1–1)
BUN SERPL-MCNC: 21 MG/DL (ref 8–23)
BUN SERPL-MCNC: 21 MG/DL (ref 8–23)
CALCIUM SERPL-MCNC: 9.7 MG/DL (ref 8.7–10.5)
CALCIUM SERPL-MCNC: 9.7 MG/DL (ref 8.7–10.5)
CHLORIDE SERPL-SCNC: 106 MMOL/L (ref 95–110)
CHLORIDE SERPL-SCNC: 106 MMOL/L (ref 95–110)
CO2 SERPL-SCNC: 30 MMOL/L (ref 23–29)
CO2 SERPL-SCNC: 30 MMOL/L (ref 23–29)
CREAT SERPL-MCNC: 1.3 MG/DL (ref 0.5–1.4)
CREAT SERPL-MCNC: 1.3 MG/DL (ref 0.5–1.4)
DIFFERENTIAL METHOD: ABNORMAL
EOSINOPHIL # BLD AUTO: 0 K/UL (ref 0–0.5)
EOSINOPHIL NFR BLD: 0.3 % (ref 0–8)
ERYTHROCYTE [DISTWIDTH] IN BLOOD BY AUTOMATED COUNT: 13.2 % (ref 11.5–14.5)
EST. GFR  (AFRICAN AMERICAN): 58.3 ML/MIN/1.73 M^2
EST. GFR  (AFRICAN AMERICAN): 58.3 ML/MIN/1.73 M^2
EST. GFR  (NON AFRICAN AMERICAN): 50.5 ML/MIN/1.73 M^2
EST. GFR  (NON AFRICAN AMERICAN): 50.5 ML/MIN/1.73 M^2
GLUCOSE SERPL-MCNC: 99 MG/DL (ref 70–110)
GLUCOSE SERPL-MCNC: 99 MG/DL (ref 70–110)
HCT VFR BLD AUTO: 41.8 % (ref 40–54)
HGB BLD-MCNC: 13.5 G/DL (ref 14–18)
IMM GRANULOCYTES # BLD AUTO: 0.01 K/UL (ref 0–0.04)
IMM GRANULOCYTES NFR BLD AUTO: 0.3 % (ref 0–0.5)
LYMPHOCYTES # BLD AUTO: 0.7 K/UL (ref 1–4.8)
LYMPHOCYTES NFR BLD: 19.3 % (ref 18–48)
MCH RBC QN AUTO: 31.7 PG (ref 27–31)
MCHC RBC AUTO-ENTMCNC: 32.3 G/DL (ref 32–36)
MCV RBC AUTO: 98 FL (ref 82–98)
MONOCYTES # BLD AUTO: 0.6 K/UL (ref 0.3–1)
MONOCYTES NFR BLD: 15.5 % (ref 4–15)
NEUTROPHILS # BLD AUTO: 2.4 K/UL (ref 1.8–7.7)
NEUTROPHILS NFR BLD: 64.3 % (ref 38–73)
NRBC BLD-RTO: 0 /100 WBC
PLATELET # BLD AUTO: 112 K/UL (ref 150–350)
PMV BLD AUTO: 12.7 FL (ref 9.2–12.9)
POTASSIUM SERPL-SCNC: 5.6 MMOL/L (ref 3.5–5.1)
POTASSIUM SERPL-SCNC: 5.6 MMOL/L (ref 3.5–5.1)
PROT SERPL-MCNC: 6.5 G/DL (ref 6–8.4)
RBC # BLD AUTO: 4.26 M/UL (ref 4.6–6.2)
SODIUM SERPL-SCNC: 140 MMOL/L (ref 136–145)
SODIUM SERPL-SCNC: 140 MMOL/L (ref 136–145)
T4 FREE SERPL-MCNC: 1 NG/DL (ref 0.71–1.51)
TESTOST SERPL-MCNC: 847 NG/DL (ref 304–1227)
TSH SERPL DL<=0.005 MIU/L-ACNC: 0.3 UIU/ML (ref 0.4–4)
WBC # BLD AUTO: 3.73 K/UL (ref 3.9–12.7)

## 2019-04-03 PROCEDURE — 36415 COLL VENOUS BLD VENIPUNCTURE: CPT | Mod: HCNC,PO

## 2019-04-03 PROCEDURE — 99214 PR OFFICE/OUTPT VISIT, EST, LEVL IV, 30-39 MIN: ICD-10-PCS | Mod: HCNC,S$GLB,, | Performed by: INTERNAL MEDICINE

## 2019-04-03 PROCEDURE — 1101F PR PT FALLS ASSESS DOC 0-1 FALLS W/OUT INJ PAST YR: ICD-10-PCS | Mod: HCNC,CPTII,S$GLB, | Performed by: INTERNAL MEDICINE

## 2019-04-03 PROCEDURE — 99214 OFFICE O/P EST MOD 30 MIN: CPT | Mod: HCNC,S$GLB,, | Performed by: INTERNAL MEDICINE

## 2019-04-03 PROCEDURE — 80053 COMPREHEN METABOLIC PANEL: CPT | Mod: HCNC

## 2019-04-03 PROCEDURE — 99999 PR PBB SHADOW E&M-EST. PATIENT-LVL III: ICD-10-PCS | Mod: PBBFAC,HCNC,, | Performed by: INTERNAL MEDICINE

## 2019-04-03 PROCEDURE — 84443 ASSAY THYROID STIM HORMONE: CPT | Mod: HCNC

## 2019-04-03 PROCEDURE — 84439 ASSAY OF FREE THYROXINE: CPT | Mod: HCNC

## 2019-04-03 PROCEDURE — 99999 PR PBB SHADOW E&M-EST. PATIENT-LVL III: CPT | Mod: PBBFAC,HCNC,, | Performed by: INTERNAL MEDICINE

## 2019-04-03 PROCEDURE — 99499 RISK ADDL DX/OHS AUDIT: ICD-10-PCS | Mod: HCNC,S$GLB,, | Performed by: INTERNAL MEDICINE

## 2019-04-03 PROCEDURE — 84403 ASSAY OF TOTAL TESTOSTERONE: CPT | Mod: HCNC

## 2019-04-03 PROCEDURE — 3079F PR MOST RECENT DIASTOLIC BLOOD PRESSURE 80-89 MM HG: ICD-10-PCS | Mod: HCNC,CPTII,S$GLB, | Performed by: INTERNAL MEDICINE

## 2019-04-03 PROCEDURE — 3079F DIAST BP 80-89 MM HG: CPT | Mod: HCNC,CPTII,S$GLB, | Performed by: INTERNAL MEDICINE

## 2019-04-03 PROCEDURE — 99499 UNLISTED E&M SERVICE: CPT | Mod: HCNC,S$GLB,, | Performed by: INTERNAL MEDICINE

## 2019-04-03 PROCEDURE — 1101F PT FALLS ASSESS-DOCD LE1/YR: CPT | Mod: HCNC,CPTII,S$GLB, | Performed by: INTERNAL MEDICINE

## 2019-04-03 PROCEDURE — 85025 COMPLETE CBC W/AUTO DIFF WBC: CPT | Mod: HCNC

## 2019-04-03 PROCEDURE — 3074F SYST BP LT 130 MM HG: CPT | Mod: HCNC,CPTII,S$GLB, | Performed by: INTERNAL MEDICINE

## 2019-04-03 PROCEDURE — 99499 RISK ADDL DX/OHS AUDIT: ICD-10-PCS | Mod: HCNC,,, | Performed by: INTERNAL MEDICINE

## 2019-04-03 PROCEDURE — 99499 UNLISTED E&M SERVICE: CPT | Mod: HCNC,,, | Performed by: INTERNAL MEDICINE

## 2019-04-03 PROCEDURE — 3074F PR MOST RECENT SYSTOLIC BLOOD PRESSURE < 130 MM HG: ICD-10-PCS | Mod: HCNC,CPTII,S$GLB, | Performed by: INTERNAL MEDICINE

## 2019-04-03 NOTE — PROGRESS NOTES
Patient, Linda Skelton (MRN #[id number]), presented with a recent Platelet count less than 150 K/uL consistent with the definition of thrombocytopenia (ICD10 - D69.6).    Platelets   Date Value Ref Range Status   01/30/2019 112 (L) 150 - 350 K/uL Final     The patient's thrombocytopenia was monitored, evaluated, addressed and/or treated. This addendum to the medical record is made on 04/03/2019.

## 2019-04-03 NOTE — PROGRESS NOTES
This note was created by combination of typed  and Dragon dictation.  Transcription errors may be present.  If there are any questions, please contact me.    Assessment & Plan:   Essential hypertension  -stable, no change.  Not requesting refills today.    Hypogonadism in male  -gets testosterone injections every 2 weeks.  This is the mid point of his cycle so check a testosterone level today.  PSA was done earlier this year normal.  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  Not requesting refills.  He is requesting that I manage these medications and I have been sending them into the pharmacy, I can continue to do so.  -     Comprehensive metabolic panel; Future; Expected date: 04/03/2019  -     TSH; Future; Expected date: 04/03/2019    Acute left-sided low back pain without sciatica  -he was worried about possible renal involvement.  Morning stiffness.  Reassurance.  Not in the right area to be kidney.  -     Cancel: Urinalysis; Future; Expected date: 04/03/2019    PAF (paroxysmal atrial fibrillation)  Coronary artery disease involving native coronary artery of native heart with angina pectoris  -managed by Cardiology.  I asked him that if he gets another episode of atrial fibrillation with severe symptoms of dizziness, lightheadedness, weakness, he consider calling 911 for emergent evaluation.    Chronic kidney disease, stage 3  -check labs today    Tubular adenoma of colon on colonoscopy 2014  -he will be due for colonoscopy later this year.  I think his functional status is good enough that he would benefit from colonoscopy    There are no discontinued medications.    meds sent this encounter:       Follow Up: No  follow-ups on file.  Plan for follow-up 6 months, recall entered.  Order colonoscopy if it has not been done already.    Subjective:     Chief Complaint   Patient presents with    Annual Exam       CHAGO Mckeon is a 83 y.o. male, last appointment with this clinic was Visit date not found.    Saw urology in early February for BPH. Hx prostate nodule. Stable.  Saw derm in follow up February CTCL  Optometry 1/2019 - return 1 year.  Cardiology 10/2018 - stable.  Nephrology 8/2018 for NADIA during bacteremia/cholecystitis. Urine protein/creatinine elevated.    Hx of macrocytosis, stopped B12. Needs CBC and BMP.  CBC 1/2019 still with macrocytosis.    Had another episode of AFib that spontaneously resolved.  Has cardiology follow up in July. He gets weak and stays in bed most of the day and it resolves in a day or 2.  Advised that if severe - needs to call 911.    He would like to continue to see me rather than endo.  Last TSH was normal.  Takes testosterone. Thyroid. Prednisone.     Had previuosly been advised to increase atenolol to 2 daily.  But then reverted back to 1 daily.  BP good today.    Has a muscle spasm he thinks.  With getting out of bed - temporary pain; wants to make sure MSK vs. Kidney.  Left side.     Hx of DVT left leg back in 1997.  On anticoagulation.  Has CTCL.  Takes topical triamcinolone. Mometasone topical. Wears compression stocking.  Is concerned about using it and wearing compression stockings.  So using every other day.     Some days wakes up with abd pain not nausea.  Brief and then goes away.  Duration maybe 10 minutes. Lower abd.  Due for colonoscopy later thsi year. 2014 tubular adenoma.    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    Visit for monitoring Tikosyn therapy 07/20/2017    Long term (current) use of anticoagulants 07/20/2017    Benign non-nodular prostatic hyperplasia without lower urinary tract symptoms 07/05/2017    Hypogonadism in male 07/05/2017    History of melanoma in situ 02/23/2017     Scalp 1/2017 tx with mohs       Mixed hyperlipidemia 10/23/2014    History of deep 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, 1997 09/08/2014    Obesity, Class I, 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 04/04/2014    ED (erectile dysfunction) 04/04/2014    Essential hypertension 04/04/2014    Bilateral leg edema 01/06/2014    Mitral regurgitation 02/14/2013    Coronary artery disease involving native coronary artery of native heart with angina pectoris hx NSTEMI with PCI to RCA 2000     Chronic kidney disease, stage 3 12/26/2012    Thyroid nodule 08/06/2012     10/31/2017 thyroid US: Stable left lobe nodule      Panhypopituitarism thyroid/prednisone/testosterone 08/06/2012    PAF (paroxysmal atrial fibrillation) s/p ablation 2/25/2016 still with episodes 07/27/2012       PAST MEDICAL HISTORY:  Past Medical History:   Diagnosis Date    ACS (acute coronary syndrome)     Acute coronary syndrome 2000    NSTEMI    Anticoagulant long-term use     Coumadin    Atrial fibrillation     Basal cell carcinoma excised     left scalp vertex    Bilateral leg edema 1/6/2014    BPH (benign prostatic hyperplasia)     CKD (chronic kidney disease)     Coronary artery disease     DVT of leg (deep venous thrombosis) 09/08/2014    On coumadin for years    Encounter for blood transfusion     Hyperlipidemia     Hypertension     Hypogonadism, male     Leg fracture, left      Melanoma 01/04/2017     in situ crown of scalp    MI (myocardial infarction)     Panhypopituitarism     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    Squamous Cell Carcinoma excised     left posterior scalp    Thyroid disease        PAST SURGICAL HISTORY:  Past Surgical History:   Procedure Laterality Date    ABLATION N/A 2/25/2016    [facility] CATH LAB    APPENDECTOMY      BASAL CELL CARCINOMA EXCISION  01/2008    BRAIN SURGERY      pituitary adenoma removed    CARDIAC CATHETERIZATION  05/09/2000    S/P stent to RCA,    CARDIAC CATHETERIZATION      stents placed    CARDIAC ELECTROPHYSIOLOGY STUDY AND ABLATION      CHOLECYSTECTOMY, LAPAROSCOPIC N/A 6/2/2018    Performed by Dickson Smith Jr., [facility] at [facility] OR    COLONOSCOPY N/A 1/7/2014    Performed by Dell Elena MD at [facility] ENDO (4TH FLR)    CORONARY ANGIOPLASTY  5/9/2000    RCA    CORONARY ANGIOPLASTY WITH STENT PLACEMENT      ESOPHAGEAL DILATION      EYE SURGERY Bilateral     cataracts removed and new lenses placed     FRACTURE SURGERY Left     leg    HERNIA REPAIR      left femur surgery      pituitatry      hypophysectomy    REMOVAL-MASS Left 12/12/2013    Performed by Burt Woodard MD at [facility] OR 2ND FLR    SKIN BIOPSY      TONSILLECTOMY      VASCULAR SURGERY         SOCIAL HISTORY:  Social History     Socioeconomic History    Marital status:      Spouse name: Not on file    Number of children: Not on file    Years of education: Not on file    Highest education level: Not on file   Occupational History    Not on file   Social Needs    Financial resource strain: Not on file    Food insecurity:     Worry: Not on file     Inability: Not on file    Transportation needs:     Medical: Not on file     Non-medical: Not on file   Tobacco Use    Smoking status: Never Smoker    Smokeless tobacco: Never Used   Substance and Sexual Activity     Alcohol use: No    Drug use: No    Sexual activity: Not Currently     Partners: Female   Lifestyle    Physical activity:     Days per week: Not on file     Minutes per session: Not on file    Stress: Not on file   Relationships    Social connections:     Talks on phone: Not on file     Gets together: Not on file     Attends Anabaptism service: Not on file     Active member of club or organization: Not on file     Attends meetings of clubs or organizations: Not on file     Relationship status: Not on file   Other Topics Concern    Not on file   Social History Narrative    retired [employer] .  Non smoker.        ALLERGIES AND MEDICATIONS: updated and reviewed.  Review of patient's allergies indicates:  No Known Allergies  Current Outpatient Medications   Medication Sig Dispense Refill    aspirin 81 mg Tab Take 81 mg by mouth every evening. 1 Tablet Oral Every night      atenolol (TENORMIN) 25 MG tablet TAKE ONE TABLET BY MOUTH TWICE DAILY 90 tablet 3    dofetilide (TIKOSYN) 500 MCG Cap TAKE 1 CAPSULE TWICE DAILY 180 capsule 3    folic acid (FOLVITE) 1 MG tablet TAKE ONE TABLET BY MOUTH ONCE DAILY 90 tablet 3    levothyroxine (SYNTHROID) 112 MCG tablet Take 1 tablet (112 mcg total) by mouth before breakfast. 90 tablet 3    mometasone (ELOCON) 0.1 % ointment aaa qd- bid for severe areas.  Avoid use on face and groin 60 g 1    NITROSTAT 0.4 mg SL tablet DISSOLVE ONE TABLET UNDER THE TONGUE EVERY 5 MINUTES AS NEEDED FOR CHEST PAIN.  DO NOT EXCEED A TOTAL OF 3 DOSES IN 15 MINUTES NOW 25 tablet 3    omeprazole (PRILOSEC) 20 MG capsule Take 20 mg by mouth every other day.      predniSONE (DELTASONE) 5 MG tablet Take 1 tablet (5 mg total) by mouth once daily. 90 tablet 3    simvastatin (ZOCOR) 80 MG tablet TAKE ONE TABLET BY MOUTH ONCE DAILY IN THE EVENING 90 tablet 3    tamsulosin (FLOMAX) 0.4 mg Cp24 Take 1 capsule (0.4 mg total) by mouth once daily. 90 capsule 3    testosterone cypionate  "(DEPOTESTOTERONE CYPIONATE) 100 mg/mL injection Inject 1 mL (100 mg total) into the muscle every 14 (fourteen) days. 10 mL 2    triamcinolone acetonide 0.1% (KENALOG) 0.1 % cream       triamcinolone acetonide 0.1% (KENALOG) 0.1 % cream aaa bid 454 g 6    warfarin (COUMADIN) 5 MG tablet TAKE ONE & ONE-HALF TABLETS BY MOUTH ONCE DAILY EXCEPT  ON  TUESDAYS  TAKE  1  TABLET ON TUESDAY 135 tablet 3     Current Facility-Administered Medications   Medication Dose Route Frequency Provider Last Rate Last Dose    lidocaine HCL 2% jelly   Topical (Top) Once Octaviano Núñez Jr., MD        testosterone cypionate injection 100 mg  100 mg Intramuscular Q14 Days Anderson Jerome MD   100 mg at 03/27/19 0844       Review of Systems   Constitutional: Negative for chills and fever.   Respiratory: Negative for cough, shortness of breath and wheezing.    Cardiovascular: Negative for chest pain, palpitations and orthopnea.       Objective:   Physical Exam   Vitals:    04/03/19 1031   BP: 126/80   Pulse: 68   Temp: 98.3 °F (36.8 °C)   SpO2: 96%   Weight: 112.4 kg (247 lb 12.8 oz)   Height: 6' 3" (1.905 m)    Body mass index is 30.97 kg/m².  Weight: 112.4 kg (247 lb 12.8 oz)   Height: 6' 3" (190.5 cm)     Physical Exam   Constitutional: He is oriented to person, place, and time. He appears well-developed and well-nourished. No distress.   Eyes: EOM are normal.   Cardiovascular: Normal rate, regular rhythm and normal heart sounds.   No murmur heard.  Pulmonary/Chest: Effort normal and breath sounds normal.   Abdominal: Soft. He exhibits no distension and no mass. There is no tenderness. There is no rebound and no guarding.   Musculoskeletal: Normal range of motion. He exhibits no edema.   Pointing towards the left lateral lower lumbar area as the area of discomfort, no flank area tenderness. Area of tenderness, no mass, no induration no deformity  Normal gait   Neurological: He is alert and oriented to person, place, and time. " Coordination normal.   Skin: Skin is warm and dry.   Psychiatric: He has a normal mood and affect. His behavior is normal. Thought content normal.

## 2019-04-04 NOTE — PROGRESS NOTES
Testosterone normal on dosing - 200 mg every 2 weeks  TSH slightly low FT4 normal has been on stable dose x years no change  Thrombocytopenia seen previously stable  MCV better  CMP with CKD stage 3 stable    Results to pt via my tanjasjatin.  No changes.  F/u 6 months.

## 2019-04-05 ENCOUNTER — TELEPHONE (OUTPATIENT)
Dept: ELECTROPHYSIOLOGY | Facility: CLINIC | Age: 84
End: 2019-04-05

## 2019-04-05 DIAGNOSIS — I48.91 ATRIAL FIBRILLATION, UNSPECIFIED TYPE: Primary | ICD-10-CM

## 2019-04-05 NOTE — TELEPHONE ENCOUNTER
----- Message from Nancy Turk sent at 4/5/2019  3:57 PM CDT -----  Contact: Patient  Patient called to discuss letter received announcing Dr. Seals's practice relocation to [place].     Patient requests to follow at [facility] with Dr. Topete. Thanks, Nancy

## 2019-04-10 ENCOUNTER — CLINICAL SUPPORT (OUTPATIENT)
Dept: FAMILY MEDICINE | Facility: CLINIC | Age: 84
End: 2019-04-10
Payer: MEDICARE

## 2019-04-10 DIAGNOSIS — E29.1 HYPOGONADISM IN MALE: Primary | ICD-10-CM

## 2019-04-10 PROCEDURE — 96372 THER/PROPH/DIAG INJ SC/IM: CPT | Mod: HCNC,S$GLB,, | Performed by: INTERNAL MEDICINE

## 2019-04-10 PROCEDURE — 96372 PR INJECTION,THERAP/PROPH/DIAG2ST, IM OR SUBCUT: ICD-10-PCS | Mod: HCNC,S$GLB,, | Performed by: INTERNAL MEDICINE

## 2019-04-10 RX ADMIN — TESTOSTERONE CYPIONATE 100 MG: 200 INJECTION, SOLUTION INTRAMUSCULAR at 08:04

## 2019-04-24 ENCOUNTER — CLINICAL SUPPORT (OUTPATIENT)
Dept: FAMILY MEDICINE | Facility: CLINIC | Age: 84
End: 2019-04-24
Payer: MEDICARE

## 2019-04-24 ENCOUNTER — LAB VISIT (OUTPATIENT)
Dept: LAB | Facility: HOSPITAL | Age: 84
End: 2019-04-24
Attending: INTERNAL MEDICINE
Payer: MEDICARE

## 2019-04-24 ENCOUNTER — ANTI-COAG VISIT (OUTPATIENT)
Dept: CARDIOLOGY | Facility: CLINIC | Age: 84
End: 2019-04-24
Payer: MEDICARE

## 2019-04-24 DIAGNOSIS — Z79.01 LONG TERM (CURRENT) USE OF ANTICOAGULANTS: ICD-10-CM

## 2019-04-24 DIAGNOSIS — E29.1 HYPOGONADISM IN MALE: Primary | ICD-10-CM

## 2019-04-24 DIAGNOSIS — I48.0 PAF (PAROXYSMAL ATRIAL FIBRILLATION): ICD-10-CM

## 2019-04-24 LAB
INR PPP: 2.1 (ref 0.8–1.2)
PROTHROMBIN TIME: 20.6 SEC (ref 9–12.5)

## 2019-04-24 PROCEDURE — 93793 PR ANTICOAGULANT MGMT FOR PT TAKING WARFARIN: ICD-10-PCS | Mod: S$GLB,,,

## 2019-04-24 PROCEDURE — 96372 PR INJECTION,THERAP/PROPH/DIAG2ST, IM OR SUBCUT: ICD-10-PCS | Mod: HCNC,S$GLB,, | Performed by: INTERNAL MEDICINE

## 2019-04-24 PROCEDURE — 85610 PROTHROMBIN TIME: CPT | Mod: HCNC

## 2019-04-24 PROCEDURE — 93793 ANTICOAG MGMT PT WARFARIN: CPT | Mod: S$GLB,,,

## 2019-04-24 PROCEDURE — 96372 THER/PROPH/DIAG INJ SC/IM: CPT | Mod: HCNC,S$GLB,, | Performed by: INTERNAL MEDICINE

## 2019-04-24 PROCEDURE — 36415 COLL VENOUS BLD VENIPUNCTURE: CPT | Mod: HCNC,PO

## 2019-04-24 RX ADMIN — TESTOSTERONE CYPIONATE 100 MG: 200 INJECTION, SOLUTION INTRAMUSCULAR at 09:04

## 2019-04-29 DIAGNOSIS — I10 ESSENTIAL HYPERTENSION: ICD-10-CM

## 2019-04-29 DIAGNOSIS — E23.0 PANHYPOPITUITARISM: ICD-10-CM

## 2019-04-29 RX ORDER — PREDNISONE 5 MG/1
TABLET ORAL
Qty: 90 TABLET | Refills: 3 | Status: SHIPPED | OUTPATIENT
Start: 2019-04-29 | End: 2019-05-24

## 2019-04-30 RX ORDER — ATENOLOL 25 MG/1
TABLET ORAL
Qty: 180 TABLET | Refills: 1 | Status: ON HOLD | OUTPATIENT
Start: 2019-04-30 | End: 2019-06-14 | Stop reason: HOSPADM

## 2019-05-08 ENCOUNTER — CLINICAL SUPPORT (OUTPATIENT)
Dept: FAMILY MEDICINE | Facility: CLINIC | Age: 84
End: 2019-05-08
Payer: MEDICARE

## 2019-05-08 DIAGNOSIS — E29.1 HYPOGONADISM IN MALE: Primary | ICD-10-CM

## 2019-05-08 PROCEDURE — 96372 THER/PROPH/DIAG INJ SC/IM: CPT | Mod: HCNC,S$GLB,, | Performed by: INTERNAL MEDICINE

## 2019-05-08 PROCEDURE — 96372 PR INJECTION,THERAP/PROPH/DIAG2ST, IM OR SUBCUT: ICD-10-PCS | Mod: HCNC,S$GLB,, | Performed by: INTERNAL MEDICINE

## 2019-05-08 RX ADMIN — TESTOSTERONE CYPIONATE 100 MG: 200 INJECTION, SOLUTION INTRAMUSCULAR at 09:05

## 2019-05-09 ENCOUNTER — TELEPHONE (OUTPATIENT)
Dept: ELECTROPHYSIOLOGY | Facility: CLINIC | Age: 84
End: 2019-05-09

## 2019-05-09 NOTE — TELEPHONE ENCOUNTER
Spoke to Mr. Mckeon who reports 2-3/10 intermittent chest pain to left chest that began 2 days ago.  He denies SOB.  Last BP taken was 127/77 with heart rate in 60s.     Mr. Mckeon also reports he was in afib from Tuesday to last night, however he is no longer in afib.  He will call our office back when/if he goes back into afib.  He is on coumadin and last INR 2.1.    Advised Mr. Mckeon for new onset chest pain I recommend ER to be evaluated.  He reports he is not having the pain at this time.  Let him know I will send message to his general cardiologist, Dr. Manning, for further direction.      Mr. Mckeon verbalizes understanding and appreciates call.

## 2019-05-09 NOTE — TELEPHONE ENCOUNTER
----- Message from Maria T Tony sent at 5/9/2019 10:26 AM CDT -----  Contact: Patient      ----- Message -----  From: Nancy Turk  Sent: 5/9/2019   8:26 AM  To: Efrem DOYLE Staff    The Pt states that he has been in AFIB since Tuesday morning and he needs to be seen because he feels its more of the heart than the AFIB because he is hurting. Please call him back @ [phone]. Thanks, Nancy

## 2019-05-10 ENCOUNTER — TELEPHONE (OUTPATIENT)
Dept: CARDIOLOGY | Facility: CLINIC | Age: 84
End: 2019-05-10

## 2019-05-14 ENCOUNTER — OFFICE VISIT (OUTPATIENT)
Dept: CARDIOLOGY | Facility: CLINIC | Age: 84
End: 2019-05-14
Payer: MEDICARE

## 2019-05-14 VITALS
BODY MASS INDEX: 31.08 KG/M2 | HEIGHT: 75 IN | HEART RATE: 50 BPM | WEIGHT: 250 LBS | DIASTOLIC BLOOD PRESSURE: 74 MMHG | SYSTOLIC BLOOD PRESSURE: 168 MMHG

## 2019-05-14 DIAGNOSIS — E78.2 MIXED HYPERLIPIDEMIA: ICD-10-CM

## 2019-05-14 DIAGNOSIS — N18.30 CHRONIC KIDNEY DISEASE, STAGE 3: ICD-10-CM

## 2019-05-14 DIAGNOSIS — Z98.890 STATUS POST CATHETER ABLATION OF ATRIAL FIBRILLATION: ICD-10-CM

## 2019-05-14 DIAGNOSIS — Z79.01 LONG TERM (CURRENT) USE OF ANTICOAGULANTS: ICD-10-CM

## 2019-05-14 DIAGNOSIS — I48.0 PAROXYSMAL ATRIAL FIBRILLATION: Primary | ICD-10-CM

## 2019-05-14 DIAGNOSIS — I25.119 CORONARY ARTERY DISEASE INVOLVING NATIVE CORONARY ARTERY OF NATIVE HEART WITH ANGINA PECTORIS: ICD-10-CM

## 2019-05-14 DIAGNOSIS — I34.0 MITRAL VALVE INSUFFICIENCY, UNSPECIFIED ETIOLOGY: ICD-10-CM

## 2019-05-14 DIAGNOSIS — I48.0 PAF (PAROXYSMAL ATRIAL FIBRILLATION): ICD-10-CM

## 2019-05-14 DIAGNOSIS — I10 ESSENTIAL HYPERTENSION: ICD-10-CM

## 2019-05-14 DIAGNOSIS — Z98.61 POST PTCA: ICD-10-CM

## 2019-05-14 PROCEDURE — 1101F PT FALLS ASSESS-DOCD LE1/YR: CPT | Mod: HCNC,CPTII,S$GLB, | Performed by: INTERNAL MEDICINE

## 2019-05-14 PROCEDURE — 99499 UNLISTED E&M SERVICE: CPT | Mod: HCNC,S$GLB,, | Performed by: INTERNAL MEDICINE

## 2019-05-14 PROCEDURE — 3078F PR MOST RECENT DIASTOLIC BLOOD PRESSURE < 80 MM HG: ICD-10-PCS | Mod: HCNC,CPTII,S$GLB, | Performed by: INTERNAL MEDICINE

## 2019-05-14 PROCEDURE — 99214 OFFICE O/P EST MOD 30 MIN: CPT | Mod: HCNC,S$GLB,, | Performed by: INTERNAL MEDICINE

## 2019-05-14 PROCEDURE — 99999 PR PBB SHADOW E&M-EST. PATIENT-LVL III: ICD-10-PCS | Mod: PBBFAC,HCNC,, | Performed by: INTERNAL MEDICINE

## 2019-05-14 PROCEDURE — 99214 PR OFFICE/OUTPT VISIT, EST, LEVL IV, 30-39 MIN: ICD-10-PCS | Mod: HCNC,S$GLB,, | Performed by: INTERNAL MEDICINE

## 2019-05-14 PROCEDURE — 1101F PR PT FALLS ASSESS DOC 0-1 FALLS W/OUT INJ PAST YR: ICD-10-PCS | Mod: HCNC,CPTII,S$GLB, | Performed by: INTERNAL MEDICINE

## 2019-05-14 PROCEDURE — 3078F DIAST BP <80 MM HG: CPT | Mod: HCNC,CPTII,S$GLB, | Performed by: INTERNAL MEDICINE

## 2019-05-14 PROCEDURE — 3077F PR MOST RECENT SYSTOLIC BLOOD PRESSURE >= 140 MM HG: ICD-10-PCS | Mod: HCNC,CPTII,S$GLB, | Performed by: INTERNAL MEDICINE

## 2019-05-14 PROCEDURE — 99999 PR PBB SHADOW E&M-EST. PATIENT-LVL III: CPT | Mod: PBBFAC,HCNC,, | Performed by: INTERNAL MEDICINE

## 2019-05-14 PROCEDURE — 3077F SYST BP >= 140 MM HG: CPT | Mod: HCNC,CPTII,S$GLB, | Performed by: INTERNAL MEDICINE

## 2019-05-14 PROCEDURE — 99499 RISK ADDL DX/OHS AUDIT: ICD-10-PCS | Mod: HCNC,S$GLB,, | Performed by: INTERNAL MEDICINE

## 2019-05-14 NOTE — PROGRESS NOTES
"Subjective:    Patient ID:  Linda Skelton is a 83 y.o. male who presents for follow-up of Chest Pain and Dizziness      HPI     83 year old male followed with CAD post NSTEMI/PCI to RCA 2000, hypertension, hyperlipidemia and paroxsymal arial fibrillation. He under went AF ablation 2/25/16. He has had 2 episodes of AF and had resumed coumadin and tikosyn. He is also followed by Dr Seals. He has had 4 recurrences of AF  lasting 24-40 hours associated with profound weakness and SOB. The last was a week ago. He did have left upper chest sharp pain"muscular" that was non exertional. He feel well when not in AF. He does usualy walk regularly for exercise.  Lab Results   Component Value Date     04/03/2019     04/03/2019    K 5.6 (H) 04/03/2019    K 5.6 (H) 04/03/2019     04/03/2019     04/03/2019    CO2 30 (H) 04/03/2019    CO2 30 (H) 04/03/2019    BUN 21 04/03/2019    BUN 21 04/03/2019    CREATININE 1.3 04/03/2019    CREATININE 1.3 04/03/2019    GLU 99 04/03/2019    GLU 99 04/03/2019    HGBA1C 6.1 09/18/2013    MG 1.9 06/06/2018    AST 21 04/03/2019    ALT 17 04/03/2019    ALBUMIN 3.8 04/03/2019    PROT 6.5 04/03/2019    BILITOT 0.7 04/03/2019    WBC 3.73 (L) 04/03/2019    HGB 13.5 (L) 04/03/2019    HCT 41.8 04/03/2019    MCV 98 04/03/2019     (L) 04/03/2019    INR 2.1 (H) 04/24/2019    INR 2.3 03/13/2019    INR 2.3 (H) 07/25/2012    PSA 10.6 (H) 04/25/2016    TSH 0.300 (L) 04/03/2019         Lab Results   Component Value Date    CHOL 130 10/10/2018    HDL 35 (L) 10/10/2018    TRIG 121 10/10/2018       Lab Results   Component Value Date    LDLCALC 70.8 10/10/2018       Past Medical History:   Diagnosis Date    ACS (acute coronary syndrome)     Acute coronary syndrome 2000    NSTEMI    Anticoagulant long-term use     Coumadin    Atrial fibrillation     Basal cell carcinoma excised     left scalp vertex    Bilateral leg edema 1/6/2014    BPH (benign prostatic hyperplasia) "     CKD (chronic kidney disease)     Coronary artery disease     DVT of leg (deep venous thrombosis) 09/08/2014    On coumadin for years    Encounter for blood transfusion     Hyperlipidemia     Hypertension     Hypogonadism, male     Leg fracture, left     Melanoma 01/04/2017     in situ crown of scalp    MI (myocardial infarction)     Panhypopituitarism     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 1 Tablet Oral Every night, Disp: , Rfl:     atenolol (TENORMIN) 25 MG tablet, TAKE 1 TABLET BY MOUTH TWICE DAILY, Disp: 180 tablet, Rfl: 1    dofetilide (TIKOSYN) 500 MCG Cap, TAKE 1 CAPSULE TWICE DAILY, Disp: 180 capsule, Rfl: 3    folic acid (FOLVITE) 1 MG tablet, TAKE ONE TABLET BY MOUTH ONCE DAILY, Disp: 90 tablet, Rfl: 3    levothyroxine (SYNTHROID) 112 MCG tablet, Take 1 tablet (112 mcg total) by mouth before breakfast., Disp: 90 tablet, Rfl: 3    mometasone (ELOCON) 0.1 % ointment, aaa qd- bid for severe areas.  Avoid use on face and groin, Disp: 60 g, Rfl: 1    NITROSTAT 0.4 mg SL tablet, DISSOLVE ONE TABLET UNDER THE TONGUE EVERY 5 MINUTES AS NEEDED FOR CHEST PAIN.  DO NOT EXCEED A TOTAL OF 3 DOSES IN 15 MINUTES NOW, Disp: 25 tablet, Rfl: 3    omeprazole (PRILOSEC) 20 MG capsule, Take 20 mg by mouth every other day., Disp: , Rfl:     predniSONE (DELTASONE) 5 MG tablet, TAKE 1 TABLET BY MOUTH ONCE DAILY, Disp: 90 tablet, Rfl: 3    simvastatin (ZOCOR) 80 MG tablet, TAKE ONE TABLET BY MOUTH ONCE DAILY IN THE EVENING, Disp: 90 tablet, Rfl: 3    tamsulosin (FLOMAX) 0.4 mg Cp24, Take 1 capsule (0.4 mg total) by mouth once daily., Disp: 90 capsule, Rfl: 3    testosterone cypionate (DEPOTESTOTERONE CYPIONATE) 100 mg/mL injection, Inject 1 mL (100 mg total) into the muscle every 14  (fourteen) days., Disp: 10 mL, Rfl: 2    triamcinolone acetonide 0.1% (KENALOG) 0.1 % cream, , Disp: , Rfl:     triamcinolone acetonide 0.1% (KENALOG) 0.1 % cream, aaa bid, Disp: 454 g, Rfl: 6    warfarin (COUMADIN) 5 MG tablet, TAKE ONE & ONE-HALF TABLETS BY MOUTH ONCE DAILY EXCEPT  ON  TUESDAYS  TAKE  1  TABLET ON TUESDAY, Disp: 135 tablet, Rfl: 3    Current Facility-Administered Medications:     lidocaine HCL 2% jelly, , Topical (Top), Once, Octaviano Núñez Jr., MD    testosterone cypionate injection 100 mg, 100 mg, Intramuscular, Q14 Days, Anderson Jerome MD, 100 mg at 05/08/19 0922          Review of Systems   Constitution: Positive for malaise/fatigue (while in AF). Negative for decreased appetite, diaphoresis, fever, weight gain and weight loss.   HENT: Negative for congestion, ear discharge, ear pain and nosebleeds.    Eyes: Negative for blurred vision, double vision and visual disturbance.   Cardiovascular: Positive for palpitations. Negative for chest pain, claudication, cyanosis, dyspnea on exertion, irregular heartbeat, leg swelling, near-syncope, orthopnea, paroxysmal nocturnal dyspnea and syncope.   Respiratory: Positive for shortness of breath (while AF). Negative for cough, hemoptysis, sleep disturbances due to breathing, snoring, sputum production and wheezing.    Endocrine: Negative for polydipsia, polyphagia and polyuria.   Hematologic/Lymphatic: Negative for adenopathy and bleeding problem. Does not bruise/bleed easily.   Skin: Negative for color change, nail changes, poor wound healing and rash.   Musculoskeletal: Negative for muscle cramps and muscle weakness.   Gastrointestinal: Negative for abdominal pain, anorexia, change in bowel habit, hematochezia, nausea and vomiting.   Genitourinary: Negative for dysuria, frequency and hematuria.   Neurological: Negative for brief paralysis, difficulty with concentration, excessive daytime sleepiness, dizziness, focal weakness, headaches,  "light-headedness, seizures, vertigo and weakness.   Psychiatric/Behavioral: Negative for altered mental status and depression.   Allergic/Immunologic: Negative for persistent infections.        Objective:BP (!) 168/74 (BP Location: Left arm, Patient Position: Sitting, BP Method: Large (Automatic))   Pulse (!) 50   Ht 6' 3" (1.905 m)   Wt 113.4 kg (250 lb)   BMI 31.25 kg/m²             Physical Exam   Constitutional: He is oriented to person, place, and time. He appears well-developed and well-nourished.   HENT:   Head: Normocephalic.   Right Ear: External ear normal.   Left Ear: External ear normal.   Nose: Nose normal.   Inspection of lips, teeth and gums normal   Eyes: Pupils are equal, round, and reactive to light. EOM are normal. No scleral icterus.   Neck: Normal range of motion. Neck supple. No JVD present. No tracheal deviation present. No thyromegaly present.   Cardiovascular: Normal rate, regular rhythm and intact distal pulses. Exam reveals no gallop and no friction rub.   No murmur heard.  Pulses:       Dorsalis pedis pulses are 2+ on the right side, and 2+ on the left side.        Posterior tibial pulses are 2+ on the right side, and 2+ on the left side.   Pulmonary/Chest: Effort normal and breath sounds normal.   Abdominal: Bowel sounds are normal. He exhibits no distension. There is no hepatosplenomegaly. There is no tenderness. There is no guarding.   Musculoskeletal: Normal range of motion. He exhibits no edema or tenderness.   Lymphadenopathy:   Palpation of neck and groin lymph nodes normal   Neurological: He is alert and oriented to person, place, and time. No cranial nerve deficit. He exhibits normal muscle tone. Coordination normal.   Skin: Skin is dry.   Palpation of skin normal   Psychiatric: His behavior is normal. Judgment and thought content normal.         Assessment:       1. Paroxysmal atrial fibrillation    2. Coronary artery disease involving native coronary artery of native heart " with angina pectoris hx NSTEMI with PCI to RCA 2000    3. Essential hypertension    4. Mixed hyperlipidemia    5. Mitral valve insufficiency, unspecified etiology    6. PAF (paroxysmal atrial fibrillation) s/p ablation 2/25/2016 still with episodes    7. Status post catheter ablation of atrial fibrillation    8. Post PTCA    9. Chronic kidney disease, stage 3    10. Long term (current) use of anticoagulants         Plan:       Linda was seen today for chest pain and dizziness.    Diagnoses and all orders for this visit:    Paroxysmal atrial fibrillation    Coronary artery disease involving native coronary artery of native heart with angina pectoris hx NSTEMI with PCI to RCA 2000  -     Lipid panel; Future; Expected date: 11/13/2019    Essential hypertension  -     Basic metabolic panel; Future; Expected date: 11/13/2019  -     CBC auto differential; Future; Expected date: 11/13/2019    Mixed hyperlipidemia  -     Lipid panel; Future; Expected date: 11/13/2019    Mitral valve insufficiency, unspecified etiology    PAF (paroxysmal atrial fibrillation) s/p ablation 2/25/2016 still with episodes    Status post catheter ablation of atrial fibrillation    Post PTCA    Chronic kidney disease, stage 3    Long term (current) use of anticoagulants

## 2019-05-16 ENCOUNTER — TELEPHONE (OUTPATIENT)
Dept: ELECTROPHYSIOLOGY | Facility: CLINIC | Age: 84
End: 2019-05-16

## 2019-05-16 NOTE — TELEPHONE ENCOUNTER
I called and spoke to patient.  Per  &  appointment scheduled to a sooner Date.      Virginia Nichols (Rita).                            ----- Message from Neel Topete MD sent at 5/14/2019  2:23 PM CDT -----  Thanks Tomer Powell, Can we bring Mr. Skelton in sooner than scheduled if he would like?    Neel    ----- Message -----  From: Angel Manning MD  Sent: 5/14/2019   1:05 PM  To: MD Neel Jean Mr Skelton has had 4 episodes of prolonged symptomatic AF since December the last a week ago. We spoke briefly regarding another attempt at RFA. His next appt with you is late July. I told him you may be in touch. Ivet

## 2019-05-21 ENCOUNTER — OFFICE VISIT (OUTPATIENT)
Dept: ELECTROPHYSIOLOGY | Facility: CLINIC | Age: 84
End: 2019-05-21
Payer: MEDICARE

## 2019-05-21 ENCOUNTER — TELEPHONE (OUTPATIENT)
Dept: ELECTROPHYSIOLOGY | Facility: CLINIC | Age: 84
End: 2019-05-21

## 2019-05-21 VITALS
DIASTOLIC BLOOD PRESSURE: 77 MMHG | HEART RATE: 58 BPM | HEIGHT: 75 IN | BODY MASS INDEX: 30.59 KG/M2 | WEIGHT: 246 LBS | SYSTOLIC BLOOD PRESSURE: 141 MMHG

## 2019-05-21 DIAGNOSIS — I10 ESSENTIAL HYPERTENSION: ICD-10-CM

## 2019-05-21 DIAGNOSIS — I48.0 PAROXYSMAL ATRIAL FIBRILLATION: Primary | ICD-10-CM

## 2019-05-21 DIAGNOSIS — I48.91 ATRIAL FIBRILLATION, UNSPECIFIED TYPE: ICD-10-CM

## 2019-05-21 DIAGNOSIS — I48.0 PAF (PAROXYSMAL ATRIAL FIBRILLATION): ICD-10-CM

## 2019-05-21 DIAGNOSIS — Z86.718 HISTORY OF DEEP VEIN THROMBOSIS (DVT) OF LOWER EXTREMITY: ICD-10-CM

## 2019-05-21 DIAGNOSIS — I25.119 CORONARY ARTERY DISEASE INVOLVING NATIVE CORONARY ARTERY OF NATIVE HEART WITH ANGINA PECTORIS: ICD-10-CM

## 2019-05-21 DIAGNOSIS — Z51.81 VISIT FOR MONITORING TIKOSYN THERAPY: ICD-10-CM

## 2019-05-21 DIAGNOSIS — Z91.89 AT RISK FOR AMIODARONE TOXICITY WITH LONG TERM USE: ICD-10-CM

## 2019-05-21 DIAGNOSIS — E66.9 OBESITY, CLASS I, BMI 30-34.9: ICD-10-CM

## 2019-05-21 DIAGNOSIS — Z79.899 VISIT FOR MONITORING TIKOSYN THERAPY: ICD-10-CM

## 2019-05-21 DIAGNOSIS — Z79.899 AT RISK FOR AMIODARONE TOXICITY WITH LONG TERM USE: ICD-10-CM

## 2019-05-21 PROCEDURE — 3077F SYST BP >= 140 MM HG: CPT | Mod: HCNC,CPTII,S$GLB, | Performed by: INTERNAL MEDICINE

## 2019-05-21 PROCEDURE — 3078F PR MOST RECENT DIASTOLIC BLOOD PRESSURE < 80 MM HG: ICD-10-PCS | Mod: HCNC,CPTII,S$GLB, | Performed by: INTERNAL MEDICINE

## 2019-05-21 PROCEDURE — 93005 RHYTHM STRIP: ICD-10-PCS | Mod: HCNC,S$GLB,, | Performed by: INTERNAL MEDICINE

## 2019-05-21 PROCEDURE — 99499 UNLISTED E&M SERVICE: CPT | Mod: HCNC,S$GLB,, | Performed by: INTERNAL MEDICINE

## 2019-05-21 PROCEDURE — 99214 PR OFFICE/OUTPT VISIT, EST, LEVL IV, 30-39 MIN: ICD-10-PCS | Mod: HCNC,S$GLB,, | Performed by: INTERNAL MEDICINE

## 2019-05-21 PROCEDURE — 93010 RHYTHM STRIP: ICD-10-PCS | Mod: HCNC,S$GLB,, | Performed by: INTERNAL MEDICINE

## 2019-05-21 PROCEDURE — 93005 ELECTROCARDIOGRAM TRACING: CPT | Mod: HCNC,S$GLB,, | Performed by: INTERNAL MEDICINE

## 2019-05-21 PROCEDURE — 1101F PR PT FALLS ASSESS DOC 0-1 FALLS W/OUT INJ PAST YR: ICD-10-PCS | Mod: HCNC,CPTII,S$GLB, | Performed by: INTERNAL MEDICINE

## 2019-05-21 PROCEDURE — 3077F PR MOST RECENT SYSTOLIC BLOOD PRESSURE >= 140 MM HG: ICD-10-PCS | Mod: HCNC,CPTII,S$GLB, | Performed by: INTERNAL MEDICINE

## 2019-05-21 PROCEDURE — 99499 RISK ADDL DX/OHS AUDIT: ICD-10-PCS | Mod: HCNC,S$GLB,, | Performed by: INTERNAL MEDICINE

## 2019-05-21 PROCEDURE — 99999 PR PBB SHADOW E&M-EST. PATIENT-LVL III: CPT | Mod: PBBFAC,HCNC,, | Performed by: INTERNAL MEDICINE

## 2019-05-21 PROCEDURE — 3078F DIAST BP <80 MM HG: CPT | Mod: HCNC,CPTII,S$GLB, | Performed by: INTERNAL MEDICINE

## 2019-05-21 PROCEDURE — 99214 OFFICE O/P EST MOD 30 MIN: CPT | Mod: HCNC,S$GLB,, | Performed by: INTERNAL MEDICINE

## 2019-05-21 PROCEDURE — 93010 ELECTROCARDIOGRAM REPORT: CPT | Mod: HCNC,S$GLB,, | Performed by: INTERNAL MEDICINE

## 2019-05-21 PROCEDURE — 99999 PR PBB SHADOW E&M-EST. PATIENT-LVL III: ICD-10-PCS | Mod: PBBFAC,HCNC,, | Performed by: INTERNAL MEDICINE

## 2019-05-21 PROCEDURE — 1101F PT FALLS ASSESS-DOCD LE1/YR: CPT | Mod: HCNC,CPTII,S$GLB, | Performed by: INTERNAL MEDICINE

## 2019-05-21 RX ORDER — AMIODARONE HYDROCHLORIDE 200 MG/1
400 TABLET ORAL 2 TIMES DAILY
Qty: 56 TABLET | Refills: 0 | Status: SHIPPED | OUTPATIENT
Start: 2019-05-27 | End: 2019-05-23 | Stop reason: ALTCHOICE

## 2019-05-21 RX ORDER — ATORVASTATIN CALCIUM 80 MG/1
80 TABLET, FILM COATED ORAL DAILY
Qty: 90 TABLET | Refills: 3 | Status: SHIPPED | OUTPATIENT
Start: 2019-05-21 | End: 2019-05-23 | Stop reason: ALTCHOICE

## 2019-05-21 RX ORDER — AMIODARONE HYDROCHLORIDE 200 MG/1
200 TABLET ORAL DAILY
Qty: 30 TABLET | Refills: 11 | Status: SHIPPED | OUTPATIENT
Start: 2019-06-11 | End: 2019-05-23 | Stop reason: ALTCHOICE

## 2019-05-21 NOTE — PROGRESS NOTES
Subjective:    Patient ID:  Linda Skelton is a 83 y.o. male who presents for follow-up of Atrial Fibrillation    Primary Cardiologist: Ivet Manning MD    HPI  I had the pleasure of seeing Mr. Skelton in our electrophysiology clinic in follow-up for his atrial fibrillation. As you are aware he is a pleasant 83 year-old man with paroxysmal atrial fibrillation s/p cryo-PVI by Dr. 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 He notes early AF recurrence after tikosyn therapy was stopped post-PVI. Tikosyn was resumed. He notes since his wife passed away last September he began having AF recurrences. They tend to last 30-48 hours. He notes even though he is apparently rate controlled (80s on home check) during the episodes he does have relative hypotension with systolic blood pressure in the low 100s and he feels very poor. Otherwise he feels well.    My interpretation of today's in clinic ECG is sinus rhythm at a rate of 60 bpm with first degree AV block (NC 208msec) and IVCD with QRS duration of 126msec and QT of 492msec.    Review of Systems   Constitution: Positive for malaise/fatigue (during AF episodes). Negative for fever.   HENT: Negative for congestion and sore throat.    Eyes: Negative for blurred vision and visual disturbance.   Cardiovascular: Positive for irregular heartbeat and palpitations. Negative for chest pain, dyspnea on exertion, leg swelling, near-syncope, orthopnea, paroxysmal nocturnal dyspnea and syncope.   Respiratory: Negative for cough and shortness of breath.    Hematologic/Lymphatic: Negative for bleeding problem. Does not bruise/bleed easily.   Skin: Negative.    Musculoskeletal: Positive for arthritis and joint pain.   Gastrointestinal: Negative for hematochezia and melena.   Neurological: Negative for dizziness and focal weakness.        Objective:     Physical Exam   Constitutional: He is oriented to person, place, and time. He appears well-developed and well-nourished. No distress.   HENT:   Head: Normocephalic and atraumatic.   Eyes: Conjunctivae are normal. Right eye exhibits no discharge. Left eye exhibits no discharge.   Neck: Neck supple. No JVD present.   Cardiovascular: Normal rate and regular rhythm. Exam reveals no gallop and no friction rub.   No murmur heard.  Pulmonary/Chest: Effort normal and breath sounds normal. No respiratory distress. He has no wheezes. He has no rales.   Abdominal: Soft. Bowel sounds are normal. He exhibits no distension. There is no tenderness. There is no rebound.   Musculoskeletal: He exhibits no edema.   Neurological: He is alert and oriented to person, place, and time.   Skin: Skin is warm and dry. He is not diaphoretic.   Psychiatric: He has a normal mood and affect. His behavior is normal. Judgment and thought content normal.   Vitals reviewed.        Assessment:       1. Paroxysmal atrial fibrillation    2. At risk for amiodarone toxicity with long term use    3. Visit for monitoring Tikosyn therapy    4. PAF (paroxysmal atrial fibrillation) s/p ablation 2/25/2016 still with episodes    5. Coronary artery disease involving native coronary artery of native heart with angina pectoris hx NSTEMI with PCI to RCA 2000    6. Essential hypertension    7. History of deep 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, 1997    8. Obesity, Class I, BMI 30-34.9         Plan:       In summary, Mr. Skelton is a pleasant 83 year-old man with paroxysmal atrial fibrillation s/p cryo-PVI by Dr. 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 Discussed options including changing to amiodarone (discussed  long-term risks of liver, lung, thyroid, ocular toxicities) versus redo PVI. Redo-PVI would be complicated by inability to use the left CFV due to chronic occlusion, however would be doable. Discussed procedural risks tend to be higher in the elderly. He will be 84 next week. It is reasonable to see if he has good response with amiodarone. He is agreeable.     Stop tikosyn   Start amiodarone in 5 days (400mg bid x 14 days then 200mg daily)  Will need to change simvastatin due to amio-simvastatin interaction (change to atorvastatin)  Coumadin clinic notified of change to amiodarone  Baseline PFTs  RTC in 3 months to see if amio is effective, if not then will discuss redo PVI    Thank you for allowing me to participate in the care of this patient. Please do not hesitate to call me with any questions or concerns.    Neel Topete MD, PhD  Cardiac Electrophysiology

## 2019-05-21 NOTE — TELEPHONE ENCOUNTER
----- Message from Chiquita Poon MA sent at 5/21/2019  3:17 PM CDT -----  Contact: [phone]      ----- Message -----  From: Nancy Turk  Sent: 5/21/2019  12:48 PM  To: Efrem DOYLE Staff    The Pharmacy is calling to clarify the medication that was just sent over amiodarone (PACERONE) 200 MG Tab and atorvastatin (LIPITOR) 80 MG tablet. Please call them back @ [phone]. Thanks, Nancy

## 2019-05-21 NOTE — TELEPHONE ENCOUNTER
Spoke to Kartik with [facility] pharmacy.  Questions regarding stopping/starting medications discussed and clarified.  Kartik will call our office with any additional questions or concerns.

## 2019-05-21 NOTE — PROGRESS NOTES
5/21/19 received following message from Dr. Topete:  I am changing Mr. Mckeon to amiodarone. He will start his loading dose next Monday.     Will r/s INR for close f/u with expected [facility]DI.

## 2019-05-21 NOTE — PATIENT INSTRUCTIONS
1.) Stop tikosyn  2.) Stop simvastatin and change to atorvastatin  3.) Start amiodarone on Monday 5/27/2019. Take 400mg twice daily (2 pills twice daily) for 14 days then take 200mg daily thereafter (1 pill daily)

## 2019-05-22 ENCOUNTER — CLINICAL SUPPORT (OUTPATIENT)
Dept: FAMILY MEDICINE | Facility: CLINIC | Age: 84
End: 2019-05-22
Payer: MEDICARE

## 2019-05-22 DIAGNOSIS — E29.1 HYPOGONADISM IN MALE: Primary | ICD-10-CM

## 2019-05-22 PROCEDURE — 96372 THER/PROPH/DIAG INJ SC/IM: CPT | Mod: HCNC,S$GLB,, | Performed by: INTERNAL MEDICINE

## 2019-05-22 PROCEDURE — 96372 PR INJECTION,THERAP/PROPH/DIAG2ST, IM OR SUBCUT: ICD-10-PCS | Mod: HCNC,S$GLB,, | Performed by: INTERNAL MEDICINE

## 2019-05-22 RX ADMIN — TESTOSTERONE CYPIONATE 100 MG: 200 INJECTION, SOLUTION INTRAMUSCULAR at 08:05

## 2019-05-23 ENCOUNTER — TELEPHONE (OUTPATIENT)
Dept: ELECTROPHYSIOLOGY | Facility: CLINIC | Age: 84
End: 2019-05-23

## 2019-05-23 ENCOUNTER — PATIENT MESSAGE (OUTPATIENT)
Dept: ELECTROPHYSIOLOGY | Facility: CLINIC | Age: 84
End: 2019-05-23

## 2019-05-23 RX ORDER — SIMVASTATIN 80 MG/1
80 TABLET, FILM COATED ORAL NIGHTLY
Qty: 90 TABLET | Refills: 3 | Status: SHIPPED | OUTPATIENT
Start: 2019-05-23 | End: 2019-12-20 | Stop reason: ALTCHOICE

## 2019-05-23 NOTE — TELEPHONE ENCOUNTER
Attempting to reach Mr. Mckeon to discuss setting up Sotalol admission.  No answer.  Message left asking him to return call to clinic.

## 2019-05-23 NOTE — TELEPHONE ENCOUNTER
Spoke to Mr. Skelton.  Sotalol initiation admission scheduled for 6/11 with 3:30 pm arrival time.  Instructed last dose of Tikosyn should be take on 6/5.    Mr. Skelton verbalizes understanding and appreciates call.

## 2019-05-23 NOTE — TELEPHONE ENCOUNTER
Patient called stating he was not comfortable starting amiodarone with potential lung toxicities as a side effect. Told him to resume tikosyn for now (only missed last night's dose) and will schedule admission for sotalol 80mg bid. Will stop atenolol when he starts sotalol. Would also stop tikosyn 5 days prior to admission.

## 2019-05-24 ENCOUNTER — PATIENT MESSAGE (OUTPATIENT)
Dept: ELECTROPHYSIOLOGY | Facility: CLINIC | Age: 84
End: 2019-05-24

## 2019-05-24 ENCOUNTER — OFFICE VISIT (OUTPATIENT)
Dept: FAMILY MEDICINE | Facility: CLINIC | Age: 84
End: 2019-05-24
Payer: MEDICARE

## 2019-05-24 VITALS
BODY MASS INDEX: 30.96 KG/M2 | OXYGEN SATURATION: 98 % | SYSTOLIC BLOOD PRESSURE: 130 MMHG | WEIGHT: 249 LBS | TEMPERATURE: 98 F | HEIGHT: 75 IN | DIASTOLIC BLOOD PRESSURE: 80 MMHG | HEART RATE: 51 BPM

## 2019-05-24 DIAGNOSIS — S83.207A TEAR OF LEFT MENISCUS AS CURRENT INJURY, INITIAL ENCOUNTER: Primary | ICD-10-CM

## 2019-05-24 PROCEDURE — 3075F SYST BP GE 130 - 139MM HG: CPT | Mod: HCNC,CPTII,S$GLB, | Performed by: INTERNAL MEDICINE

## 2019-05-24 PROCEDURE — 3079F PR MOST RECENT DIASTOLIC BLOOD PRESSURE 80-89 MM HG: ICD-10-PCS | Mod: HCNC,CPTII,S$GLB, | Performed by: INTERNAL MEDICINE

## 2019-05-24 PROCEDURE — 3079F DIAST BP 80-89 MM HG: CPT | Mod: HCNC,CPTII,S$GLB, | Performed by: INTERNAL MEDICINE

## 2019-05-24 PROCEDURE — 99213 PR OFFICE/OUTPT VISIT, EST, LEVL III, 20-29 MIN: ICD-10-PCS | Mod: HCNC,S$GLB,, | Performed by: INTERNAL MEDICINE

## 2019-05-24 PROCEDURE — 99999 PR PBB SHADOW E&M-EST. PATIENT-LVL IV: ICD-10-PCS | Mod: PBBFAC,HCNC,, | Performed by: INTERNAL MEDICINE

## 2019-05-24 PROCEDURE — 99213 OFFICE O/P EST LOW 20 MIN: CPT | Mod: HCNC,S$GLB,, | Performed by: INTERNAL MEDICINE

## 2019-05-24 PROCEDURE — 1101F PT FALLS ASSESS-DOCD LE1/YR: CPT | Mod: HCNC,CPTII,S$GLB, | Performed by: INTERNAL MEDICINE

## 2019-05-24 PROCEDURE — 1101F PR PT FALLS ASSESS DOC 0-1 FALLS W/OUT INJ PAST YR: ICD-10-PCS | Mod: HCNC,CPTII,S$GLB, | Performed by: INTERNAL MEDICINE

## 2019-05-24 PROCEDURE — 99999 PR PBB SHADOW E&M-EST. PATIENT-LVL IV: CPT | Mod: PBBFAC,HCNC,, | Performed by: INTERNAL MEDICINE

## 2019-05-24 PROCEDURE — 3075F PR MOST RECENT SYSTOLIC BLOOD PRESS GE 130-139MM HG: ICD-10-PCS | Mod: HCNC,CPTII,S$GLB, | Performed by: INTERNAL MEDICINE

## 2019-05-24 NOTE — PROGRESS NOTES
This note was created by combination of typed  and Dragon dictation.  Transcription errors may be present.  If there are any questions, please contact me.    Assessment & Plan:   Tear of left meniscus as current injury, initial encounter  -home physical therapy handout provided.  Cold packs.  He takes intermittent Tylenol for this and if he persists in taking it I have asked him to contact the [facility] as this can raise INR.  Cautiously optimistic that this will get better but give it a couple of weeks.  If no improvement next step would be to see Orthopedics    Medications Discontinued During This Encounter   Medication Reason    predniSONE (DELTASONE) 5 MG tablet Patient no longer taking    triamcinolone acetonide 0.1% (KENALOG) 0.1 % cream Patient no longer taking    triamcinolone acetonide 0.1% (KENALOG) 0.1 % cream Patient no longer taking       meds sent this encounter:       Follow Up: No follow-ups on file.    Subjective:     Chief Complaint   Patient presents with    Knee Pain     left. 1 and half weeks now. cold compression and tylenol       [place]  Linda is a 83 y.o. male, last appointment with this clinic was 4/3/2019.    Thinks he has bursitis in the knee.  Started having pain about 10 days ago.  Left knee.  Initial cold pack without relief.  Had similar episode in the past that resolved with cold packs. 2013 pes anserine bursitis.  No obvious swelling. No redness or rash. No systemic fever or chills.  No recent strenuous activity.     After discussion he does now recall about 2 weeks ago he thinks he may have strained his knee.  Left knee.    Usually walks in the morning.  But has not done so x weeks    Answers for HPI/ROS submitted by the patient on 5/24/2019   activity change: No  unexpected weight change: No  rhinorrhea: No  trouble swallowing: No  visual disturbance: No  chest tightness: No  polyuria: No  difficulty urinating: No  joint swelling: No  arthralgias: Yes  confusion:  No  dysphoric mood: No       Patient Care Team:  Anderson Jerome MD as PCP - General (Internal Medicine)  Octaviano Núñez Jr., MD as Consulting Physician (Urology)  Jac Madison II, MD as Consulting Physician (Endocrinology)  Kamini Wheeler MD as Consulting Physician (Dermatology)    Patient Active Problem List    Diagnosis Date Noted    Tubular adenoma of colon on colonoscopy 2014 04/03/2019    Perforated nasal septum 06/15/2018    History of bacteremia with klebsiella during cholecystitis summer 2018 06/07/2018    Status post cholecystectomy 6/2018 with klebsiella bacteremia 06/07/2018    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 04/26/2018    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    Long term (current) use of anticoagulants 07/20/2017    Benign non-nodular prostatic hyperplasia without lower urinary tract symptoms 07/05/2017    Hypogonadism in male 07/05/2017    History of melanoma in situ 02/23/2017     Scalp 1/2017 tx with mohs       Mixed hyperlipidemia 10/23/2014    History of deep 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, 1997 09/08/2014    Obesity, Class I, BMI 30-34.9 04/04/2014    ED (erectile dysfunction) 04/04/2014    Essential hypertension 04/04/2014    Bilateral leg edema 01/06/2014    Mitral regurgitation 02/14/2013    Coronary artery disease involving native coronary artery of native heart with angina pectoris hx NSTEMI with PCI to RCA 2000     Chronic kidney disease, stage 3 12/26/2012    Thyroid nodule 08/06/2012     10/31/2017 thyroid US: Stable left lobe nodule      Panhypopituitarism thyroid/prednisone/testosterone 08/06/2012    PAF (paroxysmal atrial fibrillation) s/p ablation 2/25/2016 still with episodes 07/27/2012       PAST MEDICAL HISTORY:  Past Medical History:   Diagnosis Date    ACS (acute coronary syndrome)     Acute coronary syndrome 2000    NSTEMI    Anticoagulant long-term use     Coumadin    Atrial  fibrillation     Basal cell carcinoma excised     left scalp vertex    Bilateral leg edema 1/6/2014    BPH (benign prostatic hyperplasia)     CKD (chronic kidney disease)     Coronary artery disease     DVT of leg (deep venous thrombosis) 09/08/2014    On coumadin for years    Encounter for blood transfusion     Hyperlipidemia     Hypertension     Hypogonadism, male     Leg fracture, left     Melanoma 01/04/2017     in situ crown of scalp    MI (myocardial infarction)     Panhypopituitarism     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    Squamous Cell Carcinoma excised     left posterior scalp    Thyroid disease        PAST SURGICAL HISTORY:  Past Surgical History:   Procedure Laterality Date    ABLATION N/A 2/25/2016    [facility]formed [facility]huja [facility] at [facility] CATH LAB    APPENDECTOMY      BASAL CELL CARCINOMA EXCISION  01/2008    BRAIN SURGERY      pituitary adenoma removed    CARDIAC CATHETERIZATION  05/09/2000    S/P stent to RCA,    CARDIAC CATHETERIZATION      stents placed    CARDIAC ELECTROPHYSIOLOGY STUDY AND ABLATION      CHOLECYSTECTOMY, LAPAROSCOPIC N/A 6/2/2018    [facility] Dickson [facility] Jr[facility] OR    COLONOSCOPY N/A 1/7/2014    Performed by Dell Elena MD at [facility] ENDO (4TH FLR)    CORONARY ANGIOPLASTY  5/9/2000    RCA    CORONARY ANGIOPLASTY WITH STENT PLACEMENT      ESOPHAGEAL DILATION      EYE SURGERY Bilateral     cataracts removed and new lenses placed     FRACTURE SURGERY Left     leg    HERNIA REPAIR      left femur surgery      pituitatry      hypophysectomy    REMOVAL-MASS Left 12/12/2013    Performed by Burt Woodard MD at [facility] OR 2ND FLR    SKIN BIOPSY      TONSILLECTOMY      VASCULAR SURGERY         SOCIAL HISTORY:  Social History     Socioeconomic History    Marital status:      Spouse name: Not on file    Number of children: Not on file    Years of education: Not on file     Highest education level: Not on file   Occupational History    Not on file   Social Needs    Financial resource strain: Not hard at all    Food insecurity:     Worry: Never true     Inability: Never true    Transportation needs:     Medical: No     Non-medical: No   Tobacco Use    Smoking status: Never Smoker    Smokeless tobacco: Never Used   Substance and Sexual Activity    Alcohol use: No     Frequency: Never    Drug use: No    Sexual activity: Not Currently     Partners: Female   Lifestyle    Physical activity:     Days per week: 0 days     Minutes per session: 0 min    Stress: Not at all   Relationships    Social connections:     Talks on phone: More than three times a week     Gets together: Once a week     Attends Zoroastrianism service: Not on file     Active member of club or organization: Yes     Attends meetings of clubs or organizations: More than 4 times per year     Relationship status:    Other Topics Concern    Not on file   Social History Narrative    retired [employer] .  Non smoker.        ALLERGIES AND MEDICATIONS: updated and reviewed.  Review of patient's allergies indicates:  No Known Allergies  Current Outpatient Medications   Medication Sig Dispense Refill    aspirin 81 mg Tab Take 81 mg by mouth every evening. 1 Tablet Oral Every night      atenolol (TENORMIN) 25 MG tablet TAKE 1 TABLET BY MOUTH TWICE DAILY 180 tablet 1    folic acid (FOLVITE) 1 MG tablet TAKE ONE TABLET BY MOUTH ONCE DAILY 90 tablet 3    levothyroxine (SYNTHROID) 112 MCG tablet Take 1 tablet (112 mcg total) by mouth before breakfast. 90 tablet 3    mometasone (ELOCON) 0.1 % ointment aaa qd- bid for severe areas.  Avoid use on face and groin 60 g 1    NITROSTAT 0.4 mg SL tablet DISSOLVE ONE TABLET UNDER THE TONGUE EVERY 5 MINUTES AS NEEDED FOR CHEST PAIN.  DO NOT EXCEED A TOTAL OF 3 DOSES IN 15 MINUTES NOW 25 tablet 3    omeprazole (PRILOSEC) 20 MG capsule Take 20 mg by mouth every other day.   "    simvastatin (ZOCOR) 80 MG tablet Take 1 tablet (80 mg total) by mouth every evening. 90 tablet 3    tamsulosin (FLOMAX) 0.4 mg Cp24 Take 1 capsule (0.4 mg total) by mouth once daily. 90 capsule 3    testosterone cypionate (DEPOTESTOTERONE CYPIONATE) 100 mg/mL injection Inject 1 mL (100 mg total) into the muscle every 14 (fourteen) days. 10 mL 2    warfarin (COUMADIN) 5 MG tablet TAKE ONE & ONE-HALF TABLETS BY MOUTH ONCE DAILY EXCEPT  ON  TUESDAYS  TAKE  1  TABLET ON TUESDAY 135 tablet 3     Current Facility-Administered Medications   Medication Dose Route Frequency Provider Last Rate Last Dose    lidocaine HCL 2% jelly   Topical (Top) Once Octaviano Núñez Jr., MD           Review of Systems   HENT: Negative for hearing loss.    Eyes: Negative for discharge.   Respiratory: Negative for wheezing.    Cardiovascular: Negative for chest pain and palpitations.   Gastrointestinal: Negative for blood in stool, constipation, diarrhea and vomiting.   Genitourinary: Negative for hematuria and urgency.   Musculoskeletal: Negative for neck pain.   Neurological: Negative for weakness and headaches.   Endo/Heme/Allergies: Negative for polydipsia.       Objective:   Physical Exam   Vitals:    05/24/19 1005   BP: (!) 152/70   BP Location: Right arm   Patient Position: Sitting   BP Method: Medium (Manual)   Pulse: (!) 51   Temp: 97.8 °F (36.6 °C)   TempSrc: Oral   SpO2: 98%   Weight: 112.9 kg (249 lb)   Height: 6' 3" (1.905 m)    Body mass index is 31.12 kg/m².  Weight: 112.9 kg (249 lb)   Height: 6' 3" (190.5 cm)     Physical Exam   Constitutional: He is oriented to person, place, and time. He appears well-developed and well-nourished. No distress.   HENT:   Head: Normocephalic and atraumatic.   Eyes: No scleral icterus.   Pulmonary/Chest: Effort normal.   Musculoskeletal: He exhibits edema.   Chronic left LE edema > right with varicose veins on the left prominent.  Left knee without effusion; tender on the left knee joint. "  Pain with full flexion and mendy on the lateral aspect   Neurological: He is alert and oriented to person, place, and time.   Skin: Skin is warm and dry.   Psychiatric: He has a normal mood and affect. His behavior is normal. Thought content normal.

## 2019-05-28 NOTE — PROGRESS NOTES
Please cancel lab on 5/30 nad move back to 6/5 as previously planned to coincide with his other appts at [facility]. We will need to know when he will be admitted for sotalol.

## 2019-05-28 NOTE — PROGRESS NOTES
Patient called 05/28/19 to inform us that he will not be starting (Amiodarone). Also [place] he will be receiving (Sotalol). He would like to know when would his next inr be. Please advise.

## 2019-06-04 ENCOUNTER — PATIENT MESSAGE (OUTPATIENT)
Dept: ELECTROPHYSIOLOGY | Facility: CLINIC | Age: 84
End: 2019-06-04

## 2019-06-04 NOTE — TELEPHONE ENCOUNTER
Spoke to Maria Teresa Linda.  All questions regarding Sotalol admit answered.  Will keep August f/u at this time as he may need f/u after Sotalol admit.  We will push back if needed.     Will also send message to Bethany regarding financial information.

## 2019-06-05 ENCOUNTER — ANTI-COAG VISIT (OUTPATIENT)
Dept: CARDIOLOGY | Facility: CLINIC | Age: 84
End: 2019-06-05
Payer: MEDICARE

## 2019-06-05 ENCOUNTER — LAB VISIT (OUTPATIENT)
Dept: LAB | Facility: HOSPITAL | Age: 84
End: 2019-06-05
Attending: INTERNAL MEDICINE
Payer: MEDICARE

## 2019-06-05 ENCOUNTER — CLINICAL SUPPORT (OUTPATIENT)
Dept: FAMILY MEDICINE | Facility: CLINIC | Age: 84
End: 2019-06-05
Payer: MEDICARE

## 2019-06-05 DIAGNOSIS — Z79.01 LONG TERM (CURRENT) USE OF ANTICOAGULANTS: ICD-10-CM

## 2019-06-05 DIAGNOSIS — I48.0 PAF (PAROXYSMAL ATRIAL FIBRILLATION): ICD-10-CM

## 2019-06-05 DIAGNOSIS — E29.1 HYPOGONADISM IN MALE: Primary | ICD-10-CM

## 2019-06-05 LAB
INR PPP: 2.5 (ref 0.8–1.2)
PROTHROMBIN TIME: 23.9 SEC (ref 9–12.5)

## 2019-06-05 PROCEDURE — 36415 COLL VENOUS BLD VENIPUNCTURE: CPT | Mod: PO

## 2019-06-05 PROCEDURE — 93793 ANTICOAG MGMT PT WARFARIN: CPT | Mod: S$GLB,,, | Performed by: PHARMACIST

## 2019-06-05 PROCEDURE — 96372 PR INJECTION,THERAP/PROPH/DIAG2ST, IM OR SUBCUT: ICD-10-PCS | Mod: S$GLB,,, | Performed by: INTERNAL MEDICINE

## 2019-06-05 PROCEDURE — 96372 THER/PROPH/DIAG INJ SC/IM: CPT | Mod: S$GLB,,, | Performed by: INTERNAL MEDICINE

## 2019-06-05 PROCEDURE — 85610 PROTHROMBIN TIME: CPT

## 2019-06-05 PROCEDURE — 93793 PR ANTICOAGULANT MGMT FOR PT TAKING WARFARIN: ICD-10-PCS | Mod: S$GLB,,, | Performed by: PHARMACIST

## 2019-06-05 RX ORDER — TESTOSTERONE CYPIONATE 200 MG/ML
100 INJECTION, SOLUTION INTRAMUSCULAR
Status: COMPLETED | OUTPATIENT
Start: 2019-06-05 | End: 2019-11-06

## 2019-06-05 RX ADMIN — TESTOSTERONE CYPIONATE 100 MG: 200 INJECTION, SOLUTION INTRAMUSCULAR at 10:06

## 2019-06-06 ENCOUNTER — OFFICE VISIT (OUTPATIENT)
Dept: DERMATOLOGY | Facility: CLINIC | Age: 84
End: 2019-06-06
Payer: MEDICARE

## 2019-06-06 DIAGNOSIS — L90.5 SCAR: ICD-10-CM

## 2019-06-06 DIAGNOSIS — B35.1 ONYCHOMYCOSIS: ICD-10-CM

## 2019-06-06 DIAGNOSIS — B35.3 TINEA PEDIS OF BOTH FEET: ICD-10-CM

## 2019-06-06 DIAGNOSIS — D18.00 ANGIOMA: ICD-10-CM

## 2019-06-06 DIAGNOSIS — Z85.828 PERSONAL HISTORY OF SKIN CANCER: ICD-10-CM

## 2019-06-06 DIAGNOSIS — D22.9 NEVUS: ICD-10-CM

## 2019-06-06 DIAGNOSIS — C84.00 MYCOSIS FUNGOIDES, UNSPECIFIED BODY REGION: ICD-10-CM

## 2019-06-06 DIAGNOSIS — L57.0 AK (ACTINIC KERATOSIS): Primary | ICD-10-CM

## 2019-06-06 DIAGNOSIS — L82.1 SK (SEBORRHEIC KERATOSIS): ICD-10-CM

## 2019-06-06 DIAGNOSIS — Z86.006 HISTORY OF MELANOMA IN SITU: ICD-10-CM

## 2019-06-06 PROCEDURE — 99214 OFFICE O/P EST MOD 30 MIN: CPT | Mod: S$GLB,,, | Performed by: DERMATOLOGY

## 2019-06-06 PROCEDURE — 1101F PT FALLS ASSESS-DOCD LE1/YR: CPT | Mod: CPTII,S$GLB,, | Performed by: DERMATOLOGY

## 2019-06-06 PROCEDURE — 1101F PR PT FALLS ASSESS DOC 0-1 FALLS W/OUT INJ PAST YR: ICD-10-PCS | Mod: CPTII,S$GLB,, | Performed by: DERMATOLOGY

## 2019-06-06 PROCEDURE — 99999 PR PBB SHADOW E&M-EST. PATIENT-LVL II: ICD-10-PCS | Mod: PBBFAC,,, | Performed by: DERMATOLOGY

## 2019-06-06 PROCEDURE — 99999 PR PBB SHADOW E&M-EST. PATIENT-LVL II: CPT | Mod: PBBFAC,,, | Performed by: DERMATOLOGY

## 2019-06-06 PROCEDURE — 99214 PR OFFICE/OUTPT VISIT, EST, LEVL IV, 30-39 MIN: ICD-10-PCS | Mod: S$GLB,,, | Performed by: DERMATOLOGY

## 2019-06-06 RX ORDER — KETOCONAZOLE 20 MG/G
CREAM TOPICAL
Qty: 60 G | Refills: 3 | Status: SHIPPED | OUTPATIENT
Start: 2019-06-06 | End: 2021-07-04

## 2019-06-06 RX ORDER — CICLOPIROX 80 MG/ML
SOLUTION TOPICAL
Qty: 1 BOTTLE | Refills: 5 | Status: SHIPPED | OUTPATIENT
Start: 2019-06-06 | End: 2021-07-04

## 2019-06-06 NOTE — PROGRESS NOTES
Subjective:       Patient ID:  Linda Skelton is a 84 y.o. male who presents for   Chief Complaint   Patient presents with    Skin Check    Lesion     Pt here today for a TBSE. Pt c/o painful red skin on on left ankle x several days. No prev tx.  Pt c/o red and scaly lesion on left forehead x a few years. No bleeding, pain or prev tx.  Pt c/o dry and scaly skin on feet x a few years. Tx with otc antifungal cream. Tx did not help.  This is a high risk patient here to check for the development of new lesions.- MIS scalp, NMSC , CTCL        Review of Systems   Constitutional: Negative for fever, chills, weight loss, fatigue, night sweats and malaise.   HENT: Negative for headaches.    Respiratory: Negative for cough and shortness of breath.    Gastrointestinal: Negative for indigestion.   Skin: Positive for activity-related sunscreen use. Negative for daily sunscreen use, tendency to form keloidal scars, recent sunburn and wears hat.   Neurological: Negative for headaches.   Hematologic/Lymphatic: Negative for adenopathy. Bruises/bleeds easily.        Objective:    Physical Exam   Constitutional: He appears well-developed and well-nourished. No distress.   Lymphadenopathy: No supraclavicular adenopathy is present.     He has no cervical adenopathy.     He has no axillary adenopathy.     He has no inguinal adenopathy.   Neurological: He is alert and oriented to person, place, and time. He is not disoriented.   Psychiatric: He has a normal mood and affect.   Skin:   Areas Examined (abnormalities noted in diagram):   Scalp / Hair Palpated and Inspected  Head / Face Inspection Performed  Neck Inspection Performed  Chest / Axilla Inspection Performed  Abdomen Inspection Performed  Genitals / Buttocks / Groin Inspection Performed  Back Inspection Performed  RUE Inspected  LUE Inspection Performed  RLE Inspected  LLE Inspection Performed  Nails and Digits Inspection Performed                           Diagram Legend      Erythematous scaling macule/papule c/w actinic keratosis       Vascular papule c/w angioma      Pigmented verrucoid papule/plaque c/w seborrheic keratosis      Yellow umbilicated papule c/w sebaceous hyperplasia      Irregularly shaped tan macule c/w lentigo     1-2 mm smooth white papules consistent with Milia      Movable subcutaneous cyst with punctum c/w epidermal inclusion cyst      Subcutaneous movable cyst c/w pilar cyst      Firm pink to brown papule c/w dermatofibroma      Pedunculated fleshy papule(s) c/w skin tag(s)      Evenly pigmented macule c/w junctional nevus     Mildly variegated pigmented, slightly irregular-bordered macule c/w mildly atypical nevus      Flesh colored to evenly pigmented papule c/w intradermal nevus       Pink pearly papule/plaque c/w basal cell carcinoma      Erythematous hyperkeratotic cursted plaque c/w SCC      Surgical scar with no sign of skin cancer recurrence      Open and closed comedones      Inflammatory papules and pustules      Verrucoid papule consistent consistent with wart     Erythematous eczematous patches and plaques     Dystrophic onycholytic nail with subungual debris c/w onychomycosis     Umbilicated papule    Erythematous-base heme-crusted tan verrucoid plaque consistent with inflamed seborrheic keratosis     Erythematous Silvery Scaling Plaque c/w Psoriasis     See annotation      Assessment / Plan:        AK (actinic keratosis)  Cryosurgery Procedure Note    Verbal consent from the patient is obtained including, but not limited to, risk of hypopigmentation/hyperpigmentation, scar, recurrence of lesion. The patient is aware of the precancerous quality and need for treatment of these lesions. Liquid nitrogen cryosurgery is applied to the 3 actinic keratoses, as detailed in the physical exam, to produce a freeze injury. The patient is aware that blisters may form and is instructed on wound care with gentle cleansing and use of vaseline ointment to keep moist  until healed. The patient is supplied a handout on cryosurgery and is instructed to call if lesions do not completely resolve.    Mycosis fungoides, unspecified body region  Pt was denied [place] [facility] unit by insurance co and does not want to pay co- pay for therapy in office  CTCL, TGR neg. ------    Notes recorded by Kamini Wheeler MD on 11/5/2018 at 5:18 PM CST  FINAL PATHOLOGIC DIAGNOSIS      2. 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 Reassurance given to patient. No treatment is necessary.     Angioma  These are benign vascular lesions that are inherited.  Treatment is not necessary.    Nevus  Discussed ABCDE's of nevi.  Monitor for new mole or moles that are becoming bigger, darker, irritated, or developing irregular borders. Brochure provided.    Tinea pedis of both feet  -     ketoconazole (NIZORAL) 2 % cream; AAA bid to feet x 3 weeks  Dispense: 60 g; Refill: 3    Onychomycosis  -     ciclopirox (PENLAC) 8 % Soln; Apply daily to affected nail. Must remove and restart weekly  Dispense: 1 Bottle; Refill: 5    Personal history of skin cancer  History of melanoma in situ  Scar  Area of previous atypical nevus/nevi  And MIS examined. Site well healed with no signs of recurrence.    Total body skin examination performed today including at least 12 points as noted in physical examination. No lesions suspicious for atypical nevi noted.             Follow up in about 4 months (around 10/6/2019).

## 2019-06-11 ENCOUNTER — HOSPITAL ENCOUNTER (INPATIENT)
Facility: HOSPITAL | Age: 84
LOS: 3 days | Discharge: HOME | DRG: 309 | End: 2019-06-14
Attending: INTERNAL MEDICINE | Admitting: HOSPITALIST
Payer: MEDICARE

## 2019-06-11 ENCOUNTER — TELEPHONE (OUTPATIENT)
Dept: ELECTROPHYSIOLOGY | Facility: CLINIC | Age: 84
End: 2019-06-11

## 2019-06-11 DIAGNOSIS — I48.0 PAF (PAROXYSMAL ATRIAL FIBRILLATION): Primary | ICD-10-CM

## 2019-06-11 DIAGNOSIS — I25.119 CORONARY ARTERY DISEASE INVOLVING NATIVE CORONARY ARTERY OF NATIVE HEART WITH ANGINA PECTORIS: ICD-10-CM

## 2019-06-11 DIAGNOSIS — Z51.81 ENCOUNTER FOR MONITORING SOTALOL THERAPY: ICD-10-CM

## 2019-06-11 DIAGNOSIS — E03.9 ACQUIRED HYPOTHYROIDISM: ICD-10-CM

## 2019-06-11 DIAGNOSIS — I48.91 ATRIAL FIBRILLATION: ICD-10-CM

## 2019-06-11 DIAGNOSIS — Z51.81 ENCOUNTER FOR MEDICATION MONITORING: ICD-10-CM

## 2019-06-11 DIAGNOSIS — I48.0 PAF (PAROXYSMAL ATRIAL FIBRILLATION): ICD-10-CM

## 2019-06-11 DIAGNOSIS — E23.0 PANHYPOPITUITARISM: ICD-10-CM

## 2019-06-11 DIAGNOSIS — E78.2 MIXED HYPERLIPIDEMIA: ICD-10-CM

## 2019-06-11 DIAGNOSIS — E83.42 HYPOMAGNESEMIA: ICD-10-CM

## 2019-06-11 DIAGNOSIS — Z79.01 LONG TERM (CURRENT) USE OF ANTICOAGULANTS: ICD-10-CM

## 2019-06-11 DIAGNOSIS — Z79.899 ENCOUNTER FOR MONITORING SOTALOL THERAPY: ICD-10-CM

## 2019-06-11 DIAGNOSIS — I48.0 PAROXYSMAL ATRIAL FIBRILLATION: ICD-10-CM

## 2019-06-11 DIAGNOSIS — Z86.718 HISTORY OF DEEP VEIN THROMBOSIS (DVT) OF LOWER EXTREMITY: ICD-10-CM

## 2019-06-11 PROBLEM — K21.9 GASTROESOPHAGEAL REFLUX DISEASE WITHOUT ESOPHAGITIS: Status: ACTIVE | Noted: 2019-06-11

## 2019-06-11 LAB
ANION GAP SERPL CALC-SCNC: 10 MMOL/L (ref 8–16)
BASOPHILS # BLD AUTO: 0.01 K/UL (ref 0–0.2)
BASOPHILS NFR BLD: 0.2 % (ref 0–1.9)
BUN SERPL-MCNC: 22 MG/DL (ref 8–23)
CALCIUM SERPL-MCNC: 10.3 MG/DL (ref 8.7–10.5)
CHLORIDE SERPL-SCNC: 104 MMOL/L (ref 95–110)
CO2 SERPL-SCNC: 26 MMOL/L (ref 23–29)
CREAT SERPL-MCNC: 1.4 MG/DL (ref 0.5–1.4)
DIFFERENTIAL METHOD: ABNORMAL
EOSINOPHIL # BLD AUTO: 0 K/UL (ref 0–0.5)
EOSINOPHIL NFR BLD: 0.2 % (ref 0–8)
ERYTHROCYTE [DISTWIDTH] IN BLOOD BY AUTOMATED COUNT: 13.5 % (ref 11.5–14.5)
EST. GFR  (AFRICAN AMERICAN): 53 ML/MIN/1.73 M^2
EST. GFR  (NON AFRICAN AMERICAN): 45.8 ML/MIN/1.73 M^2
GLUCOSE SERPL-MCNC: 95 MG/DL (ref 70–110)
HCT VFR BLD AUTO: 45.9 % (ref 40–54)
HGB BLD-MCNC: 14.9 G/DL (ref 14–18)
IMM GRANULOCYTES # BLD AUTO: 0.02 K/UL (ref 0–0.04)
IMM GRANULOCYTES NFR BLD AUTO: 0.4 % (ref 0–0.5)
INR PPP: 2.4 (ref 0.8–1.2)
LYMPHOCYTES # BLD AUTO: 1.2 K/UL (ref 1–4.8)
LYMPHOCYTES NFR BLD: 20.9 % (ref 18–48)
MAGNESIUM SERPL-MCNC: 1.9 MG/DL (ref 1.6–2.6)
MCH RBC QN AUTO: 31.5 PG (ref 27–31)
MCHC RBC AUTO-ENTMCNC: 32.5 G/DL (ref 32–36)
MCV RBC AUTO: 97 FL (ref 82–98)
MONOCYTES # BLD AUTO: 1 K/UL (ref 0.3–1)
MONOCYTES NFR BLD: 18.2 % (ref 4–15)
NEUTROPHILS # BLD AUTO: 3.3 K/UL (ref 1.8–7.7)
NEUTROPHILS NFR BLD: 60.1 % (ref 38–73)
NRBC BLD-RTO: 0 /100 WBC
PLATELET # BLD AUTO: 125 K/UL (ref 150–350)
PMV BLD AUTO: 12.2 FL (ref 9.2–12.9)
POTASSIUM SERPL-SCNC: 4.5 MMOL/L (ref 3.5–5.1)
PROTHROMBIN TIME: 22.9 SEC (ref 9–12.5)
RBC # BLD AUTO: 4.73 M/UL (ref 4.6–6.2)
SODIUM SERPL-SCNC: 140 MMOL/L (ref 136–145)
WBC # BLD AUTO: 5.54 K/UL (ref 3.9–12.7)

## 2019-06-11 PROCEDURE — 99222 1ST HOSP IP/OBS MODERATE 55: CPT | Mod: HCNC,AI,, | Performed by: INTERNAL MEDICINE

## 2019-06-11 PROCEDURE — 99222 PR INITIAL HOSPITAL CARE,LEVL II: ICD-10-PCS | Mod: HCNC,AI,, | Performed by: INTERNAL MEDICINE

## 2019-06-11 PROCEDURE — 85025 COMPLETE CBC W/AUTO DIFF WBC: CPT | Mod: HCNC

## 2019-06-11 PROCEDURE — 93010 EKG 12-LEAD: ICD-10-PCS | Mod: HCNC,,, | Performed by: INTERNAL MEDICINE

## 2019-06-11 PROCEDURE — 99222 1ST HOSP IP/OBS MODERATE 55: CPT | Mod: HCNC,,, | Performed by: INTERNAL MEDICINE

## 2019-06-11 PROCEDURE — 83735 ASSAY OF MAGNESIUM: CPT | Mod: HCNC

## 2019-06-11 PROCEDURE — 63600175 PHARM REV CODE 636 W HCPCS: Mod: HCNC | Performed by: INTERNAL MEDICINE

## 2019-06-11 PROCEDURE — 20600001 HC STEP DOWN PRIVATE ROOM: Mod: HCNC

## 2019-06-11 PROCEDURE — 36415 COLL VENOUS BLD VENIPUNCTURE: CPT | Mod: HCNC

## 2019-06-11 PROCEDURE — 80048 BASIC METABOLIC PNL TOTAL CA: CPT | Mod: HCNC

## 2019-06-11 PROCEDURE — 25000003 PHARM REV CODE 250: Mod: HCNC | Performed by: STUDENT IN AN ORGANIZED HEALTH CARE EDUCATION/TRAINING PROGRAM

## 2019-06-11 PROCEDURE — 93005 ELECTROCARDIOGRAM TRACING: CPT | Mod: HCNC

## 2019-06-11 PROCEDURE — 99222 PR INITIAL HOSPITAL CARE,LEVL II: ICD-10-PCS | Mod: HCNC,,, | Performed by: INTERNAL MEDICINE

## 2019-06-11 PROCEDURE — 85610 PROTHROMBIN TIME: CPT | Mod: HCNC

## 2019-06-11 PROCEDURE — 93010 ELECTROCARDIOGRAM REPORT: CPT | Mod: HCNC,,, | Performed by: INTERNAL MEDICINE

## 2019-06-11 PROCEDURE — 93010 ELECTROCARDIOGRAM REPORT: CPT | Mod: HCNC,76,, | Performed by: INTERNAL MEDICINE

## 2019-06-11 PROCEDURE — 25000003 PHARM REV CODE 250: Mod: HCNC | Performed by: INTERNAL MEDICINE

## 2019-06-11 RX ORDER — ACETAMINOPHEN 325 MG/1
650 TABLET ORAL EVERY 6 HOURS PRN
Status: DISCONTINUED | OUTPATIENT
Start: 2019-06-11 | End: 2019-06-14 | Stop reason: HOSPADM

## 2019-06-11 RX ORDER — FOLIC ACID 1 MG/1
1000 TABLET ORAL DAILY
Status: DISCONTINUED | OUTPATIENT
Start: 2019-06-12 | End: 2019-06-14 | Stop reason: HOSPADM

## 2019-06-11 RX ORDER — SOTALOL HYDROCHLORIDE 80 MG/1
80 TABLET ORAL 2 TIMES DAILY
Status: DISCONTINUED | OUTPATIENT
Start: 2019-06-12 | End: 2019-06-11

## 2019-06-11 RX ORDER — PANTOPRAZOLE SODIUM 40 MG/1
40 TABLET, DELAYED RELEASE ORAL DAILY
Status: DISCONTINUED | OUTPATIENT
Start: 2019-06-12 | End: 2019-06-11

## 2019-06-11 RX ORDER — MAGNESIUM SULFATE HEPTAHYDRATE 40 MG/ML
2 INJECTION, SOLUTION INTRAVENOUS ONCE
Status: COMPLETED | OUTPATIENT
Start: 2019-06-11 | End: 2019-06-11

## 2019-06-11 RX ORDER — WARFARIN SODIUM 5 MG/1
5 TABLET ORAL ONCE
Status: COMPLETED | OUTPATIENT
Start: 2019-06-11 | End: 2019-06-11

## 2019-06-11 RX ORDER — SOTALOL HYDROCHLORIDE 80 MG/1
80 TABLET ORAL 2 TIMES DAILY
Status: DISCONTINUED | OUTPATIENT
Start: 2019-06-11 | End: 2019-06-11

## 2019-06-11 RX ORDER — WARFARIN 7.5 MG/1
7.5 TABLET ORAL DAILY
Status: DISCONTINUED | OUTPATIENT
Start: 2019-06-12 | End: 2019-06-14

## 2019-06-11 RX ORDER — NAPROXEN SODIUM 220 MG/1
81 TABLET, FILM COATED ORAL DAILY
Status: DISCONTINUED | OUTPATIENT
Start: 2019-06-12 | End: 2019-06-14 | Stop reason: HOSPADM

## 2019-06-11 RX ORDER — SOTALOL HYDROCHLORIDE 80 MG/1
80 TABLET ORAL 2 TIMES DAILY
Status: CANCELLED | OUTPATIENT
Start: 2019-06-11

## 2019-06-11 RX ORDER — TAMSULOSIN HYDROCHLORIDE 0.4 MG/1
1 CAPSULE ORAL DAILY
Status: DISCONTINUED | OUTPATIENT
Start: 2019-06-12 | End: 2019-06-14 | Stop reason: HOSPADM

## 2019-06-11 RX ORDER — SODIUM CHLORIDE 0.9 % (FLUSH) 0.9 %
10 SYRINGE (ML) INJECTION
Status: DISCONTINUED | OUTPATIENT
Start: 2019-06-11 | End: 2019-06-14 | Stop reason: HOSPADM

## 2019-06-11 RX ORDER — PANTOPRAZOLE SODIUM 40 MG/1
40 TABLET, DELAYED RELEASE ORAL EVERY OTHER DAY
Status: DISCONTINUED | OUTPATIENT
Start: 2019-06-12 | End: 2019-06-14 | Stop reason: HOSPADM

## 2019-06-11 RX ORDER — SIMVASTATIN 40 MG/1
80 TABLET, FILM COATED ORAL NIGHTLY
Status: DISCONTINUED | OUTPATIENT
Start: 2019-06-11 | End: 2019-06-14 | Stop reason: HOSPADM

## 2019-06-11 RX ORDER — LEVOTHYROXINE SODIUM 112 UG/1
112 TABLET ORAL
Status: DISCONTINUED | OUTPATIENT
Start: 2019-06-12 | End: 2019-06-14 | Stop reason: HOSPADM

## 2019-06-11 RX ADMIN — SIMVASTATIN 80 MG: 40 TABLET, FILM COATED ORAL at 09:06

## 2019-06-11 RX ADMIN — MAGNESIUM SULFATE IN WATER 2 G: 40 INJECTION, SOLUTION INTRAVENOUS at 09:06

## 2019-06-11 RX ADMIN — WARFARIN SODIUM 5 MG: 5 TABLET ORAL at 09:06

## 2019-06-11 RX ADMIN — SOTALOL HYDROCHLORIDE 80 MG: 80 TABLET ORAL at 09:06

## 2019-06-11 NOTE — PLAN OF CARE
[facility] ONLY: Please call extension [phone] upon patient arrival to floor for Hospital Medicine admit team assignment and for additional admit orders for the patient. Do not page the attending, staff physician or Advanced Practice Provider with the patient on arrival (may not be in-house at the time of arrival). Call back or wait to leave beeper number when prompted.    Outside Transfer Acceptance Note       Patients name/MRN: Linda Skelton, MRN: [id number]     Referring Physician or Mid-Level provider giving report:   Contact number: Dr. Topete at [phone]    Referral Facility:     Date/Time of Acceptance: 6/11/2019 9:37 AM    Accepting Physician for admission to hospital: JEVON Madrigal MD ()    Accepting facility[facility]    Consulting Physicians from [facility] involved in case: Dr. Topete with Cardiology    Reason for transfer request: Initiation of Sotalol     Report from Physician/Mid-Level Provider/HPI: Mr. Skelton is a 83yo man with difficult to control PAfib despite taking attempting Teikosyn.  Dr. Topete with EP would like to have him admitted to 3rd floor cardiology in order to initiate Sotalol therapy.    VS: BP: ()/()   Arterial Line BP: ()/()     Past Medical History/Surgical History:   Past Medical History:   Diagnosis Date    ACS (acute coronary syndrome)     Acute coronary syndrome 2000    NSTEMI    Anticoagulant long-term use     Coumadin    Atrial fibrillation     Basal cell carcinoma excised     left scalp vertex    Bilateral leg edema 1/6/2014    BPH (benign prostatic hyperplasia)     CKD (chronic kidney disease)     Coronary artery disease     DVT of leg (deep venous thrombosis) 09/08/2014    On coumadin for years    Encounter for blood transfusion     Hyperlipidemia     Hypertension     Hypogonadism, male     Leg fracture, left     Melanoma 01/04/2017     in situ crown of scalp    MI (myocardial infarction)      Panhypopituitarism     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       Meds:     Current Facility-Administered Medications:     lidocaine HCL 2% jelly, , Topical (Top), Once, Octaviano Núñez Jr., MD    testosterone cypionate injection 100 mg, 100 mg, Intramuscular, Q14 Days, Anderson Jerome MD, 100 mg at 06/05/19 1027    Current Outpatient Medications:     aspirin 81 mg Tab, Take 81 mg by mouth every evening. 1 Tablet Oral Every night, Disp: , Rfl:     atenolol (TENORMIN) 25 MG tablet, TAKE 1 TABLET BY MOUTH TWICE DAILY, Disp: 180 tablet, Rfl: 1    ciclopirox (PENLAC) 8 % Soln, Apply daily to affected nail. Must remove and restart weekly, Disp: 1 Bottle, Rfl: 5    folic acid (FOLVITE) 1 MG tablet, TAKE ONE TABLET BY MOUTH ONCE DAILY, Disp: 90 tablet, Rfl: 3    ketoconazole (NIZORAL) 2 % cream, AAA bid to feet x 3 weeks, Disp: 60 g, Rfl: 3    levothyroxine (SYNTHROID) 112 MCG tablet, Take 1 tablet (112 mcg total) by mouth before breakfast., Disp: 90 tablet, Rfl: 3    mometasone (ELOCON) 0.1 % ointment, aaa qd- bid for severe areas.  Avoid use on face and groin, Disp: 60 g, Rfl: 1    NITROSTAT 0.4 mg SL tablet, DISSOLVE ONE TABLET UNDER THE TONGUE EVERY 5 MINUTES AS NEEDED FOR CHEST PAIN.  DO NOT EXCEED A TOTAL OF 3 DOSES IN 15 MINUTES NOW, Disp: 25 tablet, Rfl: 3    omeprazole (PRILOSEC) 20 MG capsule, Take 20 mg by mouth every other day., Disp: , Rfl:     simvastatin (ZOCOR) 80 MG tablet, Take 1 tablet (80 mg total) by mouth every evening., Disp: 90 tablet, Rfl: 3    tamsulosin (FLOMAX) 0.4 mg Cp24, Take 1 capsule (0.4 mg total) by mouth once daily., Disp: 90 capsule, Rfl: 3    testosterone cypionate (DEPOTESTOTERONE CYPIONATE) 100 mg/mL injection, Inject 1 mL (100 mg total) into the muscle every  14 (fourteen) days., Disp: 10 mL, Rfl: 2    warfarin (COUMADIN) 5 MG tablet, TAKE ONE & ONE-HALF TABLETS BY MOUTH ONCE DAILY EXCEPT  ON  TUESDAYS  TAKE  1  TABLET ON TUESDAY, Disp: 135 tablet, Rfl: 3      To Do List upon arrival:    1) Consult EP  2) Plan for Sotalol 80mg po BID x 6 doses  3) EKG 2 hours after each dose of Sotalol    JEVON Madrigal MD  Department of Hospital Medicine  Patient Flow Center/   [phone]

## 2019-06-11 NOTE — H&P
Hospital Medicine  History and Physical      Patient Name: Linda Skelton  MRN: [id number]  Date of Admission: 6/11/2019     Principal Problem: Paroxysmal atrial fibrillation     Chief Complaint     Malaise    History of Present Illness    Mr. 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 He has difficult to treat atrial fibrillation that has been refractory to Tikosyn and procedural intervention. He has episodes of AF that last 30-48 hours and are associated with malaise and relative hypotension from his home measurements. He follows with Dr. Topete in EP clinic, who after initially recommending amiodarone now has requested admission for sotalol loading due to concerns of amiodarone-associated adverse effects. He has been in his usual state of health, with about five episodes of AF this calendar year, most recently starting yesterday afternoon and continuing until now, associated primarily with fatigue and shortness of breath with mild palpitations. He attributes the episode to stopping his Tikosyn five days ago in anticipation of this admission. He denies any chest pain or edema. He is otherwise without complaint, with no recent fevers, chills, night sweats, nausea, vomiting, constipation, or diarrhea.    Review of Systems    Constitutional: +fatigue; Negative for chills, fever.   HENT: Negative for sore throat, trouble swallowing.    Eyes: Negative for photophobia, visual disturbance.   Respiratory: Negative for cough, shortness of breath.    Cardiovascular: + chest pain, +leg swelling (left sided, stable from DVT in teens) Negative for chest pain, leg swelling.   Gastrointestinal: Negative for abdominal pain, constipation, diarrhea, nausea, vomiting.   Endocrine: Negative for  cold intolerance, heat intolerance.   Genitourinary: Negative for dysuria, frequency.   Musculoskeletal: Negative for arthralgias, myalgias.   Skin: +chronic skin changes (stable), wound, erythema   Neurological: +lightheadedness, generalized weakness; Negative for dizziness, syncope, weakness   Psychiatric/Behavioral: Negative for confusion, hallucinations, anxiety    Past Medical History:   Diagnosis Date    ACS (acute coronary syndrome)     Acute coronary syndrome 2000    NSTEMI    Anticoagulant long-term use     Coumadin    Atrial fibrillation     Basal cell carcinoma excised     left scalp vertex    Bilateral leg edema 1/6/2014    BPH (benign prostatic hyperplasia)     CKD (chronic kidney disease)     Coronary artery disease     DVT of leg (deep venous thrombosis) 09/08/2014    On coumadin for years    Encounter for blood transfusion     Hyperlipidemia     Hypertension     Hypogonadism, male     Leg fracture, left     Melanoma 01/04/2017     in situ crown of scalp    MI (myocardial infarction)     Panhypopituitarism     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 CATH LAB    APPENDECTOMY      BASAL CELL CARCINOMA EXCISION  01/2008    BRAIN SURGERY      pituitary adenoma removed    CARDIAC CATHETERIZATION  05/09/2000    S/P stent to RCA,    CARDIAC CATHETERIZATION      stents placed    CARDIAC ELECTROPHYSIOLOGY STUDY AND ABLATION      CHOLECYSTECTOMY, LAPAROSCOPIC N/A 6/2/2018    Performed by Dickson Smith Jr., MD at [facility] OR    COLONOSCOPY N/A 1/7/2014    Performed by Dell Elena MD at [facility] ENDO (4TH FLR)    CORONARY ANGIOPLASTY  5/9/2000    RCA    CORONARY ANGIOPLASTY WITH STENT PLACEMENT      ESOPHAGEAL DILATION      EYE  SURGERY Bilateral     cataracts removed and new lenses placed     FRACTURE SURGERY Left     leg    HERNIA REPAIR      left femur surgery      pituitatry      hypophysectomy    REMOVAL-MASS Left 12/12/2013    Per[facility] by [facility] at [facility] OR [address] [place] [address]    SKIN BIOPSY      TONSILLECTOMY      VASCULAR SURGERY       Family History   Problem Relation Age of Onset    Cancer Father     Skin cancer Neg Hx     Melanoma Neg Hx     Heart disease Neg Hx     Hypertension Neg Hx      Social History     Socioeconomic History    Marital status:      Spouse name: Not on file    Number of children: Not on file    Years of education: Not on file    Highest education level: Not on file   Occupational History    Not on file   Social Needs    Financial resource strain: Not hard at all    Food insecurity:     Worry: Never true     Inability: Never true    Transportation needs:     Medical: No     Non-medical: No   Tobacco Use    Smoking status: Never Smoker    Smokeless tobacco: Never Used   Substance and Sexual Activity    Alcohol use: No     Frequency: Never    Drug use: No    Sexual activity: Not Currently     Partners: Female   Lifestyle    Physical activity:     Days per week: 0 days     Minutes per session: 0 min    Stress: Not at all   Relationships    Social connections:     Talks on phone: More than three times a week     Gets together: Once a week     Attends Church service: Not on file     Active member of club or organization: Yes     Attends meetings of clubs or organizations: More than 4 times per year     Relationship status:    Other Topics Concern    Not on file   Social History Narrative    retired [employer] .  Non smoker.      Medications  Scheduled Meds:   [START ON 6/12/2019] aspirin  81 mg Oral Daily    [START ON 6/12/2019] levothyroxine  112 mcg Oral Before breakfast    [START ON 6/12/2019] pantoprazole  40 mg Oral Daily    simvastatin  80 mg Oral  QHS    [START ON 6/12/2019] tamsulosin  1 capsule Oral Daily     Continuous Infusions:  PRN Meds:.acetaminophen, sodium chloride 0.9%    Allergies  Patient has no known allergies.    Physical Examination    Temp:  [98.5 °F (36.9 °C)]   Pulse:  [76]   Resp:  [18]   BP: (165)/(99)   SpO2:  [96 %]     Gen: NAD, conversant  Head: NC, AT  Eyes: PERRLA, EOMI  Throat: MMM, OP clear  CV: irregularly irregular rhythm, normal rate, no M/R/G, no peripheral edema, no JVD  Resp: CTAB, no increased work of breathing on room air  GI: Soft, NT, ND, +BS  Ext: MAEW, LLE erythema with no warmth or tenderness  Neuro: AAOx3, CN grossly intact, no focal neurologic deficits  Psychiatry: Normal mood, normal affect, no SI/HI    Assessment and Plan:    Paroxysmal atrial fibrillation  Long term (current) use of anticoagulants    - Suspect he is currently in atrial fibrillation given symptoms and examination  - S/p cryo-PVI by Dr. Ahuja 2/2016  - ECG ordered, if QTc acceptable will begin sotalol administration. 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 Testosterone administered q14 days as an outpatient    Coronary artery disease involving native coronary artery of native heart with angina pectoris     - Stable by symptoms  - History of NSTEMI with PCI to RCA in 2000, LVEF preserved following  - Continue home  aspirin and simvastatin    History of deep 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, 1997    - Stable chronic skin changes  - Continue warfarin and INR checks as above    Mixed hyperlipidemia    - Continue simvastatin    Gastroesophageal reflux disease without esophagitis    - Pantoprazole EOD per formulary swap    Benign non-nodular prostatic hyperplasia without lower urinary tract symptoms    - Stable  - Continue home tamsulosin    Diet: Cardiac  VTE PPX: Warfarin    Goals of care: Curative, full code      Andres Massey M.D.  Department of Hospital Medicine  [facility]  [phone] (pager)

## 2019-06-11 NOTE — NURSING
Patient admitted to CSU. Patient arrived to floor from doctors office, no evidence of distress; patient AAO x3 at this time. Patient placed on tele. Vital signs obtained. Patient voices no complaints at this time. Plan of care initiated with patient. Bed in lowest position, locked, SR up x2, call bell in reach. Will continue to monitor patient. MD's are aware that patient is here and are coming to admit the direct admit patient.  Will report of to night shift nurse.

## 2019-06-11 NOTE — TELEPHONE ENCOUNTER
Called admit at [phone] to have patient admitted for Sotalol Initiation as ordered by Dr Topete. I was advised to have patient arrive at patient registration for 3:30pm. Spoke with patient and instructions reviewed. He confirms that he has held Tikosyn for 5 days as instructed. He will check in at 3:30pm today. Dr Topete notified.

## 2019-06-12 LAB
ANION GAP SERPL CALC-SCNC: 5 MMOL/L (ref 8–16)
BASOPHILS # BLD AUTO: 0.01 K/UL (ref 0–0.2)
BASOPHILS NFR BLD: 0.2 % (ref 0–1.9)
BUN SERPL-MCNC: 20 MG/DL (ref 8–23)
CALCIUM SERPL-MCNC: 9.5 MG/DL (ref 8.7–10.5)
CHLORIDE SERPL-SCNC: 106 MMOL/L (ref 95–110)
CO2 SERPL-SCNC: 30 MMOL/L (ref 23–29)
CREAT SERPL-MCNC: 1.2 MG/DL (ref 0.5–1.4)
DIFFERENTIAL METHOD: ABNORMAL
EOSINOPHIL # BLD AUTO: 0 K/UL (ref 0–0.5)
EOSINOPHIL NFR BLD: 0.7 % (ref 0–8)
ERYTHROCYTE [DISTWIDTH] IN BLOOD BY AUTOMATED COUNT: 13.6 % (ref 11.5–14.5)
EST. GFR  (AFRICAN AMERICAN): >60 ML/MIN/1.73 M^2
EST. GFR  (NON AFRICAN AMERICAN): 55.2 ML/MIN/1.73 M^2
GLUCOSE SERPL-MCNC: 92 MG/DL (ref 70–110)
HCT VFR BLD AUTO: 42.4 % (ref 40–54)
HGB BLD-MCNC: 13.6 G/DL (ref 14–18)
IMM GRANULOCYTES # BLD AUTO: 0.01 K/UL (ref 0–0.04)
IMM GRANULOCYTES NFR BLD AUTO: 0.2 % (ref 0–0.5)
INR PPP: 2.2 (ref 0.8–1.2)
LYMPHOCYTES # BLD AUTO: 1.1 K/UL (ref 1–4.8)
LYMPHOCYTES NFR BLD: 25 % (ref 18–48)
MAGNESIUM SERPL-MCNC: 2.1 MG/DL (ref 1.6–2.6)
MCH RBC QN AUTO: 32.1 PG (ref 27–31)
MCHC RBC AUTO-ENTMCNC: 32.1 G/DL (ref 32–36)
MCV RBC AUTO: 100 FL (ref 82–98)
MONOCYTES # BLD AUTO: 0.9 K/UL (ref 0.3–1)
MONOCYTES NFR BLD: 21.8 % (ref 4–15)
NEUTROPHILS # BLD AUTO: 2.3 K/UL (ref 1.8–7.7)
NEUTROPHILS NFR BLD: 52.1 % (ref 38–73)
NRBC BLD-RTO: 0 /100 WBC
PLATELET # BLD AUTO: 123 K/UL (ref 150–350)
PMV BLD AUTO: 12.3 FL (ref 9.2–12.9)
POTASSIUM SERPL-SCNC: 5 MMOL/L (ref 3.5–5.1)
PROTHROMBIN TIME: 21.7 SEC (ref 9–12.5)
RBC # BLD AUTO: 4.24 M/UL (ref 4.6–6.2)
SODIUM SERPL-SCNC: 141 MMOL/L (ref 136–145)
WBC # BLD AUTO: 4.32 K/UL (ref 3.9–12.7)

## 2019-06-12 PROCEDURE — 93010 EKG 12-LEAD: ICD-10-PCS | Mod: HCNC,,, | Performed by: INTERNAL MEDICINE

## 2019-06-12 PROCEDURE — 25000003 PHARM REV CODE 250: Mod: HCNC | Performed by: INTERNAL MEDICINE

## 2019-06-12 PROCEDURE — 93010 ELECTROCARDIOGRAM REPORT: CPT | Mod: HCNC,,, | Performed by: INTERNAL MEDICINE

## 2019-06-12 PROCEDURE — 80048 BASIC METABOLIC PNL TOTAL CA: CPT | Mod: HCNC

## 2019-06-12 PROCEDURE — 63600175 PHARM REV CODE 636 W HCPCS: Mod: HCNC | Performed by: NURSE PRACTITIONER

## 2019-06-12 PROCEDURE — 99232 PR SUBSEQUENT HOSPITAL CARE,LEVL II: ICD-10-PCS | Mod: HCNC,,, | Performed by: NURSE PRACTITIONER

## 2019-06-12 PROCEDURE — 85610 PROTHROMBIN TIME: CPT | Mod: HCNC

## 2019-06-12 PROCEDURE — 36415 COLL VENOUS BLD VENIPUNCTURE: CPT | Mod: HCNC

## 2019-06-12 PROCEDURE — 25000003 PHARM REV CODE 250: Mod: HCNC | Performed by: STUDENT IN AN ORGANIZED HEALTH CARE EDUCATION/TRAINING PROGRAM

## 2019-06-12 PROCEDURE — 99232 SBSQ HOSP IP/OBS MODERATE 35: CPT | Mod: HCNC,,, | Performed by: NURSE PRACTITIONER

## 2019-06-12 PROCEDURE — 93005 ELECTROCARDIOGRAM TRACING: CPT | Mod: HCNC

## 2019-06-12 PROCEDURE — 83735 ASSAY OF MAGNESIUM: CPT | Mod: HCNC

## 2019-06-12 PROCEDURE — 20600001 HC STEP DOWN PRIVATE ROOM: Mod: HCNC

## 2019-06-12 PROCEDURE — 85025 COMPLETE CBC W/AUTO DIFF WBC: CPT | Mod: HCNC

## 2019-06-12 RX ORDER — SOTALOL HYDROCHLORIDE 80 MG/1
80 TABLET ORAL 2 TIMES DAILY
Qty: 60 TABLET | Refills: 2 | Status: SHIPPED | OUTPATIENT
Start: 2019-06-12 | End: 2019-06-12

## 2019-06-12 RX ORDER — PREDNISONE 5 MG/1
5 TABLET ORAL DAILY
Status: DISCONTINUED | OUTPATIENT
Start: 2019-06-12 | End: 2019-06-14 | Stop reason: HOSPADM

## 2019-06-12 RX ORDER — SOTALOL HYDROCHLORIDE 80 MG/1
80 TABLET ORAL 2 TIMES DAILY
Status: DISCONTINUED | OUTPATIENT
Start: 2019-06-12 | End: 2019-06-14 | Stop reason: HOSPADM

## 2019-06-12 RX ORDER — SODIUM CHLORIDE 0.9 % (FLUSH) 0.9 %
10 SYRINGE (ML) INJECTION
Status: DISCONTINUED | OUTPATIENT
Start: 2019-06-12 | End: 2019-06-14 | Stop reason: HOSPADM

## 2019-06-12 RX ORDER — PREDNISONE 5 MG/1
5 TABLET ORAL DAILY
COMMUNITY
End: 2020-04-28

## 2019-06-12 RX ORDER — SOTALOL HYDROCHLORIDE 80 MG/1
80 TABLET ORAL 2 TIMES DAILY
Qty: 60 TABLET | Refills: 2 | Status: SHIPPED | OUTPATIENT
Start: 2019-06-12 | End: 2019-07-10

## 2019-06-12 RX ADMIN — SOTALOL HYDROCHLORIDE 80 MG: 80 TABLET ORAL at 08:06

## 2019-06-12 RX ADMIN — LEVOTHYROXINE SODIUM 112 MCG: 112 TABLET ORAL at 05:06

## 2019-06-12 RX ADMIN — PANTOPRAZOLE SODIUM 40 MG: 40 TABLET, DELAYED RELEASE ORAL at 08:06

## 2019-06-12 RX ADMIN — ASPIRIN 81 MG CHEWABLE TABLET 81 MG: 81 TABLET CHEWABLE at 08:06

## 2019-06-12 RX ADMIN — FOLIC ACID 1000 MCG: 1 TABLET ORAL at 08:06

## 2019-06-12 RX ADMIN — TAMSULOSIN HYDROCHLORIDE 0.4 MG: 0.4 CAPSULE ORAL at 08:06

## 2019-06-12 RX ADMIN — SIMVASTATIN 80 MG: 40 TABLET, FILM COATED ORAL at 08:06

## 2019-06-12 RX ADMIN — WARFARIN SODIUM 7.5 MG: 7.5 TABLET ORAL at 04:06

## 2019-06-12 RX ADMIN — PREDNISONE 5 MG: 5 TABLET ORAL at 09:06

## 2019-06-12 NOTE — HOSPITAL COURSE
Admitted to hospital medicine for sotalol initiation.  QTC baseline 456, first dose ,  now.  EP following.  s/p EUGENE/ DCCV 6/13 as he had not cardioverted with medication. Now SR / PVC's.  Home with sotalol 80 mg bid.   at time of discharge.     Dispo; home no needs, f/u scheduled with Dr. Topete.

## 2019-06-12 NOTE — ASSESSMENT & PLAN NOTE
- Stable  - Most recent T4 on 4/3 was normal at 1.00  - Continue home levothyroxine  - Also continue home prednisone 5 mg daily. Testosterone administered q14 days as an outpatient

## 2019-06-12 NOTE — ASSESSMENT & PLAN NOTE
- Stable by symptoms  - History of NSTEMI with PCI to RCA in 2000, LVEF preserved following  - Continue home aspirin and simvastatin

## 2019-06-12 NOTE — SUBJECTIVE & OBJECTIVE
Past Medical History:   Diagnosis Date    ACS (acute coronary syndrome)     Acute coronary syndrome 2000    NSTEMI    Anticoagulant long-term use     Coumadin    Atrial fibrillation     Basal cell carcinoma excised     left scalp vertex    Bilateral leg edema 1/6/2014    BPH (benign prostatic hyperplasia)     CKD (chronic kidney disease)     Coronary artery disease     DVT of leg (deep venous thrombosis) 09/08/2014    On coumadin for years    Encounter for blood transfusion     Hyperlipidemia     Hypertension     Hypogonadism, male     Leg fracture, left     Melanoma 01/04/2017     in situ crown of scalp    MI (myocardial infarction)     Panhypopituitarism     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 CATH LAB    APPENDECTOMY      BASAL CELL CARCINOMA EXCISION  01/2008    BRAIN SURGERY      pituitary adenoma removed    CARDIAC CATHETERIZATION  05/09/2000    S/P stent to RCA,    CARDIAC CATHETERIZATION      stents placed    CARDIAC ELECTROPHYSIOLOGY STUDY AND ABLATION      CHOLECYSTECTOMY, LAPAROSCOPIC N/A 6/2/2018    Per[facility] by Dickson Smith Jr[facility] at [facility] OR    COLONOSCOPY N/A 1/7/2014    Performed by Dell Elena MD at [facility] ENDO (4TH FLR)    CORONARY ANGIOPLASTY  5/9/2000    RCA    CORONARY ANGIOPLASTY WITH STENT PLACEMENT      ESOPHAGEAL DILATION      EYE SURGERY Bilateral     cataracts removed and new lenses placed     FRACTURE SURGERY Left     leg    HERNIA REPAIR      left femur surgery      pituitatry      hypophysectomy    REMOVAL-MASS Left 12/12/2013    Performed by Burt Woodard MD at [facility] OR 2ND FLR    SKIN BIOPSY      TONSILLECTOMY      VASCULAR SURGERY         Review of patient's allergies  indicates:  No Known Allergies    No current facility-administered medications on file prior to encounter.      Current Outpatient Medications on File Prior to Encounter   Medication Sig    aspirin 81 mg Tab Take 81 mg by mouth every evening. 1 Tablet Oral Every night    atenolol (TENORMIN) 25 MG tablet TAKE 1 TABLET BY MOUTH TWICE DAILY    ciclopirox (PENLAC) 8 % Soln Apply daily to affected nail. Must remove and restart weekly    folic acid (FOLVITE) 1 MG tablet TAKE ONE TABLET BY MOUTH ONCE DAILY    ketoconazole (NIZORAL) 2 % cream AAA bid to feet x 3 weeks    levothyroxine (SYNTHROID) 112 MCG tablet Take 1 tablet (112 mcg total) by mouth before breakfast.    mometasone (ELOCON) 0.1 % ointment aaa qd- bid for severe areas.  Avoid use on face and groin    NITROSTAT 0.4 mg SL tablet DISSOLVE ONE TABLET UNDER THE TONGUE EVERY 5 MINUTES AS NEEDED FOR CHEST PAIN.  DO NOT EXCEED A TOTAL OF 3 DOSES IN 15 MINUTES NOW    omeprazole (PRILOSEC) 20 MG capsule Take 20 mg by mouth every other day.    simvastatin (ZOCOR) 80 MG tablet Take 1 tablet (80 mg total) by mouth every evening.    tamsulosin (FLOMAX) 0.4 mg Cp24 Take 1 capsule (0.4 mg total) by mouth once daily.    testosterone cypionate (DEPOTESTOTERONE CYPIONATE) 100 mg/mL injection Inject 1 mL (100 mg total) into the muscle every 14 (fourteen) days.    warfarin (COUMADIN) 5 MG tablet TAKE ONE & ONE-HALF TABLETS BY MOUTH ONCE DAILY EXCEPT  ON  TUESDAYS  TAKE  1  TABLET ON TUESDAY     Family History     Problem Relation (Age of Onset)    Cancer Father        Tobacco Use    Smoking status: Never Smoker    Smokeless tobacco: Never Used   Substance and Sexual Activity    Alcohol use: No     Frequency: Never    Drug use: No    Sexual activity: Not Currently     Partners: Female     Review of Systems   Constitution: Negative for chills, decreased appetite and diaphoresis.   HENT: Negative for congestion and ear discharge.    Eyes: Negative for blurred  vision and discharge.   Cardiovascular: Negative for chest pain, dyspnea on exertion, irregular heartbeat, leg swelling and paroxysmal nocturnal dyspnea.   Respiratory: Negative for cough, hemoptysis and shortness of breath.    Gastrointestinal: Negative for abdominal pain.     Objective:     Vital Signs (Most Recent):  Temp: 97.7 °F (36.5 °C) (06/11/19 2008)  Pulse: 110 (06/11/19 2008)  Resp: 18 (06/11/19 2008)  BP: 128/77 (06/11/19 2008)  SpO2: 97 % (06/11/19 2008) Vital Signs (24h Range):  Temp:  [97.7 °F (36.5 °C)-98.5 °F (36.9 °C)] 97.7 °F (36.5 °C)  Pulse:  [] 110  Resp:  [18] 18  SpO2:  [96 %-97 %] 97 %  BP: (128-165)/(77-99) 128/77       Weight: 111.5 kg (245 lb 13 oz)  Body mass index is 30.72 kg/m².    SpO2: 97 %  O2 Device (Oxygen Therapy): room air    Physical Exam   Constitutional: He is oriented to person, place, and time. He appears well-developed and well-nourished. No distress.   Eyes: Pupils are equal, round, and reactive to light. Conjunctivae are normal.   Neck: No tracheal deviation present. No thyromegaly present.   Cardiovascular: Normal rate, normal heart sounds and intact distal pulses. An irregularly irregular rhythm present. Exam reveals no gallop and no friction rub.   No murmur heard.  Pulmonary/Chest: Effort normal and breath sounds normal. No respiratory distress. He has no wheezes. He has no rales.   Abdominal: Soft. Bowel sounds are normal. He exhibits no distension. There is no tenderness.   Musculoskeletal: He exhibits no edema or deformity.   Neurological: He is alert and oriented to person, place, and time. No cranial nerve deficit. Coordination normal.   Skin: Skin is warm and dry. He is not diaphoretic.   Psychiatric: He has a normal mood and affect. His behavior is normal.       Significant Labs:   EP:   Recent Labs   Lab 06/11/19  1824      K 4.5      CO2 26   GLU 95   BUN 22   CREATININE 1.4   CALCIUM 10.3   ANIONGAP 10   ESTGFRAFRICA 53.0*   EGFRNONAA  45.8*   WBC 5.54   HGB 14.9   HCT 45.9   *   INR 2.4*       Significant Imaging: Echocardiogram: Transthoracic echo (TTE) complete (Cupid Only): No results found for this or any previous visit.

## 2019-06-12 NOTE — PROGRESS NOTES
[facility] Hospital Medicine  Progress Note    Patient Name: Linda Skelton  MRN: [id number]  Patient Class: IP- Inpatient   Admission Date: 6/11/2019  Length of Stay: 1 days  Attending Physician: Dickson Grissom MD  Primary Care Provider: Anderson Jerome MD    Hospital Medicine Team: Networked reference to record PCT  Radha Glasgow NP    Subjective:     Principal Problem:Paroxysmal atrial fibrillation    HPI:  Mr. 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 He has difficult to treat atrial fibrillation that has been refractory to Tikosyn and procedural intervention. He has episodes of AF that last 30-48 hours and are associated with malaise and relative hypotension from his home measurements. He follows with Dr. Topete in EP clinic, who after initially recommending amiodarone now has requested admission for sotalol loading due to concerns of amiodarone-associated adverse effects. He has been in his usual state of health, with about five episodes of AF this calendar year, most recently starting yesterday afternoon and continuing until now, associated primarily with fatigue and shortness of breath with mild palpitations. He attributes the episode to stopping his Tikosyn five days ago in anticipation of this admission. He denies any chest pain or edema. He is otherwise without complaint, with no recent fevers, chills, night sweats, nausea, vomiting, constipation, or diarrhea.    Hospital Course:  Admitted to hospital medicine for sotalol initiation.  QTC baseline 456, first dose .  EP following.  Planning for EUGENE/ DCCV in am if does not cardiovert with medication.     Dispo; home no needs Friday am after 6 doses of sotalol     Interval History:  plan of care reviewed and questions answered. No acute distress.     Review of Systems   Constitutional: Negative for activity change, appetite change, chills, fatigue and fever.   HENT: Negative for congestion, sore throat and trouble swallowing.    Eyes: Negative for redness and visual disturbance.   Respiratory: Negative for cough, shortness of breath and wheezing.    Cardiovascular: Negative for chest pain, palpitations and leg swelling.   Gastrointestinal: Negative for abdominal pain, constipation, diarrhea, nausea and vomiting.   Endocrine: Negative for cold intolerance and heat intolerance.   Genitourinary: Negative for decreased urine volume, difficulty urinating and hematuria.   Musculoskeletal: Negative for back pain and myalgias.   Skin: Negative for color change, rash and wound.   Allergic/Immunologic: Negative for immunocompromised state.   Neurological: Negative for dizziness, weakness, light-headedness, numbness and headaches.   Hematological: Does not bruise/bleed easily.   Psychiatric/Behavioral: Negative for agitation, decreased concentration and sleep disturbance. The patient is not nervous/anxious.      Objective:     Vital Signs (Most Recent):  Temp: 98.2 °F (36.8 °C) (06/12/19 1134)  Pulse: 75 (06/12/19 1200)  Resp: 18 (06/12/19 1134)  BP: 108/72 (06/12/19 1134)  SpO2: (!) 94 % (06/12/19 1134) Vital Signs (24h Range):  Temp:  [97.6 °F (36.4 °C)-98.5 °F (36.9 °C)] 98.2 °F (36.8 °C)  Pulse:  [] 75  Resp:  [16-20] 18  SpO2:  [94 %-98 %] 94 %  BP: (108-165)/(72-99) 108/72     Weight: 111.8 kg (246 lb 9.4 oz)  Body mass index is 30.82 kg/m².    Intake/Output Summary (Last 24 hours) at 6/12/2019 1508  Last data filed at 6/12/2019 0134  Gross per 24 hour   Intake 240 ml   Output 403 ml   Net -163 ml      Physical Exam   Constitutional: He is oriented to person, place, and time. He appears well-developed and well-nourished. No distress.   HENT:   Head: Normocephalic and atraumatic.   Right Ear:  External ear normal.   Left Ear: External ear normal.   Nose: Nose normal.   Eyes: Conjunctivae are normal.   Neck: Normal range of motion. Neck supple. No JVD present.   Cardiovascular: Normal rate, normal heart sounds and intact distal pulses. An irregularly irregular rhythm present.   No murmur heard.  Pulmonary/Chest: Effort normal and breath sounds normal. No respiratory distress. He has no wheezes. He has no rales.   Abdominal: Soft. Bowel sounds are normal. He exhibits no distension and no mass. There is no tenderness. There is no guarding.   Musculoskeletal: Normal range of motion. He exhibits no edema.   Neurological: He is alert and oriented to person, place, and time. No cranial nerve deficit or sensory deficit. Coordination normal.   Skin: Skin is warm and dry. No rash noted. He is not diaphoretic. No erythema.   Psychiatric: He has a normal mood and affect. His behavior is normal. Judgment and thought content normal.   Nursing note and vitals reviewed.      Significant Labs:   CMP:   Recent Labs   Lab 06/11/19  1824 06/12/19  0447    141   K 4.5 5.0    106   CO2 26 30*   GLU 95 92   BUN 22 20   CREATININE 1.4 1.2   CALCIUM 10.3 9.5   ANIONGAP 10 5*   EGFRNONAA 45.8* 55.2*     Lab Results   Component Value Date    WBC 4.32 06/12/2019    HGB 13.6 (L) 06/12/2019    HCT 42.4 06/12/2019     (H) 06/12/2019     (L) 06/12/2019          Significant Imaging: EKG: I have reviewed all pertinent results/findings within the past 24 hours and my personal findings are: afib, controlled rate, qtc as noted above    Assessment/Plan:      * Paroxysmal atrial fibrillation  Atrial Fibrillation controlled currently with Beta Blocker. [id number] score 6/ 9.8%. Anticoagulation indicated currently. Anticoagulation done with warfarin.   - sotalol initiation,  baseline, EP monitoring and dosing.  - continue home warfarin schedule with INR checks daily, INR 2.2      Gastroesophageal reflux disease  without esophagitis  - Pantoprazole QOD per formulary swap      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  - Stable  - Most recent T4 on 4/3 was normal at 1.00  - Continue home levothyroxine      Long term (current) use of anticoagulants  - warfarin       Benign non-nodular prostatic hyperplasia without lower urinary tract symptoms  - Stable  - Continue home tamsulosin      Mixed hyperlipidemia  Continue simvastatin      History of deep 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, 1997  - Stable chronic skin changes  - Continue warfarin and INR checks as above      Coronary artery disease involving native coronary artery of native heart with angina pectoris hx NSTEMI with PCI to RCA 2000  - Stable by symptoms  - History of NSTEMI with PCI to RCA in 2000, LVEF preserved following  - Continue home aspirin and simvastatin      Chronic kidney disease, stage 3  - Will follow daily and replace electrolytes as above  - CR 1.2    Panhypopituitarism thyroid/prednisone/testosterone  - Stable  - Most recent T4 on 4/3 was normal at 1.00  - Continue home levothyroxine  - Also continue home prednisone 5 mg daily. Testosterone administered q14 days as an outpatient        VTE Risk Mitigation (From admission, onward)        Ordered     warfarin (COUMADIN) tablet 7.5 mg  Daily      06/11/19 1850     Reason for No Pharmacological VTE Prophylaxis  Once      06/11/19 1818     IP VTE HIGH RISK PATIENT  Once      06/11/19 1818              Radha Glasgow NP  Department of Hospital Medicine   [facility]

## 2019-06-12 NOTE — SUBJECTIVE & OBJECTIVE
Interval History: No issues overnight, QTc 455 after first dose sotalol, still in Afib    Review of Systems   Constitution: Negative for chills, decreased appetite and diaphoresis.   HENT: Negative for congestion and ear discharge.    Eyes: Negative for blurred vision and discharge.   Cardiovascular: Negative for chest pain, dyspnea on exertion, irregular heartbeat, leg swelling and paroxysmal nocturnal dyspnea.   Respiratory: Negative for cough, hemoptysis and shortness of breath.    Gastrointestinal: Negative for abdominal pain.     Objective:     Vital Signs (Most Recent):  Temp: 97.6 °F (36.4 °C) (06/12/19 0827)  Pulse: 96 (06/12/19 0827)  Resp: 16 (06/12/19 0827)  BP: (!) 135/90 (06/12/19 0827)  SpO2: (!) 94 % (06/12/19 0827) Vital Signs (24h Range):  Temp:  [97.6 °F (36.4 °C)-98.5 °F (36.9 °C)] 97.6 °F (36.4 °C)  Pulse:  [] 96  Resp:  [16-20] 16  SpO2:  [94 %-98 %] 94 %  BP: (124-165)/(77-99) 135/90     Weight: 111.8 kg (246 lb 9.4 oz)  Body mass index is 30.82 kg/m².     SpO2: (!) 94 %  O2 Device (Oxygen Therapy): room air    Physical Exam   Constitutional: He is oriented to person, place, and time. He appears well-developed and well-nourished. No distress.   Eyes: Pupils are equal, round, and reactive to light. Conjunctivae are normal.   Neck: No tracheal deviation present. No thyromegaly present.   Cardiovascular: Normal rate, normal heart sounds and intact distal pulses. An irregularly irregular rhythm present. Exam reveals no gallop and no friction rub.   No murmur heard.  Pulmonary/Chest: Effort normal and breath sounds normal. No respiratory distress. He has no wheezes. He has no rales.   Abdominal: Soft. Bowel sounds are normal. He exhibits no distension. There is no tenderness.   Musculoskeletal: He exhibits no edema or deformity.   Neurological: He is alert and oriented to person, place, and time. No cranial nerve deficit. Coordination normal.   Skin: Skin is warm and dry. He is not  diaphoretic.   Psychiatric: He has a normal mood and affect. His behavior is normal.       Significant Labs:   EP:   Recent Labs   Lab 06/11/19  1824 06/12/19  0447    141   K 4.5 5.0    106   CO2 26 30*   GLU 95 92   BUN 22 20   CREATININE 1.4 1.2   CALCIUM 10.3 9.5   ANIONGAP 10 5*   ESTGFRAFRICA 53.0* >60.0   EGFRNONAA 45.8* 55.2*   WBC 5.54 4.32   HGB 14.9 13.6*   HCT 45.9 42.4   * 123*   INR 2.4* 2.2*       Significant Imaging: Echocardiogram:   2D echo with color flow doppler:   Results for orders placed or performed during the hospital encounter of 05/30/18   2D echo with color flow doppler   Result Value Ref Range    QEF 60 55 - 65    Mitral Valve Regurgitation TRIVIAL     Diastolic Dysfunction No     Est. PA Systolic Pressure 8.86     Tricuspid Valve Regurgitation TRIVIAL

## 2019-06-12 NOTE — PLAN OF CARE
Problem: Adult Inpatient Plan of Care  Goal: Plan of Care Review  Outcome: Ongoing (interventions implemented as appropriate)  Pt free of falls/trauma/injuries.  Denies c/o SOB, CP, or discomfort.  Generalized skin remains CDI; generalized edema noted.  Pt continues to take sotalol 80mg bid and got new orders for Prednisone 5mg.Pt also received n/o for a EUGENE and a possible Cardioversion.  Pt tolerating plan of care.

## 2019-06-12 NOTE — PROGRESS NOTES
[facility]  Cardiac Electrophysiology  Progress Note    Admission Date: 6/11/2019  Code Status: Full Code   Attending Physician: Dickson Grissom MD   Expected Discharge Date:   Principal Problem:Paroxysmal atrial fibrillation    Subjective:     Interval History: No issues overnight, QTc 455 after first dose sotalol, still in Afib    Review of Systems   Constitution: Negative for chills, decreased appetite and diaphoresis.   HENT: Negative for congestion and ear discharge.    Eyes: Negative for blurred vision and discharge.   Cardiovascular: Negative for chest pain, dyspnea on exertion, irregular heartbeat, leg swelling and paroxysmal nocturnal dyspnea.   Respiratory: Negative for cough, hemoptysis and shortness of breath.    Gastrointestinal: Negative for abdominal pain.     Objective:     Vital Signs (Most Recent):  Temp: 97.6 °F (36.4 °C) (06/12/19 0827)  Pulse: 96 (06/12/19 0827)  Resp: 16 (06/12/19 0827)  BP: (!) 135/90 (06/12/19 0827)  SpO2: (!) 94 % (06/12/19 0827) Vital Signs (24h Range):  Temp:  [97.6 °F (36.4 °C)-98.5 °F (36.9 °C)] 97.6 °F (36.4 °C)  Pulse:  [] 96  Resp:  [16-20] 16  SpO2:  [94 %-98 %] 94 %  BP: (124-165)/(77-99) 135/90     Weight: 111.8 kg (246 lb 9.4 oz)  Body mass index is 30.82 kg/m².     SpO2: (!) 94 %  O2 Device (Oxygen Therapy): room air    Physical Exam   Constitutional: He is oriented to person, place, and time. He appears well-developed and well-nourished. No distress.   Eyes: Pupils are equal, round, and reactive to light. Conjunctivae are normal.   Neck: No tracheal deviation present. No thyromegaly present.   Cardiovascular: Normal rate, normal heart sounds and intact distal pulses. An irregularly irregular rhythm present. Exam reveals no gallop and no friction rub.   No murmur heard.  Pulmonary/Chest: Effort normal and breath sounds normal. No respiratory distress. He has no wheezes. He has no rales.   Abdominal: Soft. Bowel sounds are normal. He  exhibits no distension. There is no tenderness.   Musculoskeletal: He exhibits no edema or deformity.   Neurological: He is alert and oriented to person, place, and time. No cranial nerve deficit. Coordination normal.   Skin: Skin is warm and dry. He is not diaphoretic.   Psychiatric: He has a normal mood and affect. His behavior is normal.       Significant Labs:   EP:   Recent Labs   Lab 06/11/19  1824 06/12/19  0447    141   K 4.5 5.0    106   CO2 26 30*   GLU 95 92   BUN 22 20   CREATININE 1.4 1.2   CALCIUM 10.3 9.5   ANIONGAP 10 5*   ESTGFRAFRICA 53.0* >60.0   EGFRNONAA 45.8* 55.2*   WBC 5.54 4.32   HGB 14.9 13.6*   HCT 45.9 42.4   * 123*   INR 2.4* 2.2*       Significant Imaging: Echocardiogram:   2D echo with color flow doppler:   Results for orders placed or performed during the hospital encounter of 05/30/18   2D echo with color flow doppler   Result Value Ref Range    QEF 60 55 - 65    Mitral Valve Regurgitation TRIVIAL     Diastolic Dysfunction No     Est. PA Systolic Pressure 8.86     Tricuspid Valve Regurgitation TRIVIAL      Assessment and Plan:     * Paroxysmal atrial fibrillation  - Patient seen and examined  - Continue 80 BID Sotalol with 2 hr EKGs post dose  - Creatinine 1.2 CrCl  55  - NPO at midnight for EUGENE/DCCV tommorow AM if he does not convert to NSR tonight  - Continue Coumadin (therapeutic 2.5)        Lizzy Enriquez MD  Cardiac Electrophysiology  [facility]

## 2019-06-12 NOTE — SUBJECTIVE & OBJECTIVE
Interval History: plan of care reviewed and questions answered. No acute distress.     Review of Systems   Constitutional: Negative for activity change, appetite change, chills, fatigue and fever.   HENT: Negative for congestion, sore throat and trouble swallowing.    Eyes: Negative for redness and visual disturbance.   Respiratory: Negative for cough, shortness of breath and wheezing.    Cardiovascular: Negative for chest pain, palpitations and leg swelling.   Gastrointestinal: Negative for abdominal pain, constipation, diarrhea, nausea and vomiting.   Endocrine: Negative for cold intolerance and heat intolerance.   Genitourinary: Negative for decreased urine volume, difficulty urinating and hematuria.   Musculoskeletal: Negative for back pain and myalgias.   Skin: Negative for color change, rash and wound.   Allergic/Immunologic: Negative for immunocompromised state.   Neurological: Negative for dizziness, weakness, light-headedness, numbness and headaches.   Hematological: Does not bruise/bleed easily.   Psychiatric/Behavioral: Negative for agitation, decreased concentration and sleep disturbance. The patient is not nervous/anxious.      Objective:     Vital Signs (Most Recent):  Temp: 98.2 °F (36.8 °C) (06/12/19 1134)  Pulse: 75 (06/12/19 1200)  Resp: 18 (06/12/19 1134)  BP: 108/72 (06/12/19 1134)  SpO2: (!) 94 % (06/12/19 1134) Vital Signs (24h Range):  Temp:  [97.6 °F (36.4 °C)-98.5 °F (36.9 °C)] 98.2 °F (36.8 °C)  Pulse:  [] 75  Resp:  [16-20] 18  SpO2:  [94 %-98 %] 94 %  BP: (108-165)/(72-99) 108/72     Weight: 111.8 kg (246 lb 9.4 oz)  Body mass index is 30.82 kg/m².    Intake/Output Summary (Last 24 hours) at 6/12/2019 1508  Last data filed at 6/12/2019 0134  Gross per 24 hour   Intake 240 ml   Output 403 ml   Net -163 ml      Physical Exam   Constitutional: He is oriented to person, place, and time. He appears well-developed and well-nourished. No distress.   HENT:   Head: Normocephalic and  atraumatic.   Right Ear: External ear normal.   Left Ear: External ear normal.   Nose: Nose normal.   Eyes: Conjunctivae are normal.   Neck: Normal range of motion. Neck supple. No JVD present.   Cardiovascular: Normal rate, normal heart sounds and intact distal pulses. An irregularly irregular rhythm present.   No murmur heard.  Pulmonary/Chest: Effort normal and breath sounds normal. No respiratory distress. He has no wheezes. He has no rales.   Abdominal: Soft. Bowel sounds are normal. He exhibits no distension and no mass. There is no tenderness. There is no guarding.   Musculoskeletal: Normal range of motion. He exhibits no edema.   Neurological: He is alert and oriented to person, place, and time. No cranial nerve deficit or sensory deficit. Coordination normal.   Skin: Skin is warm and dry. No rash noted. He is not diaphoretic. No erythema.   Psychiatric: He has a normal mood and affect. His behavior is normal. Judgment and thought content normal.   Nursing note and vitals reviewed.      Significant Labs:   CMP:   Recent Labs   Lab 06/11/19  1824 06/12/19  0447    141   K 4.5 5.0    106   CO2 26 30*   GLU 95 92   BUN 22 20   CREATININE 1.4 1.2   CALCIUM 10.3 9.5   ANIONGAP 10 5*   EGFRNONAA 45.8* 55.2*     Lab Results   Component Value Date    WBC 4.32 06/12/2019    HGB 13.6 (L) 06/12/2019    HCT 42.4 06/12/2019     (H) 06/12/2019     (L) 06/12/2019          Significant Imaging: EKG: I have reviewed all pertinent results/findings within the past 24 hours and my personal findings are: afib, controlled rate, qtc as noted above

## 2019-06-12 NOTE — HPI
Mr. 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 He has difficult to treat atrial fibrillation that has been refractory to Tikosyn and procedural intervention. He has episodes of AF that last 30-48 hours and are associated with malaise and relative hypotension from his home measurements. He follows with Dr. Topete in EP clinic, who after initially recommending amiodarone now has requested admission for sotalol loading due to concerns of amiodarone-associated adverse effects. He has been in his usual state of health, with about five episodes of AF this calendar year, most recently starting yesterday afternoon and continuing until now, associated primarily with fatigue and shortness of breath with mild palpitations. He attributes the episode to stopping his Tikosyn five days ago in anticipation of this admission. He denies any chest pain or edema. He is otherwise without complaint, with no recent fevers, chills, night sweats, nausea, vomiting, constipation, or diarrhea.

## 2019-06-12 NOTE — ASSESSMENT & PLAN NOTE
- Patient seen and examined  - Continue 80 BID Sotalol with 2 hr EKGs post dose  - Creatinine 1.2 CrCl  55  - NPO at midnight for EUGENE/DCCV tommorow AM if he does not convert to NSR tonight  - Continue Coumadin (therapeutic 2.5)

## 2019-06-12 NOTE — ASSESSMENT & PLAN NOTE
- Patient seen and examined  - Admitted for initiation of sotalol will give one dose 80 mg tonight at 9 pm and get EKG at 11pm for QTc monitoring  - Will reassess with BMP in the morning the renal function and discuss with staff  - Creatinine 1.4 Cr Cl 45.8  - NPO at midnight  - Continue Coumadin (therapeutic 2.5)

## 2019-06-12 NOTE — PLAN OF CARE
Problem: Adult Inpatient Plan of Care  Goal: Plan of Care Review  Outcome: Ongoing (interventions implemented as appropriate)  Sotalol given per MD orders. EKG performed 2 hours after dose given. Mg 2 grams IVPB given per MD orders. Coumadin given per MD orders. VSS. Pt denies SOB or chest pain. Pt remain on tele. Fall precautions maintained. Pt free of falls and injuries. POC discussed with patient/family, verbalized understanding. Questions answered, no distress at present.

## 2019-06-12 NOTE — PLAN OF CARE
06/12/19 1300   Discharge Assessment   Assessment Type Discharge Planning Assessment   Confirmed/corrected address and phone number on facesheet? Yes   Assessment information obtained from? Patient;Medical Record   Expected Length of Stay (days) 3   Communicated expected length of stay with patient/caregiver yes   Prior to hospitilization cognitive status: Alert/Oriented   Prior to hospitalization functional status: Independent   Current cognitive status: Alert/Oriented   Current Functional Status: Independent   Lives With alone   Able to Return to Prior Arrangements yes   Is patient able to care for self after discharge? Yes   Patient's perception of discharge disposition home or selfcare   Readmission Within the Last 30 Days no previous admission in last 30 days   Patient currently being followed by outpatient case management? No   Patient currently receives any other outside agency services? No   Equipment Currently Used at Home none   Do you have any problems affording any of your prescribed medications? TBD   Is the patient taking medications as prescribed? yes   Does the patient have transportation home? Yes   Transportation Anticipated family or friend will provide   Does the patient receive services at the Coumadin Clinic? Yes   Discharge Plan A Home   DME Needed Upon Discharge  none   Patient/Family in Agreement with Plan yes   Admitted with A-Fib for Sotolol initiation. Lives alone and is independent in his ADLs. Plan is to DC home. No DC needs identified.

## 2019-06-12 NOTE — CONSULTS
[facility]  Cardiac Electrophysiology  Consult Note    Admission Date: 6/11/2019  Code Status: Full Code   Attending Provider: Dickson Grissom MD  Consulting Provider: Lizzy Enriquez MD  Principal Problem:Paroxysmal atrial fibrillation    Inpatient consult to Electrophysiology  Consult performed by: Lizzy Enriquez MD  Consult ordered by: Andres Massey MD        Subjective:     Chief Complaint:  Tikosyn initiation for AFib     HPI:   Mr. Skelton is a 85 yo M with a history of paroxysmal atrial fibrillation CHADSVASC 4 s/p cryo-PVI by Dr. Ahuja 2/2016, CAD s/p RCA PCI 2000 , CKD 3, EF 60% 2018 and left leg DVT. He presents for initiation of Sotalol therapy inpatient. He was seen in clinic by Dr Topete in May due to recurrent symptomatic paroxysms of AF despite tikosyn therapy. He elected not to take amiodarone due to possible side effects.    My interpretation of today's in  ECG is atrial fibrillation at a rate of 99 bpm and IVCD with QRS duration of 122msec and QTc of 456msec.  Cr 1.4, CrCl 45.8 (Cr 4/19 1.3 CrCl 50).      Past Medical History:   Diagnosis Date    ACS (acute coronary syndrome)     Acute coronary syndrome 2000    NSTEMI    Anticoagulant long-term use     Coumadin    Atrial fibrillation     Basal cell carcinoma excised     left scalp vertex    Bilateral leg edema 1/6/2014    BPH (benign prostatic hyperplasia)     CKD (chronic kidney disease)     Coronary artery disease     DVT of leg (deep venous thrombosis) 09/08/2014    On coumadin for years    Encounter for blood transfusion     Hyperlipidemia     Hypertension     Hypogonadism, male     Leg fracture, left     Melanoma 01/04/2017     in situ crown of scalp    MI (myocardial infarction)     Panhypopituitarism     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    Squamous Cell Carcinoma excised     left posterior scalp    Thyroid disease         Past Surgical History:   Procedure Laterality Date    ABLATION N/A 2/25/2016    Performed by Boyd [facility]ja [facility] at [facility] CATH LAB    APPENDECTOMY      BASAL CELL CARCINOMA EXCISION  01/2008    BRAIN SURGERY      pituitary adenoma removed    CARDIAC CATHETERIZATION  05/09/2000    S/P stent to RCA,    CARDIAC CATHETERIZATION      stents placed    CARDIAC ELECTROPHYSIOLOGY STUDY AND ABLATION      CHOLECYSTECTOMY, LAPAROSCOPIC N/A 6/2/2018    Per[facility] Dickson Smith Jr[facility] at [facility] OR    COLONOSCOPY N/A 1/7/2014    Performed by Dell Elena MD at [facility] ENDO (4TH FLR)    CORONARY ANGIOPLASTY  5/9/2000    RCA    CORONARY ANGIOPLASTY WITH STENT PLACEMENT      ESOPHAGEAL DILATION      EYE SURGERY Bilateral     cataracts removed and new lenses placed     FRACTURE SURGERY Left     leg    HERNIA REPAIR      left femur surgery      pituitatry      hypophysectomy    REMOVAL-MASS Left 12/12/2013    Performed by Burt Woodard MD at [facility] OR 2ND FLR    SKIN BIOPSY      TONSILLECTOMY      VASCULAR SURGERY         Review of patient's allergies indicates:  No Known Allergies    No current facility-administered medications on file prior to encounter.      Current Outpatient Medications on File Prior to Encounter   Medication Sig    aspirin 81 mg Tab Take 81 mg by mouth every evening. 1 Tablet Oral Every night    atenolol (TENORMIN) 25 MG tablet TAKE 1 TABLET BY MOUTH TWICE DAILY    ciclopirox (PENLAC) 8 % Soln Apply daily to affected nail. Must remove and restart weekly    folic acid (FOLVITE) 1 MG tablet TAKE ONE TABLET BY MOUTH ONCE DAILY    ketoconazole (NIZORAL) 2 % cream AAA bid to feet x 3 weeks    levothyroxine (SYNTHROID) 112 MCG tablet Take 1 tablet (112 mcg total) by mouth before breakfast.    mometasone (ELOCON) 0.1 % ointment aaa qd- bid for severe areas.  Avoid use on face and groin    NITROSTAT 0.4 mg SL tablet DISSOLVE ONE TABLET UNDER THE TONGUE EVERY 5 MINUTES AS NEEDED  FOR CHEST PAIN.  DO NOT EXCEED A TOTAL OF 3 DOSES IN 15 MINUTES NOW    omeprazole (PRILOSEC) 20 MG capsule Take 20 mg by mouth every other day.    simvastatin (ZOCOR) 80 MG tablet Take 1 tablet (80 mg total) by mouth every evening.    tamsulosin (FLOMAX) 0.4 mg Cp24 Take 1 capsule (0.4 mg total) by mouth once daily.    testosterone cypionate (DEPOTESTOTERONE CYPIONATE) 100 mg/mL injection Inject 1 mL (100 mg total) into the muscle every 14 (fourteen) days.    warfarin (COUMADIN) 5 MG tablet TAKE ONE & ONE-HALF TABLETS BY MOUTH ONCE DAILY EXCEPT  ON  TUESDAYS  TAKE  1  TABLET ON TUESDAY     Family History     Problem Relation (Age of Onset)    Cancer Father        Tobacco Use    Smoking status: Never Smoker    Smokeless tobacco: Never Used   Substance and Sexual Activity    Alcohol use: No     Frequency: Never    Drug use: No    Sexual activity: Not Currently     Partners: Female     Review of Systems   Constitution: Negative for chills, decreased appetite and diaphoresis.   HENT: Negative for congestion and ear discharge.    Eyes: Negative for blurred vision and discharge.   Cardiovascular: Negative for chest pain, dyspnea on exertion, irregular heartbeat, leg swelling and paroxysmal nocturnal dyspnea.   Respiratory: Negative for cough, hemoptysis and shortness of breath.    Gastrointestinal: Negative for abdominal pain.     Objective:     Vital Signs (Most Recent):  Temp: 97.7 °F (36.5 °C) (06/11/19 2008)  Pulse: 110 (06/11/19 2008)  Resp: 18 (06/11/19 2008)  BP: 128/77 (06/11/19 2008)  SpO2: 97 % (06/11/19 2008) Vital Signs (24h Range):  Temp:  [97.7 °F (36.5 °C)-98.5 °F (36.9 °C)] 97.7 °F (36.5 °C)  Pulse:  [] 110  Resp:  [18] 18  SpO2:  [96 %-97 %] 97 %  BP: (128-165)/(77-99) 128/77       Weight: 111.5 kg (245 lb 13 oz)  Body mass index is 30.72 kg/m².    SpO2: 97 %  O2 Device (Oxygen Therapy): room air    Physical Exam   Constitutional: He is oriented to person, place, and time. He appears  well-developed and well-nourished. No distress.   Eyes: Pupils are equal, round, and reactive to light. Conjunctivae are normal.   Neck: No tracheal deviation present. No thyromegaly present.   Cardiovascular: Normal rate, normal heart sounds and intact distal pulses. An irregularly irregular rhythm present. Exam reveals no gallop and no friction rub.   No murmur heard.  Pulmonary/Chest: Effort normal and breath sounds normal. No respiratory distress. He has no wheezes. He has no rales.   Abdominal: Soft. Bowel sounds are normal. He exhibits no distension. There is no tenderness.   Musculoskeletal: He exhibits no edema or deformity.   Neurological: He is alert and oriented to person, place, and time. No cranial nerve deficit. Coordination normal.   Skin: Skin is warm and dry. He is not diaphoretic.   Psychiatric: He has a normal mood and affect. His behavior is normal.       Significant Labs:   EP:   Recent Labs   Lab 06/11/19  1824      K 4.5      CO2 26   GLU 95   BUN 22   CREATININE 1.4   CALCIUM 10.3   ANIONGAP 10   ESTGFRAFRICA 53.0*   EGFRNONAA 45.8*   WBC 5.54   HGB 14.9   HCT 45.9   *   INR 2.4*       Significant Imaging: Echocardiogram: Transthoracic echo (TTE) complete (Cupid Only): No results found for this or any previous visit.              Assessment and Plan:     * Paroxysmal atrial fibrillation  - Patient seen and examined  - Admitted for initiation of sotalol will start 80 mg BID tonight at 9 pm and get EKG at 11pm for QTc monitoring and follow every sotalol dose with a 2 hr EKG.  - Creatinine 1.2 Cr Cl 55  - Continue Coumadin (therapeutic 2.5)      Thank you for your consult. I will follow-up with patient. Please contact us if you have any additional questions.    Lizzy Enriquez MD  Cardiac Electrophysiology  [facility]

## 2019-06-12 NOTE — ASSESSMENT & PLAN NOTE
Atrial Fibrillation controlled currently with Beta Blocker. [id number] score 6/ 9.8%. Anticoagulation indicated currently. Anticoagulation done with warfarin.   - sotalol initiation,  baseline, EP monitoring and dosing.  - continue home warfarin schedule with INR checks daily, INR 2.2

## 2019-06-12 NOTE — HPI
Mr. Skelton is a 83 yo M with a history of paroxysmal atrial fibrillation CHADSVASC 4 s/p cryo-PVI by Dr. Ahuja 2/2016, CAD s/p RCA PCI 2000 , CKD 3, EF 60% 2018 and left leg DVT. He presents for initiation of Sotalol therapy inpatient. He was seen in clinic by Dr Topete in May due to recurrent symptomatic paroxysms of AF despite tikosyn therapy. He elected not to take amiodarone due to possible side effects.    My interpretation of today's in  ECG is atrial fibrillation at a rate of 99 bpm and IVCD with QRS duration of 122msec and QTc of 456msec.  Cr 1.4, CrCl 45.8 (Cr 4/19 1.3 CrCl 50).

## 2019-06-13 ENCOUNTER — ANESTHESIA EVENT (OUTPATIENT)
Dept: MEDSURG UNIT | Facility: HOSPITAL | Age: 84
DRG: 309 | End: 2019-06-13
Payer: MEDICARE

## 2019-06-13 ENCOUNTER — ANESTHESIA (OUTPATIENT)
Dept: MEDSURG UNIT | Facility: HOSPITAL | Age: 84
DRG: 309 | End: 2019-06-13
Payer: MEDICARE

## 2019-06-13 ENCOUNTER — TELEPHONE (OUTPATIENT)
Dept: HEMATOLOGY/ONCOLOGY | Facility: CLINIC | Age: 84
End: 2019-06-13

## 2019-06-13 PROBLEM — E83.42 HYPOMAGNESEMIA: Status: ACTIVE | Noted: 2019-06-13

## 2019-06-13 LAB
ANION GAP SERPL CALC-SCNC: 9 MMOL/L (ref 8–16)
ASCENDING AORTA: 3.4 CM
BSA FOR ECHO PROCEDURE: 2.43 M2
BUN SERPL-MCNC: 23 MG/DL (ref 8–23)
CALCIUM SERPL-MCNC: 8.9 MG/DL (ref 8.7–10.5)
CHLORIDE SERPL-SCNC: 105 MMOL/L (ref 95–110)
CO2 SERPL-SCNC: 25 MMOL/L (ref 23–29)
CREAT SERPL-MCNC: 1.3 MG/DL (ref 0.5–1.4)
EST. GFR  (AFRICAN AMERICAN): 57.9 ML/MIN/1.73 M^2
EST. GFR  (NON AFRICAN AMERICAN): 50.1 ML/MIN/1.73 M^2
GLUCOSE SERPL-MCNC: 101 MG/DL (ref 70–110)
INR PPP: 2.1 (ref 0.8–1.2)
MAGNESIUM SERPL-MCNC: 1.9 MG/DL (ref 1.6–2.6)
POTASSIUM SERPL-SCNC: 4.6 MMOL/L (ref 3.5–5.1)
PROTHROMBIN TIME: 19.9 SEC (ref 9–12.5)
SINUS: 3.6 CM
SODIUM SERPL-SCNC: 139 MMOL/L (ref 136–145)
STJ: 3.3 CM

## 2019-06-13 PROCEDURE — 93005 ELECTROCARDIOGRAM TRACING: CPT | Mod: HCNC

## 2019-06-13 PROCEDURE — 93010 ELECTROCARDIOGRAM REPORT: CPT | Mod: HCNC,76,, | Performed by: INTERNAL MEDICINE

## 2019-06-13 PROCEDURE — 20600001 HC STEP DOWN PRIVATE ROOM: Mod: HCNC

## 2019-06-13 PROCEDURE — 93010 EKG 12-LEAD: ICD-10-PCS | Mod: HCNC,,, | Performed by: INTERNAL MEDICINE

## 2019-06-13 PROCEDURE — 25000003 PHARM REV CODE 250: Mod: HCNC | Performed by: NURSE PRACTITIONER

## 2019-06-13 PROCEDURE — 36415 COLL VENOUS BLD VENIPUNCTURE: CPT | Mod: HCNC

## 2019-06-13 PROCEDURE — 99232 SBSQ HOSP IP/OBS MODERATE 35: CPT | Mod: HCNC,,, | Performed by: NURSE PRACTITIONER

## 2019-06-13 PROCEDURE — D9220A PRA ANESTHESIA: ICD-10-PCS | Mod: HCNC,ANES,, | Performed by: ANESTHESIOLOGY

## 2019-06-13 PROCEDURE — D9220A PRA ANESTHESIA: Mod: HCNC,ANES,, | Performed by: ANESTHESIOLOGY

## 2019-06-13 PROCEDURE — 63600175 PHARM REV CODE 636 W HCPCS: Mod: HCNC | Performed by: NURSE ANESTHETIST, CERTIFIED REGISTERED

## 2019-06-13 PROCEDURE — 99232 PR SUBSEQUENT HOSPITAL CARE,LEVL II: ICD-10-PCS | Mod: HCNC,,, | Performed by: NURSE PRACTITIONER

## 2019-06-13 PROCEDURE — 93010 ELECTROCARDIOGRAM REPORT: CPT | Mod: HCNC,,, | Performed by: INTERNAL MEDICINE

## 2019-06-13 PROCEDURE — 37000009 HC ANESTHESIA EA ADD 15 MINS: Mod: HCNC | Performed by: INTERNAL MEDICINE

## 2019-06-13 PROCEDURE — 85610 PROTHROMBIN TIME: CPT | Mod: HCNC

## 2019-06-13 PROCEDURE — 92960 CARDIOVERSION ELECTRIC EXT: CPT | Mod: HCNC | Performed by: INTERNAL MEDICINE

## 2019-06-13 PROCEDURE — 37000008 HC ANESTHESIA 1ST 15 MINUTES: Mod: HCNC | Performed by: INTERNAL MEDICINE

## 2019-06-13 PROCEDURE — 25000003 PHARM REV CODE 250: Mod: HCNC | Performed by: STUDENT IN AN ORGANIZED HEALTH CARE EDUCATION/TRAINING PROGRAM

## 2019-06-13 PROCEDURE — 25000003 PHARM REV CODE 250: Mod: HCNC | Performed by: NURSE ANESTHETIST, CERTIFIED REGISTERED

## 2019-06-13 PROCEDURE — 80048 BASIC METABOLIC PNL TOTAL CA: CPT | Mod: HCNC

## 2019-06-13 PROCEDURE — 92960 PR CARDIOVERSION, ELECTIVE;EXTERN: ICD-10-PCS | Mod: HCNC,,, | Performed by: INTERNAL MEDICINE

## 2019-06-13 PROCEDURE — 92960 CARDIOVERSION ELECTRIC EXT: CPT | Mod: HCNC,,, | Performed by: INTERNAL MEDICINE

## 2019-06-13 PROCEDURE — 83735 ASSAY OF MAGNESIUM: CPT | Mod: HCNC

## 2019-06-13 PROCEDURE — 63600175 PHARM REV CODE 636 W HCPCS: Mod: HCNC | Performed by: NURSE PRACTITIONER

## 2019-06-13 PROCEDURE — 25000003 PHARM REV CODE 250: Mod: HCNC | Performed by: INTERNAL MEDICINE

## 2019-06-13 RX ORDER — SODIUM CHLORIDE 9 MG/ML
INJECTION, SOLUTION INTRAVENOUS CONTINUOUS PRN
Status: DISCONTINUED | OUTPATIENT
Start: 2019-06-13 | End: 2019-06-13

## 2019-06-13 RX ORDER — ONDANSETRON 2 MG/ML
4 INJECTION INTRAMUSCULAR; INTRAVENOUS DAILY PRN
Status: DISCONTINUED | OUTPATIENT
Start: 2019-06-13 | End: 2019-06-14 | Stop reason: HOSPADM

## 2019-06-13 RX ORDER — SODIUM CHLORIDE 9 MG/ML
INJECTION, SOLUTION INTRAVENOUS ONCE
Status: DISCONTINUED | OUTPATIENT
Start: 2019-06-13 | End: 2019-06-14 | Stop reason: HOSPADM

## 2019-06-13 RX ORDER — KETAMINE HCL IN 0.9 % NACL 50 MG/5 ML
SYRINGE (ML) INTRAVENOUS
Status: DISCONTINUED | OUTPATIENT
Start: 2019-06-13 | End: 2019-06-13

## 2019-06-13 RX ORDER — SODIUM CHLORIDE 9 MG/ML
INJECTION, SOLUTION INTRAVENOUS CONTINUOUS
Status: DISCONTINUED | OUTPATIENT
Start: 2019-06-13 | End: 2019-06-14 | Stop reason: HOSPADM

## 2019-06-13 RX ORDER — GLYCOPYRROLATE 0.2 MG/ML
INJECTION INTRAMUSCULAR; INTRAVENOUS
Status: DISCONTINUED | OUTPATIENT
Start: 2019-06-13 | End: 2019-06-13

## 2019-06-13 RX ORDER — PROPOFOL 10 MG/ML
VIAL (ML) INTRAVENOUS CONTINUOUS PRN
Status: DISCONTINUED | OUTPATIENT
Start: 2019-06-13 | End: 2019-06-13

## 2019-06-13 RX ORDER — PHENYLEPHRINE HYDROCHLORIDE 10 MG/ML
INJECTION INTRAVENOUS
Status: DISCONTINUED | OUTPATIENT
Start: 2019-06-13 | End: 2019-06-13

## 2019-06-13 RX ORDER — LIDOCAINE HCL/PF 100 MG/5ML
SYRINGE (ML) INTRAVENOUS
Status: DISCONTINUED | OUTPATIENT
Start: 2019-06-13 | End: 2019-06-13

## 2019-06-13 RX ORDER — SODIUM CHLORIDE 0.9 % (FLUSH) 0.9 %
10 SYRINGE (ML) INJECTION
Status: DISCONTINUED | OUTPATIENT
Start: 2019-06-13 | End: 2019-06-14 | Stop reason: HOSPADM

## 2019-06-13 RX ORDER — PROPOFOL 10 MG/ML
VIAL (ML) INTRAVENOUS
Status: DISCONTINUED | OUTPATIENT
Start: 2019-06-13 | End: 2019-06-13

## 2019-06-13 RX ADMIN — LIDOCAINE HYDROCHLORIDE 40 MG: 20 INJECTION, SOLUTION INTRAVENOUS at 01:06

## 2019-06-13 RX ADMIN — Medication 25 MG: at 01:06

## 2019-06-13 RX ADMIN — PHENYLEPHRINE HYDROCHLORIDE 200 MCG: 10 INJECTION INTRAVENOUS at 01:06

## 2019-06-13 RX ADMIN — GLYCOPYRROLATE 0.2 MG: 0.2 INJECTION, SOLUTION INTRAMUSCULAR; INTRAVENOUS at 01:06

## 2019-06-13 RX ADMIN — WARFARIN SODIUM 7.5 MG: 7.5 TABLET ORAL at 04:06

## 2019-06-13 RX ADMIN — PROPOFOL 40 MG: 10 INJECTION, EMULSION INTRAVENOUS at 01:06

## 2019-06-13 RX ADMIN — SIMVASTATIN 80 MG: 40 TABLET, FILM COATED ORAL at 10:06

## 2019-06-13 RX ADMIN — PROPOFOL 75 MCG/KG/MIN: 10 INJECTION, EMULSION INTRAVENOUS at 01:06

## 2019-06-13 RX ADMIN — SODIUM CHLORIDE: 0.9 INJECTION, SOLUTION INTRAVENOUS at 01:06

## 2019-06-13 RX ADMIN — SOTALOL HYDROCHLORIDE 80 MG: 80 TABLET ORAL at 10:06

## 2019-06-13 RX ADMIN — MAGNESIUM SULFATE HEPTAHYDRATE 1 G: 500 INJECTION, SOLUTION INTRAMUSCULAR; INTRAVENOUS at 12:06

## 2019-06-13 RX ADMIN — SOTALOL HYDROCHLORIDE 80 MG: 80 TABLET ORAL at 09:06

## 2019-06-13 NOTE — H&P
[facility]  Cardiology  History and Physical     Patient Name: Linda Skelton  MRN: [id number]  Admission Date: 6/11/2019  Code Status: Full Code   Attending Provider: Dickson Grissom MD   Primary Care Physician: Anderson Jerome MD  Principal Problem:Paroxysmal atrial fibrillation    Patient information was obtained from patient and ER records.     Subjective:     Chief Complaint:  AF      HPI:      TRANSESOPHAGEAL ECHOCARDIOGRAPHY   PRE-PROCEDURE NOTE    06/13/2019    HPI:     Linda Skelton is a 84 y.o. man with AF here as an inpatient for EUGENE/DCCV PMHx of AF. On warfarin and INR 2.1.    Dysphagia or odynophagia:  No  Liver Disease, esophageal disease, or known varices:  No  Upper GI Bleeding: No  Snoring:  No  Sleep Apnea:  No  Prior neck surgery or radiation:  No  History of anesthetic difficulties:  No  Family history of anesthetic difficulties:  No  Last oral intake:  24 hours ago  Able to move neck in all directions:  Yes      Past Medical History:   Diagnosis Date    ACS (acute coronary syndrome)     Acute coronary syndrome 2000    NSTEMI    Anticoagulant long-term use     Coumadin    Atrial fibrillation     Basal cell carcinoma excised     left scalp vertex    Bilateral leg edema 1/6/2014    BPH (benign prostatic hyperplasia)     CKD (chronic kidney disease)     Coronary artery disease     DVT of leg (deep venous thrombosis) 09/08/2014    On coumadin for years    Encounter for blood transfusion     Hyperlipidemia     Hypertension     Hypogonadism, male     Leg fracture, left     Melanoma 01/04/2017     in situ crown of scalp    MI (myocardial infarction)     Panhypopituitarism     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 at [facility] OR    COLONOSCOPY N/A 1/7/2014    Performed by Dell Elena MD at [facility] ENDO (4TH FLR)    CORONARY ANGIOPLASTY  5/9/2000    RCA    CORONARY ANGIOPLASTY WITH STENT PLACEMENT      ESOPHAGEAL DILATION      EYE SURGERY Bilateral     cataracts removed and new lenses placed     FRACTURE SURGERY Left     leg    HERNIA REPAIR      left femur surgery      pituitatry      hypophysectomy    REMOVAL-MASS Left 12/12/2013    Performed by Burt Woodard MD at [facility] OR 2ND FLR    SKIN BIOPSY      TONSILLECTOMY      VASCULAR SURGERY         Review of patient's allergies indicates:  No Known Allergies    No current facility-administered medications on file prior to encounter.      Current Outpatient Medications on File Prior to Encounter   Medication Sig    predniSONE (DELTASONE) 5 MG tablet Take 5 mg by mouth once daily.    aspirin 81 mg Tab Take 81 mg by mouth every evening. 1 Tablet Oral Every night    atenolol (TENORMIN) 25 MG tablet TAKE 1 TABLET BY MOUTH TWICE DAILY    ciclopirox (PENLAC) 8 % Soln Apply daily to affected nail. Must remove and restart weekly    folic acid (FOLVITE) 1 MG tablet TAKE ONE TABLET BY MOUTH ONCE DAILY    ketoconazole (NIZORAL) 2 % cream AAA bid to feet x 3 weeks    levothyroxine (SYNTHROID) 112 MCG tablet Take 1 tablet (112 mcg total) by mouth before breakfast.    mometasone (ELOCON) 0.1 % ointment aaa qd- bid for severe areas.  Avoid use on face and groin    NITROSTAT 0.4 mg SL tablet DISSOLVE ONE TABLET UNDER THE TONGUE EVERY 5 MINUTES AS NEEDED FOR CHEST PAIN.  DO NOT EXCEED A TOTAL OF 3 DOSES  IN 15 MINUTES NOW    omeprazole (PRILOSEC) 20 MG capsule Take 20 mg by mouth every other day.    simvastatin (ZOCOR) 80 MG tablet Take 1 tablet (80 mg total) by mouth every evening.    tamsulosin (FLOMAX) 0.4 mg Cp24 Take 1 capsule (0.4 mg total) by mouth once daily.    warfarin (COUMADIN) 5 MG tablet TAKE ONE & ONE-HALF TABLETS BY MOUTH ONCE DAILY EXCEPT  ON  TUESDAYS  TAKE  1  TABLET ON TUESDAY (Patient taking differently: 5 mg on Tuesday, 7.5 mg all other days as directed by [facility])     Family History     Problem Relation (Age of Onset)    Cancer Father        Tobacco Use    Smoking status: Never Smoker    Smokeless tobacco: Never Used   Substance and Sexual Activity    Alcohol use: No     Frequency: Never    Drug use: No    Sexual activity: Not Currently     Partners: Female     Review of Systems   All other systems reviewed and are negative.    Objective:     Vital Signs (Most Recent):  Temp: 97.7 °F (36.5 °C) (06/13/19 1144)  Pulse: 75 (06/13/19 1144)  Resp: 18 (06/13/19 1144)  BP: (!) 140/88 (06/13/19 1144)  SpO2: 97 % (06/13/19 1144) Vital Signs (24h Range):  Temp:  [97.5 °F (36.4 °C)-98 °F (36.7 °C)] 97.7 °F (36.5 °C)  Pulse:  [] 75  Resp:  [17-20] 18  SpO2:  [94 %-97 %] 97 %  BP: (115-147)/(71-97) 140/88     Weight: 111.8 kg (246 lb 9.4 oz)  Body mass index is 30.82 kg/m².    SpO2: 97 %  O2 Device (Oxygen Therapy): room air      Intake/Output Summary (Last 24 hours) at 6/13/2019 1414  Last data filed at 6/13/2019 1350  Gross per 24 hour   Intake 670 ml   Output 950 ml   Net -280 ml       Lines/Drains/Airways     Peripheral Intravenous Line                 Peripheral IV - Single Lumen 06/11/19 1836 20 G Anterior;Left Forearm 1 day                Physical Exam   Constitutional: He is oriented to person, place, and time. He appears well-developed and well-nourished.   HENT:   Head: Normocephalic and atraumatic.   Eyes: No scleral icterus.   Cardiovascular: Normal rate.   Irregular  rhythm    Pulmonary/Chest: Effort normal and breath sounds normal.   Musculoskeletal: He exhibits no edema.   Neurological: He is alert and oriented to person, place, and time.       Significant Labs: All pertinent lab results from the last 24 hours have been reviewed.    Significant Imaging: Echocardiogram:   2D echo with color flow doppler:   Results for orders placed or performed during the hospital encounter of 05/30/18   2D echo with color flow doppler   Result Value Ref Range    QEF 60 55 - 65    Mitral Valve Regurgitation TRIVIAL     Diastolic Dysfunction No     Est. PA Systolic Pressure 8.86     Tricuspid Valve Regurgitation TRIVIAL      Assessment and Plan:     * Paroxysmal atrial fibrillation    1. EUGENE for evaluation of CHANI thrombus     -The risks, benefits & alternatives of the procedure were explained to the patient.    -The risks of transesophageal echo include but are not limited to:  Dental trauma, esophageal trauma/perforation, bleeding, laryngospasm/brochospasm, aspiration, sore throat/hoarseness, & dislodgement of the endotracheal tube/nasogastric tube (where applicable).    -The risks of moderate sedation include hypotension, respiratory depression, arrhythmias, bronchospasm, & death.    -Informed consent was obtained & the patient is agreeable to proceed with the procedure.    I will discuss with the attending physician. Attending addendum is to follow.     Further recommendations per attending addendum              VTE Risk Mitigation (From admission, onward)        Ordered     warfarin (COUMADIN) tablet 7.5 mg  Daily      06/11/19 1850     Reason for No Pharmacological VTE Prophylaxis  Once      06/11/19 1818     IP VTE HIGH RISK PATIENT  Once      06/11/19 1818          Dayana Hinojosa MD  Cardiology   [facility]

## 2019-06-13 NOTE — SUBJECTIVE & OBJECTIVE
Interval History: Disappointed he has not cardioverted.  He has not had a bm in two days.  He's been walking in his room.  Plan of care reviewed and questions answered. No acute distress.     Review of Systems   Constitutional: Negative for activity change, appetite change, chills, fatigue and fever.   HENT: Negative for congestion, sore throat and trouble swallowing.    Eyes: Negative for redness and visual disturbance.   Respiratory: Negative for cough, shortness of breath and wheezing.    Cardiovascular: Negative for chest pain, palpitations and leg swelling.   Gastrointestinal: Negative for abdominal pain, constipation, diarrhea, nausea and vomiting.   Endocrine: Negative for cold intolerance and heat intolerance.   Genitourinary: Negative for decreased urine volume, difficulty urinating and hematuria.   Musculoskeletal: Negative for back pain and myalgias.   Skin: Negative for color change, rash and wound.   Allergic/Immunologic: Negative for immunocompromised state.   Neurological: Negative for dizziness, weakness, light-headedness, numbness and headaches.   Hematological: Does not bruise/bleed easily.   Psychiatric/Behavioral: Negative for agitation, decreased concentration and sleep disturbance. The patient is not nervous/anxious.      Objective:     Vital Signs (Most Recent):  Temp: 97.5 °F (36.4 °C) (06/13/19 1445)  Pulse: (!) 56 (06/13/19 1445)  Resp: 19 (06/13/19 1445)  BP: 100/63 (06/13/19 1445)  SpO2: 95 % (06/13/19 1445) Vital Signs (24h Range):  Temp:  [97.5 °F (36.4 °C)-98 °F (36.7 °C)] 97.5 °F (36.4 °C)  Pulse:  [] 56  Resp:  [12-20] 19  SpO2:  [94 %-98 %] 95 %  BP: ()/(53-97) 100/63     Weight: 111.8 kg (246 lb 9.4 oz)  Body mass index is 30.82 kg/m².    Intake/Output Summary (Last 24 hours) at 6/13/2019 8517  Last data filed at 6/13/2019 1350  Gross per 24 hour   Intake 670 ml   Output 950 ml   Net -280 ml      Physical Exam   Constitutional: He is oriented to person, place, and time.  He appears well-developed and well-nourished. No distress.   HENT:   Head: Normocephalic and atraumatic.   Right Ear: External ear normal.   Left Ear: External ear normal.   Nose: Nose normal.   Eyes: Conjunctivae are normal.   Neck: Normal range of motion. Neck supple. No JVD present.   Cardiovascular: Normal rate, normal heart sounds and intact distal pulses. An irregularly irregular rhythm present.   No murmur heard.  Pulmonary/Chest: Effort normal and breath sounds normal. No respiratory distress. He has no wheezes. He has no rales.   Abdominal: Soft. Bowel sounds are normal. He exhibits no distension and no mass. There is no tenderness. There is no guarding.   Musculoskeletal: Normal range of motion. He exhibits no edema.   Neurological: He is alert and oriented to person, place, and time. No cranial nerve deficit or sensory deficit. Coordination normal.   Skin: Skin is warm and dry. No rash noted. He is not diaphoretic. No erythema.   Psychiatric: He has a normal mood and affect. His behavior is normal. Judgment and thought content normal.   Nursing note and vitals reviewed.      Significant Labs:   CMP:   Recent Labs   Lab 06/11/19  1824 06/12/19  0447 06/13/19  0421    141 139   K 4.5 5.0 4.6    106 105   CO2 26 30* 25   GLU 95 92 101   BUN 22 20 23   CREATININE 1.4 1.2 1.3   CALCIUM 10.3 9.5 8.9   ANIONGAP 10 5* 9   EGFRNONAA 45.8* 55.2* 50.1*     Lab Results   Component Value Date    WBC 4.32 06/12/2019    HGB 13.6 (L) 06/12/2019    HCT 42.4 06/12/2019     (H) 06/12/2019     (L) 06/12/2019          Significant Imaging: EKG: I have reviewed all pertinent results/findings within the past 24 hours and my personal findings are: afib, controlled rate, qtc as noted above

## 2019-06-13 NOTE — ASSESSMENT & PLAN NOTE
1. EUGENE for evaluation of CHANI thrombus     -The risks, benefits & alternatives of the procedure were explained to the patient.    -The risks of transesophageal echo include but are not limited to:  Dental trauma, esophageal trauma/perforation, bleeding, laryngospasm/brochospasm, aspiration, sore throat/hoarseness, & dislodgement of the endotracheal tube/nasogastric tube (where applicable).    -The risks of moderate sedation include hypotension, respiratory depression, arrhythmias, bronchospasm, & death.    -Informed consent was obtained & the patient is agreeable to proceed with the procedure.    I will discuss with the attending physician. Attending addendum is to follow.     Further recommendations per attending addendum

## 2019-06-13 NOTE — SUBJECTIVE & OBJECTIVE
Past Medical History:   Diagnosis Date    ACS (acute coronary syndrome)     Acute coronary syndrome 2000    NSTEMI    Anticoagulant long-term use     Coumadin    Atrial fibrillation     Basal cell carcinoma excised     left scalp vertex    Bilateral leg edema 1/6/2014    BPH (benign prostatic hyperplasia)     CKD (chronic kidney disease)     Coronary artery disease     DVT of leg (deep venous thrombosis) 09/08/2014    On coumadin for years    Encounter for blood transfusion     Hyperlipidemia     Hypertension     Hypogonadism, male     Leg fracture, left     Melanoma 01/04/2017     in situ crown of scalp    MI (myocardial infarction)     Panhypopituitarism     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    Squamous Cell Carcinoma excised     left posterior scalp    Thyroid disease        Past Surgical History:   Procedure Laterality Date    ABLATION N/A 2/25/2016    Performed by Boyd [facility]huja MD at [facility] CATH LAB    APPENDECTOMY      BASAL CELL CARCINOMA EXCISION  01/2008    BRAIN SURGERY      pituitary adenoma removed    CARDIAC CATHETERIZATION  05/09/2000    S/P stent to RCA,    CARDIAC CATHETERIZATION      stents placed    CARDIAC ELECTROPHYSIOLOGY STUDY AND ABLATION      CHOLECYSTECTOMY, LAPAROSCOPIC N/A 6/2/2018    Performed by Dickson Smith Jr., MD at [facility] OR    COLONOSCOPY N/A 1/7/2014    Performed by Dell Elena MD at [facility] ENDO (4TH FLR)    CORONARY ANGIOPLASTY  5/9/2000    RCA    CORONARY ANGIOPLASTY WITH STENT PLACEMENT      ESOPHAGEAL DILATION      EYE SURGERY Bilateral     cataracts removed and new lenses placed     FRACTURE SURGERY Left     leg    HERNIA REPAIR      left femur surgery      pituitatry      hypophysectomy    REMOVAL-MASS Left 12/12/2013    Performed by Burt Woodard MD at [facility] OR 2ND FLR    SKIN BIOPSY      TONSILLECTOMY      VASCULAR SURGERY         Review of patient's allergies  indicates:  No Known Allergies    No current facility-administered medications on file prior to encounter.      Current Outpatient Medications on File Prior to Encounter   Medication Sig    predniSONE (DELTASONE) 5 MG tablet Take 5 mg by mouth once daily.    aspirin 81 mg Tab Take 81 mg by mouth every evening. 1 Tablet Oral Every night    atenolol (TENORMIN) 25 MG tablet TAKE 1 TABLET BY MOUTH TWICE DAILY    ciclopirox (PENLAC) 8 % Soln Apply daily to affected nail. Must remove and restart weekly    folic acid (FOLVITE) 1 MG tablet TAKE ONE TABLET BY MOUTH ONCE DAILY    ketoconazole (NIZORAL) 2 % cream AAA bid to feet x 3 weeks    levothyroxine (SYNTHROID) 112 MCG tablet Take 1 tablet (112 mcg total) by mouth before breakfast.    mometasone (ELOCON) 0.1 % ointment aaa qd- bid for severe areas.  Avoid use on face and groin    NITROSTAT 0.4 mg SL tablet DISSOLVE ONE TABLET UNDER THE TONGUE EVERY 5 MINUTES AS NEEDED FOR CHEST PAIN.  DO NOT EXCEED A TOTAL OF 3 DOSES IN 15 MINUTES NOW    omeprazole (PRILOSEC) 20 MG capsule Take 20 mg by mouth every other day.    simvastatin (ZOCOR) 80 MG tablet Take 1 tablet (80 mg total) by mouth every evening.    tamsulosin (FLOMAX) 0.4 mg Cp24 Take 1 capsule (0.4 mg total) by mouth once daily.    warfarin (COUMADIN) 5 MG tablet TAKE ONE & ONE-HALF TABLETS BY MOUTH ONCE DAILY EXCEPT  ON  TUESDAYS  TAKE  1  TABLET ON TUESDAY (Patient taking differently: 5 mg on Tuesday, 7.5 mg all other days as directed by [facility])     Family History     Problem Relation (Age of Onset)    Cancer Father        Tobacco Use    Smoking status: Never Smoker    Smokeless tobacco: Never Used   Substance and Sexual Activity    Alcohol use: No     Frequency: Never    Drug use: No    Sexual activity: Not Currently     Partners: Female     Review of Systems   All other systems reviewed and are negative.    Objective:     Vital Signs (Most Recent):  Temp: 97.7 °F (36.5 °C) (06/13/19  1144)  Pulse: 75 (06/13/19 1144)  Resp: 18 (06/13/19 1144)  BP: (!) 140/88 (06/13/19 1144)  SpO2: 97 % (06/13/19 1144) Vital Signs (24h Range):  Temp:  [97.5 °F (36.4 °C)-98 °F (36.7 °C)] 97.7 °F (36.5 °C)  Pulse:  [] 75  Resp:  [17-20] 18  SpO2:  [94 %-97 %] 97 %  BP: (115-147)/(71-97) 140/88     Weight: 111.8 kg (246 lb 9.4 oz)  Body mass index is 30.82 kg/m².    SpO2: 97 %  O2 Device (Oxygen Therapy): room air      Intake/Output Summary (Last 24 hours) at 6/13/2019 1414  Last data filed at 6/13/2019 1350  Gross per 24 hour   Intake 670 ml   Output 950 ml   Net -280 ml       Lines/Drains/Airways     Peripheral Intravenous Line                 Peripheral IV - Single Lumen 06/11/19 1836 20 G Anterior;Left Forearm 1 day                Physical Exam   Constitutional: He is oriented to person, place, and time. He appears well-developed and well-nourished.   HENT:   Head: Normocephalic and atraumatic.   Eyes: No scleral icterus.   Cardiovascular: Normal rate.   Irregular rhythm    Pulmonary/Chest: Effort normal and breath sounds normal.   Musculoskeletal: He exhibits no edema.   Neurological: He is alert and oriented to person, place, and time.       Significant Labs: All pertinent lab results from the last 24 hours have been reviewed.    Significant Imaging: Echocardiogram:   2D echo with color flow doppler:   Results for orders placed or performed during the hospital encounter of 05/30/18   2D echo with color flow doppler   Result Value Ref Range    QEF 60 55 - 65    Mitral Valve Regurgitation TRIVIAL     Diastolic Dysfunction No     Est. PA Systolic Pressure 8.86     Tricuspid Valve Regurgitation TRIVIAL

## 2019-06-13 NOTE — ASSESSMENT & PLAN NOTE
Atrial Fibrillation controlled currently with Beta Blocker. HPAIH0KHMg score 6/ 9.8%. Anticoagulation indicated currently. Anticoagulation done with warfarin.   - sotalol initiation,  baseline, EP monitoring and dosing. Current qtc 488  - continue home warfarin schedule with INR checks daily, INR 2.1

## 2019-06-13 NOTE — HPI
TRANSESOPHAGEAL ECHOCARDIOGRAPHY   PRE-PROCEDURE NOTE    06/13/2019    HPI:     Linda Skelton is a 84 y.o. man with AF here as an inpatient for EUGENE/DCCV PMHx of AF. On warfarin and INR 2.1.    Dysphagia or odynophagia:  No  Liver Disease, esophageal disease, or known varices:  No  Upper GI Bleeding: No  Snoring:  No  Sleep Apnea:  No  Prior neck surgery or radiation:  No  History of anesthetic difficulties:  No  Family history of anesthetic difficulties:  No  Last oral intake:  24 hours ago  Able to move neck in all directions:  Yes

## 2019-06-13 NOTE — TELEPHONE ENCOUNTER
6/18/19  Was able to speak with pt.  Reestablishing care for CTCL   (last saw Dr Melgar 2017).     Scheduled for 6/26 with Dr Emmanuel.  Reminded him of location.  ====================================    6/17/19   Tried to call but could not connect. Email didn't go thru.    6/13/19:  Left message for pt to callback to schedule.    Will also send letter as a reminder.    ----- Message from Fredrick Wheeler MD sent at 6/12/2019  3:41 PM CDT -----  Hi   pt is going into hospital for a bit so please leave a number for him to call back to schedule when he is out. Thank you, Fredrick     ----- Message -----  From: Jose Mays MD  Sent: 6/11/2019   1:07 PM  To: Fredrick Wheeler MD, Edita Ferraro, MIRIAN Kohler,   Please set up with one of the heme providers.   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       ----- Message -----  From: Fredrick Wheeler MD  Sent: 6/10/2019   4:31 PM  To: Jose Mays MD    Ok thank you! Fredrick   Please have your nurse call pt to set up and we will call him as well .  Thanks    ----- Message -----  From: Jose Mays MD  Sent: 6/9/2019   2:52 PM  To: Fredrick Wheeler MD    Yes I do recommend these patients be seen annually by heme onc for physical exam and blood work.     Let me know If pt needs appt and we can get him set up.  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 No clonality on last bx 12/2018; in 2016 he did have atypical cells undetermined significance on blood but saw Ramón and he felt ok.  Thanks, fredrick

## 2019-06-13 NOTE — NURSING TRANSFER
Nursing Transfer Note      6/13/2019     Transfer To: 324[address]    Transfer via stretcher    Transfer with cardiac monitoring    Transported by escort    Medicines sent: no    Chart send with patient: Yes    Notified: son    Patient reassessed at: 6/13/19@1449hrs

## 2019-06-13 NOTE — LETTER
June 13, 2019    Linda Skelton  [address] [address]             [facility]  [address]  [facility] [address]  Phone: [phone] Dear  Mr. Skelton:    This is a reminder to call [phone] at your convenience.    Dr. Wheeler recommended establishing care with one of our hematologist-oncologist providers.        Sincerely,        Edita Ferraro RN

## 2019-06-13 NOTE — TRANSFER OF CARE
"Anesthesia Transfer of Care Note    Patient: Linda Skelton    Procedure(s) Performed: Procedure(s) (LRB):  Cardioversion or Defibrillation (Bilateral)  ECHOCARDIOGRAM,TRANSESOPHAGEAL (N/A)    Patient location: PACU    Anesthesia Type: general    Transport from OR: Transported from OR on 2-3 L/min O2 by NC with adequate spontaneous ventilation    Post pain: adequate analgesia    Post assessment: no apparent anesthetic complications and tolerated procedure well    Post vital signs: stable    Level of consciousness: awake    Nausea/Vomiting: no nausea/vomiting    Complications: none    Comments: Patient hypotensive upon arrival to PACU. Phenylephrine 200mcg x3 were administed. Fluids opened. Patient B/P responded to fluid and Phenyelphrine. B/P upon leaving the bedside was 113/74. PACU RN will continue to monitor patient.       Last vitals:   Visit Vitals  BP 78/48   Pulse 52   Temp 36.5 °C (97.7 °F) (Oral)   Resp 18   Ht 6' 3" (1.905 m)   Wt 111.8 kg (246 lb 9.4 oz)   SpO2 96%   BMI 30.82 kg/m²     "

## 2019-06-13 NOTE — PROGRESS NOTES
[facility] Hospital Medicine  Progress Note    Patient Name: Linda Skelton  MRN: [id number]  Patient Class: IP- Inpatient   Admission Date: 6/11/2019  Length of Stay: 2 days  Attending Physician: Dickson Grissom MD  Primary Care Provider: Anderson Jerome MD    Hospital Medicine Team: [facility] MED J Radha Glasgow NP    Subjective:     Principal Problem:Paroxysmal atrial fibrillation    Overview/Hospital Course:Admitted to hospital medicine for sotalol initiation.  QTC baseline 456, first dose ,  now.  EP following.  Scheduled for EUGENE/ DCCV as he has not cardioverted with medication.     Dispo; home no needs, in am after 6 doses of sotalol     Interval History: Disappointed he has not cardioverted.  He has not had a bm in two days.  He's been walking in his room.  Plan of care reviewed and questions answered. No acute distress.     Review of Systems   Constitutional: Negative for activity change, appetite change, chills, fatigue and fever.   HENT: Negative for congestion, sore throat and trouble swallowing.    Eyes: Negative for redness and visual disturbance.   Respiratory: Negative for cough, shortness of breath and wheezing.    Cardiovascular: Negative for chest pain, palpitations and leg swelling.   Gastrointestinal: Negative for abdominal pain, constipation, diarrhea, nausea and vomiting.   Endocrine: Negative for cold intolerance and heat intolerance.   Genitourinary: Negative for decreased urine volume, difficulty urinating and hematuria.   Musculoskeletal: Negative for back pain and myalgias.   Skin: Negative for color change, rash and wound.   Allergic/Immunologic: Negative for immunocompromised state.   Neurological: Negative for dizziness, weakness, light-headedness, numbness and headaches.   Hematological: Does not bruise/bleed easily.   Psychiatric/Behavioral: Negative for agitation, decreased concentration and sleep disturbance. The patient is not  nervous/anxious.      Objective:     Vital Signs (Most Recent):  Temp: 97.5 °F (36.4 °C) (06/13/19 1445)  Pulse: (!) 56 (06/13/19 1445)  Resp: 19 (06/13/19 1445)  BP: 100/63 (06/13/19 1445)  SpO2: 95 % (06/13/19 1445) Vital Signs (24h Range):  Temp:  [97.5 °F (36.4 °C)-98 °F (36.7 °C)] 97.5 °F (36.4 °C)  Pulse:  [] 56  Resp:  [12-20] 19  SpO2:  [94 %-98 %] 95 %  BP: ()/(53-97) 100/63     Weight: 111.8 kg (246 lb 9.4 oz)  Body mass index is 30.82 kg/m².    Intake/Output Summary (Last 24 hours) at 6/13/2019 1457  Last data filed at 6/13/2019 1350  Gross per 24 hour   Intake 670 ml   Output 950 ml   Net -280 ml      Physical Exam   Constitutional: He is oriented to person, place, and time. He appears well-developed and well-nourished. No distress.   HENT:   Head: Normocephalic and atraumatic.   Right Ear: External ear normal.   Left Ear: External ear normal.   Nose: Nose normal.   Eyes: Conjunctivae are normal.   Neck: Normal range of motion. Neck supple. No JVD present.   Cardiovascular: Normal rate, normal heart sounds and intact distal pulses. An irregularly irregular rhythm present.   No murmur heard.  Pulmonary/Chest: Effort normal and breath sounds normal. No respiratory distress. He has no wheezes. He has no rales.   Abdominal: Soft. Bowel sounds are normal. He exhibits no distension and no mass. There is no tenderness. There is no guarding.   Musculoskeletal: Normal range of motion. He exhibits no edema.   Neurological: He is alert and oriented to person, place, and time. No cranial nerve deficit or sensory deficit. Coordination normal.   Skin: Skin is warm and dry. No rash noted. He is not diaphoretic. No erythema.   Psychiatric: He has a normal mood and affect. His behavior is normal. Judgment and thought content normal.   Nursing note and vitals reviewed.      Significant Labs:   CMP:   Recent Labs   Lab 06/11/19  1824 06/12/19  0447 06/13/19  0421    141 139   K 4.5 5.0 4.6    106  105   CO2 26 30* 25   GLU 95 92 101   BUN 22 20 23   CREATININE 1.4 1.2 1.3   CALCIUM 10.3 9.5 8.9   ANIONGAP 10 5* 9   EGFRNONAA 45.8* 55.2* 50.1*     Lab Results   Component Value Date    WBC 4.32 06/12/2019    HGB 13.6 (L) 06/12/2019    HCT 42.4 06/12/2019     (H) 06/12/2019     (L) 06/12/2019          Significant Imaging: EKG: I have reviewed all pertinent results/findings within the past 24 hours and my personal findings are: afib, controlled rate, qtc as noted above        Assessment/Plan:      * Paroxysmal atrial fibrillation  Atrial Fibrillation controlled currently with Beta Blocker. [id number] score 6/ 9.8%. Anticoagulation indicated currently. Anticoagulation done with warfarin.   - sotalol initiation,  baseline, EP monitoring and dosing. 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  - Stable  - Most recent T4 on 4/3 was normal at 1.00  - Continue home levothyroxine      Long term (current) use of anticoagulants  - warfarin       Benign non-nodular prostatic hyperplasia without lower urinary tract symptoms  - Stable  - Continue home tamsulosin      Mixed hyperlipidemia  Continue simvastatin      History of deep 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, 1997  - Stable chronic skin changes  - Continue warfarin and INR checks as above      Coronary artery disease involving native coronary artery of native heart with angina pectoris hx NSTEMI with PCI to RCA 2000  - Stable by symptoms  - History of NSTEMI with PCI to RCA in 2000, LVEF preserved following  - Continue home aspirin and simvastatin      Chronic kidney disease, stage 3  - Will follow daily and replace electrolytes as above  - CR 1.3    Panhypopituitarism thyroid/prednisone/testosterone  - Stable  - Most recent  T4 on 4/3 was normal at 1.00  - Continue home levothyroxine  - Also continue home prednisone 5 mg daily. Testosterone administered q14 days as an outpatient        VTE Risk Mitigation (From admission, onward)        Ordered     warfarin (COUMADIN) tablet 7.5 mg  Daily      06/11/19 1850     Reason for No Pharmacological VTE Prophylaxis  Once      06/11/19 1818     IP VTE HIGH RISK PATIENT  Once      06/11/19 1818                Radha Glasgow NP  Department of Hospital Medicine   [facility]

## 2019-06-13 NOTE — ANESTHESIA POSTPROCEDURE EVALUATION
Anesthesia Post Evaluation    Patient: Linda Skelton    Procedure(s) Performed: Procedure(s) (LRB):  Cardioversion or Defibrillation (Bilateral)  ECHOCARDIOGRAM,TRANSESOPHAGEAL (N/A)    Final Anesthesia Type: general  Patient location during evaluation: PACU  Patient participation: Yes- Able to Participate  Level of consciousness: awake and alert  Post-procedure vital signs: reviewed and stable  Pain management: adequate  Airway patency: patent  PONV status at discharge: No PONV  Anesthetic complications: no      Cardiovascular status: blood pressure returned to baseline  Respiratory status: unassisted  Hydration status: euvolemic  Follow-up not needed.          Vitals Value Taken Time   /64 6/13/2019  3:02 PM   Temp 36.4 °C (97.5 °F) 6/13/2019  2:45 PM   Pulse 55 6/13/2019  3:02 PM   Resp 20 6/13/2019  3:00 PM   SpO2 94 % 6/13/2019  3:02 PM   Vitals shown include unvalidated device data.      Event Time     Out of Recovery 06/13/2019 15:25:00          Pain/Dereck Score: No data recorded

## 2019-06-13 NOTE — ANESTHESIA PREPROCEDURE EVALUATION
06/13/2019  Linda Skelton is a 84 y.o., male with atrial fibrillation here for EUGENE and possible cardioversion.    Pre-operative evaluation for Procedure(s) (LRB):  Cardioversion or Defibrillation (Bilateral)  ECHOCARDIOGRAM,TRANSESOPHAGEAL (N/A)        Patient Active Problem List   Diagnosis    PAF (paroxysmal atrial fibrillation) s/p ablation 2/25/2016 still with episodes    Thyroid nodule    Panhypopituitarism thyroid/prednisone/testosterone    Chronic kidney disease, stage 3    Coronary artery disease involving native coronary artery of native heart with angina pectoris hx NSTEMI with PCI to RCA 2000    Mitral regurgitation    Bilateral leg edema    Obesity, Class I, BMI 30-34.9    ED (erectile dysfunction)    Essential hypertension    History of deep 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, 1997    Mixed hyperlipidemia    History of melanoma in situ    Benign non-nodular prostatic hyperplasia without lower urinary tract symptoms    Hypogonadism in male    Long term (current) use of anticoagulants    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      Current Outpatient Medications on File Prior to Encounter   Medication Sig Dispense Refill    predniSONE (DELTASONE) 5 MG tablet Take 5 mg by mouth once daily.      aspirin 81 mg Tab Take 81 mg by mouth every evening. 1  Tablet Oral Every night      atenolol (TENORMIN) 25 MG tablet TAKE 1 TABLET BY MOUTH TWICE DAILY 180 tablet 1    ciclopirox (PENLAC) 8 % Soln Apply daily to affected nail. Must remove and restart weekly 1 Bottle 5    folic acid (FOLVITE) 1 MG tablet TAKE ONE TABLET BY MOUTH ONCE DAILY 90 tablet 3    ketoconazole (NIZORAL) 2 % cream AAA bid to feet x 3 weeks 60 g 3    levothyroxine (SYNTHROID) 112 MCG tablet Take 1 tablet (112 mcg total) by mouth before breakfast. 90 tablet 3    mometasone (ELOCON) 0.1 % ointment aaa qd- bid for severe areas.  Avoid use on face and groin 60 g 1    NITROSTAT 0.4 mg SL tablet DISSOLVE ONE TABLET UNDER THE TONGUE EVERY 5 MINUTES AS NEEDED FOR CHEST PAIN.  DO NOT EXCEED A TOTAL OF 3 DOSES IN 15 MINUTES NOW 25 tablet 3    omeprazole (PRILOSEC) 20 MG capsule Take 20 mg by mouth every other day.      simvastatin (ZOCOR) 80 MG tablet Take 1 tablet (80 mg total) by mouth every evening. 90 tablet 3    tamsulosin (FLOMAX) 0.4 mg Cp24 Take 1 capsule (0.4 mg total) by mouth once daily. 90 capsule 3    warfarin (COUMADIN) 5 MG tablet TAKE ONE & ONE-HALF TABLETS BY MOUTH ONCE DAILY EXCEPT  ON  TUESDAYS  TAKE  1  TABLET ON TUESDAY (Patient taking differently: 5 mg on Tuesday, 7.5 mg all other days as directed by [facility]) 135 tablet 3       Past Surgical History:   Procedure Laterality Date    ABLATION N/A 2/25/2016    Performed by Boyd Ahuja [facility] at [facility] C[facility] LAB    APPENDECTOMY      BASAL CELL CARCINOMA EXCISION  01/2008    BRAIN SURGERY      pituitary adenoma removed    CARDIAC CATHETERIZATION  05/09/2000    S/P stent to RCA,    CARDIAC CATHETERIZATION      stents placed    CARDIAC ELECTROPHYSIOLOGY STUDY AND ABLATION      CHOLECYSTECTOMY, LAPAROSCOPIC N/A 6/2/2018    Per[facility] Dickson Smith Jr[facility] OR    COLONOSCOPY N/A 1/7/2014    Performed by Dell Elena MD at [facility] ENDO (4TH FLR)    CORONARY ANGIOPLASTY  5/9/2000    RCA    CORONARY  ANGIOPLASTY WITH STENT PLACEMENT      ESOPHAGEAL DILATION      EYE SURGERY Bilateral     cataracts removed and new lenses placed     FRACTURE SURGERY Left     leg    HERNIA REPAIR      left femur surgery      pituitatry      hypophysectomy    REMOVAL-MASS Left 12/12/2013    Performed by [facility]urt [facility]ard [facility] at [facility] OR [address] [place] [address]    SKIN BIOPSY      TONSILLECTOMY      VASCULAR SURGERY         Social History     Socioeconomic History    Marital status:      Spouse name: Not on file    Number of children: Not on file    Years of education: Not on file    Highest education level: Not on file   Occupational History    Not on file   Social Needs    Financial resource strain: Not hard at all    Food insecurity:     Worry: Never true     Inability: Never true    Transportation needs:     Medical: No     Non-medical: No   Tobacco Use    Smoking status: Never Smoker    Smokeless tobacco: Never Used   Substance and Sexual Activity    Alcohol use: No     Frequency: Never    Drug use: No    Sexual activity: Not Currently     Partners: Female   Lifestyle    Physical activity:     Days per week: 0 days     Minutes per session: 0 min    Stress: Not at all   Relationships    Social connections:     Talks on phone: More than three times a week     Gets together: Once a week     Attends Latter day service: Not on file     Active member of club or organization: Yes     Attends meetings of clubs or organizations: More than 4 times per year     Relationship status:    Other Topics Concern    Not on file   Social History Narrative    retired [employer] .  Non smoker.          CBC:   Recent Labs     06/11/19  1824 06/12/19 0447   WBC 5.54 4.32   RBC 4.73 4.24*   HGB 14.9 13.6*   HCT 45.9 42.4   * 123*   MCV 97 100*   MCH 31.5* 32.1*   MCHC 32.5 32.1       CMP:   Recent Labs     06/12/19  0447 06/13/19  0421    139   K 5.0 4.6    105   CO2 30* 25   BUN 20 23   CREATININE 1.2  1.3   GLU 92 101   MG 2.1 1.9   CALCIUM 9.5 8.9       INR  Recent Labs     06/11/19  1824 06/12/19  0447 06/13/19  0421   INR 2.4* 2.2* 2.1*                 2D Echo:  Results for orders placed or performed during the hospital encounter of 05/30/18   2D echo with color flow doppler   Result Value Ref Range    QEF 60 55 - 65    Mitral Valve Regurgitation TRIVIAL     Diastolic Dysfunction No     Est. PA Systolic Pressure 8.86     Tricuspid Valve Regurgitation TRIVIAL          Anesthesia Evaluation    I have reviewed the Patient Summary Reports.    I have reviewed the Nursing Notes.   I have reviewed the Medications.     Review of Systems  Anesthesia Hx:  No problems with previous Anesthesia    Hematology/Oncology:  Hematology Normal   Oncology Normal     EENT/Dental:EENT/Dental Normal   Cardiovascular:   Hypertension Past MI CAD  CABG/stent Dysrhythmias atrial fibrillation  Denies Angina.    Pulmonary:  Pulmonary Normal    Renal/:   Chronic Renal Disease, CRI    Hepatic/GI:  Hepatic/GI Normal    Musculoskeletal:  Musculoskeletal Normal    Neurological:  Neurology Normal    Endocrine:   Hypothyroidism    Dermatological:  Skin Normal    Psych:  Psychiatric Normal           Physical Exam  General:  Well nourished    Airway/Jaw/Neck:  Airway Findings: Mouth Opening: Normal Tongue: Normal  General Airway Assessment: Adult  Mallampati: II  Jaw/Neck Findings:  Neck ROM: Normal ROM     Eyes/Ears/Nose:  Eyes/Ears/Nose Findings:    Dental:  Dental Findings: In tact   Chest/Lungs:  Chest/Lungs Findings: Clear to auscultation, Normal Respiratory Rate     Heart/Vascular:  Heart Findings: Rate: Normal  Rhythm: Regular Rhythm  Sounds: Normal  Heart Murmur  Vascular Findings:        Mental Status:  Mental Status Findings:  Cooperative, Alert and Oriented         Anesthesia Plan  Type of Anesthesia, risks & benefits discussed:  Anesthesia Type:  general, MAC  Patient's Preference: General/MAC  Intra-op Monitoring Plan: standard ASA  monitors  Intra-op Monitoring Plan Comments:   Post Op Pain Control Plan:   Post Op Pain Control Plan Comments: IV meds as needed  Induction:   IV  Beta Blocker:  Patient is not currently on a Beta-Blocker (No further documentation required).       Informed Consent: Patient understands risks and agrees with Anesthesia plan.  Questions answered. Anesthesia consent signed with patient.  ASA Score: 3     Day of Surgery Review of History & Physical:    H&P update referred to the provider.     Anesthesia Plan Notes: Discussed anesthetic options, pt understands and agrees with plan        Ready For Surgery From Anesthesia Perspective.

## 2019-06-13 NOTE — PLAN OF CARE
Problem: Adult Inpatient Plan of Care  Goal: Plan of Care Review  Outcome: Ongoing (interventions implemented as appropriate)  Sotalol given per MD orders. EKG performed 2 hours after dose given. NPO at midnight for possible EUGENE and cardioversion; if still in afib in AM. VSS. Pt denies SOB or chest pain. Pt remain on tele. Fall precautions maintained. Pt free of falls and injuries. POC discussed with patient/family, verbalized understanding. Questions answered, no distress at present.

## 2019-06-13 NOTE — PROGRESS NOTES
[facility]  Cardiac Electrophysiology  Progress Note    Admission Date: 6/11/2019  Code Status: Full Code   Attending Physician: Dickson Grissom MD   Expected Discharge Date: 6/14/2019  Principal Problem:Paroxysmal atrial fibrillation    Subjective:     Interval History: No issues overnight, QTc 480 (acceptable), CrCl 55. EUGENE/DCCV today    Review of Systems   Constitution: Negative for chills, decreased appetite and diaphoresis.   HENT: Negative for congestion and ear discharge.    Eyes: Negative for blurred vision and discharge.   Cardiovascular: Negative for chest pain, dyspnea on exertion, irregular heartbeat, leg swelling and paroxysmal nocturnal dyspnea.   Respiratory: Negative for cough, hemoptysis and shortness of breath.    Gastrointestinal: Negative for abdominal pain.     Objective:     Vital Signs (Most Recent):  Temp: 98 °F (36.7 °C) (06/13/19 0755)  Pulse: 75 (06/13/19 0736)  Resp: 20 (06/13/19 0755)  BP: 134/86 (06/13/19 0801)  SpO2: 97 % (06/13/19 0521) Vital Signs (24h Range):  Temp:  [97.5 °F (36.4 °C)-98.2 °F (36.8 °C)] 98 °F (36.7 °C)  Pulse:  [] 75  Resp:  [17-20] 20  SpO2:  [94 %-97 %] 97 %  BP: (108-147)/(71-97) 134/86     Weight: 111.8 kg (246 lb 9.4 oz)  Body mass index is 30.82 kg/m².     SpO2: 97 %  O2 Device (Oxygen Therapy): room air    Physical Exam   Constitutional: He is oriented to person, place, and time. He appears well-developed and well-nourished. No distress.   Eyes: Pupils are equal, round, and reactive to light. Conjunctivae are normal.   Neck: No tracheal deviation present. No thyromegaly present.   Cardiovascular: Normal rate, normal heart sounds and intact distal pulses. An irregularly irregular rhythm present. Exam reveals no gallop and no friction rub.   No murmur heard.  Pulmonary/Chest: Effort normal and breath sounds normal. No respiratory distress. He has no wheezes. He has no rales.   Abdominal: Soft. Bowel sounds are normal. He exhibits  no distension. There is no tenderness.   Musculoskeletal: He exhibits no edema or deformity.   Neurological: He is alert and oriented to person, place, and time. No cranial nerve deficit. Coordination normal.   Skin: Skin is warm and dry. He is not diaphoretic.   Psychiatric: He has a normal mood and affect. His behavior is normal.       Significant Labs:   EP:   Recent Labs   Lab 06/11/19  1824 06/12/19  0447 06/13/19  0421    141 139   K 4.5 5.0 4.6    106 105   CO2 26 30* 25   GLU 95 92 101   BUN 22 20 23   CREATININE 1.4 1.2 1.3   CALCIUM 10.3 9.5 8.9   ANIONGAP 10 5* 9   ESTGFRAFRICA 53.0* >60.0 57.9*   EGFRNONAA 45.8* 55.2* 50.1*   WBC 5.54 4.32  --    HGB 14.9 13.6*  --    HCT 45.9 42.4  --    * 123*  --    INR 2.4* 2.2* 2.1*       Significant Imaging: Echocardiogram: Transthoracic echo (TTE) complete (Cupid Only): No results found for this or any previous visit.    Assessment and Plan:     * Paroxysmal atrial fibrillation  - Patient seen and examined  - Continue 80 BID Sotalol with 2 hr EKGs post dose  - Creatinine 1.3 CrCl  50  - EUGENE/DCCV today AM   - Continue Coumadin (therapeutic 2.1)  - QTc up to 550 acceptable due to IVCD QRS of 130  - Ok for D/C after 6th dose tommorow at 9AM (6/14) if QTc acceptable          Lizzy Enriquez MD  Cardiac Electrophysiology  [facility]

## 2019-06-13 NOTE — SUBJECTIVE & OBJECTIVE
Interval History: No issues overnight, QTc 480 (acceptable), CrCl 55. EUGENE/DCCV today    Review of Systems   Constitution: Negative for chills, decreased appetite and diaphoresis.   HENT: Negative for congestion and ear discharge.    Eyes: Negative for blurred vision and discharge.   Cardiovascular: Negative for chest pain, dyspnea on exertion, irregular heartbeat, leg swelling and paroxysmal nocturnal dyspnea.   Respiratory: Negative for cough, hemoptysis and shortness of breath.    Gastrointestinal: Negative for abdominal pain.     Objective:     Vital Signs (Most Recent):  Temp: 98 °F (36.7 °C) (06/13/19 0755)  Pulse: 75 (06/13/19 0736)  Resp: 20 (06/13/19 0755)  BP: 134/86 (06/13/19 0801)  SpO2: 97 % (06/13/19 0521) Vital Signs (24h Range):  Temp:  [97.5 °F (36.4 °C)-98.2 °F (36.8 °C)] 98 °F (36.7 °C)  Pulse:  [] 75  Resp:  [17-20] 20  SpO2:  [94 %-97 %] 97 %  BP: (108-147)/(71-97) 134/86     Weight: 111.8 kg (246 lb 9.4 oz)  Body mass index is 30.82 kg/m².     SpO2: 97 %  O2 Device (Oxygen Therapy): room air    Physical Exam   Constitutional: He is oriented to person, place, and time. He appears well-developed and well-nourished. No distress.   Eyes: Pupils are equal, round, and reactive to light. Conjunctivae are normal.   Neck: No tracheal deviation present. No thyromegaly present.   Cardiovascular: Normal rate, normal heart sounds and intact distal pulses. An irregularly irregular rhythm present. Exam reveals no gallop and no friction rub.   No murmur heard.  Pulmonary/Chest: Effort normal and breath sounds normal. No respiratory distress. He has no wheezes. He has no rales.   Abdominal: Soft. Bowel sounds are normal. He exhibits no distension. There is no tenderness.   Musculoskeletal: He exhibits no edema or deformity.   Neurological: He is alert and oriented to person, place, and time. No cranial nerve deficit. Coordination normal.   Skin: Skin is warm and dry. He is not diaphoretic.   Psychiatric:  He has a normal mood and affect. His behavior is normal.       Significant Labs:   EP:   Recent Labs   Lab 06/11/19  1824 06/12/19  0447 06/13/19  0421    141 139   K 4.5 5.0 4.6    106 105   CO2 26 30* 25   GLU 95 92 101   BUN 22 20 23   CREATININE 1.4 1.2 1.3   CALCIUM 10.3 9.5 8.9   ANIONGAP 10 5* 9   ESTGFRAFRICA 53.0* >60.0 57.9*   EGFRNONAA 45.8* 55.2* 50.1*   WBC 5.54 4.32  --    HGB 14.9 13.6*  --    HCT 45.9 42.4  --    * 123*  --    INR 2.4* 2.2* 2.1*       Significant Imaging: Echocardiogram: Transthoracic echo (TTE) complete (Cupid Only): No results found for this or any previous visit.

## 2019-06-13 NOTE — ASSESSMENT & PLAN NOTE
- Patient seen and examined  - Continue 80 BID Sotalol with 2 hr EKGs post dose  - Creatinine 1.3 CrCl  50  - EUGENE/DCCV today AM   - Continue Coumadin (therapeutic 2.1)  - QTc up to 550 acceptable due to IVCD QRS of 130  - Ok for D/C after 6th dose tommorow at 9AM (6/14) if QTc acceptable

## 2019-06-14 VITALS
BODY MASS INDEX: 30.66 KG/M2 | HEIGHT: 75 IN | TEMPERATURE: 98 F | HEART RATE: 52 BPM | OXYGEN SATURATION: 96 % | WEIGHT: 246.56 LBS | SYSTOLIC BLOOD PRESSURE: 115 MMHG | RESPIRATION RATE: 18 BRPM | DIASTOLIC BLOOD PRESSURE: 65 MMHG

## 2019-06-14 PROBLEM — E83.42 HYPOMAGNESEMIA: Status: RESOLVED | Noted: 2019-06-13 | Resolved: 2019-06-14

## 2019-06-14 LAB
ANION GAP SERPL CALC-SCNC: 11 MMOL/L (ref 8–16)
BUN SERPL-MCNC: 24 MG/DL (ref 8–23)
CALCIUM SERPL-MCNC: 9 MG/DL (ref 8.7–10.5)
CHLORIDE SERPL-SCNC: 103 MMOL/L (ref 95–110)
CO2 SERPL-SCNC: 24 MMOL/L (ref 23–29)
CREAT SERPL-MCNC: 1.2 MG/DL (ref 0.5–1.4)
EST. GFR  (AFRICAN AMERICAN): >60 ML/MIN/1.73 M^2
EST. GFR  (NON AFRICAN AMERICAN): 55.2 ML/MIN/1.73 M^2
GLUCOSE SERPL-MCNC: 68 MG/DL (ref 70–110)
INR PPP: 1.9 (ref 0.8–1.2)
MAGNESIUM SERPL-MCNC: 2 MG/DL (ref 1.6–2.6)
POTASSIUM SERPL-SCNC: 4.3 MMOL/L (ref 3.5–5.1)
PROTHROMBIN TIME: 18.3 SEC (ref 9–12.5)
SODIUM SERPL-SCNC: 138 MMOL/L (ref 136–145)

## 2019-06-14 PROCEDURE — 93005 ELECTROCARDIOGRAM TRACING: CPT | Mod: HCNC

## 2019-06-14 PROCEDURE — 83735 ASSAY OF MAGNESIUM: CPT | Mod: HCNC

## 2019-06-14 PROCEDURE — 36415 COLL VENOUS BLD VENIPUNCTURE: CPT | Mod: HCNC

## 2019-06-14 PROCEDURE — 99239 HOSP IP/OBS DSCHRG MGMT >30: CPT | Mod: HCNC,,, | Performed by: NURSE PRACTITIONER

## 2019-06-14 PROCEDURE — 99239 PR HOSPITAL DISCHARGE DAY,>30 MIN: ICD-10-PCS | Mod: HCNC,,, | Performed by: NURSE PRACTITIONER

## 2019-06-14 PROCEDURE — 25000003 PHARM REV CODE 250: Mod: HCNC | Performed by: INTERNAL MEDICINE

## 2019-06-14 PROCEDURE — 93010 ELECTROCARDIOGRAM REPORT: CPT | Mod: HCNC,76,, | Performed by: INTERNAL MEDICINE

## 2019-06-14 PROCEDURE — 85610 PROTHROMBIN TIME: CPT | Mod: HCNC

## 2019-06-14 PROCEDURE — 93010 ELECTROCARDIOGRAM REPORT: CPT | Mod: HCNC,,, | Performed by: INTERNAL MEDICINE

## 2019-06-14 PROCEDURE — 80048 BASIC METABOLIC PNL TOTAL CA: CPT | Mod: HCNC

## 2019-06-14 PROCEDURE — 93010 EKG 12-LEAD: ICD-10-PCS | Mod: HCNC,,, | Performed by: INTERNAL MEDICINE

## 2019-06-14 PROCEDURE — 63600175 PHARM REV CODE 636 W HCPCS: Mod: HCNC | Performed by: NURSE PRACTITIONER

## 2019-06-14 PROCEDURE — 25000003 PHARM REV CODE 250: Mod: HCNC | Performed by: STUDENT IN AN ORGANIZED HEALTH CARE EDUCATION/TRAINING PROGRAM

## 2019-06-14 RX ORDER — WARFARIN SODIUM 5 MG/1
10 TABLET ORAL DAILY
Status: DISCONTINUED | OUTPATIENT
Start: 2019-06-14 | End: 2019-06-14 | Stop reason: HOSPADM

## 2019-06-14 RX ADMIN — PANTOPRAZOLE SODIUM 40 MG: 40 TABLET, DELAYED RELEASE ORAL at 09:06

## 2019-06-14 RX ADMIN — PREDNISONE 5 MG: 5 TABLET ORAL at 09:06

## 2019-06-14 RX ADMIN — FOLIC ACID 1000 MCG: 1 TABLET ORAL at 09:06

## 2019-06-14 RX ADMIN — SOTALOL HYDROCHLORIDE 80 MG: 80 TABLET ORAL at 09:06

## 2019-06-14 RX ADMIN — LEVOTHYROXINE SODIUM 112 MCG: 112 TABLET ORAL at 05:06

## 2019-06-14 RX ADMIN — ASPIRIN 81 MG CHEWABLE TABLET 81 MG: 81 TABLET CHEWABLE at 09:06

## 2019-06-14 RX ADMIN — TAMSULOSIN HYDROCHLORIDE 0.4 MG: 0.4 CAPSULE ORAL at 09:06

## 2019-06-14 NOTE — DISCHARGE SUMMARY
[facility] Hospital Medicine  Discharge Summary      Patient Name: Linda Skelton  MRN: [id number]  Admission Date: 6/11/2019  Hospital Length of Stay: 3 days  Discharge Date and Time:  06/14/2019 1:16 PM  Attending Physician: Dickson Grissom MD   Discharging Provider: Radha Glasgow NP  Primary Care Provider: Anderson Jerome MD  Hospital Medicine Team: [facility] MED  Radha Glasgow NP    HPI:   Mr. 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 He has difficult to treat atrial fibrillation that has been refractory to Tikosyn and procedural intervention. He has episodes of AF that last 30-48 hours and are associated with malaise and relative hypotension from his home measurements. He follows with Dr. Topete in EP clinic, who after initially recommending amiodarone now has requested admission for sotalol loading due to concerns of amiodarone-associated adverse effects. He has been in his usual state of health, with about five episodes of AF this calendar year, most recently starting yesterday afternoon and continuing until now, associated primarily with fatigue and shortness of breath with mild palpitations. He attributes the episode to stopping his Tikosyn five days ago in anticipation of this admission. He denies any chest pain or edema. He is otherwise without complaint, with no recent fevers, chills, night sweats, nausea, vomiting, constipation, or diarrhea.    Procedure(s) (LRB):  Cardioversion or Defibrillation (Bilateral)  ECHOCARDIOGRAM,TRANSESOPHAGEAL (N/A)      Hospital Course:   Admitted to hospital medicine for sotalol initiation.  QTC baseline 456, first dose ,  now.  EP following.  s/p EUGENE/ DCCV 6/13 as he had not  cardioverted with medication. Now SR / PVC's.  Home with sotalol 80 mg bid.   at time of discharge.     Dispo; home no needs, f/u scheduled with Dr. Topete.      Consults:   Consults (From admission, onward)        Status Ordering Provider     Inpatient consult to Electrophysiology  Once     Provider:  (Not yet assigned)    Completed GRACE PAGAN      Review of Systems   Constitutional: Negative for activity change, appetite change, chills, fatigue and fever.   HENT: Negative for congestion, sore throat and trouble swallowing.    Eyes: Negative for redness and visual disturbance.   Respiratory: Negative for cough, shortness of breath and wheezing.    Cardiovascular: Negative for chest pain, palpitations and leg swelling.   Gastrointestinal: Negative for abdominal pain, constipation, diarrhea, nausea and vomiting.   Endocrine: Negative for cold intolerance and heat intolerance.   Genitourinary: Negative for decreased urine volume, difficulty urinating and hematuria.   Musculoskeletal: Negative for back pain and myalgias.   Skin: Negative for color change, rash and wound.   Allergic/Immunologic: Negative for immunocompromised state.   Neurological: Negative for dizziness, weakness, light-headedness, numbness and headaches.   Hematological: Does not bruise/bleed easily.   Psychiatric/Behavioral: Negative for agitation, decreased concentration and sleep disturbance. The patient is not nervous/anxious.      Physical Exam   Constitutional: He is oriented to person, place, and time. He appears well-developed and well-nourished. No distress.   HENT:   Head: Normocephalic and atraumatic.   Right Ear: External ear normal.   Left Ear: External ear normal.   Nose: Nose normal.   Eyes: Conjunctivae are normal.   Neck: Normal range of motion. Neck supple. No JVD present.   Cardiovascular: Normal rate, normal heart sounds and intact distal pulses. A regular rhythm present.   No murmur heard.  Pulmonary/Chest: Effort  normal and breath sounds normal. No respiratory distress. He has no wheezes. He has no rales.   Abdominal: Soft. Bowel sounds are normal. He exhibits no distension and no mass. There is no tenderness. There is no guarding.   Musculoskeletal: Normal range of motion. He exhibits no edema.   Neurological: He is alert and oriented to person, place, and time. No cranial nerve deficit or sensory deficit. Coordination normal.   Skin: Skin is warm and dry. No rash noted. He is not diaphoretic. No erythema.   Psychiatric: He has a normal mood and affect. His behavior is normal. Judgment and thought content normal    * Paroxysmal atrial fibrillation  Atrial Fibrillation controlled currently with Beta Blocker. [id number] score 6/ 9.8%. Anticoagulation indicated currently. Anticoagulation done with warfarin.   - sotalol initiation,  baseline, EP monitoring and dosing. Current qtc 488  - continue home warfarin schedule with INR checks daily, INR 1.9, needs to take 10 mg dose tonight, then go back to his regular schedule.  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  - Stable  - Most recent T4 on 4/3 was normal at 1.00  - Continue home levothyroxine      Long term (current) use of anticoagulants  - warfarin       Benign non-nodular prostatic hyperplasia without lower urinary tract symptoms  - Stable  - Continue home tamsulosin      Mixed hyperlipidemia  Continue simvastatin      History of deep 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, 1997  - Stable chronic skin changes  - Continue warfarin and INR checks as above      Coronary artery disease involving native coronary artery of native heart with angina pectoris hx NSTEMI with PCI to RCA 2000  - Stable by symptoms  - History of NSTEMI with PCI to RCA in 2000, LVEF preserved following  - Continue home aspirin and simvastatin      Chronic kidney  disease, stage 3  - Will follow daily and replace electrolytes as above  - CR 1.3    Panhypopituitarism thyroid/prednisone/testosterone  - Stable  - Most recent T4 on 4/3 was normal at 1.00  - Continue home levothyroxine  - Also continue home prednisone 5 mg daily. 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 [E03.9] 04/26/2018 Yes    Long term (current) use of anticoagulants [Z79.01] 07/20/2017 Not Applicable    Benign non-nodular prostatic hyperplasia without lower urinary tract symptoms [N40.0] 07/05/2017 Yes    Mixed hyperlipidemia [E78.2] 10/23/2014 Yes    History of deep 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, 1997 [Z86.718] 09/08/2014 Not Applicable    Coronary artery disease involving native coronary artery of native heart with angina pectoris hx NSTEMI with PCI to RCA 2000 [I25.119]  Yes    Chronic kidney disease, stage 3 [N18.3] 12/26/2012 Yes    Panhypopituitarism thyroid/prednisone/testosterone [E23.0] 08/06/2012 Yes      Problems Resolved During this Admission:    Diagnosis Date Noted Date Resolved POA    Hypomagnesemia [E83.42] 06/13/2019 06/14/2019 No       Discharged Condition: good    Disposition: Home or Self Care    Follow Up:    Patient Instructions:      Diet Cardiac     Notify your health care provider if you experience any of the following:  persistent nausea and vomiting or diarrhea     Notify your health care provider if you experience any of the following:  temperature >100.4     Notify your health care provider if you experience any of the following:  severe uncontrolled pain     Notify your health care provider if you experience any of the following:   redness, tenderness, or signs of infection (pain, swelling, redness, odor or green/yellow discharge around incision site)     Notify your health care provider if you experience any of the following:  difficulty breathing or increased cough     Notify your health care provider if you experience any of the following:  severe persistent headache     Notify your health care provider if you experience any of the following:  worsening rash     Notify your health care provider if you experience any of the following:  persistent dizziness, light-headedness, or visual disturbances     Notify your health care provider if you experience any of the following:  increased confusion or weakness     Activity as tolerated       Significant Diagnostic Studies: Labs:   BMP:   Recent Labs   Lab 06/13/19  0421 06/14/19  0415    68*    138   K 4.6 4.3    103   CO2 25 24   BUN 23 24*   CREATININE 1.3 1.2   CALCIUM 8.9 9.0   MG 1.9 2.0       Pending Diagnostic Studies:     Procedure Component Value Units Date/Time    EKG 12-lead [[id number]] Collected:  06/14/19 1104    Order Status:  Sent Lab Status:  In process Updated:  06/14/19 1249    Narrative:       Test Reason : Z51.81,Z79.899,    Vent. Rate : 055 BPM     Atrial Rate : 055 BPM     P-R Int : 232 ms          QRS Dur : 134 ms      QT Int : 478 ms       P-R-T Axes : 048 -63 -02 degrees     QTc Int : 457 ms    Sinus bradycardia with marked sinus arrythmia with 1st degree A-V block  Left axis deviation  Nonspecific intraventricular block  Abnormal ECG  When compared with ECG of 14-JUN-2019 00:12,  Premature supraventricular complexes are no longer Present    Referred By: JEVON RUBY           Confirmed By:     EKG 12-lead [[id number]]     Order Status:  Sent Lab Status:  No result          Medications:  Reconciled Home Medications:      Medication List      START taking these medications    sotalol 80 MG tablet  Commonly known as:  BETAPACE  Take 1 tablet (80 mg total)  by mouth 2 (two) times daily.        CHANGE how you take these medications    warfarin 5 MG tablet  Commonly known as:  COUMADIN  TAKE ONE & ONE-HALF TABLETS BY MOUTH ONCE DAILY EXCEPT  ON  TUESDAYS  TAKE  1  TABLET ON TUESDAY  What changed:  See the new instructions.  Notes to patient:  Take 10 mg warfarin tonight, INR 1.9        CONTINUE taking these medications    aspirin 81 mg Tab  Take 81 mg by mouth every evening. 1 Tablet Oral Every night     ciclopirox 8 % Soln  Commonly known as:  PENLAC  Apply daily to affected nail. Must remove and restart weekly     folic acid 1 MG tablet  Commonly known as:  FOLVITE  TAKE ONE TABLET BY MOUTH ONCE DAILY     ketoconazole 2 % cream  Commonly known as:  NIZORAL  AAA bid to feet x 3 weeks     levothyroxine 112 MCG tablet  Commonly known as:  SYNTHROID  Take 1 tablet (112 mcg total) by mouth before breakfast.     mometasone 0.1 % ointment  Commonly known as:  ELOCON  aaa qd- bid for severe areas.  Avoid use on face and groin     NITROSTAT 0.4 MG SL tablet  Generic drug:  nitroGLYCERIN  DISSOLVE ONE TABLET UNDER THE TONGUE EVERY 5 MINUTES AS NEEDED FOR CHEST PAIN.  DO NOT EXCEED A TOTAL OF 3 DOSES IN 15 MINUTES NOW     omeprazole 20 MG capsule  Commonly known as:  PRILOSEC  Take 20 mg by mouth every other day.     predniSONE 5 MG tablet  Commonly known as:  DELTASONE  Take 5 mg by mouth once daily.     simvastatin 80 MG tablet  Commonly known as:  ZOCOR  Take 1 tablet (80 mg total) by mouth every evening.     tamsulosin 0.4 mg Cap  Commonly known as:  FLOMAX  Take 1 capsule (0.4 mg total) by mouth once daily.        STOP taking these medications    atenolol 25 MG tablet  Commonly known as:  TENORMIN            Indwelling Lines/Drains at time of discharge:   Lines/Drains/Airways          None          Time spent on the discharge of patient: 45 minutes  Patient was seen and examined on the date of discharge and determined to be suitable for discharge.         Radha Null  REED Glasgow  Department of Hospital Medicine  [facility]

## 2019-06-14 NOTE — PLAN OF CARE
Problem: Adult Inpatient Plan of Care  Goal: Plan of Care Review  Outcome: Outcome(s) achieved Date Met: 06/14/19  Pt free of falls/trauma/injuries.  Denies c/o SOB, CP, or discomfort.  Generalized skin remains CDI; no edema noted.  Pt cardioverted yesterday; pt remains in SR during this shift. Pt able to get AM dose of sotalol. Pt to be discharged home today. Pt tolerating plan of care. Will continue to monitor.

## 2019-06-14 NOTE — ASSESSMENT & PLAN NOTE
Atrial Fibrillation controlled currently with Beta Blocker. [id number] score 6/ 9.8%. Anticoagulation indicated currently. Anticoagulation done with warfarin.   - sotalol initiation,  baseline, EP monitoring and dosing. Current qtc 488  - continue home warfarin schedule with INR checks daily, INR 1.9, needs to take 10 mg dose tonight, then go back to his regular schedule.  -  11 am 6/14

## 2019-06-14 NOTE — PROGRESS NOTES
6/14/19 received following message:  Mr. Skelton is being discharged from the hospital today after being initiated on sotalol. While inpatient, he was initiated on his home regimen (7.5 mg QD except 5 mg on Tuesday). His INR began to slightly downtrend and he was advised to take a one-time 10 mg dose as a boost (instead of his 7.5 mg dose). He is instructed to restart the same regimen. Please follow-up as you see appropriate.     See calendar for dose and f/u plan

## 2019-06-14 NOTE — SUBJECTIVE & OBJECTIVE
Interval History: Doing well no issues, tolerated last dose of sotalol well    Review of Systems   Constitution: Negative for chills, decreased appetite and diaphoresis.   HENT: Negative for congestion and ear discharge.    Eyes: Negative for blurred vision and discharge.   Cardiovascular: Negative for chest pain, dyspnea on exertion, irregular heartbeat, leg swelling and paroxysmal nocturnal dyspnea.   Respiratory: Negative for cough, hemoptysis and shortness of breath.    Gastrointestinal: Negative for abdominal pain.     Objective:     Vital Signs (Most Recent):  Temp: 98 °F (36.7 °C) (06/14/19 0734)  Pulse: (!) 56 (06/14/19 1000)  Resp: 16 (06/14/19 0734)  BP: (!) 149/76 (06/14/19 0734)  SpO2: 96 % (06/14/19 0734) Vital Signs (24h Range):  Temp:  [97 °F (36.1 °C)-98.1 °F (36.7 °C)] 98 °F (36.7 °C)  Pulse:  [54-75] 56  Resp:  [12-20] 16  SpO2:  [95 %-98 %] 96 %  BP: ()/(53-91) 149/76     Weight: 111.8 kg (246 lb 9.4 oz)  Body mass index is 30.82 kg/m².     SpO2: 96 %  O2 Device (Oxygen Therapy): room air    Physical Exam   Constitutional: He is oriented to person, place, and time. He appears well-developed and well-nourished. No distress.   Eyes: Pupils are equal, round, and reactive to light. Conjunctivae are normal.   Neck: No tracheal deviation present. No thyromegaly present.   Cardiovascular: Normal rate, regular rhythm, normal heart sounds and intact distal pulses. Exam reveals no gallop and no friction rub.   No murmur heard.  Pulmonary/Chest: Effort normal and breath sounds normal. No respiratory distress. He has no wheezes. He has no rales.   Abdominal: Soft. Bowel sounds are normal. He exhibits no distension. There is no tenderness.   Musculoskeletal: He exhibits no edema or deformity.   Neurological: He is alert and oriented to person, place, and time. No cranial nerve deficit. Coordination normal.   Skin: Skin is warm and dry. He is not diaphoretic.   Psychiatric: He has a normal mood and  affect. His behavior is normal.       Significant Labs:   EP:   Recent Labs   Lab 06/13/19  0421 06/14/19  0414 06/14/19  0415     --  138   K 4.6  --  4.3     --  103   CO2 25  --  24     --  68*   BUN 23  --  24*   CREATININE 1.3  --  1.2   CALCIUM 8.9  --  9.0   ANIONGAP 9  --  11   ESTGFRAFRICA 57.9*  --  >60.0   EGFRNONAA 50.1*  --  55.2*   INR 2.1* 1.9*  --        Significant Imaging: Echocardiogram: Transthoracic echo (TTE) complete (Cupid Only): No results found for this or any previous visit.

## 2019-06-14 NOTE — ASSESSMENT & PLAN NOTE
- Patient seen and examined  - Continue 80 BID Sotalol and Coumadin  - EUGENE/DCCV today AM   - Continue Coumadin (therapeutic 2.1)  - QTc up to 550 acceptable due to IVCD QRS of 130  - Ok for D/C today, tolerated last sotalol dose  - follow up as outpatient with Dr. Topete in 4-6 weeks.  - EPwill sign off at this time. Please call back with any questions

## 2019-06-14 NOTE — PROGRESS NOTES
Pt discharged per MD orders.  Tele discontinued and returned to station.  IV discontinued; catheter tip intact x1.  Medication list and prescriptions reviewed; prescriptions sent to pt preferred pharmacy. Pt verbalizes understanding of all written and verbal discharge instructions.  Pt awaiting family/escort arrival.  Will continue to monitor.

## 2019-06-14 NOTE — PLAN OF CARE
Problem: Adult Inpatient Plan of Care  Goal: Plan of Care Review  Outcome: Ongoing (interventions implemented as appropriate)  Sotalol given per MD orders. EKG performed 2 hours after dose given. VSS. Pt denies SOB or chest pain. Pt remain on tele. Fall precautions maintained. Pt free of falls and injuries. POC discussed with patient/family, verbalized understanding. Questions answered, no distress at present.

## 2019-06-14 NOTE — PLAN OF CARE
06/14/19 1357   Final Note   Assessment Type Final Discharge Note   Anticipated Discharge Disposition Home   Hospital Follow Up  Appt(s) scheduled? Yes

## 2019-06-14 NOTE — PLAN OF CARE
06/14/19 0929   Post-Acute Status   Post-Acute Authorization Other   Other Status No Post-Acute Service Needs

## 2019-06-14 NOTE — PROGRESS NOTES
[facility]  Cardiac Electrophysiology  Progress Note    Admission Date: 6/11/2019  Code Status: Full Code   Attending Physician: Dickson Grissom MD   Expected Discharge Date: 6/14/2019  Principal Problem:Paroxysmal atrial fibrillation    Subjective:     Interval History: Doing well no issues, tolerated last dose of sotalol well    Review of Systems   Constitution: Negative for chills, decreased appetite and diaphoresis.   HENT: Negative for congestion and ear discharge.    Eyes: Negative for blurred vision and discharge.   Cardiovascular: Negative for chest pain, dyspnea on exertion, irregular heartbeat, leg swelling and paroxysmal nocturnal dyspnea.   Respiratory: Negative for cough, hemoptysis and shortness of breath.    Gastrointestinal: Negative for abdominal pain.     Objective:     Vital Signs (Most Recent):  Temp: 98 °F (36.7 °C) (06/14/19 0734)  Pulse: (!) 56 (06/14/19 1000)  Resp: 16 (06/14/19 0734)  BP: (!) 149/76 (06/14/19 0734)  SpO2: 96 % (06/14/19 0734) Vital Signs (24h Range):  Temp:  [97 °F (36.1 °C)-98.1 °F (36.7 °C)] 98 °F (36.7 °C)  Pulse:  [54-75] 56  Resp:  [12-20] 16  SpO2:  [95 %-98 %] 96 %  BP: ()/(53-91) 149/76     Weight: 111.8 kg (246 lb 9.4 oz)  Body mass index is 30.82 kg/m².     SpO2: 96 %  O2 Device (Oxygen Therapy): room air    Physical Exam   Constitutional: He is oriented to person, place, and time. He appears well-developed and well-nourished. No distress.   Eyes: Pupils are equal, round, and reactive to light. Conjunctivae are normal.   Neck: No tracheal deviation present. No thyromegaly present.   Cardiovascular: Normal rate, regular rhythm, normal heart sounds and intact distal pulses. Exam reveals no gallop and no friction rub.   No murmur heard.  Pulmonary/Chest: Effort normal and breath sounds normal. No respiratory distress. He has no wheezes. He has no rales.   Abdominal: Soft. Bowel sounds are normal. He exhibits no distension. There is no  tenderness.   Musculoskeletal: He exhibits no edema or deformity.   Neurological: He is alert and oriented to person, place, and time. No cranial nerve deficit. Coordination normal.   Skin: Skin is warm and dry. He is not diaphoretic.   Psychiatric: He has a normal mood and affect. His behavior is normal.       Significant Labs:   EP:   Recent Labs   Lab 06/13/19  0421 06/14/19  0414 06/14/19  0415     --  138   K 4.6  --  4.3     --  103   CO2 25  --  24     --  68*   BUN 23  --  24*   CREATININE 1.3  --  1.2   CALCIUM 8.9  --  9.0   ANIONGAP 9  --  11   ESTGFRAFRICA 57.9*  --  >60.0   EGFRNONAA 50.1*  --  55.2*   INR 2.1* 1.9*  --        Significant Imaging: Echocardiogram: Transthoracic echo (TTE) complete (Cupid Only): No results found for this or any previous visit.    Assessment and Plan:     * Paroxysmal atrial fibrillation  - Patient seen and examined  - Continue 80 BID Sotalol and Coumadin  - EUGENE/DCCV today AM   - Continue Coumadin (therapeutic 2.1)  - QTc up to 550 acceptable due to IVCD QRS of 130  - Ok for D/C today, tolerated last sotalol dose  - follow up as outpatient with Dr. Topete in 4-6 weeks.  - EPwill sign off at this time. Please call back with any questions            Lizzy Enriquez MD  Cardiac Electrophysiology  [facility]

## 2019-06-17 ENCOUNTER — PATIENT OUTREACH (OUTPATIENT)
Dept: ADMINISTRATIVE | Facility: CLINIC | Age: 84
End: 2019-06-17

## 2019-06-17 NOTE — PATIENT INSTRUCTIONS
Sotalol tablets (Betapace)  What is this medicine?  SOTALOL (LICO ta lole) is a beta-blocker. Beta-blockers reduce the workload on the heart and help it to beat more regularly. This medicine is used to treat heart rhythm problems and to slow rapid heartbeats. This medicine can help your heart to return to and maintain a normal rhythm.  How should I use this medicine?  Take this medicine by mouth with a glass of water. Follow the directions on the prescription label. Take your doses at regular intervals. Do not take your medicine more often than directed. Do not stop taking this medicine suddenly. This could lead to serious heart-related effects.  Talk to your pediatrician regarding the use of this medicine in children. Special care may be needed. While this medicine may be used in children for selected conditions precautions do apply.  What side effects may I notice from receiving this medicine?  Side effects that you should report to your doctor or health care professional as soon as possible:  · chest pain  · cold, tingling, or numb hands or feet  · confusion  · diarrhea  · difficulty breathing, wheezing  · irregular heartbeat  · muscle aches and pains  · skin rash  · slow heart rate  · sweating  · swollen legs or ankles  · tremor, shakes  · vomiting  Side effects that usually do not require medical attention (report to your doctor or health care professional if they continue or are bothersome):  · change in sex drive or performance  · mental depression  · nausea  · weakness or tiredness  What may interact with this medicine?  Do not take this medicine with any of the following medications:  · amoxapine  · arsenic trioxide  · certain antibiotics like gatifloxacin, grepafloxacin, levofloxacin, moxifloxacin, sparfloxacin, telithromycin  · cisapride  · droperidol  · haloperidol  · hawthorn  · maprotiline  · medicines for malaria like chloroquine and halofantrine  · medicines to control heart  rhythm  · methadone  · pentamidine  · phenothiazines like prochlorperazine, perphenazine, thioridazine, and others  · pimozide  · ranolazine  · tricyclic antidepressants like amitriptyline, imipramine, nortriptyline, and others  · vardenafil  · ziprasidone  This medicine may also interact with the following medications:  · antacids  · certain antibiotics such as clarithromycin and erythromycin  · clonidine  · digoxin  · medicines for angina or high blood pressure  · medicines for colds and breathing difficulties  · medicines for diabetes  · other beta-blockers like atenolol, metoprolol, propranolol and others  What if I miss a dose?  If you miss a dose, take it as soon as you can. If it is almost time for your next dose, take only that dose. Do not take double or extra doses.  Where should I keep my medicine?  Keep out of the reach of children.  Store at room temperature between 15 and 30 degrees C (59 and 86 degrees F). Throw away any unused medicine after the expiration date.  What should I tell my health care provider before I take this medicine?  They need to know if you have any of these conditions:  · diabetes  · heart or vessel disease like slow heart rate, worsening heart failure, heart block, sick sinus syndrome or Raynaud's disease  · kidney disease  · liver disease  · history of low levels of potassium or magnesium  · lung or breathing disease, like asthma or emphysema  · pheochromocytoma  · recent heart attack  · thyroid disease  · an unusual or allergic reaction to sotalol, other beta-blockers, medicines, foods, dyes, or preservatives  · pregnant or trying to get pregnant  · breast-feeding  What should I watch for while using this medicine?  Visit your doctor or health care professional for regular checks on your progress. Check your heart rate and blood pressure regularly while you are taking this medicine. Ask your doctor or health care professional what your heart rate and blood pressure should be,  and when you should contact him or her. Your doctor or health care professional also may schedule regular blood tests and electrocardiograms to check your progress.  Because your condition and the use of this medicine carry some risk, it is a good idea to carry an identification card, necklace or bracelet with details of your condition, medications, and doctor or health care professional.  You may get drowsy or dizzy. Do not drive, use machinery, or do anything that needs mental alertness until you know how this drug affects you. Do not stand or sit up quickly, especially if you are an older patient. This reduces the risk of dizzy or fainting spells. Alcohol can make you more drowsy and dizzy. Avoid alcoholic drinks.  Do not treat yourself for coughs, colds, or pain while you are taking this medicine without asking your doctor or health care professional for advice. Some ingredients may increase your blood pressure.  If you are going to have surgery, tell your doctor or health care professional that you are taking this medicine.  NOTE:This sheet is a summary. It may not cover all possible information. If you have questions about this medicine, talk to your doctor, pharmacist, or health care provider. Copyright© 2019 [employer]

## 2019-06-18 ENCOUNTER — PATIENT MESSAGE (OUTPATIENT)
Dept: DERMATOLOGY | Facility: CLINIC | Age: 84
End: 2019-06-18

## 2019-06-19 ENCOUNTER — ANTI-COAG VISIT (OUTPATIENT)
Dept: CARDIOLOGY | Facility: CLINIC | Age: 84
End: 2019-06-19
Payer: MEDICARE

## 2019-06-19 ENCOUNTER — CLINICAL SUPPORT (OUTPATIENT)
Dept: FAMILY MEDICINE | Facility: CLINIC | Age: 84
End: 2019-06-19
Payer: MEDICARE

## 2019-06-19 ENCOUNTER — LAB VISIT (OUTPATIENT)
Dept: LAB | Facility: HOSPITAL | Age: 84
End: 2019-06-19
Attending: INTERNAL MEDICINE
Payer: MEDICARE

## 2019-06-19 DIAGNOSIS — I48.0 PAF (PAROXYSMAL ATRIAL FIBRILLATION): ICD-10-CM

## 2019-06-19 DIAGNOSIS — E29.1 HYPOGONADISM IN MALE: Primary | ICD-10-CM

## 2019-06-19 DIAGNOSIS — Z79.01 LONG TERM (CURRENT) USE OF ANTICOAGULANTS: ICD-10-CM

## 2019-06-19 LAB
INR PPP: 2.3 (ref 0.8–1.2)
PROTHROMBIN TIME: 22 SEC (ref 9–12.5)

## 2019-06-19 PROCEDURE — 36415 COLL VENOUS BLD VENIPUNCTURE: CPT | Mod: HCNC,PO

## 2019-06-19 PROCEDURE — 85610 PROTHROMBIN TIME: CPT | Mod: HCNC

## 2019-06-19 PROCEDURE — 96372 THER/PROPH/DIAG INJ SC/IM: CPT | Mod: HCNC,S$GLB,, | Performed by: INTERNAL MEDICINE

## 2019-06-19 PROCEDURE — 93793 PR ANTICOAGULANT MGMT FOR PT TAKING WARFARIN: ICD-10-PCS | Mod: S$GLB,,,

## 2019-06-19 PROCEDURE — 93793 ANTICOAG MGMT PT WARFARIN: CPT | Mod: S$GLB,,,

## 2019-06-19 PROCEDURE — 96372 PR INJECTION,THERAP/PROPH/DIAG2ST, IM OR SUBCUT: ICD-10-PCS | Mod: HCNC,S$GLB,, | Performed by: INTERNAL MEDICINE

## 2019-06-19 RX ADMIN — TESTOSTERONE CYPIONATE 100 MG: 200 INJECTION, SOLUTION INTRAMUSCULAR at 08:06

## 2019-06-20 NOTE — PROGRESS NOTES
"Patient is refusing weekly INR which is normal protocol s/p DCCV. Checking with Dr. Topete if patient doesn't need them.     Per reply from Dr. Topete "It is our standard protocol for weekly INRs post-DCCV x 4 weeks to ensure he stays above 2 due to stroke risk in the immediate post-DCCV period."    "

## 2019-06-22 DIAGNOSIS — N40.0 BENIGN NON-NODULAR PROSTATIC HYPERPLASIA WITHOUT LOWER URINARY TRACT SYMPTOMS: ICD-10-CM

## 2019-06-22 DIAGNOSIS — E23.0 PANHYPOPITUITARISM: ICD-10-CM

## 2019-06-22 RX ORDER — FOLIC ACID 1 MG/1
TABLET ORAL
Qty: 90 TABLET | Refills: 3 | Status: SHIPPED | OUTPATIENT
Start: 2019-06-22 | End: 2020-06-26

## 2019-06-22 RX ORDER — LEVOTHYROXINE SODIUM 112 UG/1
TABLET ORAL
Qty: 90 TABLET | Refills: 3 | Status: SHIPPED | OUTPATIENT
Start: 2019-06-22 | End: 2020-06-26

## 2019-06-22 RX ORDER — TAMSULOSIN HYDROCHLORIDE 0.4 MG/1
CAPSULE ORAL
Qty: 90 CAPSULE | Refills: 3 | Status: SHIPPED | OUTPATIENT
Start: 2019-06-22 | End: 2020-06-26

## 2019-06-26 ENCOUNTER — INITIAL CONSULT (OUTPATIENT)
Dept: HEMATOLOGY/ONCOLOGY | Facility: CLINIC | Age: 84
End: 2019-06-26
Payer: MEDICARE

## 2019-06-26 VITALS
WEIGHT: 252.44 LBS | DIASTOLIC BLOOD PRESSURE: 83 MMHG | TEMPERATURE: 98 F | HEART RATE: 57 BPM | HEIGHT: 75 IN | SYSTOLIC BLOOD PRESSURE: 155 MMHG | BODY MASS INDEX: 31.39 KG/M2 | OXYGEN SATURATION: 97 %

## 2019-06-26 DIAGNOSIS — N18.30 CHRONIC KIDNEY DISEASE, STAGE 3: ICD-10-CM

## 2019-06-26 DIAGNOSIS — I48.0 PAF (PAROXYSMAL ATRIAL FIBRILLATION): ICD-10-CM

## 2019-06-26 DIAGNOSIS — Z79.01 LONG TERM (CURRENT) USE OF ANTICOAGULANTS: ICD-10-CM

## 2019-06-26 DIAGNOSIS — C84.A0 PLEOMORPHIC SMALL OR MEDIUM-SIZED CELL CUTANEOUS T-CELL LYMPHOMA: Primary | ICD-10-CM

## 2019-06-26 DIAGNOSIS — Z86.718 HISTORY OF DEEP VEIN THROMBOSIS (DVT) OF LOWER EXTREMITY: ICD-10-CM

## 2019-06-26 PROCEDURE — 3077F SYST BP >= 140 MM HG: CPT | Mod: HCNC,CPTII,S$GLB, | Performed by: INTERNAL MEDICINE

## 2019-06-26 PROCEDURE — 99215 PR OFFICE/OUTPT VISIT, EST, LEVL V, 40-54 MIN: ICD-10-PCS | Mod: HCNC,GC,S$GLB, | Performed by: INTERNAL MEDICINE

## 2019-06-26 PROCEDURE — 1101F PT FALLS ASSESS-DOCD LE1/YR: CPT | Mod: HCNC,CPTII,S$GLB, | Performed by: INTERNAL MEDICINE

## 2019-06-26 PROCEDURE — 99999 PR PBB SHADOW E&M-EST. PATIENT-LVL III: ICD-10-PCS | Mod: PBBFAC,HCNC,, | Performed by: INTERNAL MEDICINE

## 2019-06-26 PROCEDURE — 99999 PR PBB SHADOW E&M-EST. PATIENT-LVL III: CPT | Mod: PBBFAC,HCNC,, | Performed by: INTERNAL MEDICINE

## 2019-06-26 PROCEDURE — 1101F PR PT FALLS ASSESS DOC 0-1 FALLS W/OUT INJ PAST YR: ICD-10-PCS | Mod: HCNC,CPTII,S$GLB, | Performed by: INTERNAL MEDICINE

## 2019-06-26 PROCEDURE — 3079F PR MOST RECENT DIASTOLIC BLOOD PRESSURE 80-89 MM HG: ICD-10-PCS | Mod: HCNC,CPTII,S$GLB, | Performed by: INTERNAL MEDICINE

## 2019-06-26 PROCEDURE — 3079F DIAST BP 80-89 MM HG: CPT | Mod: HCNC,CPTII,S$GLB, | Performed by: INTERNAL MEDICINE

## 2019-06-26 PROCEDURE — 99215 OFFICE O/P EST HI 40 MIN: CPT | Mod: HCNC,GC,S$GLB, | Performed by: INTERNAL MEDICINE

## 2019-06-26 PROCEDURE — 3077F PR MOST RECENT SYSTOLIC BLOOD PRESSURE >= 140 MM HG: ICD-10-PCS | Mod: HCNC,CPTII,S$GLB, | Performed by: INTERNAL MEDICINE

## 2019-06-26 NOTE — PROGRESS NOTES
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 Melanoma in situ, superficial spreading type  - 12/26/16: Noted on shave biopsy of skin of crown of scalp, with this extending to lateral margins  -  1/23/17: Excision of skin on crown of scalp - also pt with residual malignant melanoma in situ, margins negative     3. DVT, Left lower extremity - noted in 1997, after trauma to thigh. IVC filter placed    4. Atrial Fibrillation - CHADSVASc of 6, prior ablation on 02/26/16, subsequent use of dofetilide and amiodarone, now on Sotalol. Recently admitted for sotalol loading between 6/11/19 - 6/14/19  On Coumadin 7.5 mg everyday except 5 mg on Thursday    5. CAD w stents - to RCA in 2000  6. 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   7. Hypothyroidism - on levothyroxine. With hx of benign L colloid nodule, bx in 1999  8. Erectile dysfunction - testosterone  9. CKD III, baseline Cr of 1.2 - 1.4   supplementation. Possibly PCKD? w cysts of liver and kidney  10. Tubular adenoma, descending colon - noted on colonoscopy 2014  11. BPH - negative prostate Bx in 7/2018  12. Gangrenous cholecystitis - s/p cholecystectomy in 06/2018  13. Hearing impaired  14. 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 Wheeler. He had seen one our hematologists in clinic previously, Dr. Melgar, in   January 2017. The pt had a  hyperrpigmented rash in bilateral arms (most distribution),  abdominal flanks, and also least in lower extremities at that time. The pt had a shave biopsy of hyperpigmented rash of R upper arm in 11/9/16 and left abdomen (atypical lymphocytic infiltrate w prominent epidermotropism); TCR gene rearrangement negative. He had been using triamcinolone 1% cream about once a day at the time of biopsy. It was recommended that he discontinue the steroids and that a biopsy be repeated. He had done well clinically after this point.    Also, the pt thinks that rash on his L lateral knee may have been present around the time of his initial visit with Dr. Melgar. 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 He started using mometasone 0.1% (to L lateral knee) about once a day and applies that to the L lateral knee region; pt feels that rash has improved. The pt states that he lost at least 15 pounds last year, but some of this is attributed to gangrenous cholecystitis in 2018 and his wife passing away. He has not had any fevers, sweats, or chills. Patient denies any pruritus and overall, the rash does not seem to bother him much.            Review of patient's allergies indicates:  No Known Allergies  Past Medical History:   Diagnosis Date    ACS (acute coronary syndrome)     Acute coronary syndrome 2000    NSTEMI    Anticoagulant long-term use     Coumadin    Atrial fibrillation     Basal cell carcinoma excised     left scalp vertex    Bilateral leg edema 1/6/2014    BPH (benign prostatic hyperplasia)     CKD (chronic kidney disease)     Coronary artery disease     DVT of leg (deep venous thrombosis) 09/08/2014    On coumadin for years    Encounter for blood transfusion     Hyperlipidemia     Hypertension     Hypogonadism, male     Leg fracture, left     Melanoma 01/04/2017     in situ crown of  scalp    MI (myocardial infarction)     Panhypopituitarism     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    Squamous Cell Carcinoma excised     left posterior scalp    Thyroid disease      Past Surgical History:   Procedure Laterality Date    ABLATION N/A 2/25/2016    Performed [facility] at [facility] CATH LAB    APPENDECTOMY      BASAL CELL CARCINOMA EXCISION  01/2008    BRAIN SURGERY      pituitary adenoma removed    CARDIAC CATHETERIZATION  05/09/2000    S/P stent to RCA,    CARDIAC CATHETERIZATION      stents placed    CARDIAC ELECTROPHYSIOLOGY STUDY AND ABLATION      Cardioversion or Defibrillation Bilateral 6/13/2019    Performed by Eric Nguyen [facility] at [facility] EP LAB    CHOLECYSTECTOMY, LAPAROSCOPIC N/A 6/2/2018    [facility]    COLONOSCOPY N/A 1/7/2014    Performed by Dell Elena MD at [facility] ENDO (4TH FLR)    CORONARY ANGIOPLASTY  5/9/2000    RCA    CORONARY ANGIOPLASTY WITH STENT PLACEMENT      ECHOCARDIOGRAM,TRANSESOPHAGEAL N/A 6/13/2019    Performed by [facility] Provider at [facility] EP LAB    ESOPHAGEAL DILATION      EYE SURGERY Bilateral     cataracts removed and new lenses placed     FRACTURE SURGERY Left     leg    HERNIA REPAIR      left femur surgery      pituitatry      hypophysectomy    REMOVAL-MASS Left 12/12/2013    Performed by Burt Woodard [facility] at [facility] OR 2ND FLR    SKIN BIOPSY      TONSILLECTOMY      VASCULAR SURGERY       Family History   Problem Relation Age of Onset    Cancer Father     Skin cancer Neg Hx     Melanoma Neg Hx     Heart disease Neg Hx     Hypertension Neg Hx      Social History     Tobacco Use    Smoking status: Never Smoker    Smokeless tobacco: Never Used   Substance Use Topics    Alcohol use: No     Frequency: Never    Drug use: No     Review of Systems   Constitutional: Positive for weight loss. Negative for chills, fever and malaise/fatigue.    Respiratory: Positive for shortness of breath.    Cardiovascular: Positive for leg swelling (chronic LLE swelling). Negative for chest pain.        Intermittent palpitations, controlled over last week   Gastrointestinal: Negative for abdominal pain, constipation, diarrhea, nausea and vomiting.   Musculoskeletal: Positive for joint pain.   Skin: Positive for rash. Negative for itching.   Neurological: Negative for dizziness, focal weakness, loss of consciousness and headaches.     OBJECTIVE:     Vital Signs:  Temp:  [97.6 °F (36.4 °C)]   Pulse:  [57]   BP: (155)/(83)   SpO2:  [97 %]     Physical Exam   Constitutional: He is oriented to person, place, and time. He appears well-developed and well-nourished. No distress.   Elderly  male   HENT:   Head: Normocephalic and atraumatic.   Mouth/Throat: No oropharyngeal exudate.   Eyes: Pupils are equal, round, and reactive to light. EOM are normal. No scleral icterus.   Neck: Normal range of motion. Neck supple.   Cardiovascular: Normal rate, regular rhythm and normal heart sounds. Exam reveals no gallop and no friction rub.   No murmur heard.  Pulmonary/Chest: Effort normal and breath sounds normal. No respiratory distress. He has no wheezes. He has no rales.   Abdominal: Soft. Bowel sounds are normal. He exhibits no distension. There is no tenderness. There is no guarding.   + umbilical hernia   Musculoskeletal: Normal range of motion.   Lymphadenopathy:     He has no cervical adenopathy.   Neurological: He is alert and oriented to person, place, and time. No cranial nerve deficit.   Skin: Skin is warm and dry. He is not diaphoretic.   Mild erythema in R forearm; Hyperpigmented pactches in bilatearl abdomen; additional hyperpigemented patch in L lateral knee and also on R lower leg  Left lower extremity edema   Psychiatric: He has a normal mood and affect.     Laboratory:  No new labs completed at this visit    Pathology:  ORDERING PHYSICIAN(S)  MEL  NIGEL  CLINICAL DIAGNOSIS/INFORMATION  dm  PreOperative Diagnosis  Rule out CTCL.  SPECIMEN  1) Right upper arm.  2) Left abdomen.  Supplemental Diagnosis  T-cell receptor gene rearrangement analysis by PCR was performed by [facility] laboratories on both specimens  1 and 2. Both studies were invalid due to inadequate amplification of T-cell receptor target DNA.  (Electronically Signed: 2016-11-18 11:25:03 )  Diagnosed by: Reji Collado M.D.  FINAL PATHOLOGIC DIAGNOSIS  1. Skin, right upper arm, shave biopsy:  - Atypical dermal lymphocytic infiltrate with prominent epidermotropism.  MICROSCOPIC DESCRIPTION: The epidermis is remarkable for prominent infiltration by lymphocytes that is not  associated with significant epidermal spongiosis. Within the dermis there is a superficial perivascular lymphocytic  infiltrate. The lymphocytes in the epidermis and dermis demonstrate irregularly shaped, hyperchromatic nuclei. A  majority of these lymphocytes stain positive for both CD3 and CD4, while CD8 stains a sparser population of cells.  The approximate CD4 to CD8 ratio is 5:1. Appropriately reactive controls were reviewed. T-cell receptor gene  rearrangement analysis is pending, and results will be reported in an addendum.  2. Skin, abdomen, shave biopsy:  - Atypical dermal lymphocytic infiltrate with prominent epidermotropism, suggestive of patch stage mycosis  fungoides.  MICROSCOPIC DESCRIPTION: The epidermis is remarkable for prominent infiltration by lymphocytes that is not  associated with significant epidermal spongiosis. Within the dermis there is a superficial perivascular lymphocytic  infiltrate. The lymphocytes in the epidermis and dermis demonstrate irregularly shaped, hyperchromatic nuclei. A  majority of these lymphocytes stain positive for both CD3 and CD4, while CD8 stains a sparser population of cells.  The approximate CD4 to CD8 ratio is 8:1. Appropriately reactive controls were reviewed. T-cell  "receptor gene  rearrangement analysis is pending, and results will be reported in an addendum.  Diagnosed by: Reji Collado M.D.  (Electronically Signed: 2016-11-14 11:26:12)      This case has been supplemented in order to report the results of T-cell receptor gene rearrangement analysis.  (Supplemented by: Reji Collado 11/26/2018 3:26:57 PM)  ORDERING PHYSICIAN(S)  NIGEL LEAL  CLINICAL DIAGNOSIS/INFORMATION  dm  PreOperative Diagnosis  1. Rule out atypical nevus.  2. Rule out asteotatic eczema vs. CTCL.  SPECIMEN  1) Left upper posterior thigh.  2) Left lateral knee.  Supplemental Diagnosis  T-cell receptor gene rearrangement analysis by PCR was performed by [facility]. T cell gene  rearrangement is negative. No clonal T-cell gene rearrangement is detected.  (Electronically Signed: 2018-11-26 15:27:47 )  Diagnosed by: Reji Collado M.D.  FINAL PATHOLOGIC DIAGNOSIS  1. 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  - MARGINS ARE NEGATIVE IN THE PLANES OF SECTION.  MICROSCOPIC DESCRIPTION: Sections show a circumscribed, symmetric compound nevus characterized by  nests of melanocytes with scattered single cells located predominantly along the dermoepidermal junction. There is  architectural disorder consisting of irregularity in the size, shape and distribution of the nests at the  dermal-epidermal junction and "bridging" between rete ridges. There is no significant cytologic atypia or pagetoid  growth. Nests and cords of melanocytes are present within the papillary dermis and show maturation with dermal descent. Papillary dermal fibrosis, a mild lymphocytic infiltrate and melanophages are noted. There is overlying  parakeratosis with pigment deposition, indicative of trauma.  2. Skin, left lateral knee, shave biopsy:  - ATYPICAL SUPERFICIAL DERMAL LYMPHOCYTIC INFILTRATE " "WITH EPIDERMOTROPISM (SEE  COMMENT).  MICROSCOPIC DESCRIPTION: Stratum corneum shows compact orthokeratosis. The epidermis is remarkable for  prominent infiltration by lymphocytes but also shows some focal mild spongiosis. Within the dermis there is a  superficial perivascular lymphocytic infiltrate exhibiting "tagging" along the dermoepidermal junction. A subset of the  lymphocytes in the epidermis and dermis demonstrate irregularly shaped, hyperchromatic nuclei. PAS stain is  negative for fungi. A majority of the superficial dermal and intraepidermal lymphocytes stain positive for both CD 3  and CD4, while 8 stains a significantly sparser population of cells. The approximate CD4:CD8 ratio is 6:1. The  lymphocytes also exhibit a loss of CD7. Appropriately reactive controls were reviewed. T-cell receptor gene  rearrangement is pending, and results will be reported in an addendum.  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 Repeat biopsies  over time from lesions not undergoing treatment with topical steroids may be required in order to establish a  definitive diagnosis.  Diagnosed by: Reji Collado M.D.  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  - 11/9/16:  Noted on shave biopsy of hyperpigmented rash of R upper arm and left abdomen (atypical lymphocytic infiltrate w prominent epidermotropism); TCR gene rearrangement negative  - 10/29/18: Noted on shave biopsy of hyperpigmented rash on L lateral knee; TCR gene rearrangement negative  - as noted above, all TCR gene rearrnagement studies negative on prior biopsy   - pt currently on traminilone 0.1% (applies to most of rash), mometasone 0.1% (to L lateral knee)  - would agree with prior recommendation that optimal biopsy would occur while patient is off steroids  - other possible options in this disease include topical mecholrethamine, local " radiation, topical retinoids, PUVA or UVB)  - No adenopathy or significant abnormalities in counts; no need for a BMBx or PET-CT at this time  - would favor not considering any systemic therapies due to patient being asx and also would like to have further confirmation of prior diagnosis  - will discuss with Dr. Wheeler about referral to CTCL specialist at [facility]      Discussed with Dr. Cholo Bennett MD  Hematology/Oncology Fellow, PGY IV  [facility]     Distress Screening Results: Psychosocial Distress screening score of Distress Score: 0 noted and reviewed. No intervention indicated.

## 2019-06-26 NOTE — LETTER
July 1, 2019      Kamini Wheeler MD  [address]  [facility] [address]           [facility]  [address]  [facility] [address]  Phone: [phone]          Patient: Linda Skelton   MR Number: [id number]   YOB: 1935   Date of Visit: 6/26/2019       Dear Dr. Kamini Wheeler:    Thank you for referring Linda Skelton to me for evaluation. Attached you will find relevant portions of my assessment and plan of care.    If you have questions, please do not hesitate to call me. I look forward to following Linda Skelton along with you.    Sincerely,    Pedro Emmanuel MD    Enclosure  CC:  No Recipients    If you would like to receive this communication electronically, please contact [email or web address] or [phone] to request more information on [facility] access.    For providers and/or their staff who would like to refer a patient to [facility], please contact us through our one-stop-shop provider referral line, [facility] , at [phone].    If you feel you have received this communication in error or would no longer like to receive these types of communications, please e-mail [email or web address]

## 2019-06-27 ENCOUNTER — LAB VISIT (OUTPATIENT)
Dept: LAB | Facility: HOSPITAL | Age: 84
End: 2019-06-27
Attending: INTERNAL MEDICINE
Payer: MEDICARE

## 2019-06-27 DIAGNOSIS — Z79.01 LONG TERM (CURRENT) USE OF ANTICOAGULANTS: ICD-10-CM

## 2019-06-27 LAB
INR PPP: 2.5 (ref 0.8–1.2)
PROTHROMBIN TIME: 24 SEC (ref 9–12.5)

## 2019-06-27 PROCEDURE — 36415 COLL VENOUS BLD VENIPUNCTURE: CPT | Mod: HCNC,PO

## 2019-06-27 PROCEDURE — 85610 PROTHROMBIN TIME: CPT | Mod: HCNC

## 2019-06-27 NOTE — PROGRESS NOTES
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 Wheeler, Dermatology  -  11/9/16:  Patches of mycosis fungoides noted on shave biopsy of hyperpigmented rash of R upper arm and left abdomen (atypical lymphocytic infiltrate w prominent epidermotropism); TCR gene rearrangement negative  - 10/29/18: Patches of mycosis fungoides noted on shave biopsy of hyperpigmented rash on L lateral knee; TCR gene rearrangement negative pt currently on triamcinolone 0.1% (applies to most of rash), mometasone 0.1% (to L lateral knee)    2. Melanoma in situ, superficial spreading type  - 12/26/16: Noted on shave biopsy of skin of crown of scalp, with disease extending to lateral margins  -  1/23/17: Excision of skin on crown of scalp - also pt with residual malignant melanoma in situ, margins negative     3. DVT, Left lower extremity - noted in 1997, after trauma to thigh. IVC filter placed  4. Atrial Fibrillation - CHADSVASc of 6, prior ablation on 02/26/16, subsequent use of dofetilide and amiodarone, now on Sotalol. Recently admitted for sotalol loading between 6/11/19 - 6/14/19Coumadin 7.5 mg everyday except 5 mg on Thursday  5. CAD w stents - to RCA in 2000  6. 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   7. Hypothyroidism - on levothyroxine. With hx of benign L colloid nodule, bx in 1999  8. Erectile dysfunction - on testosterone supplementation  9. CKD III, baseline Cr of 1.2 - 1.4, Possibly PCKD? w cysts of liver and kidney  10. Tubular adenoma, descending colon - noted on colonoscopy 2014  11. BPH - negative prostate Bx in 7/2018  12. Gangrenous cholecystitis - s/p cholecystectomy in 06/2018  13. Hearing impaired  14. 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 Wheeler. He had seen one our hematologists in clinic previously, Dr. Melgar, in  January 2017. The pt had a hyperrpigmented rash in bilateral arms (most distribution),  abdominal flanks, and also least in lower extremities at that time. The pt had a shave biopsy of hyperpigmented rash of R upper arm in 11/9/16 and left abdomen (atypical lymphocytic infiltrate w prominent epidermotropism); TCR gene rearrangement negative. He had been using triamcinolone 1% cream about once a day at the time of biopsy. It was recommended that he discontinue the steroids and that a biopsy be repeated. He had done well clinically after this point.    The pt thinks that rash on his L lateral knee may have been present around the time of his initial visit to Hematology clinic in 2017. 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 He started applying mometasone 0.1% to L lateral knee about once a day; pt feels that rash has improved. The pt states that he lost at least 15 pounds in the past year, but some of this weight loss is attributed to gangrenous cholecystitis and his wife passing away in 2018. He has not had any fevers, sweats, or chills. Patient denies any pruritus and the multiple regions of rash do not have any impact on his overall quality of life at this time.    Past Medical History:   Past Medical History:   Diagnosis Date    ACS (acute coronary syndrome)     Acute coronary syndrome 2000    NSTEMI    Anticoagulant long-term use     Coumadin    Atrial fibrillation     Basal cell carcinoma excised     left scalp vertex    Bilateral leg edema 1/6/2014    BPH (benign prostatic hyperplasia)     CKD (chronic kidney disease)     Coronary artery disease     DVT of leg (deep venous thrombosis) 09/08/2014     On coumadin for years    Encounter for blood transfusion     Hyperlipidemia     Hypertension     Hypogonadism, male     Leg fracture, left     Melanoma 01/04/2017     in situ crown of scalp    MI (myocardial infarction)     Panhypopituitarism     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    Squamous Cell Carcinoma excised     left posterior scalp    Thyroid disease        Past Surgical HIstory:   Past Surgical History:   Procedure Laterality Date    ABLATION N/A 2/25/2016    Performed [facility]ja [facility] at [facility] CATH LAB    APPENDECTOMY      BASAL CELL CARCINOMA EXCISION  01/2008    BRAIN SURGERY      pituitary adenoma removed    CARDIAC CATHETERIZATION  05/09/2000    S/P stent to RCA,    CARDIAC CATHETERIZATION      stents placed    CARDIAC ELECTROPHYSIOLOGY STUDY AND ABLATION      Cardioversion or Defibrillation Bilateral 6/13/2019    Performed by Eric Nguyen MD at [facility] EP LAB    CHOLECYSTECTOMY, LAPAROSCOPIC N/A 6/2/2018    Per[facility] Dickson Smith Jr[facility] at [facility] OR    COLONOSCOPY N/A 1/7/2014    Performed by Dell Elena MD at [facility] ENDO (4TH FLR)    CORONARY ANGIOPLASTY  5/9/2000    RCA    CORONARY ANGIOPLASTY WITH STENT PLACEMENT      ECHOCARDIOGRAM,TRANSESOPHAGEAL N/A 6/13/2019    Performed by [facility] at [facility] EP LAB    ESOPHAGEAL DILATION      EYE SURGERY Bilateral     cataracts removed and new lenses placed     FRACTURE SURGERY Left     leg    HERNIA REPAIR      left femur surgery      pituitatry      hypophysectomy    REMOVAL-MASS Left 12/12/2013    Performed by Burt Woodard MD at [facility] OR 2ND FLR    SKIN BIOPSY      TONSILLECTOMY      VASCULAR SURGERY         Family History:   Family History   Problem Relation Age of Onset    Cancer Father     Skin cancer Neg Hx     Melanoma Neg Hx     Heart disease Neg Hx     Hypertension Neg Hx        Social History:  reports that he has never smoked. He  has never used smokeless tobacco. He reports that he does not drink alcohol or use drugs.    Allergies:  Review of patient's allergies indicates:  No Known Allergies    Medications:  Current Outpatient Medications   Medication Sig Dispense Refill    aspirin 81 mg Tab Take 81 mg by mouth every evening. 1 Tablet Oral Every night      ciclopirox (PENLAC) 8 % Soln Apply daily to affected nail. Must remove and restart weekly 1 Bottle 5    folic acid (FOLVITE) 1 MG tablet TAKE 1 TABLET BY MOUTH ONCE DAILY 90 tablet 3    ketoconazole (NIZORAL) 2 % cream AAA bid to feet x 3 weeks 60 g 3    levothyroxine (SYNTHROID) 112 MCG tablet TAKE 1 TABLET BY MOUTH ONCE DAILY BEFORE  BREAKFAST 90 tablet 3    mometasone (ELOCON) 0.1 % ointment aaa qd- bid for severe areas.  Avoid use on face and groin 60 g 1    NITROSTAT 0.4 mg SL tablet DISSOLVE ONE TABLET UNDER THE TONGUE EVERY 5 MINUTES AS NEEDED FOR CHEST PAIN.  DO NOT EXCEED A TOTAL OF 3 DOSES IN 15 MINUTES NOW 25 tablet 3    omeprazole (PRILOSEC) 20 MG capsule Take 20 mg by mouth every other day.      predniSONE (DELTASONE) 5 MG tablet Take 5 mg by mouth once daily.      simvastatin (ZOCOR) 80 MG tablet Take 1 tablet (80 mg total) by mouth every evening. 90 tablet 3    sotalol (BETAPACE) 80 MG tablet Take 1 tablet (80 mg total) by mouth 2 (two) times daily. 60 tablet 2    tamsulosin (FLOMAX) 0.4 mg Cap TAKE 1 CAPSULE BY MOUTH ONCE DAILY 90 capsule 3    warfarin (COUMADIN) 5 MG tablet TAKE ONE & ONE-HALF TABLETS BY MOUTH ONCE DAILY EXCEPT  ON  TUESDAYS  TAKE  1  TABLET ON TUESDAY (Patient taking differently: 5 mg on Tuesday, 7.5 mg all other days as directed by Coumadin Clinic) 135 tablet 3     Current Facility-Administered Medications   Medication Dose Route Frequency Provider Last Rate Last Dose    testosterone cypionate injection 100 mg  100 mg Intramuscular Q14 Days Anderson Jerome MD   100 mg at 06/19/19 0893       Review of Systems   Constitutional: Positive for  "unexpected weight change. Negative for chills and fever.   Respiratory: Positive for shortness of breath. Negative for chest tightness.    Cardiovascular: Positive for palpitations (recent palpitations but none in past week) and leg swelling (chronic LLE swelling). Negative for chest pain.   Gastrointestinal: Negative for abdominal pain, nausea and vomiting.   Musculoskeletal: Positive for arthralgias.   Skin: Positive for rash.   Neurological: Negative for dizziness, syncope and headaches.   Hematological: Negative for adenopathy.       ECOG Performance Status: 1   Objective:      Vitals:   Vitals:    06/26/19 0854   BP: (!) 155/83   Pulse: (!) 57   Temp: 97.6 °F (36.4 °C)   SpO2: 97%   Weight: 114.5 kg (252 lb 6.8 oz)   Height: 6' 3" (1.905 m)     BMI: Body mass index is 31.55 kg/m².    Physical Exam   Constitutional: He is oriented to person, place, and time. He appears well-developed and well-nourished. No distress.   Elderly  male   HENT:   Head: Normocephalic and atraumatic.   Mouth/Throat: No oropharyngeal exudate.   Eyes: Pupils are equal, round, and reactive to light. Conjunctivae and EOM are normal. No scleral icterus.   Neck: Normal range of motion. Neck supple.   Cardiovascular: Normal rate, regular rhythm and normal heart sounds. Exam reveals no gallop and no friction rub.   No murmur heard.  Pulmonary/Chest: Effort normal and breath sounds normal. No respiratory distress. He has no wheezes. He has no rales.   Abdominal: Soft. Bowel sounds are normal. He exhibits no distension. There is no tenderness. There is no guarding.   + umbilical hernia   Musculoskeletal: Normal range of motion. He exhibits edema.   + chronic Left lower extremity edema   Lymphadenopathy:     He has no cervical adenopathy.   Neurological: He is alert and oriented to person, place, and time. No cranial nerve deficit.   Skin: Skin is warm and dry. He is not diaphoretic.   Mild erythema in R forearm; Hyperpigmented pactches " in bilateral abdomen; additional hyperpigmented patch in L lateral knee and also on R lower leg       Laboratory Data:  No visits with results within 1 Week(s) from this visit.   Latest known visit with results is:   Lab Visit on 06/19/2019   Component Date Value Ref Range Status    Prothrombin Time 06/19/2019 22.0* 9.0 - 12.5 sec Final    INR 06/19/2019 2.3* 0.8 - 1.2 Final    Comment: Coumadin Therapy:  2.0 - 3.0 for INR for all indicators except mechanical heart valves  and antiphospholipid syndromes which should use 2.5 - 3.5.           Imaging:   ORDERING PHYSICIAN(S)NIGEL LEAL  CLINICAL DIAGNOSIS/INFORMATION  PreOperative Diagnosis Rule out CTCL  SPECIMEN 1) Right upper arm 2.) Left abdomen.  Supplemental Diagnosis  T-cell receptor gene rearrangement analysis by PCR was performed by [facility] on both specimensand 2. Both studies were invalid due to inadequate amplification of T-cell receptor target DNA.Signed: 2016-11-18 11:25:03 )by: Reji Collado M.D.  FINAL PATHOLOGIC DIAGNOSIS  1. Skin, right upper arm, shave biopsy: Atypical dermal lymphocytic infiltrate with prominent epidermotropism.DESCRIPTION: The epidermis is remarkable for prominent infiltration by lymphocytes that is notwith significant epidermal spongiosis. Within the dermis there is a superficial perivascular lymphocyticThe lymphocytes in the epidermis and dermis demonstrate irregularly shaped, hyperchromatic nuclei. Aof these lymphocytes stain positive for both CD3 and CD4, while CD8 stains a sparser population of cells.approximate CD4 to CD8 ratio is 5:1. Appropriately reactive controls were reviewed. T-cell receptor geneanalysis is pending, and results will be reported in an addendum.  2. Skin, abdomen, shave biopsy: Atypical dermal lymphocytic infiltrate with prominent epidermotropism, suggestive of patch stage mycosisDESCRIPTION: The epidermis is remarkable for prominent infiltration by lymphocytes that is notwith  "significant epidermal spongiosis. Within the dermis there is a superficial perivascular lymphocyticThe lymphocytes in the epidermis and dermis demonstrate irregularly shaped, hyperchromatic nuclei. Aof these lymphocytes stain positive for both CD3 and CD4, while CD8 stains a sparser population of cells.approximate CD4 to CD8 ratio is 8:1. Appropriately reactive controls were reviewed. T-cell receptor geneanalysis is pending, and results will be reported in an addendum.by: Reji Collado M.D.Signed: 2016-11-14 11:26:12)\pard\This case has been supplemented in order to report the results of T-cell receptor gene rearrangement analysis.by: Reji Collado 11/26/2018 3:26:57 PM)    ORDERING PHYSICIAN(S)\NIGEL Baker  CLINICAL DIAGNOSIS/INFORMATION  PreOperative Diagnosis  1. Rule out atypical nevus.Rule out asteotatic eczema vs. CTCL.  SPECIMEN  1) Left upper posterior thigh   2.) Left lateral knee    Supplemental Diagnosis  T-cell receptor gene rearrangement analysis by PCR was performed by [employer]. T cell geneis negative. No clonal T-cell gene rearrangement is detected.Signed: 2018-11-26 15:27:47 )by: Reji Collado M.D.  FINAL PATHOLOGIC DIAGNOSIS  1. 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 MARGINS ARE NEGATIVE IN THE PLANES OF SECTION.DESCRIPTION: Sections show a circumscribed, symmetric compound nevus characterized byof melanocytes with scattered single cells located predominantly along the dermoepidermal junction. There isdisorder consisting of irregularity in the size, shape and distribution of the nests at theepidermal junction and "bridging" between rete ridges. There is no significant cytologic atypia or pagetoidNests and cords of melanocytes are present within the papillary dermis and show maturation with dermal descent. Papillary dermal fibrosis, a mild lymphocytic " "infiltrate and melanophages are noted. There is overlyingwith pigment deposition, indicative of trauma  2. Skin, left lateral knee, shave biopsy: ATYPICAL SUPERFICIAL DERMAL LYMPHOCYTIC INFILTRATE WITH EPIDERMOTROPISM (SEE).DESCRIPTION: Stratum corneum shows compact orthokeratosis. The epidermis is remarkable forinfiltration by lymphocytes but also shows some focal mild spongiosis. Within the dermis there is aperivascular lymphocytic infiltrate exhibiting "tagging" along the dermoepidermal junction. A subset of thein the epidermis and dermis demonstrate irregularly shaped, hyperchromatic nuclei. PAS stain isfor fungi. A majority of the superficial dermal and intraepidermal lymphocytes stain positive for both CD 3CD4, while 8 stains a significantly sparser population of cells. The approximate CD4:CD8 ratio is 6:1. Thealso exhibit a loss of CD7. Appropriately reactive controls were reviewed. T-cell receptor geneis pending, and results will be reported in an addendum.: These histologic features are concerning for evolving patch stage mycosis fungoides. 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/ CTCL   - 11/9/16:  Noted on shave biopsy of hyperpigmented rash of R upper arm and left abdomen (atypical lymphocytic infiltrate w prominent epidermotropism); TCR gene rearrangement negative\f0  - 10/29/18: Noted on shave biopsy of hyperpigmented rash on L lateral knee; TCR gene rearrangement negative as noted above, all TCR gene rearrnagement studies negative on prior biopsy  - pt currently on traminilone 0.1% (applies to most of rash), mometasone 0.1% (to L lateral knee)   - would agree with prior recommendation that optimal biopsy would occur while " patient is off steroids   -other possible options in this disease include topical mecholrethamine, local radiation, topical retinoids, PUVA or UVB)   - No adenopathy or significant abnormalities in counts; no need for a BMBx or PET-CT at this time   - would not favor any systemic therapies due to patient being asx, age, and also needing further confirmation of prior diagnosis  - will discuss with Dr. Wheeler about referral to CTCL specialist at [facility]    Pt noted that he would be comfortable to follow-up in Dr. Emmanuel's clinic as needed. He states that he is already set to see his dermatologist three times a year and so would like to come back to Hematology clinic in the future, if recommended by his dermatologist at any office visit.       Discussed with Dr. Cholo Bennett MD   Hematology and Oncology Fellow, PGY IV  [facility]

## 2019-06-28 ENCOUNTER — ANTI-COAG VISIT (OUTPATIENT)
Dept: CARDIOLOGY | Facility: CLINIC | Age: 84
End: 2019-06-28
Payer: MEDICARE

## 2019-06-28 DIAGNOSIS — I48.0 PAF (PAROXYSMAL ATRIAL FIBRILLATION): ICD-10-CM

## 2019-06-28 PROCEDURE — 93793 ANTICOAG MGMT PT WARFARIN: CPT | Mod: S$GLB,,,

## 2019-06-28 PROCEDURE — 93793 PR ANTICOAGULANT MGMT FOR PT TAKING WARFARIN: ICD-10-PCS | Mod: S$GLB,,,

## 2019-07-03 ENCOUNTER — CLINICAL SUPPORT (OUTPATIENT)
Dept: FAMILY MEDICINE | Facility: CLINIC | Age: 84
End: 2019-07-03
Payer: MEDICARE

## 2019-07-03 DIAGNOSIS — E29.1 HYPOGONADISM IN MALE: Primary | ICD-10-CM

## 2019-07-03 PROCEDURE — 96372 PR INJECTION,THERAP/PROPH/DIAG2ST, IM OR SUBCUT: ICD-10-PCS | Mod: HCNC,S$GLB,, | Performed by: INTERNAL MEDICINE

## 2019-07-03 PROCEDURE — 96372 THER/PROPH/DIAG INJ SC/IM: CPT | Mod: HCNC,S$GLB,, | Performed by: INTERNAL MEDICINE

## 2019-07-03 RX ADMIN — TESTOSTERONE CYPIONATE 100 MG: 200 INJECTION, SOLUTION INTRAMUSCULAR at 09:07

## 2019-07-08 ENCOUNTER — PATIENT MESSAGE (OUTPATIENT)
Dept: ELECTROPHYSIOLOGY | Facility: CLINIC | Age: 84
End: 2019-07-08

## 2019-07-09 ENCOUNTER — HOSPITAL ENCOUNTER (OUTPATIENT)
Dept: CARDIOLOGY | Facility: CLINIC | Age: 84
Discharge: HOME | End: 2019-07-09
Payer: MEDICARE

## 2019-07-09 ENCOUNTER — PATIENT MESSAGE (OUTPATIENT)
Dept: ELECTROPHYSIOLOGY | Facility: CLINIC | Age: 84
End: 2019-07-09

## 2019-07-09 ENCOUNTER — TELEPHONE (OUTPATIENT)
Dept: ELECTROPHYSIOLOGY | Facility: CLINIC | Age: 84
End: 2019-07-09

## 2019-07-09 DIAGNOSIS — I48.91 ATRIAL FIBRILLATION, UNSPECIFIED TYPE: ICD-10-CM

## 2019-07-09 PROCEDURE — 93010 ELECTROCARDIOGRAM REPORT: CPT | Mod: HCNC,S$GLB,, | Performed by: INTERNAL MEDICINE

## 2019-07-09 PROCEDURE — 93005 RHYTHM STRIP: ICD-10-PCS | Mod: HCNC,S$GLB,, | Performed by: INTERNAL MEDICINE

## 2019-07-09 PROCEDURE — 93010 RHYTHM STRIP: ICD-10-PCS | Mod: HCNC,S$GLB,, | Performed by: INTERNAL MEDICINE

## 2019-07-09 PROCEDURE — 93005 ELECTROCARDIOGRAM TRACING: CPT | Mod: HCNC,S$GLB,, | Performed by: INTERNAL MEDICINE

## 2019-07-09 NOTE — TELEPHONE ENCOUNTER
Dr. Topete,  Mr. Mckeon came in for EKG today that looks like he is back in afib.  He reports compliance with Sotalol 80 mg BID.  He is scheduled for f/u with you 8/27.  Please advise.  Thank you,  Vanessa

## 2019-07-09 NOTE — TELEPHONE ENCOUNTER
Attempting to reach Mr. Mckeon x 2 with no answer.  Left message letting him know EKG is scheduled for today at 11:30 am.  Will also attempt portal message.

## 2019-07-10 ENCOUNTER — PATIENT MESSAGE (OUTPATIENT)
Dept: ELECTROPHYSIOLOGY | Facility: CLINIC | Age: 84
End: 2019-07-10

## 2019-07-10 ENCOUNTER — TELEPHONE (OUTPATIENT)
Dept: ELECTROPHYSIOLOGY | Facility: CLINIC | Age: 84
End: 2019-07-10

## 2019-07-10 DIAGNOSIS — Z95.828 PRESENCE OF IVC FILTER: Primary | ICD-10-CM

## 2019-07-10 DIAGNOSIS — I48.91 ATRIAL FIBRILLATION, UNSPECIFIED TYPE: Primary | ICD-10-CM

## 2019-07-10 RX ORDER — METOPROLOL SUCCINATE 25 MG/1
25 TABLET, EXTENDED RELEASE ORAL DAILY
Qty: 30 TABLET | Refills: 11 | Status: ON HOLD | OUTPATIENT
Start: 2019-07-10 | End: 2019-09-30

## 2019-07-10 NOTE — TELEPHONE ENCOUNTER
Spoke with Mr. Skelton. He knows he is back in AF. He feels unwell in in AF. Discussed options of rate control versus redo PVI. Discussed increased risks of PVI at his age. Would do it with 3 venous accesses in the right CFV due to chronic occlusion of the left CFV. He also has an IVC filter that he would like removed.    He is inclined to proceed with redo-PVI do to the impact on his quality of life of AF even though he is rate controlled and his desire to not be on amiodarone. I would recommend IVC filter be removed prior if able to.     Will have my nurse set him up with Dr. Rios to discuss IVC filter removal and move up his appointment with me to discuss long-term strategy further.

## 2019-07-11 NOTE — TELEPHONE ENCOUNTER
----- Message from Virginia Nichols MA sent at 7/11/2019  4:14 PM CDT -----  Contact: Self  I called no one answerd.      ----- Message -----  From: Farzaneh Quesada RN  Sent: 7/10/2019  11:40 AM  To: Virginia Nichols MA        ----- Message -----  From: Chitra Alanis  Sent: 7/10/2019  11:05 AM  To: Efrem Brandt    .Needs Advice    Reason for call: Pt waiting for a call back regarding his med. Thanks        Communication Preference: [phone]    Additional Information:

## 2019-07-17 ENCOUNTER — CLINICAL SUPPORT (OUTPATIENT)
Dept: FAMILY MEDICINE | Facility: CLINIC | Age: 84
End: 2019-07-17
Payer: MEDICARE

## 2019-07-17 ENCOUNTER — LAB VISIT (OUTPATIENT)
Dept: LAB | Facility: HOSPITAL | Age: 84
End: 2019-07-17
Attending: INTERNAL MEDICINE
Payer: MEDICARE

## 2019-07-17 ENCOUNTER — ANTI-COAG VISIT (OUTPATIENT)
Dept: CARDIOLOGY | Facility: CLINIC | Age: 84
End: 2019-07-17
Payer: MEDICARE

## 2019-07-17 DIAGNOSIS — E29.1 HYPOGONADISM IN MALE: Primary | ICD-10-CM

## 2019-07-17 DIAGNOSIS — Z79.01 LONG TERM (CURRENT) USE OF ANTICOAGULANTS: ICD-10-CM

## 2019-07-17 DIAGNOSIS — I48.0 PAF (PAROXYSMAL ATRIAL FIBRILLATION): ICD-10-CM

## 2019-07-17 LAB
INR PPP: 2.6 (ref 0.8–1.2)
PROTHROMBIN TIME: 25.1 SEC (ref 9–12.5)

## 2019-07-17 PROCEDURE — 96372 THER/PROPH/DIAG INJ SC/IM: CPT | Mod: HCNC,S$GLB,, | Performed by: INTERNAL MEDICINE

## 2019-07-17 PROCEDURE — 96372 PR INJECTION,THERAP/PROPH/DIAG2ST, IM OR SUBCUT: ICD-10-PCS | Mod: HCNC,S$GLB,, | Performed by: INTERNAL MEDICINE

## 2019-07-17 PROCEDURE — 93793 PR ANTICOAGULANT MGMT FOR PT TAKING WARFARIN: ICD-10-PCS | Mod: S$GLB,,,

## 2019-07-17 PROCEDURE — 93793 ANTICOAG MGMT PT WARFARIN: CPT | Mod: S$GLB,,,

## 2019-07-17 PROCEDURE — 85610 PROTHROMBIN TIME: CPT | Mod: HCNC

## 2019-07-17 PROCEDURE — 36415 COLL VENOUS BLD VENIPUNCTURE: CPT | Mod: HCNC,PO

## 2019-07-17 RX ADMIN — TESTOSTERONE CYPIONATE 100 MG: 200 INJECTION, SOLUTION INTRAMUSCULAR at 08:07

## 2019-07-17 NOTE — PROGRESS NOTES
INR good. Patient did not follow weekly INR for post DCCV protocol. Noted he is back in a.fib and planning possible redo PVI but needs to see about getting IVC removed. We will resume routine INR for now

## 2019-07-17 NOTE — PROGRESS NOTES
Patient given 100 mg testosterone injection right ventrogluteal, tolerated well, no complaints, no reaction noted

## 2019-07-19 NOTE — PROGRESS NOTES
8 weeks is fine. Just let him know that if he has a procedure planned with Dr. Topete that will change.

## 2019-07-19 NOTE — PROGRESS NOTES
Pt want to have lab every 2 months of nothing is wrong. He want you to call him. As long as it good that's what he want.

## 2019-07-30 ENCOUNTER — OFFICE VISIT (OUTPATIENT)
Dept: CARDIOLOGY | Facility: CLINIC | Age: 84
End: 2019-07-30
Payer: MEDICARE

## 2019-07-30 ENCOUNTER — HOSPITAL ENCOUNTER (OUTPATIENT)
Dept: CARDIOLOGY | Facility: CLINIC | Age: 84
Discharge: HOME | End: 2019-07-30
Payer: MEDICARE

## 2019-07-30 ENCOUNTER — OFFICE VISIT (OUTPATIENT)
Dept: ELECTROPHYSIOLOGY | Facility: CLINIC | Age: 84
End: 2019-07-30
Payer: MEDICARE

## 2019-07-30 VITALS
HEART RATE: 93 BPM | BODY MASS INDEX: 31.8 KG/M2 | DIASTOLIC BLOOD PRESSURE: 98 MMHG | SYSTOLIC BLOOD PRESSURE: 166 MMHG | WEIGHT: 255.75 LBS | HEIGHT: 75 IN

## 2019-07-30 VITALS
OXYGEN SATURATION: 96 % | WEIGHT: 256.38 LBS | DIASTOLIC BLOOD PRESSURE: 95 MMHG | HEART RATE: 72 BPM | BODY MASS INDEX: 31.88 KG/M2 | HEIGHT: 75 IN | SYSTOLIC BLOOD PRESSURE: 147 MMHG

## 2019-07-30 DIAGNOSIS — I48.91 ATRIAL FIBRILLATION, UNSPECIFIED TYPE: ICD-10-CM

## 2019-07-30 DIAGNOSIS — I25.119 CORONARY ARTERY DISEASE INVOLVING NATIVE CORONARY ARTERY OF NATIVE HEART WITH ANGINA PECTORIS: ICD-10-CM

## 2019-07-30 DIAGNOSIS — I48.0 PAROXYSMAL ATRIAL FIBRILLATION: ICD-10-CM

## 2019-07-30 DIAGNOSIS — I10 ESSENTIAL HYPERTENSION: ICD-10-CM

## 2019-07-30 DIAGNOSIS — I34.0 MITRAL VALVE INSUFFICIENCY, UNSPECIFIED ETIOLOGY: ICD-10-CM

## 2019-07-30 DIAGNOSIS — I48.19 PERSISTENT ATRIAL FIBRILLATION: Primary | ICD-10-CM

## 2019-07-30 PROBLEM — Z95.828 PRESENCE OF IVC FILTER: Status: ACTIVE | Noted: 2019-07-30

## 2019-07-30 PROCEDURE — 93010 ELECTROCARDIOGRAM REPORT: CPT | Mod: HCNC,S$GLB,, | Performed by: INTERNAL MEDICINE

## 2019-07-30 PROCEDURE — 1101F PT FALLS ASSESS-DOCD LE1/YR: CPT | Mod: HCNC,CPTII,S$GLB, | Performed by: INTERNAL MEDICINE

## 2019-07-30 PROCEDURE — 3080F PR MOST RECENT DIASTOLIC BLOOD PRESSURE >= 90 MM HG: ICD-10-PCS | Mod: HCNC,CPTII,S$GLB, | Performed by: INTERNAL MEDICINE

## 2019-07-30 PROCEDURE — 3074F PR MOST RECENT SYSTOLIC BLOOD PRESSURE < 130 MM HG: ICD-10-PCS | Mod: HCNC,CPTII,S$GLB, | Performed by: INTERNAL MEDICINE

## 2019-07-30 PROCEDURE — 99499 RISK ADDL DX/OHS AUDIT: ICD-10-PCS | Mod: HCNC,S$GLB,, | Performed by: INTERNAL MEDICINE

## 2019-07-30 PROCEDURE — 99999 PR PBB SHADOW E&M-EST. PATIENT-LVL III: ICD-10-PCS | Mod: PBBFAC,HCNC,, | Performed by: INTERNAL MEDICINE

## 2019-07-30 PROCEDURE — 93010 RHYTHM STRIP: ICD-10-PCS | Mod: HCNC,S$GLB,, | Performed by: INTERNAL MEDICINE

## 2019-07-30 PROCEDURE — 1101F PR PT FALLS ASSESS DOC 0-1 FALLS W/OUT INJ PAST YR: ICD-10-PCS | Mod: HCNC,CPTII,S$GLB, | Performed by: INTERNAL MEDICINE

## 2019-07-30 PROCEDURE — 3080F DIAST BP >= 90 MM HG: CPT | Mod: HCNC,CPTII,S$GLB, | Performed by: INTERNAL MEDICINE

## 2019-07-30 PROCEDURE — 99215 PR OFFICE/OUTPT VISIT, EST, LEVL V, 40-54 MIN: ICD-10-PCS | Mod: HCNC,S$GLB,, | Performed by: INTERNAL MEDICINE

## 2019-07-30 PROCEDURE — 99215 OFFICE O/P EST HI 40 MIN: CPT | Mod: HCNC,S$GLB,, | Performed by: INTERNAL MEDICINE

## 2019-07-30 PROCEDURE — 93005 RHYTHM STRIP: ICD-10-PCS | Mod: HCNC,S$GLB,, | Performed by: INTERNAL MEDICINE

## 2019-07-30 PROCEDURE — 99499 UNLISTED E&M SERVICE: CPT | Mod: HCNC,S$GLB,, | Performed by: INTERNAL MEDICINE

## 2019-07-30 PROCEDURE — 93005 ELECTROCARDIOGRAM TRACING: CPT | Mod: HCNC,S$GLB,, | Performed by: INTERNAL MEDICINE

## 2019-07-30 PROCEDURE — 3074F SYST BP LT 130 MM HG: CPT | Mod: HCNC,CPTII,S$GLB, | Performed by: INTERNAL MEDICINE

## 2019-07-30 PROCEDURE — 99205 OFFICE O/P NEW HI 60 MIN: CPT | Mod: HCNC,S$GLB,, | Performed by: INTERNAL MEDICINE

## 2019-07-30 PROCEDURE — 99999 PR PBB SHADOW E&M-EST. PATIENT-LVL III: CPT | Mod: PBBFAC,HCNC,, | Performed by: INTERNAL MEDICINE

## 2019-07-30 PROCEDURE — 99205 PR OFFICE/OUTPT VISIT, NEW, LEVL V, 60-74 MIN: ICD-10-PCS | Mod: HCNC,S$GLB,, | Performed by: INTERNAL MEDICINE

## 2019-07-30 NOTE — PROGRESS NOTES
Subjective:    Patient ID:  Linda Skelton is a 84 y.o. male who presents for follow-up of Atrial Fibrillation    Primary Cardiologist: Ivet Manning MD    HPI  Prior Hx:  I had the pleasure of seeing Mr. Skelton in our electrophysiology clinic in follow-up for his atrial fibrillation. As you are aware he is a pleasant 84 year-old man with paroxysmal atrial fibrillation s/p cryo-PVI by Dr. 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 He notes early AF recurrence after tikosyn therapy was stopped post-PVI. Tikosyn was resumed. He noted since his wife passed away last September he began having AF recurrences. They tend to last 30-48 hours. He notes even though he is apparently rate controlled (80s on home check) during the episodes he does have relative hypotension with systolic blood pressure in the low 100s and he feels very poor. Otherwise he feels well.     At our first visit we discussed changing to amiodarone. He desired to not take amiodarone due to potential side effects. We initiated sotalol however he has had breakthrough.    Interim Hx:  Mr. Skelton returns for discussion regarding redo-PVI versus long-term rate control.    My interpretation of today's in clinic ECG is AF with average rate of 93 bpm with IVCD (QRS 135ms)    Review of Systems   Constitution: Positive for malaise/fatigue (during AF episodes). Negative for fever.   HENT: Negative for congestion and sore throat.    Eyes: Negative for blurred vision and visual disturbance.   Cardiovascular: Positive for irregular heartbeat and palpitations. Negative for chest pain, dyspnea on exertion, leg swelling, near-syncope, orthopnea, paroxysmal nocturnal dyspnea and syncope.   Respiratory: Negative for cough and shortness of breath.    Hematologic/Lymphatic: Negative for bleeding problem. Does not bruise/bleed  easily.   Skin: Negative.    Musculoskeletal: Positive for arthritis and joint pain.   Gastrointestinal: Negative for hematochezia and melena.   Neurological: Negative for dizziness and focal weakness.        Objective:    Physical Exam   Constitutional: He is oriented to person, place, and time. He appears well-developed and well-nourished. No distress.   HENT:   Head: Normocephalic and atraumatic.   Eyes: Conjunctivae are normal. Right eye exhibits no discharge. Left eye exhibits no discharge.   Neck: Neck supple. No JVD present.   Cardiovascular: Normal rate. An irregularly irregular rhythm present. Exam reveals no gallop and no friction rub.   No murmur heard.  Pulmonary/Chest: Effort normal and breath sounds normal. No respiratory distress. He has no wheezes. He has no rales.   Abdominal: Soft. Bowel sounds are normal. He exhibits no distension. There is no tenderness. There is no rebound.   Musculoskeletal: He exhibits no edema.   Neurological: He is alert and oriented to person, place, and time.   Skin: Skin is warm and dry. He is not diaphoretic.   Psychiatric: He has a normal mood and affect. His behavior is normal. Judgment and thought content normal.   Vitals reviewed.        Assessment:       1. PAF (paroxysmal atrial fibrillation) s/p ablation 2/25/2016 still with episodes; 6/2019 changed amio to sotalol; 6/13/19 DCCV    2. Coronary artery disease involving native coronary artery of native heart with angina pectoris hx NSTEMI with PCI to RCA 2000    3. Essential hypertension    4. Mitral valve insufficiency, unspecified etiology         Plan:       In summary, Mr. Skelton is a pleasant 84 year-old man with paroxysmal atrial fibrillation s/p cryo-PVI by Dr. 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 He does not desire amiodarone. Discussed redo-PVI versus long-term rate control. I spent about a half hour discussing the nature of PVI including possible transseptal puncture. We discussed risks and benefits at length. Our discussion included, but was not limited to the risk of death, infection, bleeding, stroke, MI, cardiac perforation, embolism, cardiac tamponade, vascular injury, AE fistula, injury to phrenic nerve, pulmonary vein stenosis and other organic injury including the possibility for need for surgery or pacemaker implantation.  He understands and desire to continue to think about it. If he elects to proceed would do RF-PVI via right femoral vein access only.    RTC in 6 months, sooner prn. If he elects for PVI will arrange without return visit    Thank you for allowing me to participate in the care of this patient. Please do not hesitate to call me with any questions or concerns.    Neel Topete MD, PhD  Cardiac Electrophysiology

## 2019-07-30 NOTE — LETTER
July 30, 2019      Neel Topete MD  [address]  [facility] [address]           [facility]  [address]  [facility] [address]  Phone: [phone]          Patient: Linda Skelton   MR Number: [id number]   YOB: 1935   Date of Visit: 7/30/2019       Dear Dr. Neel Topete:    Thank you for referring Linda Skelton to me for evaluation. Attached you will find relevant portions of my assessment and plan of care.    If you have questions, please do not hesitate to call me. I look forward to following Linda Skelton along with you.    Sincerely,    Dell Rios MD    Enclosure  CC:  No Recipients    If you would like to receive this communication electronically, please contact [email or web address] or [phone] to request more information on [employer] access.    For providers and/or their staff who would like to refer a patient to [facility], please contact us through our one-stop-shop provider referral line, [facility] , at [phone].    If you feel you have received this communication in error or would no longer like to receive these types of communications, please e-mail [email or web address]

## 2019-07-30 NOTE — ASSESSMENT & PLAN NOTE
Patient has a history of DVT with [place] IVC filter placed in 1997 at [facility]. Referred by Dr. Topete to determine if IVC filter can be removed prior to PVI ablation. Discussed risks and benefits of the procedure.   - Patient will decide after consultation with Dr. Topete. Provided patient with contact information

## 2019-07-30 NOTE — PROGRESS NOTES
Interventional Cardiology Clinic Note  Reason for Visit: IVC filter removal    HPI:   Mr. Skelton is an 84-year-old man with BPH, GERD, and cryo-PVI for atrial fibrillation by Dr. 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 He has difficult to treat atrial fibrillation and was referred by Dr. Topete as patient is interested in re-do PVI.     Per the patient, he remains in atrial fibrillation. Reports associated shortness of breath and fatigue resulting in the inability to perform household chores without getting tired. Has been on warfarin since his DVT was diagnosed. Denies bleeding; does report intermittent palpitations.     ROS:    Constitution: Negative for fever, chills, weight loss or gain. +fatigue  HENT: Negative for sore throat, rhinorrhea, or headache.  Eyes: Negative for blurred or double vision.   Cardiovascular: See above  Pulmonary: Positive for SOB   Gastrointestinal: Negative for abdominal pain, nausea, vomiting, or diarrhea.   : Negative for dysuria.   Neurological: Negative for focal weakness or sensory changes.  PMH:     Past Medical History:   Diagnosis Date    ACS (acute coronary syndrome)     Acute coronary syndrome 2000    NSTEMI    Anticoagulant long-term use     Coumadin    Atrial fibrillation     Basal cell carcinoma excised     left scalp vertex    Bilateral leg edema 1/6/2014    BPH (benign prostatic hyperplasia)     CKD (chronic kidney disease)     Coronary artery disease     DVT of leg (deep venous thrombosis) 09/08/2014    On coumadin for years    Encounter for blood transfusion     Hyperlipidemia     Hypertension     Hypogonadism, male     Leg fracture, left     Melanoma 01/04/2017     in situ crown of scalp    MI (myocardial infarction)      Panhypopituitarism     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    Squamous Cell Carcinoma excised     left posterior scalp    Thyroid disease      Past Surgical History:   Procedure Laterality Date    ABLATION N/A 2/25/2016    Performed by Boyd Ahuja MD at [facility] CATH LAB    APPENDECTOMY      BASAL CELL CARCINOMA EXCISION  01/2008    BRAIN SURGERY      pituitary adenoma removed    CARDIAC CATHETERIZATION  05/09/2000    S/P stent to RCA,    CARDIAC CATHETERIZATION      stents placed    CARDIAC ELECTROPHYSIOLOGY STUDY AND ABLATION      Cardioversion or Defibrillation Bilateral 6/13/2019    Performed by Eric Nguyen MD at [facility] EP LAB    CHOLECYSTECTOMY, LAPAROSCOPIC N/A 6/2/2018    Performed by Dickson Smith Jr., MD at [facility] OR    COLONOSCOPY N/A 1/7/2014    Performed by Dell Eelna MD at [facility] ENDO (4TH FLR)    CORONARY ANGIOPLASTY  5/9/2000    RCA    CORONARY ANGIOPLASTY WITH STENT PLACEMENT      ECHOCARDIOGRAM,TRANSESOPHAGEAL N/A 6/13/2019    Performed by [facility] Diagnostic Provider at [facility] EP LAB    ESOPHAGEAL DILATION      EYE SURGERY Bilateral     cataracts removed and new lenses placed     FRACTURE SURGERY Left     leg    HERNIA REPAIR      left femur surgery      pituitatry      hypophysectomy    REMOVAL-MASS Left 12/12/2013    Performed by Burt Woodard MD at [facility] OR 2ND FLR    SKIN BIOPSY      TONSILLECTOMY      VASCULAR SURGERY       Allergies:   Review of patient's allergies indicates:  No Known Allergies  Medications:     Current Outpatient Medications on File Prior to Visit   Medication Sig Dispense Refill    aspirin 81 mg Tab Take 81 mg by mouth every evening. 1 Tablet Oral Every night      ciclopirox (PENLAC) 8 % Soln Apply daily to affected nail. Must remove and restart weekly 1 Bottle 5    folic acid (FOLVITE) 1 MG tablet TAKE 1 TABLET BY MOUTH ONCE DAILY 90 tablet 3    ketoconazole (NIZORAL) 2 % cream AAA bid to  "feet x 3 weeks 60 g 3    levothyroxine (SYNTHROID) 112 MCG tablet TAKE 1 TABLET BY MOUTH ONCE DAILY BEFORE  BREAKFAST 90 tablet 3    metoprolol succinate (TOPROL-XL) 25 MG 24 hr tablet Take 1 tablet (25 mg total) by mouth once daily. 30 tablet 11    omeprazole (PRILOSEC) 20 MG capsule Take 20 mg by mouth every other day.      predniSONE (DELTASONE) 5 MG tablet Take 5 mg by mouth once daily.      simvastatin (ZOCOR) 80 MG tablet Take 1 tablet (80 mg total) by mouth every evening. 90 tablet 3    tamsulosin (FLOMAX) 0.4 mg Cap TAKE 1 CAPSULE BY MOUTH ONCE DAILY 90 capsule 3    warfarin (COUMADIN) 5 MG tablet TAKE ONE & ONE-HALF TABLETS BY MOUTH ONCE DAILY EXCEPT  ON  TUESDAYS  TAKE  1  TABLET ON TUESDAY (Patient taking differently: 5 mg on Tuesday, 7.5 mg all other days as directed by Coumadin Clinic) 135 tablet 3    mometasone (ELOCON) 0.1 % ointment aaa qd- bid for severe areas.  Avoid use on face and groin 60 g 1    NITROSTAT 0.4 mg SL tablet DISSOLVE ONE TABLET UNDER THE TONGUE EVERY 5 MINUTES AS NEEDED FOR CHEST PAIN.  DO NOT EXCEED A TOTAL OF 3 DOSES IN 15 MINUTES NOW 25 tablet 3     Current Facility-Administered Medications on File Prior to Visit   Medication Dose Route Frequency Provider Last Rate Last Dose    testosterone cypionate injection 100 mg  100 mg Intramuscular Q14 Days Anderson Jerome MD   100 mg at 07/17/19 0833     Social History:     Social History     Tobacco Use    Smoking status: Never Smoker    Smokeless tobacco: Never Used   Substance Use Topics    Alcohol use: No     Frequency: Never     Family History:     Family History   Problem Relation Age of Onset    Cancer Father     Skin cancer Neg Hx     Melanoma Neg Hx     Heart disease Neg Hx     Hypertension Neg Hx      Physical Exam:   BP (!) 147/95 (BP Location: Right arm, Patient Position: Sitting, BP Method: Large (Automatic))   Pulse 72   Ht 6' 3" (1.905 m)   Wt 116.3 kg (256 lb 6.3 oz)   SpO2 96%   BMI 32.05 kg/m²  "     Constitutional: NAD, conversant  HEENT: Sclera anicteric, PERRLA, EOMI  Neck: No JVD, no carotid bruits  CV: Irregularly irregular, no murmur, normal S1/S2  Pulm: CTAB, no wheezes, rales, or ronchi  GI: Abdomen soft, NTND, +BS  Extremities: 1+ LE edema, warm and well perfused  Skin: No ecchymosis, erythema, or ulcers  Psych: AOx3, appropriate affect  Neuro: No gross deficits    Labs:     Lab Results   Component Value Date     06/14/2019    K 4.3 06/14/2019     06/14/2019    CO2 24 06/14/2019    BUN 24 (H) 06/14/2019    CREATININE 1.2 06/14/2019    ANIONGAP 11 06/14/2019     Lab Results   Component Value Date    HGBA1C 6.1 09/18/2013     Lab Results   Component Value Date     (H) 01/06/2014    Lab Results   Component Value Date    WBC 4.32 06/12/2019    HGB 13.6 (L) 06/12/2019    HCT 42.4 06/12/2019     (L) 06/12/2019    GRAN 2.3 06/12/2019    GRAN 52.1 06/12/2019     Lab Results   Component Value Date    CHOL 130 10/10/2018    HDL 35 (L) 10/10/2018    LDLCALC 70.8 10/10/2018    TRIG 121 10/10/2018          Imaging:   EUGENE: 06/2019  · Normal appearing left atrial appendage. No thrombus is present in the appendage. Normal appendage velocities.  · No thrombus present in the left atrium.  · Normal left ventricular systolic function. The estimated ejection fraction is 55%  · Mitral prolapse of the posterior mitral leaflet.  · Mild mitral regurgitation.  · Normal right ventricular systolic function.  · Mild to moderate tricuspid regurgitation.  · No aortic atheroma visualized.  · Small inferior-posterior interatrial septal defect present with bi-directional shunting indicated by color flow Doppler.    TTE: 06/2018  CONCLUSIONS     1 - Normal left ventricular systolic function (EF 60-65%).     2 - Concentric hypertrophy.     3 - Trivial mitral regurgitation.     4 - Trivial tricuspid regurgitation.     EKG: atrial fibrillation, nonspecific conduction block, LAD    Assessment/Plan:   Paroxysmal  atrial fibrillation  Patient has a history of DVT with [place] IVC filter placed in 1997 at [facility]. Referred by Dr. Topete to determine if IVC filter can be removed prior to PVI ablation. Discussed risks and benefits of the procedure.   - Patient will decide after consultation with Dr. Topete. Provided patient with contact information      - F/u PRN     Signed:  Shannon Reyes MD  Cardiology - PGY4  Pager [phone]    I have personally taken the history and examined this patient. I have discussed and agree with the resident's findings and plan as documented in the resident's note.  Permanent IVC filter referred for removal due to need for intracardiac ablation procedure.  Discuss risk and benefit of procedure with the patient who will discuss procedure with Dr. Neel Topete and will call me back with his final decision.  Dell Rios

## 2019-07-31 ENCOUNTER — CLINICAL SUPPORT (OUTPATIENT)
Dept: FAMILY MEDICINE | Facility: CLINIC | Age: 84
End: 2019-07-31
Payer: MEDICARE

## 2019-07-31 DIAGNOSIS — E29.1 HYPOGONADISM IN MALE: Primary | ICD-10-CM

## 2019-07-31 PROCEDURE — 96372 PR INJECTION,THERAP/PROPH/DIAG2ST, IM OR SUBCUT: ICD-10-PCS | Mod: HCNC,S$GLB,, | Performed by: INTERNAL MEDICINE

## 2019-07-31 PROCEDURE — 96372 THER/PROPH/DIAG INJ SC/IM: CPT | Mod: HCNC,S$GLB,, | Performed by: INTERNAL MEDICINE

## 2019-07-31 RX ADMIN — TESTOSTERONE CYPIONATE 100 MG: 200 INJECTION, SOLUTION INTRAMUSCULAR at 09:07

## 2019-07-31 NOTE — PROGRESS NOTES
Depo-testosterone 100 mg administered IM to left ventrogluteal.Tolerated well.No adverse reaction noted.

## 2019-08-05 ENCOUNTER — PATIENT MESSAGE (OUTPATIENT)
Dept: CARDIOLOGY | Facility: CLINIC | Age: 84
End: 2019-08-05

## 2019-08-14 ENCOUNTER — CLINICAL SUPPORT (OUTPATIENT)
Dept: FAMILY MEDICINE | Facility: CLINIC | Age: 84
End: 2019-08-14
Payer: MEDICARE

## 2019-08-14 DIAGNOSIS — E29.1 HYPOGONADISM IN MALE: Primary | ICD-10-CM

## 2019-08-14 PROCEDURE — 96372 THER/PROPH/DIAG INJ SC/IM: CPT | Mod: HCNC,S$GLB,, | Performed by: INTERNAL MEDICINE

## 2019-08-14 PROCEDURE — 96372 PR INJECTION,THERAP/PROPH/DIAG2ST, IM OR SUBCUT: ICD-10-PCS | Mod: HCNC,S$GLB,, | Performed by: INTERNAL MEDICINE

## 2019-08-14 RX ADMIN — TESTOSTERONE CYPIONATE 100 MG: 200 INJECTION, SOLUTION INTRAMUSCULAR at 09:08

## 2019-08-14 NOTE — PROGRESS NOTES
Testosterone 100 mg administered IM to right ventrogluteal. Tolerated well. No adverse reactions noted.

## 2019-08-19 ENCOUNTER — PATIENT MESSAGE (OUTPATIENT)
Dept: ELECTROPHYSIOLOGY | Facility: CLINIC | Age: 84
End: 2019-08-19

## 2019-08-19 ENCOUNTER — TELEPHONE (OUTPATIENT)
Dept: ELECTROPHYSIOLOGY | Facility: CLINIC | Age: 84
End: 2019-08-19

## 2019-08-19 NOTE — TELEPHONE ENCOUNTER
Returned Mr. Skelton's phone call. He has significant loss of energy and dyspnea on exertion since AF has returned (rate controlled AF) and he failed anti-arrhythmic therapy (does not want to take amiodarone). We have had several talks about options including leave in rate controlled AF versus attempt at redo-PVI. He has a chronically occluded left CFV and an IVC filter that has been present for 20+ years. We discussed risks at his age. He states he understands the risks and due to how poorly he feels he wants to proceed. He also states he discussed it at length with his son.    Plan  Redo-PVI with RFA  Carto  Anesthesia for sedation  INR 2-3  EUGENE day of  Protonix 40mg daily 2 weeks prior (in lieu of omeprazole)  Will resume sotalol post ablation  Will use right groin only access and exchange CS/ICE catheters throughout when needed

## 2019-08-20 ENCOUNTER — PATIENT MESSAGE (OUTPATIENT)
Dept: FAMILY MEDICINE | Facility: CLINIC | Age: 84
End: 2019-08-20

## 2019-08-23 ENCOUNTER — PATIENT MESSAGE (OUTPATIENT)
Dept: ELECTROPHYSIOLOGY | Facility: CLINIC | Age: 84
End: 2019-08-23

## 2019-08-23 DIAGNOSIS — I48.19 PERSISTENT ATRIAL FIBRILLATION: Primary | ICD-10-CM

## 2019-08-23 NOTE — TELEPHONE ENCOUNTER
Spoke to Mr. Skelton.  Apologized for taking so long to schedule procedure date.  Procedure date set for 9/30 with 0530 arrival time.  Instructions reviewed including:    -Directions to check in desk  -NPO after midnight night prior to procedure  -Continue coumadin   -Pre-procedure LABS to be done on 9/25 at [facility]   -Start Protonix on 9/16 - 40 mg by mouth daily     All questions answered to Mr. Skelton's satisfaction and he will call with any questions or concerns.

## 2019-08-26 RX ORDER — PANTOPRAZOLE SODIUM 40 MG/1
40 TABLET, DELAYED RELEASE ORAL DAILY
Qty: 30 TABLET | Refills: 1 | Status: ON HOLD | OUTPATIENT
Start: 2019-09-16 | End: 2019-10-01 | Stop reason: SDUPTHER

## 2019-08-28 ENCOUNTER — CLINICAL SUPPORT (OUTPATIENT)
Dept: FAMILY MEDICINE | Facility: CLINIC | Age: 84
End: 2019-08-28
Payer: MEDICARE

## 2019-08-28 DIAGNOSIS — E29.1 HYPOGONADISM IN MALE: Primary | ICD-10-CM

## 2019-08-28 PROCEDURE — 96372 PR INJECTION,THERAP/PROPH/DIAG2ST, IM OR SUBCUT: ICD-10-PCS | Mod: HCNC,S$GLB,, | Performed by: INTERNAL MEDICINE

## 2019-08-28 PROCEDURE — 96372 THER/PROPH/DIAG INJ SC/IM: CPT | Mod: HCNC,S$GLB,, | Performed by: INTERNAL MEDICINE

## 2019-08-28 RX ADMIN — TESTOSTERONE CYPIONATE 100 MG: 200 INJECTION, SOLUTION INTRAMUSCULAR at 08:08

## 2019-08-28 NOTE — PROGRESS NOTES
Testosterone 100 mg administered IM to left ventrogluteal. Tolerated well. No adverse reaction noted.

## 2019-09-11 ENCOUNTER — ANTI-COAG VISIT (OUTPATIENT)
Dept: CARDIOLOGY | Facility: CLINIC | Age: 84
End: 2019-09-11
Payer: MEDICARE

## 2019-09-11 ENCOUNTER — CLINICAL SUPPORT (OUTPATIENT)
Dept: FAMILY MEDICINE | Facility: CLINIC | Age: 84
End: 2019-09-11
Payer: MEDICARE

## 2019-09-11 ENCOUNTER — LAB VISIT (OUTPATIENT)
Dept: LAB | Facility: HOSPITAL | Age: 84
End: 2019-09-11
Attending: INTERNAL MEDICINE
Payer: MEDICARE

## 2019-09-11 DIAGNOSIS — I48.0 PAF (PAROXYSMAL ATRIAL FIBRILLATION): ICD-10-CM

## 2019-09-11 DIAGNOSIS — E29.1 HYPOGONADISM IN MALE: Primary | ICD-10-CM

## 2019-09-11 DIAGNOSIS — I48.19 PERSISTENT ATRIAL FIBRILLATION: ICD-10-CM

## 2019-09-11 LAB
INR PPP: 3.2 (ref 0.8–1.2)
PROTHROMBIN TIME: 31.1 SEC (ref 9–12.5)

## 2019-09-11 PROCEDURE — 93793 ANTICOAG MGMT PT WARFARIN: CPT | Mod: S$GLB,,, | Performed by: PHARMACIST

## 2019-09-11 PROCEDURE — 93793 PR ANTICOAGULANT MGMT FOR PT TAKING WARFARIN: ICD-10-PCS | Mod: S$GLB,,, | Performed by: PHARMACIST

## 2019-09-11 PROCEDURE — 96372 THER/PROPH/DIAG INJ SC/IM: CPT | Mod: HCNC,S$GLB,, | Performed by: INTERNAL MEDICINE

## 2019-09-11 PROCEDURE — 36415 COLL VENOUS BLD VENIPUNCTURE: CPT | Mod: HCNC,PO

## 2019-09-11 PROCEDURE — 96372 PR INJECTION,THERAP/PROPH/DIAG2ST, IM OR SUBCUT: ICD-10-PCS | Mod: HCNC,S$GLB,, | Performed by: INTERNAL MEDICINE

## 2019-09-11 PROCEDURE — 85610 PROTHROMBIN TIME: CPT | Mod: HCNC

## 2019-09-11 RX ADMIN — TESTOSTERONE CYPIONATE 100 MG: 200 INJECTION, SOLUTION INTRAMUSCULAR at 08:09

## 2019-09-11 NOTE — PROGRESS NOTES
"The pt let us know today that he is scheduled for a procedure with Dr. Topete on 9/30 and he believes that he is to STOP his warfarin.  Per Dr. Topete' note on 8/19/19: "Plan  Redo-PVI with RFA  Carto  Anesthesia for sedation  INR 2-3  EUGENE day of  Protonix 40mg daily 2 weeks prior (in lieu of omeprazole)  Will resume sotalol post ablation  Will use right groin only access and exchange CS/ICE catheters throughout when needed."    To avoid any confusion, I am sending a message to Dr. Topete to confirm that the pt does NOT need to stop his warfarin for this upcoming procedure.  "

## 2019-09-18 ENCOUNTER — PATIENT MESSAGE (OUTPATIENT)
Dept: MEDSURG UNIT | Facility: HOSPITAL | Age: 84
End: 2019-09-18

## 2019-09-18 ENCOUNTER — LAB VISIT (OUTPATIENT)
Dept: LAB | Facility: HOSPITAL | Age: 84
End: 2019-09-18
Attending: INTERNAL MEDICINE
Payer: MEDICARE

## 2019-09-18 ENCOUNTER — ANTI-COAG VISIT (OUTPATIENT)
Dept: CARDIOLOGY | Facility: CLINIC | Age: 84
End: 2019-09-18
Payer: MEDICARE

## 2019-09-18 DIAGNOSIS — I48.0 PAF (PAROXYSMAL ATRIAL FIBRILLATION): ICD-10-CM

## 2019-09-18 DIAGNOSIS — I48.19 PERSISTENT ATRIAL FIBRILLATION: ICD-10-CM

## 2019-09-18 LAB
INR PPP: 3.5 (ref 0.8–1.2)
PROTHROMBIN TIME: 33.7 SEC (ref 9–12.5)

## 2019-09-18 PROCEDURE — 93793 ANTICOAG MGMT PT WARFARIN: CPT | Mod: S$GLB,,,

## 2019-09-18 PROCEDURE — 85610 PROTHROMBIN TIME: CPT | Mod: HCNC

## 2019-09-18 PROCEDURE — 93793 PR ANTICOAGULANT MGMT FOR PT TAKING WARFARIN: ICD-10-PCS | Mod: S$GLB,,,

## 2019-09-18 PROCEDURE — 36415 COLL VENOUS BLD VENIPUNCTURE: CPT | Mod: HCNC,PO

## 2019-09-18 NOTE — PROGRESS NOTES
INR trending high. Patient is pending EUGENE/PVI-RFA on 9/30 and we need to get INR in range and preferably on low end of range. Will adjust dose. Recheck INR in 1 week

## 2019-09-19 ENCOUNTER — PATIENT MESSAGE (OUTPATIENT)
Dept: ELECTROPHYSIOLOGY | Facility: CLINIC | Age: 84
End: 2019-09-19

## 2019-09-19 ENCOUNTER — TELEPHONE (OUTPATIENT)
Dept: ELECTROPHYSIOLOGY | Facility: CLINIC | Age: 84
End: 2019-09-19

## 2019-09-19 DIAGNOSIS — I48.19 PERSISTENT ATRIAL FIBRILLATION: Primary | ICD-10-CM

## 2019-09-19 NOTE — TELEPHONE ENCOUNTER
Linda Vela, [id number], is scheduled for a EUGENE, RFA, PVI with Dr Topete on 9/30/19.  Desired INR range 2-3.

## 2019-09-24 ENCOUNTER — PATIENT MESSAGE (OUTPATIENT)
Dept: ELECTROPHYSIOLOGY | Facility: CLINIC | Age: 84
End: 2019-09-24

## 2019-09-24 ENCOUNTER — TELEPHONE (OUTPATIENT)
Dept: ELECTROPHYSIOLOGY | Facility: CLINIC | Age: 84
End: 2019-09-24

## 2019-09-24 NOTE — TELEPHONE ENCOUNTER
Attempted x 2 to reach pt via phone.  No answer, message left for him to return call to our office.  Direct line given.  Will attempt portal message.

## 2019-09-24 NOTE — TELEPHONE ENCOUNTER
Spoke to Maria Teresa Linda who does not want to move case up to tomorrow due to his son traveling to come in for procedure.  He wishes to keep as scheduled on Monday.

## 2019-09-25 ENCOUNTER — CLINICAL SUPPORT (OUTPATIENT)
Dept: FAMILY MEDICINE | Facility: CLINIC | Age: 84
End: 2019-09-25
Payer: MEDICARE

## 2019-09-25 ENCOUNTER — ANTI-COAG VISIT (OUTPATIENT)
Dept: CARDIOLOGY | Facility: CLINIC | Age: 84
End: 2019-09-25
Payer: MEDICARE

## 2019-09-25 ENCOUNTER — LAB VISIT (OUTPATIENT)
Dept: LAB | Facility: HOSPITAL | Age: 84
End: 2019-09-25
Attending: INTERNAL MEDICINE
Payer: MEDICARE

## 2019-09-25 DIAGNOSIS — I48.19 PERSISTENT ATRIAL FIBRILLATION: ICD-10-CM

## 2019-09-25 DIAGNOSIS — I48.0 PAF (PAROXYSMAL ATRIAL FIBRILLATION): ICD-10-CM

## 2019-09-25 DIAGNOSIS — E29.1 HYPOGONADISM IN MALE: Primary | ICD-10-CM

## 2019-09-25 LAB
ANION GAP SERPL CALC-SCNC: 7 MMOL/L (ref 8–16)
APTT BLDCRRT: 35.9 SEC (ref 21–32)
BASOPHILS # BLD AUTO: 0.01 K/UL (ref 0–0.2)
BASOPHILS NFR BLD: 0.2 % (ref 0–1.9)
BUN SERPL-MCNC: 23 MG/DL (ref 8–23)
CALCIUM SERPL-MCNC: 9.3 MG/DL (ref 8.7–10.5)
CHLORIDE SERPL-SCNC: 105 MMOL/L (ref 95–110)
CO2 SERPL-SCNC: 30 MMOL/L (ref 23–29)
CREAT SERPL-MCNC: 1.3 MG/DL (ref 0.5–1.4)
DIFFERENTIAL METHOD: ABNORMAL
EOSINOPHIL # BLD AUTO: 0 K/UL (ref 0–0.5)
EOSINOPHIL NFR BLD: 0.9 % (ref 0–8)
ERYTHROCYTE [DISTWIDTH] IN BLOOD BY AUTOMATED COUNT: 13.5 % (ref 11.5–14.5)
EST. GFR  (AFRICAN AMERICAN): 57.9 ML/MIN/1.73 M^2
EST. GFR  (NON AFRICAN AMERICAN): 50.1 ML/MIN/1.73 M^2
GLUCOSE SERPL-MCNC: 81 MG/DL (ref 70–110)
HCT VFR BLD AUTO: 44.4 % (ref 40–54)
HGB BLD-MCNC: 14.3 G/DL (ref 14–18)
IMM GRANULOCYTES # BLD AUTO: 0.01 K/UL (ref 0–0.04)
IMM GRANULOCYTES NFR BLD AUTO: 0.2 % (ref 0–0.5)
INR PPP: 3.7 (ref 0.8–1.2)
LYMPHOCYTES # BLD AUTO: 1.1 K/UL (ref 1–4.8)
LYMPHOCYTES NFR BLD: 25.9 % (ref 18–48)
MCH RBC QN AUTO: 31.8 PG (ref 27–31)
MCHC RBC AUTO-ENTMCNC: 32.2 G/DL (ref 32–36)
MCV RBC AUTO: 99 FL (ref 82–98)
MONOCYTES # BLD AUTO: 1 K/UL (ref 0.3–1)
MONOCYTES NFR BLD: 23.6 % (ref 4–15)
NEUTROPHILS # BLD AUTO: 2.2 K/UL (ref 1.8–7.7)
NEUTROPHILS NFR BLD: 49.2 % (ref 38–73)
NRBC BLD-RTO: 0 /100 WBC
PLATELET # BLD AUTO: 128 K/UL (ref 150–350)
PMV BLD AUTO: 12 FL (ref 9.2–12.9)
POTASSIUM SERPL-SCNC: 4.3 MMOL/L (ref 3.5–5.1)
PROTHROMBIN TIME: 36.5 SEC (ref 9–12.5)
RBC # BLD AUTO: 4.49 M/UL (ref 4.6–6.2)
SODIUM SERPL-SCNC: 142 MMOL/L (ref 136–145)
WBC # BLD AUTO: 4.37 K/UL (ref 3.9–12.7)

## 2019-09-25 PROCEDURE — 36415 COLL VENOUS BLD VENIPUNCTURE: CPT | Mod: HCNC,PO

## 2019-09-25 PROCEDURE — 85025 COMPLETE CBC W/AUTO DIFF WBC: CPT | Mod: HCNC

## 2019-09-25 PROCEDURE — 85610 PROTHROMBIN TIME: CPT | Mod: HCNC

## 2019-09-25 PROCEDURE — 80048 BASIC METABOLIC PNL TOTAL CA: CPT | Mod: HCNC

## 2019-09-25 PROCEDURE — 96372 PR INJECTION,THERAP/PROPH/DIAG2ST, IM OR SUBCUT: ICD-10-PCS | Mod: HCNC,S$GLB,, | Performed by: INTERNAL MEDICINE

## 2019-09-25 PROCEDURE — 96372 THER/PROPH/DIAG INJ SC/IM: CPT | Mod: HCNC,S$GLB,, | Performed by: INTERNAL MEDICINE

## 2019-09-25 PROCEDURE — 93793 ANTICOAG MGMT PT WARFARIN: CPT | Mod: S$GLB,,,

## 2019-09-25 PROCEDURE — 93793 PR ANTICOAGULANT MGMT FOR PT TAKING WARFARIN: ICD-10-PCS | Mod: S$GLB,,,

## 2019-09-25 PROCEDURE — 85730 THROMBOPLASTIN TIME PARTIAL: CPT | Mod: HCNC

## 2019-09-25 RX ADMIN — TESTOSTERONE CYPIONATE 100 MG: 200 INJECTION, SOLUTION INTRAMUSCULAR at 08:09

## 2019-09-25 NOTE — PROGRESS NOTES
INR still trending high, despite dose lowered. He denies changes. We will adjust dose further to try and get in range or 2.0-2.5 for Monday.

## 2019-09-26 ENCOUNTER — PATIENT MESSAGE (OUTPATIENT)
Dept: MEDSURG UNIT | Facility: HOSPITAL | Age: 84
End: 2019-09-26

## 2019-09-26 NOTE — TELEPHONE ENCOUNTER
Spoke to Mr. Mckeon.  Let him know procedure arrival time has been pushed back to 10 am with a 12 pm start time.  He verbalizes understanding and appreciates call.  He verbalizes understanding and appreciates call.

## 2019-09-27 ENCOUNTER — TELEPHONE (OUTPATIENT)
Dept: DERMATOLOGY | Facility: CLINIC | Age: 84
End: 2019-09-27

## 2019-09-27 ENCOUNTER — TELEPHONE (OUTPATIENT)
Dept: ELECTROPHYSIOLOGY | Facility: CLINIC | Age: 84
End: 2019-09-27

## 2019-09-27 NOTE — TELEPHONE ENCOUNTER
Spoke to  Linda    CONFIRMED procedure arrival time of 10 am  Reiterated instructions including:    -Directions to check in desk  -NPO after midnight night prior to procedure  -Pre-procedure LABS reviewed and nothing of concern noted  -Confirmed no recent fever, bleeding, infection or skin rash in the past 30 days  -Confirmed Protonix 40 mg daily was started on 9/16 as directed    Mr. Mckeon verbalizes understanding of above and appreciates call.

## 2019-09-27 NOTE — TELEPHONE ENCOUNTER
Spoke with pt regarding message. Pt stated that he would like to cancel his appointment due to pt has an important appointment Monday for heart. Informed pt that the next available appointment will be in December. Pt stated he would like to keep his current appointment with Dr. Wheeler due to December is too far out. Pt pleasant and thanked me for calling.    RD        ----- Message from Chemo Henry sent at 9/27/2019  8:05 AM CDT -----  Contact: Pt. [phone]  The patient is requesting to speak to a nurse regarding his appointment. Please contact the patient to discuss further.

## 2019-09-30 ENCOUNTER — HOSPITAL ENCOUNTER (OUTPATIENT)
Dept: CARDIOLOGY | Facility: CLINIC | Age: 84
Discharge: HOME | End: 2019-09-30
Payer: MEDICARE

## 2019-09-30 ENCOUNTER — HOSPITAL ENCOUNTER (OUTPATIENT)
Facility: HOSPITAL | Age: 84
Discharge: HOME | End: 2019-10-01
Attending: INTERNAL MEDICINE | Admitting: INTERNAL MEDICINE
Payer: MEDICARE

## 2019-09-30 ENCOUNTER — ANESTHESIA EVENT (OUTPATIENT)
Dept: MEDSURG UNIT | Facility: HOSPITAL | Age: 84
End: 2019-09-30
Payer: MEDICARE

## 2019-09-30 ENCOUNTER — ANESTHESIA (OUTPATIENT)
Dept: MEDSURG UNIT | Facility: HOSPITAL | Age: 84
End: 2019-09-30
Payer: MEDICARE

## 2019-09-30 DIAGNOSIS — I48.91 ATRIAL FIBRILLATION: ICD-10-CM

## 2019-09-30 DIAGNOSIS — I48.19 PERSISTENT ATRIAL FIBRILLATION: ICD-10-CM

## 2019-09-30 DIAGNOSIS — I49.9 ARRHYTHMIA: ICD-10-CM

## 2019-09-30 DIAGNOSIS — E29.1 HYPOGONADISM IN MALE: ICD-10-CM

## 2019-09-30 LAB
ABO + RH BLD: NORMAL
APTT BLDCRRT: 30.7 SEC (ref 21–32)
BLD GP AB SCN CELLS X3 SERPL QL: NORMAL
INR PPP: 2.5 (ref 0.8–1.2)
POC ACTIVATED CLOTTING TIME K: 169 SEC (ref 74–137)
POC ACTIVATED CLOTTING TIME K: 230 SEC (ref 74–137)
POC ACTIVATED CLOTTING TIME K: 263 SEC (ref 74–137)
POC ACTIVATED CLOTTING TIME K: 279 SEC (ref 74–137)
POC ACTIVATED CLOTTING TIME K: 290 SEC (ref 74–137)
POC ACTIVATED CLOTTING TIME K: 351 SEC (ref 74–137)
POC ACTIVATED CLOTTING TIME K: 362 SEC (ref 74–137)
PROTHROMBIN TIME: 24.5 SEC (ref 9–12.5)
SAMPLE: ABNORMAL

## 2019-09-30 PROCEDURE — 63600175 PHARM REV CODE 636 W HCPCS: Mod: HCNC | Performed by: NURSE ANESTHETIST, CERTIFIED REGISTERED

## 2019-09-30 PROCEDURE — 93010 ELECTROCARDIOGRAM REPORT: CPT | Mod: HCNC,,, | Performed by: INTERNAL MEDICINE

## 2019-09-30 PROCEDURE — 93662 INTRACARDIAC ECG (ICE): CPT | Mod: HCNC | Performed by: INTERNAL MEDICINE

## 2019-09-30 PROCEDURE — 93320 DOPPLER ECHO COMPLETE: CPT | Mod: 26,HCNC,, | Performed by: INTERNAL MEDICINE

## 2019-09-30 PROCEDURE — 36620 INSERTION CATHETER ARTERY: CPT | Mod: 59,HCNC,, | Performed by: ANESTHESIOLOGY

## 2019-09-30 PROCEDURE — 27201423 OPTIME MED/SURG SUP & DEVICES STERILE SUPPLY: Mod: HCNC | Performed by: INTERNAL MEDICINE

## 2019-09-30 PROCEDURE — 93325 DOPPLER ECHO COLOR FLOW MAPG: CPT | Mod: 26,HCNC,, | Performed by: INTERNAL MEDICINE

## 2019-09-30 PROCEDURE — 93662 INTRACARDIAC ECG (ICE): CPT | Mod: 26,HCNC,, | Performed by: INTERNAL MEDICINE

## 2019-09-30 PROCEDURE — C1731 CATH, EP, 20 OR MORE ELEC: HCPCS | Mod: HCNC | Performed by: INTERNAL MEDICINE

## 2019-09-30 PROCEDURE — D9220A PRA ANESTHESIA: ICD-10-PCS | Mod: HCNC,ANES,, | Performed by: ANESTHESIOLOGY

## 2019-09-30 PROCEDURE — 93325 DOPPLER ECHO COLOR FLOW MAPG: CPT | Mod: HCNC

## 2019-09-30 PROCEDURE — 93657 PR TX L/R ATRIAL FIB ADDL: ICD-10-PCS | Mod: HCNC,,, | Performed by: INTERNAL MEDICINE

## 2019-09-30 PROCEDURE — 94761 N-INVAS EAR/PLS OXIMETRY MLT: CPT | Mod: HCNC

## 2019-09-30 PROCEDURE — C1751 CATH, INF, PER/CENT/MIDLINE: HCPCS | Mod: HCNC | Performed by: NURSE ANESTHETIST, CERTIFIED REGISTERED

## 2019-09-30 PROCEDURE — 93613 PR INTRACARD ELECTROPHYS 3-DIMENS MAPPING: ICD-10-PCS | Mod: HCNC,,, | Performed by: INTERNAL MEDICINE

## 2019-09-30 PROCEDURE — 93312 TRANSESOPHAGEAL ECHO (TEE) (CUPID ONLY): ICD-10-PCS | Mod: 26,HCNC,, | Performed by: INTERNAL MEDICINE

## 2019-09-30 PROCEDURE — 25000003 PHARM REV CODE 250: Mod: HCNC | Performed by: STUDENT IN AN ORGANIZED HEALTH CARE EDUCATION/TRAINING PROGRAM

## 2019-09-30 PROCEDURE — 63600175 PHARM REV CODE 636 W HCPCS: Mod: HCNC | Performed by: ANESTHESIOLOGY

## 2019-09-30 PROCEDURE — 85610 PROTHROMBIN TIME: CPT | Mod: HCNC

## 2019-09-30 PROCEDURE — D9220A PRA ANESTHESIA: Mod: HCNC,ANES,, | Performed by: ANESTHESIOLOGY

## 2019-09-30 PROCEDURE — 93005 ELECTROCARDIOGRAM TRACING: CPT | Mod: HCNC,59

## 2019-09-30 PROCEDURE — 93312 ECHO TRANSESOPHAGEAL: CPT | Mod: 26,HCNC,, | Performed by: INTERNAL MEDICINE

## 2019-09-30 PROCEDURE — 86850 RBC ANTIBODY SCREEN: CPT | Mod: HCNC

## 2019-09-30 PROCEDURE — 37000008 HC ANESTHESIA 1ST 15 MINUTES: Mod: HCNC | Performed by: INTERNAL MEDICINE

## 2019-09-30 PROCEDURE — 93320 TRANSESOPHAGEAL ECHO (TEE) (CUPID ONLY): ICD-10-PCS | Mod: 26,HCNC,, | Performed by: INTERNAL MEDICINE

## 2019-09-30 PROCEDURE — 93656 PR ELECTROPHYS EVAL, COMPREHEN, W/ABLATION OF ATRIAL FIBR: ICD-10-PCS | Mod: HCNC,,, | Performed by: INTERNAL MEDICINE

## 2019-09-30 PROCEDURE — 93613 INTRACARDIAC EPHYS 3D MAPG: CPT | Mod: HCNC | Performed by: INTERNAL MEDICINE

## 2019-09-30 PROCEDURE — 93325 TRANSESOPHAGEAL ECHO (TEE) (CUPID ONLY): ICD-10-PCS | Mod: 26,HCNC,, | Performed by: INTERNAL MEDICINE

## 2019-09-30 PROCEDURE — C1730 CATH, EP, 19 OR FEW ELECT: HCPCS | Mod: HCNC | Performed by: INTERNAL MEDICINE

## 2019-09-30 PROCEDURE — 93662 PR INTRACARD ECHO, THER/DX INTERVENT: ICD-10-PCS | Mod: 26,HCNC,, | Performed by: INTERNAL MEDICINE

## 2019-09-30 PROCEDURE — 93613 INTRACARDIAC EPHYS 3D MAPG: CPT | Mod: HCNC,,, | Performed by: INTERNAL MEDICINE

## 2019-09-30 PROCEDURE — C1732 CATH, EP, DIAG/ABL, 3D/VECT: HCPCS | Mod: HCNC | Performed by: INTERNAL MEDICINE

## 2019-09-30 PROCEDURE — 93010 EKG 12-LEAD: ICD-10-PCS | Mod: HCNC,76,, | Performed by: INTERNAL MEDICINE

## 2019-09-30 PROCEDURE — 25000003 PHARM REV CODE 250: Mod: HCNC | Performed by: INTERNAL MEDICINE

## 2019-09-30 PROCEDURE — 93657 TX L/R ATRIAL FIB ADDL: CPT | Mod: HCNC | Performed by: INTERNAL MEDICINE

## 2019-09-30 PROCEDURE — 93010 EKG 12-LEAD: ICD-10-PCS | Mod: HCNC,,, | Performed by: INTERNAL MEDICINE

## 2019-09-30 PROCEDURE — C1766 INTRO/SHEATH,STRBLE,NON-PEEL: HCPCS | Mod: HCNC | Performed by: INTERNAL MEDICINE

## 2019-09-30 PROCEDURE — 36620 PR INSERT CATH,ART,PERCUT,SHORTTERM: ICD-10-PCS | Mod: 59,HCNC,, | Performed by: ANESTHESIOLOGY

## 2019-09-30 PROCEDURE — 93656 COMPRE EP EVAL ABLTJ ATR FIB: CPT | Mod: HCNC,,, | Performed by: INTERNAL MEDICINE

## 2019-09-30 PROCEDURE — 25000003 PHARM REV CODE 250: Mod: HCNC | Performed by: NURSE ANESTHETIST, CERTIFIED REGISTERED

## 2019-09-30 PROCEDURE — C1894 INTRO/SHEATH, NON-LASER: HCPCS | Mod: HCNC | Performed by: INTERNAL MEDICINE

## 2019-09-30 PROCEDURE — C1753 CATH, INTRAVAS ULTRASOUND: HCPCS | Mod: HCNC | Performed by: INTERNAL MEDICINE

## 2019-09-30 PROCEDURE — 63600175 PHARM REV CODE 636 W HCPCS: Mod: HCNC | Performed by: NURSE PRACTITIONER

## 2019-09-30 PROCEDURE — 85730 THROMBOPLASTIN TIME PARTIAL: CPT | Mod: HCNC

## 2019-09-30 PROCEDURE — 37000009 HC ANESTHESIA EA ADD 15 MINS: Mod: HCNC | Performed by: INTERNAL MEDICINE

## 2019-09-30 PROCEDURE — 93657 TX L/R ATRIAL FIB ADDL: CPT | Mod: HCNC,,, | Performed by: INTERNAL MEDICINE

## 2019-09-30 PROCEDURE — 27200677 HC TRANSDUCER MONITOR KIT SINGLE: Mod: HCNC | Performed by: NURSE ANESTHETIST, CERTIFIED REGISTERED

## 2019-09-30 PROCEDURE — 93010 ELECTROCARDIOGRAM REPORT: CPT | Mod: HCNC,76,, | Performed by: INTERNAL MEDICINE

## 2019-09-30 PROCEDURE — 93656 COMPRE EP EVAL ABLTJ ATR FIB: CPT | Mod: HCNC | Performed by: INTERNAL MEDICINE

## 2019-09-30 PROCEDURE — 27100021 HC MULTIPORT INFUSION MANIFOLD: Mod: HCNC | Performed by: NURSE ANESTHETIST, CERTIFIED REGISTERED

## 2019-09-30 RX ORDER — EPHEDRINE SULFATE 50 MG/ML
INJECTION, SOLUTION INTRAVENOUS
Status: DISCONTINUED | OUTPATIENT
Start: 2019-09-30 | End: 2019-09-30

## 2019-09-30 RX ORDER — HEPARIN SODIUM 1000 [USP'U]/ML
INJECTION, SOLUTION INTRAVENOUS; SUBCUTANEOUS CONTINUOUS PRN
Status: DISCONTINUED | OUTPATIENT
Start: 2019-09-30 | End: 2019-09-30

## 2019-09-30 RX ORDER — LIDOCAINE HCL/PF 100 MG/5ML
SYRINGE (ML) INTRAVENOUS
Status: DISCONTINUED | OUTPATIENT
Start: 2019-09-30 | End: 2019-09-30

## 2019-09-30 RX ORDER — SUCCINYLCHOLINE CHLORIDE 20 MG/ML
INJECTION INTRAMUSCULAR; INTRAVENOUS
Status: DISCONTINUED | OUTPATIENT
Start: 2019-09-30 | End: 2019-09-30

## 2019-09-30 RX ORDER — SODIUM CHLORIDE 0.9 % (FLUSH) 0.9 %
5 SYRINGE (ML) INJECTION
Status: DISCONTINUED | OUTPATIENT
Start: 2019-09-30 | End: 2019-09-30

## 2019-09-30 RX ORDER — LIDOCAINE HYDROCHLORIDE 20 MG/ML
INJECTION, SOLUTION INFILTRATION; PERINEURAL
Status: DISCONTINUED | OUTPATIENT
Start: 2019-09-30 | End: 2019-09-30

## 2019-09-30 RX ORDER — WARFARIN SODIUM 5 MG/1
5 TABLET ORAL DAILY
Status: DISCONTINUED | OUTPATIENT
Start: 2019-09-30 | End: 2019-10-01 | Stop reason: HOSPADM

## 2019-09-30 RX ORDER — SOTALOL HYDROCHLORIDE 80 MG/1
80 TABLET ORAL 2 TIMES DAILY
Status: DISCONTINUED | OUTPATIENT
Start: 2019-09-30 | End: 2019-10-01 | Stop reason: HOSPADM

## 2019-09-30 RX ORDER — MIDAZOLAM HYDROCHLORIDE 1 MG/ML
INJECTION INTRAMUSCULAR; INTRAVENOUS
Status: DISCONTINUED | OUTPATIENT
Start: 2019-09-30 | End: 2019-09-30

## 2019-09-30 RX ORDER — SODIUM CHLORIDE 0.9 % (FLUSH) 0.9 %
3 SYRINGE (ML) INJECTION
Status: DISCONTINUED | OUTPATIENT
Start: 2019-09-30 | End: 2019-10-01 | Stop reason: HOSPADM

## 2019-09-30 RX ORDER — PROPOFOL 10 MG/ML
VIAL (ML) INTRAVENOUS
Status: DISCONTINUED | OUTPATIENT
Start: 2019-09-30 | End: 2019-09-30

## 2019-09-30 RX ORDER — HEPARIN SODIUM 1000 [USP'U]/ML
INJECTION, SOLUTION INTRAVENOUS; SUBCUTANEOUS
Status: DISCONTINUED | OUTPATIENT
Start: 2019-09-30 | End: 2019-09-30

## 2019-09-30 RX ORDER — SIMVASTATIN 40 MG/1
80 TABLET, FILM COATED ORAL NIGHTLY
Status: DISCONTINUED | OUTPATIENT
Start: 2019-09-30 | End: 2019-10-01 | Stop reason: HOSPADM

## 2019-09-30 RX ORDER — PHENYLEPHRINE HYDROCHLORIDE 10 MG/ML
INJECTION INTRAVENOUS
Status: DISCONTINUED | OUTPATIENT
Start: 2019-09-30 | End: 2019-09-30

## 2019-09-30 RX ORDER — HYDROMORPHONE HYDROCHLORIDE 1 MG/ML
0.2 INJECTION, SOLUTION INTRAMUSCULAR; INTRAVENOUS; SUBCUTANEOUS EVERY 5 MIN PRN
Status: DISCONTINUED | OUTPATIENT
Start: 2019-09-30 | End: 2019-10-01 | Stop reason: HOSPADM

## 2019-09-30 RX ORDER — ONDANSETRON 2 MG/ML
4 INJECTION INTRAMUSCULAR; INTRAVENOUS DAILY PRN
Status: DISCONTINUED | OUTPATIENT
Start: 2019-09-30 | End: 2019-10-01 | Stop reason: HOSPADM

## 2019-09-30 RX ORDER — SODIUM CHLORIDE 9 MG/ML
INJECTION, SOLUTION INTRAVENOUS CONTINUOUS
Status: DISCONTINUED | OUTPATIENT
Start: 2019-09-30 | End: 2019-09-30

## 2019-09-30 RX ORDER — ONDANSETRON HYDROCHLORIDE 2 MG/ML
INJECTION, SOLUTION INTRAMUSCULAR; INTRAVENOUS
Status: DISCONTINUED | OUTPATIENT
Start: 2019-09-30 | End: 2019-09-30

## 2019-09-30 RX ORDER — TAMSULOSIN HYDROCHLORIDE 0.4 MG/1
1 CAPSULE ORAL DAILY
Status: DISCONTINUED | OUTPATIENT
Start: 2019-10-01 | End: 2019-10-01 | Stop reason: HOSPADM

## 2019-09-30 RX ORDER — ROCURONIUM BROMIDE 10 MG/ML
INJECTION, SOLUTION INTRAVENOUS
Status: DISCONTINUED | OUTPATIENT
Start: 2019-09-30 | End: 2019-09-30

## 2019-09-30 RX ORDER — VASOPRESSIN 20 [USP'U]/ML
INJECTION, SOLUTION INTRAMUSCULAR; SUBCUTANEOUS
Status: DISCONTINUED | OUTPATIENT
Start: 2019-09-30 | End: 2019-09-30

## 2019-09-30 RX ORDER — FENTANYL CITRATE 50 UG/ML
INJECTION, SOLUTION INTRAMUSCULAR; INTRAVENOUS
Status: DISCONTINUED | OUTPATIENT
Start: 2019-09-30 | End: 2019-09-30

## 2019-09-30 RX ORDER — LEVOTHYROXINE SODIUM 112 UG/1
112 TABLET ORAL
Status: DISCONTINUED | OUTPATIENT
Start: 2019-10-01 | End: 2019-10-01 | Stop reason: HOSPADM

## 2019-09-30 RX ORDER — ACETAMINOPHEN 325 MG/1
650 TABLET ORAL EVERY 6 HOURS PRN
Status: DISCONTINUED | OUTPATIENT
Start: 2019-09-30 | End: 2019-10-01 | Stop reason: HOSPADM

## 2019-09-30 RX ORDER — PROTAMINE SULFATE 10 MG/ML
INJECTION, SOLUTION INTRAVENOUS
Status: DISCONTINUED | OUTPATIENT
Start: 2019-09-30 | End: 2019-09-30

## 2019-09-30 RX ORDER — PANTOPRAZOLE SODIUM 40 MG/1
40 TABLET, DELAYED RELEASE ORAL DAILY
Status: DISCONTINUED | OUTPATIENT
Start: 2019-09-30 | End: 2019-10-01 | Stop reason: HOSPADM

## 2019-09-30 RX ORDER — FUROSEMIDE 10 MG/ML
INJECTION INTRAMUSCULAR; INTRAVENOUS
Status: DISCONTINUED | OUTPATIENT
Start: 2019-09-30 | End: 2019-09-30

## 2019-09-30 RX ADMIN — VASOPRESSIN 2 UNITS: 20 INJECTION, SOLUTION INTRAMUSCULAR; SUBCUTANEOUS at 02:09

## 2019-09-30 RX ADMIN — FENTANYL CITRATE 100 MCG: 50 INJECTION, SOLUTION INTRAMUSCULAR; INTRAVENOUS at 01:09

## 2019-09-30 RX ADMIN — LIDOCAINE HYDROCHLORIDE 100 MG: 20 INJECTION, SOLUTION INTRAVENOUS at 01:09

## 2019-09-30 RX ADMIN — PHENYLEPHRINE HYDROCHLORIDE 100 MCG: 10 INJECTION INTRAVENOUS at 01:09

## 2019-09-30 RX ADMIN — EPHEDRINE SULFATE 10 MG: 50 INJECTION, SOLUTION INTRAMUSCULAR; INTRAVENOUS; SUBCUTANEOUS at 05:09

## 2019-09-30 RX ADMIN — PHENYLEPHRINE HYDROCHLORIDE 150 MCG: 10 INJECTION INTRAVENOUS at 02:09

## 2019-09-30 RX ADMIN — VASOPRESSIN 1 UNITS: 20 INJECTION, SOLUTION INTRAMUSCULAR; SUBCUTANEOUS at 02:09

## 2019-09-30 RX ADMIN — FUROSEMIDE 20 MG: 10 INJECTION, SOLUTION INTRAMUSCULAR; INTRAVENOUS at 05:09

## 2019-09-30 RX ADMIN — PROTAMINE SULFATE 40 MG: 10 INJECTION, SOLUTION INTRAVENOUS at 05:09

## 2019-09-30 RX ADMIN — HEPARIN SODIUM 6000 UNITS/HR: 1000 INJECTION INTRAVENOUS; SUBCUTANEOUS at 03:09

## 2019-09-30 RX ADMIN — SODIUM CHLORIDE: 0.9 INJECTION, SOLUTION INTRAVENOUS at 01:09

## 2019-09-30 RX ADMIN — HEPARIN SODIUM 3000 UNITS: 1000 INJECTION INTRAVENOUS; SUBCUTANEOUS at 03:09

## 2019-09-30 RX ADMIN — SOTALOL HYDROCHLORIDE 80 MG: 80 TABLET ORAL at 10:09

## 2019-09-30 RX ADMIN — HEPARIN SODIUM 10000 UNITS: 1000 INJECTION INTRAVENOUS; SUBCUTANEOUS at 03:09

## 2019-09-30 RX ADMIN — SUCCINYLCHOLINE CHLORIDE 140 MG: 20 INJECTION, SOLUTION INTRAMUSCULAR; INTRAVENOUS at 01:09

## 2019-09-30 RX ADMIN — PROTAMINE SULFATE 60 MG: 10 INJECTION, SOLUTION INTRAVENOUS at 05:09

## 2019-09-30 RX ADMIN — PROTAMINE SULFATE 5 MG: 10 INJECTION, SOLUTION INTRAVENOUS at 05:09

## 2019-09-30 RX ADMIN — HEPARIN SODIUM 4000 UNITS: 1000 INJECTION INTRAVENOUS; SUBCUTANEOUS at 03:09

## 2019-09-30 RX ADMIN — ROCURONIUM BROMIDE 10 MG: 10 INJECTION, SOLUTION INTRAVENOUS at 01:09

## 2019-09-30 RX ADMIN — VASOPRESSIN 2 UNITS: 20 INJECTION, SOLUTION INTRAMUSCULAR; SUBCUTANEOUS at 04:09

## 2019-09-30 RX ADMIN — EPHEDRINE SULFATE 10 MG: 50 INJECTION, SOLUTION INTRAMUSCULAR; INTRAVENOUS; SUBCUTANEOUS at 03:09

## 2019-09-30 RX ADMIN — ONDANSETRON 4 MG: 2 INJECTION, SOLUTION INTRAMUSCULAR; INTRAVENOUS at 05:09

## 2019-09-30 RX ADMIN — SIMVASTATIN 80 MG: 40 TABLET, FILM COATED ORAL at 10:09

## 2019-09-30 RX ADMIN — WARFARIN SODIUM 5 MG: 5 TABLET ORAL at 07:09

## 2019-09-30 RX ADMIN — SODIUM CHLORIDE 0.25 MCG/KG/MIN: 9 INJECTION, SOLUTION INTRAVENOUS at 02:09

## 2019-09-30 RX ADMIN — MIDAZOLAM HYDROCHLORIDE 1 MG: 1 INJECTION, SOLUTION INTRAMUSCULAR; INTRAVENOUS at 01:09

## 2019-09-30 RX ADMIN — PROPOFOL 200 MG: 10 INJECTION, EMULSION INTRAVENOUS at 01:09

## 2019-09-30 RX ADMIN — PHENYLEPHRINE HYDROCHLORIDE 100 MCG: 10 INJECTION INTRAVENOUS at 02:09

## 2019-09-30 RX ADMIN — VASOPRESSIN 2 UNITS: 20 INJECTION, SOLUTION INTRAMUSCULAR; SUBCUTANEOUS at 03:09

## 2019-09-30 RX ADMIN — PANTOPRAZOLE SODIUM 40 MG: 40 TABLET, DELAYED RELEASE ORAL at 07:09

## 2019-09-30 RX ADMIN — ACETAMINOPHEN 650 MG: 325 TABLET ORAL at 07:09

## 2019-09-30 RX ADMIN — ONDANSETRON 4 MG: 2 INJECTION INTRAMUSCULAR; INTRAVENOUS at 07:09

## 2019-09-30 NOTE — Clinical Note
Heels and arms padded. Knee roll placed for comfort. Warming blanket and safety strap applied to patient

## 2019-09-30 NOTE — Clinical Note
Notice received from [facility] regarding Pt and need for medication refill. Spoke with/faxed by: [address] [place] [address] Street  The sheath is inserted into the right femoral vein.

## 2019-09-30 NOTE — HPI
Mr. Skelton is an 84 year-old man with paroxysmal atrial fibrillation s/p cryo-PVI by Dr. 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 He does not desire amiodarone. Discussed redo-PVI versus long-term rate control. I spent about a half hour discussing the nature of PVI including possible transseptal puncture. We discussed risks and benefits at length. Our discussion included, but was not limited to the risk of death, infection, bleeding, stroke, MI, cardiac perforation, embolism, cardiac tamponade, vascular injury, AE fistula, injury to phrenic nerve, pulmonary vein stenosis and other organic injury including the possibility for need for surgery or pacemaker implantation.  He understands and desire to continue to think about it. If he elects to proceed would do RF-PVI via right femoral vein access only.     RTC in 6 months, sooner prn. If he elects for PVI will arrange   Returned Mr. Skelton's phone call. He has significant loss of energy and dyspnea on exertion since AF has returned (rate controlled AF) and he failed anti-arrhythmic therapy (does not want to take amiodarone). We have had several talks about options including leave in rate controlled AF versus attempt at redo-PVI. He has a chronically occluded left CFV and an IVC filter that has been present for 20+ years. We discussed risks at his age. He states he understands the risks and due to how poorly he feels he wants to proceed. He also states he discussed it at length with his son.     Plan  Redo-PVI with RFA  Carto  Anesthesia for sedation  INR 2-3  EUGENE day of  Protonix 40mg daily 2 weeks prior (in lieu of omeprazole)  Will resume sotalol post ablation  Will use right groin only access and exchange CS/ICE catheters  throughout when needed

## 2019-09-30 NOTE — SUBJECTIVE & OBJECTIVE
Past Medical History:   Diagnosis Date    ACS (acute coronary syndrome)     Acute coronary syndrome 2000    NSTEMI    Anticoagulant long-term use     Coumadin    Atrial fibrillation     Basal cell carcinoma excised     left scalp vertex    Bilateral leg edema 1/6/2014    BPH (benign prostatic hyperplasia)     CKD (chronic kidney disease)     Coronary artery disease     DVT of leg (deep venous thrombosis) 09/08/2014    On coumadin for years    Encounter for blood transfusion     Hyperlipidemia     Hypertension     Hypogonadism, male     Leg fracture, left     Melanoma 01/04/2017     in situ crown of scalp    MI (myocardial infarction)     Panhypopituitarism     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    Squamous Cell Carcinoma excised     left posterior scalp    Thyroid disease        Past Surgical History:   Procedure Laterality Date    APPENDECTOMY      BASAL CELL CARCINOMA EXCISION  01/2008    BRAIN SURGERY      pituitary adenoma removed    CARDIAC CATHETERIZATION  05/09/2000    S/P stent to RCA,    CARDIAC CATHETERIZATION      stents placed    CARDIAC ELECTROPHYSIOLOGY STUDY AND ABLATION      CORONARY ANGIOPLASTY  5/9/2000    RCA    CORONARY ANGIOPLASTY WITH STENT PLACEMENT      ESOPHAGEAL DILATION      EYE SURGERY Bilateral     cataracts removed and new lenses placed     FRACTURE SURGERY Left     leg    HERNIA REPAIR      LAPAROSCOPIC CHOLECYSTECTOMY N/A 6/2/2018    Procedure: CHOLECYSTECTOMY, LAPAROSCOPIC;  Surgeon: Dickson Smith Jr., MD;  Location: [facility] OR;  Service: General;  Laterality: N/A;    left femur surgery      pituitatry      hypophysectomy    SKIN BIOPSY      TONSILLECTOMY      TREATMENT OF CARDIAC ARRHYTHMIA Bilateral 6/13/2019    Procedure: Cardioversion or Defibrillation;  Surgeon: Eric Nguyen MD;  Location: [facility] EP LAB;  Service: Cardiology;  Laterality: Bilateral;  af, bryson, dccv, anes, pr, 324     VASCULAR SURGERY         Review of patient's allergies indicates:  No Known Allergies    No current facility-administered medications on file prior to encounter.      Current Outpatient Medications on File Prior to Encounter   Medication Sig    aspirin 81 mg Tab Take 81 mg by mouth every evening. 1 Tablet Oral Every night    ciclopirox (PENLAC) 8 % Soln Apply daily to affected nail. Must remove and restart weekly    folic acid (FOLVITE) 1 MG tablet TAKE 1 TABLET BY MOUTH ONCE DAILY    ketoconazole (NIZORAL) 2 % cream AAA bid to feet x 3 weeks    levothyroxine (SYNTHROID) 112 MCG tablet TAKE 1 TABLET BY MOUTH ONCE DAILY BEFORE  BREAKFAST    metoprolol succinate (TOPROL-XL) 25 MG 24 hr tablet Take 1 tablet (25 mg total) by mouth once daily.    mometasone (ELOCON) 0.1 % ointment aaa qd- bid for severe areas.  Avoid use on face and groin    omeprazole (PRILOSEC) 20 MG capsule Take 20 mg by mouth every other day.    predniSONE (DELTASONE) 5 MG tablet Take 5 mg by mouth once daily.    simvastatin (ZOCOR) 80 MG tablet Take 1 tablet (80 mg total) by mouth every evening.    tamsulosin (FLOMAX) 0.4 mg Cap TAKE 1 CAPSULE BY MOUTH ONCE DAILY    warfarin (COUMADIN) 5 MG tablet TAKE ONE & ONE-HALF TABLETS BY MOUTH ONCE DAILY EXCEPT  ON  TUESDAYS  TAKE  1  TABLET ON TUESDAY (Patient taking differently: 5 mg on Tuesday, 7.5 mg all other days as directed by [facility])    NITROSTAT 0.4 mg SL tablet DISSOLVE ONE TABLET UNDER THE TONGUE EVERY 5 MINUTES AS NEEDED FOR CHEST PAIN.  DO NOT EXCEED A TOTAL OF 3 DOSES IN 15 MINUTES NOW     Family History     Problem Relation (Age of Onset)    Cancer Father        Tobacco Use    Smoking status: Never Smoker    Smokeless tobacco: Never Used   Substance and Sexual Activity    Alcohol use: No     Frequency: Never    Drug use: No    Sexual activity: Not Currently     Partners: Female     Review of Systems   Constitution: Positive for malaise/fatigue. Negative for chills,  fever and weight gain.   HENT: Negative for congestion.    Eyes: Negative for visual disturbance.   Cardiovascular: Negative for chest pain, claudication, dyspnea on exertion, leg swelling, orthopnea, palpitations and syncope.   Respiratory: Negative for cough, shortness of breath and snoring.    Hematologic/Lymphatic: Does not bruise/bleed easily.   Skin: Negative for rash.   Musculoskeletal: Negative for muscle cramps and myalgias.   Gastrointestinal: Negative for bloating, abdominal pain, constipation, diarrhea and melena.   Genitourinary: Negative for bladder incontinence.   Neurological: Negative for excessive daytime sleepiness, focal weakness and weakness.   Psychiatric/Behavioral: Negative for depression and suicidal ideas.     Objective:     Vital Signs (Most Recent):  Temp: 96.1 °F (35.6 °C) (09/30/19 1000)  Pulse: 97 (09/30/19 1000)  Resp: 18 (09/30/19 1000)  BP: 131/85 (09/30/19 1005)  SpO2: 95 % (09/30/19 1000) Vital Signs (24h Range):  Temp:  [96.1 °F (35.6 °C)] 96.1 °F (35.6 °C)  Pulse:  [97] 97  Resp:  [18] 18  SpO2:  [95 %] 95 %  BP: (120-131)/(83-85) 131/85     Weight: 113.4 kg (250 lb)  Body mass index is 31.25 kg/m².    SpO2: 95 %       No intake or output data in the 24 hours ending 09/30/19 1106    Lines/Drains/Airways     Peripheral Intravenous Line                 Peripheral IV - Single Lumen 06/11/19 1836 20 G Anterior;Left Forearm 110 days         Peripheral IV - Single Lumen 09/30/19 1042 18 G Left Forearm less than 1 day         Peripheral IV - Single Lumen 09/30/19 1043 20 G Right Hand less than 1 day                Physical Exam   Constitutional: He is oriented to person, place, and time. He appears well-developed and well-nourished. No distress.   HENT:   Head: Normocephalic and atraumatic.   Mouth/Throat: Oropharynx is clear and moist.   Eyes: Pupils are equal, round, and reactive to light. Conjunctivae and EOM are normal. No scleral icterus.   Neck: Normal range of motion. Neck  supple. No JVD present.   Cardiovascular: Normal heart sounds and intact distal pulses. An irregularly irregular rhythm present. Tachycardia present.   No murmur heard.  Pulses:       Radial pulses are 2+ on the right side, and 2+ on the left side.   Pulmonary/Chest: Effort normal and breath sounds normal. No respiratory distress.   Symmetrical expansion   Abdominal: Soft. Bowel sounds are normal. There is no hepatosplenomegaly. There is no tenderness.   Musculoskeletal: Normal range of motion. He exhibits no edema.   Neurological: He is alert and oriented to person, place, and time. No cranial nerve deficit.   Skin: Skin is warm and dry. No rash noted. He is not diaphoretic.   Psychiatric: He has a normal mood and affect. Judgment and thought content normal.

## 2019-09-30 NOTE — ASSESSMENT & PLAN NOTE
1. EUGENE for evaluation of CHANI prior to PVI    -No absolute contraindications of esophageal stricture, tumor, perforation, laceration,or diverticulum and/or active GI bleed  -The risks, benefits & alternatives of the procedure were explained to the patient.   -The risks of transesophageal echo include but are not limited to:  Dental trauma, esophageal trauma/perforation, bleeding, laryngospasm/brochospasm, aspiration, sore throat/hoarseness, & dislodgement of the endotracheal tube/nasogastric tube (where applicable).    -The risks of moderate sedation include hypotension, respiratory depression, arrhythmias, bronchospasm, & death.    -Informed consent was obtained. The patient is agreeable to proceed with the procedure and all questions and concerns addressed.    Case discussed with an attending in echocardiography lab.     Further recommendations per attending addendum

## 2019-09-30 NOTE — ANESTHESIA PREPROCEDURE EVALUATION
09/30/2019  Linda Skelton is a 84 y.o., male with atrial fibrillation here for EUGENE and possible cardioversion.    Pre-operative evaluation for Procedure(s) (LRB):  Cardioversion or Defibrillation (Bilateral)  ECHOCARDIOGRAM,TRANSESOPHAGEAL (N/A)        Patient Active Problem List   Diagnosis    Persistent atrial fibrillation    Thyroid nodule    Panhypopituitarism thyroid/prednisone/testosterone    Chronic kidney disease, stage 3    Coronary artery disease involving native coronary artery of native heart with angina pectoris hx NSTEMI with PCI to RCA 2000    Mitral regurgitation mild, posterior leaflet MVP on EUGENE 6/2019    Bilateral leg edema    Obesity, Class I, BMI 30-34.9    ED (erectile dysfunction)    Essential hypertension    History of deep 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, 1997    Mixed hyperlipidemia    History of melanoma in situ    Benign non-nodular prostatic hyperplasia without lower urinary tract symptoms    Hypogonadism in male    Long term (current) use of anticoagulants    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    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    History of bacteremia with klebsiella during cholecystitis summer 2018    Status post cholecystectomy 6/2018 with klebsiella bacteremia    Perforated nasal septum    Tubular adenoma of colon on colonoscopy 2014    Paroxysmal atrial fibrillation    Gastroesophageal reflux disease without esophagitis    Presence of IVC filter 1997       Review of patient's allergies indicates:  No Known Allergies    Current Facility-Administered Medications on File Prior to Visit   Medication Dose Route Frequency Provider Last Rate Last Dose    0.9%  NaCl infusion   Intravenous Continuous Renay Fernandez NP        sodium chloride 0.9% flush 5 mL  5 mL Intravenous PRN Renay MOYA  REED Fernandez         Current Outpatient Medications on File Prior to Visit   Medication Sig Dispense Refill    aspirin 81 mg Tab Take 81 mg by mouth every evening. 1 Tablet Oral Every night      ciclopirox (PENLAC) 8 % Soln Apply daily to affected nail. Must remove and restart weekly 1 Bottle 5    folic acid (FOLVITE) 1 MG tablet TAKE 1 TABLET BY MOUTH ONCE DAILY 90 tablet 3    ketoconazole (NIZORAL) 2 % cream AAA bid to feet x 3 weeks 60 g 3    levothyroxine (SYNTHROID) 112 MCG tablet TAKE 1 TABLET BY MOUTH ONCE DAILY BEFORE  BREAKFAST 90 tablet 3    metoprolol succinate (TOPROL-XL) 25 MG 24 hr tablet Take 1 tablet (25 mg total) by mouth once daily. 30 tablet 11    mometasone (ELOCON) 0.1 % ointment aaa qd- bid for severe areas.  Avoid use on face and groin 60 g 1    NITROSTAT 0.4 mg SL tablet DISSOLVE ONE TABLET UNDER THE TONGUE EVERY 5 MINUTES AS NEEDED FOR CHEST PAIN.  DO NOT EXCEED A TOTAL OF 3 DOSES IN 15 MINUTES NOW 25 tablet 3    omeprazole (PRILOSEC) 20 MG capsule Take 20 mg by mouth every other day.      pantoprazole (PROTONIX) 40 MG tablet Take 1 tablet (40 mg total) by mouth once daily. 30 tablet 1    predniSONE (DELTASONE) 5 MG tablet Take 5 mg by mouth once daily.      simvastatin (ZOCOR) 80 MG tablet Take 1 tablet (80 mg total) by mouth every evening. 90 tablet 3    tamsulosin (FLOMAX) 0.4 mg Cap TAKE 1 CAPSULE BY MOUTH ONCE DAILY 90 capsule 3    warfarin (COUMADIN) 5 MG tablet TAKE ONE & ONE-HALF TABLETS BY MOUTH ONCE DAILY EXCEPT  ON  TUESDAYS  TAKE  1  TABLET ON TUESDAY (Patient taking differently: 5 mg on Tuesday, 7.5 mg all other days as directed by [facility]) 135 tablet 3       Past Surgical History:   Procedure Laterality Date    APPENDECTOMY      BASAL CELL CARCINOMA EXCISION  01/2008    BRAIN SURGERY      pituitary adenoma removed    CARDIAC CATHETERIZATION  05/09/2000    S/P stent to RCA,    CARDIAC CATHETERIZATION      stents placed    CARDIAC ELECTROPHYSIOLOGY  STUDY AND ABLATION      CORONARY ANGIOPLASTY  5/9/2000    RCA    CORONARY ANGIOPLASTY WITH STENT PLACEMENT      ESOPHAGEAL DILATION      EYE SURGERY Bilateral     cataracts removed and new lenses placed     FRACTURE SURGERY Left     leg    HERNIA REPAIR      LAPAROSCOPIC CHOLECYSTECTOMY N/A 6/2/2018    Procedure: CHOLECYSTECTOMY, LAPAROSCOPIC;  Surgeon: Dickson Smith Jr., MD;  Location: [facility];  Service: General;  Laterality: N/A;    left femur surgery      pituitatry      hypophysectomy    SKIN BIOPSY      TONSILLECTOMY      TREATMENT OF CARDIAC ARRHYTHMIA Bilateral 6/13/2019    Procedure: Cardioversion or Defibrillation;  Surgeon: Eric Nguyen MD;  Location: [facility] EP LAB;  Service: Cardiology;  Laterality: Bilateral;  af, bryson, dccv, anes, pr, 324    VASCULAR SURGERY         Social History     Socioeconomic History    Marital status:      Spouse name: Not on file    Number of children: Not on file    Years of education: Not on file    Highest education level: Not on file   Occupational History    Not on file   Social Needs    Financial resource strain: Not hard at all    Food insecurity:     Worry: Never true     Inability: Never true    Transportation needs:     Medical: No     Non-medical: No   Tobacco Use    Smoking status: Never Smoker    Smokeless tobacco: Never Used   Substance and Sexual Activity    Alcohol use: No     Frequency: Never    Drug use: No    Sexual activity: Not Currently     Partners: Female   Lifestyle    Physical activity:     Days per week: 0 days     Minutes per session: 0 min    Stress: Not at all   Relationships    Social connections:     Talks on phone: More than three times a week     Gets together: Once a week     Attends [employer] service: Not on file     Active member of club or organization: Yes     Attends meetings of clubs or organizations: More than 4 times per year     Relationship status:    Other Topics Concern    Not on file    Social History Narrative    retired [employer] .  Non smoker.          CBC:   No results for input(s): WBC, RBC, HGB, HCT, PLT, MCV, MCH, MCHC in the last 72 hours.    CMP:   No results for input(s): NA, K, CL, CO2, BUN, CREATININE, GLU, MG, PHOS, CALCIUM, ALBUMIN, PROT, ALKPHOS, ALT, AST, BILITOT in the last 72 hours.    INR  Recent Labs     09/30/19  0957   INR 2.5*   APTT 30.7                 2D Echo:  Results for orders placed or performed during the hospital encounter of 05/30/18   2D echo with color flow doppler   Result Value Ref Range    QEF 60 55 - 65    Mitral Valve Regurgitation TRIVIAL     Diastolic Dysfunction No     Est. PA Systolic Pressure 8.86     Tricuspid Valve Regurgitation TRIVIAL          Pre-op Assessment    I have reviewed the Patient Summary Reports.     I have reviewed the Nursing Notes.   I have reviewed the Medications.     Review of Systems  Anesthesia Hx:  No problems with previous Anesthesia    Hematology/Oncology:  Hematology Normal   Oncology Normal     EENT/Dental:EENT/Dental Normal   Cardiovascular:   Hypertension Past MI CAD  CABG/stent Dysrhythmias atrial fibrillation  Denies Angina. · Normal appearing left atrial appendage. No thrombus is present in the appendage. Normal appendage velocities.  · No thrombus present in the left atrium.  · Normal left ventricular systolic function. The estimated ejection fraction is 55%  · Mitral prolapse of the posterior mitral leaflet.  · Mild mitral regurgitation.  · Normal right ventricular systolic function.  · Mild to moderate tricuspid regurgitation.  · No aortic atheroma visualized.  · Small inferior-posterior interatrial septal defect present with bi-directional shunting indicated by color flow Doppler.      Pulmonary:  Pulmonary Normal    Renal/:   Chronic Renal Disease, CRI    Hepatic/GI:  Hepatic/GI Normal    Musculoskeletal:  Musculoskeletal Normal    Neurological:  Neurology Normal    Endocrine:   Hypothyroidism     Dermatological:  Skin Normal    Psych:  Psychiatric Normal           Physical Exam  General:  Well nourished    Airway/Jaw/Neck:  Airway Findings: Mouth Opening: Normal Tongue: Normal  General Airway Assessment: Adult  Mallampati: II  Jaw/Neck Findings:  Neck ROM: Normal ROM     Eyes/Ears/Nose:  Eyes/Ears/Nose Findings:    Dental:  Dental Findings: In tact   Chest/Lungs:  Chest/Lungs Findings: Clear to auscultation, Normal Respiratory Rate     Heart/Vascular:  Heart Findings: Rate: Normal  Rhythm: Regular Rhythm  Sounds: Normal  Heart Murmur  Vascular Findings:        Mental Status:  Mental Status Findings:  Cooperative, Alert and Oriented         Anesthesia Plan  Type of Anesthesia, risks & benefits discussed:  Anesthesia Type:  general  Patient's Preference: general  Intra-op Monitoring Plan: standard ASA monitors and arterial line  Intra-op Monitoring Plan Comments:   Post Op Pain Control Plan: per primary service following discharge from PACU and multimodal analgesia  Post Op Pain Control Plan Comments: IV meds as needed  Induction:   IV  Beta Blocker:  Patient is not currently on a Beta-Blocker (No further documentation required).       Informed Consent: Patient understands risks and agrees with Anesthesia plan.  Questions answered. Anesthesia consent signed with patient.  ASA Score: 3     Day of Surgery Review of History & Physical:    H&P update referred to the provider.     Anesthesia Plan Notes: Discussed anesthetic options, pt understands and agrees with plan        Ready For Surgery From Anesthesia Perspective.

## 2019-09-30 NOTE — TRANSFER OF CARE
"Anesthesia Transfer of Care Note    Patient: Linda Skelton    Procedure(s) Performed: Procedure(s) (LRB):  Ablation atrial fibrillation (N/A)  Transesophageal echo (EUGENE) intra-procedure log documentation (N/A)  Cardioversion or Defibrillation    Patient location: PACU    Anesthesia Type: general    Transport from OR: Transported from OR on 6-10 L/min O2 by face mask with adequate spontaneous ventilation    Post pain: adequate analgesia    Post assessment: no apparent anesthetic complications    Post vital signs: stable    Level of consciousness: awake    Nausea/Vomiting: no nausea/vomiting    Complications: none    Transfer of care protocol was followed      Last vitals:   Visit Vitals  BP (!) 139/96   Pulse 90   Temp 35.6 °C (96.1 °F)   Resp 10   Ht 6' 3" (1.905 m)   Wt 113.4 kg (250 lb)   SpO2 98%   BMI 31.25 kg/m²     "

## 2019-09-30 NOTE — Clinical Note
A dressing is applied to the right femoral vein puncture site. Tegaderm and gauze applied once hemostasis was achieved

## 2019-09-30 NOTE — BRIEF OP NOTE
: Neel Topete MD    Post-operative Diagnosis: AF    Procedure Performed: Pulmonary vein isolation of all 4 pulmonary veins via radiofrequency ablation.     Description of Procedure: The patient was brought to the EP lab in the fasting state. Prepped and draped in sterile fashion. Safety timeout was performed. Sedation administered by anesthesia staff. Ultrasound guided venous access of the right femoral vein was obtained x3. ICE pre ablation survey showed small to moderate posterior pericardial effusion. Two transseptal punctures performed using combination of fluoroscopic and ICE guidance. Heparin bolus and continuous infusion per protocol. Map created. RFA to isolate posterior wall and extend right posterior waca lines. ICE confirmed no significant change in pericardial effusion.     EBL: <10 mL    Specimens Removed: None  Complications: no immediate

## 2019-09-30 NOTE — HPI
This is an 83yo man here for pre-PVI EUGENE. Has a history of MR with posterior leaflet MVP, pAF, CAD, HTN, HLD, CKD3, DVT, panhypopituitarism. Currently denies symptoms of angina, heart failure, dysphagia, no contra-indications to EUGENE.    Dysphagia or odynophagia:  No  Liver Disease, esophageal disease, or known varices:  No  Upper GI Bleeding: No  Snoring:  No  Sleep Apnea:  No  Prior neck surgery or radiation:  No  History of anesthetic difficulties:  No  Family history of anesthetic difficulties:  No  Last oral intake:  12 hours ago  Able to move neck in all directions:  Yes  Anticoagulation/Antiplatelets: Coumadin    Platelet count:   Lab Results   Component Value Date     (L) 09/25/2019      Lab Results   Component Value Date    HGB 14.3 09/25/2019     Lab Results   Component Value Date    INR 2.5 (H) 09/30/2019    INR 3.7 (H) 09/25/2019    INR 3.5 (H) 09/18/2019       TT ECHO: 06/2018    1 - Normal left ventricular systolic function (EF 60-65%).     2 - Concentric hypertrophy.     3 - Trivial mitral regurgitation.     4 - Trivial tricuspid regurgitation.     EUGENE: 06/2019  · Normal appearing left atrial appendage. No thrombus is present in the appendage. Normal appendage velocities.  · No thrombus present in the left atrium.  · Normal left ventricular systolic function. The estimated ejection fraction is 55%  · Mitral prolapse of the posterior mitral leaflet.  · Mild mitral regurgitation.  · Normal right ventricular systolic function.  · Mild to moderate tricuspid regurgitation.  · No aortic atheroma visualized.  · Small inferior-posterior interatrial septal defect present with bi-directional shunting indicated by color flow Doppler.    EGD: None on file

## 2019-09-30 NOTE — H&P
[facility]  Cardiology  History and Physical     Patient Name: Linda Skelton  MRN: [id number]  Admission Date: 9/30/2019  Code Status: Prior   Attending Provider: Neel Topete MD   Primary Care Physician: Anderson Jerome MD  Principal Problem:<principal problem not specified>    Patient information was obtained from patient and past medical records.     Subjective:     Chief Complaint:  Fatigue     HPI:  This is an 83yo man here for pre-PVI EUGENE. Has a history of MR with posterior leaflet MVP, pAF, CAD, HTN, HLD, CKD3, DVT, panhypopituitarism. Currently denies symptoms of angina, heart failure, dysphagia, no contra-indications to EUGENE.    Dysphagia or odynophagia:  No  Liver Disease, esophageal disease, or known varices:  No  Upper GI Bleeding: No  Snoring:  No  Sleep Apnea:  No  Prior neck surgery or radiation:  No  History of anesthetic difficulties:  No  Family history of anesthetic difficulties:  No  Last oral intake:  12 hours ago  Able to move neck in all directions:  Yes  Anticoagulation/Antiplatelets: Coumadin    Platelet count:   Lab Results   Component Value Date     (L) 09/25/2019      Lab Results   Component Value Date    HGB 14.3 09/25/2019     Lab Results   Component Value Date    INR 2.5 (H) 09/30/2019    INR 3.7 (H) 09/25/2019    INR 3.5 (H) 09/18/2019       TT ECHO: 06/2018    1 - Normal left ventricular systolic function (EF 60-65%).     2 - Concentric hypertrophy.     3 - Trivial mitral regurgitation.     4 - Trivial tricuspid regurgitation.     EUGENE: 06/2019  · Normal appearing left atrial appendage. No thrombus is present in the appendage. Normal appendage velocities.  · No thrombus present in the left atrium.  · Normal left ventricular systolic function. The estimated ejection fraction is 55%  · Mitral prolapse of the posterior mitral leaflet.  · Mild mitral regurgitation.  · Normal right ventricular systolic function.  · Mild to moderate tricuspid regurgitation.  · No  aortic atheroma visualized.  · Small inferior-posterior interatrial septal defect present with bi-directional shunting indicated by color flow Doppler.    EGD: None on file      Past Medical History:   Diagnosis Date    ACS (acute coronary syndrome)     Acute coronary syndrome 2000    NSTEMI    Anticoagulant long-term use     Coumadin    Atrial fibrillation     Basal cell carcinoma excised     left scalp vertex    Bilateral leg edema 1/6/2014    BPH (benign prostatic hyperplasia)     CKD (chronic kidney disease)     Coronary artery disease     DVT of leg (deep venous thrombosis) 09/08/2014    On coumadin for years    Encounter for blood transfusion     Hyperlipidemia     Hypertension     Hypogonadism, male     Leg fracture, left     Melanoma 01/04/2017     in situ crown of scalp    MI (myocardial infarction)     Panhypopituitarism     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    Squamous Cell Carcinoma excised     left posterior scalp    Thyroid disease        Past Surgical History:   Procedure Laterality Date    APPENDECTOMY      BASAL CELL CARCINOMA EXCISION  01/2008    BRAIN SURGERY      pituitary adenoma removed    CARDIAC CATHETERIZATION  05/09/2000    S/P stent to RCA,    CARDIAC CATHETERIZATION      stents placed    CARDIAC ELECTROPHYSIOLOGY STUDY AND ABLATION      CORONARY ANGIOPLASTY  5/9/2000    RCA    CORONARY ANGIOPLASTY WITH STENT PLACEMENT      ESOPHAGEAL DILATION      EYE SURGERY Bilateral     cataracts removed and new lenses placed     FRACTURE SURGERY Left     leg    HERNIA REPAIR      LAPAROSCOPIC CHOLECYSTECTOMY N/A 6/2/2018    Procedure: CHOLECYSTECTOMY, LAPAROSCOPIC;  Surgeon: Dickson Smith Jr., MD;  Location: [facility];  Service: General;  Laterality: N/A;    left femur surgery      pituitatry      hypophysectomy    SKIN BIOPSY      TONSILLECTOMY      TREATMENT OF CARDIAC ARRHYTHMIA Bilateral 6/13/2019     Procedure: Cardioversion or Defibrillation;  Surgeon: Eric Nguyen MD;  Location: [facility] EP LAB;  Service: Cardiology;  Laterality: Bilateral;  af, bryson, dccv, anes, pr, 324    VASCULAR SURGERY         Review of patient's allergies indicates:  No Known Allergies    No current facility-administered medications on file prior to encounter.      Current Outpatient Medications on File Prior to Encounter   Medication Sig    aspirin 81 mg Tab Take 81 mg by mouth every evening. 1 Tablet Oral Every night    ciclopirox (PENLAC) 8 % Soln Apply daily to affected nail. Must remove and restart weekly    folic acid (FOLVITE) 1 MG tablet TAKE 1 TABLET BY MOUTH ONCE DAILY    ketoconazole (NIZORAL) 2 % cream AAA bid to feet x 3 weeks    levothyroxine (SYNTHROID) 112 MCG tablet TAKE 1 TABLET BY MOUTH ONCE DAILY BEFORE  BREAKFAST    metoprolol succinate (TOPROL-XL) 25 MG 24 hr tablet Take 1 tablet (25 mg total) by mouth once daily.    mometasone (ELOCON) 0.1 % ointment aaa qd- bid for severe areas.  Avoid use on face and groin    omeprazole (PRILOSEC) 20 MG capsule Take 20 mg by mouth every other day.    predniSONE (DELTASONE) 5 MG tablet Take 5 mg by mouth once daily.    simvastatin (ZOCOR) 80 MG tablet Take 1 tablet (80 mg total) by mouth every evening.    tamsulosin (FLOMAX) 0.4 mg Cap TAKE 1 CAPSULE BY MOUTH ONCE DAILY    warfarin (COUMADIN) 5 MG tablet TAKE ONE & ONE-HALF TABLETS BY MOUTH ONCE DAILY EXCEPT  ON  TUESDAYS  TAKE  1  TABLET ON TUESDAY (Patient taking differently: 5 mg on Tuesday, 7.5 mg all other days as directed by [facility])    NITROSTAT 0.4 mg SL tablet DISSOLVE ONE TABLET UNDER THE TONGUE EVERY 5 MINUTES AS NEEDED FOR CHEST PAIN.  DO NOT EXCEED A TOTAL OF 3 DOSES IN 15 MINUTES NOW     Family History     Problem Relation (Age of Onset)    Cancer Father        Tobacco Use    Smoking status: Never Smoker    Smokeless tobacco: Never Used   Substance and Sexual Activity    Alcohol use: No      Frequency: Never    Drug use: No    Sexual activity: Not Currently     Partners: Female     Review of Systems   Constitution: Positive for malaise/fatigue. Negative for chills, fever and weight gain.   HENT: Negative for congestion.    Eyes: Negative for visual disturbance.   Cardiovascular: Negative for chest pain, claudication, dyspnea on exertion, leg swelling, orthopnea, palpitations and syncope.   Respiratory: Negative for cough, shortness of breath and snoring.    Hematologic/Lymphatic: Does not bruise/bleed easily.   Skin: Negative for rash.   Musculoskeletal: Negative for muscle cramps and myalgias.   Gastrointestinal: Negative for bloating, abdominal pain, constipation, diarrhea and melena.   Genitourinary: Negative for bladder incontinence.   Neurological: Negative for excessive daytime sleepiness, focal weakness and weakness.   Psychiatric/Behavioral: Negative for depression and suicidal ideas.     Objective:     Vital Signs (Most Recent):  Temp: 96.1 °F (35.6 °C) (09/30/19 1000)  Pulse: 97 (09/30/19 1000)  Resp: 18 (09/30/19 1000)  BP: 131/85 (09/30/19 1005)  SpO2: 95 % (09/30/19 1000) Vital Signs (24h Range):  Temp:  [96.1 °F (35.6 °C)] 96.1 °F (35.6 °C)  Pulse:  [97] 97  Resp:  [18] 18  SpO2:  [95 %] 95 %  BP: (120-131)/(83-85) 131/85     Weight: 113.4 kg (250 lb)  Body mass index is 31.25 kg/m².    SpO2: 95 %       No intake or output data in the 24 hours ending 09/30/19 1106    Lines/Drains/Airways     Peripheral Intravenous Line                 Peripheral IV - Single Lumen 06/11/19 1836 20 G Anterior;Left Forearm 110 days         Peripheral IV - Single Lumen 09/30/19 1042 18 G Left Forearm less than 1 day         Peripheral IV - Single Lumen 09/30/19 1043 20 G Right Hand less than 1 day                Physical Exam   Constitutional: He is oriented to person, place, and time. He appears well-developed and well-nourished. No distress.   HENT:   Head: Normocephalic and atraumatic.   Mouth/Throat:  Oropharynx is clear and moist.   Eyes: Pupils are equal, round, and reactive to light. Conjunctivae and EOM are normal. No scleral icterus.   Neck: Normal range of motion. Neck supple. No JVD present.   Cardiovascular: Normal heart sounds and intact distal pulses. An irregularly irregular rhythm present. Tachycardia present.   No murmur heard.  Pulses:       Radial pulses are 2+ on the right side, and 2+ on the left side.   Pulmonary/Chest: Effort normal and breath sounds normal. No respiratory distress.   Symmetrical expansion   Abdominal: Soft. Bowel sounds are normal. There is no hepatosplenomegaly. There is no tenderness.   Musculoskeletal: Normal range of motion. He exhibits no edema.   Neurological: He is alert and oriented to person, place, and time. No cranial nerve deficit.   Skin: Skin is warm and dry. No rash noted. He is not diaphoretic.   Psychiatric: He has a normal mood and affect. Judgment and thought content normal.         Assessment and Plan:     Persistent atrial fibrillation  1. EUGENE for evaluation of CHANI prior to PVI    -No absolute contraindications of esophageal stricture, tumor, perforation, laceration,or diverticulum and/or active GI bleed  -The risks, benefits & alternatives of the procedure were explained to the patient.   -The risks of transesophageal echo include but are not limited to:  Dental trauma, esophageal trauma/perforation, bleeding, laryngospasm/brochospasm, aspiration, sore throat/hoarseness, & dislodgement of the endotracheal tube/nasogastric tube (where applicable).    -The risks of moderate sedation include hypotension, respiratory depression, arrhythmias, bronchospasm, & death.    -Informed consent was obtained. The patient is agreeable to proceed with the procedure and all questions and concerns addressed.    Case discussed with an attending in echocardiography lab.     Further recommendations per attending addendum         VTE Risk Mitigation (From admission, onward)     None          Rajesh Knight MD  Cardiology   [facility]

## 2019-09-30 NOTE — ASSESSMENT & PLAN NOTE
-Redo-PVI with RFA  -Carto  -Anesthesia for sedation  -INR 2-3  -EUGENE day of  -Protonix 40mg daily 2 weeks prior (in lieu of omeprazole)  -Will resume sotalol post ablation  -Will use right groin only access and exchange CS/ICE catheters throughout when needed

## 2019-09-30 NOTE — H&P
[facility]  Cardiac Electrophysiology  History and Physical     Admission Date: 9/30/2019  Code Status: Prior   Attending Provider: Neel Topete MD   Principal Problem:Persistent atrial fibrillation    Subjective:     Chief Complaint:  Persistent atrial fibrillation     HPI: Mr. Skelton is an 84 year-old man with paroxysmal atrial fibrillation s/p cryo-PVI by Dr. 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 He does not desire amiodarone. Discussed redo-PVI versus long-term rate control. I spent about a half hour discussing the nature of PVI including possible transseptal puncture. He understands and desires to move forward. Plan to do RF-PVI via right femoral vein access only.  Mr. Skelton called the clinic stating he has significant loss of energy and dyspnea on exertion since AF has returned (rate controlled AF) and he failed anti-arrhythmic therapy (does not want to take amiodarone). He has a chronically occluded left CFV and an IVC filter that has been present for 20+ years.      Plan  Redo-PVI with RFA  Carto  Anesthesia for sedation  INR 2-3  EUGENE day of  Protonix 40mg daily 2 weeks prior (in lieu of omeprazole)  Will resume sotalol post ablation  Will use right groin only access and exchange CS/ICE catheters throughout when needed    He presents today to [facility] for scheduled PVI RFA with Dr. Topete. He denies any chest pain, palpitations, SOB, dizziness, light headedness, weakness, syncope, or near syncopal episodes. He does state he has HSIEH and fatigue. He denies any bleeding, infections, fevers, rashes, or surgeries in the past 30 days. He is currently taking coumadin (INR today 2.5), metoprolol 25 mg daily, and pantoprazole 40 mg daily.     I have  personally reviewed the patient's EKG today, which shows AF with RVR at 106 bpm.    Past Medical History:   Diagnosis Date    ACS (acute coronary syndrome)     Acute coronary syndrome 2000    NSTEMI    Anticoagulant long-term use     Coumadin    Atrial fibrillation     Basal cell carcinoma excised     left scalp vertex    Bilateral leg edema 1/6/2014    BPH (benign prostatic hyperplasia)     CKD (chronic kidney disease)     Coronary artery disease     DVT of leg (deep venous thrombosis) 09/08/2014    On coumadin for years    Encounter for blood transfusion     Hyperlipidemia     Hypertension     Hypogonadism, male     Leg fracture, left     Melanoma 01/04/2017     in situ crown of scalp    MI (myocardial infarction)     Panhypopituitarism     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    Squamous Cell Carcinoma excised     left posterior scalp    Thyroid disease      Past Surgical History:   Procedure Laterality Date    APPENDECTOMY      BASAL CELL CARCINOMA EXCISION  01/2008    BRAIN SURGERY      pituitary adenoma removed    CARDIAC CATHETERIZATION  05/09/2000    S/P stent to RCA,    CARDIAC CATHETERIZATION      stents placed    CARDIAC ELECTROPHYSIOLOGY STUDY AND ABLATION      CORONARY ANGIOPLASTY  5/9/2000    RCA    CORONARY ANGIOPLASTY WITH STENT PLACEMENT      ESOPHAGEAL DILATION      EYE SURGERY Bilateral     cataracts removed and new lenses placed     FRACTURE SURGERY Left     leg    HERNIA REPAIR      LAPAROSCOPIC CHOLECYSTECTOMY N/A 6/2/2018    Procedure: CHOLECYSTECTOMY, LAPAROSCOPIC;  Surgeon: Dickson Smith Jr., MD;  Location: [facility] OR;  Service: General;  Laterality: N/A;    left femur surgery      pituitatry      hypophysectomy    SKIN BIOPSY      TONSILLECTOMY      TREATMENT OF CARDIAC ARRHYTHMIA Bilateral 6/13/2019    Procedure: Cardioversion or Defibrillation;  Surgeon: Eric Nguyen MD;  Location: [facility];   Service: Cardiology;  Laterality: Bilateral;  af, bryson, dccv, anes, pr, 324    VASCULAR SURGERY       Review of patient's allergies indicates:  No Known Allergies    No current facility-administered medications on file prior to encounter.      Current Outpatient Medications on File Prior to Encounter   Medication Sig    aspirin 81 mg Tab Take 81 mg by mouth every evening. 1 Tablet Oral Every night    ciclopirox (PENLAC) 8 % Soln Apply daily to affected nail. Must remove and restart weekly    folic acid (FOLVITE) 1 MG tablet TAKE 1 TABLET BY MOUTH ONCE DAILY    ketoconazole (NIZORAL) 2 % cream AAA bid to feet x 3 weeks    levothyroxine (SYNTHROID) 112 MCG tablet TAKE 1 TABLET BY MOUTH ONCE DAILY BEFORE  BREAKFAST    metoprolol succinate (TOPROL-XL) 25 MG 24 hr tablet Take 1 tablet (25 mg total) by mouth once daily.    mometasone (ELOCON) 0.1 % ointment aaa qd- bid for severe areas.  Avoid use on face and groin    omeprazole (PRILOSEC) 20 MG capsule Take 20 mg by mouth every other day.    predniSONE (DELTASONE) 5 MG tablet Take 5 mg by mouth once daily.    simvastatin (ZOCOR) 80 MG tablet Take 1 tablet (80 mg total) by mouth every evening.    tamsulosin (FLOMAX) 0.4 mg Cap TAKE 1 CAPSULE BY MOUTH ONCE DAILY    warfarin (COUMADIN) 5 MG tablet TAKE ONE & ONE-HALF TABLETS BY MOUTH ONCE DAILY EXCEPT  ON  TUESDAYS  TAKE  1  TABLET ON TUESDAY (Patient taking differently: 5 mg on Tuesday, 7.5 mg all other days as directed by [facility])    NITROSTAT 0.4 mg SL tablet DISSOLVE ONE TABLET UNDER THE TONGUE EVERY 5 MINUTES AS NEEDED FOR CHEST PAIN.  DO NOT EXCEED A TOTAL OF 3 DOSES IN 15 MINUTES NOW     Family History     Problem Relation (Age of Onset)    Cancer Father        Tobacco Use    Smoking status: Never Smoker    Smokeless tobacco: Never Used   Substance and Sexual Activity    Alcohol use: No     Frequency: Never    Drug use: No    Sexual activity: Not Currently     Partners: Female     Review of  Systems   Constitution: Negative for chills, fever and malaise/fatigue.   HENT: Negative for congestion and nosebleeds.    Eyes: Negative for blurred vision.   Cardiovascular: Negative for chest pain, dyspnea on exertion, irregular heartbeat, leg swelling, near-syncope, orthopnea, palpitations, paroxysmal nocturnal dyspnea and syncope.   Respiratory: Negative for cough, hemoptysis, shortness of breath, sleep disturbances due to breathing, sputum production and wheezing.    Endocrine: Negative for polyphagia.   Hematologic/Lymphatic: Negative for bleeding problem. Does not bruise/bleed easily.   Skin: Negative for itching and rash.   Musculoskeletal: Negative for back pain, joint swelling, muscle cramps and muscle weakness.   Gastrointestinal: Negative for bloating, abdominal pain, hematemesis, hematochezia, nausea and vomiting.   Genitourinary: Negative for dysuria and hematuria.   Neurological: Negative for dizziness, focal weakness, headaches, light-headedness, loss of balance, numbness and weakness.   Psychiatric/Behavioral: Negative for altered mental status.     Objective:     Vital Signs (Most Recent):  Temp: 96.1 °F (35.6 °C) (09/30/19 1000)  Pulse: 97 (09/30/19 1000)  Resp: 18 (09/30/19 1000)  BP: 131/85 (09/30/19 1005)  SpO2: 95 % (09/30/19 1000) Vital Signs (24h Range):  Temp:  [96.1 °F (35.6 °C)] 96.1 °F (35.6 °C)  Pulse:  [97] 97  Resp:  [18] 18  SpO2:  [95 %] 95 %  BP: (120-131)/(83-85) 131/85     Weight: 113.4 kg (250 lb)  Body mass index is 31.25 kg/m².    SpO2: 95 %     Physical Exam   Constitutional: He is oriented to person, place, and time. He appears well-developed and well-nourished. No distress.   HENT:   Head: Normocephalic and atraumatic.   Mouth/Throat: No oropharyngeal exudate.   Eyes: Pupils are equal, round, and reactive to light. Conjunctivae are normal. Right eye exhibits no discharge. Left eye exhibits no discharge.   Neck: Normal range of motion. Neck supple.   Cardiovascular:  Tachycardia, Irregularly ireegular rhythm, normal heart sounds, intact distal pulses and normal pulses.    Pulses:       Radial pulses are 2+ on the right side, and 2+ on the left side.        Dorsalis pedis pulses are 2+ on the right side, and 1+ on the left side.   Pulmonary/Chest: Effort normal and breath sounds normal. No accessory muscle usage. No respiratory distress. He has no decreased breath sounds. He has no wheezes. He has no rhonchi. He has no rales. He exhibits no tenderness.   Abdominal: Soft. Bowel sounds are normal. He exhibits no distension. There is no tenderness. There is no guarding.   Musculoskeletal: Normal range of motion. He exhibits no edema. Swelling left LE (chronic).  Neurological: He is alert and oriented to person, place, and time. He has normal strength. He is not disoriented. No cranial nerve deficit or sensory deficit. He exhibits normal muscle tone. Coordination and gait normal.   Skin: Skin is warm and dry. No rash noted. He is not diaphoretic. No erythema.   Psychiatric: He has a normal mood and affect. His behavior is normal. Judgment and thought content normal.   Nursing note and vitals reviewed.    Significant Labs: Labs from 9/25/19 through today reviewed.  INR:   Recent Labs   Lab 09/30/19  0957   INR 2.5*       Significant Imaging: Echocardiogram:   2D echo with color flow doppler:   Results for orders placed or performed during the hospital encounter of 05/30/18   2D echo with color flow doppler   Result Value Ref Range    QEF 60 55 - 65    Mitral Valve Regurgitation TRIVIAL     Diastolic Dysfunction No     Est. PA Systolic Pressure 8.86     Tricuspid Valve Regurgitation TRIVIAL     Narrative    Date of Procedure: 06/01/2018        TEST DESCRIPTION   Technical Quality: This is a technically poor study.     Aorta: The aortic root is normal in size, measuring 3.2 cm at sinotubular junction and 3.8 cm at Sinuses of Valsalva. The proximal ascending aorta is normal in size,  measuring 3.8 cm across.     Left Atrium: The left atrium is normal in size, measuring 4.9 cm across in the parasternal view.     Left Ventricle: The left ventricle is normal in size, with an end-diastolic diameter of 5.2 cm, and an end-systolic diameter of 4.2 cm. Wall thickness is increased, with the septum measuring 1.3 cm and the posterior wall measuring 1.2 cm across. Relative   wall thickness was increased at 0.46, and the LV mass index was increased at 131.2 g/m2 consistent with concentric left ventricular hypertrophy. There are no regional wall motion abnormalities. Left ventricular systolic function appears normal. Visually   estimated ejection fraction is 60-65%.     Diastolic indices: E wave velocity 0.4 m/s, E/A ratio 0.9,  msec., Diastolic function is normal.     Right Atrium: The right atrium is normal in size.     Right Ventricle: The right ventricle is normal in size. Global right ventricular systolic function appears normal. The estimated PA systolic pressure is 9 mmHg.     Mitral Valve:  There is trivial mitral regurgitation.     Tricuspid Valve:  There is trivial tricuspid regurgitation.     IVC: IVC is normal in size and collapses > 50% with a sniff, suggesting normal right atrial pressure of 3 mmHg.     Intracavitary: There is no evidence of pericardial effusion, intracavity mass, thrombi, or vegetation.         CONCLUSIONS     1 - Normal left ventricular systolic function (EF 60-65%).     2 - Concentric hypertrophy.     3 - Trivial mitral regurgitation.     4 - Trivial tricuspid regurgitation.       This document has been electronically    SIGNED BY: Jac Walker MD On: 06/01/2018 12:55        Assessment and Plan:     * Persistent atrial fibrillation  -Redo-PVI with RFA  -Carto  -Anesthesia for sedation  -INR 2-3  -EUGENE day of  -Protonix 40mg daily 2 weeks prior (in lieu of omeprazole)  -Will resume sotalol post ablation  -Will use right groin only access and exchange CS/ICE catheters  throughout when needed    Dr. Topete at bedside speaking with patient. Discussed risks benefits to include, but not limited to the risk of death, infection, bleeding, stroke, MI, cardiac perforation, embolism, cardiac tamponade, vascular injury, AE fistula, injury to phrenic nerve, pulmonary vein stenosis and other organic injury including the possibility for need for surgery or pacemaker implantation. Patient verbalized understanding and would like to proceed. Consents signed.     Renay Fernandez NP  Cardiac Electrophysiology  [facility]    Attending: Neel Topete MD

## 2019-09-30 NOTE — SUBJECTIVE & OBJECTIVE
Past Medical History:   Diagnosis Date    ACS (acute coronary syndrome)     Acute coronary syndrome 2000    NSTEMI    Anticoagulant long-term use     Coumadin    Atrial fibrillation     Basal cell carcinoma excised     left scalp vertex    Bilateral leg edema 1/6/2014    BPH (benign prostatic hyperplasia)     CKD (chronic kidney disease)     Coronary artery disease     DVT of leg (deep venous thrombosis) 09/08/2014    On coumadin for years    Encounter for blood transfusion     Hyperlipidemia     Hypertension     Hypogonadism, male     Leg fracture, left     Melanoma 01/04/2017     in situ crown of scalp    MI (myocardial infarction)     Panhypopituitarism     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    Squamous Cell Carcinoma excised     left posterior scalp    Thyroid disease        Past Surgical History:   Procedure Laterality Date    APPENDECTOMY      BASAL CELL CARCINOMA EXCISION  01/2008    BRAIN SURGERY      pituitary adenoma removed    CARDIAC CATHETERIZATION  05/09/2000    S/P stent to RCA,    CARDIAC CATHETERIZATION      stents placed    CARDIAC ELECTROPHYSIOLOGY STUDY AND ABLATION      CORONARY ANGIOPLASTY  5/9/2000    RCA    CORONARY ANGIOPLASTY WITH STENT PLACEMENT      ESOPHAGEAL DILATION      EYE SURGERY Bilateral     cataracts removed and new lenses placed     FRACTURE SURGERY Left     leg    HERNIA REPAIR      LAPAROSCOPIC CHOLECYSTECTOMY N/A 6/2/2018    Procedure: CHOLECYSTECTOMY, LAPAROSCOPIC;  Surgeon: Dickson Smith Jr., MD;  Location: [facility] OR;  Service: General;  Laterality: N/A;    left femur surgery      pituitatry      hypophysectomy    SKIN BIOPSY      TONSILLECTOMY      TREATMENT OF CARDIAC ARRHYTHMIA Bilateral 6/13/2019    Procedure: Cardioversion or Defibrillation;  Surgeon: Eric Nguyen MD;  Location: [facility] EP LAB;  Service: Cardiology;  Laterality: Bilateral;  af, bryson, dccv, anes, pr, 324     VASCULAR SURGERY         Review of patient's allergies indicates:  No Known Allergies    No current facility-administered medications on file prior to encounter.      Current Outpatient Medications on File Prior to Encounter   Medication Sig    aspirin 81 mg Tab Take 81 mg by mouth every evening. 1 Tablet Oral Every night    ciclopirox (PENLAC) 8 % Soln Apply daily to affected nail. Must remove and restart weekly    folic acid (FOLVITE) 1 MG tablet TAKE 1 TABLET BY MOUTH ONCE DAILY    ketoconazole (NIZORAL) 2 % cream AAA bid to feet x 3 weeks    levothyroxine (SYNTHROID) 112 MCG tablet TAKE 1 TABLET BY MOUTH ONCE DAILY BEFORE  BREAKFAST    metoprolol succinate (TOPROL-XL) 25 MG 24 hr tablet Take 1 tablet (25 mg total) by mouth once daily.    mometasone (ELOCON) 0.1 % ointment aaa qd- bid for severe areas.  Avoid use on face and groin    omeprazole (PRILOSEC) 20 MG capsule Take 20 mg by mouth every other day.    predniSONE (DELTASONE) 5 MG tablet Take 5 mg by mouth once daily.    simvastatin (ZOCOR) 80 MG tablet Take 1 tablet (80 mg total) by mouth every evening.    tamsulosin (FLOMAX) 0.4 mg Cap TAKE 1 CAPSULE BY MOUTH ONCE DAILY    warfarin (COUMADIN) 5 MG tablet TAKE ONE & ONE-HALF TABLETS BY MOUTH ONCE DAILY EXCEPT  ON  TUESDAYS  TAKE  1  TABLET ON TUESDAY (Patient taking differently: 5 mg on Tuesday, 7.5 mg all other days as directed by [facility])    NITROSTAT 0.4 mg SL tablet DISSOLVE ONE TABLET UNDER THE TONGUE EVERY 5 MINUTES AS NEEDED FOR CHEST PAIN.  DO NOT EXCEED A TOTAL OF 3 DOSES IN 15 MINUTES NOW     Family History     Problem Relation (Age of Onset)    Cancer Father        Tobacco Use    Smoking status: Never Smoker    Smokeless tobacco: Never Used   Substance and Sexual Activity    Alcohol use: No     Frequency: Never    Drug use: No    Sexual activity: Not Currently     Partners: Female     Review of Systems   Constitution: Negative for chills, fever and malaise/fatigue.    HENT: Negative for congestion and nosebleeds.    Eyes: Negative for blurred vision.   Cardiovascular: Negative for chest pain, dyspnea on exertion, irregular heartbeat, leg swelling, near-syncope, orthopnea, palpitations, paroxysmal nocturnal dyspnea and syncope.   Respiratory: Negative for cough, hemoptysis, shortness of breath, sleep disturbances due to breathing, sputum production and wheezing.    Endocrine: Negative for polyphagia.   Hematologic/Lymphatic: Negative for bleeding problem. Does not bruise/bleed easily.   Skin: Negative for itching and rash.   Musculoskeletal: Negative for back pain, joint swelling, muscle cramps and muscle weakness.   Gastrointestinal: Negative for bloating, abdominal pain, hematemesis, hematochezia, nausea and vomiting.   Genitourinary: Negative for dysuria and hematuria.   Neurological: Negative for dizziness, focal weakness, headaches, light-headedness, loss of balance, numbness and weakness.   Psychiatric/Behavioral: Negative for altered mental status.     Objective:     Vital Signs (Most Recent):  Temp: 96.1 °F (35.6 °C) (09/30/19 1000)  Pulse: 97 (09/30/19 1000)  Resp: 18 (09/30/19 1000)  BP: 131/85 (09/30/19 1005)  SpO2: 95 % (09/30/19 1000) Vital Signs (24h Range):  Temp:  [96.1 °F (35.6 °C)] 96.1 °F (35.6 °C)  Pulse:  [97] 97  Resp:  [18] 18  SpO2:  [95 %] 95 %  BP: (120-131)/(83-85) 131/85       Weight: 113.4 kg (250 lb)  Body mass index is 31.25 kg/m².    SpO2: 95 %       Physical Exam   Constitutional: He is oriented to person, place, and time. He appears well-developed and well-nourished. No distress.   HENT:   Head: Normocephalic and atraumatic.   Mouth/Throat: No oropharyngeal exudate.   Eyes: Pupils are equal, round, and reactive to light. Conjunctivae are normal. Right eye exhibits no discharge. Left eye exhibits no discharge.   Neck: Normal range of motion. Neck supple.   Cardiovascular: Normal rate, regular rhythm, S1 normal, S2 normal, normal heart sounds,  intact distal pulses and normal pulses.  No extrasystoles are present. PMI is not displaced. Exam reveals no gallop and no friction rub.   No murmur heard.  Pulses:       Radial pulses are 2+ on the right side, and 2+ on the left side.        Dorsalis pedis pulses are 2+ on the right side, and 2+ on the left side.   Pulmonary/Chest: Effort normal and breath sounds normal. No accessory muscle usage. No respiratory distress. He has no decreased breath sounds. He has no wheezes. He has no rhonchi. He has no rales. He exhibits no tenderness.   Abdominal: Soft. Bowel sounds are normal. He exhibits no distension. There is no tenderness. There is no guarding.   Musculoskeletal: Normal range of motion. He exhibits no edema.   Neurological: He is alert and oriented to person, place, and time. He has normal strength. He is not disoriented. No cranial nerve deficit or sensory deficit. He exhibits normal muscle tone. Coordination and gait normal.   Skin: Skin is warm and dry. No rash noted. He is not diaphoretic. No erythema.   Psychiatric: He has a normal mood and affect. His behavior is normal. Judgment and thought content normal.   Nursing note and vitals reviewed.    Significant Labs: Labs from 9/25/19 through today reviewed.  INR:   Recent Labs   Lab 09/30/19  0957   INR 2.5*       Significant Imaging: Echocardiogram:   2D echo with color flow doppler:   Results for orders placed or performed during the hospital encounter of 05/30/18   2D echo with color flow doppler   Result Value Ref Range    QEF 60 55 - 65    Mitral Valve Regurgitation TRIVIAL     Diastolic Dysfunction No     Est. PA Systolic Pressure 8.86     Tricuspid Valve Regurgitation TRIVIAL     Narrative    Date of Procedure: 06/01/2018        TEST DESCRIPTION   Technical Quality: This is a technically poor study.     Aorta: The aortic root is normal in size, measuring 3.2 cm at sinotubular junction and 3.8 cm at Sinuses of Valsalva. The proximal ascending aorta  is normal in size, measuring 3.8 cm across.     Left Atrium: The left atrium is normal in size, measuring 4.9 cm across in the parasternal view.     Left Ventricle: The left ventricle is normal in size, with an end-diastolic diameter of 5.2 cm, and an end-systolic diameter of 4.2 cm. Wall thickness is increased, with the septum measuring 1.3 cm and the posterior wall measuring 1.2 cm across. Relative   wall thickness was increased at 0.46, and the LV mass index was increased at 131.2 g/m2 consistent with concentric left ventricular hypertrophy. There are no regional wall motion abnormalities. Left ventricular systolic function appears normal. Visually   estimated ejection fraction is 60-65%.     Diastolic indices: E wave velocity 0.4 m/s, E/A ratio 0.9,  msec., Diastolic function is normal.     Right Atrium: The right atrium is normal in size.     Right Ventricle: The right ventricle is normal in size. Global right ventricular systolic function appears normal. The estimated PA systolic pressure is 9 mmHg.     Mitral Valve:  There is trivial mitral regurgitation.     Tricuspid Valve:  There is trivial tricuspid regurgitation.     IVC: IVC is normal in size and collapses > 50% with a sniff, suggesting normal right atrial pressure of 3 mmHg.     Intracavitary: There is no evidence of pericardial effusion, intracavity mass, thrombi, or vegetation.         CONCLUSIONS     1 - Normal left ventricular systolic function (EF 60-65%).     2 - Concentric hypertrophy.     3 - Trivial mitral regurgitation.     4 - Trivial tricuspid regurgitation.       This document has been electronically    SIGNED BY: Jac Walker MD On: 06/01/2018 12:55

## 2019-10-01 ENCOUNTER — ANTI-COAG VISIT (OUTPATIENT)
Dept: CARDIOLOGY | Facility: CLINIC | Age: 84
End: 2019-10-01
Payer: MEDICARE

## 2019-10-01 ENCOUNTER — PATIENT OUTREACH (OUTPATIENT)
Dept: ADMINISTRATIVE | Facility: OTHER | Age: 84
End: 2019-10-01

## 2019-10-01 VITALS
TEMPERATURE: 98 F | BODY MASS INDEX: 31.08 KG/M2 | SYSTOLIC BLOOD PRESSURE: 103 MMHG | HEIGHT: 75 IN | RESPIRATION RATE: 20 BRPM | DIASTOLIC BLOOD PRESSURE: 68 MMHG | OXYGEN SATURATION: 95 % | HEART RATE: 68 BPM | WEIGHT: 250 LBS

## 2019-10-01 DIAGNOSIS — I48.19 PERSISTENT ATRIAL FIBRILLATION: ICD-10-CM

## 2019-10-01 LAB
POC ACTIVATED CLOTTING TIME K: 131 SEC (ref 74–137)
SAMPLE: NORMAL

## 2019-10-01 PROCEDURE — 93793 ANTICOAG MGMT PT WARFARIN: CPT | Mod: S$GLB,,, | Performed by: PHARMACIST

## 2019-10-01 PROCEDURE — 25000003 PHARM REV CODE 250: Mod: HCNC | Performed by: STUDENT IN AN ORGANIZED HEALTH CARE EDUCATION/TRAINING PROGRAM

## 2019-10-01 PROCEDURE — 93793 PR ANTICOAGULANT MGMT FOR PT TAKING WARFARIN: ICD-10-PCS | Mod: S$GLB,,, | Performed by: PHARMACIST

## 2019-10-01 RX ORDER — PANTOPRAZOLE SODIUM 40 MG/1
40 TABLET, DELAYED RELEASE ORAL DAILY
Qty: 30 TABLET | Refills: 1 | Status: SHIPPED | OUTPATIENT
Start: 2019-10-01 | End: 2019-12-12 | Stop reason: ALTCHOICE

## 2019-10-01 RX ORDER — SOTALOL HYDROCHLORIDE 80 MG/1
80 TABLET ORAL 2 TIMES DAILY
Qty: 180 TABLET | Refills: 0 | Status: ON HOLD | OUTPATIENT
Start: 2019-10-01 | End: 2019-12-10 | Stop reason: HOSPADM

## 2019-10-01 RX ORDER — NITROFURANTOIN (MACROCRYSTALS) 100 MG/1
100 CAPSULE ORAL EVERY 12 HOURS
Qty: 10 CAPSULE | Refills: 0 | Status: SHIPPED | OUTPATIENT
Start: 2019-10-01 | End: 2019-10-08 | Stop reason: SDUPTHER

## 2019-10-01 RX ADMIN — PANTOPRAZOLE SODIUM 40 MG: 40 TABLET, DELAYED RELEASE ORAL at 08:10

## 2019-10-01 RX ADMIN — TAMSULOSIN HYDROCHLORIDE 0.4 MG: 0.4 CAPSULE ORAL at 08:10

## 2019-10-01 RX ADMIN — LEVOTHYROXINE SODIUM 112 MCG: 112 TABLET ORAL at 05:10

## 2019-10-01 RX ADMIN — SOTALOL HYDROCHLORIDE 80 MG: 80 TABLET ORAL at 08:10

## 2019-10-01 NOTE — DISCHARGE INSTRUCTIONS
1. Do not strain or lift anything greater than 5 lb for 1 week.  2. Do not drive or operate any dangerous machinery for 24 hours.   3. Keep the dressing on, clean, and dry for 24 hours.   4. After 24 hours, the dressing may be removed and a shower is allowed.   5. Clean the area with mild soap and water and then cover it with a bandage.   6. Once the skin has healed, bathing in a tub or swimming is allowed.   7. Inspect the groin site daily and report to the physician any swelling at the site that   cannot be controlled with manual pressure for 10 minutes, unusual pain at the   access site or affected extremity, unusual swelling at the access site, or signs or   symptoms of infection such as redness, pain, or fever.   Call 911 if you have:   Bleeding from the puncture site that you cannot stop by doing the following:   Relax and lie down right away. Keep your leg flat and apply firm pressure to the site using your fingers and a gauze pad. Keep the pressure on for 20 minutes. Continue this until the bleeding stops. This may take awhile. When bleeding stops, cover the site with a sterile bandage and keep your leg still as much as possible.

## 2019-10-01 NOTE — NURSING TRANSFER
Nursing Transfer Note      9/30/2019     Transfer To: 317    Transfer via stretcher    Transfer with bedside vitals monitor    Transported by RN    Medicines sent: none    Chart send with patient: Yes    Notified: son via text messaging system    Patient reassessed at: 9/30/2019 2010

## 2019-10-01 NOTE — PROGRESS NOTES
Pt DC'd per MD order. Discharge instructions given including activity, wound care, S&S of infections,  future appointments, and when to call MD. Medications reviewed including when to take next dose. Prescriptions given to patient. Telemetry and PIV DC'd, catheter tip intact. Pt left unit via wheelchair with son.

## 2019-10-01 NOTE — ANESTHESIA POSTPROCEDURE EVALUATION
Anesthesia Post Evaluation    Patient: Linda Skelton    Procedure(s) Performed: Procedure(s) (LRB):  Ablation atrial fibrillation (N/A)  Transesophageal echo (EUGENE) intra-procedure log documentation (N/A)  Cardioversion or Defibrillation    Final Anesthesia Type: general  Patient location during evaluation: PACU  Patient participation: Yes- Able to Participate  Level of consciousness: awake and alert  Post-procedure vital signs: reviewed and stable  Pain management: adequate  Airway patency: patent  PONV status at discharge: No PONV  Anesthetic complications: no      Cardiovascular status: blood pressure returned to baseline  Respiratory status: unassisted  Hydration status: euvolemic  Follow-up not needed.          Vitals Value Taken Time   /84 9/30/2019  7:16 PM   Temp 36.4 °C (97.6 °F) 9/30/2019  6:10 PM   Pulse 79 9/30/2019  7:30 PM   Resp 14 9/30/2019  7:30 PM   SpO2 95 % 9/30/2019  7:30 PM   Vitals shown include unvalidated device data.      No case tracking events are documented in the log.      Pain/Dereck Score: Pain Rating Prior to Med Admin: 3 (9/30/2019  7:19 PM)  Dereck Score: 9 (9/30/2019  6:25 PM)

## 2019-10-01 NOTE — DISCHARGE SUMMARY
[facility]  Cardiac Electrophysiology  Discharge Summary      Patient Name: Linda Skelton  MRN: [id number]  Admission Date: 9/30/2019  Hospital Length of Stay: 0 days  Discharge Date and Time: 10/1/2019  9:33 AM  Attending Physician: Neel Topete MD   Discharging Provider: Osvaldo Handley MD  Primary Care Physician: Anderson Jerome MD    HPI:   Mr. Skelton is an 84 year-old man with paroxysmal atrial fibrillation s/p cryo-PVI by Dr. 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 He does not desire amiodarone. Discussed redo-PVI versus long-term rate control. I spent about a half hour discussing the nature of PVI including possible transseptal puncture. We discussed risks and benefits at length. Our discussion included, but was not limited to the risk of death, infection, bleeding, stroke, MI, cardiac perforation, embolism, cardiac tamponade, vascular injury, AE fistula, injury to phrenic nerve, pulmonary vein stenosis and other organic injury including the possibility for need for surgery or pacemaker implantation.  He understands and desire to continue to think about it. If he elects to proceed would do RF-PVI via right femoral vein access only.     RTC in 6 months, sooner prn. If he elects for PVI will arrange   Returned Mr. Skelton's phone call. He has significant loss of energy and dyspnea on exertion since AF has returned (rate controlled AF) and he failed anti-arrhythmic therapy (does not want to take amiodarone). We have had several talks about options including leave in rate controlled AF versus attempt at redo-PVI. He has a chronically occluded left CFV and an IVC filter that has been present for 20+ years. We discussed risks at his age. He states he  understands the risks and due to how poorly he feels he wants to proceed. He also states he discussed it at length with his son.     Plan  Redo-PVI with RFA  Carto  Anesthesia for sedation  INR 2-3  EUGENE day of  Protonix 40mg daily 2 weeks prior (in lieu of omeprazole)  Will resume sotalol post ablation  Will use right groin only access and exchange CS/ICE catheters throughout when needed    Procedure(s) (LRB):  Ablation atrial fibrillation (N/A)  Transesophageal echo (EUGENE) intra-procedure log documentation (N/A)  Cardioversion or Defibrillation     Indwelling Lines/Drains at time of discharge:  Lines/Drains/Airways     None                 Hospital Course:  Underwent redo afib ablation with posterior wall isolation and posterior extension of prior right vein lesions which were slightly ostial. Restarted on sotalol. Metoprolol discontinued. ECG on Thursday 10/3/19 in clinic.    Significant Diagnostic Studies: Labs:   BMP: No results for input(s): GLU, NA, K, CL, CO2, BUN, CREATININE, CALCIUM, MG in the last 48 hours., CMP No results for input(s): NA, K, CL, CO2, GLU, BUN, CREATININE, CALCIUM, PROT, ALBUMIN, BILITOT, ALKPHOS, AST, ALT, ANIONGAP, ESTGFRAFRICA, EGFRNONAA in the last 48 hours., CBC No results for input(s): WBC, HGB, HCT, PLT in the last 48 hours. and INR   Lab Results   Component Value Date    INR 2.5 (H) 09/30/2019    INR 3.7 (H) 09/25/2019    INR 3.5 (H) 09/18/2019       Pending Diagnostic Studies:     Procedure Component Value Units Date/Time    IN OFFICE EKG 12-LEAD (to Muse) [[id number]]     Order Status:  Sent Lab Status:  No result           Final Active Diagnoses:    Diagnosis Date Noted POA    PRINCIPAL PROBLEM:  Persistent atrial fibrillation [I48.19] 07/27/2012 Yes      Problems Resolved During this Admission:     No new Assessment & Plan notes have been filed under this hospital service since the last note was generated.  Service: Arrhythmia      Discharged Condition: good    Disposition:  Home or Self Care    Follow Up:  Follow-up Information     Neel Topete MD In 8 weeks.    Specialties:  Electrophysiology, Cardiology  Contact information:  Hugo GONZALEZ  [facility] [id number] [phone]                 Patient Instructions:      IN OFFICE EKG 12-LEAD (to Muse)     Order Specific Question Answer Comments   Diagnosis Arrhythmia [680390]      IN OFFICE EKG 12-LEAD (to [place])   Standing Status: Future Standing Exp. Date: 10/01/20     Order Specific Question Answer Comments   Diagnosis Arrhythmia [839113]      Medications:  Reconciled Home Medications:      Medication List      START taking these medications    sotalol 80 MG tablet  Commonly known as:  BETAPACE  Take 1 tablet (80 mg total) by mouth 2 (two) times daily.        CHANGE how you take these medications    warfarin 5 MG tablet  Commonly known as:  COUMADIN  TAKE ONE & ONE-HALF TABLETS BY MOUTH ONCE DAILY EXCEPT  ON  TUESDAYS  TAKE  1  TABLET ON TUESDAY  What changed:  See the new instructions.        CONTINUE taking these medications    aspirin 81 mg Tab  Take 81 mg by mouth every evening. 1 Tablet Oral Every night     ciclopirox 8 % Soln  Commonly known as:  PENLAC  Apply daily to affected nail. Must remove and restart weekly     folic acid 1 MG tablet  Commonly known as:  FOLVITE  TAKE 1 TABLET BY MOUTH ONCE DAILY     ketoconazole 2 % cream  Commonly known as:  NIZORAL  AAA bid to feet x 3 weeks     levothyroxine 112 MCG tablet  Commonly known as:  SYNTHROID  TAKE 1 TABLET BY MOUTH ONCE DAILY BEFORE  BREAKFAST     mometasone 0.1 % ointment  Commonly known as:  ELOCON  aaa qd- bid for severe areas.  Avoid use on face and groin     NITROSTAT 0.4 MG SL tablet  Generic drug:  nitroGLYCERIN  DISSOLVE ONE TABLET UNDER THE TONGUE EVERY 5 MINUTES AS NEEDED FOR CHEST PAIN.  DO NOT EXCEED A TOTAL OF 3 DOSES IN 15 MINUTES NOW     omeprazole 20 MG capsule  Commonly known as:  PRILOSEC  Take 20 mg by mouth every other day.     pantoprazole 40 MG  tablet  Commonly known as:  PROTONIX  Take 1 tablet (40 mg total) by mouth once daily.     predniSONE 5 MG tablet  Commonly known as:  DELTASONE  Take 5 mg by mouth once daily.     simvastatin 80 MG tablet  Commonly known as:  ZOCOR  Take 1 tablet (80 mg total) by mouth every evening.     tamsulosin 0.4 mg Cap  Commonly known as:  FLOMAX  TAKE 1 CAPSULE BY MOUTH ONCE DAILY        STOP taking these medications    metoprolol succinate 25 MG 24 hr tablet  Commonly known as:  TOPROL-XL            Time spent on the discharge of patient: 30 minutes    Osvaldo Handley MD  Cardiac Electrophysiology  [facility]

## 2019-10-01 NOTE — PROGRESS NOTES
Patient's son called. He said he had a PVI RFA yesterday. He was discharged this morning. He was complaining of some burning in urine since post procedure. He said he went to urinate again an hour ago and felt burning again. He denies any fever or chills or any blood or forthy urine. At this time I will give him 5 days of nitrofurantoin for suspected UTI.

## 2019-10-02 ENCOUNTER — TELEPHONE (OUTPATIENT)
Dept: ELECTROPHYSIOLOGY | Facility: CLINIC | Age: 84
End: 2019-10-02

## 2019-10-02 ENCOUNTER — PATIENT MESSAGE (OUTPATIENT)
Dept: ELECTROPHYSIOLOGY | Facility: CLINIC | Age: 84
End: 2019-10-02

## 2019-10-02 LAB
BSA FOR ECHO PROCEDURE: 2.45 M2
PROX AORTA: 3.5 CM
SINUS: 3.7 CM
STJ: 3.1 CM

## 2019-10-02 RX ORDER — ONDANSETRON 4 MG/1
4 TABLET, FILM COATED ORAL EVERY 8 HOURS PRN
Qty: 12 TABLET | Refills: 0 | Status: SHIPPED | OUTPATIENT
Start: 2019-10-02 | End: 2019-10-16

## 2019-10-02 NOTE — TELEPHONE ENCOUNTER
Spoke to Mr. Mckeon's son, Benjamin, regarding UTI symptoms.  Benjamin reports Mr. Mckeon began macrodantin last night and AZO.  He reports he did have slight relief with urinary symptoms, however this morning he woke up feeling nauseated and unable to take morning meds due to nausea.  Benjamin reports Mr. Mckeon denies fever/chills/vomitting/sob at this time.      Discussed above with Dr. Topete who recommends Zofran 4 mg sublingual q 8 hrs PRN for nausea.      Spoke to Benjamin again and relayed Dr. Topete' recommendation.  Benjamin will  Zofran from pharmacy and give to Mr. Mckeon.  Once Zofran has relieved nausea, Mr. Mckeon will attempt to eat a couple of crackers and take morning meds.  If unable to tolerate, or nausea does not improve, Benjamin will call our office.  My direct line was given and advised he may call me directly until 5 pm.  Benjamin verbalizes understanding and appreciates call.

## 2019-10-03 NOTE — TELEPHONE ENCOUNTER
0809 - Spoke to pt's son, Benjamin, for update on Mr. Skelton.  He reports he is moving around better this morning and was able to eat half a bowl of cereal for breakfast.  Benjamin reports the Zofran that was dispensed was not the dissolvable form we spoke about.  Let him know I will call [facility] pharmacy with new rx for dissolvable tablets.  Will check in on him again around lunch time.  Benjamin verbalizes understanding and appreciates call.       Spoke to Kartik, PharmD, with [facility] pharmacy.  Called in Zofran 4 mg sublingual PRN q 8 hrs for nausea.  Dispense 12 tabs with 0 refills.  Kartik verbalizes understanding and will call our office with any questions or concerns.

## 2019-10-03 NOTE — TELEPHONE ENCOUNTER
Checking in on Mr. Skelton.  Spoke to his son, Benjamin, who reports he is doing better.  He was able to eat a bowl of soup for lunch without nausea.  He has the Zofran sublingual form now just in case it is needed, however he has not needed to take today.      Asked Benjamin to continue to monitor Mr. Skelton closely and to call with any further questions or concerns.  He verbalizes understanding and appreciates call.

## 2019-10-06 ENCOUNTER — NURSE TRIAGE (OUTPATIENT)
Dept: ADMINISTRATIVE | Facility: CLINIC | Age: 84
End: 2019-10-06

## 2019-10-06 NOTE — TELEPHONE ENCOUNTER
Reason for call:  Symptoms: slow flow,no burning, some  Hesitation.  Son called and request refill on prescription   Son requesting refill called in to [facility]     [facility] [place], [place] - [address]   [address] [place] [address]   Phone: [phone] Fax: [phone]     States he will be leaving town on tomorrow and would like to make sure dad has medication.         Will have Cardiology paged.    Reason for Disposition   Caller requesting a NON-URGENT new prescription or refill and triager unable to refill per unit policy    Protocols used: MEDICATION QUESTION CALL-A-

## 2019-10-07 ENCOUNTER — PATIENT MESSAGE (OUTPATIENT)
Dept: ELECTROPHYSIOLOGY | Facility: CLINIC | Age: 84
End: 2019-10-07

## 2019-10-07 DIAGNOSIS — R39.198 DIFFICULTY URINATING: Primary | ICD-10-CM

## 2019-10-08 ENCOUNTER — HOSPITAL ENCOUNTER (OUTPATIENT)
Dept: CARDIOLOGY | Facility: CLINIC | Age: 84
Discharge: HOME | End: 2019-10-08
Payer: MEDICARE

## 2019-10-08 ENCOUNTER — PATIENT MESSAGE (OUTPATIENT)
Dept: ELECTROPHYSIOLOGY | Facility: CLINIC | Age: 84
End: 2019-10-08

## 2019-10-08 DIAGNOSIS — I48.91 ATRIAL FIBRILLATION, UNSPECIFIED TYPE: ICD-10-CM

## 2019-10-08 PROCEDURE — 93005 RHYTHM STRIP: ICD-10-PCS | Mod: HCNC,S$GLB,, | Performed by: INTERNAL MEDICINE

## 2019-10-08 PROCEDURE — 93010 ELECTROCARDIOGRAM REPORT: CPT | Mod: HCNC,S$GLB,, | Performed by: INTERNAL MEDICINE

## 2019-10-08 PROCEDURE — 93005 ELECTROCARDIOGRAM TRACING: CPT | Mod: HCNC,S$GLB,, | Performed by: INTERNAL MEDICINE

## 2019-10-08 PROCEDURE — 93010 RHYTHM STRIP: ICD-10-PCS | Mod: HCNC,S$GLB,, | Performed by: INTERNAL MEDICINE

## 2019-10-08 RX ORDER — NITROFURANTOIN (MACROCRYSTALS) 100 MG/1
100 CAPSULE ORAL EVERY 12 HOURS
Qty: 10 CAPSULE | Refills: 0 | Status: SHIPPED | OUTPATIENT
Start: 2019-10-08 | End: 2019-10-16

## 2019-10-08 NOTE — TELEPHONE ENCOUNTER
----- Message from Neel Topete MD sent at 10/8/2019  1:05 PM CDT -----  Please notify the patient that his kidney function is stable.

## 2019-10-09 ENCOUNTER — CLINICAL SUPPORT (OUTPATIENT)
Dept: FAMILY MEDICINE | Facility: CLINIC | Age: 84
End: 2019-10-09
Payer: MEDICARE

## 2019-10-09 ENCOUNTER — ANTI-COAG VISIT (OUTPATIENT)
Dept: CARDIOLOGY | Facility: CLINIC | Age: 84
End: 2019-10-09
Payer: MEDICARE

## 2019-10-09 ENCOUNTER — TELEPHONE (OUTPATIENT)
Dept: ELECTROPHYSIOLOGY | Facility: CLINIC | Age: 84
End: 2019-10-09

## 2019-10-09 ENCOUNTER — LAB VISIT (OUTPATIENT)
Dept: LAB | Facility: HOSPITAL | Age: 84
End: 2019-10-09
Attending: INTERNAL MEDICINE
Payer: MEDICARE

## 2019-10-09 DIAGNOSIS — I48.19 PERSISTENT ATRIAL FIBRILLATION: ICD-10-CM

## 2019-10-09 DIAGNOSIS — I48.0 PAF (PAROXYSMAL ATRIAL FIBRILLATION): ICD-10-CM

## 2019-10-09 DIAGNOSIS — Z23 FLU VACCINE NEED: Primary | ICD-10-CM

## 2019-10-09 LAB
INR PPP: 2.4 (ref 0.8–1.2)
PROTHROMBIN TIME: 23.6 SEC (ref 9–12.5)

## 2019-10-09 PROCEDURE — 90662 FLU VACCINE - HIGH DOSE (65+) PRESERVATIVE FREE IM: ICD-10-PCS | Mod: HCNC,S$GLB,, | Performed by: FAMILY MEDICINE

## 2019-10-09 PROCEDURE — 85610 PROTHROMBIN TIME: CPT | Mod: HCNC

## 2019-10-09 PROCEDURE — 36415 COLL VENOUS BLD VENIPUNCTURE: CPT | Mod: HCNC,PO

## 2019-10-09 PROCEDURE — G0008 FLU VACCINE - HIGH DOSE (65+) PRESERVATIVE FREE IM: ICD-10-PCS | Mod: HCNC,S$GLB,, | Performed by: FAMILY MEDICINE

## 2019-10-09 PROCEDURE — 96372 THER/PROPH/DIAG INJ SC/IM: CPT | Mod: 59,HCNC,S$GLB, | Performed by: INTERNAL MEDICINE

## 2019-10-09 PROCEDURE — 90662 IIV NO PRSV INCREASED AG IM: CPT | Mod: HCNC,S$GLB,, | Performed by: FAMILY MEDICINE

## 2019-10-09 PROCEDURE — 93793 ANTICOAG MGMT PT WARFARIN: CPT | Mod: S$GLB,,,

## 2019-10-09 PROCEDURE — 93793 PR ANTICOAGULANT MGMT FOR PT TAKING WARFARIN: ICD-10-PCS | Mod: S$GLB,,,

## 2019-10-09 PROCEDURE — 96372 PR INJECTION,THERAP/PROPH/DIAG2ST, IM OR SUBCUT: ICD-10-PCS | Mod: 59,HCNC,S$GLB, | Performed by: INTERNAL MEDICINE

## 2019-10-09 PROCEDURE — G0008 ADMIN INFLUENZA VIRUS VAC: HCPCS | Mod: HCNC,S$GLB,, | Performed by: FAMILY MEDICINE

## 2019-10-09 RX ADMIN — TESTOSTERONE CYPIONATE 100 MG: 200 INJECTION, SOLUTION INTRAMUSCULAR at 09:10

## 2019-10-09 NOTE — TELEPHONE ENCOUNTER
----- Message from Neel Topete MD sent at 10/8/2019  5:20 PM CDT -----  Jeovanny Mendez    When you have a chance can you let Mr. Skelton know he can continue sotalol?

## 2019-10-09 NOTE — TELEPHONE ENCOUNTER
Spoke to  Nafisa.  Urinalysis results given and recommendations given to continue Sotalol.   Nafisa verbalizes full understanding and will call our office with any further questions or concerns.

## 2019-10-10 ENCOUNTER — TELEPHONE (OUTPATIENT)
Dept: DERMATOLOGY | Facility: CLINIC | Age: 84
End: 2019-10-10

## 2019-10-10 NOTE — TELEPHONE ENCOUNTER
Scheduled patient appointment per message received in the Dermatology department. Patient acknowledges appointment made and confirms.     RD      ----- Message from Chrissy Bryan sent at 10/10/2019  8:07 AM CDT -----  Contact: pt: [phone]  Pt called to schedule appt for 4 mth f/u, pt had appt scheduled for 10/03 but had surgery next available isn't until Dec would like to be seen sooner       Please contact pt: [phone]

## 2019-10-16 ENCOUNTER — LAB VISIT (OUTPATIENT)
Dept: LAB | Facility: HOSPITAL | Age: 84
End: 2019-10-16
Attending: INTERNAL MEDICINE
Payer: MEDICARE

## 2019-10-16 ENCOUNTER — ANTI-COAG VISIT (OUTPATIENT)
Dept: CARDIOLOGY | Facility: CLINIC | Age: 84
End: 2019-10-16
Payer: MEDICARE

## 2019-10-16 ENCOUNTER — OFFICE VISIT (OUTPATIENT)
Dept: CARDIOLOGY | Facility: CLINIC | Age: 84
End: 2019-10-16
Payer: MEDICARE

## 2019-10-16 VITALS
BODY MASS INDEX: 31.11 KG/M2 | WEIGHT: 250.25 LBS | HEART RATE: 53 BPM | HEIGHT: 75 IN | SYSTOLIC BLOOD PRESSURE: 138 MMHG | DIASTOLIC BLOOD PRESSURE: 71 MMHG

## 2019-10-16 DIAGNOSIS — E23.0 PANHYPOPITUITARISM: ICD-10-CM

## 2019-10-16 DIAGNOSIS — E78.2 MIXED HYPERLIPIDEMIA: ICD-10-CM

## 2019-10-16 DIAGNOSIS — I48.0 PAROXYSMAL ATRIAL FIBRILLATION: Primary | ICD-10-CM

## 2019-10-16 DIAGNOSIS — Z79.01 LONG TERM CURRENT USE OF ANTICOAGULANT: ICD-10-CM

## 2019-10-16 DIAGNOSIS — I25.119 CORONARY ARTERY DISEASE INVOLVING NATIVE CORONARY ARTERY OF NATIVE HEART WITH ANGINA PECTORIS: ICD-10-CM

## 2019-10-16 DIAGNOSIS — I10 ESSENTIAL HYPERTENSION: ICD-10-CM

## 2019-10-16 DIAGNOSIS — I34.0 MITRAL VALVE INSUFFICIENCY, UNSPECIFIED ETIOLOGY: ICD-10-CM

## 2019-10-16 DIAGNOSIS — I48.0 PAF (PAROXYSMAL ATRIAL FIBRILLATION): ICD-10-CM

## 2019-10-16 DIAGNOSIS — Z86.718 HISTORY OF DEEP VEIN THROMBOSIS (DVT) OF LOWER EXTREMITY: ICD-10-CM

## 2019-10-16 DIAGNOSIS — N18.30 CHRONIC KIDNEY DISEASE, STAGE 3: ICD-10-CM

## 2019-10-16 DIAGNOSIS — I48.19 PERSISTENT ATRIAL FIBRILLATION: ICD-10-CM

## 2019-10-16 LAB
INR PPP: 2.7 (ref 0.8–1.2)
PROTHROMBIN TIME: 26.6 SEC (ref 9–12.5)

## 2019-10-16 PROCEDURE — 99499 RISK ADDL DX/OHS AUDIT: ICD-10-PCS | Mod: HCNC,S$GLB,, | Performed by: INTERNAL MEDICINE

## 2019-10-16 PROCEDURE — 99214 OFFICE O/P EST MOD 30 MIN: CPT | Mod: HCNC,S$GLB,, | Performed by: INTERNAL MEDICINE

## 2019-10-16 PROCEDURE — 1101F PT FALLS ASSESS-DOCD LE1/YR: CPT | Mod: HCNC,CPTII,S$GLB, | Performed by: INTERNAL MEDICINE

## 2019-10-16 PROCEDURE — 85610 PROTHROMBIN TIME: CPT | Mod: HCNC

## 2019-10-16 PROCEDURE — 93793 ANTICOAG MGMT PT WARFARIN: CPT | Mod: S$GLB,,,

## 2019-10-16 PROCEDURE — 99999 PR PBB SHADOW E&M-EST. PATIENT-LVL III: CPT | Mod: PBBFAC,HCNC,, | Performed by: INTERNAL MEDICINE

## 2019-10-16 PROCEDURE — 36415 COLL VENOUS BLD VENIPUNCTURE: CPT | Mod: HCNC

## 2019-10-16 PROCEDURE — 3075F PR MOST RECENT SYSTOLIC BLOOD PRESS GE 130-139MM HG: ICD-10-PCS | Mod: HCNC,CPTII,S$GLB, | Performed by: INTERNAL MEDICINE

## 2019-10-16 PROCEDURE — 99214 PR OFFICE/OUTPT VISIT, EST, LEVL IV, 30-39 MIN: ICD-10-PCS | Mod: HCNC,S$GLB,, | Performed by: INTERNAL MEDICINE

## 2019-10-16 PROCEDURE — 3075F SYST BP GE 130 - 139MM HG: CPT | Mod: HCNC,CPTII,S$GLB, | Performed by: INTERNAL MEDICINE

## 2019-10-16 PROCEDURE — 3078F DIAST BP <80 MM HG: CPT | Mod: HCNC,CPTII,S$GLB, | Performed by: INTERNAL MEDICINE

## 2019-10-16 PROCEDURE — 99499 UNLISTED E&M SERVICE: CPT | Mod: HCNC,S$GLB,, | Performed by: INTERNAL MEDICINE

## 2019-10-16 PROCEDURE — 1101F PR PT FALLS ASSESS DOC 0-1 FALLS W/OUT INJ PAST YR: ICD-10-PCS | Mod: HCNC,CPTII,S$GLB, | Performed by: INTERNAL MEDICINE

## 2019-10-16 PROCEDURE — 3078F PR MOST RECENT DIASTOLIC BLOOD PRESSURE < 80 MM HG: ICD-10-PCS | Mod: HCNC,CPTII,S$GLB, | Performed by: INTERNAL MEDICINE

## 2019-10-16 PROCEDURE — 93793 PR ANTICOAGULANT MGMT FOR PT TAKING WARFARIN: ICD-10-PCS | Mod: S$GLB,,,

## 2019-10-16 PROCEDURE — 99999 PR PBB SHADOW E&M-EST. PATIENT-LVL III: ICD-10-PCS | Mod: PBBFAC,HCNC,, | Performed by: INTERNAL MEDICINE

## 2019-10-16 RX ORDER — TRIAMCINOLONE ACETONIDE 1 MG/G
CREAM TOPICAL
COMMUNITY
Start: 2019-10-15 | End: 2020-03-30

## 2019-10-16 NOTE — PROGRESS NOTES
Subjective:    Patient ID:  Linda Skelton is a 84 y.o. male who presents for follow-up of Paroxysmal atrial fibrillation      HPI     84 year old male followed with CAD post NSTEMI/PCI to RCA 2000, hypertension, hyperlipidemia and paroxsymal arial fibrillation. He under went AF ablation 2/25/16. He has a recurrence of symptomatic PAF, despite amiodarone and later tikosyn. Dr Tsai did another ablation 9/30/19. He remains in sinus on sotalol and coumadin . Since  the ablation he has been having intermittent bilateral upper/medial thigh pain only on palpation but is improvng[? Femoral nerve neuropathy ]  Lab Results   Component Value Date     10/08/2019    K 4.8 10/08/2019    CL 99 10/08/2019    CO2 33 (H) 10/08/2019    BUN 22 10/08/2019    CREATININE 1.3 10/08/2019     10/08/2019    HGBA1C 6.1 09/18/2013    MG 2.0 06/14/2019    AST 21 04/03/2019    ALT 17 04/03/2019    ALBUMIN 3.8 04/03/2019    PROT 6.5 04/03/2019    BILITOT 0.7 04/03/2019    WBC 4.37 09/25/2019    HGB 14.3 09/25/2019    HCT 44.4 09/25/2019    MCV 99 (H) 09/25/2019     (L) 09/25/2019    INR 2.4 (H) 10/09/2019    INR 2.3 03/13/2019    INR 2.3 (H) 07/25/2012    PSA 10.6 (H) 04/25/2016    TSH 0.300 (L) 04/03/2019         Lab Results   Component Value Date    CHOL 130 10/10/2018    HDL 35 (L) 10/10/2018    TRIG 121 10/10/2018       Lab Results   Component Value Date    LDLCALC 70.8 10/10/2018       Past Medical History:   Diagnosis Date    ACS (acute coronary syndrome)     Acute coronary syndrome 2000    NSTEMI    Anticoagulant long-term use     Coumadin    Atrial fibrillation     Basal cell carcinoma excised     left scalp vertex    Bilateral leg edema 1/6/2014    BPH (benign prostatic hyperplasia)     CKD (chronic kidney disease)     Coronary artery disease     DVT of leg (deep venous thrombosis) 09/08/2014    On coumadin for years    Encounter for blood transfusion     Hyperlipidemia     Hypertension      Hypogonadism, male     Leg fracture, left     Melanoma 01/04/2017     in situ crown of scalp    MI (myocardial infarction)     Panhypopituitarism     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 1 Tablet Oral Every night, Disp: , Rfl:     ciclopirox (PENLAC) 8 % Soln, Apply daily to affected nail. Must remove and restart weekly, Disp: 1 Bottle, Rfl: 5    folic acid (FOLVITE) 1 MG tablet, TAKE 1 TABLET BY MOUTH ONCE DAILY, Disp: 90 tablet, Rfl: 3    ketoconazole (NIZORAL) 2 % cream, AAA bid to feet x 3 weeks, Disp: 60 g, Rfl: 3    levothyroxine (SYNTHROID) 112 MCG tablet, TAKE 1 TABLET BY MOUTH ONCE DAILY BEFORE  BREAKFAST, Disp: 90 tablet, Rfl: 3    mometasone (ELOCON) 0.1 % ointment, aaa qd- bid for severe areas.  Avoid use on face and groin, Disp: 60 g, Rfl: 1    NITROSTAT 0.4 mg SL tablet, DISSOLVE ONE TABLET UNDER THE TONGUE EVERY 5 MINUTES AS NEEDED FOR CHEST PAIN.  DO NOT EXCEED A TOTAL OF 3 DOSES IN 15 MINUTES NOW, Disp: 25 tablet, Rfl: 3    omeprazole (PRILOSEC) 20 MG capsule, Take 20 mg by mouth every other day., Disp: , Rfl:     pantoprazole (PROTONIX) 40 MG tablet, Take 1 tablet (40 mg total) by mouth once daily., Disp: 30 tablet, Rfl: 1    predniSONE (DELTASONE) 5 MG tablet, Take 5 mg by mouth once daily., Disp: , Rfl:     simvastatin (ZOCOR) 80 MG tablet, Take 1 tablet (80 mg total) by mouth every evening., Disp: 90 tablet, Rfl: 3    sotalol (BETAPACE) 80 MG tablet, Take 1 tablet (80 mg total) by mouth 2 (two) times daily., Disp: 180 tablet, Rfl: 0    tamsulosin (FLOMAX) 0.4 mg Cap, TAKE 1 CAPSULE BY MOUTH ONCE DAILY, Disp: 90 capsule, Rfl: 3    triamcinolone acetonide 0.1% (KENALOG) 0.1 % cream, , Disp: , Rfl:     warfarin (COUMADIN) 5 MG tablet, TAKE ONE & ONE-HALF TABLETS  BY MOUTH ONCE DAILY EXCEPT  ON  TUESDAYS  TAKE  1  TABLET ON TUESDAY (Patient taking differently: 5 mg on Tuesday, 7.5 mg all other days as directed by [facility]), Disp: 135 tablet, Rfl: 3    Current Facility-Administered Medications:     testosterone cypionate injection 100 mg, 100 mg, Intramuscular, Q14 Days, Anderson Jerome MD, 100 mg at 10/09/19 0948          Review of Systems   Constitution: Negative for decreased appetite, diaphoresis, fever, malaise/fatigue, weight gain and weight loss.   HENT: Negative for congestion, ear discharge, ear pain and nosebleeds.    Eyes: Negative for blurred vision, double vision and visual disturbance.   Cardiovascular: Negative for chest pain, claudication, cyanosis, dyspnea on exertion, irregular heartbeat, leg swelling, near-syncope, orthopnea, palpitations, paroxysmal nocturnal dyspnea and syncope.   Respiratory: Negative for cough, hemoptysis, shortness of breath, sleep disturbances due to breathing, snoring, sputum production and wheezing.    Endocrine: Negative for polydipsia, polyphagia and polyuria.   Hematologic/Lymphatic: Negative for adenopathy and bleeding problem. Does not bruise/bleed easily.   Skin: Negative for color change, nail changes, poor wound healing and rash.   Musculoskeletal: Negative for muscle cramps and muscle weakness.   Gastrointestinal: Negative for abdominal pain, anorexia, change in bowel habit, hematochezia, nausea and vomiting.   Genitourinary: Negative for dysuria, frequency and hematuria.   Neurological: Negative for brief paralysis, difficulty with concentration, excessive daytime sleepiness, dizziness, focal weakness, headaches, light-headedness, seizures, vertigo and weakness.   Psychiatric/Behavioral: Negative for altered mental status and depression.   Allergic/Immunologic: Negative for persistent infections.        Objective:/71 (BP Location: Left arm, Patient Position: Sitting, BP Method: X-Large (Automatic))   Pulse  "(!) 53   Ht 6' 3" (1.905 m)   Wt 113.5 kg (250 lb 3.6 oz)   BMI 31.28 kg/m²             Physical Exam   Constitutional: He is oriented to person, place, and time. He appears well-developed and well-nourished.   HENT:   Head: Normocephalic.   Right Ear: External ear normal.   Left Ear: External ear normal.   Nose: Nose normal.   Inspection of lips, teeth and gums normal   Eyes: Pupils are equal, round, and reactive to light. EOM are normal. No scleral icterus.   Neck: Normal range of motion. Neck supple. No JVD present. No tracheal deviation present. No thyromegaly present.   Cardiovascular: Normal rate, regular rhythm and intact distal pulses. Exam reveals no gallop and no friction rub.   No murmur heard.  Pulses:       Carotid pulses are 2+ on the right side, and 2+ on the left side.       Posterior tibial pulses are 2+ on the right side, and 2+ on the left side.   Pulmonary/Chest: Effort normal and breath sounds normal.   Abdominal: Bowel sounds are normal. He exhibits no distension. There is no hepatosplenomegaly. There is no tenderness. There is no guarding.   Musculoskeletal: Normal range of motion. He exhibits no edema or tenderness.   Lymphadenopathy:   Palpation of neck and groin lymph nodes normal   Neurological: He is alert and oriented to person, place, and time. No cranial nerve deficit. He exhibits normal muscle tone. Coordination normal.   Skin: Skin is dry.   Palpation of skin normal   Psychiatric: His behavior is normal. Judgment and thought content normal.         Assessment:       1. Paroxysmal atrial fibrillation    2. Coronary artery disease involving native coronary artery of native heart with angina pectoris hx NSTEMI with PCI to RCA 2000    3. Essential hypertension    4. Mixed hyperlipidemia    5. Mitral valve insufficiency, unspecified etiology    6. Chronic kidney disease, stage 3    7. History of deep 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, 1997    8. Panhypopituitarism " thyroid/prednisone/testosterone    9. Long term current use of anticoagulant         Plan:       Linda was seen today for paroxysmal atrial fibrillation.    Diagnoses and all orders for this visit:    Paroxysmal atrial fibrillation    Coronary artery disease involving native coronary artery of native heart with angina pectoris hx NSTEMI with PCI to RCA 2000    Essential hypertension  -     Comprehensive metabolic panel; Future; Expected date: 010/16/2020    Mixed hyperlipidemia  -     Lipid panel; Future; Expected date: 010/16/2020    Mitral valve insufficiency, unspecified etiology    Chronic kidney disease, stage 3    History of deep 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, 1997    Panhypopituitarism thyroid/prednisone/testosterone    Long term current use of anticoagulant  -     CBC auto differential; Future; Expected date: 010/16/2020    Other orders  -     triamcinolone acetonide 0.1% (KENALOG) 0.1 % cream

## 2019-10-22 ENCOUNTER — PATIENT MESSAGE (OUTPATIENT)
Dept: ELECTROPHYSIOLOGY | Facility: CLINIC | Age: 84
End: 2019-10-22

## 2019-10-23 ENCOUNTER — CLINICAL SUPPORT (OUTPATIENT)
Dept: FAMILY MEDICINE | Facility: CLINIC | Age: 84
End: 2019-10-23
Payer: MEDICARE

## 2019-10-23 ENCOUNTER — ANTI-COAG VISIT (OUTPATIENT)
Dept: CARDIOLOGY | Facility: CLINIC | Age: 84
End: 2019-10-23
Payer: MEDICARE

## 2019-10-23 ENCOUNTER — PATIENT MESSAGE (OUTPATIENT)
Dept: ELECTROPHYSIOLOGY | Facility: CLINIC | Age: 84
End: 2019-10-23

## 2019-10-23 ENCOUNTER — LAB VISIT (OUTPATIENT)
Dept: LAB | Facility: HOSPITAL | Age: 84
End: 2019-10-23
Attending: INTERNAL MEDICINE
Payer: MEDICARE

## 2019-10-23 DIAGNOSIS — I48.19 PERSISTENT ATRIAL FIBRILLATION: ICD-10-CM

## 2019-10-23 DIAGNOSIS — E29.1 HYPOGONADISM IN MALE: Primary | ICD-10-CM

## 2019-10-23 DIAGNOSIS — I48.0 PAF (PAROXYSMAL ATRIAL FIBRILLATION): ICD-10-CM

## 2019-10-23 LAB
INR PPP: 2.6 (ref 0.8–1.2)
PROTHROMBIN TIME: 25.4 SEC (ref 9–12.5)

## 2019-10-23 PROCEDURE — 36415 COLL VENOUS BLD VENIPUNCTURE: CPT | Mod: HCNC,PO

## 2019-10-23 PROCEDURE — 93793 ANTICOAG MGMT PT WARFARIN: CPT | Mod: S$GLB,,,

## 2019-10-23 PROCEDURE — 93793 PR ANTICOAGULANT MGMT FOR PT TAKING WARFARIN: ICD-10-PCS | Mod: S$GLB,,,

## 2019-10-23 PROCEDURE — 85610 PROTHROMBIN TIME: CPT | Mod: HCNC

## 2019-10-23 PROCEDURE — 96372 PR INJECTION,THERAP/PROPH/DIAG2ST, IM OR SUBCUT: ICD-10-PCS | Mod: HCNC,S$GLB,, | Performed by: INTERNAL MEDICINE

## 2019-10-23 PROCEDURE — 96372 THER/PROPH/DIAG INJ SC/IM: CPT | Mod: HCNC,S$GLB,, | Performed by: INTERNAL MEDICINE

## 2019-10-23 RX ADMIN — TESTOSTERONE CYPIONATE 100 MG: 200 INJECTION, SOLUTION INTRAMUSCULAR at 09:10

## 2019-10-24 NOTE — PROGRESS NOTES
Patient is still on weekly INR s/p RFA 9/30. He needs 1 more weekly INR before we can extend per Dr. Topete orders. Also noted that he went back in a.fib so even more reason to keep INR weekly

## 2019-10-24 NOTE — PROGRESS NOTES
Patient returned call and was given lab result, verified coumadin dosage correctly, reports no changes, Patient was advised to continue same dose of coumadin and given next lab date, patient refused 10/30 lab appointment stated he doesn't see the need to get lab so soon, wants appointment 12/04 or 12/18 --please advise

## 2019-10-29 ENCOUNTER — OFFICE VISIT (OUTPATIENT)
Dept: ELECTROPHYSIOLOGY | Facility: CLINIC | Age: 84
End: 2019-10-29
Payer: MEDICARE

## 2019-10-29 ENCOUNTER — PATIENT MESSAGE (OUTPATIENT)
Dept: ELECTROPHYSIOLOGY | Facility: CLINIC | Age: 84
End: 2019-10-29

## 2019-10-29 ENCOUNTER — TELEPHONE (OUTPATIENT)
Dept: GASTROENTEROLOGY | Facility: CLINIC | Age: 84
End: 2019-10-29

## 2019-10-29 ENCOUNTER — TELEPHONE (OUTPATIENT)
Dept: ELECTROPHYSIOLOGY | Facility: CLINIC | Age: 84
End: 2019-10-29

## 2019-10-29 VITALS
BODY MASS INDEX: 30.53 KG/M2 | HEIGHT: 75 IN | DIASTOLIC BLOOD PRESSURE: 80 MMHG | HEART RATE: 89 BPM | WEIGHT: 245.56 LBS | SYSTOLIC BLOOD PRESSURE: 116 MMHG

## 2019-10-29 DIAGNOSIS — I48.19 PERSISTENT ATRIAL FIBRILLATION: ICD-10-CM

## 2019-10-29 DIAGNOSIS — I48.91 ATRIAL FIBRILLATION, UNSPECIFIED TYPE: ICD-10-CM

## 2019-10-29 DIAGNOSIS — Z79.01 LONG TERM (CURRENT) USE OF ANTICOAGULANTS: ICD-10-CM

## 2019-10-29 DIAGNOSIS — I48.0 PAROXYSMAL ATRIAL FIBRILLATION: Primary | ICD-10-CM

## 2019-10-29 DIAGNOSIS — I10 ESSENTIAL HYPERTENSION: Primary | ICD-10-CM

## 2019-10-29 PROCEDURE — 1101F PR PT FALLS ASSESS DOC 0-1 FALLS W/OUT INJ PAST YR: ICD-10-PCS | Mod: HCNC,CPTII,S$GLB, | Performed by: NURSE PRACTITIONER

## 2019-10-29 PROCEDURE — 1101F PT FALLS ASSESS-DOCD LE1/YR: CPT | Mod: HCNC,CPTII,S$GLB, | Performed by: NURSE PRACTITIONER

## 2019-10-29 PROCEDURE — 99214 PR OFFICE/OUTPT VISIT, EST, LEVL IV, 30-39 MIN: ICD-10-PCS | Mod: HCNC,S$GLB,, | Performed by: NURSE PRACTITIONER

## 2019-10-29 PROCEDURE — 3074F SYST BP LT 130 MM HG: CPT | Mod: HCNC,CPTII,S$GLB, | Performed by: NURSE PRACTITIONER

## 2019-10-29 PROCEDURE — 99999 PR PBB SHADOW E&M-EST. PATIENT-LVL III: CPT | Mod: PBBFAC,HCNC,, | Performed by: NURSE PRACTITIONER

## 2019-10-29 PROCEDURE — 3079F PR MOST RECENT DIASTOLIC BLOOD PRESSURE 80-89 MM HG: ICD-10-PCS | Mod: HCNC,CPTII,S$GLB, | Performed by: NURSE PRACTITIONER

## 2019-10-29 PROCEDURE — 93010 RHYTHM STRIP: ICD-10-PCS | Mod: HCNC,S$GLB,, | Performed by: INTERNAL MEDICINE

## 2019-10-29 PROCEDURE — 93005 ELECTROCARDIOGRAM TRACING: CPT | Mod: HCNC,S$GLB,, | Performed by: INTERNAL MEDICINE

## 2019-10-29 PROCEDURE — 99499 RISK ADDL DX/OHS AUDIT: ICD-10-PCS | Mod: HCNC,S$GLB,, | Performed by: NURSE PRACTITIONER

## 2019-10-29 PROCEDURE — 93005 RHYTHM STRIP: ICD-10-PCS | Mod: HCNC,S$GLB,, | Performed by: INTERNAL MEDICINE

## 2019-10-29 PROCEDURE — 3079F DIAST BP 80-89 MM HG: CPT | Mod: HCNC,CPTII,S$GLB, | Performed by: NURSE PRACTITIONER

## 2019-10-29 PROCEDURE — 99999 PR PBB SHADOW E&M-EST. PATIENT-LVL III: ICD-10-PCS | Mod: PBBFAC,HCNC,, | Performed by: NURSE PRACTITIONER

## 2019-10-29 PROCEDURE — 3074F PR MOST RECENT SYSTOLIC BLOOD PRESSURE < 130 MM HG: ICD-10-PCS | Mod: HCNC,CPTII,S$GLB, | Performed by: NURSE PRACTITIONER

## 2019-10-29 PROCEDURE — 99499 UNLISTED E&M SERVICE: CPT | Mod: HCNC,S$GLB,, | Performed by: NURSE PRACTITIONER

## 2019-10-29 PROCEDURE — 93010 ELECTROCARDIOGRAM REPORT: CPT | Mod: HCNC,S$GLB,, | Performed by: INTERNAL MEDICINE

## 2019-10-29 PROCEDURE — 99214 OFFICE O/P EST MOD 30 MIN: CPT | Mod: HCNC,S$GLB,, | Performed by: NURSE PRACTITIONER

## 2019-10-29 NOTE — TELEPHONE ENCOUNTER
----- Message from Nancy Ngo sent at 10/28/2019  8:08 AM CDT -----  Contact: self [phone]  Pt states he is having stomach problems , states he seen Ulices Marvin a couple years ago please call back to discuss

## 2019-10-29 NOTE — ASSESSMENT & PLAN NOTE
Pt reported mid-epigastric abdominal pain, and not chest pain  Given abnormal LFTS (previously normal 4 weeks ago) along with CTA findings that showed gallbladder is mildly distended noting there may be cholelithiasis or sludge near the gallbladder neck which is concerning for cholecystitis as source of pain  Lipase was normal  RUQ abdominal U/S and HIDA consistent with cholecystitis  Pt spiked fever and was started on empiric Zosyn  Blood culture positive for GNR from 5/31  Supportive care with IVF, pain control and anti-emetics  Appreciate GI following and MRCP was negative for any retained stones  Plan was for cholecystectomy yesterday but delayed until today  Repeat blood culture were done today to assess for clearance- + on 6/2. Will repeat today- 6/3.    n/a

## 2019-10-29 NOTE — TELEPHONE ENCOUNTER
Good afternoon,    Mr. Skelton is scheduled for a cardioversion on Friday (11/1) with Dr. Nguyen.  Please keep patient in range.     Thank you,    MIRIAN Mendez  Ex [id number]

## 2019-10-29 NOTE — PROGRESS NOTES
Mr. Skelton is a patient of Dr. Topete and was last seen in clinic 7/30/2019.      Subjective:   Patient ID:  Linda Skelton is a 84 y.o. male who presents for follow-up of Atrial Fibrillation  .     HPI:    Mr. Skelton is a 84 y.o. male with paroxysmal atrial fibrillation s/p cryo-PVI by Dr. 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     Background:    Primary Cardiologist: Ivet Manning MD     Mr. Skelton has a history of paroxysmal atrial fibrillation s/p cryo-PVI by Dr. 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 He notes early AF recurrence after tikosyn therapy was stopped post-PVI. Tikosyn was resumed. He notes since his wife passed away last September he began having AF recurrences. They tend to last 30-48 hours. He notes even though he is apparently rate controlled (80s on home check) during the episodes he does have relative hypotension with systolic blood pressure in the low 100s and he feels very poor. Otherwise he feels well.     Recurrent symptomatic paroxysms of AF despite tikosyn therapy. Discussed options including changing to amiodarone (discussed long-term risks of liver, lung, thyroid, ocular toxicities) versus redo PVI. Redo-PVI would be complicated by inability to use the left CFV due to chronic occlusion, however would be doable. Discussed procedural risks tend to be higher in the elderly. He will be 84 next week. It is reasonable to see if he has good response with amiodarone. He is agreeable.       Tikosyn stopped 5/2019. Plan was to start amiodarone. He desired to not take amiodarone due to potential side effects. 6/14/2019 underwent DCCV. We initiated sotalol however he has  "had breakthrough. Planned for PVI.    Update (10/29/2019):    9/30/2019: underwent successful redo-pulmonary vein RF ablation and left atrial posterior wall isolation for complex CAFEs.     Today he says he felt considerably better after his ablation. However, for the past week or so he has been feeling awful. With LH, fatigue, "upset stomach," and HSIEH. He recognizes these symptoms as r/t AF. Denies CP.     He is currently taking coumadin for stroke prophylaxis and denies significant bleeding episodes. He is currently being treated with sotalol 80mg BID for rhythm control.  Kidney function is stable, with a creatinine of 1.3 on 10/8/2019.    I have personally reviewed the patient's EKG today, which shows AF at 89bpm.    Recent Cardiac Tests:    EUGENE (9/30/2019):  · No LA or CHANI thrombus. There was SEC without thrombus. Ech contrast given. CHANI velocity of 0.2 m/sec  · Low normal left ventricular systolic function.  · Small interatrial septal defect present with left to right shunting indicated by color flow Doppler.- iatrogenic  · Mild mitral prolapse of the medial scallop of the posterior mitral leaflet (P3) with mild mitral regurgitation.  · Mild to moderate tricuspid regurgitation.    Current Outpatient Medications   Medication Sig    aspirin 81 mg Tab Take 81 mg by mouth every evening. 1 Tablet Oral Every night    ciclopirox (PENLAC) 8 % Soln Apply daily to affected nail. Must remove and restart weekly    folic acid (FOLVITE) 1 MG tablet TAKE 1 TABLET BY MOUTH ONCE DAILY    ketoconazole (NIZORAL) 2 % cream AAA bid to feet x 3 weeks    levothyroxine (SYNTHROID) 112 MCG tablet TAKE 1 TABLET BY MOUTH ONCE DAILY BEFORE  BREAKFAST    mometasone (ELOCON) 0.1 % ointment aaa qd- bid for severe areas.  Avoid use on face and groin    NITROSTAT 0.4 mg SL tablet DISSOLVE ONE TABLET UNDER THE TONGUE EVERY 5 MINUTES AS NEEDED FOR CHEST PAIN.  DO NOT EXCEED A TOTAL OF 3 DOSES IN 15 MINUTES NOW    omeprazole (PRILOSEC) 20 " "MG capsule Take 20 mg by mouth every other day.    pantoprazole (PROTONIX) 40 MG tablet Take 1 tablet (40 mg total) by mouth once daily.    predniSONE (DELTASONE) 5 MG tablet Take 5 mg by mouth once daily.    simvastatin (ZOCOR) 80 MG tablet Take 1 tablet (80 mg total) by mouth every evening.    sotalol (BETAPACE) 80 MG tablet Take 1 tablet (80 mg total) by mouth 2 (two) times daily.    tamsulosin (FLOMAX) 0.4 mg Cap TAKE 1 CAPSULE BY MOUTH ONCE DAILY    triamcinolone acetonide 0.1% (KENALOG) 0.1 % cream     warfarin (COUMADIN) 5 MG tablet TAKE ONE & ONE-HALF TABLETS BY MOUTH ONCE DAILY EXCEPT  ON  TUESDAYS  TAKE  1  TABLET ON TUESDAY (Patient taking differently: 5 mg on Tuesday, 7.5 mg all other days as directed by [facility])     Current Facility-Administered Medications   Medication    testosterone cypionate injection 100 mg     Review of Systems   Constitution: Positive for malaise/fatigue.   Cardiovascular: Positive for dyspnea on exertion and irregular heartbeat. Negative for chest pain, leg swelling and palpitations.   Respiratory: Negative for shortness of breath.    Hematologic/Lymphatic: Negative for bleeding problem.   Skin: Negative for rash.   Musculoskeletal: Negative for myalgias.   Gastrointestinal: Negative for hematemesis, hematochezia and nausea.   Genitourinary: Negative for hematuria.   Neurological: Positive for light-headedness and loss of balance.   Psychiatric/Behavioral: Negative for altered mental status.   Allergic/Immunologic: Negative for persistent infections.     Objective:        /80   Pulse 89   Ht 6' 3" (1.905 m)   Wt 111.4 kg (245 lb 9.5 oz)   BMI 30.70 kg/m²     Physical Exam   Constitutional: He is oriented to person, place, and time. He appears well-developed and well-nourished.   HENT:   Head: Normocephalic.   Nose: Nose normal.   Eyes: Pupils are equal, round, and reactive to light.   Cardiovascular: Normal rate, S1 normal and S2 normal. An irregularly " irregular rhythm present.   No murmur heard.  Pulses:       Radial pulses are 2+ on the right side, and 2+ on the left side.   Pulmonary/Chest: Breath sounds normal. No respiratory distress.   Abdominal: Normal appearance.   Musculoskeletal: Normal range of motion. He exhibits no edema.   Neurological: He is alert and oriented to person, place, and time.   Skin: Skin is warm and dry. No erythema.   Psychiatric: He has a normal mood and affect. His speech is normal and behavior is normal.   Nursing note and vitals reviewed.    Lab Results   Component Value Date     10/08/2019    K 4.8 10/08/2019    MG 2.0 2019    BUN 22 10/08/2019    CREATININE 1.3 10/08/2019    ALT 17 2019    AST 21 2019    HGB 14.3 2019    HCT 44.4 2019    TSH 0.300 (L) 2019    LDLCALC 70.8 10/10/2018       Recent Labs   Lab 19  0957 10/09/19  0916 10/16/19  1054 10/23/19  0900   INR 2.5 H 2.4 H 2.7 H 2.6 H       Assessment:     1. Essential hypertension    2. Long term (current) use of anticoagulants    3. Persistent atrial fibrillation did not want amio long term; sotalol and tikosyn still with sx; 19 redo PVI with RFA      Plan:     In summary, Mr. Skelton is a 84 y.o. male with paroxysmal atrial fibrillation s/p cryo-PVI by Dr. 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   He is now 1 month s/p RFA. Back in AF. He felt much better in SR and is very symptomatic. Case discussed with Dr. Topete, who also counseled the patient. Medical options are limited.  No increase in sotalol due to CKD. Most effective medication for him would be amiodarone. He is wary of this because his wife  of lung disease (not r/t amiodarone). Had a long discussion about routine monitoring, as well as risks vs. benefits of amiodarone. He agreed  to cardiovert now on sotalol, with the hope that he will remain in SR after (he is still in blanking period). If he were to go back into AF, he will consider amiodarone at that time. On coumadin with weekly INRs in range. Will defer EUGENE.    Schedule DCCV.   On coumadin with weekly INRs in range. Will defer EUGENE.  RTC as scheduled following procedure.    *A copy of this note has been sent to Dr. Topete*    Follow up as scheduled following procedure.    ------------------------------------------------------------------    CONTRERAS Davey, NP-C  Cardiac Electrophysiology

## 2019-10-29 NOTE — TELEPHONE ENCOUNTER
12:10 pm Spoke to Mr. Skelton who would like to come in today to be seen.  Appt scheduled and REED Lagunas, updated.

## 2019-10-30 ENCOUNTER — LAB VISIT (OUTPATIENT)
Dept: LAB | Facility: HOSPITAL | Age: 84
End: 2019-10-30
Attending: INTERNAL MEDICINE
Payer: MEDICARE

## 2019-10-30 ENCOUNTER — TELEPHONE (OUTPATIENT)
Dept: ELECTROPHYSIOLOGY | Facility: CLINIC | Age: 84
End: 2019-10-30

## 2019-10-30 ENCOUNTER — ANTI-COAG VISIT (OUTPATIENT)
Dept: CARDIOLOGY | Facility: CLINIC | Age: 84
End: 2019-10-30
Payer: MEDICARE

## 2019-10-30 DIAGNOSIS — I48.19 PERSISTENT ATRIAL FIBRILLATION: ICD-10-CM

## 2019-10-30 DIAGNOSIS — I48.0 PAF (PAROXYSMAL ATRIAL FIBRILLATION): ICD-10-CM

## 2019-10-30 DIAGNOSIS — I48.0 PAROXYSMAL ATRIAL FIBRILLATION: ICD-10-CM

## 2019-10-30 LAB
ANION GAP SERPL CALC-SCNC: 6 MMOL/L (ref 8–16)
APTT BLDCRRT: 31.9 SEC (ref 21–32)
BASOPHILS # BLD AUTO: 0.01 K/UL (ref 0–0.2)
BASOPHILS NFR BLD: 0.2 % (ref 0–1.9)
BUN SERPL-MCNC: 22 MG/DL (ref 8–23)
CALCIUM SERPL-MCNC: 9.2 MG/DL (ref 8.7–10.5)
CHLORIDE SERPL-SCNC: 104 MMOL/L (ref 95–110)
CO2 SERPL-SCNC: 29 MMOL/L (ref 23–29)
CREAT SERPL-MCNC: 1.5 MG/DL (ref 0.5–1.4)
DIFFERENTIAL METHOD: ABNORMAL
EOSINOPHIL # BLD AUTO: 0 K/UL (ref 0–0.5)
EOSINOPHIL NFR BLD: 0.7 % (ref 0–8)
ERYTHROCYTE [DISTWIDTH] IN BLOOD BY AUTOMATED COUNT: 14.7 % (ref 11.5–14.5)
EST. GFR  (AFRICAN AMERICAN): 48.7 ML/MIN/1.73 M^2
EST. GFR  (NON AFRICAN AMERICAN): 42.1 ML/MIN/1.73 M^2
GLUCOSE SERPL-MCNC: 93 MG/DL (ref 70–110)
HCT VFR BLD AUTO: 45.3 % (ref 40–54)
HGB BLD-MCNC: 14.5 G/DL (ref 14–18)
IMM GRANULOCYTES # BLD AUTO: 0.01 K/UL (ref 0–0.04)
IMM GRANULOCYTES NFR BLD AUTO: 0.2 % (ref 0–0.5)
INR PPP: 2.3 (ref 0.8–1.2)
LYMPHOCYTES # BLD AUTO: 1 K/UL (ref 1–4.8)
LYMPHOCYTES NFR BLD: 21.6 % (ref 18–48)
MCH RBC QN AUTO: 31.9 PG (ref 27–31)
MCHC RBC AUTO-ENTMCNC: 32 G/DL (ref 32–36)
MCV RBC AUTO: 100 FL (ref 82–98)
MONOCYTES # BLD AUTO: 0.9 K/UL (ref 0.3–1)
MONOCYTES NFR BLD: 20.4 % (ref 4–15)
NEUTROPHILS # BLD AUTO: 2.6 K/UL (ref 1.8–7.7)
NEUTROPHILS NFR BLD: 56.9 % (ref 38–73)
NRBC BLD-RTO: 0 /100 WBC
PLATELET # BLD AUTO: 105 K/UL (ref 150–350)
PMV BLD AUTO: 12.4 FL (ref 9.2–12.9)
POTASSIUM SERPL-SCNC: 4.3 MMOL/L (ref 3.5–5.1)
PROTHROMBIN TIME: 21.9 SEC (ref 9–12.5)
RBC # BLD AUTO: 4.54 M/UL (ref 4.6–6.2)
SODIUM SERPL-SCNC: 139 MMOL/L (ref 136–145)
WBC # BLD AUTO: 4.5 K/UL (ref 3.9–12.7)

## 2019-10-30 PROCEDURE — 80048 BASIC METABOLIC PNL TOTAL CA: CPT | Mod: HCNC

## 2019-10-30 PROCEDURE — 36415 COLL VENOUS BLD VENIPUNCTURE: CPT | Mod: HCNC,PO

## 2019-10-30 PROCEDURE — 93793 ANTICOAG MGMT PT WARFARIN: CPT | Mod: S$GLB,,,

## 2019-10-30 PROCEDURE — 85025 COMPLETE CBC W/AUTO DIFF WBC: CPT | Mod: HCNC

## 2019-10-30 PROCEDURE — 85610 PROTHROMBIN TIME: CPT | Mod: HCNC

## 2019-10-30 PROCEDURE — 85730 THROMBOPLASTIN TIME PARTIAL: CPT | Mod: HCNC

## 2019-10-30 PROCEDURE — 93793 PR ANTICOAGULANT MGMT FOR PT TAKING WARFARIN: ICD-10-PCS | Mod: S$GLB,,,

## 2019-10-30 NOTE — TELEPHONE ENCOUNTER
Spoke to Linda Skelton    CONFIRMED procedure arrival time of 8am on 11/1  Reiterated instructions including:  -Directions to check in desk  -NPO after midnight night prior to procedure   -Confirmed compliance of Coumadin. Last 4 serial INRS are within range 10/30-2.3; 10/23-2.6; 10/16-2.7; 10/9- 2.4  -Pre-procedure LABS 10/30- Creatinine elevated, pt has hx of CKD III. Platelets low but at pt's baseline     Pt verbalizes understanding of above and appreciates call.

## 2019-10-30 NOTE — PROGRESS NOTES
INR good. Noted that patient is scheduled for EUGENE/DCCV in 2 days. Will f/u for procedural INR to address any changes

## 2019-11-01 ENCOUNTER — HOSPITAL ENCOUNTER (OUTPATIENT)
Facility: HOSPITAL | Age: 84
Discharge: HOME | End: 2019-11-01
Attending: INTERNAL MEDICINE | Admitting: INTERNAL MEDICINE
Payer: MEDICARE

## 2019-11-01 ENCOUNTER — ANTI-COAG VISIT (OUTPATIENT)
Dept: CARDIOLOGY | Facility: CLINIC | Age: 84
End: 2019-11-01
Payer: MEDICARE

## 2019-11-01 ENCOUNTER — TELEPHONE (OUTPATIENT)
Dept: ELECTROPHYSIOLOGY | Facility: CLINIC | Age: 84
End: 2019-11-01

## 2019-11-01 ENCOUNTER — ANESTHESIA (OUTPATIENT)
Dept: MEDSURG UNIT | Facility: HOSPITAL | Age: 84
End: 2019-11-01
Payer: MEDICARE

## 2019-11-01 ENCOUNTER — ANESTHESIA EVENT (OUTPATIENT)
Dept: MEDSURG UNIT | Facility: HOSPITAL | Age: 84
End: 2019-11-01
Payer: MEDICARE

## 2019-11-01 VITALS
OXYGEN SATURATION: 97 % | BODY MASS INDEX: 31.08 KG/M2 | DIASTOLIC BLOOD PRESSURE: 70 MMHG | HEIGHT: 75 IN | SYSTOLIC BLOOD PRESSURE: 120 MMHG | TEMPERATURE: 97 F | WEIGHT: 250 LBS | HEART RATE: 62 BPM | RESPIRATION RATE: 18 BRPM

## 2019-11-01 DIAGNOSIS — I48.0 PAROXYSMAL ATRIAL FIBRILLATION: Primary | ICD-10-CM

## 2019-11-01 DIAGNOSIS — I48.19 PERSISTENT ATRIAL FIBRILLATION: ICD-10-CM

## 2019-11-01 DIAGNOSIS — I48.91 ATRIAL FIBRILLATION: ICD-10-CM

## 2019-11-01 LAB
APTT BLDCRRT: 32.1 SEC (ref 21–32)
INR PPP: 2.7 (ref 0.8–1.2)
PROTHROMBIN TIME: 25.8 SEC (ref 9–12.5)

## 2019-11-01 PROCEDURE — 25000003 PHARM REV CODE 250: Mod: HCNC | Performed by: INTERNAL MEDICINE

## 2019-11-01 PROCEDURE — 93010 ELECTROCARDIOGRAM REPORT: CPT | Mod: 76,HCNC,, | Performed by: INTERNAL MEDICINE

## 2019-11-01 PROCEDURE — 92960 CARDIOVERSION ELECTRIC EXT: CPT | Mod: HCNC | Performed by: INTERNAL MEDICINE

## 2019-11-01 PROCEDURE — 63600175 PHARM REV CODE 636 W HCPCS: Mod: HCNC | Performed by: NURSE PRACTITIONER

## 2019-11-01 PROCEDURE — 92960 PR CARDIOVERSION, ELECTIVE;EXTERN: ICD-10-PCS | Mod: HCNC,,, | Performed by: INTERNAL MEDICINE

## 2019-11-01 PROCEDURE — 37000008 HC ANESTHESIA 1ST 15 MINUTES: Mod: HCNC | Performed by: INTERNAL MEDICINE

## 2019-11-01 PROCEDURE — 85730 THROMBOPLASTIN TIME PARTIAL: CPT | Mod: HCNC

## 2019-11-01 PROCEDURE — D9220A PRA ANESTHESIA: ICD-10-PCS | Mod: HCNC,CRNA,, | Performed by: NURSE ANESTHETIST, CERTIFIED REGISTERED

## 2019-11-01 PROCEDURE — 85610 PROTHROMBIN TIME: CPT | Mod: HCNC

## 2019-11-01 PROCEDURE — D9220A PRA ANESTHESIA: Mod: HCNC,CRNA,, | Performed by: NURSE ANESTHETIST, CERTIFIED REGISTERED

## 2019-11-01 PROCEDURE — 93010 EKG 12-LEAD: ICD-10-PCS | Mod: HCNC,,, | Performed by: INTERNAL MEDICINE

## 2019-11-01 PROCEDURE — 99220 PR INITIAL OBSERVATION CARE,LEVL III: CPT | Mod: HCNC,25,, | Performed by: INTERNAL MEDICINE

## 2019-11-01 PROCEDURE — 99220 PR INITIAL OBSERVATION CARE,LEVL III: ICD-10-PCS | Mod: HCNC,25,, | Performed by: INTERNAL MEDICINE

## 2019-11-01 PROCEDURE — 93005 ELECTROCARDIOGRAM TRACING: CPT | Mod: HCNC

## 2019-11-01 PROCEDURE — 92960 CARDIOVERSION ELECTRIC EXT: CPT | Mod: HCNC,,, | Performed by: INTERNAL MEDICINE

## 2019-11-01 PROCEDURE — 93010 ELECTROCARDIOGRAM REPORT: CPT | Mod: HCNC,,, | Performed by: INTERNAL MEDICINE

## 2019-11-01 PROCEDURE — 37000009 HC ANESTHESIA EA ADD 15 MINS: Mod: HCNC | Performed by: INTERNAL MEDICINE

## 2019-11-01 PROCEDURE — D9220A PRA ANESTHESIA: Mod: HCNC,ANES,, | Performed by: ANESTHESIOLOGY

## 2019-11-01 PROCEDURE — D9220A PRA ANESTHESIA: ICD-10-PCS | Mod: HCNC,ANES,, | Performed by: ANESTHESIOLOGY

## 2019-11-01 PROCEDURE — 63600175 PHARM REV CODE 636 W HCPCS: Mod: HCNC | Performed by: NURSE ANESTHETIST, CERTIFIED REGISTERED

## 2019-11-01 RX ORDER — PROPOFOL 10 MG/ML
VIAL (ML) INTRAVENOUS
Status: DISCONTINUED | OUTPATIENT
Start: 2019-11-01 | End: 2019-11-01

## 2019-11-01 RX ORDER — SODIUM CHLORIDE 9 MG/ML
INJECTION, SOLUTION INTRAVENOUS CONTINUOUS
Status: ACTIVE | OUTPATIENT
Start: 2019-11-01

## 2019-11-01 RX ORDER — SILVER SULFADIAZINE 10 G/1000G
CREAM TOPICAL
Status: DISCONTINUED | OUTPATIENT
Start: 2019-11-01 | End: 2019-11-01 | Stop reason: HOSPADM

## 2019-11-01 RX ORDER — SODIUM CHLORIDE 0.9 % (FLUSH) 0.9 %
3 SYRINGE (ML) INJECTION
Status: DISCONTINUED | OUTPATIENT
Start: 2019-11-01 | End: 2019-11-01 | Stop reason: HOSPADM

## 2019-11-01 RX ORDER — LIDOCAINE HCL/PF 100 MG/5ML
SYRINGE (ML) INTRAVENOUS
Status: DISCONTINUED | OUTPATIENT
Start: 2019-11-01 | End: 2019-11-01

## 2019-11-01 RX ORDER — SODIUM CHLORIDE 0.9 % (FLUSH) 0.9 %
5 SYRINGE (ML) INJECTION
Status: ACTIVE | OUTPATIENT
Start: 2019-11-01

## 2019-11-01 RX ADMIN — LIDOCAINE HYDROCHLORIDE 100 MG: 20 INJECTION, SOLUTION INTRAVENOUS at 10:11

## 2019-11-01 RX ADMIN — SODIUM CHLORIDE: 0.9 INJECTION, SOLUTION INTRAVENOUS at 09:11

## 2019-11-01 RX ADMIN — PROPOFOL 100 MG: 10 INJECTION, EMULSION INTRAVENOUS at 10:11

## 2019-11-01 NOTE — TRANSFER OF CARE
"Anesthesia Transfer of Care Note    Patient: Linda Skelton    Procedure(s) Performed: Procedure(s) (LRB):  CARDIOVERSION (N/A)    Patient location: [facility]    Anesthesia Type: general    Transport from OR: Transported from OR on 100% O2 by closed face mask with adequate spontaneous ventilation    Post pain: adequate analgesia    Post assessment: no apparent anesthetic complications and tolerated procedure well    Post vital signs: stable    Level of consciousness: awake, alert and oriented    Nausea/Vomiting: no nausea/vomiting    Complications: none    Transfer of care protocol was followed      Last vitals:   Visit Vitals  BP (!) 143/93 (BP Location: Right arm, Patient Position: Lying)   Pulse 64   Temp 35.7 °C (96.3 °F) (Oral)   Resp 18   Ht 6' 3" (1.905 m)   Wt 113.4 kg (250 lb)   SpO2 97%   BMI 31.25 kg/m²     "

## 2019-11-01 NOTE — ANESTHESIA PREPROCEDURE EVALUATION
11/01/2019  Linda Skelton is a 84 y.o., male with atrial fibrillation here for cardioversion.    Pre-operative evaluation for Procedure(s) (LRB):  Cardioversion or Defibrillation (Bilateral)  ECHOCARDIOGRAM,TRANSESOPHAGEAL (N/A)        Patient Active Problem List   Diagnosis    Persistent atrial fibrillation did not want amio long term; sotalol and tikosyn still with sx; 9/30/19 redo PVI with RFA    Thyroid nodule    Panhypopituitarism thyroid/prednisone/testosterone    Chronic kidney disease, stage 3    Coronary artery disease involving native coronary artery of native heart with angina pectoris hx NSTEMI with PCI to RCA 2000    Mitral regurgitation mild, posterior leaflet MVP on EUGNEE 6/2019    Bilateral leg edema    Obesity, Class I, BMI 30-34.9    ED (erectile dysfunction)    Essential hypertension    History of deep 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, 1997    Mixed hyperlipidemia    History of melanoma in situ    Benign non-nodular prostatic hyperplasia without lower urinary tract symptoms    Hypogonadism in male    Long term (current) use of anticoagulants    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    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    History of bacteremia with klebsiella during cholecystitis summer 2018    Status post cholecystectomy 6/2018 with klebsiella bacteremia    Perforated nasal septum    Tubular adenoma of colon on colonoscopy 2014    Gastroesophageal reflux disease without esophagitis    Presence of IVC filter 1997    Paroxysmal atrial fibrillation       Review of patient's allergies indicates:  No Known Allergies    Current Facility-Administered Medications on File Prior to Visit   Medication Dose Route Frequency Provider Last Rate Last Dose    0.9%  NaCl infusion   Intravenous Continuous Renay Fernandez NP         sodium chloride 0.9% flush 5 mL  5 mL Intravenous PRN Renay Fernandez NP         Current Outpatient Medications on File Prior to Visit   Medication Sig Dispense Refill    aspirin 81 mg Tab Take 81 mg by mouth every evening. 1 Tablet Oral Every night      ciclopirox (PENLAC) 8 % Soln Apply daily to affected nail. Must remove and restart weekly 1 Bottle 5    folic acid (FOLVITE) 1 MG tablet TAKE 1 TABLET BY MOUTH ONCE DAILY 90 tablet 3    ketoconazole (NIZORAL) 2 % cream AAA bid to feet x 3 weeks 60 g 3    levothyroxine (SYNTHROID) 112 MCG tablet TAKE 1 TABLET BY MOUTH ONCE DAILY BEFORE  BREAKFAST 90 tablet 3    mometasone (ELOCON) 0.1 % ointment aaa qd- bid for severe areas.  Avoid use on face and groin 60 g 1    NITROSTAT 0.4 mg SL tablet DISSOLVE ONE TABLET UNDER THE TONGUE EVERY 5 MINUTES AS NEEDED FOR CHEST PAIN.  DO NOT EXCEED A TOTAL OF 3 DOSES IN 15 MINUTES NOW 25 tablet 3    omeprazole (PRILOSEC) 20 MG capsule Take 20 mg by mouth every other day.      pantoprazole (PROTONIX) 40 MG tablet Take 1 tablet (40 mg total) by mouth once daily. 30 tablet 1    predniSONE (DELTASONE) 5 MG tablet Take 5 mg by mouth once daily.      simvastatin (ZOCOR) 80 MG tablet Take 1 tablet (80 mg total) by mouth every evening. 90 tablet 3    sotalol (BETAPACE) 80 MG tablet Take 1 tablet (80 mg total) by mouth 2 (two) times daily. 180 tablet 0    tamsulosin (FLOMAX) 0.4 mg Cap TAKE 1 CAPSULE BY MOUTH ONCE DAILY 90 capsule 3    triamcinolone acetonide 0.1% (KENALOG) 0.1 % cream       warfarin (COUMADIN) 5 MG tablet TAKE ONE & ONE-HALF TABLETS BY MOUTH ONCE DAILY EXCEPT  ON  TUESDAYS  TAKE  1  TABLET ON TUESDAY (Patient taking differently: 5 mg on Tuesday, 7.5 mg all other days as directed by [facility]) 135 tablet 3       Past Surgical History:   Procedure Laterality Date    ABLATION OF ARRHYTHMOGENIC FOCUS FOR ATRIAL FIBRILLATION N/A 9/30/2019    Procedure: Ablation atrial fibrillation;  Surgeon: Neel DOYLE  MD Efrem;  Location: [facility] EP LAB;  Service: Cardiology;  Laterality: N/A;  AF, BRYSON, PVI (re-do), Carto, Gen, ID, 3 Prep *right groin only access and exchange CS/ICE catheters throughout as needed*    APPENDECTOMY      BASAL CELL CARCINOMA EXCISION  01/2008    BRAIN SURGERY      pituitary adenoma removed    CARDIAC CATHETERIZATION  05/09/2000    S/P stent to RCA,    CARDIAC CATHETERIZATION      stents placed    CARDIAC ELECTROPHYSIOLOGY STUDY AND ABLATION      CORONARY ANGIOPLASTY  5/9/2000    RCA    CORONARY ANGIOPLASTY WITH STENT PLACEMENT      ESOPHAGEAL DILATION      EYE SURGERY Bilateral     cataracts removed and new lenses placed     FRACTURE SURGERY Left     leg    HERNIA REPAIR      LAPAROSCOPIC CHOLECYSTECTOMY N/A 6/2/2018    Procedure: CHOLECYSTECTOMY, LAPAROSCOPIC;  Surgeon: Dickson Smith Jr., MD;  Location: [facility] OR;  Service: General;  Laterality: N/A;    left femur surgery      pituitatry      hypophysectomy    SKIN BIOPSY      TONSILLECTOMY      TREATMENT OF CARDIAC ARRHYTHMIA Bilateral 6/13/2019    Procedure: Cardioversion or Defibrillation;  Surgeon: Eric Nguyen MD;  Location: [facility] EP LAB;  Service: Cardiology;  Laterality: Bilateral;  af, bryson, dccv, anes, pr, 324    TREATMENT OF CARDIAC ARRHYTHMIA  9/30/2019    Procedure: Cardioversion or Defibrillation;  Surgeon: Neel Topete MD;  Location: [facility] EP LAB;  Service: Cardiology;;    VASCULAR SURGERY         Social History     Socioeconomic History    Marital status:      Spouse name: Not on file    Number of children: Not on file    Years of education: Not on file    Highest education level: Not on file   Occupational History    Not on file   Social Needs    Financial resource strain: Not hard at all    Food insecurity:     Worry: Never true     Inability: Never true    Transportation needs:     Medical: No     Non-medical: No   Tobacco Use    Smoking status: Never Smoker    Smokeless tobacco: Never Used    Substance and Sexual Activity    Alcohol use: No     Frequency: Never    Drug use: No    Sexual activity: Not Currently     Partners: Female   Lifestyle    Physical activity:     Days per week: 0 days     Minutes per session: 0 min    Stress: Not at all   Relationships    Social connections:     Talks on phone: More than three times a week     Gets together: Once a week     Attends Mu-ism service: Not on file     Active member of club or organization: Yes     Attends meetings of clubs or organizations: More than 4 times per year     Relationship status:    Other Topics Concern    Not on file   Social History Narrative    retired [employer] .  Non smoker.          CBC:   Recent Labs     10/30/19  0936   WBC 4.50   RBC 4.54*   HGB 14.5   HCT 45.3   *   *   MCH 31.9*   MCHC 32.0       CMP:   Recent Labs     10/30/19  0936      K 4.3      CO2 29   BUN 22   CREATININE 1.5*   GLU 93   CALCIUM 9.2       INR  Recent Labs     10/30/19  0936 11/01/19  0715   INR 2.3* 2.7*   APTT 31.9 32.1*                 2D Echo:  Results for orders placed or performed during the hospital encounter of 05/30/18   2D echo with color flow doppler   Result Value Ref Range    QEF 60 55 - 65    Mitral Valve Regurgitation TRIVIAL     Diastolic Dysfunction No     Est. PA Systolic Pressure 8.86     Tricuspid Valve Regurgitation TRIVIAL          Pre-op Assessment    I have reviewed the Patient Summary Reports.     I have reviewed the Nursing Notes.   I have reviewed the Medications.     Review of Systems  Anesthesia Hx:  No problems with previous Anesthesia    Hematology/Oncology:  Hematology Normal   Oncology Normal     EENT/Dental:EENT/Dental Normal   Cardiovascular:   Hypertension Past MI CAD  CABG/stent Dysrhythmias atrial fibrillation  Denies Angina. · Normal appearing left atrial appendage. No thrombus is present in the appendage. Normal appendage velocities.  · No thrombus present in the left  atrium.  · Normal left ventricular systolic function. The estimated ejection fraction is 55%  · Mitral prolapse of the posterior mitral leaflet.  · Mild mitral regurgitation.  · Normal right ventricular systolic function.  · Mild to moderate tricuspid regurgitation.  · No aortic atheroma visualized.  · Small inferior-posterior interatrial septal defect present with bi-directional shunting indicated by color flow Doppler.      Pulmonary:  Pulmonary Normal    Renal/:   Chronic Renal Disease, CRI    Hepatic/GI:  Hepatic/GI Normal    Musculoskeletal:  Musculoskeletal Normal    Neurological:  Neurology Normal    Endocrine:   Hypothyroidism    Dermatological:  Skin Normal    Psych:  Psychiatric Normal           Physical Exam  General:  Well nourished    Airway/Jaw/Neck:  Airway Findings: Mouth Opening: Normal Tongue: Normal  General Airway Assessment: Adult  Mallampati: II  Jaw/Neck Findings:  Neck ROM: Normal ROM     Eyes/Ears/Nose:  Eyes/Ears/Nose Findings:    Dental:  Dental Findings: In tact   Chest/Lungs:  Chest/Lungs Findings: Clear to auscultation, Normal Respiratory Rate     Heart/Vascular:  Heart Findings: Rate: Normal  Rhythm: Regular Rhythm  Sounds: Normal  Heart Murmur  Vascular Findings:        Mental Status:  Mental Status Findings:  Cooperative, Alert and Oriented         Anesthesia Plan  Type of Anesthesia, risks & benefits discussed:  Anesthesia Type:  general  Patient's Preference: general  Intra-op Monitoring Plan: standard ASA monitors  Intra-op Monitoring Plan Comments:   Post Op Pain Control Plan: per primary service following discharge from PACU and multimodal analgesia  Post Op Pain Control Plan Comments: I  Induction:   IV  Beta Blocker:  Patient is not currently on a Beta-Blocker (No further documentation required).       Informed Consent: Patient understands risks and agrees with Anesthesia plan.  Questions answered. Anesthesia consent signed with patient.  ASA Score: 3     Day of Surgery  Review of History & Physical:    H&P update referred to the provider.     Anesthesia Plan Notes: Discussed anesthetic options, pt understands and agrees with plan        Ready For Surgery From Anesthesia Perspective.

## 2019-11-01 NOTE — HPI
84 y.o. male with paroxysmal atrial fibrillation s/p cryo-PVI by Dr. 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

## 2019-11-01 NOTE — DISCHARGE INSTRUCTIONS
Discharge Instructions for Cardioversion  Your healthcare provider performed a procedure called cardioversion. Your healthcare provider used a controlled electric shock or a medicine to briefly stop all electrical activity in your heart. This helped restore your hearts normal rhythm. Here are some instructions to follow while you recover.  Home care  · Because cardioversion typically requires sedation, you won't be able to drive home. You will need a ride. Wait at least 24 hours before driving a car or operating heavy machinery after receiving sedating medicines.  · Dont be alarmed if the skin on your chest is irritated or feels like it is sunburned. Your healthcare provider may prescribe a soothing lotion to relieve this discomfort. These minor symptoms will go away in a few days.  · Ask your healthcare provider about medicines to keep your heart rhythm steady.  · If you were prescribed medicine, take it as instructed by your healthcare provider. Dont skip doses or take double doses. Cardioversion requires blood thinners for at least 4 weeks to prevent a delayed risk of stroke when treating atrial fibrillation or atrial flutter. Be sure you discuss which medicine you are taking to prevent stroke. Ask when you need to have your medicine levels checked, and whether you may be able to stop taking it in the future or whether it is recommended that you take it for life. Some of these blood-thinning medicines will have the dose adjusted, and interact with other medicines or foods. Your healthcare team will give you full instructions on what to watch out for. Report bleeding or symptoms of stroke immediately to your healthcare team and seek emergency medical attention.  · Learn to take your own pulse. Keep a record of your results. Ask your healthcare provider when you should seek emergency medical attention. He or she will tell you which pulse rate reading is dangerous.   · Keep in mind this procedure may need to be  repeated if the abnormal heart rhythm returns. After the procedure, your healthcare provider will tell you if the treatment worked or if you will need further treatments or medication.  Follow-up care  Make a follow-up appointment, or as directed by our staff.     When to call your healthcare provider  Call 911 right away if you have:  · Chest pain  · Shortness of breath  · Loss of vision, speech, or strength or coordination in any body part  Otherwise, call your healthcare provider immediately if you have:  · Fainting, dizzy, or lightheaded  · Chest pain with increased activity  · Irregular heartbeat or fast pulse  · Bleeding issues from blood-thinning medicines   Date Last Reviewed: 3/1/2017  © 2272-5289 [employer]. [address] [place] [address], [place] [address]. All rights reserved. This information is not intended as a substitute for professional medical care. Always follow your healthcare professional's instructions.

## 2019-11-01 NOTE — ANESTHESIA POSTPROCEDURE EVALUATION
Anesthesia Post Evaluation    Patient: Linda Skelton    Procedure(s) Performed: Procedure(s) (LRB):  CARDIOVERSION (N/A)    Final Anesthesia Type: general  Patient location during evaluation: PACU  Patient participation: Yes- Able to Participate  Level of consciousness: awake and alert and oriented  Post-procedure vital signs: reviewed and stable  Pain management: adequate  Airway patency: patent  PONV status at discharge: No PONV  Anesthetic complications: no      Cardiovascular status: blood pressure returned to baseline and hemodynamically stable  Respiratory status: unassisted and spontaneous ventilation  Hydration status: euvolemic  Follow-up not needed.          Vitals Value Taken Time   /70 11/1/2019 11:30 AM   Temp 36.1 °C (96.9 °F) 11/1/2019 11:30 AM   Pulse 62 11/1/2019 11:30 AM   Resp 18 11/1/2019 11:30 AM   SpO2 97 % 11/1/2019 11:30 AM         No case tracking events are documented in the log.      Pain/Dereck Score: Dereck Score: 9 (11/1/2019 10:36 AM)

## 2019-11-01 NOTE — NURSING TRANSFER
Nursing Transfer Note      11/1/2019     Transfer To: [facility]    Transfer via wheelchair    Transfer with n/a    Transported by pct    Medicines sent: n/a    Chart send with patient: Yes    Notified: n/a    Patient reassessed at: 11/01/2019 at 1119 (date, time)    Upon arrival to floor: call bell in reach and bed in lowest position

## 2019-11-01 NOTE — PLAN OF CARE
Received patient via stretcher from PACU. Patient s/p DCCV, AAOx3. VSS, resp even and unlabored. Post procedure protocol reviewed with patient. Understanding verbalized.  Nurse call bell within reach. Will continue to monitor per post procedure protocol.

## 2019-11-01 NOTE — TELEPHONE ENCOUNTER
Called & l/m for Mr. Skelton advising I was informed he is unable to make Janneth Gallardo' 11/27 apts; was calling to reschedule those apts for him.  Asked him to give us a call back & we will be happy to reschedule the apts for him.

## 2019-11-01 NOTE — INTERVAL H&P NOTE
-The patient has been examined and the H&P has been reviewed:    I concur with the findings and no changes have occurred since H&P was written.   Patient presented to short stay cardiac unit on 11/1/19  for planned cardioversion with MD Patrick Reyes , patient denies any bleeding, chest pains, rash , fevers, chills, or any other acute symptoms.   INR today 2.7 , on coumadin On coumadin with weekly INRs in range. Will defer EUGENE.  EKG in AF      Ref Range & Units 07:15  (11/1/19) 2d ago  (10/30/19) 9d ago  (10/23/19) 2wk ago  (10/16/19) 3wk ago  (10/9/19) 1mo ago  (9/30/19) 1mo ago  (9/25/19)   Prothrombin Time 9.0 - 12.5 sec 25.8High   21.9High   25.4High   26.6High   23.6High   24.5High   36.5High     INR 0.8 - 1.2 2.7High   2.3High  CM 2.6High  CM 2.7High  CM 2.4High  CM 2.5High  CM 3.7High  CM   Comment: Coumadin Therapy:   2.0 - 3.0 for INR for all indicators except mechanical heart valves          PE:   General: Well developed, well nourished, No distress on room air   HEENT: Head is normocephalic, atraumatic;  Lungs: Clear to auscultation bilaterally.  No wheezing. Normal respiratory effort.  Cardiovascular: Irregularly irregular  rhythm and normal heart sounds.    PMI is not displaced  Extremities: No LE edema. Pulses 2+ and symmetric.   Abdomen: Abdomen is soft, non-tender non-distended with normal bowel sounds.  Skin: Skin color, texture, turgor normal. No rashes.  Musculoskeletal: normal range of motion   Neurologic: Normal strength and tone. No focal numbness or weakness.   Psychiatric: normal mood and affect.  behavior is normal. Alert and oriented X 3.  Labs reviewed       - planned for DCCV  - No EUGENE given therapeutic INRs, INR today 2.7  - Anesthesia for sedation     Prior to procedure, extensive discussion with patient regarding risks and benefits of DCCV, and patient would like to proceed.  The patient voices understanding and all questions have been answered. No further questions/concerns voiced at  this time.              RENEA Hinds-BC  Cardiology Electrophysiology  NP   [facility]    STAFF MD Patrick Reyes         Anesthesia/Surgery risks, benefits and alternative options discussed and understood by patient/family.          Active Hospital Problems    Diagnosis  POA    Paroxysmal atrial fibrillation [I48.0]  Yes      Resolved Hospital Problems   No resolved problems to display.

## 2019-11-01 NOTE — DISCHARGE SUMMARY
[facility]  Cardiac Electrophysiology  Discharge Summary      Patient Name: Linda Skelton  MRN: [id number]  Admission Date: 2019  Hospital Length of Stay: 0 days  Discharge Date and Time:  2019 1:08 PM  Attending Physician: MD Eric Nguyen   Discharging Provider: Jeannie Dean NP  Primary Care Physician: Anderson Jerome MD    HPI: Mr. Skelton is a 84 year old male with PMH  paroxysmal atrial fibrillation s/p cryo-PVI by Dr. 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    On 2019: underwent successful redo-pulmonary vein RF ablation and left atrial posterior wall isolation for complex CAFEs.     On last EP clinic visit, 10/29/19 > patient found to be back in AF, during blanking period. Pt was symptomatic.    Medical options are limited.  No increase in sotalol due to CKD. Most effective medication for him would be amiodarone. He is wary of this because his wife  of lung disease (not r/t amiodarone).   Patient elected to proceed for planned cardioversion. Pt remains on sotalol 80 mg BID, and coumadin.         Patient presented to short stay cardiac unit on 19  for planned cardioversion  , patient denies any bleeding, chest pains, rash , fevers, chills, or any other acute symptoms.  Patient is on coumadin patient has had therapeutic INRs > 1month,  denies any bleeding episodes.    EUGENE deferred         Procedure(s) (LRB):  CARDIOVERSION (N/A)       Hospital Course:  INR was 2.7 hence proceeded to DCCV which converted patient from AF to sinus rhythm ( see procedure note for details).   Patient tolerated procedure with no immediate complications . Patient states he feels well   Post procedure  EKG showed Sinus Bradycardia at 55 BPM   Patient to resume coumadin, close follow up with coumadin clinic.   Pt to follow up with EKG in 1 week,   and 4 weeks with MD Neel Topete     Discharge plans/instructions discussed with patient and her spouse Mr Wheeler    who verbalized understanding  and agreement of plans of care. No further questions or concerns  voiced at this time.         Consults:  Anesthesia         Final Active Diagnoses:    Diagnosis Date Noted POA    PRINCIPAL PROBLEM:  Paroxysmal atrial fibrillation [I48.0] 11/01/2019 Yes      Problems Resolved During this Admission:         Discharged Condition: good    Disposition: Home or Self Care    Follow Up:  Follow-up Information     Prakash aMry - Coumadin. Call today.    Specialty:  Cardiology  Contact information:  [facility]  [facility] [id number] [phone]  Additional information:  3rd floor           Neel Topete MD In 4 weeks.    Specialties:  Electrophysiology, Cardiology  Why:  EKG in 1 week   Contact information:  [address]  [facility] [address] [phone]                 Patient Instructions:      Diet Cardiac     No driving until:   Order Comments: For 24 - 28 hours after the procedure     Other restrictions (specify):   Order Comments: Diet  -You may resume oral intake after you are discharged, as long you have no swallowing difficulties.     Side effects from your sedation:  -You may be drowsy for the remainder of the day.     Because you have received sedation for this procedure:  -Limit activity for the remainder of the day.  -Do not drive or operate any equipment for  24 hours   -Do not smoke for at least 6 hours and until you are fully awake and alert.  -Do not drink alcoholic beverage for 24 hours.  -Defer important decision making until the following day.     Go to the Emergency Department if you develop:  -Bleeding  -Weakness or numbness  -Visual, gait or speech disturbance  -New chest pain, palpitations, shortness of breath, rapid heart beat, or fainting  -Fever     Follow up:  -EKG in 1 week     Notify your health care provider if you experience  any of the following:  temperature >100.4     Notify your health care provider if you experience any of the following:  persistent nausea and vomiting or diarrhea     Notify your health care provider if you experience any of the following:  severe uncontrolled pain     Notify your health care provider if you experience any of the following:  redness, tenderness, or signs of infection (pain, swelling, redness, odor or green/yellow discharge around incision site)     Notify your health care provider if you experience any of the following:  difficulty breathing or increased cough     Notify your health care provider if you experience any of the following:  severe persistent headache     Notify your health care provider if you experience any of the following:  worsening rash     Notify your health care provider if you experience any of the following:  persistent dizziness, light-headedness, or visual disturbances     Notify your health care provider if you experience any of the following:  increased confusion or weakness     Notify your health care provider if you experience any of the following:   Order Comments: For any concerning medical symptoms     Medications:  Reconciled Home Medications:      Medication List      CHANGE how you take these medications    warfarin 5 MG tablet  Commonly known as:  COUMADIN  TAKE ONE & ONE-HALF TABLETS BY MOUTH ONCE DAILY EXCEPT  ON  TUESDAYS  TAKE  1  TABLET ON TUESDAY  What changed:  See the new instructions.        CONTINUE taking these medications    aspirin 81 mg Tab  Take 81 mg by mouth every evening. 1 Tablet Oral Every night     ciclopirox 8 % Soln  Commonly known as:  PENLAC  Apply daily to affected nail. Must remove and restart weekly     folic acid 1 MG tablet  Commonly known as:  FOLVITE  TAKE 1 TABLET BY MOUTH ONCE DAILY     ketoconazole 2 % cream  Commonly known as:  NIZORAL  AAA bid to feet x 3 weeks     levothyroxine 112 MCG tablet  Commonly known as:   SYNTHROID  TAKE 1 TABLET BY MOUTH ONCE DAILY BEFORE  BREAKFAST     mometasone 0.1 % ointment  Commonly known as:  ELOCON  aaa qd- bid for severe areas.  Avoid use on face and groin     NITROSTAT 0.4 MG SL tablet  Generic drug:  nitroGLYCERIN  DISSOLVE ONE TABLET UNDER THE TONGUE EVERY 5 MINUTES AS NEEDED FOR CHEST PAIN.  DO NOT EXCEED A TOTAL OF 3 DOSES IN 15 MINUTES NOW     omeprazole 20 MG capsule  Commonly known as:  PRILOSEC  Take 20 mg by mouth every other day.     pantoprazole 40 MG tablet  Commonly known as:  PROTONIX  Take 1 tablet (40 mg total) by mouth once daily.     predniSONE 5 MG tablet  Commonly known as:  DELTASONE  Take 5 mg by mouth once daily.     simvastatin 80 MG tablet  Commonly known as:  ZOCOR  Take 1 tablet (80 mg total) by mouth every evening.     sotalol 80 MG tablet  Commonly known as:  BETAPACE  Take 1 tablet (80 mg total) by mouth 2 (two) times daily.     tamsulosin 0.4 mg Cap  Commonly known as:  FLOMAX  TAKE 1 CAPSULE BY MOUTH ONCE DAILY     triamcinolone acetonide 0.1% 0.1 % cream  Commonly known as:  KENALOG            Time spent on the discharge of patient: 30  minutes    Jeannie Dean NP  Cardiac Electrophysiology  [facility]    STAFF MD Eric Nguyen

## 2019-11-01 NOTE — TELEPHONE ENCOUNTER
----- Message from Jeannie Dean NP sent at 11/1/2019  1:05 PM CDT -----  Patient has a follow up with BRIANNA Gallardo on 11/27, please call patient to reschedule, pt states he is  not available for that date     Best   Jeannie

## 2019-11-04 ENCOUNTER — PATIENT MESSAGE (OUTPATIENT)
Dept: ELECTROPHYSIOLOGY | Facility: CLINIC | Age: 84
End: 2019-11-04

## 2019-11-06 ENCOUNTER — ANTI-COAG VISIT (OUTPATIENT)
Dept: CARDIOLOGY | Facility: CLINIC | Age: 84
End: 2019-11-06
Payer: MEDICARE

## 2019-11-06 ENCOUNTER — TELEPHONE (OUTPATIENT)
Dept: FAMILY MEDICINE | Facility: CLINIC | Age: 84
End: 2019-11-06

## 2019-11-06 ENCOUNTER — LAB VISIT (OUTPATIENT)
Dept: LAB | Facility: HOSPITAL | Age: 84
End: 2019-11-06
Attending: INTERNAL MEDICINE
Payer: MEDICARE

## 2019-11-06 ENCOUNTER — CLINICAL SUPPORT (OUTPATIENT)
Dept: FAMILY MEDICINE | Facility: CLINIC | Age: 84
End: 2019-11-06
Payer: MEDICARE

## 2019-11-06 DIAGNOSIS — I48.0 PAF (PAROXYSMAL ATRIAL FIBRILLATION): ICD-10-CM

## 2019-11-06 DIAGNOSIS — E29.1 HYPOGONADISM IN MALE: Primary | ICD-10-CM

## 2019-11-06 DIAGNOSIS — I48.19 PERSISTENT ATRIAL FIBRILLATION: ICD-10-CM

## 2019-11-06 LAB
INR PPP: 2.8 (ref 0.8–1.2)
PROTHROMBIN TIME: 27.3 SEC (ref 9–12.5)

## 2019-11-06 PROCEDURE — 36415 COLL VENOUS BLD VENIPUNCTURE: CPT | Mod: HCNC,PO

## 2019-11-06 PROCEDURE — 96372 THER/PROPH/DIAG INJ SC/IM: CPT | Mod: HCNC,S$GLB,, | Performed by: INTERNAL MEDICINE

## 2019-11-06 PROCEDURE — 85610 PROTHROMBIN TIME: CPT | Mod: HCNC

## 2019-11-06 PROCEDURE — 93793 ANTICOAG MGMT PT WARFARIN: CPT | Mod: S$GLB,,,

## 2019-11-06 PROCEDURE — 96372 PR INJECTION,THERAP/PROPH/DIAG2ST, IM OR SUBCUT: ICD-10-PCS | Mod: HCNC,S$GLB,, | Performed by: INTERNAL MEDICINE

## 2019-11-06 PROCEDURE — 93793 PR ANTICOAGULANT MGMT FOR PT TAKING WARFARIN: ICD-10-PCS | Mod: S$GLB,,,

## 2019-11-06 RX ORDER — TESTOSTERONE CYPIONATE 1000 MG/10ML
100 INJECTION, SOLUTION INTRAMUSCULAR
Status: DISCONTINUED | OUTPATIENT
Start: 2019-11-20 | End: 2019-11-20

## 2019-11-06 RX ADMIN — TESTOSTERONE CYPIONATE 100 MG: 200 INJECTION, SOLUTION INTRAMUSCULAR at 08:11

## 2019-11-06 NOTE — TELEPHONE ENCOUNTER
Pt receives testosterone injections 100mg Q14 days; received last scheduled dose today, needs new orders entered to start 11/20/19 if appropriate

## 2019-11-06 NOTE — PROGRESS NOTES
Pt tolerated depo-testosterone injection to left ventrogluteal without difficulty; no adverse reaction noted

## 2019-11-06 NOTE — TELEPHONE ENCOUNTER
I re-ordered it.  Last testosterone check was 4/2019. So he is due.  Sorry for the inconvenience, I know he just had labs - can he repeat testosterone in 1 week? Not today

## 2019-11-08 ENCOUNTER — TELEPHONE (OUTPATIENT)
Dept: ELECTROPHYSIOLOGY | Facility: CLINIC | Age: 84
End: 2019-11-08

## 2019-11-08 ENCOUNTER — PATIENT MESSAGE (OUTPATIENT)
Dept: ELECTROPHYSIOLOGY | Facility: CLINIC | Age: 84
End: 2019-11-08

## 2019-11-08 ENCOUNTER — HOSPITAL ENCOUNTER (OUTPATIENT)
Dept: CARDIOLOGY | Facility: CLINIC | Age: 84
Discharge: HOME | End: 2019-11-08
Payer: MEDICARE

## 2019-11-08 DIAGNOSIS — I48.91 ATRIAL FIBRILLATION, UNSPECIFIED TYPE: ICD-10-CM

## 2019-11-08 PROCEDURE — 93005 ELECTROCARDIOGRAM TRACING: CPT | Mod: HCNC,S$GLB,, | Performed by: INTERNAL MEDICINE

## 2019-11-08 PROCEDURE — 93005 RHYTHM STRIP: ICD-10-PCS | Mod: HCNC,S$GLB,, | Performed by: INTERNAL MEDICINE

## 2019-11-08 PROCEDURE — 93010 ELECTROCARDIOGRAM REPORT: CPT | Mod: HCNC,S$GLB,, | Performed by: INTERNAL MEDICINE

## 2019-11-08 PROCEDURE — 93010 RHYTHM STRIP: ICD-10-PCS | Mod: HCNC,S$GLB,, | Performed by: INTERNAL MEDICINE

## 2019-11-11 ENCOUNTER — TELEPHONE (OUTPATIENT)
Dept: ELECTROPHYSIOLOGY | Facility: CLINIC | Age: 84
End: 2019-11-11

## 2019-11-11 DIAGNOSIS — I48.19 PERSISTENT ATRIAL FIBRILLATION: Primary | ICD-10-CM

## 2019-11-11 DIAGNOSIS — Z01.89 ENCOUNTER FOR OTHER SPECIFIED SPECIAL EXAMINATIONS: ICD-10-CM

## 2019-11-11 NOTE — TELEPHONE ENCOUNTER
----- Message from Neel Topete MD sent at 11/11/2019  2:55 PM CST -----  Please notify the patient that he is back in atrial fibrillation. As we discussed in his clinic visit with Janneth recently, he was going to consider changing from sotalol to amiodarone should this DCCV fail. I recommend stopping sotalol and then 5 days later starting amiodarone at 200mg bid x 4 weeks then changing to 200mg daily. After 3 weeks of amiodarone we can perform a DCCV, no EUGENE needed as long as he has weekly INRs >2.

## 2019-11-11 NOTE — PROGRESS NOTES
Please notify the patient that he is back in atrial fibrillation. As we discussed in his clinic visit with Janneth recently, he was going to consider changing from sotalol to amiodarone should this DCCV fail. I recommend stopping sotalol and then 5 days later starting amiodarone at 200mg bid x 4 weeks then changing to 200mg daily. After 3 weeks of amiodarone we can perform a DCCV, no EUGENE needed as long as he has weekly INRs >2.

## 2019-11-12 NOTE — TELEPHONE ENCOUNTER
Spoke to Mr. Caldwellil.  Reviewed Dr. Topete recommendations including:    -Will stop Sotalol as of today (11/11/19)  -Will begin Amiodarone on 11/17/19 taking 200 mg BID x 1 month  -On 12/18/19 will decrease Amiodarone to 200 mg daily  -EUGENE (Cx if weekly INRs > 2) DCCV will be on 12/10/19 with 0800 arrival time  -Pre-procedure labs to be drawn on 12/4 at [place] lab  -One week post DCCV EKG to be done on 12/17 10 am on 3rd Floor  -Full set of instructions to be sent upon completion     Maria Teresa Linda verbalizes full understanding of above and will call our office with any questions or concerns.

## 2019-11-13 ENCOUNTER — ANTI-COAG VISIT (OUTPATIENT)
Dept: CARDIOLOGY | Facility: CLINIC | Age: 84
End: 2019-11-13
Payer: MEDICARE

## 2019-11-13 ENCOUNTER — LAB VISIT (OUTPATIENT)
Dept: LAB | Facility: HOSPITAL | Age: 84
End: 2019-11-13
Attending: INTERNAL MEDICINE
Payer: MEDICARE

## 2019-11-13 DIAGNOSIS — I48.19 PERSISTENT ATRIAL FIBRILLATION: ICD-10-CM

## 2019-11-13 DIAGNOSIS — I48.0 PAF (PAROXYSMAL ATRIAL FIBRILLATION): ICD-10-CM

## 2019-11-13 LAB
INR PPP: 3.1 (ref 0.8–1.2)
PROTHROMBIN TIME: 30.4 SEC (ref 9–12.5)

## 2019-11-13 PROCEDURE — 36415 COLL VENOUS BLD VENIPUNCTURE: CPT | Mod: HCNC,PO

## 2019-11-13 PROCEDURE — 93793 PR ANTICOAGULANT MGMT FOR PT TAKING WARFARIN: ICD-10-PCS | Mod: S$GLB,,,

## 2019-11-13 PROCEDURE — 85610 PROTHROMBIN TIME: CPT | Mod: HCNC

## 2019-11-13 PROCEDURE — 93793 ANTICOAG MGMT PT WARFARIN: CPT | Mod: S$GLB,,,

## 2019-11-13 NOTE — PROGRESS NOTES
INR a tad high but acceptable especially since he is s/p DCCV 11/1. Please check for changes when calling with instructions.

## 2019-11-20 ENCOUNTER — CLINICAL SUPPORT (OUTPATIENT)
Dept: FAMILY MEDICINE | Facility: CLINIC | Age: 84
End: 2019-11-20
Payer: MEDICARE

## 2019-11-20 ENCOUNTER — LAB VISIT (OUTPATIENT)
Dept: LAB | Facility: HOSPITAL | Age: 84
End: 2019-11-20
Attending: INTERNAL MEDICINE
Payer: MEDICARE

## 2019-11-20 ENCOUNTER — ANTI-COAG VISIT (OUTPATIENT)
Dept: CARDIOLOGY | Facility: CLINIC | Age: 84
End: 2019-11-20
Payer: MEDICARE

## 2019-11-20 DIAGNOSIS — I48.19 PERSISTENT ATRIAL FIBRILLATION: ICD-10-CM

## 2019-11-20 DIAGNOSIS — E29.1 HYPOGONADISM IN MALE: Primary | ICD-10-CM

## 2019-11-20 DIAGNOSIS — I48.0 PAF (PAROXYSMAL ATRIAL FIBRILLATION): ICD-10-CM

## 2019-11-20 LAB
INR PPP: 3.8 (ref 0.8–1.2)
PROTHROMBIN TIME: 37.3 SEC (ref 9–12.5)

## 2019-11-20 PROCEDURE — 99999 PR PBB SHADOW E&M-EST. PATIENT-LVL I: CPT | Mod: PBBFAC,HCNC,,

## 2019-11-20 PROCEDURE — 99999 PR PBB SHADOW E&M-EST. PATIENT-LVL I: ICD-10-PCS | Mod: PBBFAC,HCNC,,

## 2019-11-20 PROCEDURE — 93793 PR ANTICOAGULANT MGMT FOR PT TAKING WARFARIN: ICD-10-PCS | Mod: S$GLB,,,

## 2019-11-20 PROCEDURE — 93793 ANTICOAG MGMT PT WARFARIN: CPT | Mod: S$GLB,,,

## 2019-11-20 PROCEDURE — 96372 THER/PROPH/DIAG INJ SC/IM: CPT | Mod: HCNC,S$GLB,, | Performed by: INTERNAL MEDICINE

## 2019-11-20 PROCEDURE — 36415 COLL VENOUS BLD VENIPUNCTURE: CPT | Mod: HCNC,PO

## 2019-11-20 PROCEDURE — 96372 PR INJECTION,THERAP/PROPH/DIAG2ST, IM OR SUBCUT: ICD-10-PCS | Mod: HCNC,S$GLB,, | Performed by: INTERNAL MEDICINE

## 2019-11-20 PROCEDURE — 85610 PROTHROMBIN TIME: CPT | Mod: HCNC

## 2019-11-20 RX ORDER — TESTOSTERONE CYPIONATE 200 MG/ML
100 INJECTION, SOLUTION INTRAMUSCULAR
Status: COMPLETED | OUTPATIENT
Start: 2019-11-20 | End: 2020-01-29

## 2019-11-20 RX ADMIN — TESTOSTERONE CYPIONATE 100 MG: 200 INJECTION, SOLUTION INTRAMUSCULAR at 10:11

## 2019-11-20 NOTE — PROGRESS NOTES
Administered Depo Testosterone 100 mg IM to right upper outer quad gluteus.  Patient tolerated injection well, no adverse reactions noted.

## 2019-11-21 NOTE — PROGRESS NOTES
Per pt started amiodarone on 11/17.   Medlist does not reflect this but found telephone note from 11/11:   -Will stop Sotalol as of today (11/11/19)  -Will begin Amiodarone on 11/17/19 taking 200 mg BID x 1 month  -On 12/18/19 will decrease Amiodarone to 200 mg daily  -EUGENE (Cx if weekly INRs > 2) DCCV will be on 12/10/19 with 0800 arrival time  -Pre-procedure labs to be drawn on 12/4 at [facility] lab  -One week post DCCV EKG to be done on 12/17 10 am on 3rd Floor    When INR from 11/13 was addressed, none of this was mentioned, and he should have been asked for any changes at that time per chart notes. We will decrease dose for DDI but cautiously due to recent DCCV and pending DCCV.

## 2019-11-26 ENCOUNTER — ANTI-COAG VISIT (OUTPATIENT)
Dept: CARDIOLOGY | Facility: CLINIC | Age: 84
End: 2019-11-26
Payer: MEDICARE

## 2019-11-26 ENCOUNTER — LAB VISIT (OUTPATIENT)
Dept: LAB | Facility: HOSPITAL | Age: 84
End: 2019-11-26
Attending: INTERNAL MEDICINE
Payer: MEDICARE

## 2019-11-26 DIAGNOSIS — I48.0 PAF (PAROXYSMAL ATRIAL FIBRILLATION): ICD-10-CM

## 2019-11-26 DIAGNOSIS — I48.19 PERSISTENT ATRIAL FIBRILLATION: ICD-10-CM

## 2019-11-26 LAB
INR PPP: 4.3 (ref 0.8–1.2)
PROTHROMBIN TIME: 42.5 SEC (ref 9–12.5)

## 2019-11-26 PROCEDURE — 85610 PROTHROMBIN TIME: CPT | Mod: HCNC

## 2019-11-26 PROCEDURE — 93793 ANTICOAG MGMT PT WARFARIN: CPT | Mod: S$GLB,,,

## 2019-11-26 PROCEDURE — 93793 PR ANTICOAGULANT MGMT FOR PT TAKING WARFARIN: ICD-10-PCS | Mod: S$GLB,,,

## 2019-11-26 PROCEDURE — 36415 COLL VENOUS BLD VENIPUNCTURE: CPT | Mod: HCNC,PO

## 2019-11-27 ENCOUNTER — PATIENT MESSAGE (OUTPATIENT)
Dept: MEDSURG UNIT | Facility: HOSPITAL | Age: 84
End: 2019-11-27

## 2019-12-04 ENCOUNTER — LAB VISIT (OUTPATIENT)
Dept: LAB | Facility: HOSPITAL | Age: 84
End: 2019-12-04
Attending: INTERNAL MEDICINE
Payer: MEDICARE

## 2019-12-04 ENCOUNTER — CLINICAL SUPPORT (OUTPATIENT)
Dept: FAMILY MEDICINE | Facility: CLINIC | Age: 84
End: 2019-12-04
Payer: MEDICARE

## 2019-12-04 ENCOUNTER — PATIENT MESSAGE (OUTPATIENT)
Dept: FAMILY MEDICINE | Facility: CLINIC | Age: 84
End: 2019-12-04

## 2019-12-04 ENCOUNTER — ANTI-COAG VISIT (OUTPATIENT)
Dept: CARDIOLOGY | Facility: CLINIC | Age: 84
End: 2019-12-04
Payer: MEDICARE

## 2019-12-04 DIAGNOSIS — E29.1 HYPOGONADISM IN MALE: Primary | ICD-10-CM

## 2019-12-04 DIAGNOSIS — I48.19 PERSISTENT ATRIAL FIBRILLATION: ICD-10-CM

## 2019-12-04 DIAGNOSIS — I48.0 PAF (PAROXYSMAL ATRIAL FIBRILLATION): ICD-10-CM

## 2019-12-04 LAB
ANION GAP SERPL CALC-SCNC: 7 MMOL/L (ref 8–16)
APTT BLDCRRT: 36.8 SEC (ref 21–32)
BASOPHILS # BLD AUTO: 0.01 K/UL (ref 0–0.2)
BASOPHILS NFR BLD: 0.2 % (ref 0–1.9)
BUN SERPL-MCNC: 21 MG/DL (ref 8–23)
CALCIUM SERPL-MCNC: 9.5 MG/DL (ref 8.7–10.5)
CHLORIDE SERPL-SCNC: 102 MMOL/L (ref 95–110)
CO2 SERPL-SCNC: 30 MMOL/L (ref 23–29)
CREAT SERPL-MCNC: 1.6 MG/DL (ref 0.5–1.4)
DIFFERENTIAL METHOD: ABNORMAL
EOSINOPHIL # BLD AUTO: 0.1 K/UL (ref 0–0.5)
EOSINOPHIL NFR BLD: 1.4 % (ref 0–8)
ERYTHROCYTE [DISTWIDTH] IN BLOOD BY AUTOMATED COUNT: 14.9 % (ref 11.5–14.5)
EST. GFR  (AFRICAN AMERICAN): 45.1 ML/MIN/1.73 M^2
EST. GFR  (NON AFRICAN AMERICAN): 39 ML/MIN/1.73 M^2
GLUCOSE SERPL-MCNC: 99 MG/DL (ref 70–110)
HCT VFR BLD AUTO: 47.8 % (ref 40–54)
HGB BLD-MCNC: 14.8 G/DL (ref 14–18)
IMM GRANULOCYTES # BLD AUTO: 0.02 K/UL (ref 0–0.04)
IMM GRANULOCYTES NFR BLD AUTO: 0.4 % (ref 0–0.5)
INR PPP: 3.1 (ref 0.8–1.2)
INR PPP: 3.1 (ref 0.8–1.2)
LYMPHOCYTES # BLD AUTO: 1 K/UL (ref 1–4.8)
LYMPHOCYTES NFR BLD: 20.5 % (ref 18–48)
MCH RBC QN AUTO: 31.9 PG (ref 27–31)
MCHC RBC AUTO-ENTMCNC: 31 G/DL (ref 32–36)
MCV RBC AUTO: 103 FL (ref 82–98)
MONOCYTES # BLD AUTO: 1 K/UL (ref 0.3–1)
MONOCYTES NFR BLD: 20.1 % (ref 4–15)
NEUTROPHILS # BLD AUTO: 2.8 K/UL (ref 1.8–7.7)
NEUTROPHILS NFR BLD: 57.4 % (ref 38–73)
NRBC BLD-RTO: 0 /100 WBC
PLATELET # BLD AUTO: 118 K/UL (ref 150–350)
PMV BLD AUTO: 11.4 FL (ref 9.2–12.9)
POTASSIUM SERPL-SCNC: 4.5 MMOL/L (ref 3.5–5.1)
PROTHROMBIN TIME: 30.2 SEC (ref 9–12.5)
PROTHROMBIN TIME: 30.2 SEC (ref 9–12.5)
RBC # BLD AUTO: 4.64 M/UL (ref 4.6–6.2)
SODIUM SERPL-SCNC: 139 MMOL/L (ref 136–145)
WBC # BLD AUTO: 4.88 K/UL (ref 3.9–12.7)

## 2019-12-04 PROCEDURE — 85730 THROMBOPLASTIN TIME PARTIAL: CPT | Mod: HCNC

## 2019-12-04 PROCEDURE — 80048 BASIC METABOLIC PNL TOTAL CA: CPT | Mod: HCNC

## 2019-12-04 PROCEDURE — 96372 THER/PROPH/DIAG INJ SC/IM: CPT | Mod: HCNC,S$GLB,, | Performed by: INTERNAL MEDICINE

## 2019-12-04 PROCEDURE — 93793 PR ANTICOAGULANT MGMT FOR PT TAKING WARFARIN: ICD-10-PCS | Mod: S$GLB,,,

## 2019-12-04 PROCEDURE — 93793 ANTICOAG MGMT PT WARFARIN: CPT | Mod: S$GLB,,,

## 2019-12-04 PROCEDURE — 96372 PR INJECTION,THERAP/PROPH/DIAG2ST, IM OR SUBCUT: ICD-10-PCS | Mod: HCNC,S$GLB,, | Performed by: INTERNAL MEDICINE

## 2019-12-04 PROCEDURE — 85610 PROTHROMBIN TIME: CPT | Mod: HCNC

## 2019-12-04 PROCEDURE — 85025 COMPLETE CBC W/AUTO DIFF WBC: CPT | Mod: HCNC

## 2019-12-04 PROCEDURE — 36415 COLL VENOUS BLD VENIPUNCTURE: CPT | Mod: HCNC,PO

## 2019-12-04 RX ADMIN — TESTOSTERONE CYPIONATE 100 MG: 200 INJECTION, SOLUTION INTRAMUSCULAR at 08:12

## 2019-12-04 NOTE — PROGRESS NOTES
Administered Depo - Testosterone 100 mg IM to left upper outer quad gluteus.  Patient tolerated injection well, no adverse reactions noted.

## 2019-12-05 ENCOUNTER — PATIENT MESSAGE (OUTPATIENT)
Dept: MEDSURG UNIT | Facility: HOSPITAL | Age: 84
End: 2019-12-05

## 2019-12-05 NOTE — PROGRESS NOTES
Patient called and was given coumadin dose change: 5mg daily and redraw date, reports he's having DCCV 12/10/19

## 2019-12-06 ENCOUNTER — TELEPHONE (OUTPATIENT)
Dept: ELECTROPHYSIOLOGY | Facility: CLINIC | Age: 84
End: 2019-12-06

## 2019-12-06 RX ORDER — AMIODARONE HYDROCHLORIDE 200 MG/1
200 TABLET ORAL DAILY
Status: ON HOLD | COMMUNITY
End: 2020-02-22 | Stop reason: HOSPADM

## 2019-12-06 NOTE — TELEPHONE ENCOUNTER
Spoke to Mr. Skelton.  Discussed Dr. Topete thoughts below.  He verbalizes understanding and is ok with waiting to see if ringing in ears resolves.  Mr. Skelton does not wish to do EKG on Monday, as he will be coming in Tuesday for his procedure anyway.  Reminded Mr. Skelton we always have a physician on call by calling our main phone number and asking to speak to the EP MD on call.  Mr. Skelton verbalizes full understanding and appreciates call.

## 2019-12-09 ENCOUNTER — TELEPHONE (OUTPATIENT)
Dept: ELECTROPHYSIOLOGY | Facility: CLINIC | Age: 84
End: 2019-12-09

## 2019-12-09 ENCOUNTER — TELEPHONE (OUTPATIENT)
Dept: FAMILY MEDICINE | Facility: CLINIC | Age: 84
End: 2019-12-09

## 2019-12-09 ENCOUNTER — PATIENT MESSAGE (OUTPATIENT)
Dept: FAMILY MEDICINE | Facility: CLINIC | Age: 84
End: 2019-12-09

## 2019-12-09 DIAGNOSIS — I48.19 PERSISTENT ATRIAL FIBRILLATION: Primary | ICD-10-CM

## 2019-12-09 RX ORDER — CYCLOBENZAPRINE HCL 10 MG
10 TABLET ORAL 3 TIMES DAILY PRN
Qty: 15 TABLET | Refills: 0 | Status: SHIPPED | OUTPATIENT
Start: 2019-12-09 | End: 2019-12-20

## 2019-12-09 NOTE — TELEPHONE ENCOUNTER
----- Message from Missy Amin sent at 12/9/2019  3:40 PM CST -----  Contact: self  Type: Patient Call Back    Who called:self    What is the request in detail:pt need to speak to Dr Jerome or Nurse    Can the clinic reply by [facility]?no    Would the patient rather a call back or a response via [facility]? call    Best call back number:

## 2019-12-09 NOTE — TELEPHONE ENCOUNTER
Spoke to Mr. Skelton     CONFIRMED procedure arrival time of 0800    Reiterated instructions including:  -Directions to check in desk  -NPO after midnight night prior to procedure  -Continue coumadin to maintain INR 2-3  -Pre-procedure LABS reviewed.  Cr of 1.6 reviewed with Dr. Topete.  -Mr. Skelton complains the ringing in his ears is keeping him up at night.  He insist on proceeding as planned tomorrow.  Spoke with ENT manager, Mikhail, and appt scheduled with Dr. Florentino tomorrow afternoon (with audiogram prior).     Mr. Skelton verbalizes understanding of above and appreciates call.

## 2019-12-10 ENCOUNTER — PATIENT MESSAGE (OUTPATIENT)
Dept: FAMILY MEDICINE | Facility: CLINIC | Age: 84
End: 2019-12-10

## 2019-12-10 ENCOUNTER — CLINICAL SUPPORT (OUTPATIENT)
Dept: AUDIOLOGY | Facility: CLINIC | Age: 84
End: 2019-12-10
Payer: MEDICARE

## 2019-12-10 ENCOUNTER — HOSPITAL ENCOUNTER (OUTPATIENT)
Facility: HOSPITAL | Age: 84
Discharge: HOME | End: 2019-12-10
Attending: INTERNAL MEDICINE | Admitting: INTERNAL MEDICINE
Payer: MEDICARE

## 2019-12-10 ENCOUNTER — OFFICE VISIT (OUTPATIENT)
Dept: OTOLARYNGOLOGY | Facility: CLINIC | Age: 84
End: 2019-12-10
Payer: MEDICARE

## 2019-12-10 ENCOUNTER — ANESTHESIA EVENT (OUTPATIENT)
Dept: MEDSURG UNIT | Facility: HOSPITAL | Age: 84
End: 2019-12-10
Payer: MEDICARE

## 2019-12-10 ENCOUNTER — ANESTHESIA (OUTPATIENT)
Dept: MEDSURG UNIT | Facility: HOSPITAL | Age: 84
End: 2019-12-10
Payer: MEDICARE

## 2019-12-10 VITALS
SYSTOLIC BLOOD PRESSURE: 142 MMHG | HEIGHT: 75 IN | RESPIRATION RATE: 16 BRPM | DIASTOLIC BLOOD PRESSURE: 83 MMHG | BODY MASS INDEX: 29.84 KG/M2 | HEART RATE: 79 BPM | TEMPERATURE: 97 F | WEIGHT: 240 LBS | OXYGEN SATURATION: 98 %

## 2019-12-10 VITALS — WEIGHT: 240.06 LBS | BODY MASS INDEX: 29.85 KG/M2 | HEIGHT: 75 IN

## 2019-12-10 DIAGNOSIS — H90.3 SENSORINEURAL HEARING LOSS (SNHL) OF BOTH EARS: Primary | ICD-10-CM

## 2019-12-10 DIAGNOSIS — H90.3 BILATERAL SENSORINEURAL HEARING LOSS: Primary | ICD-10-CM

## 2019-12-10 DIAGNOSIS — H93.12 TINNITUS, LEFT: ICD-10-CM

## 2019-12-10 DIAGNOSIS — I48.91 ATRIAL FIBRILLATION: ICD-10-CM

## 2019-12-10 DIAGNOSIS — I48.19 PERSISTENT ATRIAL FIBRILLATION: Primary | ICD-10-CM

## 2019-12-10 DIAGNOSIS — H61.22 IMPACTED CERUMEN OF LEFT EAR: ICD-10-CM

## 2019-12-10 DIAGNOSIS — I48.19 PERSISTENT ATRIAL FIBRILLATION: ICD-10-CM

## 2019-12-10 LAB
APTT BLDCRRT: 36.7 SEC (ref 21–32)
INR PPP: 3.9 (ref 0.8–1.2)
PROTHROMBIN TIME: 38.6 SEC (ref 9–12.5)

## 2019-12-10 PROCEDURE — 92960 CARDIOVERSION ELECTRIC EXT: CPT | Mod: HCNC | Performed by: INTERNAL MEDICINE

## 2019-12-10 PROCEDURE — 3078F PR MOST RECENT DIASTOLIC BLOOD PRESSURE < 80 MM HG: ICD-10-PCS | Mod: HCNC,CPTII,S$GLB, | Performed by: OTOLARYNGOLOGY

## 2019-12-10 PROCEDURE — 99999 PR PBB SHADOW E&M-EST. PATIENT-LVL I: CPT | Mod: PBBFAC,HCNC,,

## 2019-12-10 PROCEDURE — 63600175 PHARM REV CODE 636 W HCPCS: Mod: HCNC | Performed by: ANESTHESIOLOGY

## 2019-12-10 PROCEDURE — G0268 EAR CERUMEN REMOVAL: ICD-10-PCS | Mod: HCNC,S$GLB,, | Performed by: OTOLARYNGOLOGY

## 2019-12-10 PROCEDURE — 3074F PR MOST RECENT SYSTOLIC BLOOD PRESSURE < 130 MM HG: ICD-10-PCS | Mod: HCNC,CPTII,S$GLB, | Performed by: OTOLARYNGOLOGY

## 2019-12-10 PROCEDURE — 92557 PR COMPREHENSIVE HEARING TEST: ICD-10-PCS | Mod: HCNC,S$GLB,, | Performed by: AUDIOLOGIST

## 2019-12-10 PROCEDURE — 99999 PR PBB SHADOW E&M-EST. PATIENT-LVL III: ICD-10-PCS | Mod: PBBFAC,HCNC,, | Performed by: OTOLARYNGOLOGY

## 2019-12-10 PROCEDURE — 99999 PR PBB SHADOW E&M-EST. PATIENT-LVL I: ICD-10-PCS | Mod: PBBFAC,HCNC,,

## 2019-12-10 PROCEDURE — 92567 PR TYMPA2METRY: ICD-10-PCS | Mod: HCNC,S$GLB,, | Performed by: AUDIOLOGIST

## 2019-12-10 PROCEDURE — D9220A PRA ANESTHESIA: ICD-10-PCS | Mod: HCNC,,, | Performed by: ANESTHESIOLOGY

## 2019-12-10 PROCEDURE — 99999 PR PBB SHADOW E&M-EST. PATIENT-LVL III: CPT | Mod: PBBFAC,HCNC,, | Performed by: OTOLARYNGOLOGY

## 2019-12-10 PROCEDURE — G0268 REMOVAL OF IMPACTED WAX MD: HCPCS | Mod: HCNC,S$GLB,, | Performed by: OTOLARYNGOLOGY

## 2019-12-10 PROCEDURE — D9220A PRA ANESTHESIA: Mod: HCNC,,, | Performed by: ANESTHESIOLOGY

## 2019-12-10 PROCEDURE — 85610 PROTHROMBIN TIME: CPT | Mod: HCNC

## 2019-12-10 PROCEDURE — 93005 ELECTROCARDIOGRAM TRACING: CPT | Mod: HCNC

## 2019-12-10 PROCEDURE — 92960 PR CARDIOVERSION, ELECTIVE;EXTERN: ICD-10-PCS | Mod: HCNC,,, | Performed by: INTERNAL MEDICINE

## 2019-12-10 PROCEDURE — 1126F PR PAIN SEVERITY QUANTIFIED, NO PAIN PRESENT: ICD-10-PCS | Mod: HCNC,S$GLB,, | Performed by: OTOLARYNGOLOGY

## 2019-12-10 PROCEDURE — 93005 ELECTROCARDIOGRAM TRACING: CPT | Mod: HCNC,59

## 2019-12-10 PROCEDURE — 37000008 HC ANESTHESIA 1ST 15 MINUTES: Mod: HCNC | Performed by: INTERNAL MEDICINE

## 2019-12-10 PROCEDURE — 92567 TYMPANOMETRY: CPT | Mod: HCNC,S$GLB,, | Performed by: AUDIOLOGIST

## 2019-12-10 PROCEDURE — 3074F SYST BP LT 130 MM HG: CPT | Mod: HCNC,CPTII,S$GLB, | Performed by: OTOLARYNGOLOGY

## 2019-12-10 PROCEDURE — 99203 PR OFFICE/OUTPT VISIT, NEW, LEVL III, 30-44 MIN: ICD-10-PCS | Mod: 25,HCNC,S$GLB, | Performed by: OTOLARYNGOLOGY

## 2019-12-10 PROCEDURE — 92960 CARDIOVERSION ELECTRIC EXT: CPT | Mod: HCNC,,, | Performed by: INTERNAL MEDICINE

## 2019-12-10 PROCEDURE — 1159F MED LIST DOCD IN RCRD: CPT | Mod: HCNC,S$GLB,, | Performed by: OTOLARYNGOLOGY

## 2019-12-10 PROCEDURE — 99203 OFFICE O/P NEW LOW 30 MIN: CPT | Mod: 25,HCNC,S$GLB, | Performed by: OTOLARYNGOLOGY

## 2019-12-10 PROCEDURE — 85730 THROMBOPLASTIN TIME PARTIAL: CPT | Mod: HCNC

## 2019-12-10 PROCEDURE — 3078F DIAST BP <80 MM HG: CPT | Mod: HCNC,CPTII,S$GLB, | Performed by: OTOLARYNGOLOGY

## 2019-12-10 PROCEDURE — 1159F PR MEDICATION LIST DOCUMENTED IN MEDICAL RECORD: ICD-10-PCS | Mod: HCNC,S$GLB,, | Performed by: OTOLARYNGOLOGY

## 2019-12-10 PROCEDURE — 1101F PT FALLS ASSESS-DOCD LE1/YR: CPT | Mod: HCNC,CPTII,S$GLB, | Performed by: OTOLARYNGOLOGY

## 2019-12-10 PROCEDURE — 92557 COMPREHENSIVE HEARING TEST: CPT | Mod: HCNC,S$GLB,, | Performed by: AUDIOLOGIST

## 2019-12-10 PROCEDURE — 93010 EKG 12-LEAD: ICD-10-PCS | Mod: 76,HCNC,, | Performed by: INTERNAL MEDICINE

## 2019-12-10 PROCEDURE — 1126F AMNT PAIN NOTED NONE PRSNT: CPT | Mod: HCNC,S$GLB,, | Performed by: OTOLARYNGOLOGY

## 2019-12-10 PROCEDURE — 37000009 HC ANESTHESIA EA ADD 15 MINS: Mod: HCNC | Performed by: INTERNAL MEDICINE

## 2019-12-10 PROCEDURE — 93010 ELECTROCARDIOGRAM REPORT: CPT | Mod: HCNC,,, | Performed by: INTERNAL MEDICINE

## 2019-12-10 PROCEDURE — 25000003 PHARM REV CODE 250: Mod: HCNC | Performed by: INTERNAL MEDICINE

## 2019-12-10 PROCEDURE — 1101F PR PT FALLS ASSESS DOC 0-1 FALLS W/OUT INJ PAST YR: ICD-10-PCS | Mod: HCNC,CPTII,S$GLB, | Performed by: OTOLARYNGOLOGY

## 2019-12-10 RX ORDER — ONDANSETRON 2 MG/ML
4 INJECTION INTRAMUSCULAR; INTRAVENOUS DAILY PRN
Status: DISCONTINUED | OUTPATIENT
Start: 2019-12-10 | End: 2019-12-10 | Stop reason: HOSPADM

## 2019-12-10 RX ORDER — SODIUM CHLORIDE 9 MG/ML
INJECTION, SOLUTION INTRAVENOUS CONTINUOUS
Status: DISCONTINUED | OUTPATIENT
Start: 2019-12-10 | End: 2019-12-10 | Stop reason: HOSPADM

## 2019-12-10 RX ORDER — PROPOFOL 10 MG/ML
VIAL (ML) INTRAVENOUS
Status: DISCONTINUED | OUTPATIENT
Start: 2019-12-10 | End: 2019-12-10

## 2019-12-10 RX ORDER — AMIODARONE HYDROCHLORIDE 200 MG/1
200 TABLET ORAL DAILY
Qty: 30 TABLET | Refills: 6 | Status: ON HOLD | OUTPATIENT
Start: 2019-12-10 | End: 2020-02-22 | Stop reason: SDUPTHER

## 2019-12-10 RX ORDER — SODIUM CHLORIDE 0.9 % (FLUSH) 0.9 %
3 SYRINGE (ML) INJECTION
Status: DISCONTINUED | OUTPATIENT
Start: 2019-12-10 | End: 2019-12-10 | Stop reason: HOSPADM

## 2019-12-10 RX ORDER — LIDOCAINE HCL/PF 100 MG/5ML
SYRINGE (ML) INTRAVENOUS
Status: DISCONTINUED | OUTPATIENT
Start: 2019-12-10 | End: 2019-12-10

## 2019-12-10 RX ORDER — HYDROMORPHONE HYDROCHLORIDE 1 MG/ML
0.2 INJECTION, SOLUTION INTRAMUSCULAR; INTRAVENOUS; SUBCUTANEOUS EVERY 5 MIN PRN
Status: DISCONTINUED | OUTPATIENT
Start: 2019-12-10 | End: 2019-12-10 | Stop reason: HOSPADM

## 2019-12-10 RX ORDER — SODIUM CHLORIDE 0.9 % (FLUSH) 0.9 %
5 SYRINGE (ML) INJECTION
Status: DISCONTINUED | OUTPATIENT
Start: 2019-12-10 | End: 2019-12-10 | Stop reason: HOSPADM

## 2019-12-10 RX ORDER — SILVER SULFADIAZINE 10 G/1000G
CREAM TOPICAL
Status: DISCONTINUED | OUTPATIENT
Start: 2019-12-10 | End: 2019-12-10 | Stop reason: HOSPADM

## 2019-12-10 RX ADMIN — LIDOCAINE HYDROCHLORIDE 100 MG: 20 INJECTION, SOLUTION INTRAVENOUS at 10:12

## 2019-12-10 RX ADMIN — PROPOFOL 70 MG: 10 INJECTION, EMULSION INTRAVENOUS at 10:12

## 2019-12-10 NOTE — H&P
[facility] Cardiac [facility]  Cardiac Electrophysiology  History and Physical     Admission Date: 12/10/2019  Code Status: Prior   Attending Provider: Neel Topete MD   Principal Problem:Persistent atrial fibrillation    Subjective:     Chief Complaint:  Persistent AF     HPI: Mr. Skelton is a 84 year old male with paroxysmal atrial fibrillation s/p cryo-PVI by Dr. 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      Mr. Skelton has a history of paroxysmal atrial fibrillation s/p cryo-PVI by Dr. 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 He notes early AF recurrence after tikosyn therapy was stopped post-PVI. Tikosyn was resumed. He notes since his wife passed away last September he began having AF recurrences. They tend to last 30-48 hours. He notes even though he is apparently rate controlled (80s on home check) during the episodes he does have relative hypotension with systolic blood pressure in the low 100s and he feels very poor. Otherwise he feels well.     Recurrent symptomatic paroxysms of AF despite tikosyn therapy. Discussed options including changing to amiodarone (discussed long-term risks of liver, lung, thyroid, ocular toxicities) versus redo PVI. Redo-PVI would be complicated by inability to use the left CFV due to chronic occlusion, however would be doable. Discussed procedural risks tend to be higher in the elderly. He will be 84 next week. It is reasonable to see if he has good response with amiodarone. He is agreeable.       Tikosyn stopped 5/2019. Plan was to start amiodarone. He desired to not take amiodarone due to potential side effects. 6/14/2019 underwent DCCV.  "We initiated sotalol however he has had breakthrough. Planned for PVI.     2019: underwent successful redo-pulmonary vein RF ablation and left atrial posterior wall isolation for complex CAFEs.   He says he felt considerably better after his ablation. However, at his last visit 10/29/2019 so he had been feeling awful. With LH, fatigue, "upset stomach," and HSIEH. He recognizes these symptoms as r/t AF. Denies CP.   He was noted to be back in AF. He felt much better in SR and is very symptomatic. Case discussed with Dr. Topete, who also counseled the patient. Medical options are limited.  No increase in sotalol due to CKD. Most effective medication for him would be amiodarone. He is wary of this because his wife  of lung disease (not r/t amiodarone). Had a long discussion about routine monitoring, as well as risks vs. benefits of amiodarone. He agreed to cardiovert now on sotalol, with the hope that he will remain in SR after (he is still in blanking period). If he were to go back into AF, he will consider amiodarone at that time. On coumadin with weekly INRs in range. Will defer EUGENE.  19 patient underwent successful DCCV.   per Dr. Caldwell's note, please notify the patient that he is back in atrial fibrillation. As we discussed in his clinic visit with Janneth carreon, he was going to consider changing from sotalol to amiodarone should this DCCV fail. I recommend stopping sotalol and then 5 days later starting amiodarone at 200mg bid x 4 weeks then changing to 200mg daily. After 3 weeks of amiodarone we can perform a DCCV, no EUGENE needed as long as he has weekly INRs >2.    He presents today to [facility] for scheduled  DCCV with Dr. Topete. He denies any chest pain, SOB, HSIEH, dizziness, light headedness, weakness, syncope, or near syncopal episodes. He does state he has palpitations and LE swelling. He denies any bleeding, infections, fevers, rashes, or surgeries in the past 30 days. He is currently " taking amiodarone 200 mg BID (until 12/17/19) and coumadin. EUGENE deferred per  as patient reports full compliance with  coumadin and his INR has been >2 for the last 6 weeks or more. Patient denies Hx of CVA/TIA or CHANI thrombus. INR this AM is 3.9.    EKG reviewed with Dr. Topete noted AFL at 103 bpm.    Past Medical History:   Diagnosis Date    ACS (acute coronary syndrome)     Acute coronary syndrome 2000    NSTEMI    Anticoagulant long-term use     Coumadin    Atrial fibrillation     Basal cell carcinoma excised     left scalp vertex    Bilateral leg edema 1/6/2014    BPH (benign prostatic hyperplasia)     CKD (chronic kidney disease)     Coronary artery disease     DVT of leg (deep venous thrombosis) 09/08/2014    On coumadin for years    Encounter for blood transfusion     Hyperlipidemia     Hypertension     Hypogonadism, male     Leg fracture, left     Melanoma 01/04/2017     in situ crown of scalp    MI (myocardial infarction)     Panhypopituitarism     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    Squamous Cell Carcinoma excised     left posterior scalp    Thyroid disease        Past Surgical History:   Procedure Laterality Date    ABLATION OF ARRHYTHMOGENIC FOCUS FOR ATRIAL FIBRILLATION N/A 9/30/2019    Procedure: Ablation atrial fibrillation;  Surgeon: Neel Topete MD;  Location: [facility] EP LAB;  Service: Cardiology;  Laterality: N/A;  AF, EUGENE, PVI (re-do), Carto, Gen, MN, 3 Prep *right groin only access and exchange CS/ICE catheters throughout as needed*    APPENDECTOMY      BASAL CELL CARCINOMA EXCISION  01/2008    BRAIN SURGERY      pituitary adenoma removed    CARDIAC CATHETERIZATION  05/09/2000    S/P stent to RCA,    CARDIAC CATHETERIZATION      stents placed    CARDIAC ELECTROPHYSIOLOGY STUDY AND ABLATION      CORONARY ANGIOPLASTY  5/9/2000    RCA    CORONARY ANGIOPLASTY WITH STENT PLACEMENT      ESOPHAGEAL DILATION       EYE SURGERY Bilateral     cataracts removed and new lenses placed     FRACTURE SURGERY Left     leg    HERNIA REPAIR      LAPAROSCOPIC CHOLECYSTECTOMY N/A 6/2/2018    Procedure: CHOLECYSTECTOMY, LAPAROSCOPIC;  Surgeon: Dickson Smith Jr., MD;  Location: [facility] OR;  Service: General;  Laterality: N/A;    left femur surgery      pituitatry      hypophysectomy    SKIN BIOPSY      TONSILLECTOMY      TREATMENT OF CARDIAC ARRHYTHMIA Bilateral 6/13/2019    Procedure: Cardioversion or Defibrillation;  Surgeon: Eric Nguyen MD;  Location: [facility] EP LAB;  Service: Cardiology;  Laterality: Bilateral;  af, bryson, dccv, anes, pr, 324    TREATMENT OF CARDIAC ARRHYTHMIA  9/30/2019    Procedure: Cardioversion or Defibrillation;  Surgeon: Neel Topete MD;  Location: [facility] EP LAB;  Service: Cardiology;;    TREATMENT OF CARDIAC ARRHYTHMIA N/A 11/1/2019    Procedure: CARDIOVERSION;  Surgeon: Eric Nguyen MD;  Location: [facility] EP LAB;  Service: Cardiology;  Laterality: N/A;  AF, BRYSON (Cx if Serial INRs x 4), DCCV, MAC, DM, 3 Prep    VASCULAR SURGERY         Review of patient's allergies indicates:  No Known Allergies    Current Facility-Administered Medications on File Prior to Encounter   Medication    0.9%  NaCl infusion    sodium chloride 0.9% flush 5 mL     Current Outpatient Medications on File Prior to Encounter   Medication Sig    aspirin 81 mg Tab Take 81 mg by mouth every evening. 1 Tablet Oral Every night    folic acid (FOLVITE) 1 MG tablet TAKE 1 TABLET BY MOUTH ONCE DAILY    levothyroxine (SYNTHROID) 112 MCG tablet TAKE 1 TABLET BY MOUTH ONCE DAILY BEFORE  BREAKFAST    mometasone (ELOCON) 0.1 % ointment aaa qd- bid for severe areas.  Avoid use on face and groin    omeprazole (PRILOSEC) 20 MG capsule Take 20 mg by mouth every other day.    predniSONE (DELTASONE) 5 MG tablet Take 5 mg by mouth once daily.    simvastatin (ZOCOR) 80 MG tablet Take 1 tablet (80 mg total) by mouth every evening.     tamsulosin (FLOMAX) 0.4 mg Cap TAKE 1 CAPSULE BY MOUTH ONCE DAILY    triamcinolone acetonide 0.1% (KENALOG) 0.1 % cream     warfarin (COUMADIN) 5 MG tablet TAKE ONE & ONE-HALF TABLETS BY MOUTH ONCE DAILY EXCEPT  ON  TUESDAYS  TAKE  1  TABLET ON TUESDAY (Patient taking differently: 5 mg on Tuesday, 7.5 mg all other days as directed by [facility])    ciclopirox (PENLAC) 8 % Soln Apply daily to affected nail. Must remove and restart weekly    ketoconazole (NIZORAL) 2 % cream AAA bid to feet x 3 weeks    NITROSTAT 0.4 mg SL tablet DISSOLVE ONE TABLET UNDER THE TONGUE EVERY 5 MINUTES AS NEEDED FOR CHEST PAIN.  DO NOT EXCEED A TOTAL OF 3 DOSES IN 15 MINUTES NOW    pantoprazole (PROTONIX) 40 MG tablet Take 1 tablet (40 mg total) by mouth once daily.    sotalol (BETAPACE) 80 MG tablet Take 1 tablet (80 mg total) by mouth 2 (two) times daily.     Family History     Problem Relation (Age of Onset)    Cancer Father        Tobacco Use    Smoking status: Never Smoker    Smokeless tobacco: Never Used   Substance and Sexual Activity    Alcohol use: No     Frequency: Never    Drug use: No    Sexual activity: Not Currently     Partners: Female     Review of Systems   Constitution: Negative for chills, fever and malaise/fatigue.   HENT: Negative for congestion and nosebleeds.    Eyes: Negative for blurred vision.   Cardiovascular: Positive for leg swelling and palpitations. Negative for chest pain, dyspnea on exertion, irregular heartbeat, near-syncope, orthopnea, paroxysmal nocturnal dyspnea and syncope.   Respiratory: Negative for cough, hemoptysis, shortness of breath, sleep disturbances due to breathing, sputum production and wheezing.    Endocrine: Negative for polyphagia.   Hematologic/Lymphatic: Negative for bleeding problem. Does not bruise/bleed easily.   Skin: Negative for itching and rash.   Musculoskeletal: Negative for back pain, joint swelling, muscle cramps and muscle weakness.   Gastrointestinal:  Negative for bloating, abdominal pain, hematemesis, hematochezia, nausea and vomiting.   Genitourinary: Negative for dysuria and hematuria.   Neurological: Negative for dizziness, focal weakness, headaches, light-headedness, loss of balance, numbness and weakness.   Psychiatric/Behavioral: Negative for altered mental status.     Objective:     Vital Signs (Most Recent):  Temp: 97.2 °F (36.2 °C) (12/10/19 0757)  Pulse: 102 (12/10/19 0757)  Resp: 16 (12/10/19 0757)  BP: 134/88 (12/10/19 0800)  SpO2: 96 % (12/10/19 0757) Vital Signs (24h Range):  Temp:  [97.2 °F (36.2 °C)] 97.2 °F (36.2 °C)  Pulse:  [102] 102  Resp:  [16] 16  SpO2:  [96 %] 96 %  BP: (134-154)/(88-98) 134/88     Weight: 108.9 kg (240 lb)  Body mass index is 30 kg/m².    SpO2: 96 %  O2 Device (Oxygen Therapy): room air    Physical Exam   Constitutional: He is oriented to person, place, and time. He appears well-developed and well-nourished. No distress.   HENT:   Head: Normocephalic and atraumatic.   Mouth/Throat: No oropharyngeal exudate.   Eyes: Pupils are equal, round, and reactive to light. Conjunctivae are normal. Right eye exhibits no discharge. Left eye exhibits no discharge.   Neck: Normal range of motion. Neck supple.   Cardiovascular: S1 normal, S2 normal, normal heart sounds, intact distal pulses and normal pulses. An irregularly irregular rhythm present. Tachycardia present. PMI is not displaced.   Pulses:       Radial pulses are 2+ on the right side, and 2+ on the left side.   Pulmonary/Chest: Effort normal and breath sounds normal. No accessory muscle usage. No respiratory distress. He has no decreased breath sounds. He has no wheezes. He has no rhonchi. He has no rales. He exhibits no tenderness.   Abdominal: Soft. Bowel sounds are normal. He exhibits no distension. There is no tenderness. There is no guarding.   Musculoskeletal: Normal range of motion. He exhibits edema.   Trace edema bilateral LEs L>R (chronic for pt)   Neurological: He  is alert and oriented to person, place, and time. He has normal strength. He is not disoriented. No cranial nerve deficit or sensory deficit. He exhibits normal muscle tone. Coordination and gait normal.   Skin: Skin is warm and dry. No rash noted. He is not diaphoretic. No erythema.   Psychiatric: He has a normal mood and affect. His behavior is normal. Judgment and thought content normal.   Nursing note and vitals reviewed.    Significant Labs: Labs from 12/4/19 through today reviewed. INR from 11/6/19 through today reviewed. INR today 3.9.  Significant Imaging: Echocardiogram:   2D echo with color flow doppler:   Results for orders placed or performed during the hospital encounter of 05/30/18   2D echo with color flow doppler   Result Value Ref Range    QEF 60 55 - 65    Mitral Valve Regurgitation TRIVIAL     Diastolic Dysfunction No     Est. PA Systolic Pressure 8.86     Tricuspid Valve Regurgitation TRIVIAL     Narrative    Date of Procedure: 06/01/2018    TEST DESCRIPTION   Technical Quality: This is a technically poor study.     Aorta: The aortic root is normal in size, measuring 3.2 cm at sinotubular junction and 3.8 cm at Sinuses of Valsalva. The proximal ascending aorta is normal in size, measuring 3.8 cm across.     Left Atrium: The left atrium is normal in size, measuring 4.9 cm across in the parasternal view.     Left Ventricle: The left ventricle is normal in size, with an end-diastolic diameter of 5.2 cm, and an end-systolic diameter of 4.2 cm. Wall thickness is increased, with the septum measuring 1.3 cm and the posterior wall measuring 1.2 cm across. Relative   wall thickness was increased at 0.46, and the LV mass index was increased at 131.2 g/m2 consistent with concentric left ventricular hypertrophy. There are no regional wall motion abnormalities. Left ventricular systolic function appears normal. Visually   estimated ejection fraction is 60-65%.     Diastolic indices: E wave velocity 0.4 m/s,  E/A ratio 0.9,  msec., Diastolic function is normal.     Right Atrium: The right atrium is normal in size.     Right Ventricle: The right ventricle is normal in size. Global right ventricular systolic function appears normal. The estimated PA systolic pressure is 9 mmHg.     Mitral Valve:  There is trivial mitral regurgitation.     Tricuspid Valve:  There is trivial tricuspid regurgitation.     IVC: IVC is normal in size and collapses > 50% with a sniff, suggesting normal right atrial pressure of 3 mmHg.     Intracavitary: There is no evidence of pericardial effusion, intracavity mass, thrombi, or vegetation.     CONCLUSIONS     1 - Normal left ventricular systolic function (EF 60-65%).     2 - Concentric hypertrophy.     3 - Trivial mitral regurgitation.     4 - Trivial tricuspid regurgitation.     This document has been electronically    SIGNED BY: Jac Walker MD On: 06/01/2018 12:55      Assessment and Plan:     Persistent atrial fibrillation did not want amio long term; sotalol and tikosyn still with sx; 9/30/19 redo PVI with RFA; 11/1/19 DCCV  -DCCV  - EUGENE deferred per  as patient reports full compliance with  coumadin and his INR has been >2 for the last 6 weeks or more. Patient denies Hx of CVA/TIA or CHANI thrombus. INR this AM is 3.9.  -Anesthesia for sedation.    Prior to procedure, extensive discussion with patient regarding risks and benefits of DCCV, and patient would like to proceed. Dr. Topete at bedside to answer all questions. The patient and son voice understanding and all questions have been answered. No further questions/concerns voiced at this time. Consents signed.    Renay Fernandez NP  Cardiac Electrophysiology  [facility]    Attending: Neel Topete MD

## 2019-12-10 NOTE — SUBJECTIVE & OBJECTIVE
Past Medical History:   Diagnosis Date    ACS (acute coronary syndrome)     Acute coronary syndrome 2000    NSTEMI    Anticoagulant long-term use     Coumadin    Atrial fibrillation     Basal cell carcinoma excised     left scalp vertex    Bilateral leg edema 1/6/2014    BPH (benign prostatic hyperplasia)     CKD (chronic kidney disease)     Coronary artery disease     DVT of leg (deep venous thrombosis) 09/08/2014    On coumadin for years    Encounter for blood transfusion     Hyperlipidemia     Hypertension     Hypogonadism, male     Leg fracture, left     Melanoma 01/04/2017     in situ crown of scalp    MI (myocardial infarction)     Panhypopituitarism     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    Squamous Cell Carcinoma excised     left posterior scalp    Thyroid disease        Past Surgical History:   Procedure Laterality Date    ABLATION OF ARRHYTHMOGENIC FOCUS FOR ATRIAL FIBRILLATION N/A 9/30/2019    Procedure: Ablation atrial fibrillation;  Surgeon: Neel Topete MD;  Location: [facility] EP LAB;  Service: Cardiology;  Laterality: N/A;  AF, EUGENE, PVI (re-do), Carto, Gen, NH, 3 Prep *right groin only access and exchange CS/ICE catheters throughout as needed*    APPENDECTOMY      BASAL CELL CARCINOMA EXCISION  01/2008    BRAIN SURGERY      pituitary adenoma removed    CARDIAC CATHETERIZATION  05/09/2000    S/P stent to RCA,    CARDIAC CATHETERIZATION      stents placed    CARDIAC ELECTROPHYSIOLOGY STUDY AND ABLATION      CORONARY ANGIOPLASTY  5/9/2000    RCA    CORONARY ANGIOPLASTY WITH STENT PLACEMENT      ESOPHAGEAL DILATION      EYE SURGERY Bilateral     cataracts removed and new lenses placed     FRACTURE SURGERY Left     leg    HERNIA REPAIR      LAPAROSCOPIC CHOLECYSTECTOMY N/A 6/2/2018    Procedure: CHOLECYSTECTOMY, LAPAROSCOPIC;  Surgeon: Dickson Smith Jr., MD;  Location: [facility] OR;  Service: General;  Laterality: N/A;    left  femur surgery      pituitatry      hypophysectomy    SKIN BIOPSY      TONSILLECTOMY      TREATMENT OF CARDIAC ARRHYTHMIA Bilateral 6/13/2019    Procedure: Cardioversion or Defibrillation;  Surgeon: Eric Nguyen MD;  Location: [facility] EP LAB;  Service: Cardiology;  Laterality: Bilateral;  af, bryson, dccv, anes, pr, 324    TREATMENT OF CARDIAC ARRHYTHMIA  9/30/2019    Procedure: Cardioversion or Defibrillation;  Surgeon: Neel Topete MD;  Location: [facility] EP LAB;  Service: Cardiology;;    TREATMENT OF CARDIAC ARRHYTHMIA N/A 11/1/2019    Procedure: CARDIOVERSION;  Surgeon: Eric Nguyen MD;  Location: [facility] EP LAB;  Service: Cardiology;  Laterality: N/A;  AF, BRYSON (Cx if Serial INRs x 4), DCCV, MAC, DM, 3 Prep    VASCULAR SURGERY         Review of patient's allergies indicates:  No Known Allergies    Current Facility-Administered Medications on File Prior to Encounter   Medication    0.9%  NaCl infusion    sodium chloride 0.9% flush 5 mL     Current Outpatient Medications on File Prior to Encounter   Medication Sig    aspirin 81 mg Tab Take 81 mg by mouth every evening. 1 Tablet Oral Every night    folic acid (FOLVITE) 1 MG tablet TAKE 1 TABLET BY MOUTH ONCE DAILY    levothyroxine (SYNTHROID) 112 MCG tablet TAKE 1 TABLET BY MOUTH ONCE DAILY BEFORE  BREAKFAST    mometasone (ELOCON) 0.1 % ointment aaa qd- bid for severe areas.  Avoid use on face and groin    omeprazole (PRILOSEC) 20 MG capsule Take 20 mg by mouth every other day.    predniSONE (DELTASONE) 5 MG tablet Take 5 mg by mouth once daily.    simvastatin (ZOCOR) 80 MG tablet Take 1 tablet (80 mg total) by mouth every evening.    tamsulosin (FLOMAX) 0.4 mg Cap TAKE 1 CAPSULE BY MOUTH ONCE DAILY    triamcinolone acetonide 0.1% (KENALOG) 0.1 % cream     warfarin (COUMADIN) 5 MG tablet TAKE ONE & ONE-HALF TABLETS BY MOUTH ONCE DAILY EXCEPT  ON  TUESDAYS  TAKE  1  TABLET ON TUESDAY (Patient taking differently: 5 mg on Tuesday, 7.5 mg all other days  as directed by [facility])    ciclopirox (PENLAC) 8 % Soln Apply daily to affected nail. Must remove and restart weekly    ketoconazole (NIZORAL) 2 % cream AAA bid to feet x 3 weeks    NITROSTAT 0.4 mg SL tablet DISSOLVE ONE TABLET UNDER THE TONGUE EVERY 5 MINUTES AS NEEDED FOR CHEST PAIN.  DO NOT EXCEED A TOTAL OF 3 DOSES IN 15 MINUTES NOW    pantoprazole (PROTONIX) 40 MG tablet Take 1 tablet (40 mg total) by mouth once daily.    sotalol (BETAPACE) 80 MG tablet Take 1 tablet (80 mg total) by mouth 2 (two) times daily.     Family History     Problem Relation (Age of Onset)    Cancer Father        Tobacco Use    Smoking status: Never Smoker    Smokeless tobacco: Never Used   Substance and Sexual Activity    Alcohol use: No     Frequency: Never    Drug use: No    Sexual activity: Not Currently     Partners: Female     Review of Systems   Constitution: Negative for chills, fever and malaise/fatigue.   HENT: Negative for congestion and nosebleeds.    Eyes: Negative for blurred vision.   Cardiovascular: Positive for leg swelling and palpitations. Negative for chest pain, dyspnea on exertion, irregular heartbeat, near-syncope, orthopnea, paroxysmal nocturnal dyspnea and syncope.   Respiratory: Negative for cough, hemoptysis, shortness of breath, sleep disturbances due to breathing, sputum production and wheezing.    Endocrine: Negative for polyphagia.   Hematologic/Lymphatic: Negative for bleeding problem. Does not bruise/bleed easily.   Skin: Negative for itching and rash.   Musculoskeletal: Negative for back pain, joint swelling, muscle cramps and muscle weakness.   Gastrointestinal: Negative for bloating, abdominal pain, hematemesis, hematochezia, nausea and vomiting.   Genitourinary: Negative for dysuria and hematuria.   Neurological: Negative for dizziness, focal weakness, headaches, light-headedness, loss of balance, numbness and weakness.   Psychiatric/Behavioral: Negative for altered mental status.      Objective:     Vital Signs (Most Recent):  Temp: 97.2 °F (36.2 °C) (12/10/19 0757)  Pulse: 102 (12/10/19 0757)  Resp: 16 (12/10/19 0757)  BP: 134/88 (12/10/19 0800)  SpO2: 96 % (12/10/19 0757) Vital Signs (24h Range):  Temp:  [97.2 °F (36.2 °C)] 97.2 °F (36.2 °C)  Pulse:  [102] 102  Resp:  [16] 16  SpO2:  [96 %] 96 %  BP: (134-154)/(88-98) 134/88     Weight: 108.9 kg (240 lb)  Body mass index is 30 kg/m².    SpO2: 96 %  O2 Device (Oxygen Therapy): room air    Physical Exam   Constitutional: He is oriented to person, place, and time. He appears well-developed and well-nourished. No distress.   HENT:   Head: Normocephalic and atraumatic.   Mouth/Throat: No oropharyngeal exudate.   Eyes: Pupils are equal, round, and reactive to light. Conjunctivae are normal. Right eye exhibits no discharge. Left eye exhibits no discharge.   Neck: Normal range of motion. Neck supple.   Cardiovascular: S1 normal, S2 normal, normal heart sounds, intact distal pulses and normal pulses. An irregularly irregular rhythm present. Tachycardia present. PMI is not displaced.   Pulses:       Radial pulses are 2+ on the right side, and 2+ on the left side.   Pulmonary/Chest: Effort normal and breath sounds normal. No accessory muscle usage. No respiratory distress. He has no decreased breath sounds. He has no wheezes. He has no rhonchi. He has no rales. He exhibits no tenderness.   Abdominal: Soft. Bowel sounds are normal. He exhibits no distension. There is no tenderness. There is no guarding.   Musculoskeletal: Normal range of motion. He exhibits edema.   Trace edema bilateral LEs L>R (chronic for pt)   Neurological: He is alert and oriented to person, place, and time. He has normal strength. He is not disoriented. No cranial nerve deficit or sensory deficit. He exhibits normal muscle tone. Coordination and gait normal.   Skin: Skin is warm and dry. No rash noted. He is not diaphoretic. No erythema.   Psychiatric: He has a normal mood and  affect. His behavior is normal. Judgment and thought content normal.   Nursing note and vitals reviewed.    Significant Labs: Labs from 12/4/19 through today reviewed. INR from 11/6/19 through today reviewed. INR today 3.8.  Significant Imaging: Echocardiogram:   2D echo with color flow doppler:   Results for orders placed or performed during the hospital encounter of 05/30/18   2D echo with color flow doppler   Result Value Ref Range    QEF 60 55 - 65    Mitral Valve Regurgitation TRIVIAL     Diastolic Dysfunction No     Est. PA Systolic Pressure 8.86     Tricuspid Valve Regurgitation TRIVIAL     Narrative    Date of Procedure: 06/01/2018    TEST DESCRIPTION   Technical Quality: This is a technically poor study.     Aorta: The aortic root is normal in size, measuring 3.2 cm at sinotubular junction and 3.8 cm at Sinuses of Valsalva. The proximal ascending aorta is normal in size, measuring 3.8 cm across.     Left Atrium: The left atrium is normal in size, measuring 4.9 cm across in the parasternal view.     Left Ventricle: The left ventricle is normal in size, with an end-diastolic diameter of 5.2 cm, and an end-systolic diameter of 4.2 cm. Wall thickness is increased, with the septum measuring 1.3 cm and the posterior wall measuring 1.2 cm across. Relative   wall thickness was increased at 0.46, and the LV mass index was increased at 131.2 g/m2 consistent with concentric left ventricular hypertrophy. There are no regional wall motion abnormalities. Left ventricular systolic function appears normal. Visually   estimated ejection fraction is 60-65%.     Diastolic indices: E wave velocity 0.4 m/s, E/A ratio 0.9,  msec., Diastolic function is normal.     Right Atrium: The right atrium is normal in size.     Right Ventricle: The right ventricle is normal in size. Global right ventricular systolic function appears normal. The estimated PA systolic pressure is 9 mmHg.     Mitral Valve:  There is trivial mitral  regurgitation.     Tricuspid Valve:  There is trivial tricuspid regurgitation.     IVC: IVC is normal in size and collapses > 50% with a sniff, suggesting normal right atrial pressure of 3 mmHg.     Intracavitary: There is no evidence of pericardial effusion, intracavity mass, thrombi, or vegetation.     CONCLUSIONS     1 - Normal left ventricular systolic function (EF 60-65%).     2 - Concentric hypertrophy.     3 - Trivial mitral regurgitation.     4 - Trivial tricuspid regurgitation.     This document has been electronically    SIGNED BY: Jac Walker MD On: 06/01/2018 12:55

## 2019-12-10 NOTE — PLAN OF CARE
Received report from MIRIAN Floyd. Patient s/p cardioversion, AAOx4. VSS, no c/o pain or discomfort at this time, resp even and unlabored. Post procedure protocol reviewed with patient and patient's family. Understanding verbalized. Family members at bedside. Nurse call bell within reach. Will continue to monitor per post procedure protocol.

## 2019-12-10 NOTE — HPI
Mr. Skelton is a 84 year old male with paroxysmal atrial fibrillation s/p cryo-PVI by Dr. 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      Mr. Skelton has a history of paroxysmal atrial fibrillation s/p cryo-PVI by Dr. 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 He notes early AF recurrence after tikosyn therapy was stopped post-PVI. Tikosyn was resumed. He notes since his wife passed away last September he began having AF recurrences. They tend to last 30-48 hours. He notes even though he is apparently rate controlled (80s on home check) during the episodes he does have relative hypotension with systolic blood pressure in the low 100s and he feels very poor. Otherwise he feels well.     Recurrent symptomatic paroxysms of AF despite tikosyn therapy. Discussed options including changing to amiodarone (discussed long-term risks of liver, lung, thyroid, ocular toxicities) versus redo PVI. Redo-PVI would be complicated by inability to use the left CFV due to chronic occlusion, however would be doable. Discussed procedural risks tend to be higher in the elderly. He will be 84 next week. It is reasonable to see if he has good response with amiodarone. He is agreeable.       Tikosyn stopped 5/2019. Plan was to start amiodarone. He desired to not take amiodarone due to potential side effects. 6/14/2019 underwent DCCV. We initiated sotalol however he has had breakthrough. Planned for PVI.     9/30/2019: underwent successful redo-pulmonary vein RF ablation and left atrial posterior wall isolation for complex CAFEs.   He says he felt considerably better after his ablation. However, at his last visit 10/29/2019 so  "he had been feeling awful. With LH, fatigue, "upset stomach," and HSIEH. He recognizes these symptoms as r/t AF. Denies CP.   He was noted to be back in AF. He felt much better in SR and is very symptomatic. Case discussed with Dr. Topete, who also counseled the patient. Medical options are limited.  No increase in sotalol due to CKD. Most effective medication for him would be amiodarone. He is wary of this because his wife  of lung disease (not r/t amiodarone). Had a long discussion about routine monitoring, as well as risks vs. benefits of amiodarone. He agreed to cardiovert now on sotalol, with the hope that he will remain in SR after (he is still in blanking period). If he were to go back into AF, he will consider amiodarone at that time. On coumadin with weekly INRs in range. Will defer EUGENE.  19 patient underwent successful DCCV.   per Dr. Caldwell's note, please notify the patient that he is back in atrial fibrillation. As we discussed in his clinic visit with Janneth carreon, he was going to consider changing from sotalol to amiodarone should this DCCV fail. I recommend stopping sotalol and then 5 days later starting amiodarone at 200mg bid x 4 weeks then changing to 200mg daily. After 3 weeks of amiodarone we can perform a DCCV, no EUGENE needed as long as he has weekly INRs >2.    He presents today to [facility] for scheduled DCCV with Dr. Topete. He denies any chest pain, SOB, HSIEH, dizziness, light headedness, weakness, syncope, or near syncopal episodes. He does state he has palpitations and LE swelling. He denies any bleeding, infections, fevers, rashes, or surgeries in the past 30 days. He is currently taking amiodarone 200 mg BID (until 19) and coumadin. EUGENE deferred per  as patient reports full compliance with coumadin and his INR has been >2 for the last 6 weeks or more. Patient denies Hx of CVA/TIA or CHANI thrombus. INR this AM is ......    EKG reviewed with Dr. Topete noted AFL " at 103 bpm.

## 2019-12-10 NOTE — TELEPHONE ENCOUNTER
Patient requesting something for insomnia.  With AFib about to undergo ablation, would avoid medications that could affect heart rhythms.  Such as trazodone and .  I would avoid habit-forming medications as well.

## 2019-12-10 NOTE — PLAN OF CARE
Pt arrived to floor ambulatory from home accompanied by his son.. Pt oriented to room. Iv inserted. Admit assessment completed. Nurse call bell within reach. Will continue to monitor

## 2019-12-10 NOTE — ANESTHESIA PREPROCEDURE EVALUATION
12/10/2019  Linda Skelton is a 84 y.o., male         Patient Active Problem List   Diagnosis    Persistent atrial fibrillation did not want amio long term; sotalol and tikosyn still with sx; 9/30/19 redo PVI with RFA; 11/1/19 DCCV    Thyroid nodule    Panhypopituitarism thyroid/prednisone/testosterone    Chronic kidney disease, stage 3    Coronary artery disease involving native coronary artery of native heart with angina pectoris hx NSTEMI with PCI to RCA 2000    Mitral regurgitation mild, posterior leaflet MVP on EUGENE 6/2019    Bilateral leg edema    Obesity, Class I, BMI 30-34.9    ED (erectile dysfunction)    Essential hypertension    History of deep 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, 1997    Mixed hyperlipidemia    History of melanoma in situ    Benign non-nodular prostatic hyperplasia without lower urinary tract symptoms    Hypogonadism in male    Long term (current) use of anticoagulants    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    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    History of bacteremia with klebsiella during cholecystitis summer 2018    Status post cholecystectomy 6/2018 with klebsiella bacteremia    Perforated nasal septum    Tubular adenoma of colon on colonoscopy 2014    Gastroesophageal reflux disease without esophagitis    Presence of IVC filter 1997    Atrial fibrillation       Review of patient's allergies indicates:  No Known Allergies    Current Facility-Administered Medications on File Prior to Visit   Medication Dose Route Frequency Provider Last Rate Last Dose    0.9%  NaCl infusion   Intravenous Continuous Renay Fernandez NP   Stopped at 11/01/19 1022    0.9%  NaCl infusion   Intravenous Continuous Renay Fernandez NP        sodium chloride 0.9% flush 5 mL  5 mL Intravenous PRN Renay MOYA  REED Fernandez        sodium chloride 0.9% flush 5 mL  5 mL Intravenous PRN Renay Fernandez NP         Current Outpatient Medications on File Prior to Visit   Medication Sig Dispense Refill    amiodarone (PACERONE) 200 MG Tab Take 200 mg by mouth once daily. 200 mg BID until 12/17/19 then reduce down to 200 mg daily thereafter      aspirin 81 mg Tab Take 81 mg by mouth every evening. 1 Tablet Oral Every night      ciclopirox (PENLAC) 8 % Soln Apply daily to affected nail. Must remove and restart weekly 1 Bottle 5    cyclobenzaprine (FLEXERIL) 10 MG tablet Take 1 tablet (10 mg total) by mouth 3 (three) times daily as needed for Muscle spasms. 15 tablet 0    folic acid (FOLVITE) 1 MG tablet TAKE 1 TABLET BY MOUTH ONCE DAILY 90 tablet 3    ketoconazole (NIZORAL) 2 % cream AAA bid to feet x 3 weeks 60 g 3    levothyroxine (SYNTHROID) 112 MCG tablet TAKE 1 TABLET BY MOUTH ONCE DAILY BEFORE  BREAKFAST 90 tablet 3    mometasone (ELOCON) 0.1 % ointment aaa qd- bid for severe areas.  Avoid use on face and groin 60 g 1    NITROSTAT 0.4 mg SL tablet DISSOLVE ONE TABLET UNDER THE TONGUE EVERY 5 MINUTES AS NEEDED FOR CHEST PAIN.  DO NOT EXCEED A TOTAL OF 3 DOSES IN 15 MINUTES NOW 25 tablet 3    omeprazole (PRILOSEC) 20 MG capsule Take 20 mg by mouth every other day.      pantoprazole (PROTONIX) 40 MG tablet Take 1 tablet (40 mg total) by mouth once daily. 30 tablet 1    predniSONE (DELTASONE) 5 MG tablet Take 5 mg by mouth once daily.      simvastatin (ZOCOR) 80 MG tablet Take 1 tablet (80 mg total) by mouth every evening. 90 tablet 3    sotalol (BETAPACE) 80 MG tablet Take 1 tablet (80 mg total) by mouth 2 (two) times daily. 180 tablet 0    tamsulosin (FLOMAX) 0.4 mg Cap TAKE 1 CAPSULE BY MOUTH ONCE DAILY 90 capsule 3    triamcinolone acetonide 0.1% (KENALOG) 0.1 % cream       warfarin (COUMADIN) 5 MG tablet TAKE ONE & ONE-HALF TABLETS BY MOUTH ONCE DAILY EXCEPT  ON  TUESDAYS  TAKE  1  TABLET ON TUESDAY  (Patient taking differently: 5 mg on Tuesday, 7.5 mg all other days as directed by [facility]) 135 tablet 3       Past Surgical History:   Procedure Laterality Date    ABLATION OF ARRHYTHMOGENIC FOCUS FOR ATRIAL FIBRILLATION N/A 9/30/2019    Procedure: Ablation atrial fibrillation;  Surgeon: Neel Topete MD;  Location: [facility] EP LAB;  Service: Cardiology;  Laterality: N/A;  AF, BRYSON, PVI (re-do), Carto, Gen, [place], 3 Prep *right groin only access and exchange CS/ICE catheters throughout as needed*    APPENDECTOMY      BASAL CELL CARCINOMA EXCISION  01/2008    BRAIN SURGERY      pituitary adenoma removed    CARDIAC CATHETERIZATION  05/09/2000    S/P stent to RCA,    CARDIAC CATHETERIZATION      stents placed    CARDIAC ELECTROPHYSIOLOGY STUDY AND ABLATION      CORONARY ANGIOPLASTY  5/9/2000    RCA    CORONARY ANGIOPLASTY WITH STENT PLACEMENT      ESOPHAGEAL DILATION      EYE SURGERY Bilateral     cataracts removed and new lenses placed     FRACTURE SURGERY Left     leg    HERNIA REPAIR      LAPAROSCOPIC CHOLECYSTECTOMY N/A 6/2/2018    Procedure: CHOLECYSTECTOMY, LAPAROSCOPIC;  Surgeon: Dickson Smith Jr., MD;  Location: [facility] OR;  Service: General;  Laterality: N/A;    left femur surgery      pituitatry      hypophysectomy    SKIN BIOPSY      TONSILLECTOMY      TREATMENT OF CARDIAC ARRHYTHMIA Bilateral 6/13/2019    Procedure: Cardioversion or Defibrillation;  Surgeon: Eric Nguyen MD;  Location: [facility] EP LAB;  Service: Cardiology;  Laterality: Bilateral;  af, bryson, dccv, anes, pr, 324    TREATMENT OF CARDIAC ARRHYTHMIA  9/30/2019    Procedure: Cardioversion or Defibrillation;  Surgeon: Neel Topete MD;  Location: [facility] EP LAB;  Service: Cardiology;;    TREATMENT OF CARDIAC ARRHYTHMIA N/A 11/1/2019    Procedure: CARDIOVERSION;  Surgeon: Eric Nguyen MD;  Location: [facility] EP LAB;  Service: Cardiology;  Laterality: N/A;  AF, BRYSON (Cx if Serial INRs x 4), DCCV, MAC, DM, 3 Prep    VASCULAR SURGERY          Social History     Socioeconomic History    Marital status:      Spouse name: Not on file    Number of children: Not on file    Years of education: Not on file    Highest education level: Not on file   Occupational History    Not on file   Social Needs    Financial resource strain: Not hard at all    Food insecurity:     Worry: Never true     Inability: Never true    Transportation needs:     Medical: No     Non-medical: No   Tobacco Use    Smoking status: Never Smoker    Smokeless tobacco: Never Used   Substance and Sexual Activity    Alcohol use: No     Frequency: Never    Drug use: No    Sexual activity: Not Currently     Partners: Female   Lifestyle    Physical activity:     Days per week: 0 days     Minutes per session: 0 min    Stress: Not at all   Relationships    Social connections:     Talks on phone: More than three times a week     Gets together: Once a week     Attends Protestant service: Not on file     Active member of club or organization: Yes     Attends meetings of clubs or organizations: More than 4 times per year     Relationship status:    Other Topics Concern    Not on file   Social History Narrative    retired Religious .  Non smoker.          CBC:   No results for input(s): WBC, RBC, HGB, HCT, PLT, MCV, MCH, MCHC in the last 72 hours.    CMP:   No results for input(s): NA, K, CL, CO2, BUN, CREATININE, GLU, MG, PHOS, CALCIUM, ALBUMIN, PROT, ALKPHOS, ALT, AST, BILITOT in the last 72 hours.    INR  No results for input(s): PT, INR, PROTIME, APTT in the last 72 hours.              2D Echo:  Results for orders placed or performed during the hospital encounter of 05/30/18   2D echo with color flow doppler   Result Value Ref Range    QEF 60 55 - 65    Mitral Valve Regurgitation TRIVIAL     Diastolic Dysfunction No     Est. PA Systolic Pressure 8.86     Tricuspid Valve Regurgitation TRIVIAL          Pre-op Assessment    I have reviewed the Patient Summary  Reports.     I have reviewed the Nursing Notes.   I have reviewed the Medications.     Review of Systems  Anesthesia Hx:  No problems with previous Anesthesia    Hematology/Oncology:  Hematology Normal   Oncology Normal     EENT/Dental:EENT/Dental Normal   Cardiovascular:   Hypertension Past MI CAD  CABG/stent Dysrhythmias atrial fibrillation  Denies Angina. · Normal appearing left atrial appendage. No thrombus is present in the appendage. Normal appendage velocities.  · No thrombus present in the left atrium.  · Normal left ventricular systolic function. The estimated ejection fraction is 55%  · Mitral prolapse of the posterior mitral leaflet.  · Mild mitral regurgitation.  · Normal right ventricular systolic function.  · Mild to moderate tricuspid regurgitation.  · No aortic atheroma visualized.  · Small inferior-posterior interatrial septal defect present with bi-directional shunting indicated by color flow Doppler.      Pulmonary:  Pulmonary Normal    Renal/:   Chronic Renal Disease, CRI    Hepatic/GI:  Hepatic/GI Normal    Musculoskeletal:  Musculoskeletal Normal    Neurological:  Neurology Normal    Endocrine:   Hypothyroidism    Dermatological:  Skin Normal    Psych:  Psychiatric Normal           Physical Exam  General:  Well nourished    Airway/Jaw/Neck:  Airway Findings: Mouth Opening: Normal Tongue: Normal  General Airway Assessment: Adult  Mallampati: II  Jaw/Neck Findings:  Neck ROM: Normal ROM     Eyes/Ears/Nose:  Eyes/Ears/Nose Findings:    Dental:  Dental Findings: In tact   Chest/Lungs:  Chest/Lungs Findings: Clear to auscultation, Normal Respiratory Rate     Heart/Vascular:  Heart Findings: Rate: Normal  Rhythm: Regular Rhythm  Sounds: Normal  Heart Murmur  Vascular Findings:        Mental Status:  Mental Status Findings:  Cooperative, Alert and Oriented         Anesthesia Plan  Type of Anesthesia, risks & benefits discussed:  Anesthesia Type:  general  Patient's Preference: general  Intra-op  Monitoring Plan: standard ASA monitors  Intra-op Monitoring Plan Comments:   Post Op Pain Control Plan: per primary service following discharge from PACU and multimodal analgesia  Post Op Pain Control Plan Comments: I  Induction:   IV  Beta Blocker:  Patient is not currently on a Beta-Blocker (No further documentation required).       Informed Consent: Patient understands risks and agrees with Anesthesia plan.  Questions answered. Anesthesia consent signed with patient.  ASA Score: 3     Day of Surgery Review of History & Physical:    H&P update referred to the provider.     Anesthesia Plan Notes: Discussed anesthetic options, pt understands and agrees with plan        Ready For Surgery From Anesthesia Perspective.

## 2019-12-10 NOTE — ANESTHESIA POSTPROCEDURE EVALUATION
Anesthesia Post Evaluation    Patient: Linda Skelton    Procedure(s) Performed: Procedure(s) (LRB):  CARDIOVERSION (N/A)    Final Anesthesia Type: general    Patient location during evaluation: PACU  Patient participation: Yes- Able to Participate  Level of consciousness: awake and alert  Post-procedure vital signs: reviewed and stable  Pain management: adequate  Airway patency: patent    PONV status at discharge: No PONV  Anesthetic complications: no      Cardiovascular status: blood pressure returned to baseline and hemodynamically stable  Respiratory status: unassisted, spontaneous ventilation and room air  Hydration status: euvolemic  Follow-up not needed.          Vitals Value Taken Time   /83 12/10/2019 11:11 AM   Temp 36.1 °C (97 °F) 12/10/2019 11:11 AM   Pulse 79 12/10/2019 11:11 AM   Resp 16 12/10/2019 11:11 AM   SpO2 98 % 12/10/2019 11:11 AM         No case tracking events are documented in the log.      Pain/Dereck Score: Dereck Score: 10 (12/10/2019 11:00 AM)

## 2019-12-10 NOTE — PROGRESS NOTES
Linda Skelton was seen today in the clinic for an audiologic evaluation.  Patients main complaint was left-sided tinnitus.  Mr. Skelton reported that his tinnitus has since gone away but he does have a history of hearing loss.    Tympanometry revealed Type A in the right ear and Type A in the left ear.  Audiogram results revealed moderate to severe to profound sensorineural hearing loss (250-8000 Hz) in both ears. Speech reception thresholds were noted at 50 dB in the right ear and 50 dB in the left ear.  Speech discrimination scores were 72% in the right ear and 64% in the left ear.    Recommendations:  1. Otologic evaluation  2. Annual audiogram  3. Noise protection when in noise  4. Hearing aid consultation

## 2019-12-10 NOTE — NURSING TRANSFER
Nursing Transfer Note      12/10/2019      Transfer To: [facility]    Transfer via stretcher    Transfer with none    Transported by MIRIAN Farfan    Medicines sent: none    Chart send with patient: Yes    Notified: will notify family upon arrival to [facility]    Patient reassessed at: 12/10/2019 1019

## 2019-12-11 ENCOUNTER — ANTI-COAG VISIT (OUTPATIENT)
Dept: CARDIOLOGY | Facility: CLINIC | Age: 84
End: 2019-12-11
Payer: MEDICARE

## 2019-12-11 DIAGNOSIS — I48.19 PERSISTENT ATRIAL FIBRILLATION: ICD-10-CM

## 2019-12-11 PROCEDURE — 93793 PR ANTICOAGULANT MGMT FOR PT TAKING WARFARIN: ICD-10-PCS | Mod: S$GLB,,,

## 2019-12-11 PROCEDURE — 93793 ANTICOAG MGMT PT WARFARIN: CPT | Mod: S$GLB,,,

## 2019-12-11 NOTE — PROGRESS NOTES
"12/10 d/c summary, "DCCV completed with restoration of normal sinus rhythm (see procedure report). Patient tolerated procedure well with no acute complications. Post procedure EKG showed normal sinus rhythm with first degree AV block with a ventricular rate of 76 bpm. Patient voiding, tolerating PO intake, and ambulating without any difficulty. Reviewed EKG and discharge plans with Dr. Topete. Plan to continue home medications including amiodarone 200 mg BID until 12/17/19 then decrease to 200 mg daily. Follow up EKG in 1 week and follow up with Dr. Topete in 4 weeks. Follow up with coumadin clinic."    Patient reports taking dose 12/10 despite high INR. Will adjust dose  "

## 2019-12-11 NOTE — DISCHARGE SUMMARY
[facility] Stay Cardiac Unit  Cardiac Electrophysiology  Discharge Summary      Patient Name: Linda Skelton  MRN: [id number]  Admission Date: 12/10/2019  Hospital Length of Stay: 0 days  Discharge Date and Time: 12/10/2019 11:46 AM  Attending Physician: Neel Topete MD  Discharging Provider: Renay Fernandez NP  Primary Care Physician: Anderson Jerome MD    HPI: Mr. Skelton is a 84 year old male with paroxysmal atrial fibrillation s/p cryo-PVI by Dr. 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      Mr. Skelton has a history of paroxysmal atrial fibrillation s/p cryo-PVI by Dr. 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 He notes early AF recurrence after tikosyn therapy was stopped post-PVI. Tikosyn was resumed. He notes since his wife passed away last September he began having AF recurrences. They tend to last 30-48 hours. He notes even though he is apparently rate controlled (80s on home check) during the episodes he does have relative hypotension with systolic blood pressure in the low 100s and he feels very poor. Otherwise he feels well.     Recurrent symptomatic paroxysms of AF despite tikosyn therapy. Discussed options including changing to amiodarone (discussed long-term risks of liver, lung, thyroid, ocular toxicities) versus redo PVI. Redo-PVI would be complicated by inability to use the left CFV due to chronic occlusion, however would be doable. Discussed procedural risks tend to be higher in the elderly. He will be 84 next week. It is reasonable to see if he has good response with amiodarone. He is agreeable.       Tikosyn stopped 5/2019. Plan was to start amiodarone. He  "desired to not take amiodarone due to potential side effects. 2019 underwent DCCV. We initiated sotalol however he has had breakthrough. Planned for PVI.     2019: underwent successful redo-pulmonary vein RF ablation and left atrial posterior wall isolation for complex CAFEs.   He says he felt considerably better after his ablation. However, at his last visit 10/29/2019 so he had been feeling awful. With LH, fatigue, "upset stomach," and HSIEH. He recognizes these symptoms as r/t AF. Denies CP.   He was noted to be back in AF. He felt much better in SR and is very symptomatic. Case discussed with Dr. Topete, who also counseled the patient. Medical options are limited.  No increase in sotalol due to CKD. Most effective medication for him would be amiodarone. He is wary of this because his wife  of lung disease (not r/t amiodarone). Had a long discussion about routine monitoring, as well as risks vs. benefits of amiodarone. He agreed to cardiovert now on sotalol, with the hope that he will remain in SR after (he is still in blanking period). If he were to go back into AF, he will consider amiodarone at that time. On coumadin with weekly INRs in range. Will defer EUGENE.  19 patient underwent successful DCCV.   per Dr. Caldwell's note, please notify the patient that he is back in atrial fibrillation. As we discussed in his clinic visit with Janneth carreon, he was going to consider changing from sotalol to amiodarone should this DCCV fail. I recommend stopping sotalol and then 5 days later starting amiodarone at 200mg bid x 4 weeks then changing to 200mg daily. After 3 weeks of amiodarone we can perform a DCCV, no EUGENE needed as long as he has weekly INRs >2.     He presents today to [facility] for scheduled  DCCV with Dr. Topete. He denies any chest pain, SOB, HSIEH, dizziness, light headedness, weakness, syncope, or near syncopal episodes. He does state he has palpitations and LE swelling. He denies any " bleeding, infections, fevers, rashes, or surgeries in the past 30 days. He is currently taking amiodarone 200 mg BID (until 12/17/19) and coumadin. EUGENE deferred per  as patient reports full compliance with  coumadin and his INR has been >2 for the last 6 weeks or more. Patient denies Hx of CVA/TIA or CHANI thrombus. INR this AM is 3.9.     EKG reviewed with Dr. Topete noted AFL at 103 bpm.    Procedure(s) (LRB):  CARDIOVERSION (N/A)     Indwelling Lines/Drains at time of discharge:  Lines/Drains/Airways     None           Hospital Course: Patient presented in atrial flutter. Patient on coumadin. EUGENE deferred per  as patient reports full compliance with  coumadin and his INR has been >2 for the last 6 weeks or more. Patient denies Hx of CVA/TIA or CHANI thrombus. INR this AM is 3.9. DCCV completed with restoration of normal sinus rhythm (see procedure report). Patient tolerated procedure well with no acute complications. Post procedure EKG showed normal sinus rhythm with first degree AV block with a ventricular rate of 76 bpm. Patient voiding, tolerating PO intake, and ambulating without any difficulty. Reviewed EKG and discharge plans with Dr. Topete. Plan to continue home medications including amiodarone 200 mg BID until 12/17/19 then decrease to 200 mg daily. Follow up EKG in 1 week and follow up with Dr. Topete in 4 weeks. Follow up with coumadin clinic. Discharge plans/instructions discussed with patient and son who verbalized understanding and agreement of plans of care. No further questions or concerns voiced at this time. Discharged home in stable condition.     Physical Exam   Constitutional: He is oriented to person, place, and time. He appears well-developed and well-nourished. No distress.   HENT:   Head: Normocephalic and atraumatic.   Mouth/Throat: No oropharyngeal exudate.   Eyes: Pupils are equal, round, and reactive to light. Conjunctivae are normal. Right eye exhibits no discharge.  Left eye exhibits no discharge.   Neck: Normal range of motion. Neck supple.   Cardiovascular: S1 normal, S2 normal, normal heart sounds, intact distal pulses and normal pulses Regular rhythm noted. PMI is not displaced.   Pulses:       Radial pulses are 2+ on the right side, and 2+ on the left side.   Pulmonary/Chest: Effort normal and breath sounds normal. No accessory muscle usage. No respiratory distress. He has no decreased breath sounds. He has no wheezes. He has no rhonchi. He has no rales. He exhibits no tenderness.   Abdominal: Soft. Bowel sounds are normal. He exhibits no distension. There is no tenderness. There is no guarding.   Musculoskeletal: Normal range of motion. He exhibits edema.   Trace edema bilateral LEs L>R (chronic for pt)   Neurological: He is alert and oriented to person, place, and time. He has normal strength. He is not disoriented. No cranial nerve deficit or sensory deficit. He exhibits normal muscle tone. Coordination and gait normal.   Skin: Skin is warm and dry. No rash noted. He is not diaphoretic. No erythema.   Psychiatric: He has a normal mood and affect. His behavior is normal. Judgment and thought content normal.   Nursing note and vitals reviewed.    Consults:   Anesthesia.    Significant Diagnostic Studies: Labs from 12/4/19 through today reviewed. INR from 11/6/19 through today reviewed. INR today 3.9.  Lab Results   Component Value Date    INR 3.9 (H) 12/10/2019    INR 3.1 (H) 12/04/2019    INR 3.1 (H) 12/04/2019     Cardiac Graphics: Echocardiogram:   2D echo with color flow doppler:   Results for orders placed or performed during the hospital encounter of 05/30/18   2D echo with color flow doppler   Result Value Ref Range    QEF 60 55 - 65    Mitral Valve Regurgitation TRIVIAL     Diastolic Dysfunction No     Est. PA Systolic Pressure 8.86     Tricuspid Valve Regurgitation TRIVIAL     Narrative    Date of Procedure: 06/01/2018        TEST DESCRIPTION   Technical Quality:  This is a technically poor study.     Aorta: The aortic root is normal in size, measuring 3.2 cm at sinotubular junction and 3.8 cm at Sinuses of Valsalva. The proximal ascending aorta is normal in size, measuring 3.8 cm across.     Left Atrium: The left atrium is normal in size, measuring 4.9 cm across in the parasternal view.     Left Ventricle: The left ventricle is normal in size, with an end-diastolic diameter of 5.2 cm, and an end-systolic diameter of 4.2 cm. Wall thickness is increased, with the septum measuring 1.3 cm and the posterior wall measuring 1.2 cm across. Relative   wall thickness was increased at 0.46, and the LV mass index was increased at 131.2 g/m2 consistent with concentric left ventricular hypertrophy. There are no regional wall motion abnormalities. Left ventricular systolic function appears normal. Visually   estimated ejection fraction is 60-65%.     Diastolic indices: E wave velocity 0.4 m/s, E/A ratio 0.9,  msec., Diastolic function is normal.     Right Atrium: The right atrium is normal in size.     Right Ventricle: The right ventricle is normal in size. Global right ventricular systolic function appears normal. The estimated PA systolic pressure is 9 mmHg.     Mitral Valve:  There is trivial mitral regurgitation.     Tricuspid Valve:  There is trivial tricuspid regurgitation.     IVC: IVC is normal in size and collapses > 50% with a sniff, suggesting normal right atrial pressure of 3 mmHg.     Intracavitary: There is no evidence of pericardial effusion, intracavity mass, thrombi, or vegetation.         CONCLUSIONS     1 - Normal left ventricular systolic function (EF 60-65%).     2 - Concentric hypertrophy.     3 - Trivial mitral regurgitation.     4 - Trivial tricuspid regurgitation.             This document has been electronically    SIGNED BY: Jac Walker MD On: 06/01/2018 12:55     Final Active Diagnoses:    Diagnosis Date Noted POA    PRINCIPAL PROBLEM:  Persistent  atrial fibrillation did not want amio long term; sotalol and tikosyn still with sx; 9/30/19 redo PVI with RFA; 11/1/19 DCCV [I48.19] 07/27/2012 Yes      Problems Resolved During this Admission:     No new Assessment & Plan notes have been filed under this hospital service since the last note was generated.  Service: Arrhythmia    Discharged Condition: stable    Disposition: Home or Self Care    Follow Up:  Follow-up Information     EKG 1 [facility] In 1 week.    Specialty:  Cardiology  Why:  s/p DCCV           Neel Topete MD In 1 month.    Specialties:  Electrophysiology, Cardiology  Why:  s/p DCCV  Contact information:  [address]  [facility] [id number]  [phone]                 Patient Instructions:      Diet Cardiac     No driving until:   Order Comments: No driving or operating heavy machinery for 24-48 hours after your procedure because you received sedation.     Other restrictions (specify):   Order Comments: Medications:  -Continue to take your home medications as listed on your medication list after you are discharged.  -If you have problems or side effects caused by your medications, call your Physician.    New Medications:  None.    Diet  -You may resume oral intake after you are discharged, as long you have no swallowing difficulties.    Side effects:  -You may be drowsy for the remainder of the day because you received sedation.    Because you have received sedation for this procedure:  -Limit activity for the remainder of the day.  -Do not drive or operate any equipment for the remainder of the day.  -Do not smoke for at least 6 hours and until you are fully awake and alert.  -Do not drink alcoholic beverage for 24 hours.  -Defer important decision making until the following day.    Go to the Emergency Department if you develop:  -Bleeding  -Weakness or numbness  -Visual, gait or speech disturbance  -New chest pain, palpitations, shortness of breath, rapid heart beat, or  fainting  -Fever    Follow up:  -EKG in 1 week.  -Dr. Topete in 1 month.  -Follow up with coumadin clinic.     Notify your health care provider if you experience any of the following:  increased confusion or weakness     Notify your health care provider if you experience any of the following:  persistent dizziness, light-headedness, or visual disturbances     Notify your health care provider if you experience any of the following:  worsening rash     Notify your health care provider if you experience any of the following:  severe persistent headache     Notify your health care provider if you experience any of the following:  difficulty breathing or increased cough     Notify your health care provider if you experience any of the following:  redness, tenderness, or signs of infection (pain, swelling, redness, odor or green/yellow discharge around incision site)     Notify your health care provider if you experience any of the following:  severe uncontrolled pain     Notify your health care provider if you experience any of the following:  persistent nausea and vomiting or diarrhea     Notify your health care provider if you experience any of the following:  temperature >100.4     Notify your health care provider if you experience any of the following:   Order Comments: For any concerning medical symptoms.     Medications:  Reconciled Home Medications:      Medication List      CHANGE how you take these medications    * amiodarone 200 MG Tab  Commonly known as:  PACERONE  Take 200 mg by mouth once daily. 200 mg BID until 12/17/19 then reduce down to 200 mg daily thereafter  What changed:  Another medication with the same name was added. Make sure you understand how and when to take each.     * amiodarone 200 MG Tab  Commonly known as:  PACERONE  Take 1 tablet (200 mg total) by mouth once daily.  What changed:  You were already taking a medication with the same name, and this prescription was added. Make sure you  understand how and when to take each.     warfarin 5 MG tablet  Commonly known as:  COUMADIN  TAKE ONE & ONE-HALF TABLETS BY MOUTH ONCE DAILY EXCEPT  ON  TUESDAYS  TAKE  1  TABLET ON TUESDAY  What changed:  See the new instructions.         * This list has 2 medication(s) that are the same as other medications prescribed for you. Read the directions carefully, and ask your doctor or other care provider to review them with you.            CONTINUE taking these medications    aspirin 81 mg Tab  Take 81 mg by mouth every evening. 1 Tablet Oral Every night     ciclopirox 8 % Soln  Commonly known as:  Penlac  Apply daily to affected nail. Must remove and restart weekly     cyclobenzaprine 10 MG tablet  Commonly known as:  FLEXERIL  Take 1 tablet (10 mg total) by mouth 3 (three) times daily as needed for Muscle spasms.     folic acid 1 MG tablet  Commonly known as:  FOLVITE  TAKE 1 TABLET BY MOUTH ONCE DAILY     ketoconazole 2 % cream  Commonly known as:  NIZORAL  AAA bid to feet x 3 weeks     levothyroxine 112 MCG tablet  Commonly known as:  SYNTHROID  TAKE 1 TABLET BY MOUTH ONCE DAILY BEFORE  BREAKFAST     mometasone 0.1 % ointment  Commonly known as:  ELOCON  aaa qd- bid for severe areas.  Avoid use on face and groin     Nitrostat 0.4 MG SL tablet  Generic drug:  nitroGLYCERIN  DISSOLVE ONE TABLET UNDER THE TONGUE EVERY 5 MINUTES AS NEEDED FOR CHEST PAIN.  DO NOT EXCEED A TOTAL OF 3 DOSES IN 15 MINUTES NOW     omeprazole 20 MG capsule  Commonly known as:  PRILOSEC  Take 20 mg by mouth every other day.     pantoprazole 40 MG tablet  Commonly known as:  PROTONIX  Take 1 tablet (40 mg total) by mouth once daily.     predniSONE 5 MG tablet  Commonly known as:  DELTASONE  Take 5 mg by mouth once daily.     simvastatin 80 MG tablet  Commonly known as:  ZOCOR  Take 1 tablet (80 mg total) by mouth every evening.     tamsulosin 0.4 mg Cap  Commonly known as:  FLOMAX  TAKE 1 CAPSULE BY MOUTH ONCE DAILY     triamcinolone  acetonide 0.1% 0.1 % cream  Commonly known as:  KENALOG        STOP taking these medications    sotalol 80 MG tablet  Commonly known as:  BETAPACE          Plan:  -Continue all home medications.  -Continue amiodarone 200 mg BID until 12/17/19 then decrease to 200 mg daily.  -Follow up EKG in 1 week.  -Follow up with Dr. Topete in 1 month.    Time spent on the discharge of patient: 14 minutes    Renay Fernandez NP  Cardiac Electrophysiology  [facility] Cardiac [facility]    Attending: Neel Topete MD

## 2019-12-12 ENCOUNTER — TELEPHONE (OUTPATIENT)
Dept: FAMILY MEDICINE | Facility: CLINIC | Age: 84
End: 2019-12-12

## 2019-12-12 DIAGNOSIS — N18.30 CHRONIC KIDNEY DISEASE, STAGE 3: ICD-10-CM

## 2019-12-12 DIAGNOSIS — Z12.5 ENCOUNTER FOR SCREENING FOR MALIGNANT NEOPLASM OF PROSTATE: ICD-10-CM

## 2019-12-12 DIAGNOSIS — E29.1 HYPOGONADISM IN MALE: ICD-10-CM

## 2019-12-12 DIAGNOSIS — E03.9 ACQUIRED HYPOTHYROIDISM: ICD-10-CM

## 2019-12-12 DIAGNOSIS — E23.0 PANHYPOPITUITARISM: ICD-10-CM

## 2019-12-12 DIAGNOSIS — E78.2 MIXED HYPERLIPIDEMIA: ICD-10-CM

## 2019-12-12 DIAGNOSIS — I25.119 CORONARY ARTERY DISEASE INVOLVING NATIVE CORONARY ARTERY OF NATIVE HEART WITH ANGINA PECTORIS: Primary | ICD-10-CM

## 2019-12-12 NOTE — PROGRESS NOTES
Subjective:       Patient ID: Linda Skelton is a 84 y.o. male.    Chief Complaint: Tinnitus    HPI     Linda Skelton is a 84 y.o. male  Presents for evaluation of left-sided tinnitus.  The tinnitus was severe and constant and lasted for several days and started 1 week ago.  This resolved last week following treatment of atrial flutter with a cardioversion.  He has not had it since that time. He reports a history of chronic hearing loss of bilateral ears.    Review of Systems   Constitutional: Negative for chills, fever and unexpected weight change.   HENT: Negative for sore throat and trouble swallowing.    Eyes: Negative for pain and visual disturbance.   Respiratory: Negative for apnea and shortness of breath.    Cardiovascular: Negative for chest pain and palpitations.   Gastrointestinal: Negative for abdominal pain and nausea.   Endocrine: Negative for cold intolerance and heat intolerance.   Musculoskeletal: Negative for joint swelling and neck stiffness.   Skin: Negative for color change and rash.   Neurological: Negative for facial asymmetry and headaches.   Hematological: Negative for adenopathy. Does not bruise/bleed easily.   Psychiatric/Behavioral: Negative for agitation. The patient is not nervous/anxious.        Objective:      Physical Exam   Constitutional: He is oriented to person, place, and time. He appears well-developed and well-nourished. No distress.   HENT:   Head: Normocephalic and atraumatic.   Right Ear: Tympanic membrane, external ear and ear canal normal.   Left Ear: Tympanic membrane and external ear normal. Left ear swelling: cerumen impaction noted, see procedure note.   Nose: Nose normal.   Mouth/Throat: Uvula is midline, oropharynx is clear and moist and mucous membranes are normal.   Winter: Midline  Rinne: AC > BC @ 512Hz   Eyes: Pupils are equal, round, and reactive to light. Conjunctivae and EOM are normal.   Neck: Normal range of motion. No tracheal deviation present. No  thyromegaly present.   Cardiovascular: Normal rate and regular rhythm.   Pulmonary/Chest: Effort normal. No respiratory distress.   Musculoskeletal: Normal range of motion.   Lymphadenopathy:        Head (right side): No submental, no submandibular and no tonsillar adenopathy present.        Head (left side): No submental, no submandibular and no tonsillar adenopathy present.     He has no cervical adenopathy.   Neurological: He is alert and oriented to person, place, and time.   Psychiatric: He has a normal mood and affect. His behavior is normal.   Nursing note and vitals reviewed.      Data:      Audiogram tracings independently reviewed and discussed with patient shows moderate-severe SNHL AU    Assessment:       1. Sensorineural hearing loss (SNHL) of both ears    2. Tinnitus, left    3. Impacted cerumen of left ear        Plan:        tinnitus has resolved, etiology and treatment discussed   impaction removed   counseled on hearing loss, recommend hearing aids    F/u yearly audiogram

## 2019-12-12 NOTE — TELEPHONE ENCOUNTER
----- Message from Selin Ayala sent at 12/12/2019  9:29 AM CST -----  Contact: Patient   Type: Lab    Caller is requesting to schedule their Lab appointment prior to annual appointment.    Order is not listed in EPIC.  Please enter order and contact patient to schedule.    Name of Caller: Patient     Preferred Date and Time of Labs: 4/3/2020    Date of EPP Appointment:4/3/2020    Where would they like the lab performed? [place]     Would the patient rather a call back or a response via [facility]? Call back     Best Call Back Number: [phone]

## 2019-12-17 ENCOUNTER — ANTI-COAG VISIT (OUTPATIENT)
Dept: CARDIOLOGY | Facility: CLINIC | Age: 84
End: 2019-12-17
Payer: MEDICARE

## 2019-12-17 ENCOUNTER — HOSPITAL ENCOUNTER (OUTPATIENT)
Dept: CARDIOLOGY | Facility: CLINIC | Age: 84
Discharge: HOME | End: 2019-12-17
Payer: MEDICARE

## 2019-12-17 ENCOUNTER — LAB VISIT (OUTPATIENT)
Dept: LAB | Facility: HOSPITAL | Age: 84
End: 2019-12-17
Payer: MEDICARE

## 2019-12-17 DIAGNOSIS — I48.19 PERSISTENT ATRIAL FIBRILLATION: ICD-10-CM

## 2019-12-17 DIAGNOSIS — I48.0 PAF (PAROXYSMAL ATRIAL FIBRILLATION): ICD-10-CM

## 2019-12-17 LAB
INR PPP: 2.3 (ref 0.8–1.2)
PROTHROMBIN TIME: 22.6 SEC (ref 9–12.5)

## 2019-12-17 PROCEDURE — 36415 COLL VENOUS BLD VENIPUNCTURE: CPT | Mod: HCNC

## 2019-12-17 PROCEDURE — 93010 ELECTROCARDIOGRAM REPORT: CPT | Mod: HCNC,S$GLB,, | Performed by: INTERNAL MEDICINE

## 2019-12-17 PROCEDURE — 93793 PR ANTICOAGULANT MGMT FOR PT TAKING WARFARIN: ICD-10-PCS | Mod: S$GLB,,,

## 2019-12-17 PROCEDURE — 93005 RHYTHM STRIP: ICD-10-PCS | Mod: HCNC,S$GLB,, | Performed by: INTERNAL MEDICINE

## 2019-12-17 PROCEDURE — 85610 PROTHROMBIN TIME: CPT | Mod: HCNC

## 2019-12-17 PROCEDURE — 93793 ANTICOAG MGMT PT WARFARIN: CPT | Mod: S$GLB,,,

## 2019-12-17 PROCEDURE — 93010 RHYTHM STRIP: ICD-10-PCS | Mod: HCNC,S$GLB,, | Performed by: INTERNAL MEDICINE

## 2019-12-17 PROCEDURE — 93005 ELECTROCARDIOGRAM TRACING: CPT | Mod: HCNC,S$GLB,, | Performed by: INTERNAL MEDICINE

## 2019-12-18 ENCOUNTER — CLINICAL SUPPORT (OUTPATIENT)
Dept: FAMILY MEDICINE | Facility: CLINIC | Age: 84
End: 2019-12-18
Payer: MEDICARE

## 2019-12-18 ENCOUNTER — OFFICE VISIT (OUTPATIENT)
Dept: DERMATOLOGY | Facility: CLINIC | Age: 84
End: 2019-12-18
Payer: MEDICARE

## 2019-12-18 DIAGNOSIS — E29.1 HYPOGONADISM IN MALE: Primary | ICD-10-CM

## 2019-12-18 DIAGNOSIS — Z86.006 HISTORY OF MELANOMA IN SITU: ICD-10-CM

## 2019-12-18 DIAGNOSIS — D22.9 NEVUS: ICD-10-CM

## 2019-12-18 DIAGNOSIS — L90.5 SCAR: ICD-10-CM

## 2019-12-18 DIAGNOSIS — D18.01 CHERRY ANGIOMA: ICD-10-CM

## 2019-12-18 DIAGNOSIS — C84.A0 CUTANEOUS T-CELL LYMPHOMA, UNSPECIFIED BODY REGION: ICD-10-CM

## 2019-12-18 DIAGNOSIS — Z85.828 PERSONAL HISTORY OF SKIN CANCER: ICD-10-CM

## 2019-12-18 DIAGNOSIS — B35.4 TINEA CORPORIS: ICD-10-CM

## 2019-12-18 DIAGNOSIS — L57.0 AK (ACTINIC KERATOSIS): Primary | ICD-10-CM

## 2019-12-18 DIAGNOSIS — L82.1 SK (SEBORRHEIC KERATOSIS): ICD-10-CM

## 2019-12-18 PROCEDURE — 99499 UNLISTED E&M SERVICE: CPT | Mod: HCNC,S$GLB,, | Performed by: INTERNAL MEDICINE

## 2019-12-18 PROCEDURE — 99499 NO LOS: ICD-10-PCS | Mod: HCNC,S$GLB,, | Performed by: INTERNAL MEDICINE

## 2019-12-18 PROCEDURE — 1126F PR PAIN SEVERITY QUANTIFIED, NO PAIN PRESENT: ICD-10-PCS | Mod: HCNC,S$GLB,, | Performed by: DERMATOLOGY

## 2019-12-18 PROCEDURE — 96373 PR INJECTION,THERAP/PROPH/DIAGNOST, INTRA-ARTERIAL: ICD-10-PCS | Mod: HCNC,S$GLB,, | Performed by: INTERNAL MEDICINE

## 2019-12-18 PROCEDURE — 1101F PR PT FALLS ASSESS DOC 0-1 FALLS W/OUT INJ PAST YR: ICD-10-PCS | Mod: HCNC,CPTII,S$GLB, | Performed by: DERMATOLOGY

## 2019-12-18 PROCEDURE — 99999 PR PBB SHADOW E&M-EST. PATIENT-LVL III: ICD-10-PCS | Mod: PBBFAC,HCNC,, | Performed by: DERMATOLOGY

## 2019-12-18 PROCEDURE — 1159F PR MEDICATION LIST DOCUMENTED IN MEDICAL RECORD: ICD-10-PCS | Mod: HCNC,S$GLB,, | Performed by: DERMATOLOGY

## 2019-12-18 PROCEDURE — 96373 THER/PROPH/DIAG INJ IA: CPT | Mod: HCNC,S$GLB,, | Performed by: INTERNAL MEDICINE

## 2019-12-18 PROCEDURE — 17003 DESTRUCT PREMALG LES 2-14: CPT | Mod: HCNC,S$GLB,, | Performed by: DERMATOLOGY

## 2019-12-18 PROCEDURE — 99214 OFFICE O/P EST MOD 30 MIN: CPT | Mod: 25,HCNC,S$GLB, | Performed by: DERMATOLOGY

## 2019-12-18 PROCEDURE — 99999 PR PBB SHADOW E&M-EST. PATIENT-LVL III: CPT | Mod: PBBFAC,HCNC,, | Performed by: DERMATOLOGY

## 2019-12-18 PROCEDURE — 17000 PR DESTRUCTION(LASER SURGERY,CRYOSURGERY,CHEMOSURGERY),PREMALIGNANT LESIONS,FIRST LESION: ICD-10-PCS | Mod: HCNC,S$GLB,, | Performed by: DERMATOLOGY

## 2019-12-18 PROCEDURE — 96372 PR INJECTION,THERAP/PROPH/DIAG2ST, IM OR SUBCUT: ICD-10-PCS | Mod: HCNC,S$GLB,, | Performed by: INTERNAL MEDICINE

## 2019-12-18 PROCEDURE — 99499 RISK ADDL DX/OHS AUDIT: ICD-10-PCS | Mod: HCNC,S$GLB,, | Performed by: DERMATOLOGY

## 2019-12-18 PROCEDURE — 99499 UNLISTED E&M SERVICE: CPT | Mod: HCNC,S$GLB,, | Performed by: DERMATOLOGY

## 2019-12-18 PROCEDURE — 17003 DESTRUCTION, PREMALIGNANT LESIONS; SECOND THROUGH 14 LESIONS: ICD-10-PCS | Mod: HCNC,S$GLB,, | Performed by: DERMATOLOGY

## 2019-12-18 PROCEDURE — 1101F PT FALLS ASSESS-DOCD LE1/YR: CPT | Mod: HCNC,CPTII,S$GLB, | Performed by: DERMATOLOGY

## 2019-12-18 PROCEDURE — 99999 PR PBB SHADOW E&M-EST. PATIENT-LVL I: CPT | Mod: PBBFAC,HCNC,,

## 2019-12-18 PROCEDURE — 17000 DESTRUCT PREMALG LESION: CPT | Mod: HCNC,S$GLB,, | Performed by: DERMATOLOGY

## 2019-12-18 PROCEDURE — 99999 PR PBB SHADOW E&M-EST. PATIENT-LVL I: ICD-10-PCS | Mod: PBBFAC,HCNC,,

## 2019-12-18 PROCEDURE — 1159F MED LIST DOCD IN RCRD: CPT | Mod: HCNC,S$GLB,, | Performed by: DERMATOLOGY

## 2019-12-18 PROCEDURE — 96372 THER/PROPH/DIAG INJ SC/IM: CPT | Mod: HCNC,S$GLB,, | Performed by: INTERNAL MEDICINE

## 2019-12-18 PROCEDURE — 99214 PR OFFICE/OUTPT VISIT, EST, LEVL IV, 30-39 MIN: ICD-10-PCS | Mod: 25,HCNC,S$GLB, | Performed by: DERMATOLOGY

## 2019-12-18 PROCEDURE — 1126F AMNT PAIN NOTED NONE PRSNT: CPT | Mod: HCNC,S$GLB,, | Performed by: DERMATOLOGY

## 2019-12-18 RX ORDER — MOMETASONE FUROATE 1 MG/ML
SOLUTION TOPICAL
Qty: 60 ML | Refills: 3 | Status: ON HOLD | OUTPATIENT
Start: 2019-12-18 | End: 2020-02-22 | Stop reason: HOSPADM

## 2019-12-18 RX ADMIN — TESTOSTERONE CYPIONATE 100 MG: 200 INJECTION, SOLUTION INTRAMUSCULAR at 10:12

## 2019-12-18 NOTE — PROGRESS NOTES
Subjective:       Patient ID:  Linda Skelton is a 84 y.o. male who presents for   Chief Complaint   Patient presents with    Skin Check    Rash     Pt here today for a TBSE. Pt c/o scaly patch on bottom  x a few weeks. Tx with TAC cream, cerave cream and vaseline with minimal improvement.   Pt with hx of CTCL, melanoma in situ, several NMSC      Review of Systems   Constitutional: Negative for fever, chills, weight loss and weight gain.   Skin: Positive for rash and activity-related sunscreen use. Negative for itching, daily sunscreen use, tendency to form keloidal scars and recent sunburn.   Hematologic/Lymphatic: Negative for adenopathy. Bruises/bleeds easily.        Objective:    Physical Exam   Constitutional: He appears well-developed and well-nourished. No distress.   Lymphadenopathy: No supraclavicular adenopathy is present.     He has no cervical adenopathy.     He has no axillary adenopathy.     He has no inguinal adenopathy.   Neurological: He is alert and oriented to person, place, and time. He is not disoriented.   Psychiatric: He has a normal mood and affect.   Skin:   Areas Examined (abnormalities noted in diagram):   Scalp / Hair Palpated and Inspected  Head / Face Inspection Performed  Neck Inspection Performed  Chest / Axilla Inspection Performed  Abdomen Inspection Performed  Genitals / Buttocks / Groin Inspection Performed  Back Inspection Performed  RUE Inspected  LUE Inspection Performed  RLE Inspected  LLE Inspection Performed  Nails and Digits Inspection Performed                           Diagram Legend     Erythematous scaling macule/papule c/w actinic keratosis       Vascular papule c/w angioma      Pigmented verrucoid papule/plaque c/w seborrheic keratosis      Yellow umbilicated papule c/w sebaceous hyperplasia      Irregularly shaped tan macule c/w lentigo     1-2 mm smooth white papules consistent with Milia      Movable subcutaneous cyst with punctum c/w epidermal inclusion  cyst      Subcutaneous movable cyst c/w pilar cyst      Firm pink to brown papule c/w dermatofibroma      Pedunculated fleshy papule(s) c/w skin tag(s)      Evenly pigmented macule c/w junctional nevus     Mildly variegated pigmented, slightly irregular-bordered macule c/w mildly atypical nevus      Flesh colored to evenly pigmented papule c/w intradermal nevus       Pink pearly papule/plaque c/w basal cell carcinoma      Erythematous hyperkeratotic cursted plaque c/w SCC      Surgical scar with no sign of skin cancer recurrence      Open and closed comedones      Inflammatory papules and pustules      Verrucoid papule consistent consistent with wart     Erythematous eczematous patches and plaques     Dystrophic onycholytic nail with subungual debris c/w onychomycosis     Umbilicated papule    Erythematous-base heme-crusted tan verrucoid plaque consistent with inflamed seborrheic keratosis     Erythematous Silvery Scaling Plaque c/w Psoriasis     See annotation      Assessment / Plan:        AK (actinic keratosis)  Cryosurgery Procedure Note    Verbal consent from the patient is obtained including, but not limited to, risk of hypopigmentation/hyperpigmentation, scar, recurrence of lesion. The patient is aware of the precancerous quality and need for treatment of these lesions. Liquid nitrogen cryosurgery is applied to the 8 actinic keratoses, as detailed in the physical exam, to produce a freeze injury. The patient is aware that blisters may form and is instructed on wound care with gentle cleansing and use of vaseline ointment to keep moist until healed. The patient is supplied a handout on cryosurgery and is instructed to call if lesions do not completely resolve.      Cutaneous T-cell lymphoma, unspecified body region- cannot tell if pt flaring v diffuse asteotosis  Will tx with below and then recheck at follow up  -     mometasone (ELOCON) 0.1 % solution; Mix entire bottle in jar of cerave cream and aaa qd- bid  prn itching  Dispense: 60 mL; Refill: 3    Cherry angioma  These are benign vascular lesions that are inherited.  Treatment is not necessary.    Nevus  Discussed ABCDE's of nevi.  Monitor for new mole or moles that are becoming bigger, darker, irritated, or developing irregular borders. Brochure provided.    SK (seborrheic keratosis)  These are benign inherited growths without a malignant potential. Reassurance given to patient. No treatment is necessary.     Tinea corporis  Ketoconazole cream 2% bid     Personal history of skin cancer  History of melanoma in situ  Scar  Pt with history of non melanoma skin cancer  Total body skin examination performed today including at least 12 points as noted in physical examination. No suspicious lesions noted.             Follow up in about 2 months (around 2/18/2020).

## 2019-12-19 ENCOUNTER — TELEPHONE (OUTPATIENT)
Dept: ELECTROPHYSIOLOGY | Facility: CLINIC | Age: 84
End: 2019-12-19

## 2019-12-19 NOTE — TELEPHONE ENCOUNTER
Spoke to Mr. Mckeon who reports he wanted to ensure his post dccv clinic f/u got set up.  F/u set up for 1/9/20.    Mr. Mckeon also reports when he went to  his Amiodarone from the pharmacy the pharmacist warned him of increased effects due to also taking simvastatin and coumadin.  Let Mr. Mckeon know I will discuss with Dr. Topete and let him know of any changes he recommends.      Mr. Mckeon verbalizes understanding and appreciates call.

## 2019-12-19 NOTE — TELEPHONE ENCOUNTER
----- Message from Maria De Jesus Soriano MA sent at 12/18/2019  5:10 PM CST -----  Pt would like you to give him a call.  Thanks.

## 2019-12-20 ENCOUNTER — LAB VISIT (OUTPATIENT)
Dept: LAB | Facility: HOSPITAL | Age: 84
End: 2019-12-20
Attending: INTERNAL MEDICINE
Payer: MEDICARE

## 2019-12-20 ENCOUNTER — OFFICE VISIT (OUTPATIENT)
Dept: FAMILY MEDICINE | Facility: CLINIC | Age: 84
End: 2019-12-20
Payer: MEDICARE

## 2019-12-20 ENCOUNTER — PATIENT MESSAGE (OUTPATIENT)
Dept: ELECTROPHYSIOLOGY | Facility: CLINIC | Age: 84
End: 2019-12-20

## 2019-12-20 VITALS
DIASTOLIC BLOOD PRESSURE: 84 MMHG | BODY MASS INDEX: 30.37 KG/M2 | TEMPERATURE: 98 F | HEART RATE: 70 BPM | WEIGHT: 244.25 LBS | HEIGHT: 75 IN | RESPIRATION RATE: 16 BRPM | SYSTOLIC BLOOD PRESSURE: 156 MMHG | OXYGEN SATURATION: 96 %

## 2019-12-20 DIAGNOSIS — R19.5 DARK STOOLS: ICD-10-CM

## 2019-12-20 DIAGNOSIS — K52.9 GASTROENTERITIS: Primary | ICD-10-CM

## 2019-12-20 DIAGNOSIS — Z79.01 LONG TERM (CURRENT) USE OF ANTICOAGULANTS: ICD-10-CM

## 2019-12-20 PROBLEM — I48.91 ATRIAL FIBRILLATION: Status: RESOLVED | Noted: 2019-11-01 | Resolved: 2019-12-20

## 2019-12-20 LAB
BASOPHILS # BLD AUTO: 0.01 K/UL (ref 0–0.2)
BASOPHILS NFR BLD: 0.2 % (ref 0–1.9)
DIFFERENTIAL METHOD: ABNORMAL
EOSINOPHIL # BLD AUTO: 0 K/UL (ref 0–0.5)
EOSINOPHIL NFR BLD: 0.9 % (ref 0–8)
ERYTHROCYTE [DISTWIDTH] IN BLOOD BY AUTOMATED COUNT: 14.9 % (ref 11.5–14.5)
HCT VFR BLD AUTO: 46.9 % (ref 40–54)
HGB BLD-MCNC: 14.6 G/DL (ref 14–18)
IMM GRANULOCYTES # BLD AUTO: 0.01 K/UL (ref 0–0.04)
IMM GRANULOCYTES NFR BLD AUTO: 0.2 % (ref 0–0.5)
LYMPHOCYTES # BLD AUTO: 0.8 K/UL (ref 1–4.8)
LYMPHOCYTES NFR BLD: 17.7 % (ref 18–48)
MCH RBC QN AUTO: 31.7 PG (ref 27–31)
MCHC RBC AUTO-ENTMCNC: 31.1 G/DL (ref 32–36)
MCV RBC AUTO: 102 FL (ref 82–98)
MONOCYTES # BLD AUTO: 0.8 K/UL (ref 0.3–1)
MONOCYTES NFR BLD: 17.7 % (ref 4–15)
NEUTROPHILS # BLD AUTO: 2.8 K/UL (ref 1.8–7.7)
NEUTROPHILS NFR BLD: 63.3 % (ref 38–73)
NRBC BLD-RTO: 0 /100 WBC
PLATELET # BLD AUTO: 124 K/UL (ref 150–350)
PMV BLD AUTO: 12 FL (ref 9.2–12.9)
RBC # BLD AUTO: 4.6 M/UL (ref 4.6–6.2)
WBC # BLD AUTO: 4.35 K/UL (ref 3.9–12.7)

## 2019-12-20 PROCEDURE — 1126F PR PAIN SEVERITY QUANTIFIED, NO PAIN PRESENT: ICD-10-PCS | Mod: HCNC,S$GLB,, | Performed by: INTERNAL MEDICINE

## 2019-12-20 PROCEDURE — 99214 PR OFFICE/OUTPT VISIT, EST, LEVL IV, 30-39 MIN: ICD-10-PCS | Mod: HCNC,S$GLB,, | Performed by: INTERNAL MEDICINE

## 2019-12-20 PROCEDURE — 3077F PR MOST RECENT SYSTOLIC BLOOD PRESSURE >= 140 MM HG: ICD-10-PCS | Mod: HCNC,CPTII,S$GLB, | Performed by: INTERNAL MEDICINE

## 2019-12-20 PROCEDURE — 1101F PR PT FALLS ASSESS DOC 0-1 FALLS W/OUT INJ PAST YR: ICD-10-PCS | Mod: HCNC,CPTII,S$GLB, | Performed by: INTERNAL MEDICINE

## 2019-12-20 PROCEDURE — 1159F MED LIST DOCD IN RCRD: CPT | Mod: HCNC,S$GLB,, | Performed by: INTERNAL MEDICINE

## 2019-12-20 PROCEDURE — 99999 PR PBB SHADOW E&M-EST. PATIENT-LVL IV: CPT | Mod: PBBFAC,HCNC,, | Performed by: INTERNAL MEDICINE

## 2019-12-20 PROCEDURE — 36415 COLL VENOUS BLD VENIPUNCTURE: CPT | Mod: HCNC,PO

## 2019-12-20 PROCEDURE — 99999 PR PBB SHADOW E&M-EST. PATIENT-LVL IV: ICD-10-PCS | Mod: PBBFAC,HCNC,, | Performed by: INTERNAL MEDICINE

## 2019-12-20 PROCEDURE — 1159F PR MEDICATION LIST DOCUMENTED IN MEDICAL RECORD: ICD-10-PCS | Mod: HCNC,S$GLB,, | Performed by: INTERNAL MEDICINE

## 2019-12-20 PROCEDURE — 3077F SYST BP >= 140 MM HG: CPT | Mod: HCNC,CPTII,S$GLB, | Performed by: INTERNAL MEDICINE

## 2019-12-20 PROCEDURE — 1126F AMNT PAIN NOTED NONE PRSNT: CPT | Mod: HCNC,S$GLB,, | Performed by: INTERNAL MEDICINE

## 2019-12-20 PROCEDURE — 99214 OFFICE O/P EST MOD 30 MIN: CPT | Mod: HCNC,S$GLB,, | Performed by: INTERNAL MEDICINE

## 2019-12-20 PROCEDURE — 85025 COMPLETE CBC W/AUTO DIFF WBC: CPT | Mod: HCNC

## 2019-12-20 PROCEDURE — 3079F PR MOST RECENT DIASTOLIC BLOOD PRESSURE 80-89 MM HG: ICD-10-PCS | Mod: HCNC,CPTII,S$GLB, | Performed by: INTERNAL MEDICINE

## 2019-12-20 PROCEDURE — 1101F PT FALLS ASSESS-DOCD LE1/YR: CPT | Mod: HCNC,CPTII,S$GLB, | Performed by: INTERNAL MEDICINE

## 2019-12-20 PROCEDURE — 3079F DIAST BP 80-89 MM HG: CPT | Mod: HCNC,CPTII,S$GLB, | Performed by: INTERNAL MEDICINE

## 2019-12-20 RX ORDER — PRAVASTATIN SODIUM 40 MG/1
40 TABLET ORAL DAILY
Qty: 30 TABLET | Refills: 11 | Status: SHIPPED | OUTPATIENT
Start: 2019-12-20 | End: 2019-12-23

## 2019-12-20 NOTE — TELEPHONE ENCOUNTER
Attempting to reach Mr. Skelton.  No answer x 2.  Message left letting him know I will send information via portal.

## 2019-12-20 NOTE — PROGRESS NOTES
This note was created by combination of typed  and M-Modal dictation.  Transcription errors may be present.  If there are any questions, please contact me.    Assessment & Plan:   Gastroenteritis  Dark stools  Long term (current) use of anticoagulants  -I think this is from pepto bismol.  No chest pain, lightheadedness, no BRBPR. No abd pain.   Check CBC. If significant change - will need to go to ER for eval and IVF and EGD.  Otherwise if normal/stable, BRAT diet.  D/C pepto. immodium is OK. No indication for antibiotic.  -     CBC auto differential; Future; Expected date: 12/20/2019    There are no discontinued medications.    meds sent this encounter:       Follow Up: No follow-ups on file.    Subjective:     Chief Complaint   Patient presents with    Diarrhea     Started Monday night        HPI  Linda is a 84 y.o. male, last appointment with this clinic was Visit date not found.    I previously saw him back in May for what was suspicious to me for tear of his knee.    He underwent DC cardioversion with unfortunate reversion to atrial fibrillation.  Underwent repeat DC cardioversion December 10th  With plans to slowly decrease the amiodarone over the course of the month.  Sotalol trial, did not keep him out of AFib; was changed to amio 12/2019    He is on anticoagulation for the AFib but also for history of DVT status post IVC filter.  On long-term anticoagulation for this.    Last CBC 12/4  Last INR 12/17 in therapeutic range    Monday night, around 10:30 PM, sudden bowel urgency without warning.  Fecal incontinence. Several bowel movements that evening.  Tue had f/u at the hospital, thought he was doing OK but Tue evening had more diarrhea.   And last night, Thur night, restarted. Affected his sleep b/c he was concerned about it recurring.   It's black stool. No pain.  He did take pepto bismol. Took first dose Tue AM. And this morning. No weakness or dizziness  No raw or undercooked food, though he  did eat some leftovers Monday afternoon that in hindsight questionable.   Daytime, does not seem to occur very much.  No vomiting. But belching. With foul smell.   eating OK.  Urinating OK. No fever. No one else having this.   Yesterday ate roast beef, potato. And made lettuce salad.    Patient Care Team:  Anderson Jerome MD as PCP - General (Internal Medicine)  Octaviano Núñez Jr., MD as Consulting Physician (Urology)  Jac Madison II, MD as Consulting Physician (Endocrinology)  Kamini Wheeler MD as Consulting Physician (Dermatology)  Edita Ferraro RN as Oncology Navigator (Hematology and Oncology)  John Wise MA as Care Coordinator    Patient Active Problem List    Diagnosis Date Noted    Presence of IVC filter 1997 07/30/2019    Gastroesophageal reflux disease without esophagitis 06/11/2019    Tubular adenoma of colon on colonoscopy 2014 04/03/2019    Perforated nasal septum 06/15/2018    History of bacteremia with klebsiella during cholecystitis summer 2018 06/07/2018    Status post cholecystectomy 6/2018 with klebsiella bacteremia 06/07/2018    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 04/26/2018    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    Long term (current) use of anticoagulants 07/20/2017    Benign non-nodular prostatic hyperplasia without lower urinary tract symptoms 07/05/2017    Hypogonadism in male 07/05/2017    History of melanoma in situ 02/23/2017     Scalp 1/2017 tx with mohs       Mixed hyperlipidemia 10/23/2014    History of deep 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, 1997 09/08/2014     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)      Obesity, Class I, BMI 30-34.9 04/04/2014    ED (erectile dysfunction) 04/04/2014    Essential hypertension 04/04/2014    Bilateral leg edema 01/06/2014    Mitral regurgitation mild, posterior leaflet MVP on EUGENE 6/2019 02/14/2013    Coronary artery disease  involving native coronary artery of native heart with angina pectoris hx NSTEMI with PCI to RCA 2000     Chronic kidney disease, stage 3 12/26/2012    Thyroid nodule 08/06/2012     10/31/2017 thyroid US: Stable left lobe nodule      Panhypopituitarism thyroid/prednisone/testosterone 08/06/2012    Persistent atrial fibrillation did not want amio long term; 9/30/19 redo PVI with RFA; 11/1/19 DCCV; 12/10/19 repeat DCCV 07/27/2012 6/2019 Admitted to hospital medicine for sotalol initiation.  QTC baseline 456, first dose ,  now.  EP following.  s/p EUGENE/ DCCV 6/13 as he had not cardioverted with medication. Now SR / PVC's.  Home with sotalol 80 mg bid.   at time of discharge.   9/30/19 Successful redo-pulmonary vein RF ablation.  Successful left atrial posterior wall isolation for complex CAFEs  Procedures performed: ICE guidance for double transseptal puncture, redo PVI with RFA with reisolation of the right pulmonary veins, left atrial posterior wall isolation to treat complex fractionated electrograms, 3D mapping with Carto, CS catheter placement and pacing.  11/1/19 DCCV  12/10/19 repeat DCCV         PAST MEDICAL HISTORY:  Past Medical History:   Diagnosis Date    ACS (acute coronary syndrome)     Acute coronary syndrome 2000    NSTEMI    Anticoagulant long-term use     Coumadin    Atrial fibrillation     Basal cell carcinoma excised     left scalp vertex    Bilateral leg edema 1/6/2014    BPH (benign prostatic hyperplasia)     CKD (chronic kidney disease)     Coronary artery disease     DVT of leg (deep venous thrombosis) 09/08/2014    On coumadin for years    Encounter for blood transfusion     Hyperlipidemia     Hypertension     Hypogonadism, male     Leg fracture, left     Melanoma 01/04/2017     in situ crown of scalp    MI (myocardial infarction)     Panhypopituitarism     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 EP LAB;  Service: Cardiology;  Laterality: N/A;  AF, BRYSON, PVI (re-do), Carto, Gen, MN, 3 Prep *right groin only access and exchange CS/ICE catheters throughout as needed*    APPENDECTOMY      BASAL CELL CARCINOMA EXCISION  01/2008    BRAIN SURGERY      pituitary adenoma removed    CARDIAC CATHETERIZATION  05/09/2000    S/P stent to RCA,    CARDIAC CATHETERIZATION      stents placed    CARDIAC ELECTROPHYSIOLOGY STUDY AND ABLATION      CORONARY ANGIOPLASTY  5/9/2000    RCA    CORONARY ANGIOPLASTY WITH STENT PLACEMENT      ESOPHAGEAL DILATION      EYE SURGERY Bilateral     cataracts removed and new lenses placed     FRACTURE SURGERY Left     leg    HERNIA REPAIR      LAPAROSCOPIC CHOLECYSTECTOMY N/A 6/2/2018    Procedure: CHOLECYSTECTOMY, LAPAROSCOPIC;  Surgeon: Dickson Smith Jr., MD;  Location: [facility];  Service: General;  Laterality: N/A;    left femur surgery      pituitatry      hypophysectomy    SKIN BIOPSY      TONSILLECTOMY      TREATMENT OF CARDIAC ARRHYTHMIA Bilateral 6/13/2019    Procedure: Cardioversion or Defibrillation;  Surgeon: Eric Nguyen MD;  Location: [facility] EP LAB;  Service: Cardiology;  Laterality: Bilateral;  af, bryson, dccv, anes, pr, 324    TREATMENT OF CARDIAC ARRHYTHMIA  9/30/2019    Procedure: Cardioversion or Defibrillation;  Surgeon: Neel Topete MD;  Location: [facility] EP LAB;  Service: Cardiology;;    TREATMENT OF CARDIAC ARRHYTHMIA N/A 11/1/2019    Procedure: CARDIOVERSION;  Surgeon: Eric Nguyen MD;  Location: [facility] EP LAB;  Service: Cardiology;  Laterality: N/A;  AF, BRYSON (Cx if Serial INRs x 4), DCCV, MAC, DM, 3 Prep    TREATMENT OF CARDIAC  ARRHYTHMIA N/A 12/10/2019    Procedure: CARDIOVERSION;  Surgeon: Neel Topete MD;  Location: [facility] EP LAB;  Service: Cardiology;  Laterality: N/A;  AF, EUGENE (Cx if weekly INRs > 2), DCCV, MAC, MT, 3 Prep    VASCULAR SURGERY         SOCIAL HISTORY:  Social History     Socioeconomic History    Marital status:      Spouse name: Not on file    Number of children: Not on file    Years of education: Not on file    Highest education level: Not on file   Occupational History    Not on file   Social Needs    Financial resource strain: Not hard at all    Food insecurity:     Worry: Never true     Inability: Never true    Transportation needs:     Medical: No     Non-medical: No   Tobacco Use    Smoking status: Never Smoker    Smokeless tobacco: Never Used   Substance and Sexual Activity    Alcohol use: No     Frequency: Never    Drug use: No    Sexual activity: Not Currently     Partners: Female   Lifestyle    Physical activity:     Days per week: 0 days     Minutes per session: 0 min    Stress: Not at all   Relationships    Social connections:     Talks on phone: More than three times a week     Gets together: Once a week     Attends [employer] service: Not on file     Active member of club or organization: Yes     Attends meetings of clubs or organizations: More than 4 times per year     Relationship status:    Other Topics Concern    Not on file   Social History Narrative    retired [employer] .  Non smoker.        ALLERGIES AND MEDICATIONS: updated and reviewed.  Review of patient's allergies indicates:  No Known Allergies  Current Outpatient Medications   Medication Sig Dispense Refill    amiodarone (PACERONE) 200 MG Tab Take 200 mg by mouth once daily. 200 mg BID until 12/17/19 then reduce down to 200 mg daily thereafter      amiodarone (PACERONE) 200 MG Tab Take 1 tablet (200 mg total) by mouth once daily. 30 tablet 6    aspirin 81 mg Tab Take 81 mg by mouth every evening. 1  Tablet Oral Every night      ciclopirox (PENLAC) 8 % Soln Apply daily to affected nail. Must remove and restart weekly 1 Bottle 5    cyclobenzaprine (FLEXERIL) 10 MG tablet Take 1 tablet (10 mg total) by mouth 3 (three) times daily as needed for Muscle spasms. 15 tablet 0    folic acid (FOLVITE) 1 MG tablet TAKE 1 TABLET BY MOUTH ONCE DAILY 90 tablet 3    ketoconazole (NIZORAL) 2 % cream AAA bid to feet x 3 weeks 60 g 3    levothyroxine (SYNTHROID) 112 MCG tablet TAKE 1 TABLET BY MOUTH ONCE DAILY BEFORE  BREAKFAST 90 tablet 3    mometasone (ELOCON) 0.1 % ointment aaa qd- bid for severe areas.  Avoid use on face and groin 60 g 1    mometasone (ELOCON) 0.1 % solution Mix entire bottle in jar of cerave cream and aaa qd- bid prn itching 60 mL 3    NITROSTAT 0.4 mg SL tablet DISSOLVE ONE TABLET UNDER THE TONGUE EVERY 5 MINUTES AS NEEDED FOR CHEST PAIN.  DO NOT EXCEED A TOTAL OF 3 DOSES IN 15 MINUTES NOW 25 tablet 3    omeprazole (PRILOSEC) 20 MG capsule Take 20 mg by mouth every other day.      predniSONE (DELTASONE) 5 MG tablet Take 5 mg by mouth once daily.      simvastatin (ZOCOR) 80 MG tablet Take 1 tablet (80 mg total) by mouth every evening. 90 tablet 3    tamsulosin (FLOMAX) 0.4 mg Cap TAKE 1 CAPSULE BY MOUTH ONCE DAILY 90 capsule 3    triamcinolone acetonide 0.1% (KENALOG) 0.1 % cream       warfarin (COUMADIN) 5 MG tablet TAKE ONE & ONE-HALF TABLETS BY MOUTH ONCE DAILY EXCEPT  ON  TUESDAYS  TAKE  1  TABLET ON TUESDAY (Patient taking differently: 5 mg on Tuesday, 7.5 mg all other days as directed by Coumadin Clinic) 135 tablet 3     Current Facility-Administered Medications   Medication Dose Route Frequency Provider Last Rate Last Dose    testosterone cypionate injection 100 mg  100 mg Intramuscular Q14 Days Anderson Jerome MD   100 mg at 12/18/19 1024     Facility-Administered Medications Ordered in Other Visits   Medication Dose Route Frequency Provider Last Rate Last Dose    0.9%  NaCl infusion   " Intravenous Continuous Renay Fernandez NP   Stopped at 11/01/19 1022    sodium chloride 0.9% flush 5 mL  5 mL Intravenous PRN Renay Fernandez NP           Review of Systems   Constitutional: Negative for chills and fever.   Respiratory: Negative for cough.    Cardiovascular: Negative for chest pain and palpitations.   Gastrointestinal: Negative for abdominal pain.   Genitourinary: Negative for dysuria.       Objective:   Physical Exam   Vitals:    12/20/19 1312   Pulse: 70   Resp: 16   Temp: 97.6 °F (36.4 °C)   TempSrc: Oral   SpO2: 96%   Weight: 110.8 kg (244 lb 4.3 oz)   Height: 6' 3" (1.905 m)    Body mass index is 30.53 kg/m².  Weight: 110.8 kg (244 lb 4.3 oz)   Height: 6' 3" (190.5 cm)     Physical Exam   Constitutional: He is oriented to person, place, and time. He appears well-developed and well-nourished. No distress.   Eyes: EOM are normal.   Cardiovascular: Normal rate, regular rhythm and normal heart sounds.   No murmur heard.  Pulmonary/Chest: Effort normal and breath sounds normal.   Abdominal: Soft. He exhibits no distension and no mass. There is no tenderness. There is no guarding.   Musculoskeletal: Normal range of motion.   Neurological: He is alert and oriented to person, place, and time. Coordination normal.   Skin: Skin is warm and dry.   Psychiatric: He has a normal mood and affect. His behavior is normal. Thought content normal.     "

## 2019-12-23 ENCOUNTER — PATIENT MESSAGE (OUTPATIENT)
Dept: CARDIOLOGY | Facility: CLINIC | Age: 84
End: 2019-12-23

## 2019-12-23 DIAGNOSIS — E78.00 PURE HYPERCHOLESTEROLEMIA: ICD-10-CM

## 2019-12-23 DIAGNOSIS — I25.10 CORONARY ARTERY DISEASE INVOLVING NATIVE CORONARY ARTERY OF NATIVE HEART, ANGINA PRESENCE UNSPECIFIED: Primary | ICD-10-CM

## 2019-12-23 RX ORDER — ROSUVASTATIN CALCIUM 40 MG/1
40 TABLET, COATED ORAL DAILY
Qty: 30 TABLET | Refills: 11 | Status: SHIPPED | OUTPATIENT
Start: 2019-12-23 | End: 2020-12-28

## 2019-12-23 NOTE — PROGRESS NOTES
Hb stable, done for c/o dark stool.  Due to pepto unlikely GIB.   Results to pt via [facility]. Immodium, BRAT diet.

## 2019-12-26 ENCOUNTER — ANTI-COAG VISIT (OUTPATIENT)
Dept: CARDIOLOGY | Facility: CLINIC | Age: 84
End: 2019-12-26
Payer: MEDICARE

## 2019-12-26 ENCOUNTER — LAB VISIT (OUTPATIENT)
Dept: LAB | Facility: HOSPITAL | Age: 84
End: 2019-12-26
Attending: INTERNAL MEDICINE
Payer: MEDICARE

## 2019-12-26 DIAGNOSIS — I48.0 PAF (PAROXYSMAL ATRIAL FIBRILLATION): ICD-10-CM

## 2019-12-26 DIAGNOSIS — I48.19 PERSISTENT ATRIAL FIBRILLATION: ICD-10-CM

## 2019-12-26 LAB
INR PPP: 2.7 (ref 0.8–1.2)
PROTHROMBIN TIME: 26 SEC (ref 9–12.5)

## 2019-12-26 PROCEDURE — 36415 COLL VENOUS BLD VENIPUNCTURE: CPT | Mod: HCNC,PO

## 2019-12-26 PROCEDURE — 85610 PROTHROMBIN TIME: CPT | Mod: HCNC

## 2019-12-26 PROCEDURE — 93793 PR ANTICOAGULANT MGMT FOR PT TAKING WARFARIN: ICD-10-PCS | Mod: S$GLB,,, | Performed by: PHARMACIST

## 2019-12-26 PROCEDURE — 93793 ANTICOAG MGMT PT WARFARIN: CPT | Mod: S$GLB,,, | Performed by: PHARMACIST

## 2020-01-02 ENCOUNTER — CLINICAL SUPPORT (OUTPATIENT)
Dept: FAMILY MEDICINE | Facility: CLINIC | Age: 85
End: 2020-01-02
Payer: MEDICARE

## 2020-01-02 ENCOUNTER — LAB VISIT (OUTPATIENT)
Dept: LAB | Facility: HOSPITAL | Age: 85
End: 2020-01-02
Attending: INTERNAL MEDICINE
Payer: MEDICARE

## 2020-01-02 ENCOUNTER — ANTI-COAG VISIT (OUTPATIENT)
Dept: CARDIOLOGY | Facility: CLINIC | Age: 85
End: 2020-01-02
Payer: MEDICARE

## 2020-01-02 DIAGNOSIS — I48.0 PAF (PAROXYSMAL ATRIAL FIBRILLATION): ICD-10-CM

## 2020-01-02 DIAGNOSIS — E29.1 HYPOGONADISM IN MALE: Primary | ICD-10-CM

## 2020-01-02 DIAGNOSIS — I48.19 PERSISTENT ATRIAL FIBRILLATION: ICD-10-CM

## 2020-01-02 LAB
INR PPP: 3 (ref 0.8–1.2)
PROTHROMBIN TIME: 28.3 SEC (ref 9–12.5)

## 2020-01-02 PROCEDURE — 36415 COLL VENOUS BLD VENIPUNCTURE: CPT | Mod: HCNC,PO

## 2020-01-02 PROCEDURE — 96372 PR INJECTION,THERAP/PROPH/DIAG2ST, IM OR SUBCUT: ICD-10-PCS | Mod: HCNC,S$GLB,, | Performed by: INTERNAL MEDICINE

## 2020-01-02 PROCEDURE — 99499 NO LOS: ICD-10-PCS | Mod: HCNC,S$GLB,, | Performed by: INTERNAL MEDICINE

## 2020-01-02 PROCEDURE — 96372 THER/PROPH/DIAG INJ SC/IM: CPT | Mod: HCNC,S$GLB,, | Performed by: INTERNAL MEDICINE

## 2020-01-02 PROCEDURE — 93793 PR ANTICOAGULANT MGMT FOR PT TAKING WARFARIN: ICD-10-PCS | Mod: S$GLB,,,

## 2020-01-02 PROCEDURE — 99499 UNLISTED E&M SERVICE: CPT | Mod: HCNC,S$GLB,, | Performed by: INTERNAL MEDICINE

## 2020-01-02 PROCEDURE — 85610 PROTHROMBIN TIME: CPT | Mod: HCNC

## 2020-01-02 PROCEDURE — 93793 ANTICOAG MGMT PT WARFARIN: CPT | Mod: S$GLB,,,

## 2020-01-02 RX ADMIN — TESTOSTERONE CYPIONATE 100 MG: 200 INJECTION, SOLUTION INTRAMUSCULAR at 08:01

## 2020-01-02 NOTE — PROGRESS NOTES
.Pt received testosterone inj. Tolerated well. Pt instructed to wait 15mins to be assessed for adverse reactions. verbalized understanding.

## 2020-01-03 ENCOUNTER — OFFICE VISIT (OUTPATIENT)
Dept: OPTOMETRY | Facility: CLINIC | Age: 85
End: 2020-01-03
Payer: COMMERCIAL

## 2020-01-03 DIAGNOSIS — Z96.1 PSEUDOPHAKIA OF BOTH EYES: Primary | ICD-10-CM

## 2020-01-03 DIAGNOSIS — Z01.00 ROUTINE EYE EXAM: ICD-10-CM

## 2020-01-03 DIAGNOSIS — Z13.5 GLAUCOMA SCREENING: ICD-10-CM

## 2020-01-03 PROCEDURE — 92014 PR EYE EXAM, EST PATIENT,COMPREHESV: ICD-10-PCS | Mod: S$GLB,,, | Performed by: OPTOMETRIST

## 2020-01-03 PROCEDURE — 99999 PR PBB SHADOW E&M-EST. PATIENT-LVL II: CPT | Mod: PBBFAC,,, | Performed by: OPTOMETRIST

## 2020-01-03 PROCEDURE — 92015 DETERMINE REFRACTIVE STATE: CPT | Mod: S$GLB,,, | Performed by: OPTOMETRIST

## 2020-01-03 PROCEDURE — 92014 COMPRE OPH EXAM EST PT 1/>: CPT | Mod: S$GLB,,, | Performed by: OPTOMETRIST

## 2020-01-03 PROCEDURE — 99999 PR PBB SHADOW E&M-EST. PATIENT-LVL II: ICD-10-PCS | Mod: PBBFAC,,, | Performed by: OPTOMETRIST

## 2020-01-03 PROCEDURE — 92015 PR REFRACTION: ICD-10-PCS | Mod: S$GLB,,, | Performed by: OPTOMETRIST

## 2020-01-03 NOTE — PROGRESS NOTES
HPI     DLS; 1/16/19  Pt states no VA problems  Occ. Floaters, no flashes   Refresh gtts PRN   MFIOLs OU    Last edited by Rodrigue Flores, OD on 1/3/2020  9:03 AM. (History)        ROS     Positive for: Eyes (cat surgery OU)    Negative for: Constitutional, Gastrointestinal, Neurological, Skin,   Genitourinary, Musculoskeletal, HENT, Endocrine, Cardiovascular,   Respiratory, Psychiatric, Allergic/Imm, Heme/Lymph    Last edited by Rodrigue Flores, OD on 1/3/2020  9:03 AM. (History)        Assessment /Plan     For exam results, see Encounter Report.    Pseudophakia of both eyes    Glaucoma screening    Routine eye exam      1. Sp MFIOL/YAG OU--pt happy without spex    PLAN:    rtc 1 yr

## 2020-01-06 ENCOUNTER — HOSPITAL ENCOUNTER (EMERGENCY)
Facility: HOSPITAL | Age: 85
Discharge: HOME | End: 2020-01-06
Attending: EMERGENCY MEDICINE
Payer: MEDICARE

## 2020-01-06 ENCOUNTER — NURSE TRIAGE (OUTPATIENT)
Dept: ADMINISTRATIVE | Facility: CLINIC | Age: 85
End: 2020-01-06

## 2020-01-06 VITALS
HEIGHT: 75 IN | HEART RATE: 66 BPM | WEIGHT: 240 LBS | DIASTOLIC BLOOD PRESSURE: 86 MMHG | RESPIRATION RATE: 22 BRPM | SYSTOLIC BLOOD PRESSURE: 149 MMHG | TEMPERATURE: 99 F | BODY MASS INDEX: 29.84 KG/M2 | OXYGEN SATURATION: 98 %

## 2020-01-06 DIAGNOSIS — R10.13 EPIGASTRIC ABDOMINAL PAIN: ICD-10-CM

## 2020-01-06 DIAGNOSIS — R19.7 DIARRHEA IN ADULT PATIENT: Primary | ICD-10-CM

## 2020-01-06 LAB
ALBUMIN SERPL BCP-MCNC: 3.9 G/DL (ref 3.5–5.2)
ALP SERPL-CCNC: 58 U/L (ref 55–135)
ALT SERPL W/O P-5'-P-CCNC: 31 U/L (ref 10–44)
ANION GAP SERPL CALC-SCNC: 6 MMOL/L (ref 8–16)
APTT BLDCRRT: 40.6 SEC (ref 21–32)
AST SERPL-CCNC: 32 U/L (ref 10–40)
BASOPHILS # BLD AUTO: 0.01 K/UL (ref 0–0.2)
BASOPHILS NFR BLD: 0.2 % (ref 0–1.9)
BILIRUB SERPL-MCNC: 1.1 MG/DL (ref 0.1–1)
BUN SERPL-MCNC: 17 MG/DL (ref 8–23)
CALCIUM SERPL-MCNC: 9.7 MG/DL (ref 8.7–10.5)
CHLORIDE SERPL-SCNC: 103 MMOL/L (ref 95–110)
CO2 SERPL-SCNC: 29 MMOL/L (ref 23–29)
CREAT SERPL-MCNC: 1.5 MG/DL (ref 0.5–1.4)
DIFFERENTIAL METHOD: ABNORMAL
EOSINOPHIL # BLD AUTO: 0 K/UL (ref 0–0.5)
EOSINOPHIL NFR BLD: 0.5 % (ref 0–8)
ERYTHROCYTE [DISTWIDTH] IN BLOOD BY AUTOMATED COUNT: 13.8 % (ref 11.5–14.5)
EST. GFR  (AFRICAN AMERICAN): 49 ML/MIN/1.73 M^2
EST. GFR  (NON AFRICAN AMERICAN): 42 ML/MIN/1.73 M^2
GLUCOSE SERPL-MCNC: 96 MG/DL (ref 70–110)
HCT VFR BLD AUTO: 46.4 % (ref 40–54)
HGB BLD-MCNC: 14.6 G/DL (ref 14–18)
IMM GRANULOCYTES # BLD AUTO: 0.01 K/UL (ref 0–0.04)
IMM GRANULOCYTES NFR BLD AUTO: 0.2 % (ref 0–0.5)
INR PPP: 2.9 (ref 0.8–1.2)
LIPASE SERPL-CCNC: 16 U/L (ref 4–60)
LYMPHOCYTES # BLD AUTO: 0.5 K/UL (ref 1–4.8)
LYMPHOCYTES NFR BLD: 12.4 % (ref 18–48)
MAGNESIUM SERPL-MCNC: 2 MG/DL (ref 1.6–2.6)
MCH RBC QN AUTO: 30.9 PG (ref 27–31)
MCHC RBC AUTO-ENTMCNC: 31.5 G/DL (ref 32–36)
MCV RBC AUTO: 98 FL (ref 82–98)
MONOCYTES # BLD AUTO: 0.6 K/UL (ref 0.3–1)
MONOCYTES NFR BLD: 14.7 % (ref 4–15)
NEUTROPHILS # BLD AUTO: 3.1 K/UL (ref 1.8–7.7)
NEUTROPHILS NFR BLD: 72 % (ref 38–73)
NRBC BLD-RTO: 0 /100 WBC
PLATELET # BLD AUTO: 125 K/UL (ref 150–350)
PMV BLD AUTO: 11.5 FL (ref 9.2–12.9)
POTASSIUM SERPL-SCNC: 4.1 MMOL/L (ref 3.5–5.1)
PROT SERPL-MCNC: 6.7 G/DL (ref 6–8.4)
PROTHROMBIN TIME: 30.3 SEC (ref 9–12.5)
RBC # BLD AUTO: 4.72 M/UL (ref 4.6–6.2)
SODIUM SERPL-SCNC: 138 MMOL/L (ref 136–145)
TROPONIN I SERPL DL<=0.01 NG/ML-MCNC: <0.006 NG/ML (ref 0–0.03)
WBC # BLD AUTO: 4.36 K/UL (ref 3.9–12.7)

## 2020-01-06 PROCEDURE — 84484 ASSAY OF TROPONIN QUANT: CPT | Mod: HCNC

## 2020-01-06 PROCEDURE — 83690 ASSAY OF LIPASE: CPT | Mod: HCNC

## 2020-01-06 PROCEDURE — 99284 EMERGENCY DEPT VISIT MOD MDM: CPT | Mod: 25,HCNC

## 2020-01-06 PROCEDURE — 85025 COMPLETE CBC W/AUTO DIFF WBC: CPT | Mod: HCNC

## 2020-01-06 PROCEDURE — 93005 ELECTROCARDIOGRAM TRACING: CPT | Mod: HCNC

## 2020-01-06 PROCEDURE — 93010 ELECTROCARDIOGRAM REPORT: CPT | Mod: HCNC,,, | Performed by: INTERNAL MEDICINE

## 2020-01-06 PROCEDURE — 83735 ASSAY OF MAGNESIUM: CPT | Mod: HCNC

## 2020-01-06 PROCEDURE — 85610 PROTHROMBIN TIME: CPT | Mod: HCNC

## 2020-01-06 PROCEDURE — 25000003 PHARM REV CODE 250: Mod: HCNC | Performed by: EMERGENCY MEDICINE

## 2020-01-06 PROCEDURE — 85730 THROMBOPLASTIN TIME PARTIAL: CPT | Mod: HCNC

## 2020-01-06 PROCEDURE — 80053 COMPREHEN METABOLIC PANEL: CPT | Mod: HCNC

## 2020-01-06 PROCEDURE — 93010 EKG 12-LEAD: ICD-10-PCS | Mod: HCNC,,, | Performed by: INTERNAL MEDICINE

## 2020-01-06 RX ORDER — MAG HYDROX/ALUMINUM HYD/SIMETH 200-200-20
60 SUSPENSION, ORAL (FINAL DOSE FORM) ORAL
Status: COMPLETED | OUTPATIENT
Start: 2020-01-06 | End: 2020-01-06

## 2020-01-06 RX ORDER — PANTOPRAZOLE SODIUM 40 MG/1
80 TABLET, DELAYED RELEASE ORAL
Status: COMPLETED | OUTPATIENT
Start: 2020-01-06 | End: 2020-01-06

## 2020-01-06 RX ORDER — OMEPRAZOLE 20 MG/1
40 CAPSULE, DELAYED RELEASE ORAL DAILY
Qty: 30 CAPSULE | Refills: 1 | Status: SHIPPED | OUTPATIENT
Start: 2020-01-06 | End: 2020-02-20 | Stop reason: ALTCHOICE

## 2020-01-06 RX ADMIN — PANTOPRAZOLE SODIUM 80 MG: 40 TABLET, DELAYED RELEASE ORAL at 11:01

## 2020-01-06 RX ADMIN — ALUMINUM HYDROXIDE, MAGNESIUM HYDROXIDE, AND SIMETHICONE 60 ML: 200; 200; 20 SUSPENSION ORAL at 11:01

## 2020-01-06 NOTE — TELEPHONE ENCOUNTER
"  Reason for Disposition   Constant abdominal pain lasting > 2 hours    Additional Information   Negative: Shock suspected (e.g., cold/pale/clammy skin, too weak to stand, low BP, rapid pulse)   Negative: Difficult to awaken or acting confused (e.g., disoriented, slurred speech)   Negative: Sounds like a life-threatening emergency to the triager   Negative: SEVERE abdominal pain (e.g., excruciating) and present > 1 hour   Negative: SEVERE abdominal pain and age > 60   Negative: Bloody, black, or tarry bowel movements   Negative: SEVERE diarrhea (e.g., 7 or more times / day more than normal) and age > 60 years    Protocols used: DIARRHEA-A-OH    Pt stated on Dec 6 he had diarrhea x 4 days. He saw Dr. Jerome. Pt stated for the last 3 weeks he has been having diarrhea. Pt stated his stomach acid has just about eating him up. Pt stated he don't have any bowel movements during the day but at night 4-5 times a night. Pt stated his discomfort is 6-7/10. Pt stated "it keeps me  awake and I can't stand it". Care advice recommend pt go to Er now. Pt stated he would like to speak with Dr Jerome's office first because Dr Jerome told him he would put him in the hospital. Pt is awaiting a return call. Please call pt.  "

## 2020-01-06 NOTE — ED PROVIDER NOTES
"Encounter Date: 1/6/2020    SCRIBE #1 NOTE: I, Indu Xavier, am scribing for, and in the presence of,  Jordan Rodríguez MD. I have scribed the following portions of the note - Other sections scribed: HPI, ROS.       History     Chief Complaint   Patient presents with    Abdominal Pain     abd with diarrhea x1 month intermittently. pt denies being on antibx. c/o of generalized weakness. denies dizziness.      This is an 84 y.o male who has HLD, CAD, HTN, old MI, A-fib, CKD, and BPH presents to the ED for an evaluation for abdominal discomfort described as "an aggravated feeling" x 1 month.  Patient also reports of episodes of diarrhea and nausea.  He reports his symptoms are intermittent and occurs every 2-3 days, but only during the night time.  He reports in the past 24 hours, he has not had any episodes of diarrhea.  He reports the last episode being 4 days ago.  He reports informing his PCP of his symptoms who advised that he come to the ED if his symptoms persisted.  He denies fever, chills, chest pain, shortness of breath, or any other associated symptoms.  No prior tx.  No alleviating factors.    The history is provided by the patient.     Review of patient's allergies indicates:  No Known Allergies  Past Medical History:   Diagnosis Date    ACS (acute coronary syndrome)     Acute coronary syndrome 2000    NSTEMI    Anticoagulant long-term use     Coumadin    Atrial fibrillation     Basal cell carcinoma excised     left scalp vertex    Bilateral leg edema 1/6/2014    BPH (benign prostatic hyperplasia)     CKD (chronic kidney disease)     Coronary artery disease     DVT of leg (deep venous thrombosis) 09/08/2014    On coumadin for years    Encounter for blood transfusion     Hyperlipidemia     Hypertension     Hypogonadism, male     Leg fracture, left     Melanoma 01/04/2017     in situ crown of scalp    MI (myocardial infarction)     Panhypopituitarism     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    Squamous Cell Carcinoma excised     left posterior scalp    Thyroid disease      Past Surgical History:   Procedure Laterality Date    ABLATION OF ARRHYTHMOGENIC FOCUS FOR ATRIAL FIBRILLATION N/A 9/30/2019    Procedure: Ablation atrial fibrillation;  Surgeon: Neel Topete MD;  Location: [facility] EP LAB;  Service: Cardiology;  Laterality: N/A;  AF, BRYSON, PVI (re-do), Carto, Gen, MD, 3 Prep *right groin only access and exchange CS/ICE catheters throughout as needed*    APPENDECTOMY      BASAL CELL CARCINOMA EXCISION  01/2008    BRAIN SURGERY      pituitary adenoma removed    CARDIAC CATHETERIZATION  05/09/2000    S/P stent to RCA,    CARDIAC CATHETERIZATION      stents placed    CARDIAC ELECTROPHYSIOLOGY STUDY AND ABLATION      CORONARY ANGIOPLASTY  5/9/2000    RCA    CORONARY ANGIOPLASTY WITH STENT PLACEMENT      ESOPHAGEAL DILATION      EYE SURGERY Bilateral     cataracts removed and new lenses placed     FRACTURE SURGERY Left     leg    HERNIA REPAIR      LAPAROSCOPIC CHOLECYSTECTOMY N/A 6/2/2018    Procedure: CHOLECYSTECTOMY, LAPAROSCOPIC;  Surgeon: Dickson Smith Jr., MD;  Location: [facility] OR;  Service: General;  Laterality: N/A;    left femur surgery      pituitatry      hypophysectomy    SKIN BIOPSY      TONSILLECTOMY      TREATMENT OF CARDIAC ARRHYTHMIA Bilateral 6/13/2019    Procedure: Cardioversion or Defibrillation;  Surgeon: Eric Nguyen MD;  Location: [facility] EP LAB;  Service: Cardiology;  Laterality: Bilateral;  af, bryson, dccv, anes, pr, 324    TREATMENT OF CARDIAC ARRHYTHMIA  9/30/2019    Procedure: Cardioversion or Defibrillation;  Surgeon: Neel Topete MD;  Location: [facility] EP LAB;  Service: Cardiology;;    TREATMENT OF CARDIAC ARRHYTHMIA N/A 11/1/2019    Procedure: CARDIOVERSION;  Surgeon: Eric Nguyen MD;  Location: [facility] EP LAB;  Service: Cardiology;  Laterality: N/A;  AF, BRYSON (Cx if Serial INRs x 4), DCCV, MAC, DM, 3  Prep    TREATMENT OF CARDIAC ARRHYTHMIA N/A 12/10/2019    Procedure: CARDIOVERSION;  Surgeon: Neel Topete MD;  Location: [facility] EP LAB;  Service: Cardiology;  Laterality: N/A;  AF, EUGENE (Cx if weekly INRs > 2), DCCV, MAC, KS, 3 Prep    VASCULAR SURGERY       Family History   Problem Relation Age of Onset    Cancer Father     Skin cancer Neg Hx     Melanoma Neg Hx     Heart disease Neg Hx     Hypertension Neg Hx      Social History     Tobacco Use    Smoking status: Never Smoker    Smokeless tobacco: Never Used   Substance Use Topics    Alcohol use: No     Frequency: Never    Drug use: No     Review of Systems   Constitutional: Negative for chills and fever.   HENT: Negative for congestion, ear pain, rhinorrhea and sore throat.    Eyes: Negative for pain and visual disturbance.   Respiratory: Negative for cough and shortness of breath.    Cardiovascular: Negative for chest pain.   Gastrointestinal: Positive for abdominal pain, diarrhea and nausea. Negative for vomiting.   Genitourinary: Negative for dysuria.   Musculoskeletal: Negative for back pain and neck pain.   Skin: Negative for rash.   Neurological: Negative for headaches.       Physical Exam     Initial Vitals [01/06/20 1043]   BP Pulse Resp Temp SpO2   100/74 74 18 98.2 °F (36.8 °C) 97 %      MAP       --         Physical Exam  The patient was examined specifically for the following:   General:No significant distress, Good color, Warm and dry. Head and neck:Scalp atraumatic, Neck supple. Neurological:Appropriate conversation, Gross motor deficits. Eyes:Conjugate gaze, Clear corneas. ENT: No epistaxis. Cardiac: Regular rate and rhythm, Grossly normal heart tones. Pulmonary: Wheezing, Rales. Gastrointestinal: Abdominal tenderness, Abdominal distention. Musculoskeletal: Extremity deformity, Apparent pain with range of motion of the joints. Skin: Rash.   The findings on examination were normal.  The lungs are clear.  The heart tones are normal.  The  abdomen is nontender.  There is no guarding rebound mass or distention.  Vital signs are stable.  The patient appears to be in no distress.  ED Course   Procedures  Labs Reviewed   COMPREHENSIVE METABOLIC PANEL - Abnormal; Notable for the following components:       Result Value    Creatinine 1.5 (*)     Total Bilirubin 1.1 (*)     Anion Gap 6 (*)     eGFR if  49 (*)     eGFR if non  42 (*)     All other components within normal limits   CBC W/ AUTO DIFFERENTIAL - Abnormal; Notable for the following components:    Mean Corpuscular Hemoglobin Conc 31.5 (*)     Platelets 125 (*)     Lymph # 0.5 (*)     Lymph% 12.4 (*)     All other components within normal limits   APTT - Abnormal; Notable for the following components:    aPTT 40.6 (*)     All other components within normal limits   PROTIME-INR - Abnormal; Notable for the following components:    Prothrombin Time 30.3 (*)     INR 2.9 (*)     All other components within normal limits   MAGNESIUM   LIPASE   TROPONIN I     EKG Readings: (Independently Interpreted)   This patient is in a sinus rhythm with a first-degree AV block.  The heart rate of 63.  The patient has an intraventricular conduction delay with left axis deviation.  There is no evidence of acute myocardial infarction or malignant arrhythmia.  There are nonspecific T-wave changes.  ST segments are unremarkable.       Imaging Results    None       Medical decision making:  Given the above, this patient presents to the emergency room with explosive diarrhea that is occasional and episodic.  The patient has not had diarrhea in 24 hr.  The abdomen is completely nontender.  The patient had a small amount of burning epigastric abdominal pain that comes and goes on a recurrent basis troponin is normal. There is no evidence of peritonitis bowel obstruction dehydration.  Given the above this patient also developed as a burning epigastric abdominal pain. I will treat with pantoprazole.   I doubt myocardial infarction                Scribe Attestation:   Scribe #1: I performed the above scribed service and the documentation accurately describes the services I performed. I attest to the accuracy of the note.                          Clinical Impression:       ICD-10-CM ICD-9-CM   1. Diarrhea in adult patient R19.7 787.91   2. Epigastric abdominal pain R10.13 789.06              I personally performed the services described in this documentation.  All medical record  entries made by the scribe are at my direction and in my presence.  Signed, Dr. Marcos Rodríguez MD  01/06/20 9792

## 2020-01-06 NOTE — DISCHARGE INSTRUCTIONS
Diet  Liquids are the first step:  · Water and clear liquids are important so you don't get dehydrated. Drink a small amount at a time or suck on ice chips.  Food  · People with diarrhea should not make or serve food for others. When making food for yourself, wash your hands before and after.  · Wash your hands after using cutting boards, countertops, and knives that have touched raw food.  · Keep uncooked meats away from cooked and ready-to-eat foods.  During the first 24 hours, follow the diet below:  · Beverages: sport drinks, soft drinks without caffeine, mineral water (plain or flavored), decaffeinated tea and coffee. If you are very dehydrated, sports drinks are not a good choice. They have too much sugar and not enough electrolytes. In this case, commercially available products called oral rehydration solutions, are best.  · Soups: clear broth, consommé, and bouillon  · Desserts: plain gelatin, popsicles, and fruit juice bars  During the next 24 hours (the second day), you may add these foods to the above list if you are feeling better. If not, continue what you did the first day:  · Hot cereal, plain toast, bread, rolls, crackers  · Plain noodles, rice, mashed potatoes, chicken noodle or rice soup  · Unsweetened canned fruit (avoid pineapple), bananas  · Limit fat to less than 15 grams per day. Avoid margarine, butter, oils, mayonnaise, sauces, gravies, fried foods, peanut butter, meat, poultry, and fish.  · Limit fiber. Avoid raw or cooked vegetables, fresh fruits (except bananas), and bran cereals.  · Limit dairy  · Continue to avoid alcohol  · Limit caffeine and chocolate. No spices or seasonings except salt.  During the next 24 hours:  · Gradually resume a normal diet, as you feel better and your symptoms improve.  · If at any time you start feeling worse again, go back to clear liquids until you feel better.

## 2020-01-08 NOTE — PROGRESS NOTES
1/8/2020 received request to clear for GI procedure. With CHADSvasc=5 and no h/o CVA will clear to hold warfarin x5 days without bridge. Reply sent to [facility] (ph [phone], fax [phone]) and Dr. Topete notified of plan.

## 2020-01-09 ENCOUNTER — OFFICE VISIT (OUTPATIENT)
Dept: ELECTROPHYSIOLOGY | Facility: CLINIC | Age: 85
End: 2020-01-09
Payer: MEDICARE

## 2020-01-09 ENCOUNTER — OFFICE VISIT (OUTPATIENT)
Dept: URGENT CARE | Facility: CLINIC | Age: 85
End: 2020-01-09
Payer: MEDICARE

## 2020-01-09 ENCOUNTER — HOSPITAL ENCOUNTER (OUTPATIENT)
Dept: CARDIOLOGY | Facility: CLINIC | Age: 85
Discharge: HOME | End: 2020-01-09
Payer: MEDICARE

## 2020-01-09 ENCOUNTER — ANTI-COAG VISIT (OUTPATIENT)
Dept: CARDIOLOGY | Facility: CLINIC | Age: 85
End: 2020-01-09
Payer: MEDICARE

## 2020-01-09 ENCOUNTER — TELEPHONE (OUTPATIENT)
Dept: FAMILY MEDICINE | Facility: CLINIC | Age: 85
End: 2020-01-09

## 2020-01-09 VITALS
HEIGHT: 75 IN | TEMPERATURE: 97 F | DIASTOLIC BLOOD PRESSURE: 54 MMHG | OXYGEN SATURATION: 95 % | HEART RATE: 73 BPM | WEIGHT: 234 LBS | RESPIRATION RATE: 18 BRPM | BODY MASS INDEX: 29.09 KG/M2 | SYSTOLIC BLOOD PRESSURE: 90 MMHG

## 2020-01-09 VITALS
HEART RATE: 72 BPM | WEIGHT: 234.38 LBS | HEIGHT: 75 IN | BODY MASS INDEX: 29.14 KG/M2 | SYSTOLIC BLOOD PRESSURE: 110 MMHG | DIASTOLIC BLOOD PRESSURE: 60 MMHG

## 2020-01-09 DIAGNOSIS — I48.19 PERSISTENT ATRIAL FIBRILLATION: ICD-10-CM

## 2020-01-09 DIAGNOSIS — K59.00 CONSTIPATION, UNSPECIFIED CONSTIPATION TYPE: Primary | ICD-10-CM

## 2020-01-09 DIAGNOSIS — N18.30 CHRONIC KIDNEY DISEASE, STAGE 3: ICD-10-CM

## 2020-01-09 DIAGNOSIS — I25.119 CORONARY ARTERY DISEASE INVOLVING NATIVE CORONARY ARTERY OF NATIVE HEART WITH ANGINA PECTORIS: ICD-10-CM

## 2020-01-09 DIAGNOSIS — I48.19 PERSISTENT ATRIAL FIBRILLATION: Primary | ICD-10-CM

## 2020-01-09 DIAGNOSIS — Z79.01 LONG TERM (CURRENT) USE OF ANTICOAGULANTS: ICD-10-CM

## 2020-01-09 DIAGNOSIS — I10 ESSENTIAL HYPERTENSION: ICD-10-CM

## 2020-01-09 PROCEDURE — 1101F PT FALLS ASSESS-DOCD LE1/YR: CPT | Mod: HCNC,CPTII,S$GLB, | Performed by: INTERNAL MEDICINE

## 2020-01-09 PROCEDURE — 3074F SYST BP LT 130 MM HG: CPT | Mod: HCNC,CPTII,S$GLB, | Performed by: INTERNAL MEDICINE

## 2020-01-09 PROCEDURE — 3078F DIAST BP <80 MM HG: CPT | Mod: HCNC,CPTII,S$GLB, | Performed by: INTERNAL MEDICINE

## 2020-01-09 PROCEDURE — 93010 RHYTHM STRIP: ICD-10-PCS | Mod: HCNC,S$GLB,, | Performed by: INTERNAL MEDICINE

## 2020-01-09 PROCEDURE — 99999 PR PBB SHADOW E&M-EST. PATIENT-LVL IV: CPT | Mod: PBBFAC,HCNC,, | Performed by: INTERNAL MEDICINE

## 2020-01-09 PROCEDURE — 3074F PR MOST RECENT SYSTOLIC BLOOD PRESSURE < 130 MM HG: ICD-10-PCS | Mod: HCNC,CPTII,S$GLB, | Performed by: INTERNAL MEDICINE

## 2020-01-09 PROCEDURE — 74018 XR KUB: ICD-10-PCS | Mod: S$GLB,,, | Performed by: RADIOLOGY

## 2020-01-09 PROCEDURE — 99213 PR OFFICE/OUTPT VISIT, EST, LEVL III, 20-29 MIN: ICD-10-PCS | Mod: HCNC,S$GLB,, | Performed by: INTERNAL MEDICINE

## 2020-01-09 PROCEDURE — 74018 RADEX ABDOMEN 1 VIEW: CPT | Mod: S$GLB,,, | Performed by: RADIOLOGY

## 2020-01-09 PROCEDURE — 1126F AMNT PAIN NOTED NONE PRSNT: CPT | Mod: HCNC,S$GLB,, | Performed by: INTERNAL MEDICINE

## 2020-01-09 PROCEDURE — 99499 UNLISTED E&M SERVICE: CPT | Mod: S$GLB,,, | Performed by: INTERNAL MEDICINE

## 2020-01-09 PROCEDURE — 1159F MED LIST DOCD IN RCRD: CPT | Mod: HCNC,S$GLB,, | Performed by: INTERNAL MEDICINE

## 2020-01-09 PROCEDURE — 3078F PR MOST RECENT DIASTOLIC BLOOD PRESSURE < 80 MM HG: ICD-10-PCS | Mod: HCNC,CPTII,S$GLB, | Performed by: INTERNAL MEDICINE

## 2020-01-09 PROCEDURE — 93010 ELECTROCARDIOGRAM REPORT: CPT | Mod: HCNC,S$GLB,, | Performed by: INTERNAL MEDICINE

## 2020-01-09 PROCEDURE — 99214 PR OFFICE/OUTPT VISIT, EST, LEVL IV, 30-39 MIN: ICD-10-PCS | Mod: S$GLB,,, | Performed by: PHYSICIAN ASSISTANT

## 2020-01-09 PROCEDURE — 99214 OFFICE O/P EST MOD 30 MIN: CPT | Mod: S$GLB,,, | Performed by: PHYSICIAN ASSISTANT

## 2020-01-09 PROCEDURE — 93005 ELECTROCARDIOGRAM TRACING: CPT | Mod: HCNC,S$GLB,, | Performed by: INTERNAL MEDICINE

## 2020-01-09 PROCEDURE — 99499 RISK ADDL DX/OHS AUDIT: ICD-10-PCS | Mod: S$GLB,,, | Performed by: INTERNAL MEDICINE

## 2020-01-09 PROCEDURE — 99999 PR PBB SHADOW E&M-EST. PATIENT-LVL IV: ICD-10-PCS | Mod: PBBFAC,HCNC,, | Performed by: INTERNAL MEDICINE

## 2020-01-09 PROCEDURE — 1159F PR MEDICATION LIST DOCUMENTED IN MEDICAL RECORD: ICD-10-PCS | Mod: HCNC,S$GLB,, | Performed by: INTERNAL MEDICINE

## 2020-01-09 PROCEDURE — 1101F PR PT FALLS ASSESS DOC 0-1 FALLS W/OUT INJ PAST YR: ICD-10-PCS | Mod: HCNC,CPTII,S$GLB, | Performed by: INTERNAL MEDICINE

## 2020-01-09 PROCEDURE — 93005 RHYTHM STRIP: ICD-10-PCS | Mod: HCNC,S$GLB,, | Performed by: INTERNAL MEDICINE

## 2020-01-09 PROCEDURE — 99213 OFFICE O/P EST LOW 20 MIN: CPT | Mod: HCNC,S$GLB,, | Performed by: INTERNAL MEDICINE

## 2020-01-09 PROCEDURE — 1126F PR PAIN SEVERITY QUANTIFIED, NO PAIN PRESENT: ICD-10-PCS | Mod: HCNC,S$GLB,, | Performed by: INTERNAL MEDICINE

## 2020-01-09 RX ORDER — SYRING-NEEDL,DISP,INSUL,0.3 ML 29 G X1/2"
296 SYRINGE, EMPTY DISPOSABLE MISCELLANEOUS ONCE
Qty: 295 ML | Refills: 0 | Status: SHIPPED | OUTPATIENT
Start: 2020-01-09 | End: 2020-01-09

## 2020-01-09 RX ORDER — PANTOPRAZOLE SODIUM 40 MG/1
TABLET, DELAYED RELEASE ORAL
COMMUNITY
Start: 2020-01-07 | End: 2020-03-26 | Stop reason: SDUPTHER

## 2020-01-09 NOTE — TELEPHONE ENCOUNTER
Patient notified. Patient said that he don't feel well enough to drive and will try to see if he can get someone to bring him to urgent care.

## 2020-01-09 NOTE — TELEPHONE ENCOUNTER
----- Message from Samara Elena sent at 1/9/2020  2:45 PM CST -----  Contact: Self   Type: Patient Call Back    Who called: Self     What is the request in detail:patient states it is a Emergency that he speak with Dr. Jerome because she feels he need to be put in the hosp. He refused to say what was wrong because he stated it's to complicated to go into detail but it had something to do with his previous visit. He also stated he would rather speak directly to Dr. Jerome rather than a nurse so he don't have to keep repeating himself.     Can the clinic reply by [facility]? No     Would the patient rather a call back or a response via [facility]?  Call     Best call back number:[phone]

## 2020-01-09 NOTE — PROGRESS NOTES
"Subjective:       Patient ID: Linda Skelton is a 84 y.o. male.    Vitals:  height is 6' 3" (1.905 m) and weight is 106.1 kg (234 lb). His temperature is 97.1 °F (36.2 °C). His blood pressure is 90/54 (abnormal) and his pulse is 73. His respiration is 18 and oxygen saturation is 95%.     Chief Complaint: Abdominal Pain    Pt states his PCP told him to come to urgent care to get an XRAY of his stomach. Pt said he has been constipated for 6 days. He was seen in the ER and "nothing was done." He has tried dulcolax, enema, and stool softeners for the past 4 days without relief.     Abdominal Pain   This is a new problem. Episode onset: 6 days  The onset quality is sudden. The problem occurs constantly. The problem has been gradually worsening. The pain is at a severity of 7/10. The pain is severe. The quality of the pain is aching. Associated symptoms include constipation. Nothing aggravates the pain. The pain is relieved by nothing. He has tried antacids (stool softener ) for the symptoms. The treatment provided no relief.       Gastrointestinal: Positive for abdominal pain and constipation.   Neurological: Positive for dizziness and light-headedness.       Objective:      Physical Exam   Constitutional: He is oriented to person, place, and time. He appears well-developed and well-nourished.   HENT:   Head: Normocephalic and atraumatic.   Right Ear: External ear normal.   Left Ear: External ear normal.   Nose: Nose normal.   Mouth/Throat: Mucous membranes are normal.   Eyes: Conjunctivae and lids are normal.   Neck: Trachea normal and full passive range of motion without pain. Neck supple.   Cardiovascular: Normal rate, regular rhythm and normal heart sounds.   Pulmonary/Chest: Effort normal and breath sounds normal. No respiratory distress.   Abdominal: Soft. Normal appearance and bowel sounds are normal. He exhibits distension (mild). He exhibits no abdominal bruit, no pulsatile midline mass and no mass. There is " generalized tenderness. There is no rigidity, no rebound, no guarding and no CVA tenderness.   Musculoskeletal: Normal range of motion. He exhibits no edema.   Neurological: He is alert and oriented to person, place, and time. He has normal strength.   Skin: Skin is warm, dry, intact, not diaphoretic and not pale.   Psychiatric: He has a normal mood and affect. His speech is normal and behavior is normal. Judgment and thought content normal. Cognition and memory are normal.   Nursing note and vitals reviewed.        Xr Kub    Result Date: 1/9/2020  EXAMINATION: XR KUB CLINICAL HISTORY: Constipation, unspecified TECHNIQUE: Single AP supine view of the abdomen (KUB) was performed COMPARISON: CTA 05/30/2019. FINDINGS: Nonspecific bowel gas pattern noted.  No dilated, air-filled loops of bowel are seen.  No mass or mass effect is appreciated.  An IVC filter is noted.  Phleboliths are present in the pelvis.  Degenerative changes of the lumbar spine are seen.     As above. Electronically signed by: Jaspreet Li Date:    01/09/2020 Time:    18:33    Assessment:       1. Constipation, unspecified constipation type        Plan:         Constipation, unspecified constipation type  -     XR KUB; Future; Expected date: 01/09/2020  -     magnesium citrate solution; Take 296 mLs by mouth once. for 1 dose  Dispense: 295 mL; Refill: 0      Patient Instructions   General Discharge Instructions   If you were prescribed a narcotic or controlled medication, do not drive or operate heavy equipment or machinery while taking these medications.  If you were prescribed antibiotics, please take them to completion.  You must understand that you've received an Urgent Care treatment only and that you may be released before all your medical problems are known or treated. You, the patient, will arrange for follow up care as instructed.  Follow up with your PCP or specialty clinic as directed in the next 1-2 weeks if not improved or as needed.   You can call [phone] to schedule an appointment with the appropriate provider.  If your condition worsens we recommend that you receive another evaluation at the emergency room immediately or contact your primary medical clinics after hours call service to discuss your concerns.  Please return here or go to the Emergency Department for any concerns or worsening of condition.      Constipation (Adult)  Constipation means that you have bowel movements that are less frequent than usual. Stools often become very hard and difficult to pass.  Constipation is very common. At some point in life it affects almost everyone. Since everyone's bowel habits are different, what is constipation to one person may not be to another. Your healthcare provider may do tests to diagnose constipation. It depends on what he or she finds when evaluating you.    Symptoms of constipation include:  · Abdominal pain  · Bloating  · Vomiting  · Painful bowel movements  · Itching, swelling, bleeding, or pain around the anus  Causes  Constipation can have many causes. These include:  · Diet low in fiber  · Too much dairy  · Not drinking enough liquids  · Lack of exercise or physical activity. This is especially true for older adults.  · Changes in lifestyle or daily routine, including pregnancy, aging, work, and travel  · Frequent use or misuse of laxatives  · Ignoring the urge to have a bowel movement or delaying it until later  · Medicines, such as certain prescription pain medicines, iron supplements, antacids, certain antidepressants, and calcium supplements  · Diseases like irritable bowel syndrome, bowel obstructions, stroke, diabetes, thyroid disease, Parkinson disease, hemorrhoids, and colon cancer  Complications  Potential complications of constipation can include:  · Hemorrhoids  · Rectal bleeding from hemorrhoids or anal fissures (skin tears)  · Hernias  · Dependency on laxatives  · Chronic constipation  · Fecal impaction  · Bowel  obstruction or perforation  Home care  All treatment should be done after talking with your healthcare provider. This is especially true if you have another medical problems, are taking prescription medicines, or are an older adult. Treatment most often involves lifestyle changes. You may also need medicines. Your healthcare provider will tell you which will work best for you. Follow the advice below to help avoid this problem in the future.  Lifestyle changes  These lifestyle changes can help prevent constipation:  · Diet. Eat a high-fiber diet, with fresh fruit and vegetables, and reduce dairy intake, meats, and processed foods  · Fluids. It's important to get enough fluids each day. Drink plenty of water when you eat more fiber. If you are on diet that limits the amount of fluid you can have, talk about this with your healthcare provider.  · Regular exercise. Check with your healthcare provider first.  Medications  Take any medicines as directed. Some laxatives are safe to use only every now and then. Others can be taken on a regular basis. Talk with your doctor or pharmacist if you have questions.  Prescription pain medicines can cause constipation. If you are taking this kind of medicine, ask your healthcare provider if you should also take a stool softener.  Medicines you may take to treat constipation include:  · Fiber supplements  · Stool softeners  · Laxatives  · Enemas  · Rectal suppositories  Follow-up care  Follow up with your healthcare provider if symptoms don't get better in the next few days. You may need to have more tests or see a specialist.  Call 911  Call 911 if any of these occur:  · Trouble breathing  · Stiff, rigid abdomen that is severely painful to touch  · Confusion  · Fainting or loss of consciousness  · Rapid heart rate  · Chest pain  When to seek medical advice  Call your healthcare provider right away if any of these occur:  · Fever over 100.4°F (38°C)  · Failure to resume normal bowel  movements  · Pain in your abdomen or back gets worse  · Nausea or vomiting  · Swelling in your abdomen  · Blood in the stool  · Black, tarry stool  · Involuntary weight loss  · Weakness  Date Last Reviewed: 12/30/2015  © 6729-1132 [email or web address]. [address] [place] [address], [place] [address]. All rights reserved. This information is not intended as a substitute for professional medical care. Always follow your healthcare professional's instructions.

## 2020-01-09 NOTE — TELEPHONE ENCOUNTER
Patient states he is having no BM'S since 1/3, has plans for upper and lower G.I. Has had 3 laxatives and a fleet enema and nothing is moving. Please advise for further instruction .

## 2020-01-09 NOTE — PROGRESS NOTES
Subjective:    Patient ID:  Linda Skelton is a 84 y.o. male who presents for follow-up of Atrial Fibrillation    Primary Cardiologist: Ivet Manning MD    HPI  Prior Hx:  I had the pleasure of seeing Mr. Skelton in our electrophysiology clinic in follow-up for his atrial fibrillation. As you are aware he is a pleasant 84 year-old man with paroxysmal atrial fibrillation s/p cryo-PVI by Dr. 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 He notes early AF recurrence after tikosyn therapy was stopped post-PVI. Tikosyn was resumed. He noted since his wife passed away last September he began having AF recurrences. They tend to last 30-48 hours. He notes even though he is apparently rate controlled (80s on home check) during the episodes he does have relative hypotension with systolic blood pressure in the low 100s and he feels very poor. Otherwise he feels well.     At our first visit we discussed changing to amiodarone. He desired to not take amiodarone due to potential side effects. We initiated sotalol however he has had breakthrough.    Interim Hx:  Mr. Skelton returns for follow-up. He had recurrent AF on sotalol post-redo-PVI. We performed a DCCV 12/10/2019 and initiated amiodarone. He returns for follow-up. He feels good from an AF standpoint. He is currently having issues for the past 4 weeks with recurrent explosive diarrhea and then constipation. He is being evaluated by gastroenterology (non-[facility]).    My interpretation of today's in clinic ECG is sinus rhythm with first degree AV block (CT 214msec).    Review of Systems   Constitution: Negative for fever and malaise/fatigue.   HENT: Negative for congestion and sore throat.    Eyes: Negative for blurred vision and visual disturbance.   Cardiovascular: Negative for chest pain, dyspnea on exertion, irregular heartbeat, leg  swelling, near-syncope, orthopnea, palpitations, paroxysmal nocturnal dyspnea and syncope.   Respiratory: Negative for cough and shortness of breath.    Hematologic/Lymphatic: Negative for bleeding problem. Does not bruise/bleed easily.   Skin: Negative.    Musculoskeletal: Positive for arthritis and joint pain.   Gastrointestinal: Positive for constipation and diarrhea. Negative for hematochezia and melena.   Neurological: Negative for dizziness and focal weakness.        Objective:    Physical Exam   Constitutional: He is oriented to person, place, and time. He appears well-developed and well-nourished. No distress.   HENT:   Head: Normocephalic and atraumatic.   Eyes: Conjunctivae are normal. Right eye exhibits no discharge. Left eye exhibits no discharge.   Neck: Neck supple. No JVD present.   Cardiovascular: Normal rate. An irregularly irregular rhythm present. Exam reveals no gallop and no friction rub.   No murmur heard.  Pulmonary/Chest: Effort normal and breath sounds normal. No respiratory distress. He has no wheezes. He has no rales.   Abdominal: Soft. Bowel sounds are normal. He exhibits no distension. There is no tenderness. There is no rebound.   Musculoskeletal: He exhibits no edema.   Neurological: He is alert and oriented to person, place, and time.   Skin: Skin is warm and dry. He is not diaphoretic.   Psychiatric: He has a normal mood and affect. His behavior is normal. Judgment and thought content normal.   Vitals reviewed.        Assessment:       1. Persistent atrial fibrillation did not want amio long term; 9/30/19 redo PVI with RFA; 11/1/19 DCCV; 12/10/19 repeat DCCV    2. Essential hypertension    3. Coronary artery disease involving native coronary artery of native heart with angina pectoris hx NSTEMI with PCI to RCA 2000    4. Chronic kidney disease, stage 3    5. Long term (current) use of anticoagulants         Plan:       In summary, Mr. Skelton is a pleasant 84 year-old man with  symptomatic persistent atrial fibrillation s/p cryo-PVI by Dr. 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 He is maintaining sinus rhythm now on amiodarone. Continue this and warfarin. He is now 1 month post-DCCV. Ok to start holding warfarin when needed for upcoming planned EGD/colonoscopy.    RTC in 6 months with preclinic CMP/TSH. Needs baseline PFTs.    Thank you for allowing me to participate in the care of this patient. Please do not hesitate to call me with any questions or concerns.    Neel Topete MD, PhD  Cardiac Electrophysiology

## 2020-01-09 NOTE — TELEPHONE ENCOUNTER
Has alternated between diarrhea and constipation.  I don't know if he is emptied out from the diarrhea, or if he is constipated and if he needs an enema or disimpaction.  Would recommend he go to urgent care and see if they can do X ray of the abdomen and see if he is impacted.

## 2020-01-10 ENCOUNTER — PATIENT MESSAGE (OUTPATIENT)
Dept: FAMILY MEDICINE | Facility: CLINIC | Age: 85
End: 2020-01-10

## 2020-01-10 NOTE — PROGRESS NOTES
INR high and likely due to recent health change. He was in ER 1/6 then u/c yesterday for GI problems (was diarrhea, now constipation). Dose was looking to be stable in the last few weeks and was fine in ER 1/6. Will adjust dose for today. Repeat INR next week with other appts in [place]. He is now out of his post DCCV period. He saw Dr. Topete yesterday who recommended to stay on warfarin and amiodarone for now. He is back in NSR. Reevaluate next week. Once INR stabilizes, we will resume routine INR.

## 2020-01-10 NOTE — PROGRESS NOTES
Patient advised to take (Warfarin 2.5mg Fri/Sun, except 5mg AOD). Patient verbalized understanding.

## 2020-01-10 NOTE — PATIENT INSTRUCTIONS
General Discharge Instructions   If you were prescribed a narcotic or controlled medication, do not drive or operate heavy equipment or machinery while taking these medications.  If you were prescribed antibiotics, please take them to completion.  You must understand that you've received an Urgent Care treatment only and that you may be released before all your medical problems are known or treated. You, the patient, will arrange for follow up care as instructed.  Follow up with your PCP or specialty clinic as directed in the next 1-2 weeks if not improved or as needed.  You can call [phone] to schedule an appointment with the appropriate provider.  If your condition worsens we recommend that you receive another evaluation at the emergency room immediately or contact your primary medical clinics after hours call service to discuss your concerns.  Please return here or go to the Emergency Department for any concerns or worsening of condition.      Constipation (Adult)  Constipation means that you have bowel movements that are less frequent than usual. Stools often become very hard and difficult to pass.  Constipation is very common. At some point in life it affects almost everyone. Since everyone's bowel habits are different, what is constipation to one person may not be to another. Your healthcare provider may do tests to diagnose constipation. It depends on what he or she finds when evaluating you.    Symptoms of constipation include:  · Abdominal pain  · Bloating  · Vomiting  · Painful bowel movements  · Itching, swelling, bleeding, or pain around the anus  Causes  Constipation can have many causes. These include:  · Diet low in fiber  · Too much dairy  · Not drinking enough liquids  · Lack of exercise or physical activity. This is especially true for older adults.  · Changes in lifestyle or daily routine, including pregnancy, aging, work, and travel  · Frequent use or misuse of laxatives  · Ignoring the  urge to have a bowel movement or delaying it until later  · Medicines, such as certain prescription pain medicines, iron supplements, antacids, certain antidepressants, and calcium supplements  · Diseases like irritable bowel syndrome, bowel obstructions, stroke, diabetes, thyroid disease, Parkinson disease, hemorrhoids, and colon cancer  Complications  Potential complications of constipation can include:  · Hemorrhoids  · Rectal bleeding from hemorrhoids or anal fissures (skin tears)  · Hernias  · Dependency on laxatives  · Chronic constipation  · Fecal impaction  · Bowel obstruction or perforation  Home care  All treatment should be done after talking with your healthcare provider. This is especially true if you have another medical problems, are taking prescription medicines, or are an older adult. Treatment most often involves lifestyle changes. You may also need medicines. Your healthcare provider will tell you which will work best for you. Follow the advice below to help avoid this problem in the future.  Lifestyle changes  These lifestyle changes can help prevent constipation:  · Diet. Eat a high-fiber diet, with fresh fruit and vegetables, and reduce dairy intake, meats, and processed foods  · Fluids. It's important to get enough fluids each day. Drink plenty of water when you eat more fiber. If you are on diet that limits the amount of fluid you can have, talk about this with your healthcare provider.  · Regular exercise. Check with your healthcare provider first.  Medications  Take any medicines as directed. Some laxatives are safe to use only every now and then. Others can be taken on a regular basis. Talk with your doctor or pharmacist if you have questions.  Prescription pain medicines can cause constipation. If you are taking this kind of medicine, ask your healthcare provider if you should also take a stool softener.  Medicines you may take to treat constipation include:  · Fiber supplements  · Stool  softeners  · Laxatives  · Enemas  · Rectal suppositories  Follow-up care  Follow up with your healthcare provider if symptoms don't get better in the next few days. You may need to have more tests or see a specialist.  Call 911  Call 911 if any of these occur:  · Trouble breathing  · Stiff, rigid abdomen that is severely painful to touch  · Confusion  · Fainting or loss of consciousness  · Rapid heart rate  · Chest pain  When to seek medical advice  Call your healthcare provider right away if any of these occur:  · Fever over 100.4°F (38°C)  · Failure to resume normal bowel movements  · Pain in your abdomen or back gets worse  · Nausea or vomiting  · Swelling in your abdomen  · Blood in the stool  · Black, tarry stool  · Involuntary weight loss  · Weakness  Date Last Reviewed: 12/30/2015  © 7503-8621 [employer]. [address] [place] [address], [place] [address]. All rights reserved. This information is not intended as a substitute for professional medical care. Always follow your healthcare professional's instructions.

## 2020-01-13 ENCOUNTER — PATIENT MESSAGE (OUTPATIENT)
Dept: FAMILY MEDICINE | Facility: CLINIC | Age: 85
End: 2020-01-13

## 2020-01-14 DIAGNOSIS — Z79.01 ANTICOAGULATION MONITORING BY PHARMACIST: ICD-10-CM

## 2020-01-14 DIAGNOSIS — I48.20 CHRONIC ATRIAL FIBRILLATION: ICD-10-CM

## 2020-01-14 DIAGNOSIS — I48.0 PAROXYSMAL ATRIAL FIBRILLATION: ICD-10-CM

## 2020-01-14 RX ORDER — WARFARIN SODIUM 5 MG/1
TABLET ORAL
Qty: 135 TABLET | Refills: 3 | Status: SHIPPED | OUTPATIENT
Start: 2020-01-14 | End: 2020-02-20

## 2020-01-15 ENCOUNTER — ANTI-COAG VISIT (OUTPATIENT)
Dept: CARDIOLOGY | Facility: CLINIC | Age: 85
End: 2020-01-15
Payer: MEDICARE

## 2020-01-15 ENCOUNTER — LAB VISIT (OUTPATIENT)
Dept: LAB | Facility: HOSPITAL | Age: 85
End: 2020-01-15
Attending: INTERNAL MEDICINE
Payer: MEDICARE

## 2020-01-15 ENCOUNTER — CLINICAL SUPPORT (OUTPATIENT)
Dept: FAMILY MEDICINE | Facility: CLINIC | Age: 85
End: 2020-01-15
Payer: MEDICARE

## 2020-01-15 DIAGNOSIS — I48.0 PAF (PAROXYSMAL ATRIAL FIBRILLATION): ICD-10-CM

## 2020-01-15 DIAGNOSIS — I48.19 PERSISTENT ATRIAL FIBRILLATION: ICD-10-CM

## 2020-01-15 DIAGNOSIS — E29.1 HYPOGONADISM IN MALE: Primary | ICD-10-CM

## 2020-01-15 LAB
INR PPP: 4.6 (ref 0.8–1.2)
PROTHROMBIN TIME: 43.9 SEC (ref 9–12.5)

## 2020-01-15 PROCEDURE — 99999 PR PBB SHADOW E&M-EST. PATIENT-LVL I: ICD-10-PCS | Mod: PBBFAC,HCNC,,

## 2020-01-15 PROCEDURE — 96372 PR INJECTION,THERAP/PROPH/DIAG2ST, IM OR SUBCUT: ICD-10-PCS | Mod: HCNC,S$GLB,, | Performed by: INTERNAL MEDICINE

## 2020-01-15 PROCEDURE — 93793 PR ANTICOAGULANT MGMT FOR PT TAKING WARFARIN: ICD-10-PCS | Mod: S$GLB,,,

## 2020-01-15 PROCEDURE — 99999 PR PBB SHADOW E&M-EST. PATIENT-LVL I: CPT | Mod: PBBFAC,HCNC,,

## 2020-01-15 PROCEDURE — 93793 ANTICOAG MGMT PT WARFARIN: CPT | Mod: S$GLB,,,

## 2020-01-15 PROCEDURE — 85610 PROTHROMBIN TIME: CPT | Mod: HCNC

## 2020-01-15 PROCEDURE — 36415 COLL VENOUS BLD VENIPUNCTURE: CPT | Mod: HCNC,PO

## 2020-01-15 PROCEDURE — 96372 THER/PROPH/DIAG INJ SC/IM: CPT | Mod: HCNC,S$GLB,, | Performed by: INTERNAL MEDICINE

## 2020-01-15 RX ORDER — WARFARIN SODIUM 5 MG/1
5 TABLET ORAL DAILY
Qty: 90 TABLET | Refills: 3 | Status: SHIPPED | OUTPATIENT
Start: 2020-01-15 | End: 2021-01-29

## 2020-01-15 RX ADMIN — TESTOSTERONE CYPIONATE 100 MG: 200 INJECTION, SOLUTION INTRAMUSCULAR at 08:01

## 2020-01-15 NOTE — PROGRESS NOTES
INR very high today. Patient still having GI issues with diarrhea. Will hold a dose today & decrease. Recheck INR in 1 week

## 2020-01-22 ENCOUNTER — ANTI-COAG VISIT (OUTPATIENT)
Dept: CARDIOLOGY | Facility: CLINIC | Age: 85
End: 2020-01-22
Payer: MEDICARE

## 2020-01-22 ENCOUNTER — LAB VISIT (OUTPATIENT)
Dept: LAB | Facility: HOSPITAL | Age: 85
End: 2020-01-22
Attending: INTERNAL MEDICINE
Payer: MEDICARE

## 2020-01-22 DIAGNOSIS — I48.0 PAF (PAROXYSMAL ATRIAL FIBRILLATION): ICD-10-CM

## 2020-01-22 DIAGNOSIS — I48.19 PERSISTENT ATRIAL FIBRILLATION: ICD-10-CM

## 2020-01-22 LAB
INR PPP: 2.2 (ref 0.8–1.2)
PROTHROMBIN TIME: 20.7 SEC (ref 9–12.5)

## 2020-01-22 PROCEDURE — 93793 PR ANTICOAGULANT MGMT FOR PT TAKING WARFARIN: ICD-10-PCS | Mod: S$GLB,,,

## 2020-01-22 PROCEDURE — 85610 PROTHROMBIN TIME: CPT | Mod: HCNC

## 2020-01-22 PROCEDURE — 93793 ANTICOAG MGMT PT WARFARIN: CPT | Mod: S$GLB,,,

## 2020-01-22 PROCEDURE — 36415 COLL VENOUS BLD VENIPUNCTURE: CPT | Mod: HCNC,PO

## 2020-01-23 ENCOUNTER — PATIENT MESSAGE (OUTPATIENT)
Dept: ELECTROPHYSIOLOGY | Facility: CLINIC | Age: 85
End: 2020-01-23

## 2020-01-29 ENCOUNTER — LAB VISIT (OUTPATIENT)
Dept: LAB | Facility: HOSPITAL | Age: 85
End: 2020-01-29
Attending: INTERNAL MEDICINE
Payer: MEDICARE

## 2020-01-29 ENCOUNTER — ANTI-COAG VISIT (OUTPATIENT)
Dept: CARDIOLOGY | Facility: CLINIC | Age: 85
End: 2020-01-29
Payer: MEDICARE

## 2020-01-29 ENCOUNTER — CLINICAL SUPPORT (OUTPATIENT)
Dept: FAMILY MEDICINE | Facility: CLINIC | Age: 85
End: 2020-01-29
Payer: MEDICARE

## 2020-01-29 DIAGNOSIS — I48.19 PERSISTENT ATRIAL FIBRILLATION: ICD-10-CM

## 2020-01-29 DIAGNOSIS — E29.1 HYPOGONADISM IN MALE: Primary | ICD-10-CM

## 2020-01-29 DIAGNOSIS — I48.0 PAF (PAROXYSMAL ATRIAL FIBRILLATION): ICD-10-CM

## 2020-01-29 LAB
INR PPP: 2 (ref 0.8–1.2)
PROTHROMBIN TIME: 19 SEC (ref 9–12.5)

## 2020-01-29 PROCEDURE — 93793 ANTICOAG MGMT PT WARFARIN: CPT | Mod: S$GLB,,,

## 2020-01-29 PROCEDURE — 96372 PR INJECTION,THERAP/PROPH/DIAG2ST, IM OR SUBCUT: ICD-10-PCS | Mod: HCNC,S$GLB,, | Performed by: INTERNAL MEDICINE

## 2020-01-29 PROCEDURE — 85610 PROTHROMBIN TIME: CPT | Mod: HCNC

## 2020-01-29 PROCEDURE — 36415 COLL VENOUS BLD VENIPUNCTURE: CPT | Mod: HCNC,PO

## 2020-01-29 PROCEDURE — 99999 PR PBB SHADOW E&M-EST. PATIENT-LVL I: ICD-10-PCS | Mod: PBBFAC,HCNC,,

## 2020-01-29 PROCEDURE — 99999 PR PBB SHADOW E&M-EST. PATIENT-LVL I: CPT | Mod: PBBFAC,HCNC,,

## 2020-01-29 PROCEDURE — 93793 PR ANTICOAGULANT MGMT FOR PT TAKING WARFARIN: ICD-10-PCS | Mod: S$GLB,,,

## 2020-01-29 PROCEDURE — 96372 THER/PROPH/DIAG INJ SC/IM: CPT | Mod: HCNC,S$GLB,, | Performed by: INTERNAL MEDICINE

## 2020-01-29 RX ADMIN — TESTOSTERONE CYPIONATE 100 MG: 200 INJECTION, SOLUTION INTRAMUSCULAR at 08:01

## 2020-01-29 NOTE — PROGRESS NOTES
INR at goal. Medications and chart reviewed. No changes noted to necessitate adjustment of warfarin or follow-up plan. See calendar.    Continue to follow closely. INR decreasing and on low end today

## 2020-01-31 ENCOUNTER — PATIENT MESSAGE (OUTPATIENT)
Dept: ELECTROPHYSIOLOGY | Facility: CLINIC | Age: 85
End: 2020-01-31

## 2020-02-04 ENCOUNTER — PES CALL (OUTPATIENT)
Dept: ADMINISTRATIVE | Facility: CLINIC | Age: 85
End: 2020-02-04

## 2020-02-10 ENCOUNTER — LAB VISIT (OUTPATIENT)
Dept: LAB | Facility: HOSPITAL | Age: 85
End: 2020-02-10
Payer: MEDICARE

## 2020-02-10 ENCOUNTER — ANTI-COAG VISIT (OUTPATIENT)
Dept: CARDIOLOGY | Facility: CLINIC | Age: 85
End: 2020-02-10
Payer: MEDICARE

## 2020-02-10 ENCOUNTER — OFFICE VISIT (OUTPATIENT)
Dept: UROLOGY | Facility: CLINIC | Age: 85
End: 2020-02-10
Payer: MEDICARE

## 2020-02-10 VITALS
BODY MASS INDEX: 29.38 KG/M2 | SYSTOLIC BLOOD PRESSURE: 104 MMHG | WEIGHT: 236.31 LBS | DIASTOLIC BLOOD PRESSURE: 68 MMHG | HEART RATE: 72 BPM | HEIGHT: 75 IN

## 2020-02-10 DIAGNOSIS — N40.0 BENIGN NON-NODULAR PROSTATIC HYPERPLASIA WITHOUT LOWER URINARY TRACT SYMPTOMS: Primary | ICD-10-CM

## 2020-02-10 DIAGNOSIS — I48.0 PAF (PAROXYSMAL ATRIAL FIBRILLATION): ICD-10-CM

## 2020-02-10 DIAGNOSIS — I48.19 PERSISTENT ATRIAL FIBRILLATION: ICD-10-CM

## 2020-02-10 LAB
INR PPP: 1.8 (ref 0.8–1.2)
PROTHROMBIN TIME: 17.4 SEC (ref 9–12.5)

## 2020-02-10 PROCEDURE — 99214 PR OFFICE/OUTPT VISIT, EST, LEVL IV, 30-39 MIN: ICD-10-PCS | Mod: HCNC,S$GLB,, | Performed by: UROLOGY

## 2020-02-10 PROCEDURE — 1159F PR MEDICATION LIST DOCUMENTED IN MEDICAL RECORD: ICD-10-PCS | Mod: HCNC,S$GLB,, | Performed by: UROLOGY

## 2020-02-10 PROCEDURE — 1101F PT FALLS ASSESS-DOCD LE1/YR: CPT | Mod: HCNC,CPTII,S$GLB, | Performed by: UROLOGY

## 2020-02-10 PROCEDURE — 3078F DIAST BP <80 MM HG: CPT | Mod: HCNC,CPTII,S$GLB, | Performed by: UROLOGY

## 2020-02-10 PROCEDURE — 99214 OFFICE O/P EST MOD 30 MIN: CPT | Mod: HCNC,S$GLB,, | Performed by: UROLOGY

## 2020-02-10 PROCEDURE — 99999 PR PBB SHADOW E&M-EST. PATIENT-LVL III: CPT | Mod: PBBFAC,HCNC,, | Performed by: UROLOGY

## 2020-02-10 PROCEDURE — 3074F SYST BP LT 130 MM HG: CPT | Mod: HCNC,CPTII,S$GLB, | Performed by: UROLOGY

## 2020-02-10 PROCEDURE — 1126F AMNT PAIN NOTED NONE PRSNT: CPT | Mod: HCNC,S$GLB,, | Performed by: UROLOGY

## 2020-02-10 PROCEDURE — 85610 PROTHROMBIN TIME: CPT | Mod: HCNC

## 2020-02-10 PROCEDURE — 3074F PR MOST RECENT SYSTOLIC BLOOD PRESSURE < 130 MM HG: ICD-10-PCS | Mod: HCNC,CPTII,S$GLB, | Performed by: UROLOGY

## 2020-02-10 PROCEDURE — 93793 PR ANTICOAGULANT MGMT FOR PT TAKING WARFARIN: ICD-10-PCS | Mod: S$GLB,,,

## 2020-02-10 PROCEDURE — 99999 PR PBB SHADOW E&M-EST. PATIENT-LVL III: ICD-10-PCS | Mod: PBBFAC,HCNC,, | Performed by: UROLOGY

## 2020-02-10 PROCEDURE — 3078F PR MOST RECENT DIASTOLIC BLOOD PRESSURE < 80 MM HG: ICD-10-PCS | Mod: HCNC,CPTII,S$GLB, | Performed by: UROLOGY

## 2020-02-10 PROCEDURE — 1159F MED LIST DOCD IN RCRD: CPT | Mod: HCNC,S$GLB,, | Performed by: UROLOGY

## 2020-02-10 PROCEDURE — 36415 COLL VENOUS BLD VENIPUNCTURE: CPT | Mod: HCNC

## 2020-02-10 PROCEDURE — 1101F PR PT FALLS ASSESS DOC 0-1 FALLS W/OUT INJ PAST YR: ICD-10-PCS | Mod: HCNC,CPTII,S$GLB, | Performed by: UROLOGY

## 2020-02-10 PROCEDURE — 1126F PR PAIN SEVERITY QUANTIFIED, NO PAIN PRESENT: ICD-10-PCS | Mod: HCNC,S$GLB,, | Performed by: UROLOGY

## 2020-02-10 PROCEDURE — 93793 ANTICOAG MGMT PT WARFARIN: CPT | Mod: S$GLB,,,

## 2020-02-10 NOTE — PROGRESS NOTES
Subjective:       Patient ID: Linda Skelton is a 84 y.o. male.    Chief Complaint: Benign Prostatic Hypertrophy (annual check up with hattie, pt voice no urology issue . he state dr. bustamante told him he did not need to follow up . but he state he may need a prostate check up.)    HPI   Linda Skelton is a 84 y.o. male with a PMHx of HLD, HTN, and MI who presents to the clinic for prostate management. His AUA Sx Score is 2. He is satisfied with his voiding habits.    Past Medical History:   Diagnosis Date    ACS (acute coronary syndrome)     Acute coronary syndrome 2000    NSTEMI    Anticoagulant long-term use     Coumadin    Atrial fibrillation     Basal cell carcinoma excised     left scalp vertex    Bilateral leg edema 1/6/2014    BPH (benign prostatic hyperplasia)     CKD (chronic kidney disease)     Coronary artery disease     DVT of leg (deep venous thrombosis) 09/08/2014    On coumadin for years    Encounter for blood transfusion     Hyperlipidemia     Hypertension     Hypogonadism, male     Leg fracture, left     Melanoma 01/04/2017     in situ crown of scalp    MI (myocardial infarction)     Panhypopituitarism     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 EP LAB;  Service: Cardiology;  Laterality: N/A;  AF, EUGENE, PVI (re-do), Carto, Gen, RI, 3 Prep *right groin only access and exchange CS/ICE catheters throughout as needed*    APPENDECTOMY      BASAL CELL CARCINOMA EXCISION  01/2008    BRAIN SURGERY      pituitary adenoma removed    CARDIAC CATHETERIZATION  05/09/2000    S/P stent to RCA,    CARDIAC CATHETERIZATION      stents placed     CARDIAC ELECTROPHYSIOLOGY STUDY AND ABLATION      CORONARY ANGIOPLASTY  5/9/2000    RCA    CORONARY ANGIOPLASTY WITH STENT PLACEMENT      ESOPHAGEAL DILATION      EYE SURGERY Bilateral     cataracts removed and new lenses placed     FRACTURE SURGERY Left     leg    HERNIA REPAIR      LAPAROSCOPIC CHOLECYSTECTOMY N/A 6/2/2018    Procedure: CHOLECYSTECTOMY, LAPAROSCOPIC;  Surgeon: Dickson Smith Jr., MD;  Location[facility] OR;  Service: General;  Laterality: N/A;    left femur surgery      pituitatry      hypophysectomy    SKIN BIOPSY      TONSILLECTOMY      TREATMENT OF CARDIAC ARRHYTHMIA Bilateral 6/13/2019    Procedure: Cardioversion or Defibrillation;  Surgeon: Eric Nguyen MD;  Location: [facility] EP LAB;  Service: Cardiology;  Laterality: Bilateral;  af, bryson, dccv, anes, pr, 324    TREATMENT OF CARDIAC ARRHYTHMIA  9/30/2019    Procedure: Cardioversion or Defibrillation;  Surgeon: Neel Topete MD;  Location: [facility] EP LAB;  Service: Cardiology;;    TREATMENT OF CARDIAC ARRHYTHMIA N/A 11/1/2019    Procedure: CARDIOVERSION;  Surgeon: Eric Nguyen MD;  Location: [facility] EP LAB;  Service: Cardiology;  Laterality: N/A;  AF, BRYSON (Cx if Serial INRs x 4), DCCV, MAC, DM, 3 Prep    TREATMENT OF CARDIAC ARRHYTHMIA N/A 12/10/2019    Procedure: CARDIOVERSION;  Surgeon: Neel Topete MD;  Location: [facility] EP LAB;  Service: Cardiology;  Laterality: N/A;  AF, BRYSON (Cx if weekly INRs > 2), DCCV, MAC, KS, 3 Prep    VASCULAR SURGERY         Family History   Problem Relation Age of Onset    Cancer Father     Skin cancer Neg Hx     Melanoma Neg Hx     Heart disease Neg Hx     Hypertension Neg Hx        Social History     Socioeconomic History    Marital status:      Spouse name: Not on file    Number of children: Not on file    Years of education: Not on file    Highest education level: Not on file   Occupational History    Not on file   Social Needs    Financial resource strain: Not hard at all    Food  insecurity:     Worry: Never true     Inability: Never true    Transportation needs:     Medical: No     Non-medical: No   Tobacco Use    Smoking status: Never Smoker    Smokeless tobacco: Never Used   Substance and Sexual Activity    Alcohol use: No     Frequency: Never    Drug use: No    Sexual activity: Not Currently     Partners: Female   Lifestyle    Physical activity:     Days per week: 0 days     Minutes per session: 0 min    Stress: Not at all   Relationships    Social connections:     Talks on phone: More than three times a week     Gets together: Once a week     Attends Denominational service: Not on file     Active member of club or organization: Yes     Attends meetings of clubs or organizations: More than 4 times per year     Relationship status:    Other Topics Concern    Not on file   Social History Narrative    retired [employer] .  Non smoker.        Allergies:  Patient has no known allergies.    Medications:    Current Outpatient Medications:     amiodarone (PACERONE) 200 MG Tab, Take 200 mg by mouth once daily. 200 mg BID until 12/17/19 then reduce down to 200 mg daily thereafter, Disp: , Rfl:     amiodarone (PACERONE) 200 MG Tab, Take 1 tablet (200 mg total) by mouth once daily., Disp: 30 tablet, Rfl: 6    aspirin 81 mg Tab, Take 81 mg by mouth every evening. 1 Tablet Oral Every night, Disp: , Rfl:     ciclopirox (PENLAC) 8 % Soln, Apply daily to affected nail. Must remove and restart weekly, Disp: 1 Bottle, Rfl: 5    folic acid (FOLVITE) 1 MG tablet, TAKE 1 TABLET BY MOUTH ONCE DAILY, Disp: 90 tablet, Rfl: 3    ketoconazole (NIZORAL) 2 % cream, AAA bid to feet x 3 weeks, Disp: 60 g, Rfl: 3    levothyroxine (SYNTHROID) 112 MCG tablet, TAKE 1 TABLET BY MOUTH ONCE DAILY BEFORE  BREAKFAST, Disp: 90 tablet, Rfl: 3    mometasone (ELOCON) 0.1 % ointment, aaa qd- bid for severe areas.  Avoid use on face and groin, Disp: 60 g, Rfl: 1    mometasone (ELOCON) 0.1 % solution, Mix  entire bottle in jar of cerave cream and aaa qd- bid prn itching, Disp: 60 mL, Rfl: 3    NITROSTAT 0.4 mg SL tablet, DISSOLVE ONE TABLET UNDER THE TONGUE EVERY 5 MINUTES AS NEEDED FOR CHEST PAIN.  DO NOT EXCEED A TOTAL OF 3 DOSES IN 15 MINUTES NOW, Disp: 25 tablet, Rfl: 3    omeprazole (PRILOSEC) 20 MG capsule, Take 2 capsules (40 mg total) by mouth once daily., Disp: 30 capsule, Rfl: 1    pantoprazole (PROTONIX) 40 MG tablet, , Disp: , Rfl:     predniSONE (DELTASONE) 5 MG tablet, Take 5 mg by mouth once daily., Disp: , Rfl:     rosuvastatin (CRESTOR) 40 MG Tab, Take 1 tablet (40 mg total) by mouth once daily., Disp: 30 tablet, Rfl: 11    tamsulosin (FLOMAX) 0.4 mg Cap, TAKE 1 CAPSULE BY MOUTH ONCE DAILY, Disp: 90 capsule, Rfl: 3    triamcinolone acetonide 0.1% (KENALOG) 0.1 % cream, , Disp: , Rfl:     warfarin (COUMADIN) 5 MG tablet, As per coumadin clinic, Disp: 135 tablet, Rfl: 3    warfarin (COUMADIN) 5 MG tablet, Take 1 tablet (5 mg total) by mouth Daily. Current Dose ( 2.5 mg every Sun; 5 mg all other days) or as directed by coumadin clinic., Disp: 90 tablet, Rfl: 3  No current facility-administered medications for this visit.     Facility-Administered Medications Ordered in Other Visits:     0.9%  NaCl infusion, , Intravenous, Continuous, Renay Fernandez NP, Stopped at 11/01/19 1022    sodium chloride 0.9% flush 5 mL, 5 mL, Intravenous, PRN, Renay Fernandez NP    Review of Systems   Constitutional: Negative for activity change, appetite change, chills, diaphoresis, fatigue, fever and unexpected weight change.   HENT: Negative for congestion, dental problem, hearing loss, mouth sores, postnasal drip, rhinorrhea, sinus pressure and trouble swallowing.    Eyes: Negative for pain, discharge and itching.   Respiratory: Negative for apnea, cough, choking, chest tightness, shortness of breath and wheezing.    Cardiovascular: Negative for chest pain, palpitations and leg swelling.    Gastrointestinal: Negative for abdominal distention, abdominal pain, anal bleeding, blood in stool, constipation and diarrhea.   Endocrine: Negative for polydipsia and polyuria.   Genitourinary: Negative for decreased urine volume, difficulty urinating, discharge, dysuria, enuresis, flank pain, frequency, genital sores, hematuria, penile pain, penile swelling, scrotal swelling and urgency.   Musculoskeletal: Negative for arthralgias and back pain.   Skin: Negative for color change, rash and wound.   Neurological: Negative for dizziness, syncope, speech difficulty, light-headedness and headaches.   Hematological: Negative for adenopathy. Does not bruise/bleed easily.   Psychiatric/Behavioral: Negative for behavioral problems and confusion.       Objective:      Physical Exam   Vitals reviewed.  Constitutional: He appears well-developed.   HENT:   Head: Normocephalic.   Cardiovascular: Normal rate.    Pulmonary/Chest: Effort normal.   Abdominal: Soft.   Genitourinary:   Genitourinary Comments: Prostate was smooth without nodularity. No rectal masses. 35 grams.Normal perineum.     Neurological: He is alert.   Skin: Skin is warm.     Psychiatric: He has a normal mood and affect.       Assessment:       1. Benign non-nodular prostatic hyperplasia without lower urinary tract symptoms        Plan:       Linda was seen today for benign prostatic hypertrophy.    Diagnoses and all orders for this visit:    Benign non-nodular prostatic hyperplasia without lower urinary tract symptoms        Follow-up with PCP.  No PSAs are necessary unless nodule is palpable    RTC 1 year for NORMA.      IJuanita, am acting as a scribe on this patient encounter in the presence and under the supervision of DR. Núñez.      02/10/2020 9:29 AM  IDr. Núñez, personally performed the services described in this documentation.   All medical record entries made by the scribe were at my direction and in my presence.   I have reviewed the chart  and agree that the record is accurate and complete.   Octaviano Núñez MD.  9:29 AM 02/10/2020

## 2020-02-10 NOTE — PLAN OF CARE
Plan of acre reviewed with patient and son, Genaro. Patient received from PACU via stretcher. Tele monitor in place. Right groinw with dry gauze /tegaderm dressing in place. No bleeding, no hematoma noted. Bedrest until 2230. Moore catheter in place. Call bell wihihn reach. Will continue to monitor  
Plan of care discussed with patient. Patient is free of fall/trauma/injury. Denies CP, SOB, or pain/discomfort. R groin dressing CDI. Site ecchymotic, but no bleeding or hematoma noted. All questions addressed. Will continue to monitor. Patient to be discharged this AM.  
Pt arrived to unit accompanied by family.  Oriented pt to rm and unit.  Pre op orders and assessment initiated.  Pt remains npo.  Pt in no acute distress and verbalizes no complaints.  Will continue to monitor.  Call bell within reach.      
80

## 2020-02-14 ENCOUNTER — CLINICAL SUPPORT (OUTPATIENT)
Dept: FAMILY MEDICINE | Facility: CLINIC | Age: 85
End: 2020-02-14
Payer: MEDICARE

## 2020-02-14 DIAGNOSIS — E29.1 HYPOGONADISM IN MALE: Primary | ICD-10-CM

## 2020-02-14 PROCEDURE — 96372 THER/PROPH/DIAG INJ SC/IM: CPT | Mod: HCNC,S$GLB,, | Performed by: INTERNAL MEDICINE

## 2020-02-14 PROCEDURE — 96372 PR INJECTION,THERAP/PROPH/DIAG2ST, IM OR SUBCUT: ICD-10-PCS | Mod: HCNC,S$GLB,, | Performed by: INTERNAL MEDICINE

## 2020-02-14 RX ORDER — TESTOSTERONE CYPIONATE 1000 MG/10ML
100 INJECTION, SOLUTION INTRAMUSCULAR
Status: DISCONTINUED | OUTPATIENT
Start: 2020-02-14 | End: 2020-02-14

## 2020-02-14 RX ORDER — TESTOSTERONE CYPIONATE 200 MG/ML
100 INJECTION, SOLUTION INTRAMUSCULAR
Status: SHIPPED | OUTPATIENT
Start: 2020-02-14 | End: 2020-05-08

## 2020-02-14 RX ADMIN — TESTOSTERONE CYPIONATE 100 MG: 200 INJECTION, SOLUTION INTRAMUSCULAR at 11:02

## 2020-02-14 NOTE — PROGRESS NOTES
Pt tolerated injection of depo-testosterone 100mg to right ventrogluteal without difficulty; no adverse reaction noted; pt informed orders for testosterone level added per Dr Jerome and linked to lab appt in 2 weeks; pt verbalized understanding

## 2020-02-14 NOTE — PROGRESS NOTES
Spoke with nurse Sal at [facility]. Patient is there for his injection. She will link to lab that need to be done on 2/26/2020 and will inform patient.

## 2020-02-17 ENCOUNTER — OFFICE VISIT (OUTPATIENT)
Dept: DERMATOLOGY | Facility: CLINIC | Age: 85
End: 2020-02-17
Payer: MEDICARE

## 2020-02-17 DIAGNOSIS — B35.3 TINEA PEDIS OF BOTH FEET: ICD-10-CM

## 2020-02-17 DIAGNOSIS — C84.A0 CUTANEOUS T-CELL LYMPHOMA, UNSPECIFIED BODY REGION: Primary | ICD-10-CM

## 2020-02-17 PROCEDURE — 99213 OFFICE O/P EST LOW 20 MIN: CPT | Mod: HCNC,S$GLB,, | Performed by: DERMATOLOGY

## 2020-02-17 PROCEDURE — 99999 PR PBB SHADOW E&M-EST. PATIENT-LVL II: CPT | Mod: PBBFAC,HCNC,, | Performed by: DERMATOLOGY

## 2020-02-17 PROCEDURE — 99999 PR PBB SHADOW E&M-EST. PATIENT-LVL II: ICD-10-PCS | Mod: PBBFAC,HCNC,, | Performed by: DERMATOLOGY

## 2020-02-17 PROCEDURE — 1126F AMNT PAIN NOTED NONE PRSNT: CPT | Mod: HCNC,S$GLB,, | Performed by: DERMATOLOGY

## 2020-02-17 PROCEDURE — 1159F MED LIST DOCD IN RCRD: CPT | Mod: HCNC,S$GLB,, | Performed by: DERMATOLOGY

## 2020-02-17 PROCEDURE — 99213 PR OFFICE/OUTPT VISIT, EST, LEVL III, 20-29 MIN: ICD-10-PCS | Mod: HCNC,S$GLB,, | Performed by: DERMATOLOGY

## 2020-02-17 PROCEDURE — 1159F PR MEDICATION LIST DOCUMENTED IN MEDICAL RECORD: ICD-10-PCS | Mod: HCNC,S$GLB,, | Performed by: DERMATOLOGY

## 2020-02-17 PROCEDURE — 1101F PR PT FALLS ASSESS DOC 0-1 FALLS W/OUT INJ PAST YR: ICD-10-PCS | Mod: HCNC,CPTII,S$GLB, | Performed by: DERMATOLOGY

## 2020-02-17 PROCEDURE — 99499 UNLISTED E&M SERVICE: CPT | Mod: HCNC,S$GLB,, | Performed by: DERMATOLOGY

## 2020-02-17 PROCEDURE — 1126F PR PAIN SEVERITY QUANTIFIED, NO PAIN PRESENT: ICD-10-PCS | Mod: HCNC,S$GLB,, | Performed by: DERMATOLOGY

## 2020-02-17 PROCEDURE — 99499 RISK ADDL DX/OHS AUDIT: ICD-10-PCS | Mod: HCNC,S$GLB,, | Performed by: DERMATOLOGY

## 2020-02-17 PROCEDURE — 1101F PT FALLS ASSESS-DOCD LE1/YR: CPT | Mod: HCNC,CPTII,S$GLB, | Performed by: DERMATOLOGY

## 2020-02-17 RX ORDER — CLOTRIMAZOLE 1 %
CREAM (GRAM) TOPICAL 2 TIMES DAILY
Qty: 60 G | Refills: 3 | Status: SHIPPED | OUTPATIENT
Start: 2020-02-17 | End: 2021-07-04

## 2020-02-17 NOTE — PROGRESS NOTES
Subjective:       Patient ID:  Linda Skelton is a 84 y.o. male who presents for   Chief Complaint   Patient presents with    Rash     Pt here today for rash f/u, pt states improving, tx Eloconcerave combo.  Not itching. No longer spreading.   Insurance co will no longer cover ketoconazole cream.  They will cover clotrimazole.       Review of Systems   Constitutional: Negative for fever, chills, fatigue and malaise.   Skin: Positive for rash, dry skin and activity-related sunscreen use. Negative for daily sunscreen use and recent sunburn.   Hematologic/Lymphatic: Bruises/bleeds easily.        Objective:    Physical Exam   Constitutional: He appears well-developed and well-nourished.   Neurological: He is alert and oriented to person, place, and time.   Psychiatric: He has a normal mood and affect.   Skin:   Areas Examined (abnormalities noted in diagram):   Head / Face Inspection Performed  Chest / Axilla Inspection Performed  Abdomen Inspection Performed  Back Inspection Performed  RUE Inspected  LUE Inspection Performed  RLE Inspected  LLE Inspection Performed              Diagram Legend     Erythematous scaling macule/papule c/w actinic keratosis       Vascular papule c/w angioma      Pigmented verrucoid papule/plaque c/w seborrheic keratosis      Yellow umbilicated papule c/w sebaceous hyperplasia      Irregularly shaped tan macule c/w lentigo     1-2 mm smooth white papules consistent with Milia      Movable subcutaneous cyst with punctum c/w epidermal inclusion cyst      Subcutaneous movable cyst c/w pilar cyst      Firm pink to brown papule c/w dermatofibroma      Pedunculated fleshy papule(s) c/w skin tag(s)      Evenly pigmented macule c/w junctional nevus     Mildly variegated pigmented, slightly irregular-bordered macule c/w mildly atypical nevus      Flesh colored to evenly pigmented papule c/w intradermal nevus       Pink pearly papule/plaque c/w basal cell carcinoma      Erythematous  hyperkeratotic cursted plaque c/w SCC      Surgical scar with no sign of skin cancer recurrence      Open and closed comedones      Inflammatory papules and pustules      Verrucoid papule consistent consistent with wart     Erythematous eczematous patches and plaques     Dystrophic onycholytic nail with subungual debris c/w onychomycosis     Umbilicated papule    Erythematous-base heme-crusted tan verrucoid plaque consistent with inflamed seborrheic keratosis     Erythematous Silvery Scaling Plaque c/w Psoriasis     See annotation      Assessment / Plan:        Cutaneous T-cell lymphoma, unspecified body region  Pt still with current disease unresponsive to elocon /cerave mixture but not progressing.   Instructed pt to resume TAC 0.1% cream to affected areasqd- bid  Natural sunlight to affected areas if possible for 10 minutes before 10 am or after 4 pm      Tinea pedis of both feet  -     clotrimazole (LOTRIMIN) 1 % cream; Apply topically 2 (two) times daily. To feet  For 3 weeks as needed  Dispense: 60 g; Refill: 3             Follow up in about 4 months (around 6/17/2020).

## 2020-02-19 ENCOUNTER — OFFICE VISIT (OUTPATIENT)
Dept: GASTROENTEROLOGY | Facility: CLINIC | Age: 85
End: 2020-02-19
Payer: MEDICARE

## 2020-02-19 VITALS
SYSTOLIC BLOOD PRESSURE: 133 MMHG | WEIGHT: 234.13 LBS | DIASTOLIC BLOOD PRESSURE: 74 MMHG | HEIGHT: 75 IN | HEART RATE: 69 BPM | BODY MASS INDEX: 29.11 KG/M2

## 2020-02-19 DIAGNOSIS — D12.6 TUBULAR ADENOMA OF COLON: ICD-10-CM

## 2020-02-19 DIAGNOSIS — R19.4 CHANGE IN BOWEL HABITS: ICD-10-CM

## 2020-02-19 DIAGNOSIS — K21.9 GASTROESOPHAGEAL REFLUX DISEASE WITHOUT ESOPHAGITIS: Primary | ICD-10-CM

## 2020-02-19 PROCEDURE — 1101F PT FALLS ASSESS-DOCD LE1/YR: CPT | Mod: HCNC,CPTII,GC,S$GLB | Performed by: STUDENT IN AN ORGANIZED HEALTH CARE EDUCATION/TRAINING PROGRAM

## 2020-02-19 PROCEDURE — 99204 PR OFFICE/OUTPT VISIT, NEW, LEVL IV, 45-59 MIN: ICD-10-PCS | Mod: HCNC,GC,S$GLB, | Performed by: STUDENT IN AN ORGANIZED HEALTH CARE EDUCATION/TRAINING PROGRAM

## 2020-02-19 PROCEDURE — 99999 PR PBB SHADOW E&M-EST. PATIENT-LVL IV: ICD-10-PCS | Mod: PBBFAC,HCNC,GC, | Performed by: STUDENT IN AN ORGANIZED HEALTH CARE EDUCATION/TRAINING PROGRAM

## 2020-02-19 PROCEDURE — 1126F AMNT PAIN NOTED NONE PRSNT: CPT | Mod: HCNC,GC,S$GLB, | Performed by: STUDENT IN AN ORGANIZED HEALTH CARE EDUCATION/TRAINING PROGRAM

## 2020-02-19 PROCEDURE — 99999 PR PBB SHADOW E&M-EST. PATIENT-LVL IV: CPT | Mod: PBBFAC,HCNC,GC, | Performed by: STUDENT IN AN ORGANIZED HEALTH CARE EDUCATION/TRAINING PROGRAM

## 2020-02-19 PROCEDURE — 1101F PR PT FALLS ASSESS DOC 0-1 FALLS W/OUT INJ PAST YR: ICD-10-PCS | Mod: HCNC,CPTII,GC,S$GLB | Performed by: STUDENT IN AN ORGANIZED HEALTH CARE EDUCATION/TRAINING PROGRAM

## 2020-02-19 PROCEDURE — 3078F PR MOST RECENT DIASTOLIC BLOOD PRESSURE < 80 MM HG: ICD-10-PCS | Mod: HCNC,CPTII,GC,S$GLB | Performed by: STUDENT IN AN ORGANIZED HEALTH CARE EDUCATION/TRAINING PROGRAM

## 2020-02-19 PROCEDURE — 3078F DIAST BP <80 MM HG: CPT | Mod: HCNC,CPTII,GC,S$GLB | Performed by: STUDENT IN AN ORGANIZED HEALTH CARE EDUCATION/TRAINING PROGRAM

## 2020-02-19 PROCEDURE — 3075F PR MOST RECENT SYSTOLIC BLOOD PRESS GE 130-139MM HG: ICD-10-PCS | Mod: HCNC,CPTII,GC,S$GLB | Performed by: STUDENT IN AN ORGANIZED HEALTH CARE EDUCATION/TRAINING PROGRAM

## 2020-02-19 PROCEDURE — 1126F PR PAIN SEVERITY QUANTIFIED, NO PAIN PRESENT: ICD-10-PCS | Mod: HCNC,GC,S$GLB, | Performed by: STUDENT IN AN ORGANIZED HEALTH CARE EDUCATION/TRAINING PROGRAM

## 2020-02-19 PROCEDURE — 1159F MED LIST DOCD IN RCRD: CPT | Mod: HCNC,GC,S$GLB, | Performed by: STUDENT IN AN ORGANIZED HEALTH CARE EDUCATION/TRAINING PROGRAM

## 2020-02-19 PROCEDURE — 1159F PR MEDICATION LIST DOCUMENTED IN MEDICAL RECORD: ICD-10-PCS | Mod: HCNC,GC,S$GLB, | Performed by: STUDENT IN AN ORGANIZED HEALTH CARE EDUCATION/TRAINING PROGRAM

## 2020-02-19 PROCEDURE — 3075F SYST BP GE 130 - 139MM HG: CPT | Mod: HCNC,CPTII,GC,S$GLB | Performed by: STUDENT IN AN ORGANIZED HEALTH CARE EDUCATION/TRAINING PROGRAM

## 2020-02-19 PROCEDURE — 99204 OFFICE O/P NEW MOD 45 MIN: CPT | Mod: HCNC,GC,S$GLB, | Performed by: STUDENT IN AN ORGANIZED HEALTH CARE EDUCATION/TRAINING PROGRAM

## 2020-02-20 ENCOUNTER — HOSPITAL ENCOUNTER (INPATIENT)
Facility: HOSPITAL | Age: 85
LOS: 2 days | Discharge: HOME | DRG: 310 | End: 2020-02-22
Attending: EMERGENCY MEDICINE | Admitting: FAMILY MEDICINE
Payer: MEDICARE

## 2020-02-20 ENCOUNTER — TELEPHONE (OUTPATIENT)
Dept: FAMILY MEDICINE | Facility: CLINIC | Age: 85
End: 2020-02-20

## 2020-02-20 DIAGNOSIS — R55 SYNCOPE: ICD-10-CM

## 2020-02-20 DIAGNOSIS — I48.91 ATRIAL FIBRILLATION WITH RAPID VENTRICULAR RESPONSE: ICD-10-CM

## 2020-02-20 DIAGNOSIS — R55 SYNCOPE AND COLLAPSE: ICD-10-CM

## 2020-02-20 DIAGNOSIS — R55 SYNCOPE, UNSPECIFIED SYNCOPE TYPE: Primary | ICD-10-CM

## 2020-02-20 DIAGNOSIS — I95.1 ORTHOSTATIC HYPOTENSION: ICD-10-CM

## 2020-02-20 DIAGNOSIS — I25.119 CORONARY ARTERY DISEASE INVOLVING NATIVE CORONARY ARTERY OF NATIVE HEART WITH ANGINA PECTORIS: ICD-10-CM

## 2020-02-20 DIAGNOSIS — S09.90XA CLOSED HEAD INJURY: ICD-10-CM

## 2020-02-20 LAB
ALBUMIN SERPL BCP-MCNC: 3.1 G/DL (ref 3.5–5.2)
ALP SERPL-CCNC: 56 U/L (ref 55–135)
ALT SERPL W/O P-5'-P-CCNC: 14 U/L (ref 10–44)
ANION GAP SERPL CALC-SCNC: 8 MMOL/L (ref 8–16)
AST SERPL-CCNC: 17 U/L (ref 10–40)
BASOPHILS # BLD AUTO: 0.01 K/UL (ref 0–0.2)
BASOPHILS NFR BLD: 0.2 % (ref 0–1.9)
BILIRUB SERPL-MCNC: 0.5 MG/DL (ref 0.1–1)
BUN SERPL-MCNC: 16 MG/DL (ref 8–23)
CALCIUM SERPL-MCNC: 8.6 MG/DL (ref 8.7–10.5)
CHLORIDE SERPL-SCNC: 105 MMOL/L (ref 95–110)
CO2 SERPL-SCNC: 25 MMOL/L (ref 23–29)
CREAT SERPL-MCNC: 1.6 MG/DL (ref 0.5–1.4)
DIFFERENTIAL METHOD: ABNORMAL
EOSINOPHIL # BLD AUTO: 0 K/UL (ref 0–0.5)
EOSINOPHIL NFR BLD: 0.4 % (ref 0–8)
ERYTHROCYTE [DISTWIDTH] IN BLOOD BY AUTOMATED COUNT: 13.9 % (ref 11.5–14.5)
EST. GFR  (AFRICAN AMERICAN): 45 ML/MIN/1.73 M^2
EST. GFR  (NON AFRICAN AMERICAN): 39 ML/MIN/1.73 M^2
GLUCOSE SERPL-MCNC: 92 MG/DL (ref 70–110)
HCT VFR BLD AUTO: 41.1 % (ref 40–54)
HGB BLD-MCNC: 13.2 G/DL (ref 14–18)
IMM GRANULOCYTES # BLD AUTO: 0.02 K/UL (ref 0–0.04)
IMM GRANULOCYTES NFR BLD AUTO: 0.4 % (ref 0–0.5)
INR PPP: 2 (ref 0.8–1.2)
LYMPHOCYTES # BLD AUTO: 0.6 K/UL (ref 1–4.8)
LYMPHOCYTES NFR BLD: 11.3 % (ref 18–48)
MCH RBC QN AUTO: 31.3 PG (ref 27–31)
MCHC RBC AUTO-ENTMCNC: 32.1 G/DL (ref 32–36)
MCV RBC AUTO: 97 FL (ref 82–98)
MONOCYTES # BLD AUTO: 1 K/UL (ref 0.3–1)
MONOCYTES NFR BLD: 17.5 % (ref 4–15)
NEUTROPHILS # BLD AUTO: 4 K/UL (ref 1.8–7.7)
NEUTROPHILS NFR BLD: 70.2 % (ref 38–73)
NRBC BLD-RTO: 0 /100 WBC
PLATELET # BLD AUTO: 141 K/UL (ref 150–350)
PMV BLD AUTO: 11.1 FL (ref 9.2–12.9)
POTASSIUM SERPL-SCNC: 3.8 MMOL/L (ref 3.5–5.1)
PROT SERPL-MCNC: 5.8 G/DL (ref 6–8.4)
PROTHROMBIN TIME: 22.2 SEC (ref 9–12.5)
RBC # BLD AUTO: 4.22 M/UL (ref 4.6–6.2)
SODIUM SERPL-SCNC: 138 MMOL/L (ref 136–145)
TROPONIN I SERPL DL<=0.01 NG/ML-MCNC: 0.01 NG/ML (ref 0–0.03)
WBC # BLD AUTO: 5.66 K/UL (ref 3.9–12.7)

## 2020-02-20 PROCEDURE — 99291 CRITICAL CARE FIRST HOUR: CPT | Mod: 25,HCNC

## 2020-02-20 PROCEDURE — 85025 COMPLETE CBC W/AUTO DIFF WBC: CPT | Mod: HCNC

## 2020-02-20 PROCEDURE — 93010 EKG 12-LEAD: ICD-10-PCS | Mod: HCNC,,, | Performed by: INTERNAL MEDICINE

## 2020-02-20 PROCEDURE — 80053 COMPREHEN METABOLIC PANEL: CPT | Mod: HCNC

## 2020-02-20 PROCEDURE — 12000002 HC ACUTE/MED SURGE SEMI-PRIVATE ROOM: Mod: HCNC

## 2020-02-20 PROCEDURE — 63600175 PHARM REV CODE 636 W HCPCS: Mod: HCNC | Performed by: EMERGENCY MEDICINE

## 2020-02-20 PROCEDURE — 25000003 PHARM REV CODE 250: Mod: HCNC | Performed by: EMERGENCY MEDICINE

## 2020-02-20 PROCEDURE — 93010 ELECTROCARDIOGRAM REPORT: CPT | Mod: HCNC,,, | Performed by: INTERNAL MEDICINE

## 2020-02-20 PROCEDURE — 93005 ELECTROCARDIOGRAM TRACING: CPT | Mod: HCNC

## 2020-02-20 PROCEDURE — 96361 HYDRATE IV INFUSION ADD-ON: CPT | Mod: HCNC

## 2020-02-20 PROCEDURE — 96374 THER/PROPH/DIAG INJ IV PUSH: CPT | Mod: HCNC

## 2020-02-20 PROCEDURE — 84484 ASSAY OF TROPONIN QUANT: CPT | Mod: HCNC

## 2020-02-20 PROCEDURE — 85610 PROTHROMBIN TIME: CPT | Mod: HCNC

## 2020-02-20 RX ORDER — ROSUVASTATIN CALCIUM 10 MG/1
40 TABLET, COATED ORAL DAILY
Status: DISCONTINUED | OUTPATIENT
Start: 2020-02-21 | End: 2020-02-20

## 2020-02-20 RX ORDER — ROSUVASTATIN CALCIUM 10 MG/1
40 TABLET, COATED ORAL DAILY
Status: CANCELLED | OUTPATIENT
Start: 2020-02-20

## 2020-02-20 RX ORDER — DIGOXIN 0.25 MG/ML
250 INJECTION INTRAMUSCULAR; INTRAVENOUS ONCE
Status: COMPLETED | OUTPATIENT
Start: 2020-02-21 | End: 2020-02-21

## 2020-02-20 RX ORDER — ROSUVASTATIN CALCIUM 10 MG/1
40 TABLET, COATED ORAL DAILY
Status: DISCONTINUED | OUTPATIENT
Start: 2020-02-20 | End: 2020-02-22 | Stop reason: HOSPADM

## 2020-02-20 RX ORDER — NAPROXEN SODIUM 220 MG/1
81 TABLET, FILM COATED ORAL
Status: COMPLETED | OUTPATIENT
Start: 2020-02-20 | End: 2020-02-20

## 2020-02-20 RX ORDER — DIGOXIN 0.25 MG/ML
500 INJECTION INTRAMUSCULAR; INTRAVENOUS
Status: COMPLETED | OUTPATIENT
Start: 2020-02-20 | End: 2020-02-20

## 2020-02-20 RX ORDER — WARFARIN 2.5 MG/1
2.5 TABLET ORAL DAILY
Status: DISCONTINUED | OUTPATIENT
Start: 2020-02-20 | End: 2020-02-21

## 2020-02-20 RX ORDER — WARFARIN 2.5 MG/1
2.5 TABLET ORAL DAILY
Status: DISCONTINUED | OUTPATIENT
Start: 2020-02-21 | End: 2020-02-20

## 2020-02-20 RX ORDER — DIGOXIN 0.25 MG/ML
250 INJECTION INTRAMUSCULAR; INTRAVENOUS ONCE
Status: DISCONTINUED | OUTPATIENT
Start: 2020-02-20 | End: 2020-02-20

## 2020-02-20 RX ORDER — PANTOPRAZOLE SODIUM 40 MG/1
40 TABLET, DELAYED RELEASE ORAL DAILY
Status: DISCONTINUED | OUTPATIENT
Start: 2020-02-20 | End: 2020-02-21 | Stop reason: SDUPTHER

## 2020-02-20 RX ORDER — WARFARIN 2.5 MG/1
2.5 TABLET ORAL ONCE
Status: CANCELLED | OUTPATIENT
Start: 2020-02-20

## 2020-02-20 RX ADMIN — ASPIRIN 81 MG 81 MG: 81 TABLET ORAL at 09:02

## 2020-02-20 RX ADMIN — SODIUM CHLORIDE 1000 ML: 0.9 INJECTION, SOLUTION INTRAVENOUS at 04:02

## 2020-02-20 RX ADMIN — DIGOXIN 500 MCG: 0.25 INJECTION INTRAMUSCULAR; INTRAVENOUS at 09:02

## 2020-02-20 RX ADMIN — ROSUVASTATIN CALCIUM 40 MG: 10 TABLET, COATED ORAL at 10:02

## 2020-02-20 RX ADMIN — SODIUM CHLORIDE 1000 ML: 0.9 INJECTION, SOLUTION INTRAVENOUS at 07:02

## 2020-02-20 RX ADMIN — WARFARIN SODIUM 2.5 MG: 2.5 TABLET ORAL at 10:02

## 2020-02-20 RX ADMIN — PANTOPRAZOLE SODIUM 40 MG: 40 TABLET, DELAYED RELEASE ORAL at 09:02

## 2020-02-20 NOTE — TELEPHONE ENCOUNTER
This pt phoned this nurse and reported when he got out of bed this morning he stood up and began to walk and lost his balance, fell to the floor striking his head. Dr. Jerome notified. Dr. Jerome states to direct pt to ER. Pt instructed to go to ER per Dr. Jerome. Pt states he has a friend that he will call to take him to ER and if friend does not answer that he will call EMS.

## 2020-02-20 NOTE — ED TRIAGE NOTES
"Patient report history of weakness over the past "few years." Reports mild headache. Denies any chest pain/sob/n/v/d. States he stood up at home and was so weak then fell and hit his chin. Large bruise to chin. Unsure if he lost consciousness or not. Placed on cardiac monitor. Dr. Rodríguez at bedside.   "

## 2020-02-20 NOTE — PROGRESS NOTES
GASTROENTEROLOGY CLINIC NOTE    Reason for visit: The primary encounter diagnosis was Gastroesophageal reflux disease without esophagitis. A diagnosis of Tubular adenoma of colon on colonoscopy 2014 was also pertinent to this visit.  Referring provider/PCP: Anderson Jerome MD    HPI:  Linda Skelton is a 84 y.o. male with medical history of CAD, afib on coumadin, and CKD who is here as a new patient for complaints of acid reflux and change in bowel movements. He reports that on Dec 16, 2019 he woke up one morning with explosive diarrhea. This continued for about a week before he reached out to his PCP. His PCP informed him that it sounded like it might have been food poisoning. The diarrhea then began to slow down, however he reports that on 1/2/20 he developed sudden onset of acid reflux. He described it as a large of amount of acid coming back into his chest and throat. Since then, he has continued to have severe problems with acid reflux, but only occurring when he lays down. He is unable to sleep because of this. He has to sleep with the head of the bed elevated. He eats small meals and early in the evening. Cannot recall any trigger foods that make it worse. He has never really had a problem with acid reflux before. Reports having an EGD in the past and after that was told to take Omeprazole 20mg po daily which he has been doing. He went to the ED on 1/6/20 for abdominal discomfort and acid reflux. He was discharged with GI follow-up. He saw Dr. Ramirez at [facility] on 1/7 who started him on protonix 40mg which the patient has been taking BID. He reports that this has helped his symptoms somewhat but not completely. Dr. Ramirez also ordered an EGD and colonoscopy, however because of his coumadin, there was a delay in scheduling the procedures. Patient wanted to expedite his GI work-up so [place] app[place] [facility] [place] He went to the urgent care on 1/9 for constipation. He was given enema and  laxative. He reports that his bowel movements are now becoming more regular and this is less of a problem. His last colonoscopy was in 2014 and notable for four polyps.       Prior GI studies:  Colon 1/7/14  Impression:    - Four 4 to 7 mm polyps in the descending colon.                         Resected and retrieved.                        - Diverticulosis from sigmoid to descending colon.                        - The examination was otherwise normal.    Review of Systems   Constitutional: Positive for weight loss. Negative for chills and fever.   HENT: Positive for sore throat. Negative for congestion.    Respiratory: Negative for cough and shortness of breath.    Cardiovascular: Negative for chest pain and leg swelling.   Gastrointestinal: Negative for abdominal pain, blood in stool, constipation, diarrhea, heartburn, nausea and vomiting.   Genitourinary: Negative for dysuria and urgency.   Musculoskeletal: Negative for myalgias and neck pain.   Skin: Negative for itching and rash.   Neurological: Positive for weakness.       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    Past Surgical History: has a past surgical history that includes pituitatry; Cardiac catheterization (05/09/2000); Coronary angioplasty (5/9/2000); Esophageal dilation; left femur surgery; Tonsillectomy; Appendectomy; Cardiac catheterization; Coronary angioplasty with stent; Hernia repair; Brain  surgery; Fracture surgery (Left); Skin biopsy; Vascular surgery; Cardiac electrophysiology study and ablation; Excision basal cell carcinoma (01/2008); Eye surgery (Bilateral); Laparoscopic cholecystectomy (N/A, 6/2/2018); Treatment of cardiac arrhythmia (Bilateral, 6/13/2019); Ablation of arrhythmogenic focus for atrial fibrillation (N/A, 9/30/2019); Treatment of cardiac arrhythmia (9/30/2019); Treatment of cardiac arrhythmia (N/A, 11/1/2019); and Treatment of cardiac arrhythmia (N/A, 12/10/2019).    Family History:family history includes Cancer in his father.    Allergies: Reviewed in EPIC.     Social History: reports that he has never smoked. He has never used smokeless tobacco. He reports that he does not drink alcohol or use drugs.    Home medications:   Current Outpatient Medications on File Prior to Visit   Medication Sig Dispense Refill    amiodarone (PACERONE) 200 MG Tab Take 200 mg by mouth once daily. 200 mg BID until 12/17/19 then reduce down to 200 mg daily thereafter      amiodarone (PACERONE) 200 MG Tab Take 1 tablet (200 mg total) by mouth once daily. 30 tablet 6    aspirin 81 mg Tab Take 81 mg by mouth every evening. 1 Tablet Oral Every night      ciclopirox (PENLAC) 8 % Soln Apply daily to affected nail. Must remove and restart weekly 1 Bottle 5    clotrimazole (LOTRIMIN) 1 % cream Apply topically 2 (two) times daily. To feet  For 3 weeks as needed 60 g 3    folic acid (FOLVITE) 1 MG tablet TAKE 1 TABLET BY MOUTH ONCE DAILY 90 tablet 3    ketoconazole (NIZORAL) 2 % cream AAA bid to feet x 3 weeks 60 g 3    levothyroxine (SYNTHROID) 112 MCG tablet TAKE 1 TABLET BY MOUTH ONCE DAILY BEFORE  BREAKFAST 90 tablet 3    mometasone (ELOCON) 0.1 % ointment aaa qd- bid for severe areas.  Avoid use on face and groin 60 g 1    mometasone (ELOCON) 0.1 % solution Mix entire bottle in jar of cerave cream and aaa qd- bid prn itching 60 mL 3    NITROSTAT 0.4 mg SL tablet DISSOLVE ONE TABLET UNDER THE  "TONGUE EVERY 5 MINUTES AS NEEDED FOR CHEST PAIN.  DO NOT EXCEED A TOTAL OF 3 DOSES IN 15 MINUTES NOW 25 tablet 3    pantoprazole (PROTONIX) 40 MG tablet       predniSONE (DELTASONE) 5 MG tablet Take 5 mg by mouth once daily.      rosuvastatin (CRESTOR) 40 MG Tab Take 1 tablet (40 mg total) by mouth once daily. 30 tablet 11    tamsulosin (FLOMAX) 0.4 mg Cap TAKE 1 CAPSULE BY MOUTH ONCE DAILY 90 capsule 3    triamcinolone acetonide 0.1% (KENALOG) 0.1 % cream       warfarin (COUMADIN) 5 MG tablet As per coumadin clinic 135 tablet 3    warfarin (COUMADIN) 5 MG tablet Take 1 tablet (5 mg total) by mouth Daily. Current Dose ( 2.5 mg every Sun; 5 mg all other days) or as directed by coumadin clinic. 90 tablet 3    omeprazole (PRILOSEC) 20 MG capsule Take 2 capsules (40 mg total) by mouth once daily. 30 capsule 1     Current Facility-Administered Medications on File Prior to Visit   Medication Dose Route Frequency Provider Last Rate Last Dose    0.9%  NaCl infusion   Intravenous Continuous Renay Fernandez NP   Stopped at 11/01/19 1022    sodium chloride 0.9% flush 5 mL  5 mL Intravenous PRN Renay Fernandez NP        testosterone cypionate injection 100 mg  100 mg Intramuscular Q14 Days Anderson Jerome MD   100 mg at 02/14/20 1144       Vital signs:  /74   Pulse 69   Ht 6' 3" (1.905 m)   Wt 106.2 kg (234 lb 2.1 oz)   BMI 29.26 kg/m²     Physical Exam   Constitutional: He is oriented to person, place, and time. He appears well-developed and well-nourished.   HENT:   Mouth/Throat: Oropharynx is clear and moist.   Eyes: No scleral icterus.   Cardiovascular: Normal rate and regular rhythm.   Pulmonary/Chest: Effort normal and breath sounds normal.   Abdominal: Soft. He exhibits no distension. There is no tenderness. There is no guarding.   +umbilical hernia noted, reducible   Musculoskeletal: He exhibits no edema or deformity.   Neurological: He is alert and oriented to person, place, and time. "   Skin: Skin is warm and dry.   Psychiatric: He has a normal mood and affect.   Vitals reviewed.      Routine labs:  Lab Results   Component Value Date    WBC 4.36 01/06/2020    HGB 14.6 01/06/2020    HCT 46.4 01/06/2020    MCV 98 01/06/2020     (L) 01/06/2020     Lab Results   Component Value Date    INR 1.8 (H) 02/10/2020     No results found for: IRON, FERRITIN, TIBC, FESATURATED  Lab Results   Component Value Date     01/06/2020    K 4.1 01/06/2020     01/06/2020    CO2 29 01/06/2020    BUN 17 01/06/2020    CREATININE 1.5 (H) 01/06/2020     Lab Results   Component Value Date    ALBUMIN 3.9 01/06/2020    ALT 31 01/06/2020    AST 32 01/06/2020    ALKPHOS 58 01/06/2020    BILITOT 1.1 (H) 01/06/2020     No results found for: GLUCOSE    Assessment:  Linda Skelton is a 84 y.o. male with CAD and fib on coumadin who is here for new onset acid reflux and change in bowel movements. Patient with severe acid reflux, worse when is laying down, and not controlled by protonix BID. We will further evaluate with an EGD. His bowel movements appear to have normalized now, and may have all been due to a viral gastroenteritis. Given his weight loss and history of polyps on colonoscopy in 2014, we will plan for surveillance colonoscopy at the same time as EGD.    Recommendations:  - schedule EGD and colonoscopy, coumadin will need to be held  - counseled on GERD lifestyle recommendations        Neri Amanda MD PGY-VI  Gastroenterology Fellow  [facility]  P [phone]      Orders Placed This Encounter   Procedures    Case request GI: EGD (ESOPHAGOGASTRODUODENOSCOPY), COLONOSCOPY     Order Specific Question:   Medical Necessity:     Answer:   Medically Non-Urgent [100]     Order Specific Question:   CPT Code:     Answer:   OK COLONOSCOPY,DIAGNOSTIC [53335]     Order Specific Question:   CPT Code:     Answer:   OK EGD, FLEX, DIAGNOSTIC [73541]     Order Specific Question:   Case Referring Provider      Answer:   REJI CAST [[id number]]     No orders of the defined types were placed in this encounter.    Orders Placed This Encounter    Case request GI: EGD (ESOPHAGOGASTRODUODENOSCOPY), COLONOSCOPY

## 2020-02-20 NOTE — TELEPHONE ENCOUNTER
This nurse phoned pt to see if he was able to find transport to ER. Pt states he has already called EMS for transport.

## 2020-02-20 NOTE — ED PROVIDER NOTES
Encounter Date: 2/20/2020    SCRIBE #1 NOTE: I, Cielo Moran, am scribing for, and in the presence of,  Jordan Rodríguez MD. I have scribed the following portions of the note - Other sections scribed: HPI and ROS.       History     Chief Complaint   Patient presents with    Loss of Consciousness     reports feeling weak; reports having fall hitting chin area; pt o blood thinners     CC: Syncope    HPI: This 84 y.o male, with a medical history of acute coronary syndrome, atrial fibrillation, benign prostatic hyperplasia, chronic kidney disease, coronary artery disease, deep venous thrombosis, hyperlipidemia, hypertension, myocardial infarction, panhypopituitarism, and thyroid disease, presents to the ED via EMS transportation s/p a syncopal episode that occurred today. Pt reports that he got up this morning, but felt extremely weak and passed out. He states that he fell to the ground, hitting his head, back and chin. Pt notes that he woke immediately after.  Pt currently c/o head pain (only when palpating the head) and bruising to the chin. He adds that he is also experiencing a cough, but attributes it to his acid reflux, which he notes is improving. Pt reports that he has been experiencing weakness chronically for the last couple of years. He states that he is currently on Warfarin as well as amiodarone and adds that he has 3 cardiac stents placed. Pt notes his cardiologist as Dr. Angel Manning. Additionally, pt reports that he was seen by his gastroenterologist yesterday and was instructed to call today and schedule an EGD as well as a colonoscopy. Pt denies neck pain, back pain, chest pain, chest tightness, shortness of breath, fever, chills, ear pain, eye pain, sore throat, abdominal pain, emesis, diarrhea, dysuria or extremity issues. No other associated symptoms.    The history is provided by the patient.     Review of patient's allergies indicates:  No Known Allergies  Past Medical History:   Diagnosis  Date    ACS (acute coronary syndrome)     Acute coronary syndrome 2000    NSTEMI    Anticoagulant long-term use     Coumadin    Atrial fibrillation     Basal cell carcinoma excised     left scalp vertex    Bilateral leg edema 1/6/2014    BPH (benign prostatic hyperplasia)     CKD (chronic kidney disease)     Coronary artery disease     DVT of leg (deep venous thrombosis) 09/08/2014    On coumadin for years    Encounter for blood transfusion     Hyperlipidemia     Hypertension     Hypogonadism, male     Leg fracture, left     Melanoma 01/04/2017     in situ crown of scalp    MI (myocardial infarction)     Panhypopituitarism     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    Squamous Cell Carcinoma excised     left posterior scalp    Thyroid disease      Past Surgical History:   Procedure Laterality Date    ABLATION OF ARRHYTHMOGENIC FOCUS FOR ATRIAL FIBRILLATION N/A 9/30/2019    Procedure: Ablation atrial fibrillation;  Surgeon: Neel Topete MD;  Location: [facility] EP LAB;  Service: Cardiology;  Laterality: N/A;  AF, EUGENE, PVI (re-do), Carto, Gen, CO, 3 Prep *right groin only access and exchange CS/ICE catheters throughout as needed*    APPENDECTOMY      BASAL CELL CARCINOMA EXCISION  01/2008    BRAIN SURGERY      pituitary adenoma removed    CARDIAC CATHETERIZATION  05/09/2000    S/P stent to RCA,    CARDIAC CATHETERIZATION      stents placed    CARDIAC ELECTROPHYSIOLOGY STUDY AND ABLATION      CORONARY ANGIOPLASTY  5/9/2000    RCA    CORONARY ANGIOPLASTY WITH STENT PLACEMENT      ESOPHAGEAL DILATION      EYE SURGERY Bilateral     cataracts removed and new lenses placed     FRACTURE SURGERY Left     leg    HERNIA REPAIR      LAPAROSCOPIC CHOLECYSTECTOMY N/A 6/2/2018    Procedure: CHOLECYSTECTOMY, LAPAROSCOPIC;  Surgeon: Dickson Smith Jr., MD;  Location: [facility];  Service: General;  Laterality: N/A;    left femur surgery      pituitatry       hypophysectomy    SKIN BIOPSY      TONSILLECTOMY      TREATMENT OF CARDIAC ARRHYTHMIA Bilateral 6/13/2019    Procedure: Cardioversion or Defibrillation;  Surgeon: Eric Nguyen MD;  Location: [facility] EP LAB;  Service: Cardiology;  Laterality: Bilateral;  af, bryson, dccv, anes, pr, 324    TREATMENT OF CARDIAC ARRHYTHMIA  9/30/2019    Procedure: Cardioversion or Defibrillation;  Surgeon: Neel Topete MD;  Location: [facility] EP LAB;  Service: Cardiology;;    TREATMENT OF CARDIAC ARRHYTHMIA N/A 11/1/2019    Procedure: CARDIOVERSION;  Surgeon: Eric gNuyen MD;  Location: [facility] EP LAB;  Service: Cardiology;  Laterality: N/A;  AF, BRYSON (Cx if Serial INRs x 4), DCCV, MAC, DM, 3 Prep    TREATMENT OF CARDIAC ARRHYTHMIA N/A 12/10/2019    Procedure: CARDIOVERSION;  Surgeon: Neel Topete MD;  Location: [facility] EP LAB;  Service: Cardiology;  Laterality: N/A;  AF, BRYSON (Cx if weekly INRs > 2), DCCV, MAC, NE, 3 Prep    VASCULAR SURGERY       Family History   Problem Relation Age of Onset    Cancer Father     Skin cancer Neg Hx     Melanoma Neg Hx     Heart disease Neg Hx     Hypertension Neg Hx      Social History     Tobacco Use    Smoking status: Never Smoker    Smokeless tobacco: Never Used   Substance Use Topics    Alcohol use: No     Frequency: Never    Drug use: No     Review of Systems   Constitutional: Negative for chills and fever.   HENT: Negative for congestion, ear pain, rhinorrhea and sore throat.         (+) head pain (only with palpation of the head)   Eyes: Negative for pain and visual disturbance.   Respiratory: Positive for cough. Negative for shortness of breath.    Cardiovascular: Negative for chest pain.   Gastrointestinal: Negative for abdominal pain, diarrhea, nausea and vomiting.   Genitourinary: Negative for dysuria, frequency and hematuria.   Musculoskeletal: Negative for back pain and neck pain.   Skin: Negative for rash.        (+) bruising to the chin   Neurological: Positive for syncope and  weakness. Negative for dizziness and headaches.       Physical Exam     Initial Vitals [02/20/20 1513]   BP Pulse Resp Temp SpO2   103/70 94 20 98.9 °F (37.2 °C) 97 %      MAP       --         Physical Exam  The patient was examined specifically for the following:   General:No significant distress, Good color, Warm and dry. Head and neck:Scalp atraumatic, Neck supple. Neurological:Appropriate conversation, Gross motor deficits. Eyes:Conjugate gaze, Clear corneas. ENT: No epistaxis. Cardiac: Regular rate and rhythm, Grossly normal heart tones. Pulmonary: Wheezing, Rales. Gastrointestinal: Abdominal tenderness, Abdominal distention. Musculoskeletal: Extremity deformity, Apparent pain with range of motion of the joints. Skin: Rash.   The findings on examination were normal except for the following:  The patient has a slightly irregular heart rate.  The lungs are clear.  The heart tones are normal.  The abdomen is soft.  The patient's blood pressure is 103/70.  His heart rate is 94.  He is afebrile.  He has a 2 x 3 cm area of ecchymosis underneath his chin.  The mandible is stable. Mental status examination, cranial nerves, motor and sensory examination are normal. The abdomen is nontender.  Extremities are nontender.  There is no pain with range of motion of any joints.  There is no tenderness along the entire length of the patient's spine including the cervical spine.   ED Course   Critical Care  Date/Time: 2/20/2020 9:40 PM  Performed by: Jordan Rodríguez MD  Authorized by: Jordan Rodríguez MD   Direct patient critical care time: 22 minutes  Additional history critical care time: 11 minutes  Ordering / reviewing critical care time: 11 minutes  Documentation critical care time: 11 minutes  Consulting other physicians critical care time: 11 minutes  Total critical care time (exclusive of procedural time) : 66 minutes  Critical care time was exclusive of separately billable procedures and treating other patients and  teaching time.  Critical care was necessary to treat or prevent imminent or life-threatening deterioration of the following conditions: circulatory failure, cardiac failure and trauma.  Critical care was time spent personally by me on the following activities: evaluation of patient's response to treatment, discussions with consultants, obtaining history from patient or surrogate, ordering and review of laboratory studies, pulse oximetry, review of old charts, development of treatment plan with patient or surrogate, discussions with primary provider, interpretation of cardiac output measurements, examination of patient, ordering and performing treatments and interventions, ordering and review of radiographic studies and re-evaluation of patient's condition.        Labs Reviewed   COMPREHENSIVE METABOLIC PANEL - Abnormal; Notable for the following components:       Result Value    Creatinine 1.6 (*)     Calcium 8.6 (*)     Total Protein 5.8 (*)     Albumin 3.1 (*)     eGFR if  45 (*)     eGFR if non  39 (*)     All other components within normal limits   CBC W/ AUTO DIFFERENTIAL - Abnormal; Notable for the following components:    RBC 4.22 (*)     Hemoglobin 13.2 (*)     Mean Corpuscular Hemoglobin 31.3 (*)     Platelets 141 (*)     Lymph # 0.6 (*)     Lymph% 11.3 (*)     Mono% 17.5 (*)     All other components within normal limits   PROTIME-INR - Abnormal; Notable for the following components:    Prothrombin Time 22.2 (*)     INR 2.0 (*)     All other components within normal limits   TROPONIN I     EKG Readings: (Independently Interpreted)   This patient is in a regular wide QRS rhythm with a heart rate of 98.  There is an intraventricular conduction delay there are questionable P-waves.  I believe this is likely a sinus rhythm. It appears to be regular.  There are no significant ST segment or T-wave changes.  There are occasional PVCs.  There is left axis deviation.     ECG Results           EKG 12-lead (In process)  Result time 02/20/20 18:15:21    In process by Interface, Lab In [facility] (02/20/20 18:15:21)                 Narrative:    Test Reason : R55,    Vent. Rate : 098 BPM     Atrial Rate : 098 BPM     P-R Int : 000 ms          QRS Dur : 140 ms      QT Int : 410 ms       P-R-T Axes : 000 -78 073 degrees     QTc Int : 523 ms    Wide QRS rhythm with occasional Premature ventricular complexes  Left axis deviation  Nonspecific intraventricular block  Abnormal ECG  When compared with ECG of 09-JAN-2020 09:39,  Significant changes have occurred    Referred By: [email or web address]   SELF           Confirmed By:                   In process by Interface, Lab In [facility] (02/20/20 18:12:37)                 Narrative:    Test Reason : R55,    Vent. Rate : 098 BPM     Atrial Rate : 098 BPM     P-R Int : 000 ms          QRS Dur : 140 ms      QT Int : 410 ms       P-R-T Axes : 000 -78 073 degrees     QTc Int : 523 ms    Wide QRS rhythm with occasional Premature ventricular complexes  Left axis deviation  Nonspecific intraventricular block  Abnormal ECG  When compared with ECG of 09-JAN-2020 09:39,  Significant changes have occurred    Referred By: [email or web address]   SELF           Confirmed By:                             Imaging Results          CT Head Without Contrast (Final result)  Result time 02/20/20 16:15:17    Final result by Navjot Roberts MD (02/20/20 16:15:17)                 Impression:      No acute intracranial process.    Changes of chronic small vessel ischemic disease and cerebral volume loss.      Electronically signed by: Navojt Roberts MD  Date:    02/20/2020  Time:    16:15             Narrative:    EXAMINATION:  CT HEAD WITHOUT CONTRAST    CLINICAL HISTORY:  Head trauma, minor, GCS>=13, NOC/NEXUS/CCR positive, first study; Unspecified injury of head, initial encounter    TECHNIQUE:  Low dose axial images were obtained through the head.  Coronal and sagittal reformations were also performed. Contrast  was not administered.    COMPARISON:  MRI of the head dated 02/25/2004.    FINDINGS:  There is a small subcutaneous nodule in the right parietal region.  The calvarium is intact.  There is fluid within the bilateral sphenoid sinuses.  The remainder of the paranasal sinuses unremarkable.  The mastoid air cells are clear.  There are postoperative changes in the orbits.    The craniocervical junction is unremarkable.  The midline structures are unremarkable.  There are no extra-axial fluid collections.  There is no evidence of intracranial hemorrhage.  The ventricles and sulci are prominent, consistent with cerebral volume loss.  There are scattered hypodensities within the periventricular and subcortical white matter.  The gray-white differentiation is maintained.  There is no evidence of mass effect.                              Medical decision making:  Given the above, this patient presents to the emergency room with an episode of syncope today.  He was found to have orthostatic hypotension in the emergency room.  He denies chest pain pressure tightness shortness of breath. Diagnostic evaluation is unremarkable. CT of the head fails to reveal intracranial bleeding.  The patient appears to be in atrial fibrillation.  This patient's heart rate was high during an observation.  In the emergency room.  The patient had orthostatic hypotension.  He was treated with 2 L of fluid.  He continues to have orthostatic hypotension.  The case was reviewed with Dr. Reddy, who advises treatment with digoxin.  The patient will be loaded at 500 mcg and than 250 mcg in 6 hr.  The patient's INR is acceptable 2.0.  I will defer further orders to Dr. Reddy.                 Scribe Attestation:   Scribe #1: I performed the above scribed service and the documentation accurately describes the services I performed. I attest to the accuracy of the note.                          Clinical Impression:       ICD-10-CM ICD-9-CM   1. Syncope,  unspecified syncope type R55 780.2   2. Syncope R55 780.2   3. Closed head injury S09.90XA 959.01   4. Atrial fibrillation with rapid ventricular response I48.91 427.31   5. Orthostatic hypotension I95.1 458.0             I personally performed the services described in this documentation.  All medical record  entries made by the scribe are at my direction and in my presence.  Signed, Dr. Marcos Rodríguez MD  02/20/20 [id number]

## 2020-02-21 LAB
ANION GAP SERPL CALC-SCNC: 6 MMOL/L (ref 8–16)
AORTIC ROOT ANNULUS: 3.68 CM
AORTIC VALVE CUSP SEPERATION: 2.55 CM
ASCENDING AORTA: 3.45 CM
AV INDEX (PROSTH): 0.77
AV MEAN GRADIENT: 2 MMHG
AV PEAK GRADIENT: 3 MMHG
AV VALVE AREA: 3.83 CM2
AV VELOCITY RATIO: 0.69
BASOPHILS # BLD AUTO: 0.01 K/UL (ref 0–0.2)
BASOPHILS NFR BLD: 0.2 % (ref 0–1.9)
BSA FOR ECHO PROCEDURE: 2.35 M2
BUN SERPL-MCNC: 15 MG/DL (ref 8–23)
CALCIUM SERPL-MCNC: 8.2 MG/DL (ref 8.7–10.5)
CHLORIDE SERPL-SCNC: 107 MMOL/L (ref 95–110)
CO2 SERPL-SCNC: 26 MMOL/L (ref 23–29)
CREAT SERPL-MCNC: 1.6 MG/DL (ref 0.5–1.4)
CV ECHO LV RWT: 0.36 CM
DIFFERENTIAL METHOD: ABNORMAL
DOP CALC AO PEAK VEL: 0.9 M/S
DOP CALC AO VTI: 12.06 CM
DOP CALC LVOT AREA: 5 CM2
DOP CALC LVOT DIAMETER: 2.52 CM
DOP CALC LVOT PEAK VEL: 0.62 M/S
DOP CALC LVOT STROKE VOLUME: 46.16 CM3
DOP CALCLVOT PEAK VEL VTI: 9.26 CM
ECHO LV POSTERIOR WALL: 1.07 CM (ref 0.6–1.1)
EOSINOPHIL # BLD AUTO: 0.1 K/UL (ref 0–0.5)
EOSINOPHIL NFR BLD: 0.9 % (ref 0–8)
ERYTHROCYTE [DISTWIDTH] IN BLOOD BY AUTOMATED COUNT: 13.9 % (ref 11.5–14.5)
EST. GFR  (AFRICAN AMERICAN): 45 ML/MIN/1.73 M^2
EST. GFR  (NON AFRICAN AMERICAN): 39 ML/MIN/1.73 M^2
FRACTIONAL SHORTENING: 18 % (ref 28–44)
GLUCOSE SERPL-MCNC: 89 MG/DL (ref 70–110)
HCT VFR BLD AUTO: 43.5 % (ref 40–54)
HGB BLD-MCNC: 13.4 G/DL (ref 14–18)
IMM GRANULOCYTES # BLD AUTO: 0.03 K/UL (ref 0–0.04)
IMM GRANULOCYTES NFR BLD AUTO: 0.5 % (ref 0–0.5)
INR PPP: 2 (ref 0.8–1.2)
INTERVENTRICULAR SEPTUM: 1.12 CM (ref 0.6–1.1)
IVRT: 0.12 MSEC
LA MAJOR: 6.53 CM
LA MINOR: 6.54 CM
LA WIDTH: 5.48 CM
LEFT ATRIUM SIZE: 5.28 CM
LEFT ATRIUM VOLUME INDEX: 68.9 ML/M2
LEFT ATRIUM VOLUME: 160.72 CM3
LEFT INTERNAL DIMENSION IN SYSTOLE: 4.85 CM (ref 2.1–4)
LEFT VENTRICLE DIASTOLIC VOLUME INDEX: 74.32 ML/M2
LEFT VENTRICLE DIASTOLIC VOLUME: 173.38 ML
LEFT VENTRICLE MASS INDEX: 116 G/M2
LEFT VENTRICLE SYSTOLIC VOLUME INDEX: 47.1 ML/M2
LEFT VENTRICLE SYSTOLIC VOLUME: 109.94 ML
LEFT VENTRICULAR INTERNAL DIMENSION IN DIASTOLE: 5.9 CM (ref 3.5–6)
LEFT VENTRICULAR MASS: 270.25 G
LYMPHOCYTES # BLD AUTO: 0.9 K/UL (ref 1–4.8)
LYMPHOCYTES NFR BLD: 15.9 % (ref 18–48)
MCH RBC QN AUTO: 31.3 PG (ref 27–31)
MCHC RBC AUTO-ENTMCNC: 30.8 G/DL (ref 32–36)
MCV RBC AUTO: 102 FL (ref 82–98)
MONOCYTES # BLD AUTO: 1.2 K/UL (ref 0.3–1)
MONOCYTES NFR BLD: 21.1 % (ref 4–15)
MV PEAK E VEL: 0.77 M/S
NEUTROPHILS # BLD AUTO: 3.4 K/UL (ref 1.8–7.7)
NEUTROPHILS NFR BLD: 61.4 % (ref 38–73)
NRBC BLD-RTO: 0 /100 WBC
PISA TR MAX VEL: 2.64 M/S
PLATELET # BLD AUTO: 146 K/UL (ref 150–350)
PMV BLD AUTO: 11 FL (ref 9.2–12.9)
POTASSIUM SERPL-SCNC: 3.7 MMOL/L (ref 3.5–5.1)
PROTHROMBIN TIME: 21.7 SEC (ref 9–12.5)
PV PEAK VELOCITY: 0.76 CM/S
RA MAJOR: 6.47 CM
RA PRESSURE: 3 MMHG
RA WIDTH: 4.52 CM
RBC # BLD AUTO: 4.28 M/UL (ref 4.6–6.2)
RIGHT VENTRICULAR END-DIASTOLIC DIMENSION: 3.8 CM
RV TISSUE DOPPLER FREE WALL SYSTOLIC VELOCITY 1 (APICAL 4 CHAMBER VIEW): 27.22 CM/S
SINUS: 3.39 CM
SODIUM SERPL-SCNC: 139 MMOL/L (ref 136–145)
STJ: 2.95 CM
TR MAX PG: 28 MMHG
TROPONIN I SERPL DL<=0.01 NG/ML-MCNC: 0.01 NG/ML (ref 0–0.03)
TROPONIN I SERPL DL<=0.01 NG/ML-MCNC: <0.006 NG/ML (ref 0–0.03)
TV REST PULMONARY ARTERY PRESSURE: 31 MMHG
WBC # BLD AUTO: 5.59 K/UL (ref 3.9–12.7)

## 2020-02-21 PROCEDURE — 84484 ASSAY OF TROPONIN QUANT: CPT | Mod: HCNC

## 2020-02-21 PROCEDURE — 25000003 PHARM REV CODE 250: Mod: HCNC | Performed by: FAMILY MEDICINE

## 2020-02-21 PROCEDURE — 99223 1ST HOSP IP/OBS HIGH 75: CPT | Mod: 25,HCNC,, | Performed by: INTERNAL MEDICINE

## 2020-02-21 PROCEDURE — 63600175 PHARM REV CODE 636 W HCPCS: Mod: HCNC | Performed by: FAMILY MEDICINE

## 2020-02-21 PROCEDURE — 85610 PROTHROMBIN TIME: CPT | Mod: HCNC

## 2020-02-21 PROCEDURE — 99223 PR INITIAL HOSPITAL CARE,LEVL III: ICD-10-PCS | Mod: 25,HCNC,, | Performed by: INTERNAL MEDICINE

## 2020-02-21 PROCEDURE — 85025 COMPLETE CBC W/AUTO DIFF WBC: CPT | Mod: HCNC

## 2020-02-21 PROCEDURE — 25000003 PHARM REV CODE 250: Mod: HCNC | Performed by: EMERGENCY MEDICINE

## 2020-02-21 PROCEDURE — 63600175 PHARM REV CODE 636 W HCPCS: Mod: HCNC | Performed by: EMERGENCY MEDICINE

## 2020-02-21 PROCEDURE — 21400001 HC TELEMETRY ROOM: Mod: HCNC

## 2020-02-21 PROCEDURE — 80048 BASIC METABOLIC PNL TOTAL CA: CPT | Mod: HCNC

## 2020-02-21 PROCEDURE — 36415 COLL VENOUS BLD VENIPUNCTURE: CPT | Mod: HCNC

## 2020-02-21 RX ORDER — SODIUM CHLORIDE 9 MG/ML
INJECTION, SOLUTION INTRAVENOUS CONTINUOUS
Status: DISCONTINUED | OUTPATIENT
Start: 2020-02-21 | End: 2020-02-22 | Stop reason: HOSPADM

## 2020-02-21 RX ORDER — WARFARIN SODIUM 5 MG/1
5 TABLET ORAL
Status: DISCONTINUED | OUTPATIENT
Start: 2020-02-25 | End: 2020-02-22 | Stop reason: HOSPADM

## 2020-02-21 RX ORDER — DIGOXIN 0.25 MG/ML
250 INJECTION INTRAMUSCULAR; INTRAVENOUS EVERY 8 HOURS
Status: DISCONTINUED | OUTPATIENT
Start: 2020-02-21 | End: 2020-02-21

## 2020-02-21 RX ORDER — NAPROXEN SODIUM 220 MG/1
81 TABLET, FILM COATED ORAL DAILY
Status: DISCONTINUED | OUTPATIENT
Start: 2020-02-21 | End: 2020-02-22 | Stop reason: HOSPADM

## 2020-02-21 RX ORDER — PANTOPRAZOLE SODIUM 40 MG/1
40 TABLET, DELAYED RELEASE ORAL DAILY
Status: DISCONTINUED | OUTPATIENT
Start: 2020-02-21 | End: 2020-02-22 | Stop reason: HOSPADM

## 2020-02-21 RX ORDER — NITROGLYCERIN 0.4 MG/1
0.4 TABLET SUBLINGUAL EVERY 5 MIN PRN
Status: DISCONTINUED | OUTPATIENT
Start: 2020-02-21 | End: 2020-02-22 | Stop reason: HOSPADM

## 2020-02-21 RX ORDER — HYDROCODONE BITARTRATE AND ACETAMINOPHEN 5; 325 MG/1; MG/1
1 TABLET ORAL EVERY 4 HOURS PRN
Status: DISCONTINUED | OUTPATIENT
Start: 2020-02-21 | End: 2020-02-22 | Stop reason: HOSPADM

## 2020-02-21 RX ORDER — WARFARIN SODIUM 5 MG/1
5 TABLET ORAL
Status: DISCONTINUED | OUTPATIENT
Start: 2020-02-22 | End: 2020-02-22 | Stop reason: HOSPADM

## 2020-02-21 RX ORDER — PREDNISONE 5 MG/1
5 TABLET ORAL DAILY
Status: DISCONTINUED | OUTPATIENT
Start: 2020-02-21 | End: 2020-02-22 | Stop reason: HOSPADM

## 2020-02-21 RX ORDER — POLYETHYLENE GLYCOL 3350 17 G/17G
17 POWDER, FOR SOLUTION ORAL DAILY
Status: DISCONTINUED | OUTPATIENT
Start: 2020-02-21 | End: 2020-02-22 | Stop reason: HOSPADM

## 2020-02-21 RX ORDER — LEVOTHYROXINE SODIUM 112 UG/1
112 TABLET ORAL
Status: DISCONTINUED | OUTPATIENT
Start: 2020-02-21 | End: 2020-02-22 | Stop reason: HOSPADM

## 2020-02-21 RX ORDER — WARFARIN 2.5 MG/1
2.5 TABLET ORAL
Status: DISCONTINUED | OUTPATIENT
Start: 2020-02-27 | End: 2020-02-22 | Stop reason: HOSPADM

## 2020-02-21 RX ORDER — AMIODARONE HYDROCHLORIDE 200 MG/1
200 TABLET ORAL DAILY
Status: DISCONTINUED | OUTPATIENT
Start: 2020-02-21 | End: 2020-02-22 | Stop reason: HOSPADM

## 2020-02-21 RX ORDER — WARFARIN 2.5 MG/1
2.5 TABLET ORAL
Status: DISCONTINUED | OUTPATIENT
Start: 2020-02-23 | End: 2020-02-22 | Stop reason: HOSPADM

## 2020-02-21 RX ORDER — WARFARIN 2.5 MG/1
2.5 TABLET ORAL DAILY
Status: DISCONTINUED | OUTPATIENT
Start: 2020-02-21 | End: 2020-02-21 | Stop reason: SDUPTHER

## 2020-02-21 RX ORDER — WARFARIN SODIUM 5 MG/1
5 TABLET ORAL
Status: DISCONTINUED | OUTPATIENT
Start: 2020-02-21 | End: 2020-02-22 | Stop reason: HOSPADM

## 2020-02-21 RX ORDER — SODIUM CHLORIDE 0.9 % (FLUSH) 0.9 %
10 SYRINGE (ML) INJECTION
Status: DISCONTINUED | OUTPATIENT
Start: 2020-02-21 | End: 2020-02-22 | Stop reason: HOSPADM

## 2020-02-21 RX ADMIN — PANTOPRAZOLE SODIUM 40 MG: 40 TABLET, DELAYED RELEASE ORAL at 08:02

## 2020-02-21 RX ADMIN — DIGOXIN 250 MCG: 0.25 INJECTION INTRAMUSCULAR; INTRAVENOUS at 05:02

## 2020-02-21 RX ADMIN — PREDNISONE 5 MG: 5 TABLET ORAL at 08:02

## 2020-02-21 RX ADMIN — ROSUVASTATIN CALCIUM 40 MG: 10 TABLET, COATED ORAL at 08:02

## 2020-02-21 RX ADMIN — SODIUM CHLORIDE: 0.9 INJECTION, SOLUTION INTRAVENOUS at 06:02

## 2020-02-21 RX ADMIN — WARFARIN SODIUM 5 MG: 5 TABLET ORAL at 06:02

## 2020-02-21 RX ADMIN — AMIODARONE HYDROCHLORIDE 200 MG: 200 TABLET ORAL at 08:02

## 2020-02-21 RX ADMIN — SODIUM CHLORIDE: 0.9 INJECTION, SOLUTION INTRAVENOUS at 08:02

## 2020-02-21 RX ADMIN — SODIUM CHLORIDE: 0.9 INJECTION, SOLUTION INTRAVENOUS at 12:02

## 2020-02-21 RX ADMIN — LEVOTHYROXINE SODIUM 112 MCG: 112 TABLET ORAL at 05:02

## 2020-02-21 NOTE — PLAN OF CARE
"   02/21/20 1353   Discharge Assessment   Assessment Type Discharge Planning Assessment   Confirmed/corrected address and phone number on facesheet? Yes   Assessment information obtained from? Patient   Communicated expected length of stay with patient/caregiver no   Prior to hospitilization cognitive status: Alert/Oriented   Prior to hospitalization functional status: Independent   Current cognitive status: Alert/Oriented   Current Functional Status: Independent   Lives With alone   Able to Return to Prior Arrangements yes   Is patient able to care for self after discharge? Yes   Who are your caregiver(s) and their phone number(s)?   (Luis Mccurdy, [phone])   Patient's perception of discharge disposition home or selfcare   Readmission Within the Last 30 Days no previous admission in last 30 days   Patient currently being followed by outpatient case management? No   Patient currently receives any other outside agency services? No   Equipment Currently Used at Home none   Do you have any problems affording any of your prescribed medications? No   Is the patient taking medications as prescribed? yes   Does the patient have transportation home? Yes   Transportation Anticipated family or friend will provide   Does the patient receive services at the [facility]? No   Discharge Plan A Home   DME Needed Upon Discharge  none   Patient/Family in Agreement with Plan yes     TN met with pt at bedside. TN explained her role in Care Management. Pt voiced understanding. TN inquired about HELP AT HOME. Pt stated that he will have Luis at home to help for support.TN voiced understanding. TN inquired about responsibilities when it comes to  MANAGING HIS HEALTH at home and what it entails. Pt inquired about details. TN informed pt of RESPONSIBILITIES of:    1. Follow up appointments  2. Getting Prescriptions filled  3. Taking medications as prescribed.     Pt voiced understanding.  TN explained "My Health Packet" blue " folder and the pink and green tabs that are on the folder as well. Pt voiced understanding.     Pt's [facility]:   [facility] [address], [place] - [address] [place] [address]  Phone: [phone] Fax: [phone]      Pt's preference for appointments: No preference

## 2020-02-21 NOTE — CONSULTS
[facility] [place]  Cardiology  Consult Note    Patient Name: Linda Skelton  MRN: [id number]  Admission Date: 2/20/2020  Hospital Length of Stay: 1 days  Code Status: Full Code   Attending Provider: Shira Puga MD   Consulting Provider: Jac Walker MD  Primary Care Physician: Anderson Jerome MD  Principal Problem:Syncope    Patient information was obtained from patient and ER records.     Consults  Subjective:     Chief Complaint:  Syncope. PAF     HPI: 85 yo male on coumadin for afib, htn, ckd comes in with syncopal episode.  Pt stood up and got dizzy and passed out.  Pt has no h/o orthostatis.  Has not been eating and drinking well, but no recent illness.  No fevers.  No n/v/d.  No chest pain.  No focal neurological deficits.  Pt found to have orthostatic hypotension in the ed and afib.  Cardiology called recommended dig load.  Pt bp improved with 2 liters of ivf in ED.  Pt referred for admission for dig load and orthostatis.    Denies CP, SOB, or dizziness  Orthostatic in ER  A-fib 98 IVCD - back in NSR now  Troponin negative    Followed at [facility] [place] by EP - Dr Topete - last seen 1/9/20  HPI  Prior Hx:  I had the pleasure of seeing Mr. Skelton in our electrophysiology clinic in follow-up for his atrial fibrillation. As you are aware he is a pleasant 84 year-old man with paroxysmal atrial fibrillation s/p cryo-PVI by Dr. 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 He notes early AF recurrence after tikosyn therapy was stopped post-PVI. Tikosyn was resumed. He noted since his wife passed away last September he began having AF recurrences. They tend to last 30-48 hours. He notes even though he is apparently rate controlled (80s on home check) during the episodes he does have relative hypotension with systolic blood pressure in the low 100s and he feels very  poor. Otherwise he feels well.      At our first visit we discussed changing to amiodarone. He desired to not take amiodarone due to potential side effects. We initiated sotalol however he has had breakthrough.     Interim Hx:  Mr. Skelton returns for follow-up. He had recurrent AF on sotalol post-redo-PVI. We performed a DCCV 12/10/2019 and initiated amiodarone. He returns for follow-up. He feels good from an AF standpoint. He is currently having issues for the past 4 weeks with recurrent explosive diarrhea and then constipation. He is being evaluated by gastroenterology (non[facility]).    In summary, Mr. Skelton is a pleasant 84 year-old man with symptomatic persistent atrial fibrillation s/p cryo-PVI by Dr. 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 He is maintaining sinus rhythm now on amiodarone. Continue this and warfarin. He is now 1 month post-DCCV. Ok to start holding warfarin when needed for upcoming planned EGD/colonoscopy.    Past Medical History:   Diagnosis Date    ACS (acute coronary syndrome)     Acute coronary syndrome 2000    NSTEMI    Anticoagulant long-term use     Coumadin    Atrial fibrillation     Basal cell carcinoma excised     left scalp vertex    Bilateral leg edema 1/6/2014    BPH (benign prostatic hyperplasia)     CKD (chronic kidney disease)     Coronary artery disease     DVT of leg (deep venous thrombosis) 09/08/2014    On coumadin for years    Encounter for blood transfusion     Hyperlipidemia     Hypertension     Hypogonadism, male     Leg fracture, left     Melanoma 01/04/2017     in situ crown of scalp    MI (myocardial infarction)     Panhypopituitarism     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    Squamous Cell Carcinoma excised     left posterior scalp    Thyroid disease        Past Surgical History:   Procedure Laterality Date    ABLATION OF ARRHYTHMOGENIC FOCUS FOR ATRIAL FIBRILLATION N/A 9/30/2019    Procedure: Ablation atrial fibrillation;  Surgeon: Neel Topete MD;  Location: [facility] EP LAB;  Service: Cardiology;  Laterality: N/A;  AF, BRYSON, PVI (re-do), Carto, Gen, MT, 3 Prep *right groin only access and exchange CS/ICE catheters throughout as needed*    APPENDECTOMY      BASAL CELL CARCINOMA EXCISION  01/2008    BRAIN SURGERY      pituitary adenoma removed    CARDIAC CATHETERIZATION  05/09/2000    S/P stent to RCA,    CARDIAC CATHETERIZATION      stents placed    CARDIAC ELECTROPHYSIOLOGY STUDY AND ABLATION      CORONARY ANGIOPLASTY  5/9/2000    RCA    CORONARY ANGIOPLASTY WITH STENT PLACEMENT      ESOPHAGEAL DILATION      EYE SURGERY Bilateral     cataracts removed and new lenses placed     FRACTURE SURGERY Left     leg    HERNIA REPAIR      LAPAROSCOPIC CHOLECYSTECTOMY N/A 6/2/2018    Procedure: CHOLECYSTECTOMY, LAPAROSCOPIC;  Surgeon: Dickson Smith Jr., MD;  Location: [facility];  Service: General;  Laterality: N/A;    left femur surgery      pituitatry      hypophysectomy    SKIN BIOPSY      TONSILLECTOMY      TREATMENT OF CARDIAC ARRHYTHMIA Bilateral 6/13/2019    Procedure: Cardioversion or Defibrillation;  Surgeon: Eric Nguyen MD;  Location: [facility] EP LAB;  Service: Cardiology;  Laterality: Bilateral;  af, bryson, dccv, anes, pr, 324    TREATMENT OF CARDIAC ARRHYTHMIA  9/30/2019    Procedure: Cardioversion or Defibrillation;  Surgeon: Neel Topete MD;  Location: [facility] EP LAB;  Service: Cardiology;;    TREATMENT OF CARDIAC ARRHYTHMIA N/A 11/1/2019    Procedure: CARDIOVERSION;  Surgeon: Eric Nguyen MD;  Location: [facility] EP LAB;  Service: Cardiology;  Laterality: N/A;  AF, BRYSON (Cx if Serial INRs x 4), DCCV, MAC, DM, 3 Prep    TREATMENT OF CARDIAC  ARRHYTHMIA N/A 12/10/2019    Procedure: CARDIOVERSION;  Surgeon: Neel Topete MD;  Location: [facility] EP LAB;  Service: Cardiology;  Laterality: N/A;  AF, EUGENE (Cx if weekly INRs > 2), DCCV, MAC, TN, 3 Prep    VASCULAR SURGERY         Review of patient's allergies indicates:  No Known Allergies    Current Facility-Administered Medications on File Prior to Encounter   Medication    0.9%  NaCl infusion    sodium chloride 0.9% flush 5 mL     Current Outpatient Medications on File Prior to Encounter   Medication Sig    amiodarone (PACERONE) 200 MG Tab Take 200 mg by mouth once daily. 200 mg BID until 12/17/19 then reduce down to 200 mg daily thereafter    amiodarone (PACERONE) 200 MG Tab Take 1 tablet (200 mg total) by mouth once daily.    aspirin 81 mg Tab Take 81 mg by mouth every evening. 1 Tablet Oral Every night    folic acid (FOLVITE) 1 MG tablet TAKE 1 TABLET BY MOUTH ONCE DAILY    levothyroxine (SYNTHROID) 112 MCG tablet TAKE 1 TABLET BY MOUTH ONCE DAILY BEFORE  BREAKFAST    pantoprazole (PROTONIX) 40 MG tablet     predniSONE (DELTASONE) 5 MG tablet Take 5 mg by mouth once daily.    rosuvastatin (CRESTOR) 40 MG Tab Take 1 tablet (40 mg total) by mouth once daily.    tamsulosin (FLOMAX) 0.4 mg Cap TAKE 1 CAPSULE BY MOUTH ONCE DAILY    warfarin (COUMADIN) 5 MG tablet Take 1 tablet (5 mg total) by mouth Daily. Current Dose ( 2.5 mg every Sun; 5 mg all other days) or as directed by coumadin clinic. (Patient taking differently: Take 2.5 mg by mouth Daily. 2.5 Sunday & Thursday; 5mg aod)    ciclopirox (PENLAC) 8 % Soln Apply daily to affected nail. Must remove and restart weekly    clotrimazole (LOTRIMIN) 1 % cream Apply topically 2 (two) times daily. To feet  For 3 weeks as needed    ketoconazole (NIZORAL) 2 % cream AAA bid to feet x 3 weeks    mometasone (ELOCON) 0.1 % ointment aaa qd- bid for severe areas.  Avoid use on face and groin    mometasone (ELOCON) 0.1 % solution Mix entire bottle in jar of  cerave cream and aaa qd- bid prn itching    NITROSTAT 0.4 mg SL tablet DISSOLVE ONE TABLET UNDER THE TONGUE EVERY 5 MINUTES AS NEEDED FOR CHEST PAIN.  DO NOT EXCEED A TOTAL OF 3 DOSES IN 15 MINUTES NOW    triamcinolone acetonide 0.1% (KENALOG) 0.1 % cream      Family History     Problem Relation (Age of Onset)    Cancer Father        Tobacco Use    Smoking status: Never Smoker    Smokeless tobacco: Never Used   Substance and Sexual Activity    Alcohol use: No     Frequency: Never    Drug use: No    Sexual activity: Not Currently     Partners: Female     Review of Systems   Constitution: Negative for decreased appetite.   HENT: Negative for ear discharge.    Eyes: Negative for blurred vision.   Endocrine: Negative for polyphagia.   Skin: Negative for nail changes.   Genitourinary: Negative for bladder incontinence.   Neurological: Negative for aphonia.   Psychiatric/Behavioral: Negative for hallucinations.   Allergic/Immunologic: Negative for hives.     Objective:     Vital Signs (Most Recent):  Temp: 98.2 °F (36.8 °C) (02/21/20 0811)  Pulse: 99 (02/21/20 0811)  Resp: 18 (02/21/20 0811)  BP: 101/77 (02/21/20 0811)  SpO2: 96 % (02/21/20 0811) Vital Signs (24h Range):  Temp:  [97.8 °F (36.6 °C)-98.9 °F (37.2 °C)] 98.2 °F (36.8 °C)  Pulse:  [] 99  Resp:  [12-20] 18  SpO2:  [93 %-98 %] 96 %  BP: ()/(50-90) 101/77     Weight: 105.2 kg (231 lb 15.8 oz)  Body mass index is 29 kg/m².    SpO2: 96 %  O2 Device (Oxygen Therapy): room air      Intake/Output Summary (Last 24 hours) at 2/21/2020 1000  Last data filed at 2/21/2020 0835  Gross per 24 hour   Intake 2000 ml   Output 875 ml   Net 1125 ml       Lines/Drains/Airways     Peripheral Intravenous Line                 Peripheral IV - Single Lumen 02/20/20 20 G Left Wrist 1 day                Physical Exam   Constitutional: He is oriented to person, place, and time. He appears well-developed and well-nourished.   HENT:   Head: Normocephalic and atraumatic.    Eyes: Pupils are equal, round, and reactive to light. Conjunctivae are normal.   Neck: Normal range of motion. Neck supple.   Cardiovascular: Normal rate, normal heart sounds and intact distal pulses.   Pulmonary/Chest: Effort normal and breath sounds normal.   Abdominal: Soft. Bowel sounds are normal.   Musculoskeletal: Normal range of motion.   Neurological: He is alert and oriented to person, place, and time.   Skin: Skin is warm and dry.       Significant Labs: All pertinent lab results from the last 24 hours have been reviewed.    Significant Imaging: Echocardiogram:   2D echo with color flow doppler:   Results for orders placed or performed during the hospital encounter of 05/30/18   2D echo with color flow doppler   Result Value Ref Range    QEF 60 55 - 65    Mitral Valve Regurgitation TRIVIAL     Diastolic Dysfunction No     Est. PA Systolic Pressure 8.86     Tricuspid Valve Regurgitation TRIVIAL     Narrative    Date of Procedure: 06/01/2018        TEST DESCRIPTION   Technical Quality: This is a technically poor study.     Aorta: The aortic root is normal in size, measuring 3.2 cm at sinotubular junction and 3.8 cm at Sinuses of Valsalva. The proximal ascending aorta is normal in size, measuring 3.8 cm across.     Left Atrium: The left atrium is normal in size, measuring 4.9 cm across in the parasternal view.     Left Ventricle: The left ventricle is normal in size, with an end-diastolic diameter of 5.2 cm, and an end-systolic diameter of 4.2 cm. Wall thickness is increased, with the septum measuring 1.3 cm and the posterior wall measuring 1.2 cm across. Relative   wall thickness was increased at 0.46, and the LV mass index was increased at 131.2 g/m2 consistent with concentric left ventricular hypertrophy. There are no regional wall motion abnormalities. Left ventricular systolic function appears normal. Visually   estimated ejection fraction is 60-65%.     Diastolic indices: E wave velocity 0.4 m/s, E/A  ratio 0.9,  msec., Diastolic function is normal.     Right Atrium: The right atrium is normal in size.     Right Ventricle: The right ventricle is normal in size. Global right ventricular systolic function appears normal. The estimated PA systolic pressure is 9 mmHg.     Mitral Valve:  There is trivial mitral regurgitation.     Tricuspid Valve:  There is trivial tricuspid regurgitation.     IVC: IVC is normal in size and collapses > 50% with a sniff, suggesting normal right atrial pressure of 3 mmHg.     Intracavitary: There is no evidence of pericardial effusion, intracavity mass, thrombi, or vegetation.         CONCLUSIONS     1 - Normal left ventricular systolic function (EF 60-65%).     2 - Concentric hypertrophy.     3 - Trivial mitral regurgitation.     4 - Trivial tricuspid regurgitation.             This document has been electronically    SIGNED BY: Jac Walker MD On: 06/01/2018 12:55     Assessment and Plan:     * Syncope  Appears related to orthostasis and PAF. Improved now. Repeat echo - if stable then ok for d/c and f/u with EP next week    Coronary artery disease involving native coronary artery of native heart with angina pectoris hx NSTEMI with PCI to RCA 2000  Stable. Denies CP    Chronic kidney disease, stage 3  Per primary    Persistent atrial fibrillation did not want amio long term; 9/30/19 redo PVI with RFA; 11/1/19 DCCV; 12/10/19 repeat DCCV  Initially had return of A-fib then spontaneously went back to NSR. Given digoxin load. Will stop given advanced age/CRI/drug interactions. Needs EP f/u with Dr Topete after d/c. On coumadin - INR 2.0        VTE Risk Mitigation (From admission, onward)         Ordered     warfarin (COUMADIN) tablet 2.5 mg  Daily      02/20/20 4300                Thank you for your consult. I will sign off. Please contact us if you have any additional questions.    Jac Walker MD  Cardiology   [facility] [place]

## 2020-02-21 NOTE — PLAN OF CARE
Problem: Fall Injury Risk  Goal: Absence of Fall and Fall-Related Injury  Outcome: Ongoing, Progressing  Intervention: Identify and Manage Contributors to Fall Injury Risk  Flowsheets (Taken 2/21/2020 0438)  Self-Care Promotion: independence encouraged; BADL personal objects within reach; BADL personal routines maintained  Medication Review/Management: medications reviewed; high risk medications identified  Intervention: Promote Injury-Free Environment  Flowsheets (Taken 2/21/2020 0438)  Safety Promotion/Fall Prevention: assistive device/personal item within reach; bed alarm set; supervised activity; instructed to call staff for mobility; toileting scheduled; Fall Risk reviewed with patient/family; medications reviewed; nonskid shoes/socks when out of bed  Environmental Safety Modification: assistive device/personal items within reach; clutter free environment maintained; lighting adjusted     Problem: Adult Inpatient Plan of Care  Goal: Absence of Hospital-Acquired Illness or Injury  Outcome: Ongoing, Progressing  Goal: Optimal Comfort and Wellbeing  Outcome: Ongoing, Progressing  Goal: Readiness for Transition of Care  Outcome: Ongoing, Progressing     Problem: Syncope  Goal: Absence of Syncopal Symptoms  Outcome: Ongoing, Progressing  Intervention: Manage Effect of Syncopal Symptoms  Flowsheets (Taken 2/21/2020 0438)  Supportive Measures: relaxation techniques promoted; verbalization of feelings encouraged

## 2020-02-21 NOTE — ASSESSMENT & PLAN NOTE
Initially had return of A-fib then spontaneously went back to NSR. Given digoxin load. Will stop given advanced age/CRI/drug interactions. Needs EP f/u with Dr Topete after d/c. On coumadin - INR 2.0

## 2020-02-21 NOTE — SUBJECTIVE & OBJECTIVE
Past Medical History:   Diagnosis Date    ACS (acute coronary syndrome)     Acute coronary syndrome 2000    NSTEMI    Anticoagulant long-term use     Coumadin    Atrial fibrillation     Basal cell carcinoma excised     left scalp vertex    Bilateral leg edema 1/6/2014    BPH (benign prostatic hyperplasia)     CKD (chronic kidney disease)     Coronary artery disease     DVT of leg (deep venous thrombosis) 09/08/2014    On coumadin for years    Encounter for blood transfusion     Hyperlipidemia     Hypertension     Hypogonadism, male     Leg fracture, left     Melanoma 01/04/2017     in situ crown of scalp    MI (myocardial infarction)     Panhypopituitarism     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    Squamous Cell Carcinoma excised     left posterior scalp    Thyroid disease        Past Surgical History:   Procedure Laterality Date    ABLATION OF ARRHYTHMOGENIC FOCUS FOR ATRIAL FIBRILLATION N/A 9/30/2019    Procedure: Ablation atrial fibrillation;  Surgeon: Neel Topete MD;  Location: [facility] EP LAB;  Service: Cardiology;  Laterality: N/A;  AF, EUGENE, PVI (re-do), Carto, Gen, ME, 3 Prep *right groin only access and exchange CS/ICE catheters throughout as needed*    APPENDECTOMY      BASAL CELL CARCINOMA EXCISION  01/2008    BRAIN SURGERY      pituitary adenoma removed    CARDIAC CATHETERIZATION  05/09/2000    S/P stent to RCA,    CARDIAC CATHETERIZATION      stents placed    CARDIAC ELECTROPHYSIOLOGY STUDY AND ABLATION      CORONARY ANGIOPLASTY  5/9/2000    RCA    CORONARY ANGIOPLASTY WITH STENT PLACEMENT      ESOPHAGEAL DILATION      EYE SURGERY Bilateral     cataracts removed and new lenses placed     FRACTURE SURGERY Left     leg    HERNIA REPAIR      LAPAROSCOPIC CHOLECYSTECTOMY N/A 6/2/2018    Procedure: CHOLECYSTECTOMY, LAPAROSCOPIC;  Surgeon: Dickson Smith Jr., MD;  Location: [facility] OR;  Service: General;  Laterality: N/A;    left  femur surgery      pituitatry      hypophysectomy    SKIN BIOPSY      TONSILLECTOMY      TREATMENT OF CARDIAC ARRHYTHMIA Bilateral 6/13/2019    Procedure: Cardioversion or Defibrillation;  Surgeon: Eric Nguyen MD;  Location: [facility] EP LAB;  Service: Cardiology;  Laterality: Bilateral;  af, bryson, dccv, anes, pr, 324    TREATMENT OF CARDIAC ARRHYTHMIA  9/30/2019    Procedure: Cardioversion or Defibrillation;  Surgeon: Neel Topete MD;  Location: [facility] EP LAB;  Service: Cardiology;;    TREATMENT OF CARDIAC ARRHYTHMIA N/A 11/1/2019    Procedure: CARDIOVERSION;  Surgeon: Eric Nguyen MD;  Location: [facility] EP LAB;  Service: Cardiology;  Laterality: N/A;  AF, BRYSON (Cx if Serial INRs x 4), DCCV, MAC, [place], 3 Prep    TREATMENT OF CARDIAC ARRHYTHMIA N/A 12/10/2019    Procedure: CARDIOVERSION;  Surgeon: Neel Topete MD;  Location: [facility] EP LAB;  Service: Cardiology;  Laterality: N/A;  AF, BRYSON (Cx if weekly INRs > 2), DCCV, MAC, [place], 3 Prep    VASCULAR SURGERY         Review of patient's allergies indicates:  No Known Allergies    Current Facility-Administered Medications on File Prior to Encounter   Medication    0.9%  NaCl infusion    sodium chloride 0.9% flush 5 mL    testosterone cypionate injection 100 mg     Current Outpatient Medications on File Prior to Encounter   Medication Sig    amiodarone (PACERONE) 200 MG Tab Take 200 mg by mouth once daily. 200 mg BID until 12/17/19 then reduce down to 200 mg daily thereafter    amiodarone (PACERONE) 200 MG Tab Take 1 tablet (200 mg total) by mouth once daily.    aspirin 81 mg Tab Take 81 mg by mouth every evening. 1 Tablet Oral Every night    folic acid (FOLVITE) 1 MG tablet TAKE 1 TABLET BY MOUTH ONCE DAILY    levothyroxine (SYNTHROID) 112 MCG tablet TAKE 1 TABLET BY MOUTH ONCE DAILY BEFORE  BREAKFAST    pantoprazole (PROTONIX) 40 MG tablet     predniSONE (DELTASONE) 5 MG tablet Take 5 mg by mouth once daily.    rosuvastatin (CRESTOR) 40 MG Tab Take 1  tablet (40 mg total) by mouth once daily.    tamsulosin (FLOMAX) 0.4 mg Cap TAKE 1 CAPSULE BY MOUTH ONCE DAILY    warfarin (COUMADIN) 5 MG tablet Take 1 tablet (5 mg total) by mouth Daily. Current Dose ( 2.5 mg every Sun; 5 mg all other days) or as directed by coumadin clinic. (Patient taking differently: Take 2.5 mg by mouth Daily. 2.5 Sunday & Thursday; 5mg aod)    [DISCONTINUED] warfarin (COUMADIN) 5 MG tablet As per coumadin clinic    ciclopirox (PENLAC) 8 % Soln Apply daily to affected nail. Must remove and restart weekly    clotrimazole (LOTRIMIN) 1 % cream Apply topically 2 (two) times daily. To feet  For 3 weeks as needed    ketoconazole (NIZORAL) 2 % cream AAA bid to feet x 3 weeks    mometasone (ELOCON) 0.1 % ointment aaa qd- bid for severe areas.  Avoid use on face and groin    mometasone (ELOCON) 0.1 % solution Mix entire bottle in jar of cerave cream and aaa qd- bid prn itching    NITROSTAT 0.4 mg SL tablet DISSOLVE ONE TABLET UNDER THE TONGUE EVERY 5 MINUTES AS NEEDED FOR CHEST PAIN.  DO NOT EXCEED A TOTAL OF 3 DOSES IN 15 MINUTES NOW    triamcinolone acetonide 0.1% (KENALOG) 0.1 % cream     [DISCONTINUED] omeprazole (PRILOSEC) 20 MG capsule Take 2 capsules (40 mg total) by mouth once daily.     Family History     Problem Relation (Age of Onset)    Cancer Father        Tobacco Use    Smoking status: Never Smoker    Smokeless tobacco: Never Used   Substance and Sexual Activity    Alcohol use: No     Frequency: Never    Drug use: No    Sexual activity: Not Currently     Partners: Female     Review of Systems   Constitutional: Positive for appetite change. Negative for activity change, chills and fever.   HENT: Negative for congestion, facial swelling and sore throat.    Eyes: Negative for discharge and visual disturbance.   Respiratory: Negative for apnea, cough, chest tightness, shortness of breath and wheezing.    Cardiovascular: Negative for chest pain and leg swelling.    Gastrointestinal: Negative for abdominal pain, diarrhea, nausea and vomiting.   Endocrine: Negative for polydipsia, polyphagia and polyuria.   Genitourinary: Negative for difficulty urinating and dysuria.   Musculoskeletal: Negative for back pain and joint swelling.   Skin: Negative for color change, rash and wound.   Allergic/Immunologic: Negative for environmental allergies and immunocompromised state.   Neurological: Positive for weakness. Negative for dizziness, facial asymmetry and numbness.   Hematological: Negative.    Psychiatric/Behavioral: Negative for agitation, confusion and suicidal ideas.     Objective:     Vital Signs (Most Recent):  Temp: 98.4 °F (36.9 °C) (02/20/20 2210)  Pulse: 97 (02/20/20 2302)  Resp: 18 (02/20/20 1929)  BP: 122/83 (02/20/20 2302)  SpO2: 95 % (02/20/20 2310) Vital Signs (24h Range):  Temp:  [98 °F (36.7 °C)-98.9 °F (37.2 °C)] 98.4 °F (36.9 °C)  Pulse:  [] 97  Resp:  [12-20] 18  SpO2:  [93 %-98 %] 95 %  BP: ()/(50-90) 122/83     Weight: 105.2 kg (232 lb)  Body mass index is 29 kg/m².    Physical Exam   Constitutional: He is oriented to person, place, and time. He appears well-developed and well-nourished. No distress.   HENT:   Head: Normocephalic and atraumatic.   Nose: Nose normal.   Eyes: Pupils are equal, round, and reactive to light. Conjunctivae and EOM are normal. Right eye exhibits no discharge. Left eye exhibits no discharge. No scleral icterus.   Neck: Normal range of motion. Neck supple. No JVD present. No tracheal deviation present.   Cardiovascular: Normal rate, regular rhythm and normal heart sounds. Exam reveals no gallop and no friction rub.   No murmur heard.  Pulmonary/Chest: Effort normal and breath sounds normal. No stridor. No respiratory distress. He has no wheezes. He has no rales. He exhibits no tenderness.   Abdominal: Soft. Bowel sounds are normal. He exhibits no distension and no mass. There is no tenderness. There is no rebound and no  guarding.   Musculoskeletal: Normal range of motion. He exhibits no edema or deformity.   Neurological: He is alert and oriented to person, place, and time. He displays normal reflexes. No cranial nerve deficit or sensory deficit. He exhibits normal muscle tone.   Skin: Skin is warm. Capillary refill takes less than 2 seconds. No rash noted. No erythema. No pallor.   Psychiatric: He has a normal mood and affect. His behavior is normal. Judgment and thought content normal.         CRANIAL NERVES     CN III, IV, VI   Pupils are equal, round, and reactive to light.  Extraocular motions are normal.        Significant Labs: All pertinent labs within the past 24 hours have been reviewed.    Significant Imaging: I have reviewed and interpreted all pertinent imaging results/findings within the past 24 hours.

## 2020-02-21 NOTE — SUBJECTIVE & OBJECTIVE
Past Medical History:   Diagnosis Date    ACS (acute coronary syndrome)     Acute coronary syndrome 2000    NSTEMI    Anticoagulant long-term use     Coumadin    Atrial fibrillation     Basal cell carcinoma excised     left scalp vertex    Bilateral leg edema 1/6/2014    BPH (benign prostatic hyperplasia)     CKD (chronic kidney disease)     Coronary artery disease     DVT of leg (deep venous thrombosis) 09/08/2014    On coumadin for years    Encounter for blood transfusion     Hyperlipidemia     Hypertension     Hypogonadism, male     Leg fracture, left     Melanoma 01/04/2017     in situ crown of scalp    MI (myocardial infarction)     Panhypopituitarism     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    Squamous Cell Carcinoma excised     left posterior scalp    Thyroid disease        Past Surgical History:   Procedure Laterality Date    ABLATION OF ARRHYTHMOGENIC FOCUS FOR ATRIAL FIBRILLATION N/A 9/30/2019    Procedure: Ablation atrial fibrillation;  Surgeon: Neel Topete MD;  Location: [facility] EP LAB;  Service: Cardiology;  Laterality: N/A;  AF, EUGENE, PVI (re-do), Carto, Gen, [place], 3 Prep *right groin only access and exchange CS/ICE catheters throughout as needed*    APPENDECTOMY      BASAL CELL CARCINOMA EXCISION  01/2008    BRAIN SURGERY      pituitary adenoma removed    CARDIAC CATHETERIZATION  05/09/2000    S/P stent to RCA,    CARDIAC CATHETERIZATION      stents placed    CARDIAC ELECTROPHYSIOLOGY STUDY AND ABLATION      CORONARY ANGIOPLASTY  5/9/2000    RCA    CORONARY ANGIOPLASTY WITH STENT PLACEMENT      ESOPHAGEAL DILATION      EYE SURGERY Bilateral     cataracts removed and new lenses placed     FRACTURE SURGERY Left     leg    HERNIA REPAIR      LAPAROSCOPIC CHOLECYSTECTOMY N/A 6/2/2018    Procedure: CHOLECYSTECTOMY, LAPAROSCOPIC;  Surgeon: Dickson Smith Jr., MD;  Location: [facility] OR;  Service: General;  Laterality: N/A;    left  femur surgery      pituitatry      hypophysectomy    SKIN BIOPSY      TONSILLECTOMY      TREATMENT OF CARDIAC ARRHYTHMIA Bilateral 6/13/2019    Procedure: Cardioversion or Defibrillation;  Surgeon: Eric Nguyen MD;  Location: [facility] EP LAB;  Service: Cardiology;  Laterality: Bilateral;  af, bryson, dccv, anes, pr, 324    TREATMENT OF CARDIAC ARRHYTHMIA  9/30/2019    Procedure: Cardioversion or Defibrillation;  Surgeon: Neel Topete MD;  Location: [facility] EP LAB;  Service: Cardiology;;    TREATMENT OF CARDIAC ARRHYTHMIA N/A 11/1/2019    Procedure: CARDIOVERSION;  Surgeon: Eric Nguyen MD;  Location: [facility] EP LAB;  Service: Cardiology;  Laterality: N/A;  AF, BRYSON (Cx if Serial INRs x 4), DCCV, MAC, DM, 3 Prep    TREATMENT OF CARDIAC ARRHYTHMIA N/A 12/10/2019    Procedure: CARDIOVERSION;  Surgeon: Neel Topete MD;  Location: [facility] EP LAB;  Service: Cardiology;  Laterality: N/A;  AF, BRYSON (Cx if weekly INRs > 2), DCCV, MAC, [place], 3 Prep    VASCULAR SURGERY         Review of patient's allergies indicates:  No Known Allergies    Current Facility-Administered Medications on File Prior to Encounter   Medication    0.9%  NaCl infusion    sodium chloride 0.9% flush 5 mL     Current Outpatient Medications on File Prior to Encounter   Medication Sig    amiodarone (PACERONE) 200 MG Tab Take 200 mg by mouth once daily. 200 mg BID until 12/17/19 then reduce down to 200 mg daily thereafter    amiodarone (PACERONE) 200 MG Tab Take 1 tablet (200 mg total) by mouth once daily.    aspirin 81 mg Tab Take 81 mg by mouth every evening. 1 Tablet Oral Every night    folic acid (FOLVITE) 1 MG tablet TAKE 1 TABLET BY MOUTH ONCE DAILY    levothyroxine (SYNTHROID) 112 MCG tablet TAKE 1 TABLET BY MOUTH ONCE DAILY BEFORE  BREAKFAST    pantoprazole (PROTONIX) 40 MG tablet     predniSONE (DELTASONE) 5 MG tablet Take 5 mg by mouth once daily.    rosuvastatin (CRESTOR) 40 MG Tab Take 1 tablet (40 mg total) by mouth once daily.     tamsulosin (FLOMAX) 0.4 mg Cap TAKE 1 CAPSULE BY MOUTH ONCE DAILY    warfarin (COUMADIN) 5 MG tablet Take 1 tablet (5 mg total) by mouth Daily. Current Dose ( 2.5 mg every Sun; 5 mg all other days) or as directed by coumadin clinic. (Patient taking differently: Take 2.5 mg by mouth Daily. 2.5 Sunday & Thursday; 5mg aod)    ciclopirox (PENLAC) 8 % Soln Apply daily to affected nail. Must remove and restart weekly    clotrimazole (LOTRIMIN) 1 % cream Apply topically 2 (two) times daily. To feet  For 3 weeks as needed    ketoconazole (NIZORAL) 2 % cream AAA bid to feet x 3 weeks    mometasone (ELOCON) 0.1 % ointment aaa qd- bid for severe areas.  Avoid use on face and groin    mometasone (ELOCON) 0.1 % solution Mix entire bottle in jar of cerave cream and aaa qd- bid prn itching    NITROSTAT 0.4 mg SL tablet DISSOLVE ONE TABLET UNDER THE TONGUE EVERY 5 MINUTES AS NEEDED FOR CHEST PAIN.  DO NOT EXCEED A TOTAL OF 3 DOSES IN 15 MINUTES NOW    triamcinolone acetonide 0.1% (KENALOG) 0.1 % cream      Family History     Problem Relation (Age of Onset)    Cancer Father        Tobacco Use    Smoking status: Never Smoker    Smokeless tobacco: Never Used   Substance and Sexual Activity    Alcohol use: No     Frequency: Never    Drug use: No    Sexual activity: Not Currently     Partners: Female     Review of Systems   Constitution: Negative for decreased appetite.   HENT: Negative for ear discharge.    Eyes: Negative for blurred vision.   Endocrine: Negative for polyphagia.   Skin: Negative for nail changes.   Genitourinary: Negative for bladder incontinence.   Neurological: Negative for aphonia.   Psychiatric/Behavioral: Negative for hallucinations.   Allergic/Immunologic: Negative for hives.     Objective:     Vital Signs (Most Recent):  Temp: 98.2 °F (36.8 °C) (02/21/20 0811)  Pulse: 99 (02/21/20 0811)  Resp: 18 (02/21/20 0811)  BP: 101/77 (02/21/20 0811)  SpO2: 96 % (02/21/20 0811) Vital Signs (24h Range):  Temp:   [97.8 °F (36.6 °C)-98.9 °F (37.2 °C)] 98.2 °F (36.8 °C)  Pulse:  [] 99  Resp:  [12-20] 18  SpO2:  [93 %-98 %] 96 %  BP: ()/(50-90) 101/77     Weight: 105.2 kg (231 lb 15.8 oz)  Body mass index is 29 kg/m².    SpO2: 96 %  O2 Device (Oxygen Therapy): room air      Intake/Output Summary (Last 24 hours) at 2/21/2020 1000  Last data filed at 2/21/2020 0835  Gross per 24 hour   Intake 2000 ml   Output 875 ml   Net 1125 ml       Lines/Drains/Airways     Peripheral Intravenous Line                 Peripheral IV - Single Lumen 02/20/20 20 G Left Wrist 1 day                Physical Exam   Constitutional: He is oriented to person, place, and time. He appears well-developed and well-nourished.   HENT:   Head: Normocephalic and atraumatic.   Eyes: Pupils are equal, round, and reactive to light. Conjunctivae are normal.   Neck: Normal range of motion. Neck supple.   Cardiovascular: Normal rate, normal heart sounds and intact distal pulses.   Pulmonary/Chest: Effort normal and breath sounds normal.   Abdominal: Soft. Bowel sounds are normal.   Musculoskeletal: Normal range of motion.   Neurological: He is alert and oriented to person, place, and time.   Skin: Skin is warm and dry.       Significant Labs: All pertinent lab results from the last 24 hours have been reviewed.    Significant Imaging: Echocardiogram:   2D echo with color flow doppler:   Results for orders placed or performed during the hospital encounter of 05/30/18   2D echo with color flow doppler   Result Value Ref Range    QEF 60 55 - 65    Mitral Valve Regurgitation TRIVIAL     Diastolic Dysfunction No     Est. PA Systolic Pressure 8.86     Tricuspid Valve Regurgitation TRIVIAL     Narrative    Date of Procedure: 06/01/2018        TEST DESCRIPTION   Technical Quality: This is a technically poor study.     Aorta: The aortic root is normal in size, measuring 3.2 cm at sinotubular junction and 3.8 cm at Sinuses of Valsalva. The proximal ascending aorta is  normal in size, measuring 3.8 cm across.     Left Atrium: The left atrium is normal in size, measuring 4.9 cm across in the parasternal view.     Left Ventricle: The left ventricle is normal in size, with an end-diastolic diameter of 5.2 cm, and an end-systolic diameter of 4.2 cm. Wall thickness is increased, with the septum measuring 1.3 cm and the posterior wall measuring 1.2 cm across. Relative   wall thickness was increased at 0.46, and the LV mass index was increased at 131.2 g/m2 consistent with concentric left ventricular hypertrophy. There are no regional wall motion abnormalities. Left ventricular systolic function appears normal. Visually   estimated ejection fraction is 60-65%.     Diastolic indices: E wave velocity 0.4 m/s, E/A ratio 0.9,  msec., Diastolic function is normal.     Right Atrium: The right atrium is normal in size.     Right Ventricle: The right ventricle is normal in size. Global right ventricular systolic function appears normal. The estimated PA systolic pressure is 9 mmHg.     Mitral Valve:  There is trivial mitral regurgitation.     Tricuspid Valve:  There is trivial tricuspid regurgitation.     IVC: IVC is normal in size and collapses > 50% with a sniff, suggesting normal right atrial pressure of 3 mmHg.     Intracavitary: There is no evidence of pericardial effusion, intracavity mass, thrombi, or vegetation.         CONCLUSIONS     1 - Normal left ventricular systolic function (EF 60-65%).     2 - Concentric hypertrophy.     3 - Trivial mitral regurgitation.     4 - Trivial tricuspid regurgitation.             This document has been electronically    SIGNED BY: Jac Walker MD On: 06/01/2018 12:55

## 2020-02-21 NOTE — ASSESSMENT & PLAN NOTE
Due to orthostatic hypotension.  No focal neuro deficits.  Ct head neg in ED.  Treat with ivf.  Not on any bp meds.  Secondary to some dehydration.

## 2020-02-21 NOTE — HPI
85 yo male on coumadin for afib, htn, ckd comes in with syncopal episode.  Pt stood up and got dizzy and passed out.  Pt has no h/o orthostatis.  Has not been eating and drinking well, but no recent illness.  No fevers.  No n/v/d.  No chest pain.  No focal neurological deficits.  Pt found to have orthostatic hypotension in the ed and afib.  Cardiology called recommended dig load.  Pt bp improved with 2 liters of ivf in ED.  Pt referred for admission for dig load and orthostatis.

## 2020-02-21 NOTE — ASSESSMENT & PLAN NOTE
Appears related to orthostasis and PAF. Improved now. Repeat echo - if stable then ok for d/c and f/u with EP next week

## 2020-02-21 NOTE — CONSULTS
Nutrition services consulted for coumadin education. Attempted to meet with pt x2 (out of room, asleep at f/u). Chart reviewed, pt on coumadin at home and f/u's with coumadin clinic. Education handout left at bedside with f/u resources.     1. RD to f/u  2. Consult as needed.

## 2020-02-21 NOTE — SUBJECTIVE & OBJECTIVE
Interval History:  Feels better but still weak.  Has not been out of bed    Review of Systems   All other systems reviewed and are negative.    Objective:     Vital Signs (Most Recent):  Temp: 97.5 °F (36.4 °C) (02/21/20 1502)  Pulse: 98 (02/21/20 1502)  Resp: 18 (02/21/20 1502)  BP: 132/89 (02/21/20 1502)  SpO2: (!) 93 % (02/21/20 1502) Vital Signs (24h Range):  Temp:  [97.4 °F (36.3 °C)-98.4 °F (36.9 °C)] 97.5 °F (36.4 °C)  Pulse:  [] 98  Resp:  [12-20] 18  SpO2:  [93 %-98 %] 93 %  BP: ()/(50-90) 132/89     Weight: 104.8 kg (231 lb)  Body mass index is 28.87 kg/m².    Intake/Output Summary (Last 24 hours) at 2/21/2020 1542  Last data filed at 2/21/2020 0835  Gross per 24 hour   Intake 2000 ml   Output 875 ml   Net 1125 ml      Physical Exam   Constitutional: He is oriented to person, place, and time. He appears well-developed and well-nourished.   HENT:   Head: Normocephalic.   Eyes: Conjunctivae and EOM are normal.   Neck: Normal range of motion.   Cardiovascular: An irregularly irregular rhythm present.   Murmur heard.   Systolic murmur is present with a grade of 3/6.  Pulmonary/Chest: Effort normal and breath sounds normal.   Abdominal: Soft. Bowel sounds are normal.   Musculoskeletal: Normal range of motion.   Neurological: He is alert and oriented to person, place, and time.   Skin: Skin is warm and dry.   Psychiatric: He has a normal mood and affect. His behavior is normal. Judgment and thought content normal.       Significant Labs: All pertinent labs within the past 24 hours have been reviewed.    Significant Imaging: I have reviewed all pertinent imaging results/findings within the past 24 hours.

## 2020-02-21 NOTE — H&P
[facility] Medicine  History & Physical    Patient Name: Linda Skelton  MRN: [id number]  Admission Date: 2/20/2020  Attending Physician: Jordan Rodríguez MD   Primary Care Provider: Anderson Jerome MD         Patient information was obtained from patient, past medical records and ER records.     Subjective:     Principal Problem:Syncope    Chief Complaint:   Chief Complaint   Patient presents with    Loss of Consciousness     reports feeling weak; reports having fall hitting chin area; pt o blood thinners        HPI: 83 yo male on coumadin for afib, htn, ckd comes in with syncopal episode.  Pt stood up and got dizzy and passed out.  Pt has no h/o orthostatis.  Has not been eating and drinking well, but no recent illness.  No fevers.  No n/v/d.  No chest pain.  No focal neurological deficits.  Pt found to have orthostatic hypotension in the ed and afib.  Cardiology called recommended dig load.  Pt bp improved with 2 liters of ivf in ED.  Pt referred for admission for dig load and orthostatis.    Past Medical History:   Diagnosis Date    ACS (acute coronary syndrome)     Acute coronary syndrome 2000    NSTEMI    Anticoagulant long-term use     Coumadin    Atrial fibrillation     Basal cell carcinoma excised     left scalp vertex    Bilateral leg edema 1/6/2014    BPH (benign prostatic hyperplasia)     CKD (chronic kidney disease)     Coronary artery disease     DVT of leg (deep venous thrombosis) 09/08/2014    On coumadin for years    Encounter for blood transfusion     Hyperlipidemia     Hypertension     Hypogonadism, male     Leg fracture, left     Melanoma 01/04/2017     in situ crown of scalp    MI (myocardial infarction)     Panhypopituitarism     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    Squamous Cell Carcinoma excised     left posterior scalp    Thyroid disease        Past Surgical History:   Procedure Laterality  Date    ABLATION OF ARRHYTHMOGENIC FOCUS FOR ATRIAL FIBRILLATION N/A 9/30/2019    Procedure: Ablation atrial fibrillation;  Surgeon: Neel Topete MD;  Location: [facility] EP LAB;  Service: Cardiology;  Laterality: N/A;  AF, BRYSON, PVI (re-do), Carto, Gen, IL, 3 Prep *right groin only access and exchange CS/ICE catheters throughout as needed*    APPENDECTOMY      BASAL CELL CARCINOMA EXCISION  01/2008    BRAIN SURGERY      pituitary adenoma removed    CARDIAC CATHETERIZATION  05/09/2000    S/P stent to RCA,    CARDIAC CATHETERIZATION      stents placed    CARDIAC ELECTROPHYSIOLOGY STUDY AND ABLATION      CORONARY ANGIOPLASTY  5/9/2000    RCA    CORONARY ANGIOPLASTY WITH STENT PLACEMENT      ESOPHAGEAL DILATION      EYE SURGERY Bilateral     cataracts removed and new lenses placed     FRACTURE SURGERY Left     leg    HERNIA REPAIR      LAPAROSCOPIC CHOLECYSTECTOMY N/A 6/2/2018    Procedure: CHOLECYSTECTOMY, LAPAROSCOPIC;  Surgeon: Dickson Smith Jr., MD;  Location: [facility] OR;  Service: General;  Laterality: N/A;    left femur surgery      pituitatry      hypophysectomy    SKIN BIOPSY      TONSILLECTOMY      TREATMENT OF CARDIAC ARRHYTHMIA Bilateral 6/13/2019    Procedure: Cardioversion or Defibrillation;  Surgeon: Eric Nguyen MD;  Location: [facility] EP LAB;  Service: Cardiology;  Laterality: Bilateral;  af, bryson, dccv, anes, pr, 324    TREATMENT OF CARDIAC ARRHYTHMIA  9/30/2019    Procedure: Cardioversion or Defibrillation;  Surgeon: Neel Topete MD;  Location: [facility] EP LAB;  Service: Cardiology;;    TREATMENT OF CARDIAC ARRHYTHMIA N/A 11/1/2019    Procedure: CARDIOVERSION;  Surgeon: Eric Nguyen MD;  Location: [facility] EP LAB;  Service: Cardiology;  Laterality: N/A;  AF, BRYSON (Cx if Serial INRs x 4), DCCV, MAC, DM, 3 Prep    TREATMENT OF CARDIAC ARRHYTHMIA N/A 12/10/2019    Procedure: CARDIOVERSION;  Surgeon: Neel Topete MD;  Location: [facility] EP LAB;  Service: Cardiology;  Laterality: N/A;  AF, BRYSON (Cx  if weekly INRs > 2), DCCV, MAC, CT, 3 Prep    VASCULAR SURGERY         Review of patient's allergies indicates:  No Known Allergies    Current Facility-Administered Medications on File Prior to Encounter   Medication    0.9%  NaCl infusion    sodium chloride 0.9% flush 5 mL    testosterone cypionate injection 100 mg     Current Outpatient Medications on File Prior to Encounter   Medication Sig    amiodarone (PACERONE) 200 MG Tab Take 200 mg by mouth once daily. 200 mg BID until 12/17/19 then reduce down to 200 mg daily thereafter    amiodarone (PACERONE) 200 MG Tab Take 1 tablet (200 mg total) by mouth once daily.    aspirin 81 mg Tab Take 81 mg by mouth every evening. 1 Tablet Oral Every night    folic acid (FOLVITE) 1 MG tablet TAKE 1 TABLET BY MOUTH ONCE DAILY    levothyroxine (SYNTHROID) 112 MCG tablet TAKE 1 TABLET BY MOUTH ONCE DAILY BEFORE  BREAKFAST    pantoprazole (PROTONIX) 40 MG tablet     predniSONE (DELTASONE) 5 MG tablet Take 5 mg by mouth once daily.    rosuvastatin (CRESTOR) 40 MG Tab Take 1 tablet (40 mg total) by mouth once daily.    tamsulosin (FLOMAX) 0.4 mg Cap TAKE 1 CAPSULE BY MOUTH ONCE DAILY    warfarin (COUMADIN) 5 MG tablet Take 1 tablet (5 mg total) by mouth Daily. Current Dose ( 2.5 mg every Sun; 5 mg all other days) or as directed by coumadin clinic. (Patient taking differently: Take 2.5 mg by mouth Daily. 2.5 Sunday & Thursday; 5mg aod)    [DISCONTINUED] warfarin (COUMADIN) 5 MG tablet As per coumadin clinic    ciclopirox (PENLAC) 8 % Soln Apply daily to affected nail. Must remove and restart weekly    clotrimazole (LOTRIMIN) 1 % cream Apply topically 2 (two) times daily. To feet  For 3 weeks as needed    ketoconazole (NIZORAL) 2 % cream AAA bid to feet x 3 weeks    mometasone (ELOCON) 0.1 % ointment aaa qd- bid for severe areas.  Avoid use on face and groin    mometasone (ELOCON) 0.1 % solution Mix entire bottle in jar of cerave cream and aaa qd- bid prn itching     NITROSTAT 0.4 mg SL tablet DISSOLVE ONE TABLET UNDER THE TONGUE EVERY 5 MINUTES AS NEEDED FOR CHEST PAIN.  DO NOT EXCEED A TOTAL OF 3 DOSES IN 15 MINUTES NOW    triamcinolone acetonide 0.1% (KENALOG) 0.1 % cream     [DISCONTINUED] omeprazole (PRILOSEC) 20 MG capsule Take 2 capsules (40 mg total) by mouth once daily.     Family History     Problem Relation (Age of Onset)    Cancer Father        Tobacco Use    Smoking status: Never Smoker    Smokeless tobacco: Never Used   Substance and Sexual Activity    Alcohol use: No     Frequency: Never    Drug use: No    Sexual activity: Not Currently     Partners: Female     Review of Systems   Constitutional: Positive for appetite change. Negative for activity change, chills and fever.   HENT: Negative for congestion, facial swelling and sore throat.    Eyes: Negative for discharge and visual disturbance.   Respiratory: Negative for apnea, cough, chest tightness, shortness of breath and wheezing.    Cardiovascular: Negative for chest pain and leg swelling.   Gastrointestinal: Negative for abdominal pain, diarrhea, nausea and vomiting.   Endocrine: Negative for polydipsia, polyphagia and polyuria.   Genitourinary: Negative for difficulty urinating and dysuria.   Musculoskeletal: Negative for back pain and joint swelling.   Skin: Negative for color change, rash and wound.   Allergic/Immunologic: Negative for environmental allergies and immunocompromised state.   Neurological: Positive for weakness. Negative for dizziness, facial asymmetry and numbness.   Hematological: Negative.    Psychiatric/Behavioral: Negative for agitation, confusion and suicidal ideas.     Objective:     Vital Signs (Most Recent):  Temp: 98.4 °F (36.9 °C) (02/20/20 2210)  Pulse: 97 (02/20/20 2302)  Resp: 18 (02/20/20 1929)  BP: 122/83 (02/20/20 2302)  SpO2: 95 % (02/20/20 2310) Vital Signs (24h Range):  Temp:  [98 °F (36.7 °C)-98.9 °F (37.2 °C)] 98.4 °F (36.9 °C)  Pulse:  [] 97  Resp:   [12-20] 18  SpO2:  [93 %-98 %] 95 %  BP: ()/(50-90) 122/83     Weight: 105.2 kg (232 lb)  Body mass index is 29 kg/m².    Physical Exam   Constitutional: He is oriented to person, place, and time. He appears well-developed and well-nourished. No distress.   HENT:   Head: Normocephalic and atraumatic.   Nose: Nose normal.   Eyes: Pupils are equal, round, and reactive to light. Conjunctivae and EOM are normal. Right eye exhibits no discharge. Left eye exhibits no discharge. No scleral icterus.   Neck: Normal range of motion. Neck supple. No JVD present. No tracheal deviation present.   Cardiovascular: Normal rate, regular rhythm and normal heart sounds. Exam reveals no gallop and no friction rub.   No murmur heard.  Pulmonary/Chest: Effort normal and breath sounds normal. No stridor. No respiratory distress. He has no wheezes. He has no rales. He exhibits no tenderness.   Abdominal: Soft. Bowel sounds are normal. He exhibits no distension and no mass. There is no tenderness. There is no rebound and no guarding.   Musculoskeletal: Normal range of motion. He exhibits no edema or deformity.   Neurological: He is alert and oriented to person, place, and time. He displays normal reflexes. No cranial nerve deficit or sensory deficit. He exhibits normal muscle tone.   Skin: Skin is warm. Capillary refill takes less than 2 seconds. No rash noted. No erythema. No pallor.   Psychiatric: He has a normal mood and affect. His behavior is normal. Judgment and thought content normal.         CRANIAL NERVES     CN III, IV, VI   Pupils are equal, round, and reactive to light.  Extraocular motions are normal.        Significant Labs: All pertinent labs within the past 24 hours have been reviewed.    Significant Imaging: I have reviewed and interpreted all pertinent imaging results/findings within the past 24 hours.    Assessment/Plan:   85 yo male comes in with sycnope due to orthostatic hypotension with afib      * Syncope  Due  to orthostatic hypotension.  No focal neuro deficits.  Ct head neg in ED.  Treat with ivf.  Not on any bp meds.  Secondary to some dehydration.      Orthostatic hypotension    Given 2 liters ivf in ED bp better.  Ck orthostatics q shift.      Long term (current) use of anticoagulants  coumadin      History of deep 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, 1997  Noted, inr 2, on coumadin for afib      Essential hypertension  Not on any home meds for this at this time      Chronic kidney disease, stage 3  At baseline cr of 1.6      Persistent atrial fibrillation did not want amio long term; 9/30/19 redo PVI with RFA; 11/1/19 DCCV; 12/10/19 repeat DCCV  Cards called recommended dig load.  Cont amiodorone.  Rate 100 or less.  Cont coumadin.        VTE Risk Mitigation (From admission, onward)         Ordered     warfarin (COUMADIN) tablet 2.5 mg  Daily      02/20/20 6805                   Vanesa Forman MD  Department of Hospital Medicine   [facility]

## 2020-02-21 NOTE — PROGRESS NOTES
[facility] Medicine  Progress Note    Patient Name: Linda Skelton  MRN: [id number]  Patient Class: IP- Inpatient   Admission Date: 2/20/2020  Length of Stay: 1 days  Attending Physician: Shira Puga MD  Primary Care Provider: Anderson Jerome MD        Subjective:     Principal Problem:Syncope        HPI:  83 yo male on coumadin for afib, htn, ckd comes in with syncopal episode.  Pt stood up and got dizzy and passed out.  Pt has no h/o orthostatis.  Has not been eating and drinking well, but no recent illness.  No fevers.  No n/v/d.  No chest pain.  No focal neurological deficits.  Pt found to have orthostatic hypotension in the ed and afib.  Cardiology called recommended dig load.  Pt bp improved with 2 liters of ivf in ED.  Pt referred for admission for dig load and orthostatis.    Overview/Hospital Course:  Admitted with orthostatic hypotension syncopal episode.  Cardiology following.  Status post do age load and IV fluids.  Blood pressure is now improved.  Continue to follow Cardiology recommendations monitor blood pressures and will get PT evaluation repeat orthostatics    Interval History:  Feels better but still weak.  Has not been out of bed    Review of Systems   All other systems reviewed and are negative.    Objective:     Vital Signs (Most Recent):  Temp: 97.5 °F (36.4 °C) (02/21/20 1502)  Pulse: 98 (02/21/20 1502)  Resp: 18 (02/21/20 1502)  BP: 132/89 (02/21/20 1502)  SpO2: (!) 93 % (02/21/20 1502) Vital Signs (24h Range):  Temp:  [97.4 °F (36.3 °C)-98.4 °F (36.9 °C)] 97.5 °F (36.4 °C)  Pulse:  [] 98  Resp:  [12-20] 18  SpO2:  [93 %-98 %] 93 %  BP: ()/(50-90) 132/89     Weight: 104.8 kg (231 lb)  Body mass index is 28.87 kg/m².    Intake/Output Summary (Last 24 hours) at 2/21/2020 1542  Last data filed at 2/21/2020 0835  Gross per 24 hour   Intake 2000 ml   Output 875 ml   Net 1125 ml      Physical Exam   Constitutional: He is oriented to person, place, and  time. He appears well-developed and well-nourished.   HENT:   Head: Normocephalic.   Eyes: Conjunctivae and EOM are normal.   Neck: Normal range of motion.   Cardiovascular: An irregularly irregular rhythm present.   Murmur heard.   Systolic murmur is present with a grade of 3/6.  Pulmonary/Chest: Effort normal and breath sounds normal.   Abdominal: Soft. Bowel sounds are normal.   Musculoskeletal: Normal range of motion.   Neurological: He is alert and oriented to person, place, and time.   Skin: Skin is warm and dry.   Psychiatric: He has a normal mood and affect. His behavior is normal. Judgment and thought content normal.       Significant Labs: All pertinent labs within the past 24 hours have been reviewed.    Significant Imaging: I have reviewed all pertinent imaging results/findings within the past 24 hours.      Assessment/Plan:      * Syncope  Due to orthostatic hypotension.  No focal neuro deficits.  Ct head neg in ED.  Treat with ivf.  Not on any bp meds.  Secondary to some dehydration.  PT OT repeat orthostatics      Orthostatic hypotension    Given 2 liters ivf in ED bp better.  Ck orthostatics q shift.    PT OT    Long term (current) use of anticoagulants  coumadin      History of deep 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, 1997  Noted, inr 2, on coumadin for afib  Continue Coumadin      Essential hypertension  Not on any home meds for this at this time  Monitor blood pressures    Chronic kidney disease, stage 3  At baseline cr of 1.6      Persistent atrial fibrillation did not want amio long term; 9/30/19 redo PVI with RFA; 11/1/19 DCCV; 12/10/19 repeat DCCV  Cards called recommended dig load.  Cont amiodorone.  Rate 100 or less.  Cont coumadin.  Rate controlled        VTE Risk Mitigation (From admission, onward)         Ordered     warfarin (COUMADIN) tablet 2.5 mg  Daily      02/20/20 4005                      Shira Puga MD  Department of Hospital Medicine   [facility]

## 2020-02-21 NOTE — ASSESSMENT & PLAN NOTE
Cards called recommended dig load.  Cont amiodorone.  Rate 100 or less.  Cont coumadin.  Rate controlled

## 2020-02-21 NOTE — NURSING
Received patient from ED too room via Wheelchair. Patient accompanied by transport. Transferred patient to bed with assistance, Denies dizziness or headache at this time. Evaluated general patient appearance and condition. Admit assessment initiated. Tele monitoring initiated. Saline lock at L forarm flushed, Intact. No apparent distress noted at this time. Instructed patient to call before ambulating, bed alarm engaged call light within reach, bed in lowest position. Will continue to monitor.

## 2020-02-21 NOTE — HOSPITAL COURSE
Admitted with orthostatic hypotension syncopal episode.  Also returned to AFib with Cardiology following.  Status post digoxin load and IV fluids.  Returns to normal sinus rhythm  Blood pressure is now improved.  C Mohs orthostatic BP as were within normal limits how ever this a.m. he did have 1 that was positive but was completely asymptomatic as walking around without any symptoms about back and forth to the bathroom he reports he is not lightheaded or dizzy and feels comfortable to be discharged home.  Echocardiogram showed a decreased EF of 45% believe his last was 60-65.  Cardiology has signed off will have him follow with his EP physician next week and Cardiology within a month.  He is currently asymptomatic.  Would like to be discharged home for outpatient follow vitals are stable.  Strict precautions were given for orthostatics lightheaded dizziness standing slowly return to ER or call PCP immediately if symptoms returned.

## 2020-02-21 NOTE — PROGRESS NOTES
Report received from off going nurse, MIRAIN Sorenson. Patient AAO. No signs of distress noted. Call light in reach. Bed low and locked. Will continue to monitor.

## 2020-02-21 NOTE — HPI
HPI: 85 yo male on coumadin for afib, htn, ckd comes in with syncopal episode.  Pt stood up and got dizzy and passed out.  Pt has no h/o orthostatis.  Has not been eating and drinking well, but no recent illness.  No fevers.  No n/v/d.  No chest pain.  No focal neurological deficits.  Pt found to have orthostatic hypotension in the ed and afib.  Cardiology called recommended dig load.  Pt bp improved with 2 liters of ivf in ED.  Pt referred for admission for dig load and orthostatis.    Denies CP, SOB, or dizziness  Orthostatic in ER  A-fib 98 IVCD - back in NSR now  Troponin negative    Followed at [facility] by EP - Dr Topete - last seen 1/9/20  HPI  Prior Hx:  I had the pleasure of seeing Mr. Skelton in our electrophysiology clinic in follow-up for his atrial fibrillation. As you are aware he is a pleasant 84 year-old man with paroxysmal atrial fibrillation s/p cryo-PVI by Dr. 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 He notes early AF recurrence after tikosyn therapy was stopped post-PVI. Tikosyn was resumed. He noted since his wife passed away last September he began having AF recurrences. They tend to last 30-48 hours. He notes even though he is apparently rate controlled (80s on home check) during the episodes he does have relative hypotension with systolic blood pressure in the low 100s and he feels very poor. Otherwise he feels well.      At our first visit we discussed changing to amiodarone. He desired to not take amiodarone due to potential side effects. We initiated sotalol however he has had breakthrough.     Interim Hx:  Mr. Skelton returns for follow-up. He had recurrent AF on sotalol post-redo-PVI. We performed a DCCV 12/10/2019 and initiated amiodarone. He returns for follow-up. He feels good from an AF standpoint. He is currently having issues for the past 4 weeks  with recurrent explosive diarrhea and then constipation. He is being evaluated by [facility]ology [facility]    In summary, Mr. Skelton is a pleasant 84 year-old man with symptomatic persistent atrial fibrillation s/p cryo-PVI by Dr. 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 He is maintaining sinus rhythm now on amiodarone. Continue this and warfarin. He is now 1 month post-DCCV. Ok to start holding warfarin when needed for upcoming planned EGD/colonoscopy.

## 2020-02-21 NOTE — ASSESSMENT & PLAN NOTE
Due to orthostatic hypotension.  No focal neuro deficits.  Ct head neg in ED.  Treat with ivf.  Not on any bp meds.  Secondary to some dehydration.  PT OT repeat orthostatics

## 2020-02-22 VITALS
RESPIRATION RATE: 18 BRPM | SYSTOLIC BLOOD PRESSURE: 137 MMHG | DIASTOLIC BLOOD PRESSURE: 85 MMHG | HEART RATE: 93 BPM | OXYGEN SATURATION: 97 % | TEMPERATURE: 98 F | HEIGHT: 75 IN | BODY MASS INDEX: 28.72 KG/M2 | WEIGHT: 231 LBS

## 2020-02-22 LAB
ANION GAP SERPL CALC-SCNC: 5 MMOL/L (ref 8–16)
BASOPHILS # BLD AUTO: 0.01 K/UL (ref 0–0.2)
BASOPHILS NFR BLD: 0.2 % (ref 0–1.9)
BUN SERPL-MCNC: 16 MG/DL (ref 8–23)
CALCIUM SERPL-MCNC: 8 MG/DL (ref 8.7–10.5)
CHLORIDE SERPL-SCNC: 107 MMOL/L (ref 95–110)
CO2 SERPL-SCNC: 27 MMOL/L (ref 23–29)
CREAT SERPL-MCNC: 1.5 MG/DL (ref 0.5–1.4)
DIFFERENTIAL METHOD: ABNORMAL
EOSINOPHIL # BLD AUTO: 0.1 K/UL (ref 0–0.5)
EOSINOPHIL NFR BLD: 0.8 % (ref 0–8)
ERYTHROCYTE [DISTWIDTH] IN BLOOD BY AUTOMATED COUNT: 13.8 % (ref 11.5–14.5)
EST. GFR  (AFRICAN AMERICAN): 49 ML/MIN/1.73 M^2
EST. GFR  (NON AFRICAN AMERICAN): 42 ML/MIN/1.73 M^2
GLUCOSE SERPL-MCNC: 90 MG/DL (ref 70–110)
HCT VFR BLD AUTO: 41.6 % (ref 40–54)
HGB BLD-MCNC: 13 G/DL (ref 14–18)
IMM GRANULOCYTES # BLD AUTO: 0.04 K/UL (ref 0–0.04)
IMM GRANULOCYTES NFR BLD AUTO: 0.6 % (ref 0–0.5)
INR PPP: 1.9 (ref 0.8–1.2)
LYMPHOCYTES # BLD AUTO: 0.9 K/UL (ref 1–4.8)
LYMPHOCYTES NFR BLD: 13.2 % (ref 18–48)
MCH RBC QN AUTO: 31.5 PG (ref 27–31)
MCHC RBC AUTO-ENTMCNC: 31.3 G/DL (ref 32–36)
MCV RBC AUTO: 101 FL (ref 82–98)
MONOCYTES # BLD AUTO: 1.4 K/UL (ref 0.3–1)
MONOCYTES NFR BLD: 21 % (ref 4–15)
NEUTROPHILS # BLD AUTO: 4.3 K/UL (ref 1.8–7.7)
NEUTROPHILS NFR BLD: 64.2 % (ref 38–73)
NRBC BLD-RTO: 0 /100 WBC
PLATELET # BLD AUTO: 150 K/UL (ref 150–350)
PMV BLD AUTO: 11.9 FL (ref 9.2–12.9)
POTASSIUM SERPL-SCNC: 4.2 MMOL/L (ref 3.5–5.1)
PROTHROMBIN TIME: 21.5 SEC (ref 9–12.5)
RBC # BLD AUTO: 4.13 M/UL (ref 4.6–6.2)
SODIUM SERPL-SCNC: 139 MMOL/L (ref 136–145)
WBC # BLD AUTO: 6.66 K/UL (ref 3.9–12.7)

## 2020-02-22 PROCEDURE — 80048 BASIC METABOLIC PNL TOTAL CA: CPT | Mod: HCNC

## 2020-02-22 PROCEDURE — 85025 COMPLETE CBC W/AUTO DIFF WBC: CPT | Mod: HCNC

## 2020-02-22 PROCEDURE — 25000003 PHARM REV CODE 250: Mod: HCNC | Performed by: EMERGENCY MEDICINE

## 2020-02-22 PROCEDURE — 25000003 PHARM REV CODE 250: Mod: HCNC | Performed by: FAMILY MEDICINE

## 2020-02-22 PROCEDURE — 36415 COLL VENOUS BLD VENIPUNCTURE: CPT | Mod: HCNC

## 2020-02-22 PROCEDURE — 85610 PROTHROMBIN TIME: CPT | Mod: HCNC

## 2020-02-22 PROCEDURE — 63600175 PHARM REV CODE 636 W HCPCS: Mod: HCNC | Performed by: EMERGENCY MEDICINE

## 2020-02-22 RX ORDER — AMIODARONE HYDROCHLORIDE 200 MG/1
200 TABLET ORAL DAILY
Qty: 30 TABLET | Refills: 6 | Status: SHIPPED | OUTPATIENT
Start: 2020-02-22 | End: 2020-02-27

## 2020-02-22 RX ADMIN — POLYETHYLENE GLYCOL 3350 17 G: 17 POWDER, FOR SOLUTION ORAL at 09:02

## 2020-02-22 RX ADMIN — PANTOPRAZOLE SODIUM 40 MG: 40 TABLET, DELAYED RELEASE ORAL at 09:02

## 2020-02-22 RX ADMIN — ROSUVASTATIN CALCIUM 40 MG: 10 TABLET, COATED ORAL at 09:02

## 2020-02-22 RX ADMIN — SODIUM CHLORIDE: 0.9 INJECTION, SOLUTION INTRAVENOUS at 09:02

## 2020-02-22 RX ADMIN — PREDNISONE 5 MG: 5 TABLET ORAL at 09:02

## 2020-02-22 RX ADMIN — AMIODARONE HYDROCHLORIDE 200 MG: 200 TABLET ORAL at 09:02

## 2020-02-22 RX ADMIN — SODIUM CHLORIDE: 0.9 INJECTION, SOLUTION INTRAVENOUS at 02:02

## 2020-02-22 RX ADMIN — LEVOTHYROXINE SODIUM 112 MCG: 112 TABLET ORAL at 05:02

## 2020-02-22 NOTE — PROGRESS NOTES
0973 TN scheduled an electronic PCP appt with Dr. Jerome on 03/17/20 at 9:00 AM, placed on the waiting list.    TN sent message to Dr. Neel Topete' staff to schedule a cardiology f/u appt in 3 days and contact the pt.

## 2020-02-22 NOTE — NURSING
Report received from ANA Turner. Patient resting comfortably in bed,  no acute distress noted. Plan of care reviewed with patient. Instructed patient to call for assistance before ambulating, side rails up x2, bed alarm set, call light in reach, non skid socks in use. IV fluids infusing to Peripheral IV site, no redness or swelling noted.  Patient verbalized understanding of instructions. Will continue to monitor.    Patient refused bed alarm, he said he will call if he needs to get up.

## 2020-02-22 NOTE — ASSESSMENT & PLAN NOTE
Due to orthostatic hypotension.  No focal neuro deficits.  Ct head neg in ED.  Treat with ivf.  Not on any bp meds.  Secondary to some dehydration.  PT OT now completely asymptomatic

## 2020-02-22 NOTE — PLAN OF CARE
Problem: Fall Injury Risk  Goal: Absence of Fall and Fall-Related Injury  Outcome: Ongoing, Progressing  Intervention: Identify and Manage Contributors to Fall Injury Risk  Flowsheets (Taken 2/22/2020 0508)  Self-Care Promotion: independence encouraged; BADL personal objects within reach; BADL personal routines maintained  Medication Review/Management: medications reviewed; high risk medications identified  Intervention: Promote Injury-Free Environment  Flowsheets (Taken 2/22/2020 0508)  Safety Promotion/Fall Prevention: assistive device/personal item within reach; bed alarm refused; orthostatic vital signs; side rails raised x 2; instructed to call staff for mobility; supervised activity; high risk medications identified  Environmental Safety Modification: assistive device/personal items within reach; clutter free environment maintained     Problem: Syncope  Goal: Absence of Syncopal Symptoms  Outcome: Ongoing, Progressing  Intervention: Manage Effect of Syncopal Symptoms  Flowsheets (Taken 2/22/2020 0508)  Syncope Management: position changed slowly  Supportive Measures: relaxation techniques promoted; verbalization of feelings encouraged      No acute events overnight. No fall or any skin injury. No further complaints made.     0720am Report given to MIRIAN Kaufman.

## 2020-02-22 NOTE — ASSESSMENT & PLAN NOTE
Given 2 liters ivf in ED bp better.  Still orthostatic occasionally.  Has been rehydrated and has been giving orthostatic teaching

## 2020-02-22 NOTE — ASSESSMENT & PLAN NOTE
Cards called recommended dig load.  Return to sinus rhythm.  Continue amiodarone.  His rate now is less than 100.  Continue anticoagulation follow up with Cards outpatient they have signed off  Did have noted decrease in EF on this echocardiogram 245 follow with Cards as outpatient  Given recent orthostatics will allow Cardiology to manage any further beta-blocker or ACE inhibitor management.  They have signed off for follow-up outpatient

## 2020-02-22 NOTE — NURSING
Orthostatic blood pressure    Supine position BP- 152/91 HR 96    Standing position BP 94/59 HR- 100, no dizziness,  No weakness noted as claimd

## 2020-02-22 NOTE — PLAN OF CARE
02/22/20 1018   Final Note   Assessment Type Final Discharge Note   Anticipated Discharge Disposition Home   What phone number can be called within the next 1-3 days to see how you are doing after discharge?   (Listed in chart)   Hospital Follow Up  Appt(s) scheduled? Yes   Discharge plans and expectations educations in teach back method with documentation complete? Yes   Right Care Referral Info   Post Acute Recommendation No Care   Post-Acute Status   Post-Acute Authorization Placement;HME   Post-Acute Placement Status Set-up Complete   HME Status Set-up Complete   Discharge Delays None known at this time     Pt stated he did not want to go to PCP appt, TN cancelled.  TN informed floor nurse, Shae, care management is complete and can proceed with discharge teaching.

## 2020-02-22 NOTE — DISCHARGE SUMMARY
[facility] Hospital Medicine  Discharge Summary      Patient Name: Linda Skelton  MRN: [id number]  Admission Date: 2/20/2020  Hospital Length of Stay: 2 days  Discharge Date and Time:  02/22/2020 9:19 AM  Attending Physician: Shira Puga MD   Discharging Provider: Shira Puga MD  Primary Care Provider: Anderson Jerome MD      HPI:   83 yo male on coumadin for afib, htn, ckd comes in with syncopal episode.  Pt stood up and got dizzy and passed out.  Pt has no h/o orthostatis.  Has not been eating and drinking well, but no recent illness.  No fevers.  No n/v/d.  No chest pain.  No focal neurological deficits.  Pt found to have orthostatic hypotension in the ed and afib.  Cardiology called recommended dig load.  Pt bp improved with 2 liters of ivf in ED.  Pt referred for admission for dig load and orthostatis.    * No surgery found *      Hospital Course:   Admitted with orthostatic hypotension syncopal episode.  Also returned to AFib with Cardiology following.  Status post digoxin load and IV fluids.  Returns to normal sinus rhythm  Blood pressure is now improved.  C Mohs orthostatic BP as were within normal limits how ever this a.m. he did have 1 that was positive but was completely asymptomatic as walking around without any symptoms about back and forth to the bathroom he reports he is not lightheaded or dizzy and feels comfortable to be discharged home.  Echocardiogram showed a decreased EF of 45% believe his last was 60-65.  Cardiology has signed off will have him follow with his EP physician next week and Cardiology within a month.  He is currently asymptomatic.  Would like to be discharged home for outpatient follow vitals are stable.  Strict precautions were given for orthostatics lightheaded dizziness standing slowly return to ER or call PCP immediately if symptoms returned.       Consults:   Consults (From admission, onward)        Status Ordering Provider     Inpatient consult  to Cardiology  Once     Provider:  Jac Walker MD    Acknowledged LOTTIE GILLILAND     Nutrition Services Referral  Once     Provider:  (Not yet assigned)    ART Barron          * Syncope  Due to orthostatic hypotension.  No focal neuro deficits.  Ct head neg in ED.  Treat with ivf.  Not on any bp meds.  Secondary to some dehydration.  PT OT now completely asymptomatic      Orthostatic hypotension    Given 2 liters ivf in ED bp better.  Still orthostatic occasionally.  Has been rehydrated and has been giving orthostatic teaching    History of deep 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, 1997  Noted, inr 2, on coumadin for afib  Continue Coumadin      Chronic kidney disease, stage 3  At baseline cr of 1.6      Persistent atrial fibrillation did not want amio long term; 9/30/19 redo PVI with RFA; 11/1/19 DCCV; 12/10/19 repeat DCCV  Cards called recommended dig load.  Return to sinus rhythm.  Continue amiodarone.  His rate now is less than 100.  Continue anticoagulation follow up with Cards outpatient they have signed off  Did have noted decrease in EF on this echocardiogram follow with Cards as outpatient      Final Active Diagnoses:    Diagnosis Date Noted POA    PRINCIPAL PROBLEM:  Syncope [R55] 02/20/2020 Yes    Orthostatic hypotension [I95.1] 02/20/2020 Yes    Long term (current) use of anticoagulants [Z79.01] 07/20/2017 Not Applicable    History of deep 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, 1997 [Z86.718] 09/08/2014 Not Applicable    Essential hypertension [I10] 04/04/2014 Yes    Coronary artery disease involving native coronary artery of native heart with angina pectoris hx NSTEMI with PCI to RCA 2000 [I25.119]  Yes    Chronic kidney disease, stage 3 [N18.3] 12/26/2012 Yes    Persistent atrial fibrillation did not want amio long term; 9/30/19 redo PVI with RFA; 11/1/19 DCCV; 12/10/19 repeat DCCV [I48.19] 07/27/2012 Yes      Problems Resolved During this Admission:        Discharged Condition: good    Disposition: Home or Self Care    Follow Up:  Follow-up Information     Anderson Jerome MD.    Specialty:  Internal Medicine  Contact information:  [address] [address] [phone]             Neel Topete MD In 3 days.    Specialties:  Electrophysiology, Cardiology  Contact information:  [address]  [place] [id number] [phone]                 Patient Instructions:      Diet Cardiac     Other restrictions (specify):   Order Comments: Orthostatic teaching.  Advised to rise slowly sit for a few minutes before standing and stand holding for stability before walking.       Significant Diagnostic Studies: Labs: All labs within the past 24 hours have been reviewed    Pending Diagnostic Studies:     None         Medications:  Reconciled Home Medications:      Medication List      CHANGE how you take these medications    amiodarone 200 MG Tab  Commonly known as:  PACERONE  Take 1 tablet (200 mg total) by mouth once daily.  What changed:  Another medication with the same name was removed. Continue taking this medication, and follow the directions you see here.     mometasone 0.1 % ointment  Commonly known as:  ELOCON  aaa qd- bid for severe areas.  Avoid use on face and groin  What changed:  Another medication with the same name was removed. Continue taking this medication, and follow the directions you see here.     warfarin 5 MG tablet  Commonly known as:  COUMADIN  Take 1 tablet (5 mg total) by mouth Daily. Current Dose ( 2.5 mg every Sun; 5 mg all other days) or as directed by coumadin clinic.  What changed:    · how much to take  · additional instructions  · Another medication with the same name was removed. Continue taking this medication, and follow the directions you see here.        CONTINUE taking these medications    aspirin 81 mg Tab  Take 81 mg by mouth every evening. 1 Tablet Oral Every night     ciclopirox 8 % Soln  Commonly known as:   Penlac  Apply daily to affected nail. Must remove and restart weekly     clotrimazole 1 % cream  Commonly known as:  LOTRIMIN  Apply topically 2 (two) times daily. To feet  For 3 weeks as needed     ketoconazole 2 % cream  Commonly known as:  NIZORAL  AAA bid to feet x 3 weeks     levothyroxine 112 MCG tablet  Commonly known as:  SYNTHROID  TAKE 1 TABLET BY MOUTH ONCE DAILY BEFORE  BREAKFAST     Nitrostat 0.4 MG SL tablet  Generic drug:  nitroGLYCERIN  DISSOLVE ONE TABLET UNDER THE TONGUE EVERY 5 MINUTES AS NEEDED FOR CHEST PAIN.  DO NOT EXCEED A TOTAL OF 3 DOSES IN 15 MINUTES NOW     pantoprazole 40 MG tablet  Commonly known as:  PROTONIX     predniSONE 5 MG tablet  Commonly known as:  DELTASONE  Take 5 mg by mouth once daily.     rosuvastatin 40 MG Tab  Commonly known as:  CRESTOR  Take 1 tablet (40 mg total) by mouth once daily.     tamsulosin 0.4 mg Cap  Commonly known as:  FLOMAX  TAKE 1 CAPSULE BY MOUTH ONCE DAILY     triamcinolone acetonide 0.1% 0.1 % cream  Commonly known as:  KENALOG        ASK your doctor about these medications    folic acid 1 MG tablet  Commonly known as:  FOLVITE  TAKE 1 TABLET BY MOUTH ONCE DAILY            Indwelling Lines/Drains at time of discharge:   Lines/Drains/Airways     None                 Time spent on the discharge of patient: 30 minutes  Patient was seen and examined on the date of discharge and determined to be suitable for discharge.         Shira Puga MD  Department of Hospital Medicine  [facility]

## 2020-02-22 NOTE — NURSING
In preparation for discharge, removal of patient's saline lock and heart monitor per policy. Discharge instructions giving to patient. Patient verbalized understanding. Patient is waiting for a family member to take him home. No distress noted.

## 2020-02-22 NOTE — PROGRESS NOTES
Follow-up Information     Anderson Jerome MD On 3/17/2020.    Specialty:  Internal Medicine  Why:  Outpatient Services PCP Follow-Up Tuesday at 9:00 AM; placed on the waiting list  Contact information:  [address] [address]  [phone]             Neel Topete MD In 3 days.    Specialties:  Electrophysiology, Cardiology  Why:  Outpatient Services Cardiology Follow-Up Clinic Nurse will contact patient with an appointment  Contact information:  [address]  [place] [address]  [phone]                 PLEASE BRING TO ALL FOLLOW UP APPOINTMENTS:   1) A COPY YOUR DISCHARGE INSTRUCTIONS   2) ALL MEDICINES YOU ARE CURRENTLY TAKING IN THEIR ORIGINAL BOTTLES   3) IDENTIFICATION CARD   4) INSURANCE CARD    **PLEASE ARRIVE 15 MINUTES AHEAD OF SCHEDULED APPOINTMENT TIME   ++PLEASE CALL 24 HOURS IN ADVANCE IF YOU MUST RESCHEDULE YOUR APPOINTMENT DAY AND/OR TIME     [facility] CARE MANAGEMENT WRITTEN DISCHARGE INFORMATION    APPOINTMENTS AND RESOURCES TO HELP YOU MANAGE YOUR CARE AT HOME BASED ON YOUR PREFERENCES:  (If an appointment is not scheduled for you when you leave the hospital, call your doctor to schedule a follow up visit within a week)        Healthy Living Instructions to HELP MANAGE YOUR CARE AT HOME:  Things You are responsible for:  1.    Getting your prescriptions filled   2.    Taking your medications as directed, DO NOT MISS ANY DOSES!  3.    Following the diet and exercise recommended by your doctor  4.    Going to your follow-up doctor appointment. This is important because it allows the doctor to monitor your progress and determine if any changes need to made to your treatment plan.  5. If you have any questions about MANAGING YOUR CARE AT HOME Call the Nurse Care Line for 24/7 Assistance [phone]       Please answer any calls you may receive from [facility]. We want to continue to support you as you manage your healthcare needs. [facility] is happy to have the  opportunity to serve you.      Thank you for choosing [facility] for your healthcare needs!  Your [facility] Case Management Team,    Carmen Encarnacion RN [place]  Registered Nurse Transition Navigator  [phone]

## 2020-02-24 ENCOUNTER — TELEPHONE (OUTPATIENT)
Dept: ELECTROPHYSIOLOGY | Facility: CLINIC | Age: 85
End: 2020-02-24

## 2020-02-24 ENCOUNTER — TELEPHONE (OUTPATIENT)
Dept: CARDIOLOGY | Facility: CLINIC | Age: 85
End: 2020-02-24

## 2020-02-24 PROBLEM — I50.22 CHRONIC SYSTOLIC HEART FAILURE: Status: ACTIVE | Noted: 2020-02-24

## 2020-02-24 NOTE — PROGRESS NOTES
Agree with plan for egd and colon,   Accepting that this is higher than usual risk with anticoagulation and age

## 2020-02-24 NOTE — TELEPHONE ENCOUNTER
Pt would like me to give him a call back to scheduled hosp f/u ----- Message from Carmen Encarnacion RN sent at 2/22/2020  9:41 AM CST -----  Contact: Carmen Encarnacion RN Case Manager [phone]  Hi, Happy Saturday!    Pt is discharged on 02/22/20 and will need a cardiology f/u appt in 3 days if feasible. Please contact the pt with an appointment.    Thank you,    Carmen

## 2020-02-24 NOTE — TELEPHONE ENCOUNTER
We have cleared this patient to hold warfarin x5 days for upcoming EGD/c-scope. We are not planning to bridge. Please let us know if you have any questions or concerns otherwise we will proceed as planned.     Thanks  [facility]  Ph [phone]

## 2020-02-25 ENCOUNTER — PATIENT OUTREACH (OUTPATIENT)
Dept: ADMINISTRATIVE | Facility: CLINIC | Age: 85
End: 2020-02-25

## 2020-02-25 NOTE — PATIENT INSTRUCTIONS
Dehydration (Adult)  Dehydration occurs when your body loses too much fluid. This may be the result of prolonged vomiting or diarrhea, excessive sweating, or a high fever. It may also happen if you dont drink enough fluid when youre sick or out in the heat. Misuse of diuretics (water pills) can also be a cause.  Symptoms include thirst and decreased urine output. You may also feel dizzy, weak, fatigued, or very drowsy. The diet described below is usually enough to treat dehydration. In some cases, you may need medicine.  Home care  · Drink at least 12 8-ounce glasses of fluid every day to resolve the dehydration. Fluid may include water; orange juice; lemonade; apple, grape, or cranberry juice; clear fruit drinks; electrolyte replacement and sports drinks; and teas and coffee without caffeine. If you have been diagnosed with a kidney disease, ask your doctor how much and what types of fluids you should drink to prevent dehydration. If you have kidney disease, fluid can build up in the body. This can be dangerous to your health.  · If you have a fever, muscle aches, or a headache as a result of a cold or flu, you may take acetaminophen or ibuprofen, unless another medicine was prescribed. If you have chronic liver or kidney disease, or have ever had a stomach ulcer or gastrointestinal bleeding, talk with your health care provider before using these medicines. Don't take aspirin if you are younger than 18 and have a fever. Aspirin raises the chance for severe liver injury.  Follow-up care  Follow up with your health care provider, or as advised.  When to seek medical advice  Call your health care provider right away if any of these occur:  · Continued vomiting  · Frequent diarrhea (more than 5 times a day); blood (red or black color) or mucus in diarrhea  · Blood in vomit or stool  · Swollen abdomen or increasing abdominal pain  · Weakness, dizziness, or fainting  · Unusual drowsiness or confusion  · Reduced urine  output or extreme thirst  · Fever of 100.4°F (34°C) or higher  Date Last Reviewed: 5/31/2015  © 6534-5946 [employer]. [address] [place] [address], [place] [address]. All rights reserved. This information is not intended as a substitute for professional medical care. Always follow your healthcare professional's instructions.

## 2020-02-26 ENCOUNTER — LAB VISIT (OUTPATIENT)
Dept: LAB | Facility: HOSPITAL | Age: 85
End: 2020-02-26
Attending: INTERNAL MEDICINE
Payer: MEDICARE

## 2020-02-26 ENCOUNTER — CLINICAL SUPPORT (OUTPATIENT)
Dept: FAMILY MEDICINE | Facility: CLINIC | Age: 85
End: 2020-02-26
Payer: MEDICARE

## 2020-02-26 ENCOUNTER — TELEPHONE (OUTPATIENT)
Dept: ENDOSCOPY | Facility: HOSPITAL | Age: 85
End: 2020-02-26

## 2020-02-26 ENCOUNTER — ANTI-COAG VISIT (OUTPATIENT)
Dept: CARDIOLOGY | Facility: CLINIC | Age: 85
End: 2020-02-26
Payer: MEDICARE

## 2020-02-26 DIAGNOSIS — E29.1 HYPOGONADISM IN MALE: Primary | ICD-10-CM

## 2020-02-26 DIAGNOSIS — E29.1 HYPOGONADISM IN MALE: ICD-10-CM

## 2020-02-26 DIAGNOSIS — I48.19 PERSISTENT ATRIAL FIBRILLATION: ICD-10-CM

## 2020-02-26 LAB
INR PPP: 2 (ref 0.8–1.2)
PROTHROMBIN TIME: 21.8 SEC (ref 9–12.5)
TESTOST SERPL-MCNC: 648 NG/DL (ref 304–1227)

## 2020-02-26 PROCEDURE — 96372 PR INJECTION,THERAP/PROPH/DIAG2ST, IM OR SUBCUT: ICD-10-PCS | Mod: HCNC,S$GLB,, | Performed by: INTERNAL MEDICINE

## 2020-02-26 PROCEDURE — 36415 COLL VENOUS BLD VENIPUNCTURE: CPT | Mod: HCNC,PO

## 2020-02-26 PROCEDURE — 84403 ASSAY OF TOTAL TESTOSTERONE: CPT | Mod: HCNC

## 2020-02-26 PROCEDURE — 85610 PROTHROMBIN TIME: CPT | Mod: HCNC

## 2020-02-26 PROCEDURE — 93793 ANTICOAG MGMT PT WARFARIN: CPT | Mod: S$GLB,,,

## 2020-02-26 PROCEDURE — 96372 THER/PROPH/DIAG INJ SC/IM: CPT | Mod: HCNC,S$GLB,, | Performed by: INTERNAL MEDICINE

## 2020-02-26 PROCEDURE — 93793 PR ANTICOAGULANT MGMT FOR PT TAKING WARFARIN: ICD-10-PCS | Mod: S$GLB,,,

## 2020-02-26 RX ADMIN — TESTOSTERONE CYPIONATE 100 MG: 200 INJECTION, SOLUTION INTRAMUSCULAR at 09:02

## 2020-02-26 NOTE — PROGRESS NOTES
INR good. Noted patient admitted 2/20-2/22 for syncope. He was noted to be dehydrated and in a.fib. He was given fluids and dig load and converted. Patient received maintenance dose while admitted. No pertinent changes on chart review. INR good today but trending low. Recheck INR in 2 weeks to assess trend.

## 2020-02-26 NOTE — PROGRESS NOTES
Mr. Skelton is a patient of Dr. Topete and was last seen in clinic 1/9/2020.    Subjective:   Patient ID:  Linda Skelton is a 84 y.o. male who presents for follow-up of syncope and AF.     HPI:  Mr. Skelton is a 84 y.o. male with paroxysmal atrial fibrillation s/p cryo-PVI by Dr. 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    Background:  Primary Cardiologist: Ivet Manning MD  Mr. Skelton is an 84 year-old man with paroxysmal atrial fibrillation s/p cryo-PVI by Dr. 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 He notes early AF recurrence after tikosyn therapy was stopped post-PVI. Tikosyn was resumed. He noted since his wife passed away last September he began having AF recurrences. They tend to last 30-48 hours. He notes even though he is apparently rate controlled (80s on home check) during the episodes he does have relative hypotension with systolic blood pressure in the low 100s and he feels very poor. Otherwise he feels well.   At first office visit with Dr. Topete, 5/21/2019, discussed changing to amiodarone. He desired to not take amiodarone due to potential side effects. We initiated sotalol however he has had breakthrough (shown on EKG 7/2019) Discussed re-do PVI.    9/30/2019 Successful redo-pulmonary vein RF ablation.Successful left atrial posterior wall isolation for complex CAFEs.    At his last office visit 1/9/2020, Mr. Skelton presented for follow up. He had recurrent AF on sotalol post-redo-PVI. We performed a DCCV 12/10/2019 and initiated amiodarone. He feels good from an AF standpoint. EKG showed sinus rhythm with first degree AV block (WI 214msec).  RTC in 6 months with preclinic CMP/TSH. Needs  baseline PFTs.     Update (02/27/2020):  Mr. Skelton presents for follow up of syncopal episode and recurrent AF (s/p re-do PVI 9/2019). Today, he denies chest pain with exertion or at rest, palpitations, SOB, HSIEH, dizziness, or syncope.   On 2/20/2020 Mr. Rich presented to the ED after syncopal episode. Stated he stood from seated position, became dizzy, then passed out. He was found to have orthostatic hypotension and AF while in ED. Patient loaded on digoxin and converted back to NSR. BP stabilized. Echo showed EF 45%. He was discharged home, no medication changes.  Mr. Skelton is currently taking coumadin for stroke prophylaxis and denies significant bleeding episodes. He is currently being treated with amiodarone 200 mg daily for rhythm control. Kidney function is stable, with a creatinine of 1.6 on 2/20/2020. LFT WNL 2/20/2020. TSH 0.300 4/13/2019. PFTs none available for review. He is not currently on a BB, this was stopped around the time of his ablation.    I have personally reviewed the patient's EKG today, which shows atrial fibrillation at 99 bpm.     Case discussed with Dr. Topete. Mr. Skelton is now 5 months s/p re-do PVI with recurrent AF on amiodarone. He has failed multiple AAD in the past. The plan will be for rate controlled AF. We will discontinue amiodarone and start metoprolol succinate 25 mg daily for rate control as well as for his cardiomyopathy. Patient has tolerated metoprolol in the past. I instructed him to monitor his BP. He is on board with this plan and is happy to discontinue amiodarone.     Recent Cardiac Tests:  2D Echo (2/21/2020):   · Mildly decreased left ventricular systolic function. The estimated ejection fraction is 45%.  · Concentric left ventricular hypertrophy.  · Left ventricular diastolic dysfunction.  · Normal right ventricular systolic function.  · Moderate left atrial enlargement.  · Moderate right atrial enlargement.  · Mild mitral regurgitation.  · Mild tricuspid  regurgitation.  · Normal central venous pressure (3 mmHg).  · The estimated PA systolic pressure is 31 mmHg.  Past Medical History:   Diagnosis Date    ACS (acute coronary syndrome)     Acute coronary syndrome 2000    NSTEMI    Anticoagulant long-term use     Coumadin    Atrial fibrillation     Basal cell carcinoma excised     left scalp vertex    Bilateral leg edema 1/6/2014    BPH (benign prostatic hyperplasia)     CKD (chronic kidney disease)     Coronary artery disease     DVT of leg (deep venous thrombosis) 09/08/2014    On coumadin for years    Encounter for blood transfusion     Hyperlipidemia     Hypertension     Hypogonadism, male     Leg fracture, left     Melanoma 01/04/2017     in situ crown of scalp    MI (myocardial infarction)     Panhypopituitarism     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    Squamous Cell Carcinoma excised     left posterior scalp    Thyroid disease      Past Surgical History:   Procedure Laterality Date    ABLATION OF ARRHYTHMOGENIC FOCUS FOR ATRIAL FIBRILLATION N/A 9/30/2019    Procedure: Ablation atrial fibrillation;  Surgeon: Neel Topete MD;  Location: [facility] EP LAB;  Service: Cardiology;  Laterality: N/A;  AF, EUGENE, PVI (re-do), Carto, Gen, NH, 3 Prep *right groin only access and exchange CS/ICE catheters throughout as needed*    APPENDECTOMY      BASAL CELL CARCINOMA EXCISION  01/2008    BRAIN SURGERY      pituitary adenoma removed    CARDIAC CATHETERIZATION  05/09/2000    S/P stent to RCA,    CARDIAC CATHETERIZATION      stents placed    CARDIAC ELECTROPHYSIOLOGY STUDY AND ABLATION      CORONARY ANGIOPLASTY  5/9/2000    RCA    CORONARY ANGIOPLASTY WITH STENT PLACEMENT      ESOPHAGEAL DILATION      EYE SURGERY Bilateral     cataracts removed and new lenses placed     FRACTURE SURGERY Left     leg    HERNIA REPAIR      LAPAROSCOPIC CHOLECYSTECTOMY N/A 6/2/2018    Procedure: CHOLECYSTECTOMY,  LAPAROSCOPIC;  Surgeon: Dickson Smith Jr., MD;  Location: [facility] OR;  Service: General;  Laterality: N/A;    left femur surgery      pituitatry      hypophysectomy    SKIN BIOPSY      TONSILLECTOMY      TREATMENT OF CARDIAC ARRHYTHMIA Bilateral 6/13/2019    Procedure: Cardioversion or Defibrillation;  Surgeon: Eric Nguyen MD;  Location: [facility] EP LAB;  Service: Cardiology;  Laterality: Bilateral;  af, bryson, dccv, anes, pr, 324    TREATMENT OF CARDIAC ARRHYTHMIA  9/30/2019    Procedure: Cardioversion or Defibrillation;  Surgeon: Neel Topete MD;  Location: [facility] EP LAB;  Service: Cardiology;;    TREATMENT OF CARDIAC ARRHYTHMIA N/A 11/1/2019    Procedure: CARDIOVERSION;  Surgeon: Eric Nguyen MD;  Location: [facility] EP LAB;  Service: Cardiology;  Laterality: N/A;  AF, BRYSON (Cx if Serial INRs x 4), DCCV, MAC, DM, 3 Prep    TREATMENT OF CARDIAC ARRHYTHMIA N/A 12/10/2019    Procedure: CARDIOVERSION;  Surgeon: Neel Topete MD;  Location: [facility] EP LAB;  Service: Cardiology;  Laterality: N/A;  AF, BRYSON (Cx if weekly INRs > 2), DCCV, MAC, MA, 3 Prep    VASCULAR SURGERY         Current Outpatient Medications   Medication Sig    aspirin 81 mg Tab Take 81 mg by mouth every evening. 1 Tablet Oral Every night    ciclopirox (PENLAC) 8 % Soln Apply daily to affected nail. Must remove and restart weekly    clotrimazole (LOTRIMIN) 1 % cream Apply topically 2 (two) times daily. To feet  For 3 weeks as needed    folic acid (FOLVITE) 1 MG tablet TAKE 1 TABLET BY MOUTH ONCE DAILY    ketoconazole (NIZORAL) 2 % cream AAA bid to feet x 3 weeks    levothyroxine (SYNTHROID) 112 MCG tablet TAKE 1 TABLET BY MOUTH ONCE DAILY BEFORE  BREAKFAST    mometasone (ELOCON) 0.1 % ointment aaa qd- bid for severe areas.  Avoid use on face and groin    NITROSTAT 0.4 mg SL tablet DISSOLVE ONE TABLET UNDER THE TONGUE EVERY 5 MINUTES AS NEEDED FOR CHEST PAIN.  DO NOT EXCEED A TOTAL OF 3 DOSES IN 15 MINUTES NOW    pantoprazole (PROTONIX) 40 MG  tablet     predniSONE (DELTASONE) 5 MG tablet Take 5 mg by mouth once daily.    rosuvastatin (CRESTOR) 40 MG Tab Take 1 tablet (40 mg total) by mouth once daily.    tamsulosin (FLOMAX) 0.4 mg Cap TAKE 1 CAPSULE BY MOUTH ONCE DAILY    triamcinolone acetonide 0.1% (KENALOG) 0.1 % cream     warfarin (COUMADIN) 5 MG tablet Take 1 tablet (5 mg total) by mouth Daily. Current Dose ( 2.5 mg every Sun; 5 mg all other days) or as directed by coumadin clinic. (Patient taking differently: Take 2.5 mg by mouth Daily. 2.5 Sunday & Thursday; 5mg aod)    dicyclomine (BENTYL) 20 mg tablet     metoprolol succinate (TOPROL-XL) 25 MG 24 hr tablet Take 1 tablet (25 mg total) by mouth once daily.    polyethylene glycol (GOLYTELY,NULYTELY) 236-22.74-6.74 -5.86 gram suspension Take 4,000 mLs (4 L total) by mouth once. for 1 dose     Current Facility-Administered Medications   Medication    testosterone cypionate injection 100 mg     Facility-Administered Medications Ordered in Other Visits   Medication    0.9%  NaCl infusion    sodium chloride 0.9% flush 5 mL     Review of Systems   Constitution: Positive for malaise/fatigue. Negative for chills and fever.   HENT: Negative for congestion and nosebleeds.    Eyes: Negative for blurred vision.   Cardiovascular: Negative for chest pain, dyspnea on exertion, irregular heartbeat, leg swelling, near-syncope, orthopnea, palpitations, paroxysmal nocturnal dyspnea and syncope.   Respiratory: Negative for cough, hemoptysis, shortness of breath, sleep disturbances due to breathing and wheezing.    Endocrine: Negative for polyphagia.   Hematologic/Lymphatic: Negative for bleeding problem. Does not bruise/bleed easily.   Skin: Negative for itching and rash.   Musculoskeletal: Negative for back pain, joint pain, muscle cramps and muscle weakness.   Gastrointestinal: Negative for bloating, abdominal pain and hematemesis.   Genitourinary: Negative for hematuria.   Neurological: Negative for  "dizziness, focal weakness, headaches, light-headedness, loss of balance, numbness, paresthesias and weakness.   Psychiatric/Behavioral: Negative for altered mental status. The patient is not nervous/anxious.      Objective:     /68   Pulse 99   Ht 6' 3" (1.905 m)   Wt 108.3 kg (238 lb 12.1 oz)   BMI 29.84 kg/m²     Physical Exam   Constitutional: He is oriented to person, place, and time. He appears well-developed and well-nourished. He does not appear ill. No distress.   HENT:   Head: Normocephalic and atraumatic.   Eyes: Pupils are equal, round, and reactive to light. Conjunctivae, EOM and lids are normal.   Neck: Normal range of motion. Neck supple.   Cardiovascular: Normal rate, normal heart sounds, intact distal pulses and normal pulses. An irregularly irregular rhythm present.   Pulses:       Radial pulses are 2+ on the right side, and 2+ on the left side.   Pulmonary/Chest: Effort normal and breath sounds normal. No accessory muscle usage. No respiratory distress. He has no decreased breath sounds. He has no wheezes. He has no rhonchi. He has no rales. He exhibits no tenderness.   Abdominal: Normal appearance. There is no tenderness. There is no guarding.   Musculoskeletal: Normal range of motion. He exhibits no edema.   Neurological: He is alert and oriented to person, place, and time. He has normal strength. He is not disoriented. No sensory deficit. Coordination and gait normal.   Skin: Skin is warm and dry. No bruising noted. He is not diaphoretic. No erythema.   Psychiatric: He has a normal mood and affect. His speech is normal and behavior is normal. Judgment and thought content normal.   Nursing note and vitals reviewed.    Lab Results   Component Value Date     02/22/2020    K 4.2 02/22/2020    MG 2.0 01/06/2020    BUN 16 02/22/2020    CREATININE 1.5 (H) 02/22/2020    ALT 14 02/20/2020    AST 17 02/20/2020    HGB 13.0 (L) 02/22/2020    HCT 41.6 02/22/2020    TSH 0.300 (L) 04/03/2019    " LDLCALC 70.8 10/10/2018       Recent Labs   Lab 02/20/20  1912 02/21/20  0834 02/22/20  0444 02/26/20  0902   INR 2.0 H 2.0 H 1.9 H 2.0 H         Assessment:     1. Syncope, unspecified syncope type    2. Orthostatic hypotension    3. Persistent atrial fibrillation did not want amio long term; 9/30/19 redo PVI with RFA; 11/1/19 DCCV; 12/10/19 repeat DCCV    4. Chronic systolic heart failure 2/2020 TTE with reduced LVEF compared to previous LVEF 45%    5. Essential hypertension    6. Coronary artery disease involving native coronary artery of native heart with angina pectoris hx NSTEMI with PCI to RCA 2000    7. Chronic kidney disease, stage 3    8. Long term (current) use of anticoagulants      Plan:   In summary, Mr. Skelton is a 84 y.o. male with a history of  symptomatic persistent atrial fibrillation s/p cryo-PVI by Dr. 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 On 2/20/2020 Mr. Rich presented to the ED after syncopal episode. Stated he stood from seated position, became dizzy, then passed out. He was found to have orthostatic hypotension and AF while in ED. Patient loaded on digoxin and converted back to NSR. BP stabilized. Echo showed EF 45%. He was discharged home, no medication changes.  Mr. Skelton feels well today and is currently taking coumadin for stroke prophylaxis and amiodarone 200 mg daily for rhythm control. He is not currently on a BB, this was stopped around the time of his ablation.    Today's EKG shows atrial fibrillation at 99 bpm.     Case discussed with Dr. Topete. Mr. Skelton is now 5 months s/p re-do PVI with recurrent AF on amiodarone. He has failed  multiple AAD in the past. The plan will be for rate controlled AF. We will discontinue amiodarone and start metoprolol succinate 25 mg daily for rate control as well as for his cardiomyopathy. Patient has tolerated metoprolol in the past. I instructed him to monitor his BP. He is on board with this plan and is happy to discontinue amiodarone.      Discontinue amiodarone.  Start metoprolol succinate 25 mg daily.  Follow up in about 6 months, sooner as needed.    *A copy of this note has been sent to Dr. Topete*  ------------------------------------------------------------------    CONTRERAS Hanley, TAL-C  [facility]

## 2020-02-27 ENCOUNTER — OFFICE VISIT (OUTPATIENT)
Dept: ELECTROPHYSIOLOGY | Facility: CLINIC | Age: 85
End: 2020-02-27
Payer: MEDICARE

## 2020-02-27 ENCOUNTER — PATIENT MESSAGE (OUTPATIENT)
Dept: ENDOSCOPY | Facility: HOSPITAL | Age: 85
End: 2020-02-27

## 2020-02-27 ENCOUNTER — HOSPITAL ENCOUNTER (OUTPATIENT)
Dept: CARDIOLOGY | Facility: CLINIC | Age: 85
Discharge: HOME | End: 2020-02-27
Payer: MEDICARE

## 2020-02-27 VITALS
SYSTOLIC BLOOD PRESSURE: 122 MMHG | WEIGHT: 238.75 LBS | HEART RATE: 99 BPM | HEIGHT: 75 IN | BODY MASS INDEX: 29.69 KG/M2 | DIASTOLIC BLOOD PRESSURE: 68 MMHG

## 2020-02-27 DIAGNOSIS — Z12.11 SCREENING FOR COLON CANCER: Primary | ICD-10-CM

## 2020-02-27 DIAGNOSIS — Z79.01 LONG TERM (CURRENT) USE OF ANTICOAGULANTS: ICD-10-CM

## 2020-02-27 DIAGNOSIS — I95.1 ORTHOSTATIC HYPOTENSION: ICD-10-CM

## 2020-02-27 DIAGNOSIS — I48.19 PERSISTENT ATRIAL FIBRILLATION: ICD-10-CM

## 2020-02-27 DIAGNOSIS — I50.22 CHRONIC SYSTOLIC HEART FAILURE: ICD-10-CM

## 2020-02-27 DIAGNOSIS — R55 SYNCOPE, UNSPECIFIED SYNCOPE TYPE: Primary | ICD-10-CM

## 2020-02-27 DIAGNOSIS — I10 ESSENTIAL HYPERTENSION: ICD-10-CM

## 2020-02-27 DIAGNOSIS — N18.30 CHRONIC KIDNEY DISEASE, STAGE 3: ICD-10-CM

## 2020-02-27 DIAGNOSIS — I25.119 CORONARY ARTERY DISEASE INVOLVING NATIVE CORONARY ARTERY OF NATIVE HEART WITH ANGINA PECTORIS: ICD-10-CM

## 2020-02-27 PROCEDURE — 1101F PT FALLS ASSESS-DOCD LE1/YR: CPT | Mod: HCNC,CPTII,S$GLB, | Performed by: NURSE PRACTITIONER

## 2020-02-27 PROCEDURE — 3078F DIAST BP <80 MM HG: CPT | Mod: HCNC,CPTII,S$GLB, | Performed by: NURSE PRACTITIONER

## 2020-02-27 PROCEDURE — 93010 RHYTHM STRIP: ICD-10-PCS | Mod: HCNC,S$GLB,, | Performed by: INTERNAL MEDICINE

## 2020-02-27 PROCEDURE — 1101F PR PT FALLS ASSESS DOC 0-1 FALLS W/OUT INJ PAST YR: ICD-10-PCS | Mod: HCNC,CPTII,S$GLB, | Performed by: NURSE PRACTITIONER

## 2020-02-27 PROCEDURE — 1159F MED LIST DOCD IN RCRD: CPT | Mod: HCNC,S$GLB,, | Performed by: NURSE PRACTITIONER

## 2020-02-27 PROCEDURE — 3078F PR MOST RECENT DIASTOLIC BLOOD PRESSURE < 80 MM HG: ICD-10-PCS | Mod: HCNC,CPTII,S$GLB, | Performed by: NURSE PRACTITIONER

## 2020-02-27 PROCEDURE — 1159F PR MEDICATION LIST DOCUMENTED IN MEDICAL RECORD: ICD-10-PCS | Mod: HCNC,S$GLB,, | Performed by: NURSE PRACTITIONER

## 2020-02-27 PROCEDURE — 93005 RHYTHM STRIP: ICD-10-PCS | Mod: HCNC,S$GLB,, | Performed by: INTERNAL MEDICINE

## 2020-02-27 PROCEDURE — 93010 ELECTROCARDIOGRAM REPORT: CPT | Mod: HCNC,S$GLB,, | Performed by: INTERNAL MEDICINE

## 2020-02-27 PROCEDURE — 99999 PR PBB SHADOW E&M-EST. PATIENT-LVL IV: ICD-10-PCS | Mod: PBBFAC,HCNC,, | Performed by: NURSE PRACTITIONER

## 2020-02-27 PROCEDURE — 99499 UNLISTED E&M SERVICE: CPT | Mod: HCNC,S$GLB,, | Performed by: NURSE PRACTITIONER

## 2020-02-27 PROCEDURE — 1126F PR PAIN SEVERITY QUANTIFIED, NO PAIN PRESENT: ICD-10-PCS | Mod: HCNC,S$GLB,, | Performed by: NURSE PRACTITIONER

## 2020-02-27 PROCEDURE — 93005 ELECTROCARDIOGRAM TRACING: CPT | Mod: HCNC,S$GLB,, | Performed by: INTERNAL MEDICINE

## 2020-02-27 PROCEDURE — 3074F SYST BP LT 130 MM HG: CPT | Mod: HCNC,CPTII,S$GLB, | Performed by: NURSE PRACTITIONER

## 2020-02-27 PROCEDURE — 99999 PR PBB SHADOW E&M-EST. PATIENT-LVL IV: CPT | Mod: PBBFAC,HCNC,, | Performed by: NURSE PRACTITIONER

## 2020-02-27 PROCEDURE — 99499 RISK ADDL DX/OHS AUDIT: ICD-10-PCS | Mod: HCNC,S$GLB,, | Performed by: NURSE PRACTITIONER

## 2020-02-27 PROCEDURE — 99214 OFFICE O/P EST MOD 30 MIN: CPT | Mod: HCNC,S$GLB,, | Performed by: NURSE PRACTITIONER

## 2020-02-27 PROCEDURE — 1126F AMNT PAIN NOTED NONE PRSNT: CPT | Mod: HCNC,S$GLB,, | Performed by: NURSE PRACTITIONER

## 2020-02-27 PROCEDURE — 3074F PR MOST RECENT SYSTOLIC BLOOD PRESSURE < 130 MM HG: ICD-10-PCS | Mod: HCNC,CPTII,S$GLB, | Performed by: NURSE PRACTITIONER

## 2020-02-27 PROCEDURE — 99214 PR OFFICE/OUTPT VISIT, EST, LEVL IV, 30-39 MIN: ICD-10-PCS | Mod: HCNC,S$GLB,, | Performed by: NURSE PRACTITIONER

## 2020-02-27 RX ORDER — METOPROLOL SUCCINATE 25 MG/1
25 TABLET, EXTENDED RELEASE ORAL DAILY
Qty: 90 TABLET | Refills: 3 | Status: SHIPPED | OUTPATIENT
Start: 2020-02-27 | End: 2020-07-10

## 2020-02-27 RX ORDER — POLYETHYLENE GLYCOL 3350, SODIUM SULFATE ANHYDROUS, SODIUM BICARBONATE, SODIUM CHLORIDE, POTASSIUM CHLORIDE 236; 22.74; 6.74; 5.86; 2.97 G/4L; G/4L; G/4L; G/4L; G/4L
4 POWDER, FOR SOLUTION ORAL ONCE
Qty: 4000 ML | Refills: 0 | Status: SHIPPED | OUTPATIENT
Start: 2020-02-27 | End: 2020-02-27

## 2020-02-27 RX ORDER — DICYCLOMINE HYDROCHLORIDE 20 MG/1
TABLET ORAL
COMMUNITY
Start: 2020-01-09 | End: 2020-09-30 | Stop reason: ALTCHOICE

## 2020-02-27 NOTE — PATIENT INSTRUCTIONS
-Discontinue amiodarone.   -Start metoprolol succinate 25 mg once daily.  -Continue coumadin.  -Follow up in 6 months.

## 2020-02-27 NOTE — TELEPHONE ENCOUNTER
Attempted to contact patient to offer him next available appt with Dr. Fritz on 3/11/20. He didn't answer and left him a voicemail to return call.

## 2020-02-27 NOTE — PROGRESS NOTES
Testosterone normal on current regimen. 100 mg every 2 weeks.     Results to pt via my [facility]. Repeat in 3 months.  He has a lab visit mid march will check all other labs.

## 2020-03-02 PROBLEM — R55 SYNCOPE: Status: RESOLVED | Noted: 2020-02-20 | Resolved: 2020-03-02

## 2020-03-08 ENCOUNTER — PATIENT MESSAGE (OUTPATIENT)
Dept: ELECTROPHYSIOLOGY | Facility: CLINIC | Age: 85
End: 2020-03-08

## 2020-03-08 DIAGNOSIS — I48.19 PERSISTENT ATRIAL FIBRILLATION: Primary | ICD-10-CM

## 2020-03-09 NOTE — TELEPHONE ENCOUNTER
Spoke to Mr. Skelton who reports he drinks plenty of water throughout the day.  Recommendation for Holter given and scheduled tomorrow morning at [facility] in [address].  Mr. Skelton verbalizes understanding and will call our office with any further questions or concerns.

## 2020-03-09 NOTE — TELEPHONE ENCOUNTER
Spoke to Mr. Skelton who reports he is having episodes of dizziness/lightheadedness and near syncope.  He reports compliance of coumadin and Metoprolol 25 mg daily.  He reports the last time he took his blood pressure was on 3/3/2020 114/75 with HR 93.  Asked for him to retake blood pressure while on the phone with me which is BP 82/54 with HR 83.  Mr. Skelton denies dizziness/lightheadedness at this time.      Let Mr. Skelton know I am going to discuss above with Dr. Topete and will let him know his recommendations.  Advised if he begins to feel dizzy/lightheaded to lay down, even if standing, advised him to gently lay down flat until symptoms resolve.  Also advised to slowly go from lying to sitting to standing position.  He verbalizes understanding of this and appreciates call.

## 2020-03-11 ENCOUNTER — CLINICAL SUPPORT (OUTPATIENT)
Dept: FAMILY MEDICINE | Facility: CLINIC | Age: 85
End: 2020-03-11
Payer: MEDICARE

## 2020-03-11 ENCOUNTER — LAB VISIT (OUTPATIENT)
Dept: LAB | Facility: HOSPITAL | Age: 85
End: 2020-03-11
Attending: INTERNAL MEDICINE
Payer: MEDICARE

## 2020-03-11 ENCOUNTER — ANTI-COAG VISIT (OUTPATIENT)
Dept: CARDIOLOGY | Facility: CLINIC | Age: 85
End: 2020-03-11
Payer: MEDICARE

## 2020-03-11 DIAGNOSIS — I48.0 PAF (PAROXYSMAL ATRIAL FIBRILLATION): ICD-10-CM

## 2020-03-11 DIAGNOSIS — I48.19 PERSISTENT ATRIAL FIBRILLATION: ICD-10-CM

## 2020-03-11 DIAGNOSIS — E29.1 HYPOGONADISM IN MALE: Primary | ICD-10-CM

## 2020-03-11 LAB
INR PPP: 1.7 (ref 0.8–1.2)
PROTHROMBIN TIME: 18.3 SEC (ref 9–12.5)

## 2020-03-11 PROCEDURE — 36415 COLL VENOUS BLD VENIPUNCTURE: CPT | Mod: HCNC,PO

## 2020-03-11 PROCEDURE — 93793 ANTICOAG MGMT PT WARFARIN: CPT | Mod: S$GLB,,,

## 2020-03-11 PROCEDURE — 99499 UNLISTED E&M SERVICE: CPT | Mod: HCNC,S$GLB,, | Performed by: INTERNAL MEDICINE

## 2020-03-11 PROCEDURE — 99499 NO LOS: ICD-10-PCS | Mod: HCNC,S$GLB,, | Performed by: INTERNAL MEDICINE

## 2020-03-11 PROCEDURE — 96372 PR INJECTION,THERAP/PROPH/DIAG2ST, IM OR SUBCUT: ICD-10-PCS | Mod: HCNC,S$GLB,, | Performed by: INTERNAL MEDICINE

## 2020-03-11 PROCEDURE — 96372 THER/PROPH/DIAG INJ SC/IM: CPT | Mod: HCNC,S$GLB,, | Performed by: INTERNAL MEDICINE

## 2020-03-11 PROCEDURE — 85610 PROTHROMBIN TIME: CPT | Mod: HCNC

## 2020-03-11 PROCEDURE — 93793 PR ANTICOAGULANT MGMT FOR PT TAKING WARFARIN: ICD-10-PCS | Mod: S$GLB,,,

## 2020-03-11 RX ADMIN — TESTOSTERONE CYPIONATE 100 MG: 200 INJECTION, SOLUTION INTRAMUSCULAR at 08:03

## 2020-03-11 NOTE — PROGRESS NOTES
.Pt received testosterone. Tolerated well. Pt instructed to wait 15mins to be assessed for adverse reactions. verbalized understanding.

## 2020-03-11 NOTE — PROGRESS NOTES
INR low. Noted amiodarone stopped 2/27. Will increase dose    Update 3/13: received chart back. Patient didn't take dose as planned this week. He took 5mg Wed, 2.5mg Th. It should have been 7.5mg Wed, 5mg thu.  Advise to take 10mg 3/13 then take prescribed dose

## 2020-03-12 ENCOUNTER — CLINICAL SUPPORT (OUTPATIENT)
Dept: CARDIOLOGY | Facility: CLINIC | Age: 85
End: 2020-03-12
Attending: INTERNAL MEDICINE
Payer: MEDICARE

## 2020-03-12 DIAGNOSIS — I48.19 PERSISTENT ATRIAL FIBRILLATION: ICD-10-CM

## 2020-03-12 PROCEDURE — 93224 HOLTER MONITOR - 24 HOUR (CUPID ONLY): ICD-10-PCS | Mod: S$GLB,,, | Performed by: INTERNAL MEDICINE

## 2020-03-12 PROCEDURE — 93224 XTRNL ECG REC UP TO 48 HRS: CPT | Mod: S$GLB,,, | Performed by: INTERNAL MEDICINE

## 2020-03-15 ENCOUNTER — PATIENT MESSAGE (OUTPATIENT)
Dept: ELECTROPHYSIOLOGY | Facility: CLINIC | Age: 85
End: 2020-03-15

## 2020-03-15 ENCOUNTER — PATIENT MESSAGE (OUTPATIENT)
Dept: ENDOSCOPY | Facility: HOSPITAL | Age: 85
End: 2020-03-15

## 2020-03-17 ENCOUNTER — TELEPHONE (OUTPATIENT)
Dept: ELECTROPHYSIOLOGY | Facility: CLINIC | Age: 85
End: 2020-03-17

## 2020-03-17 LAB
OHS CV EVENT MONITOR DAY: 0
OHS CV HOLTER LENGTH DECIMAL HOURS: 23.98
OHS CV HOLTER LENGTH HOURS: 23
OHS CV HOLTER LENGTH MINUTES: 59

## 2020-03-17 NOTE — TELEPHONE ENCOUNTER
----- Message from Neel Topete MD sent at 3/17/2020  1:35 PM CDT -----  Please notify the patient that his heart rate is well controlled in atrial fibrillation.

## 2020-03-19 NOTE — PROGRESS NOTES
Tristen is not going to the lab because of the virus. He said he will stay on his dose. You may want to call him.

## 2020-03-24 ENCOUNTER — LAB VISIT (OUTPATIENT)
Dept: LAB | Facility: HOSPITAL | Age: 85
End: 2020-03-24
Attending: INTERNAL MEDICINE
Payer: MEDICARE

## 2020-03-24 ENCOUNTER — ANTI-COAG VISIT (OUTPATIENT)
Dept: CARDIOLOGY | Facility: CLINIC | Age: 85
End: 2020-03-24
Payer: MEDICARE

## 2020-03-24 ENCOUNTER — PATIENT MESSAGE (OUTPATIENT)
Dept: CARDIOLOGY | Facility: CLINIC | Age: 85
End: 2020-03-24

## 2020-03-24 DIAGNOSIS — I48.19 PERSISTENT ATRIAL FIBRILLATION: ICD-10-CM

## 2020-03-24 DIAGNOSIS — I48.0 PAF (PAROXYSMAL ATRIAL FIBRILLATION): ICD-10-CM

## 2020-03-24 LAB
INR PPP: 1.6 (ref 0.8–1.2)
PROTHROMBIN TIME: 17.9 SEC (ref 9–12.5)

## 2020-03-24 PROCEDURE — 93793 PR ANTICOAGULANT MGMT FOR PT TAKING WARFARIN: ICD-10-PCS | Mod: S$GLB,,,

## 2020-03-24 PROCEDURE — 93793 ANTICOAG MGMT PT WARFARIN: CPT | Mod: S$GLB,,,

## 2020-03-24 PROCEDURE — 85610 PROTHROMBIN TIME: CPT | Mod: HCNC

## 2020-03-24 PROCEDURE — 36415 COLL VENOUS BLD VENIPUNCTURE: CPT | Mod: HCNC,PO

## 2020-03-26 ENCOUNTER — PATIENT MESSAGE (OUTPATIENT)
Dept: ENDOSCOPY | Facility: HOSPITAL | Age: 85
End: 2020-03-26

## 2020-03-26 RX ORDER — PANTOPRAZOLE SODIUM 40 MG/1
40 TABLET, DELAYED RELEASE ORAL DAILY
Qty: 30 TABLET | Refills: 11 | Status: SHIPPED | OUTPATIENT
Start: 2020-03-26 | End: 2021-01-05 | Stop reason: ALTCHOICE

## 2020-03-30 RX ORDER — TRIAMCINOLONE ACETONIDE 1 MG/G
CREAM TOPICAL
Qty: 454 G | Refills: 2 | Status: SHIPPED | OUTPATIENT
Start: 2020-03-30 | End: 2021-07-04

## 2020-04-03 ENCOUNTER — PATIENT MESSAGE (OUTPATIENT)
Dept: FAMILY MEDICINE | Facility: CLINIC | Age: 85
End: 2020-04-03

## 2020-04-03 ENCOUNTER — TELEPHONE (OUTPATIENT)
Dept: FAMILY MEDICINE | Facility: CLINIC | Age: 85
End: 2020-04-03

## 2020-04-03 DIAGNOSIS — E29.1 HYPOGONADISM IN MALE: Primary | ICD-10-CM

## 2020-04-03 RX ORDER — TESTOSTERONE CYPIONATE 200 MG/ML
100 INJECTION, SOLUTION INTRAMUSCULAR
Status: COMPLETED | OUTPATIENT
Start: 2020-04-08 | End: 2020-07-01

## 2020-04-03 NOTE — TELEPHONE ENCOUNTER
Can we put him on for nurse visit for testosterone injection here at [facility] 4/8? He has lab appt 9 AM so somewhere around that time.

## 2020-04-03 NOTE — TELEPHONE ENCOUNTER
Patient scheduled for injection around the same day/ time as labs. Testosterone order needs to be put in.

## 2020-04-08 ENCOUNTER — ANTI-COAG VISIT (OUTPATIENT)
Dept: CARDIOLOGY | Facility: CLINIC | Age: 85
End: 2020-04-08
Payer: MEDICARE

## 2020-04-08 ENCOUNTER — LAB VISIT (OUTPATIENT)
Dept: LAB | Facility: HOSPITAL | Age: 85
End: 2020-04-08
Attending: INTERNAL MEDICINE
Payer: MEDICARE

## 2020-04-08 ENCOUNTER — CLINICAL SUPPORT (OUTPATIENT)
Dept: FAMILY MEDICINE | Facility: CLINIC | Age: 85
End: 2020-04-08
Payer: MEDICARE

## 2020-04-08 DIAGNOSIS — E29.1 HYPOGONADISM IN MALE: Primary | ICD-10-CM

## 2020-04-08 DIAGNOSIS — I48.0 PAF (PAROXYSMAL ATRIAL FIBRILLATION): ICD-10-CM

## 2020-04-08 DIAGNOSIS — I48.19 PERSISTENT ATRIAL FIBRILLATION: ICD-10-CM

## 2020-04-08 LAB
INR PPP: 3.3 (ref 0.8–1.2)
PROTHROMBIN TIME: 36.4 SEC (ref 9–12.5)

## 2020-04-08 PROCEDURE — 96373 THER/PROPH/DIAG INJ IA: CPT | Mod: S$GLB,,, | Performed by: INTERNAL MEDICINE

## 2020-04-08 PROCEDURE — 93793 PR ANTICOAGULANT MGMT FOR PT TAKING WARFARIN: ICD-10-PCS | Mod: S$GLB,,,

## 2020-04-08 PROCEDURE — 96373 PR INJECTION,THERAP/PROPH/DIAGNOST, INTRA-ARTERIAL: ICD-10-PCS | Mod: S$GLB,,, | Performed by: INTERNAL MEDICINE

## 2020-04-08 PROCEDURE — 99499 NO LOS: ICD-10-PCS | Mod: S$GLB,,, | Performed by: INTERNAL MEDICINE

## 2020-04-08 PROCEDURE — 96372 PR INJECTION,THERAP/PROPH/DIAG2ST, IM OR SUBCUT: ICD-10-PCS | Mod: S$GLB,,, | Performed by: INTERNAL MEDICINE

## 2020-04-08 PROCEDURE — 93793 ANTICOAG MGMT PT WARFARIN: CPT | Mod: S$GLB,,,

## 2020-04-08 PROCEDURE — 85610 PROTHROMBIN TIME: CPT

## 2020-04-08 PROCEDURE — 99499 UNLISTED E&M SERVICE: CPT | Mod: S$GLB,,, | Performed by: INTERNAL MEDICINE

## 2020-04-08 PROCEDURE — 96372 THER/PROPH/DIAG INJ SC/IM: CPT | Mod: S$GLB,,, | Performed by: INTERNAL MEDICINE

## 2020-04-08 PROCEDURE — 36415 COLL VENOUS BLD VENIPUNCTURE: CPT | Mod: PO

## 2020-04-08 RX ADMIN — TESTOSTERONE CYPIONATE 100 MG: 200 INJECTION, SOLUTION INTRAMUSCULAR at 09:04

## 2020-04-08 NOTE — PROGRESS NOTES
DEPO-TESTOSTERONE 100 MG IM GIVEN in left hip ,ADVISE PATIENT 15 MIN. WAIT. Lot# [id number], NDC [id number]

## 2020-04-08 NOTE — PROGRESS NOTES
INR now too high. Will decrease dose.    Reviewed record in preparation for visit and have obtained necessary documentation. Identified pt with two pt identifiers(name and ). Chief Complaint   Patient presents with   [facility] Maintenance Due   Topic Date Due    DTaP/Tdap/Td  (1 - Tdap) 1997    Pap Test  2007    Flu Vaccine  2019       Ms. Jarek Lerma has a reminder for a \"due or due soon\" health maintenance. I have asked that she discuss this further with her primary care provider for follow-up on this health maintenance. Coordination of Care Questionnaire:  :     1) Have you been to an emergency room, urgent care clinic since your last visit? no   Hospitalized since your last visit? no             2) Have you seen or consulted any other health care providers outside of [address] [place] [address] since your last visit? no  (Include any pap smears or colon screenings in this section.)    3) In the event something were to happen to you and you were unable to speak on your behalf, do you have an Advance Directive/ Living Will in place stating your wishes? NO    Do you have an Advance Directive on file? no    4) Are you interested in receiving information on Advance Directives? NO    Patient is accompanied by self I have received verbal consent from Liseth Duarte to discuss any/all medical information while they are present in the room.

## 2020-04-20 ENCOUNTER — PATIENT MESSAGE (OUTPATIENT)
Dept: FAMILY MEDICINE | Facility: CLINIC | Age: 85
End: 2020-04-20

## 2020-04-20 DIAGNOSIS — Z12.5 SCREENING FOR PROSTATE CANCER: ICD-10-CM

## 2020-04-20 DIAGNOSIS — E29.1 HYPOGONADISM IN MALE: Primary | ICD-10-CM

## 2020-04-20 DIAGNOSIS — R79.89 ABNORMAL TSH: ICD-10-CM

## 2020-04-20 DIAGNOSIS — N18.30 CHRONIC KIDNEY DISEASE, STAGE 3: ICD-10-CM

## 2020-04-20 NOTE — TELEPHONE ENCOUNTER
Labs ordered please schedule lab visit for around the time of his nurse visit.    Should be the PSA; vit D; PTH; CMP, TSH, CBC  And the lipid panel ordered by Dr. Manning    All other duplicates - do not order

## 2020-04-21 ENCOUNTER — TELEPHONE (OUTPATIENT)
Dept: FAMILY MEDICINE | Facility: CLINIC | Age: 85
End: 2020-04-21

## 2020-04-21 NOTE — TELEPHONE ENCOUNTER
----- Message from Anderson Jerome MD sent at 4/20/2020  4:42 PM CDT -----  Contact: Self  Please see pt's my chart message - he wants to do labs - ordered - just needs a lab visit scheduled.  ----- Message -----  From: Harrison Ndiaye  Sent: 4/20/2020   3:53 PM CDT  To: Justus Barron Staff    Type:  Patient Returning Call    Who Called: Self    Who Left Message for Patient: Christina    Does the patient know what this is regarding?:no    Would the patient rather a call back or a response via [facility]? call  Best Call Back Number: [phone]

## 2020-04-22 ENCOUNTER — LAB VISIT (OUTPATIENT)
Dept: LAB | Facility: HOSPITAL | Age: 85
End: 2020-04-22
Attending: INTERNAL MEDICINE
Payer: MEDICARE

## 2020-04-22 ENCOUNTER — ANTI-COAG VISIT (OUTPATIENT)
Dept: CARDIOLOGY | Facility: CLINIC | Age: 85
End: 2020-04-22
Payer: MEDICARE

## 2020-04-22 ENCOUNTER — CLINICAL SUPPORT (OUTPATIENT)
Dept: FAMILY MEDICINE | Facility: CLINIC | Age: 85
End: 2020-04-22
Payer: MEDICARE

## 2020-04-22 DIAGNOSIS — Z12.5 SCREENING FOR PROSTATE CANCER: ICD-10-CM

## 2020-04-22 DIAGNOSIS — I48.0 PAF (PAROXYSMAL ATRIAL FIBRILLATION): ICD-10-CM

## 2020-04-22 DIAGNOSIS — R79.89 ABNORMAL TSH: ICD-10-CM

## 2020-04-22 DIAGNOSIS — E29.1 HYPOGONADISM IN MALE: ICD-10-CM

## 2020-04-22 DIAGNOSIS — I48.19 PERSISTENT ATRIAL FIBRILLATION: ICD-10-CM

## 2020-04-22 DIAGNOSIS — N18.30 CHRONIC KIDNEY DISEASE, STAGE 3: ICD-10-CM

## 2020-04-22 DIAGNOSIS — E29.1 HYPOGONADISM IN MALE: Primary | ICD-10-CM

## 2020-04-22 LAB
25(OH)D3+25(OH)D2 SERPL-MCNC: 19 NG/ML (ref 30–96)
ALBUMIN SERPL BCP-MCNC: 3.8 G/DL (ref 3.5–5.2)
ALP SERPL-CCNC: 60 U/L (ref 55–135)
ALT SERPL W/O P-5'-P-CCNC: 15 U/L (ref 10–44)
ANION GAP SERPL CALC-SCNC: 6 MMOL/L (ref 8–16)
AST SERPL-CCNC: 20 U/L (ref 10–40)
BASOPHILS # BLD AUTO: 0.01 K/UL (ref 0–0.2)
BASOPHILS NFR BLD: 0.2 % (ref 0–1.9)
BILIRUB SERPL-MCNC: 0.5 MG/DL (ref 0.1–1)
BUN SERPL-MCNC: 21 MG/DL (ref 8–23)
CALCIUM SERPL-MCNC: 9.4 MG/DL (ref 8.7–10.5)
CHLORIDE SERPL-SCNC: 103 MMOL/L (ref 95–110)
CO2 SERPL-SCNC: 30 MMOL/L (ref 23–29)
COMPLEXED PSA SERPL-MCNC: 2.5 NG/ML (ref 0–4)
CREAT SERPL-MCNC: 1.6 MG/DL (ref 0.5–1.4)
DIFFERENTIAL METHOD: ABNORMAL
EOSINOPHIL # BLD AUTO: 0 K/UL (ref 0–0.5)
EOSINOPHIL NFR BLD: 0.6 % (ref 0–8)
ERYTHROCYTE [DISTWIDTH] IN BLOOD BY AUTOMATED COUNT: 14.1 % (ref 11.5–14.5)
EST. GFR  (AFRICAN AMERICAN): 45 ML/MIN/1.73 M^2
EST. GFR  (NON AFRICAN AMERICAN): 39 ML/MIN/1.73 M^2
GLUCOSE SERPL-MCNC: 98 MG/DL (ref 70–110)
HCT VFR BLD AUTO: 41.8 % (ref 40–54)
HGB BLD-MCNC: 13.1 G/DL (ref 14–18)
IMM GRANULOCYTES # BLD AUTO: 0.01 K/UL (ref 0–0.04)
IMM GRANULOCYTES NFR BLD AUTO: 0.2 % (ref 0–0.5)
INR PPP: 3.2 (ref 0.8–1.2)
LYMPHOCYTES # BLD AUTO: 1.1 K/UL (ref 1–4.8)
LYMPHOCYTES NFR BLD: 20.2 % (ref 18–48)
MCH RBC QN AUTO: 31.6 PG (ref 27–31)
MCHC RBC AUTO-ENTMCNC: 31.3 G/DL (ref 32–36)
MCV RBC AUTO: 101 FL (ref 82–98)
MONOCYTES # BLD AUTO: 1.3 K/UL (ref 0.3–1)
MONOCYTES NFR BLD: 23.1 % (ref 4–15)
NEUTROPHILS # BLD AUTO: 3 K/UL (ref 1.8–7.7)
NEUTROPHILS NFR BLD: 55.7 % (ref 38–73)
NRBC BLD-RTO: 0 /100 WBC
PLATELET # BLD AUTO: 136 K/UL (ref 150–350)
PMV BLD AUTO: 11.9 FL (ref 9.2–12.9)
POTASSIUM SERPL-SCNC: 4.7 MMOL/L (ref 3.5–5.1)
PROT SERPL-MCNC: 6.6 G/DL (ref 6–8.4)
PROTHROMBIN TIME: 35.3 SEC (ref 9–12.5)
PTH-INTACT SERPL-MCNC: 52.4 PG/ML (ref 9–77)
RBC # BLD AUTO: 4.14 M/UL (ref 4.6–6.2)
SODIUM SERPL-SCNC: 139 MMOL/L (ref 136–145)
TSH SERPL DL<=0.005 MIU/L-ACNC: 0.56 UIU/ML (ref 0.4–4)
WBC # BLD AUTO: 5.45 K/UL (ref 3.9–12.7)

## 2020-04-22 PROCEDURE — 99499 NO LOS: ICD-10-PCS | Mod: S$GLB,,, | Performed by: INTERNAL MEDICINE

## 2020-04-22 PROCEDURE — 82306 VITAMIN D 25 HYDROXY: CPT

## 2020-04-22 PROCEDURE — 85610 PROTHROMBIN TIME: CPT

## 2020-04-22 PROCEDURE — 80053 COMPREHEN METABOLIC PANEL: CPT

## 2020-04-22 PROCEDURE — 93793 PR ANTICOAGULANT MGMT FOR PT TAKING WARFARIN: ICD-10-PCS | Mod: S$GLB,,,

## 2020-04-22 PROCEDURE — 96372 THER/PROPH/DIAG INJ SC/IM: CPT | Mod: S$GLB,,, | Performed by: INTERNAL MEDICINE

## 2020-04-22 PROCEDURE — 96372 PR INJECTION,THERAP/PROPH/DIAG2ST, IM OR SUBCUT: ICD-10-PCS | Mod: S$GLB,,, | Performed by: INTERNAL MEDICINE

## 2020-04-22 PROCEDURE — 84153 ASSAY OF PSA TOTAL: CPT

## 2020-04-22 PROCEDURE — 83970 ASSAY OF PARATHORMONE: CPT

## 2020-04-22 PROCEDURE — 96373 THER/PROPH/DIAG INJ IA: CPT | Mod: S$GLB,,, | Performed by: INTERNAL MEDICINE

## 2020-04-22 PROCEDURE — 99499 UNLISTED E&M SERVICE: CPT | Mod: S$GLB,,, | Performed by: INTERNAL MEDICINE

## 2020-04-22 PROCEDURE — 84443 ASSAY THYROID STIM HORMONE: CPT

## 2020-04-22 PROCEDURE — 85025 COMPLETE CBC W/AUTO DIFF WBC: CPT

## 2020-04-22 PROCEDURE — 93793 ANTICOAG MGMT PT WARFARIN: CPT | Mod: S$GLB,,,

## 2020-04-22 PROCEDURE — 36415 COLL VENOUS BLD VENIPUNCTURE: CPT | Mod: PO

## 2020-04-22 PROCEDURE — 96373 PR INJECTION,THERAP/PROPH/DIAGNOST, INTRA-ARTERIAL: ICD-10-PCS | Mod: S$GLB,,, | Performed by: INTERNAL MEDICINE

## 2020-04-22 RX ADMIN — TESTOSTERONE CYPIONATE 100 MG: 200 INJECTION, SOLUTION INTRAMUSCULAR at 09:04

## 2020-04-22 NOTE — PROGRESS NOTES
Patient was called and given coumadin instructions and redraw date, verbalized understanding, appointment booked

## 2020-04-22 NOTE — PROGRESS NOTES
DEPO-TESTOSTERONE 100 MG IM GIVEN,ADVISE PATIENT 15 MIN. WAIT . 100 mg was wasted and witnessed by Misty Irvin LPN. See MAR for administration.

## 2020-04-24 ENCOUNTER — PATIENT MESSAGE (OUTPATIENT)
Dept: OTOLARYNGOLOGY | Facility: CLINIC | Age: 85
End: 2020-04-24

## 2020-04-25 ENCOUNTER — PATIENT MESSAGE (OUTPATIENT)
Dept: OTOLARYNGOLOGY | Facility: CLINIC | Age: 85
End: 2020-04-25

## 2020-04-27 DIAGNOSIS — D64.9 ANEMIA, UNSPECIFIED TYPE: Primary | ICD-10-CM

## 2020-04-27 NOTE — PROGRESS NOTES
CKD stage 3  Anemia of CKD and possibly hypogonadism.  Hx of colonoscopy 2014 with TA. Aged out of repeat testing.   Vit D low  PTH WNL    Macrocytosis, 2018 B12 level was normal  Will check ferritin and iron panel next labs.  Plan for repeat around 7/2020  PSA WNL (on testosterone)

## 2020-04-28 DIAGNOSIS — E23.0 PANHYPOPITUITARISM: Primary | ICD-10-CM

## 2020-04-28 RX ORDER — PREDNISONE 5 MG/1
TABLET ORAL
Qty: 90 TABLET | Refills: 1 | Status: SHIPPED | OUTPATIENT
Start: 2020-04-28 | End: 2020-11-01

## 2020-05-06 ENCOUNTER — ANTI-COAG VISIT (OUTPATIENT)
Dept: CARDIOLOGY | Facility: CLINIC | Age: 85
End: 2020-05-06
Payer: MEDICARE

## 2020-05-06 ENCOUNTER — CLINICAL SUPPORT (OUTPATIENT)
Dept: FAMILY MEDICINE | Facility: CLINIC | Age: 85
End: 2020-05-06
Payer: MEDICARE

## 2020-05-06 ENCOUNTER — LAB VISIT (OUTPATIENT)
Dept: LAB | Facility: HOSPITAL | Age: 85
End: 2020-05-06
Attending: INTERNAL MEDICINE
Payer: MEDICARE

## 2020-05-06 DIAGNOSIS — E29.1 HYPOGONADISM IN MALE: Primary | ICD-10-CM

## 2020-05-06 DIAGNOSIS — I48.19 PERSISTENT ATRIAL FIBRILLATION: ICD-10-CM

## 2020-05-06 DIAGNOSIS — I48.0 PAF (PAROXYSMAL ATRIAL FIBRILLATION): ICD-10-CM

## 2020-05-06 LAB
INR PPP: 2 (ref 0.8–1.2)
PROTHROMBIN TIME: 22.1 SEC (ref 9–12.5)

## 2020-05-06 PROCEDURE — 85610 PROTHROMBIN TIME: CPT | Mod: HCNC

## 2020-05-06 PROCEDURE — 96372 PR INJECTION,THERAP/PROPH/DIAG2ST, IM OR SUBCUT: ICD-10-PCS | Mod: HCNC,S$GLB,, | Performed by: INTERNAL MEDICINE

## 2020-05-06 PROCEDURE — 93793 ANTICOAG MGMT PT WARFARIN: CPT | Mod: S$GLB,,,

## 2020-05-06 PROCEDURE — 99499 UNLISTED E&M SERVICE: CPT | Mod: HCNC,S$GLB,, | Performed by: INTERNAL MEDICINE

## 2020-05-06 PROCEDURE — 99499 NO LOS: ICD-10-PCS | Mod: HCNC,S$GLB,, | Performed by: INTERNAL MEDICINE

## 2020-05-06 PROCEDURE — 96373 THER/PROPH/DIAG INJ IA: CPT | Mod: HCNC,S$GLB,, | Performed by: INTERNAL MEDICINE

## 2020-05-06 PROCEDURE — 96373 PR INJECTION,THERAP/PROPH/DIAGNOST, INTRA-ARTERIAL: ICD-10-PCS | Mod: HCNC,S$GLB,, | Performed by: INTERNAL MEDICINE

## 2020-05-06 PROCEDURE — 93793 PR ANTICOAGULANT MGMT FOR PT TAKING WARFARIN: ICD-10-PCS | Mod: S$GLB,,,

## 2020-05-06 PROCEDURE — 36415 COLL VENOUS BLD VENIPUNCTURE: CPT | Mod: HCNC,PO

## 2020-05-06 PROCEDURE — 96372 THER/PROPH/DIAG INJ SC/IM: CPT | Mod: HCNC,S$GLB,, | Performed by: INTERNAL MEDICINE

## 2020-05-06 RX ADMIN — TESTOSTERONE CYPIONATE 100 MG: 200 INJECTION, SOLUTION INTRAMUSCULAR at 08:05

## 2020-05-18 ENCOUNTER — PATIENT MESSAGE (OUTPATIENT)
Dept: ENDOSCOPY | Facility: HOSPITAL | Age: 85
End: 2020-05-18

## 2020-05-19 DIAGNOSIS — K21.9 GASTROESOPHAGEAL REFLUX DISEASE, ESOPHAGITIS PRESENCE NOT SPECIFIED: Primary | ICD-10-CM

## 2020-05-19 DIAGNOSIS — Z01.818 PRE-OP TESTING: Primary | ICD-10-CM

## 2020-05-20 ENCOUNTER — LAB VISIT (OUTPATIENT)
Dept: LAB | Facility: HOSPITAL | Age: 85
End: 2020-05-20
Attending: INTERNAL MEDICINE
Payer: MEDICARE

## 2020-05-20 ENCOUNTER — CLINICAL SUPPORT (OUTPATIENT)
Dept: FAMILY MEDICINE | Facility: CLINIC | Age: 85
End: 2020-05-20
Payer: MEDICARE

## 2020-05-20 ENCOUNTER — ANTI-COAG VISIT (OUTPATIENT)
Dept: CARDIOLOGY | Facility: CLINIC | Age: 85
End: 2020-05-20
Payer: MEDICARE

## 2020-05-20 DIAGNOSIS — I48.0 PAF (PAROXYSMAL ATRIAL FIBRILLATION): ICD-10-CM

## 2020-05-20 DIAGNOSIS — E29.1 HYPOGONADISM IN MALE: Primary | ICD-10-CM

## 2020-05-20 DIAGNOSIS — I48.19 PERSISTENT ATRIAL FIBRILLATION: ICD-10-CM

## 2020-05-20 LAB
INR PPP: 3 (ref 0.8–1.2)
PROTHROMBIN TIME: 33.1 SEC (ref 9–12.5)

## 2020-05-20 PROCEDURE — 96373 THER/PROPH/DIAG INJ IA: CPT | Mod: HCNC,S$GLB,, | Performed by: INTERNAL MEDICINE

## 2020-05-20 PROCEDURE — 96373 PR INJECTION,THERAP/PROPH/DIAGNOST, INTRA-ARTERIAL: ICD-10-PCS | Mod: HCNC,S$GLB,, | Performed by: INTERNAL MEDICINE

## 2020-05-20 PROCEDURE — 36415 COLL VENOUS BLD VENIPUNCTURE: CPT | Mod: HCNC,PO

## 2020-05-20 PROCEDURE — 99499 NO LOS: ICD-10-PCS | Mod: HCNC,S$GLB,, | Performed by: INTERNAL MEDICINE

## 2020-05-20 PROCEDURE — 93793 ANTICOAG MGMT PT WARFARIN: CPT | Mod: S$GLB,,,

## 2020-05-20 PROCEDURE — 96372 PR INJECTION,THERAP/PROPH/DIAG2ST, IM OR SUBCUT: ICD-10-PCS | Mod: HCNC,S$GLB,, | Performed by: INTERNAL MEDICINE

## 2020-05-20 PROCEDURE — 99499 UNLISTED E&M SERVICE: CPT | Mod: HCNC,S$GLB,, | Performed by: INTERNAL MEDICINE

## 2020-05-20 PROCEDURE — 85610 PROTHROMBIN TIME: CPT | Mod: HCNC

## 2020-05-20 PROCEDURE — 96372 THER/PROPH/DIAG INJ SC/IM: CPT | Mod: HCNC,S$GLB,, | Performed by: INTERNAL MEDICINE

## 2020-05-20 PROCEDURE — 93793 PR ANTICOAGULANT MGMT FOR PT TAKING WARFARIN: ICD-10-PCS | Mod: S$GLB,,,

## 2020-05-20 RX ADMIN — TESTOSTERONE CYPIONATE 100 MG: 200 INJECTION, SOLUTION INTRAMUSCULAR at 08:05

## 2020-05-20 NOTE — PROGRESS NOTES
INR good but on upper end today. Last INR on lower end. He is now scheduled for cscope 6/2. Hold 5 days prior, we will plan bolus dose afterward and repeat INR the following week    Pt needs INR the week after procedure since he will be holding. We will likely need INR again on 6/17 as well

## 2020-05-21 NOTE — PROGRESS NOTES
Pt notified of results plan and recheck date. Pt would like to know if he could have his labs drawn on 6/17. He have an appt on that day. Verbalized understanding

## 2020-05-22 ENCOUNTER — TELEPHONE (OUTPATIENT)
Dept: DERMATOLOGY | Facility: CLINIC | Age: 85
End: 2020-05-22

## 2020-05-22 ENCOUNTER — TELEPHONE (OUTPATIENT)
Dept: ENDOSCOPY | Facility: HOSPITAL | Age: 85
End: 2020-05-22

## 2020-05-22 DIAGNOSIS — Z12.11 SPECIAL SCREENING FOR MALIGNANT NEOPLASMS, COLON: Primary | ICD-10-CM

## 2020-05-22 NOTE — TELEPHONE ENCOUNTER
----- Message from Nadia Rush MA sent at 5/21/2020  2:55 PM CDT -----  Contact: patient      ----- Message -----  From: Inga Handley  Sent: 5/21/2020   2:34 PM CDT  To: Summer PEREZ Staff    Please call above patient at [phone] need sooner appointment waiting on a call from the nurse thanks.

## 2020-05-22 NOTE — TELEPHONE ENCOUNTER
Received in basket message patient has questions regarding COVID screening test. Called patient. Patient requesting [facility] in [place]- 5/31/20

## 2020-05-31 ENCOUNTER — CLINICAL SUPPORT (OUTPATIENT)
Dept: URGENT CARE | Facility: CLINIC | Age: 85
End: 2020-05-31
Payer: MEDICARE

## 2020-05-31 VITALS
TEMPERATURE: 97 F | HEART RATE: 94 BPM | HEIGHT: 75 IN | OXYGEN SATURATION: 95 % | BODY MASS INDEX: 29.59 KG/M2 | WEIGHT: 238 LBS

## 2020-05-31 DIAGNOSIS — Z12.11 SPECIAL SCREENING FOR MALIGNANT NEOPLASMS, COLON: ICD-10-CM

## 2020-05-31 PROCEDURE — U0003 INFECTIOUS AGENT DETECTION BY NUCLEIC ACID (DNA OR RNA); SEVERE ACUTE RESPIRATORY SYNDROME CORONAVIRUS 2 (SARS-COV-2) (CORONAVIRUS DISEASE [COVID-19]), AMPLIFIED PROBE TECHNIQUE, MAKING USE OF HIGH THROUGHPUT TECHNOLOGIES AS DESCRIBED BY CMS-2020-01-R: HCPCS | Mod: HCNC

## 2020-06-01 LAB — SARS-COV-2 RNA RESP QL NAA+PROBE: NOT DETECTED

## 2020-06-02 ENCOUNTER — HOSPITAL ENCOUNTER (OUTPATIENT)
Facility: HOSPITAL | Age: 85
Discharge: HOME | End: 2020-06-02
Attending: INTERNAL MEDICINE | Admitting: INTERNAL MEDICINE
Payer: MEDICARE

## 2020-06-02 ENCOUNTER — ANESTHESIA EVENT (OUTPATIENT)
Dept: ENDOSCOPY | Facility: HOSPITAL | Age: 85
End: 2020-06-02
Payer: MEDICARE

## 2020-06-02 ENCOUNTER — ANESTHESIA (OUTPATIENT)
Dept: ENDOSCOPY | Facility: HOSPITAL | Age: 85
End: 2020-06-02
Payer: MEDICARE

## 2020-06-02 VITALS
WEIGHT: 238 LBS | DIASTOLIC BLOOD PRESSURE: 69 MMHG | TEMPERATURE: 98 F | BODY MASS INDEX: 29.59 KG/M2 | RESPIRATION RATE: 16 BRPM | OXYGEN SATURATION: 95 % | HEART RATE: 77 BPM | HEIGHT: 75 IN | SYSTOLIC BLOOD PRESSURE: 129 MMHG

## 2020-06-02 DIAGNOSIS — K21.9 GASTROESOPHAGEAL REFLUX DISEASE WITHOUT ESOPHAGITIS: ICD-10-CM

## 2020-06-02 DIAGNOSIS — D12.6 TUBULAR ADENOMA OF COLON: Primary | ICD-10-CM

## 2020-06-02 DIAGNOSIS — K21.9 GERD (GASTROESOPHAGEAL REFLUX DISEASE): ICD-10-CM

## 2020-06-02 PROCEDURE — 37000008 HC ANESTHESIA 1ST 15 MINUTES: Mod: HCNC | Performed by: INTERNAL MEDICINE

## 2020-06-02 PROCEDURE — 37000009 HC ANESTHESIA EA ADD 15 MINS: Mod: HCNC | Performed by: INTERNAL MEDICINE

## 2020-06-02 PROCEDURE — 43239 EGD BIOPSY SINGLE/MULTIPLE: CPT | Mod: HCNC | Performed by: INTERNAL MEDICINE

## 2020-06-02 PROCEDURE — 25000003 PHARM REV CODE 250: Mod: HCNC | Performed by: INTERNAL MEDICINE

## 2020-06-02 PROCEDURE — 88305 TISSUE EXAM BY PATHOLOGIST: CPT | Mod: HCNC | Performed by: PATHOLOGY

## 2020-06-02 PROCEDURE — 45380 COLONOSCOPY AND BIOPSY: CPT | Mod: PT,HCNC,, | Performed by: INTERNAL MEDICINE

## 2020-06-02 PROCEDURE — 88305 TISSUE EXAM BY PATHOLOGIST: CPT | Mod: 26,HCNC,, | Performed by: PATHOLOGY

## 2020-06-02 PROCEDURE — 43239 EGD BIOPSY SINGLE/MULTIPLE: CPT | Mod: 51,HCNC,, | Performed by: INTERNAL MEDICINE

## 2020-06-02 PROCEDURE — 63600175 PHARM REV CODE 636 W HCPCS: Mod: HCNC | Performed by: NURSE ANESTHETIST, CERTIFIED REGISTERED

## 2020-06-02 PROCEDURE — 88342 CHG IMMUNOCYTOCHEMISTRY: ICD-10-PCS | Mod: 26,HCNC,, | Performed by: PATHOLOGY

## 2020-06-02 PROCEDURE — 27201012 HC FORCEPS, HOT/COLD, DISP: Mod: HCNC | Performed by: INTERNAL MEDICINE

## 2020-06-02 PROCEDURE — 45380 COLONOSCOPY AND BIOPSY: CPT | Mod: HCNC | Performed by: INTERNAL MEDICINE

## 2020-06-02 PROCEDURE — 88342 IMHCHEM/IMCYTCHM 1ST ANTB: CPT | Mod: HCNC | Performed by: PATHOLOGY

## 2020-06-02 PROCEDURE — 88305 TISSUE EXAM BY PATHOLOGIST: ICD-10-PCS | Mod: 26,HCNC,, | Performed by: PATHOLOGY

## 2020-06-02 PROCEDURE — 25000003 PHARM REV CODE 250: Mod: HCNC | Performed by: NURSE PRACTITIONER

## 2020-06-02 PROCEDURE — 43239 PR EGD, FLEX, W/BIOPSY, SGL/MULTI: ICD-10-PCS | Mod: 51,HCNC,, | Performed by: INTERNAL MEDICINE

## 2020-06-02 PROCEDURE — 88342 IMHCHEM/IMCYTCHM 1ST ANTB: CPT | Mod: 26,HCNC,, | Performed by: PATHOLOGY

## 2020-06-02 PROCEDURE — 45380 PR COLONOSCOPY,BIOPSY: ICD-10-PCS | Mod: PT,HCNC,, | Performed by: INTERNAL MEDICINE

## 2020-06-02 PROCEDURE — E9220 PRA ENDO ANESTHESIA: ICD-10-PCS | Mod: HCNC,,, | Performed by: NURSE ANESTHETIST, CERTIFIED REGISTERED

## 2020-06-02 PROCEDURE — E9220 PRA ENDO ANESTHESIA: HCPCS | Mod: HCNC,,, | Performed by: NURSE ANESTHETIST, CERTIFIED REGISTERED

## 2020-06-02 RX ORDER — PHENYLEPHRINE HYDROCHLORIDE 10 MG/ML
INJECTION INTRAVENOUS
Status: DISCONTINUED | OUTPATIENT
Start: 2020-06-02 | End: 2020-06-02

## 2020-06-02 RX ORDER — PROPOFOL 10 MG/ML
VIAL (ML) INTRAVENOUS
Status: DISCONTINUED | OUTPATIENT
Start: 2020-06-02 | End: 2020-06-02

## 2020-06-02 RX ORDER — LIDOCAINE HYDROCHLORIDE 20 MG/ML
INJECTION INTRAVENOUS
Status: DISCONTINUED | OUTPATIENT
Start: 2020-06-02 | End: 2020-06-02

## 2020-06-02 RX ORDER — PROPOFOL 10 MG/ML
VIAL (ML) INTRAVENOUS CONTINUOUS PRN
Status: DISCONTINUED | OUTPATIENT
Start: 2020-06-02 | End: 2020-06-02

## 2020-06-02 RX ORDER — SODIUM CHLORIDE 9 MG/ML
INJECTION, SOLUTION INTRAVENOUS CONTINUOUS
Status: DISCONTINUED | OUTPATIENT
Start: 2020-06-02 | End: 2020-06-02 | Stop reason: HOSPADM

## 2020-06-02 RX ORDER — SODIUM CHLORIDE 9 MG/ML
INJECTION, SOLUTION INTRAVENOUS CONTINUOUS
Status: CANCELLED | OUTPATIENT
Start: 2020-06-02

## 2020-06-02 RX ADMIN — SODIUM CHLORIDE: 0.9 INJECTION, SOLUTION INTRAVENOUS at 08:06

## 2020-06-02 RX ADMIN — PHENYLEPHRINE HYDROCHLORIDE 100 MCG: 10 INJECTION INTRAVENOUS at 09:06

## 2020-06-02 RX ADMIN — PROPOFOL 125 MCG/KG/MIN: 10 INJECTION, EMULSION INTRAVENOUS at 08:06

## 2020-06-02 RX ADMIN — PROPOFOL 50 MG: 10 INJECTION, EMULSION INTRAVENOUS at 08:06

## 2020-06-02 RX ADMIN — PHENYLEPHRINE HYDROCHLORIDE 200 MCG: 10 INJECTION INTRAVENOUS at 09:06

## 2020-06-02 RX ADMIN — LIDOCAINE HYDROCHLORIDE 100 MG: 20 INJECTION, SOLUTION INTRAVENOUS at 08:06

## 2020-06-02 NOTE — [employer] PATIENT INSTRUCTIONS
Discharge Summary/Instructions after an Endoscopic Procedure  Patient Name: Linda Skelton  Patient MRN: [id number]  Patient YOB: 1935 Tuesday, June 2, 2020  Dell Elena MD  RESTRICTIONS:  During your procedure today, you received medications for sedation.  These   medications may affect your judgment, balance and coordination.  Therefore,   for 24 hours, you have the following restrictions:   - DO NOT drive a car, operate machinery, make legal/financial decisions,   sign important papers or drink alcohol.    ACTIVITY:  Today: no heavy lifting, straining or running due to procedural   sedation/anesthesia.  The following day: return to full activity including work.  DIET:  Eat and drink normally unless instructed otherwise.     TREATMENT FOR COMMON SIDE EFFECTS:  - Mild abdominal pain, nausea, belching, bloating or excessive gas:  rest,   eat lightly and use a heating pad.  - Sore Throat: treat with throat lozenges and/or gargle with warm salt   water.  - Because air was used during the procedure, expelling large amounts of air   from your rectum or belching is normal.  - If a bowel prep was taken, you may not have a bowel movement for 1-3 days.    This is normal.  SYMPTOMS TO WATCH FOR AND REPORT TO YOUR PHYSICIAN:  1. Abdominal pain or bloating, other than gas cramps.  2. Chest pain.  3. Back pain.  4. Signs of infection such as: chills or fever occurring within 24 hours   after the procedure.  5. Rectal bleeding, which would show as bright red, maroon, or black stools.   (A tablespoon of blood from the rectum is not serious, especially if   hemorrhoids are present.)  6. Vomiting.  7. Weakness or dizziness.  GO DIRECTLY TO THE NEAREST EMERGENCY ROOM IF YOU HAVE ANY OF THE FOLLOWING:      Difficulty breathing              Chills and/or fever over 101 F   Persistent vomiting and/or vomiting blood   Severe abdominal pain   Severe chest pain   Black, tarry stools   Bleeding- more than one tablespoon   Any other  symptom or condition that you feel may need urgent attention  Your doctor recommends these additional instructions:  If any biopsies were taken, your doctors clinic will contact you in 1 to 2   weeks with any results.  - Patient has a contact number available for emergencies.  The signs and   symptoms of potential delayed complications were discussed with the   patient.  Return to normal activities tomorrow.  Written discharge   instructions were provided to the patient.   - Discharge patient to home (ambulatory).   - Resume previous diet.   - Continue present medications.   - Await pathology results.   - Return to GI clinic PRN.  For questions, problems or results please call your physician - Dell Elena MD at Work:  [phone].  [facility], EMERGENCY ROOM PHONE NUMBER: [phone]  IF A COMPLICATION OR EMERGENCY SITUATION ARISES AND YOU ARE UNABLE TO REACH   YOUR PHYSICIAN - GO DIRECTLY TO THE EMERGENCY ROOM.  Dell Elena MD  6/2/2020 9:08:00 AM  This report has been verified and signed electronically.  [employer]

## 2020-06-02 NOTE — H&P
Short Stay Endoscopy History and Physical    PCP - Anderson Jeroem MD     Procedure - EGD  ASA - per anesthesia  Mallampati - per anesthesia  History of Anesthesia problems - no  Family history Anesthesia problems -  no   Plan of anesthesia - General    HPI:  This is a 85 y.o. male here for evaluation of GERD and previous colon polyps. GERD has been controlled now, takes protonix BID but sometimes once daily. No dysphagia. No family history of colon cancer, abdominal pain, blood in stool.     ROS:  Constitutional: No fevers, chills, No weight loss  CV: No chest pain  Pulm: No cough, No shortness of breath  Ophtho: No vision changes  GI: see HPI  Derm: No rash    Medical History:  has a past medical history of ACS (acute coronary syndrome), Acute coronary syndrome (2000), Anticoagulant long-term use, Atrial fibrillation, Basal cell carcinoma (excised ), Bilateral leg edema (1/6/2014), BPH (benign prostatic hyperplasia), CKD (chronic kidney disease), Coronary artery disease, DVT of leg (deep venous thrombosis) (09/08/2014), Encounter for blood transfusion, Hyperlipidemia, Hypertension, Hypogonadism, male, Leg fracture, left, Melanoma (01/04/2017), MI (myocardial infarction), Panhypopituitarism, 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    Surgical History:  has a past surgical history that includes pituitatry; Cardiac catheterization (05/09/2000); Coronary angioplasty (5/9/2000); Esophageal dilation; left femur surgery; Tonsillectomy; Appendectomy; Cardiac catheterization; Coronary angioplasty with stent; Hernia repair; Brain surgery; Fracture surgery (Left); Skin biopsy; Vascular surgery; Cardiac electrophysiology study and ablation; Excision basal cell carcinoma (01/2008); Eye surgery (Bilateral); Laparoscopic cholecystectomy (N/A, 6/2/2018); Treatment of cardiac arrhythmia (Bilateral, 6/13/2019); Ablation of  arrhythmogenic focus for atrial fibrillation (N/A, 9/30/2019); Treatment of cardiac arrhythmia (9/30/2019); Treatment of cardiac arrhythmia (N/A, 11/1/2019); and Treatment of cardiac arrhythmia (N/A, 12/10/2019).    Family History: family history includes Cancer in his father.. Otherwise no colon cancer, inflammatory bowel disease, or GI malignancies.    Social History:  reports that he has never smoked. He has never used smokeless tobacco. He reports that he does not drink alcohol or use drugs.    Review of patient's allergies indicates:  No Known Allergies    Medications:   Facility-Administered Medications Prior to Admission   Medication Dose Route Frequency Provider Last Rate Last Dose    testosterone cypionate injection 100 mg  100 mg Intramuscular Q14 Days Anderson Jerome MD   100 mg at 05/20/20 0842     Medications Prior to Admission   Medication Sig Dispense Refill Last Dose    folic acid (FOLVITE) 1 MG tablet TAKE 1 TABLET BY MOUTH ONCE DAILY 90 tablet 3 6/1/2020 at Unknown time    levothyroxine (SYNTHROID) 112 MCG tablet TAKE 1 TABLET BY MOUTH ONCE DAILY BEFORE  BREAKFAST 90 tablet 3 6/1/2020 at Unknown time    metoprolol succinate (TOPROL-XL) 25 MG 24 hr tablet Take 1 tablet (25 mg total) by mouth once daily. 90 tablet 3 6/1/2020 at Unknown time    predniSONE (DELTASONE) 5 MG tablet Take 1 tablet by mouth once daily 90 tablet 1 6/1/2020 at Unknown time    rosuvastatin (CRESTOR) 40 MG Tab Take 1 tablet (40 mg total) by mouth once daily. 30 tablet 11 6/1/2020 at Unknown time    tamsulosin (FLOMAX) 0.4 mg Cap TAKE 1 CAPSULE BY MOUTH ONCE DAILY 90 capsule 3 6/1/2020 at Unknown time    aspirin 81 mg Tab Take 81 mg by mouth every evening. 1 Tablet Oral Every night   5/29/2020    ciclopirox (PENLAC) 8 % Soln Apply daily to affected nail. Must remove and restart weekly 1 Bottle 5 Taking    clotrimazole (LOTRIMIN) 1 % cream Apply topically 2 (two) times daily. To feet  For 3 weeks as needed 60 g 3 Taking     dicyclomine (BENTYL) 20 mg tablet    Unknown at Unknown time    ketoconazole (NIZORAL) 2 % cream AAA bid to feet x 3 weeks 60 g 3 Taking    mometasone (ELOCON) 0.1 % ointment aaa qd- bid for severe areas.  Avoid use on face and groin 60 g 1 Taking    NITROSTAT 0.4 mg SL tablet DISSOLVE ONE TABLET UNDER THE TONGUE EVERY 5 MINUTES AS NEEDED FOR CHEST PAIN.  DO NOT EXCEED A TOTAL OF 3 DOSES IN 15 MINUTES NOW 25 tablet 3 More than a month at Unknown time    pantoprazole (PROTONIX) 40 MG tablet Take 1 tablet (40 mg total) by mouth once daily. 30 tablet 11     triamcinolone acetonide 0.1% (KENALOG) 0.1 % cream Apply to affected area qd- bid for flares 454 g 2     warfarin (COUMADIN) 5 MG tablet Take 1 tablet (5 mg total) by mouth Daily. Current Dose ( 2.5 mg every Sun; 5 mg all other days) or as directed by coumadin clinic. (Patient taking differently: Take 2.5 mg by mouth Daily. 2.5 Sunday & Thursday; 5mg aod) 90 tablet 3 5/24/2020       Physical Exam:    Vital Signs:   Vitals:    06/02/20 0802   BP: (!) 153/95   Pulse: 92   Resp: 16   Temp: 97.5 °F (36.4 °C)       General Appearance: Well appearing in no acute distress  Eyes:    No scleral icterus  ENT: Neck supple, Lips, mucosa, and tongue normal; teeth and gums normal  Lungs: CTA anteriorly  Heart:  Regular rate, S1, S2 normal, no murmurs heard.  Abdomen: Soft, non tender, non distended with normal bowel sounds. No hepatosplenomegaly, ascites, or mass.  Extremities: No edema  Skin: No rash    Labs:  Lab Results   Component Value Date    WBC 5.45 04/22/2020    HGB 13.1 (L) 04/22/2020    HCT 41.8 04/22/2020     (L) 04/22/2020    CHOL 130 10/10/2018    TRIG 121 10/10/2018    HDL 35 (L) 10/10/2018    ALT 15 04/22/2020    AST 20 04/22/2020     04/22/2020    K 4.7 04/22/2020     04/22/2020    CREATININE 1.6 (H) 04/22/2020    BUN 21 04/22/2020    CO2 30 (H) 04/22/2020    TSH 0.562 04/22/2020    PSA 2.5 04/22/2020    INR 3.0 (H) 05/20/2020     HGBA1C 6.1 09/18/2013       I have explained the risks and benefits of endoscopy procedures to the patient including but not limited to bleeding, perforation, infection, and death.  The patient was asked if they understand and allowed to ask any further questions to their satisfaction.      Neri Amanda MD PGY-VI  Gastroenterology Fellow  [facility]

## 2020-06-02 NOTE — ANESTHESIA POSTPROCEDURE EVALUATION
Anesthesia Post Evaluation    Patient: Linda Skelton    Procedure(s) Performed: Procedure(s) (LRB):  EGD (ESOPHAGOGASTRODUODENOSCOPY) (N/A)  COLONOSCOPY (N/A)    Final Anesthesia Type: general    Patient location during evaluation: PACU  Patient participation: Yes- Able to Participate  Level of consciousness: awake and alert  Post-procedure vital signs: reviewed and stable  Pain management: adequate  Airway patency: patent    PONV status at discharge: No PONV  Anesthetic complications: no      Cardiovascular status: blood pressure returned to baseline and stable  Respiratory status: unassisted  Hydration status: euvolemic  Follow-up not needed.          Vitals Value Taken Time   /69 6/2/2020 10:03 AM   Temp 36.8 °C (98.2 °F) 6/2/2020  9:32 AM   Pulse 77 6/2/2020 10:03 AM   Resp 16 6/2/2020 10:03 AM   SpO2 95 % 6/2/2020 10:03 AM         Event Time     Out of Recovery 10:15:14          Pain/Dereck Score: Dereck Score: 8 (6/2/2020  9:34 AM)

## 2020-06-02 NOTE — ANESTHESIA PREPROCEDURE EVALUATION
06/02/2020  Linda Skelton is a 85 y.o., male.    Anesthesia Evaluation    I have reviewed the Patient Summary Reports.         Review of Systems  Anesthesia Hx:  No problems with previous Anesthesia    Social:  Non-Smoker    Hematology/Oncology:  Hematology Normal   Oncology Normal     EENT/Dental:EENT/Dental Normal   Cardiovascular:   Hypertension Past MI CAD  Dysrhythmias atrial fibrillation Angina CHF (EF 45%) PND: A fib in Feb '20. ECG has been reviewed.    Pulmonary:  Pulmonary Normal    Renal/:   Chronic Renal Disease, CRI    Hepatic/GI:   GERD    Musculoskeletal:  Musculoskeletal Normal    Neurological:  Neurology Normal    Endocrine:   Hypothyroidism    Dermatological:  Skin Normal    Psych:  Psychiatric Normal           Physical Exam  General:  Well nourished    Airway/Jaw/Neck:  Airway Findings: Mouth Opening: Normal Tongue: Normal  General Airway Assessment: Adult  Mallampati: II  Improves to II with phonation.  TM Distance: Normal, at least 6 cm  Jaw/Neck Findings:  Neck ROM: Normal ROM      Dental:  Dental Findings: In tact   Chest/Lungs:  Chest/Lungs Findings: Clear to auscultation, Normal Respiratory Rate     Heart/Vascular:  Heart Findings: Rate: Normal  Rhythm: Regular Rhythm  Sounds: Normal             Anesthesia Plan  Type of Anesthesia, risks & benefits discussed:  Anesthesia Type:  general  Patient's Preference: General  Intra-op Monitoring Plan:   Intra-op Monitoring Plan Comments:   Post Op Pain Control Plan:   Post Op Pain Control Plan Comments:   Induction:   IV  Beta Blocker:  Patient is not currently on a Beta-Blocker (No further documentation required).       Informed Consent: Patient understands risks and agrees with Anesthesia plan.  Questions answered. Anesthesia consent signed with patient.  ASA Score: 3     Day of Surgery Review of History & Physical: I have interviewed  and examined the patient. I have reviewed the patient's H&P dated:  There are no significant changes.          Ready For Surgery From Anesthesia Perspective.

## 2020-06-02 NOTE — [employer] PATIENT INSTRUCTIONS
Discharge Summary/Instructions after an Endoscopic Procedure  Patient Name: Linda Skelton  Patient MRN: [id number]  Patient YOB: 1935 Tuesday, June 2, 2020  Dell Elena MD  RESTRICTIONS:  During your procedure today, you received medications for sedation.  These   medications may affect your judgment, balance and coordination.  Therefore,   for 24 hours, you have the following restrictions:   - DO NOT drive a car, operate machinery, make legal/financial decisions,   sign important papers or drink alcohol.    ACTIVITY:  Today: no heavy lifting, straining or running due to procedural   sedation/anesthesia.  The following day: return to full activity including work.  DIET:  Eat and drink normally unless instructed otherwise.     TREATMENT FOR COMMON SIDE EFFECTS:  - Mild abdominal pain, nausea, belching, bloating or excessive gas:  rest,   eat lightly and use a heating pad.  - Sore Throat: treat with throat lozenges and/or gargle with warm salt   water.  - Because air was used during the procedure, expelling large amounts of air   from your rectum or belching is normal.  - If a bowel prep was taken, you may not have a bowel movement for 1-3 days.    This is normal.  SYMPTOMS TO WATCH FOR AND REPORT TO YOUR PHYSICIAN:  1. Abdominal pain or bloating, other than gas cramps.  2. Chest pain.  3. Back pain.  4. Signs of infection such as: chills or fever occurring within 24 hours   after the procedure.  5. Rectal bleeding, which would show as bright red, maroon, or black stools.   (A tablespoon of blood from the rectum is not serious, especially if   hemorrhoids are present.)  6. Vomiting.  7. Weakness or dizziness.  GO DIRECTLY TO THE NEAREST EMERGENCY ROOM IF YOU HAVE ANY OF THE FOLLOWING:      Difficulty breathing              Chills and/or fever over 101 F   Persistent vomiting and/or vomiting blood   Severe abdominal pain   Severe chest pain   Black, tarry stools   Bleeding- more than one tablespoon   Any other  symptom or condition that you feel may need urgent attention  Your doctor recommends these additional instructions:  If any biopsies were taken, your doctors clinic will contact you in 1 to 2   weeks with any results.  - Patient has a contact number available for emergencies.  The signs and   symptoms of potential delayed complications were discussed with the   patient.  Return to normal activities tomorrow.  Written discharge   instructions were provided to the patient.   - Discharge patient to home (ambulatory).   - Resume previous diet.   - Continue present medications.   - Resume Coumadin (warfarin) at prior dose today.  Refer to managing   physician for further adjustment of therapy.   - Await pathology results.   - No repeat colonoscopy due to age.  For questions, problems or results please call your physician - Dell Elena MD at Work:  [phone].  [facility], EMERGENCY ROOM PHONE NUMBER: [phone]  IF A COMPLICATION OR EMERGENCY SITUATION ARISES AND YOU ARE UNABLE TO REACH   YOUR PHYSICIAN - GO DIRECTLY TO THE EMERGENCY ROOM.  Dell Elena MD  6/2/2020 9:28:48 AM  This report has been verified and signed electronically.  [employer]

## 2020-06-02 NOTE — TRANSFER OF CARE
"Anesthesia Transfer of Care Note    Patient: Linda Skelton    Procedure(s) Performed: Procedure(s) (LRB):  EGD (ESOPHAGOGASTRODUODENOSCOPY) (N/A)  COLONOSCOPY (N/A)    Patient location: PACU    Anesthesia Type: general    Transport from OR: Transported from OR on room air with adequate spontaneous ventilation    Post pain: adequate analgesia    Post assessment: tolerated procedure well and no apparent anesthetic complications    Post vital signs: stable    Level of consciousness: sedated    Nausea/Vomiting: no nausea/vomiting    Complications: none    Transfer of care protocol was followed      Last vitals:   Visit Vitals  BP (!) 88/57 (BP Location: Right arm)   Pulse 81   Temp 36.8 °C (98.2 °F)   Resp 16   Ht 6' 3" (1.905 m)   Wt 108 kg (238 lb)   SpO2 100%   BMI 29.75 kg/m²     "

## 2020-06-04 PROBLEM — K21.9 GERD (GASTROESOPHAGEAL REFLUX DISEASE): Status: RESOLVED | Noted: 2020-06-02 | Resolved: 2020-06-04

## 2020-06-04 LAB
FINAL PATHOLOGIC DIAGNOSIS: NORMAL
GROSS: NORMAL

## 2020-06-15 ENCOUNTER — NURSE TRIAGE (OUTPATIENT)
Dept: ADMINISTRATIVE | Facility: CLINIC | Age: 85
End: 2020-06-15

## 2020-06-15 NOTE — TELEPHONE ENCOUNTER
Pt contacted through Post Procedural Symptom Tracking. Denies any cough, fever, or difficulty breathing since procedure.    Reason for Disposition   Health Information question, no triage required and triager able to answer question    Additional Information   Negative: [1] Caller is not with the adult (patient) AND [2] reporting urgent symptoms   Negative: Lab result questions   Negative: Medication questions   Negative: Caller can't be reached by phone   Negative: Caller has already spoken to PCP or another triager   Negative: RN needs further essential information from caller in order to complete triage   Negative: Requesting regular office appointment   Negative: [1] Caller requesting NON-URGENT health information AND [2] PCP's office is the best resource    Protocols used: INFORMATION ONLY CALL-A-

## 2020-06-17 ENCOUNTER — LAB VISIT (OUTPATIENT)
Dept: LAB | Facility: HOSPITAL | Age: 85
End: 2020-06-17
Attending: INTERNAL MEDICINE
Payer: MEDICARE

## 2020-06-17 ENCOUNTER — CLINICAL SUPPORT (OUTPATIENT)
Dept: FAMILY MEDICINE | Facility: CLINIC | Age: 85
End: 2020-06-17
Payer: MEDICARE

## 2020-06-17 DIAGNOSIS — E29.1 HYPOGONADISM IN MALE: Primary | ICD-10-CM

## 2020-06-17 DIAGNOSIS — I48.0 PAROXYSMAL ATRIAL FIBRILLATION: ICD-10-CM

## 2020-06-17 LAB
INR PPP: 1.8 (ref 0.8–1.2)
PROTHROMBIN TIME: 17.6 SEC (ref 9–12.5)

## 2020-06-17 PROCEDURE — 96372 PR INJECTION,THERAP/PROPH/DIAG2ST, IM OR SUBCUT: ICD-10-PCS | Mod: HCNC,S$GLB,, | Performed by: INTERNAL MEDICINE

## 2020-06-17 PROCEDURE — 99499 UNLISTED E&M SERVICE: CPT | Mod: HCNC,S$GLB,, | Performed by: INTERNAL MEDICINE

## 2020-06-17 PROCEDURE — 96373 PR INJECTION,THERAP/PROPH/DIAGNOST, INTRA-ARTERIAL: ICD-10-PCS | Mod: HCNC,S$GLB,, | Performed by: INTERNAL MEDICINE

## 2020-06-17 PROCEDURE — 96373 THER/PROPH/DIAG INJ IA: CPT | Mod: HCNC,S$GLB,, | Performed by: INTERNAL MEDICINE

## 2020-06-17 PROCEDURE — 96372 THER/PROPH/DIAG INJ SC/IM: CPT | Mod: HCNC,S$GLB,, | Performed by: INTERNAL MEDICINE

## 2020-06-17 PROCEDURE — 36415 COLL VENOUS BLD VENIPUNCTURE: CPT | Mod: HCNC,PO

## 2020-06-17 PROCEDURE — 99499 NO LOS: ICD-10-PCS | Mod: HCNC,S$GLB,, | Performed by: INTERNAL MEDICINE

## 2020-06-17 PROCEDURE — 85610 PROTHROMBIN TIME: CPT | Mod: HCNC

## 2020-06-17 RX ADMIN — TESTOSTERONE CYPIONATE 100 MG: 200 INJECTION, SOLUTION INTRAMUSCULAR at 08:06

## 2020-06-18 ENCOUNTER — OFFICE VISIT (OUTPATIENT)
Dept: ELECTROPHYSIOLOGY | Facility: CLINIC | Age: 85
End: 2020-06-18
Payer: MEDICARE

## 2020-06-18 ENCOUNTER — ANTI-COAG VISIT (OUTPATIENT)
Dept: CARDIOLOGY | Facility: CLINIC | Age: 85
End: 2020-06-18
Payer: MEDICARE

## 2020-06-18 ENCOUNTER — HOSPITAL ENCOUNTER (OUTPATIENT)
Dept: CARDIOLOGY | Facility: CLINIC | Age: 85
Discharge: HOME | End: 2020-06-18
Payer: MEDICARE

## 2020-06-18 VITALS
WEIGHT: 238.56 LBS | BODY MASS INDEX: 29.66 KG/M2 | DIASTOLIC BLOOD PRESSURE: 70 MMHG | SYSTOLIC BLOOD PRESSURE: 130 MMHG | HEART RATE: 92 BPM | HEIGHT: 75 IN

## 2020-06-18 DIAGNOSIS — I50.22 CHRONIC SYSTOLIC HEART FAILURE: ICD-10-CM

## 2020-06-18 DIAGNOSIS — I10 ESSENTIAL HYPERTENSION: ICD-10-CM

## 2020-06-18 DIAGNOSIS — I48.19 PERSISTENT ATRIAL FIBRILLATION: Primary | ICD-10-CM

## 2020-06-18 DIAGNOSIS — I95.1 ORTHOSTATIC HYPOTENSION: ICD-10-CM

## 2020-06-18 DIAGNOSIS — I48.19 PERSISTENT ATRIAL FIBRILLATION: ICD-10-CM

## 2020-06-18 DIAGNOSIS — Z95.828 PRESENCE OF IVC FILTER: ICD-10-CM

## 2020-06-18 PROCEDURE — 1159F PR MEDICATION LIST DOCUMENTED IN MEDICAL RECORD: ICD-10-PCS | Mod: HCNC,S$GLB,, | Performed by: INTERNAL MEDICINE

## 2020-06-18 PROCEDURE — 99999 PR PBB SHADOW E&M-EST. PATIENT-LVL III: ICD-10-PCS | Mod: PBBFAC,HCNC,, | Performed by: INTERNAL MEDICINE

## 2020-06-18 PROCEDURE — 1101F PR PT FALLS ASSESS DOC 0-1 FALLS W/OUT INJ PAST YR: ICD-10-PCS | Mod: HCNC,CPTII,S$GLB, | Performed by: INTERNAL MEDICINE

## 2020-06-18 PROCEDURE — 93005 ELECTROCARDIOGRAM TRACING: CPT | Mod: HCNC,S$GLB,, | Performed by: INTERNAL MEDICINE

## 2020-06-18 PROCEDURE — 3075F SYST BP GE 130 - 139MM HG: CPT | Mod: HCNC,CPTII,S$GLB, | Performed by: INTERNAL MEDICINE

## 2020-06-18 PROCEDURE — 93010 RHYTHM STRIP: ICD-10-PCS | Mod: HCNC,S$GLB,, | Performed by: INTERNAL MEDICINE

## 2020-06-18 PROCEDURE — 99999 PR PBB SHADOW E&M-EST. PATIENT-LVL III: CPT | Mod: PBBFAC,HCNC,, | Performed by: INTERNAL MEDICINE

## 2020-06-18 PROCEDURE — 93010 ELECTROCARDIOGRAM REPORT: CPT | Mod: HCNC,S$GLB,, | Performed by: INTERNAL MEDICINE

## 2020-06-18 PROCEDURE — 1126F AMNT PAIN NOTED NONE PRSNT: CPT | Mod: HCNC,S$GLB,, | Performed by: INTERNAL MEDICINE

## 2020-06-18 PROCEDURE — 99214 PR OFFICE/OUTPT VISIT, EST, LEVL IV, 30-39 MIN: ICD-10-PCS | Mod: HCNC,S$GLB,, | Performed by: INTERNAL MEDICINE

## 2020-06-18 PROCEDURE — 93005 RHYTHM STRIP: ICD-10-PCS | Mod: HCNC,S$GLB,, | Performed by: INTERNAL MEDICINE

## 2020-06-18 PROCEDURE — 1101F PT FALLS ASSESS-DOCD LE1/YR: CPT | Mod: HCNC,CPTII,S$GLB, | Performed by: INTERNAL MEDICINE

## 2020-06-18 PROCEDURE — 3075F PR MOST RECENT SYSTOLIC BLOOD PRESS GE 130-139MM HG: ICD-10-PCS | Mod: HCNC,CPTII,S$GLB, | Performed by: INTERNAL MEDICINE

## 2020-06-18 PROCEDURE — 1159F MED LIST DOCD IN RCRD: CPT | Mod: HCNC,S$GLB,, | Performed by: INTERNAL MEDICINE

## 2020-06-18 PROCEDURE — 1126F PR PAIN SEVERITY QUANTIFIED, NO PAIN PRESENT: ICD-10-PCS | Mod: HCNC,S$GLB,, | Performed by: INTERNAL MEDICINE

## 2020-06-18 PROCEDURE — 99214 OFFICE O/P EST MOD 30 MIN: CPT | Mod: HCNC,S$GLB,, | Performed by: INTERNAL MEDICINE

## 2020-06-18 PROCEDURE — 3078F DIAST BP <80 MM HG: CPT | Mod: HCNC,CPTII,S$GLB, | Performed by: INTERNAL MEDICINE

## 2020-06-18 PROCEDURE — 3078F PR MOST RECENT DIASTOLIC BLOOD PRESSURE < 80 MM HG: ICD-10-PCS | Mod: HCNC,CPTII,S$GLB, | Performed by: INTERNAL MEDICINE
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
## 2020-06-18 NOTE — PROGRESS NOTES
INR low. He recently held coumadin for EGD/Cscope. He missed follow up lab as planned and advised last visit. He was supposed to have INR the week after procedure. It looks like he gave MA a hard time during questioning today. Patient did not confirm dose correctly. He also reports eating a large salad earlier this week. Patient has a history of doing what he wants vs what we recommend.     We will bolus dose today then resume prescribed dose. Recheck INR in 2 weeks.

## 2020-06-18 NOTE — PROGRESS NOTES
Primary Cardiologist: Ivet Manning MD    Subjective:   Patient ID:  Linda Skelton is a 85 y.o. male who presents for follow-up of syncope and AF.     HPI:  Mr. Skelton is a 85 y.o. male with paroxysmal atrial fibrillation s/p cryo-PVI by Dr. 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    Background:    Mr. Skelton is an 84 year-old man with paroxysmal atrial fibrillation s/p cryo-PVI by Dr. 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 He notes early AF recurrence after tikosyn therapy was stopped post-PVI. Tikosyn was resumed. He noted since his wife passed away last September he began having AF recurrences. They tend to last 30-48 hours. He notes even though he is apparently rate controlled (80s on home check) during the episodes he does have relative hypotension with systolic blood pressure in the low 100s and he feels very poor. Otherwise he feels well.   At first office visit with Dr. Topete, 5/21/2019, discussed changing to amiodarone. He desired to not take amiodarone due to potential side effects. We initiated sotalol however he has had breakthrough (shown on EKG 7/2019) Discussed re-do PVI.    9/30/2019 Successful redo-pulmonary vein RF ablation.Successful left atrial posterior wall isolation for complex CAFEs.    At his last office visit 1/9/2020, Mr. Skelton presented for follow up. He had recurrent AF on sotalol post-redo-PVI. We performed a DCCV 12/10/2019 and initiated amiodarone. He feels good from an AF standpoint. EKG showed sinus rhythm with first degree AV block (RI 214msec).  RTC in 6 months with preclinic CMP/TSH. Needs baseline PFTs.     Update (2/27/2020):  Mr. Skelton saw Renay Fernandez for  follow up of syncopal episode and recurrent AF (s/p re-do PVI 9/2019).  On 2/20/2020 Mr. Rich presented to the ED after syncopal episode. Stated he stood from seated position, became dizzy, then passed out. He was found to have orthostatic hypotension and AF while in ED. Echo showed EF 45%. He was discharged home, no medication changes. His AF recurred at follow-up. Due to failing multiple AADs and undergoing 2 PVIs, we elected for rate control.    Interim Hx:  Mr. Skelton presents for follow-up. He notes persistent lack of energy. He denies dyspnea on exertion or chest discomfort. He is able to do all of his activities but has to stop often to rest. A holter monitor noted relatively well controlled ventricular rates.    My interpretation of today's in clinic electrocardiogram is AF with average ventricular rate of 92 bpm.    Review of Systems   Constitution: Positive for malaise/fatigue. Negative for chills and fever.   HENT: Negative for congestion and nosebleeds.    Eyes: Negative for blurred vision.   Cardiovascular: Negative for chest pain, dyspnea on exertion, irregular heartbeat, leg swelling, near-syncope, orthopnea, palpitations, paroxysmal nocturnal dyspnea and syncope.   Respiratory: Negative for cough, hemoptysis, shortness of breath, sleep disturbances due to breathing and wheezing.    Endocrine: Negative for polyphagia.   Hematologic/Lymphatic: Negative for bleeding problem. Does not bruise/bleed easily.   Skin: Negative for itching and rash.   Musculoskeletal: Negative for back pain, joint pain, muscle cramps and muscle weakness.   Gastrointestinal: Negative for bloating, abdominal pain and hematemesis.   Genitourinary: Negative for hematuria.   Neurological: Negative for dizziness, focal weakness, headaches, light-headedness, loss of balance, numbness, paresthesias and weakness.   Psychiatric/Behavioral: Negative for altered mental status. The patient is not nervous/anxious.      Objective:      Physical Exam   Constitutional: He is oriented to person, place, and time. He appears well-developed and well-nourished. He does not appear ill. No distress.   HENT:   Head: Normocephalic and atraumatic.   Eyes: Pupils are equal, round, and reactive to light. Conjunctivae, EOM and lids are normal.   Neck: Normal range of motion. Neck supple.   Cardiovascular: Normal rate, normal heart sounds and normal pulses. An irregularly irregular rhythm present. Exam reveals no gallop and no friction rub.   No murmur heard.  Pulmonary/Chest: Effort normal and breath sounds normal. No accessory muscle usage. No respiratory distress. He has no decreased breath sounds. He has no wheezes. He has no rhonchi. He has no rales. He exhibits no tenderness.   Abdominal: Normal appearance. There is no abdominal tenderness. There is no guarding.   Musculoskeletal: Normal range of motion.         General: No edema.   Neurological: He is alert and oriented to person, place, and time. He has normal strength. He is not disoriented. No sensory deficit. Coordination and gait normal.   Skin: Skin is warm and dry. He is not diaphoretic.   Psychiatric: He has a normal mood and affect. His speech is normal and behavior is normal. Judgment and thought content normal.   Nursing note and vitals reviewed.        Assessment:     1. Persistent atrial fibrillation did not want amio long term; 9/30/19 redo PVI with RFA; 11/1/19 DCCV; 12/10/19 repeat DCCV    2. Orthostatic hypotension, hospitalization 2/2020    3. Essential hypertension    4. Chronic systolic heart failure 2/2020 TTE with reduced LVEF compared to previous LVEF 45%    5. Presence of IVC filter 1997      Plan:   In summary, Mr. Skelton is a 85 y.o. male with a history of  symptomatic persistent atrial fibrillation s/p cryo-PVI by Dr. Ahuja 2/2016, coronary artery disease with prior MI and preserved LVEF, hypertension, CKD stage 3, and left leg DVT with chronically occluded left iliac  vein (discovered during PVI) presenting for follow-up of recurrent symptomatic persistent AF despite sotalol, dofetilide, and amiodarone s/p redo-PVI who presents today for follow up. He has persistent lack of energy with atrial fibrillation. Recommend repeating his ECHO. If EF is worse will increase metoprolol and order a PET stress test. If EF is the same or improved will maintain on low dose metoprolol.    Will call with results of ECHO and discuss next steps. Otherwise follow up in about 6 months, sooner as needed.    Thank you for allowing me to participate in the care of this patient. Please do not hesitate to call me with any questions or concerns.    Neel Topete MD, PhD  Cardiac Electrophysiology

## 2020-06-22 ENCOUNTER — PATIENT MESSAGE (OUTPATIENT)
Dept: ELECTROPHYSIOLOGY | Facility: CLINIC | Age: 85
End: 2020-06-22

## 2020-06-24 ENCOUNTER — HOSPITAL ENCOUNTER (OUTPATIENT)
Dept: CARDIOLOGY | Facility: HOSPITAL | Age: 85
Discharge: HOME | End: 2020-06-24
Attending: INTERNAL MEDICINE
Payer: MEDICARE

## 2020-06-24 ENCOUNTER — TELEPHONE (OUTPATIENT)
Dept: ELECTROPHYSIOLOGY | Facility: CLINIC | Age: 85
End: 2020-06-24

## 2020-06-24 VITALS
DIASTOLIC BLOOD PRESSURE: 70 MMHG | SYSTOLIC BLOOD PRESSURE: 130 MMHG | HEIGHT: 75 IN | BODY MASS INDEX: 29.59 KG/M2 | HEART RATE: 80 BPM | WEIGHT: 238 LBS

## 2020-06-24 DIAGNOSIS — I48.19 PERSISTENT ATRIAL FIBRILLATION: ICD-10-CM

## 2020-06-24 LAB
ASCENDING AORTA: 3.38 CM
AV INDEX (PROSTH): 0.65
AV MEAN GRADIENT: 2 MMHG
AV PEAK GRADIENT: 4 MMHG
AV VALVE AREA: 2.64 CM2
AV VELOCITY RATIO: 0.67
BSA FOR ECHO PROCEDURE: 2.39 M2
CV ECHO LV RWT: 0.41 CM
DOP CALC AO PEAK VEL: 1.02 M/S
DOP CALC AO VTI: 18.27 CM
DOP CALC LVOT AREA: 4.1 CM2
DOP CALC LVOT DIAMETER: 2.28 CM
DOP CALC LVOT PEAK VEL: 0.68 M/S
DOP CALC LVOT STROKE VOLUME: 48.23 CM3
DOP CALCLVOT PEAK VEL VTI: 11.82 CM
E/E' RATIO: 10.15 M/S
ECHO LV POSTERIOR WALL: 1.19 CM (ref 0.6–1.1)
FRACTIONAL SHORTENING: 21 % (ref 28–44)
INTERVENTRICULAR SEPTUM: 1.02 CM (ref 0.6–1.1)
IVRT: 77.07 MSEC
LA MAJOR: 7 CM
LA MINOR: 7.04 CM
LA WIDTH: 5.07 CM
LEFT ATRIUM SIZE: 4.69 CM
LEFT ATRIUM VOLUME INDEX: 60.1 ML/M2
LEFT ATRIUM VOLUME: 141.88 CM3
LEFT INTERNAL DIMENSION IN SYSTOLE: 4.63 CM (ref 2.1–4)
LEFT VENTRICLE DIASTOLIC VOLUME INDEX: 71.75 ML/M2
LEFT VENTRICLE DIASTOLIC VOLUME: 169.52 ML
LEFT VENTRICLE MASS INDEX: 114 G/M2
LEFT VENTRICLE SYSTOLIC VOLUME INDEX: 41.9 ML/M2
LEFT VENTRICLE SYSTOLIC VOLUME: 99.04 ML
LEFT VENTRICULAR INTERNAL DIMENSION IN DIASTOLE: 5.84 CM (ref 3.5–6)
LEFT VENTRICULAR MASS: 268.91 G
LV LATERAL E/E' RATIO: 8.25 M/S
LV SEPTAL E/E' RATIO: 13.2 M/S
MV PEAK E VEL: 0.66 M/S
PISA TR MAX VEL: 3.27 M/S
PULM VEIN S/D RATIO: 0.68
PV PEAK D VEL: 0.63 M/S
PV PEAK S VEL: 0.43 M/S
RA MAJOR: 6.58 CM
RA PRESSURE: 15 MMHG
RA WIDTH: 5.55 CM
RIGHT VENTRICULAR END-DIASTOLIC DIMENSION: 4.36 CM
RV TISSUE DOPPLER FREE WALL SYSTOLIC VELOCITY 1 (APICAL 4 CHAMBER VIEW): 9.91 CM/S
SINUS: 3.49 CM
STJ: 3.2 CM
TDI LATERAL: 0.08 M/S
TDI SEPTAL: 0.05 M/S
TDI: 0.07 M/S
TR MAX PG: 43 MMHG
TRICUSPID ANNULAR PLANE SYSTOLIC EXCURSION: 1.44 CM
TV REST PULMONARY ARTERY PRESSURE: 58 MMHG

## 2020-06-24 PROCEDURE — 93306 TTE W/DOPPLER COMPLETE: CPT | Mod: 26,HCNC,, | Performed by: INTERNAL MEDICINE

## 2020-06-24 PROCEDURE — 93306 TTE W/DOPPLER COMPLETE: CPT | Mod: HCNC

## 2020-06-24 PROCEDURE — 93306 ECHO (CUPID ONLY): ICD-10-PCS | Mod: 26,HCNC,, | Performed by: INTERNAL MEDICINE

## 2020-06-24 NOTE — PROGRESS NOTES
Please notify the patient that his heart function remains moderately depressed (similar to when he went to the [facility]). He also appears to have extra fluid on board which is likely contributing to his feeling poorly (he has some heart failure). I would like for him to see Dr. Kerri WOOD. We can start lasix 40mg daily until he sees Dr. Manning. Once he is lost some water weight I would like to increase his metoprolol to 50mg daily (maybe in 2 weeks). I tried calling him myself but did not reach him. I am happy to call him if he wants to talk to me as well.

## 2020-06-24 NOTE — TELEPHONE ENCOUNTER
Attempting to reach Mr. Skelton regarding below.  No answer to home or mobile numbers.  Will attempt again.

## 2020-06-24 NOTE — TELEPHONE ENCOUNTER
----- Message from Neel Topete MD sent at 6/24/2020  2:46 PM CDT -----  Please notify the patient that his heart function remains moderately depressed (similar to when he went to the [facility]). He also appears to have extra fluid on board which is likely contributing to his feeling poorly (he has some heart failure). I would like for him to see Dr. Manning ASAP. We can start lasix 40mg daily until he sees Dr. Manning. Once he is lost some water weight I would like to increase his metoprolol to 50mg daily (maybe in 2 weeks). I tried calling him myself but did not reach him. I am happy to call him if he wants to talk to me as well.

## 2020-06-25 ENCOUNTER — OFFICE VISIT (OUTPATIENT)
Dept: OTOLARYNGOLOGY | Facility: CLINIC | Age: 85
End: 2020-06-25
Payer: MEDICARE

## 2020-06-25 VITALS
BODY MASS INDEX: 29.92 KG/M2 | HEIGHT: 75 IN | WEIGHT: 240.63 LBS | TEMPERATURE: 98 F | SYSTOLIC BLOOD PRESSURE: 122 MMHG | DIASTOLIC BLOOD PRESSURE: 82 MMHG

## 2020-06-25 DIAGNOSIS — H61.22 IMPACTED CERUMEN OF LEFT EAR: Primary | ICD-10-CM

## 2020-06-25 PROCEDURE — 99499 NO LOS: ICD-10-PCS | Mod: S$GLB,,, | Performed by: OTOLARYNGOLOGY

## 2020-06-25 PROCEDURE — 99499 UNLISTED E&M SERVICE: CPT | Mod: S$GLB,,, | Performed by: OTOLARYNGOLOGY

## 2020-06-25 PROCEDURE — 69210 EAR CERUMEN REMOVAL: ICD-10-PCS | Mod: S$GLB,,, | Performed by: OTOLARYNGOLOGY

## 2020-06-25 PROCEDURE — 69210 REMOVE IMPACTED EAR WAX UNI: CPT | Mod: S$GLB,,, | Performed by: OTOLARYNGOLOGY

## 2020-06-25 NOTE — PROCEDURES
Ear Cerumen Removal    Date/Time: 6/25/2020 9:15 AM  Performed by: Neil Bowie MD  Authorized by: Neil Bowie MD     Consent Done?:  Yes (Verbal)  Location details:  Left ear  Procedure type: curette    Cerumen  Removal Results:  Cerumen completely removed     Binocular microscopy used to examine ear canal and tympanic membrane.  There was excess cerumen on the right side and an impaction of cerumen on the left.  This was removed using a curette and other microinstrumentation.

## 2020-06-26 ENCOUNTER — TELEPHONE (OUTPATIENT)
Dept: ELECTROPHYSIOLOGY | Facility: CLINIC | Age: 85
End: 2020-06-26

## 2020-06-26 DIAGNOSIS — E23.0 PANHYPOPITUITARISM: ICD-10-CM

## 2020-06-26 DIAGNOSIS — N40.0 BENIGN NON-NODULAR PROSTATIC HYPERPLASIA WITHOUT LOWER URINARY TRACT SYMPTOMS: ICD-10-CM

## 2020-06-26 RX ORDER — TAMSULOSIN HYDROCHLORIDE 0.4 MG/1
CAPSULE ORAL
Qty: 90 CAPSULE | Refills: 1 | Status: SHIPPED | OUTPATIENT
Start: 2020-06-26 | End: 2020-07-20 | Stop reason: SINTOL

## 2020-06-26 RX ORDER — LEVOTHYROXINE SODIUM 112 UG/1
TABLET ORAL
Qty: 90 TABLET | Refills: 1 | Status: SHIPPED | OUTPATIENT
Start: 2020-06-26 | End: 2020-09-28

## 2020-06-26 RX ORDER — FOLIC ACID 1 MG/1
TABLET ORAL
Qty: 90 TABLET | Refills: 1 | Status: SHIPPED | OUTPATIENT
Start: 2020-06-26 | End: 2020-12-28

## 2020-06-26 NOTE — TELEPHONE ENCOUNTER
Reached out to Mr. Skelton's son, Benjamin.  Let Benjamin know I have been unable to reach Mr. Skelton the past two days.  Gave my direct line to Benjamin and asked he have his dad call me back.  Benjamin verbalizes understanding and appreciates call.

## 2020-06-26 NOTE — TELEPHONE ENCOUNTER
Attempting again to reach Mr. Skelton.  No answer to home or mobile numbers.  Message left asking for him to return call to our office.  My direct line left for return call.

## 2020-06-29 ENCOUNTER — PATIENT MESSAGE (OUTPATIENT)
Dept: ELECTROPHYSIOLOGY | Facility: CLINIC | Age: 85
End: 2020-06-29

## 2020-06-30 ENCOUNTER — TELEPHONE (OUTPATIENT)
Dept: ELECTROPHYSIOLOGY | Facility: CLINIC | Age: 85
End: 2020-06-30

## 2020-06-30 RX ORDER — FUROSEMIDE 40 MG/1
40 TABLET ORAL DAILY
COMMUNITY
Start: 2020-06-30 | End: 2020-07-10 | Stop reason: SDUPTHER

## 2020-07-01 ENCOUNTER — TELEPHONE (OUTPATIENT)
Dept: FAMILY MEDICINE | Facility: CLINIC | Age: 85
End: 2020-07-01

## 2020-07-01 ENCOUNTER — ANTI-COAG VISIT (OUTPATIENT)
Dept: CARDIOLOGY | Facility: CLINIC | Age: 85
End: 2020-07-01
Payer: MEDICARE

## 2020-07-01 ENCOUNTER — CLINICAL SUPPORT (OUTPATIENT)
Dept: FAMILY MEDICINE | Facility: CLINIC | Age: 85
End: 2020-07-01
Payer: MEDICARE

## 2020-07-01 DIAGNOSIS — E29.1 HYPOGONADISM IN MALE: Primary | ICD-10-CM

## 2020-07-01 DIAGNOSIS — E29.1 HYPOGONADISM IN MALE: ICD-10-CM

## 2020-07-01 DIAGNOSIS — I48.19 PERSISTENT ATRIAL FIBRILLATION: ICD-10-CM

## 2020-07-01 PROCEDURE — 93793 ANTICOAG MGMT PT WARFARIN: CPT | Mod: S$GLB,,,

## 2020-07-01 PROCEDURE — 93793 PR ANTICOAGULANT MGMT FOR PT TAKING WARFARIN: ICD-10-PCS | Mod: S$GLB,,,

## 2020-07-01 PROCEDURE — 96372 PR INJECTION,THERAP/PROPH/DIAG2ST, IM OR SUBCUT: ICD-10-PCS | Mod: HCNC,S$GLB,, | Performed by: INTERNAL MEDICINE

## 2020-07-01 PROCEDURE — 96372 THER/PROPH/DIAG INJ SC/IM: CPT | Mod: HCNC,S$GLB,, | Performed by: INTERNAL MEDICINE

## 2020-07-01 RX ORDER — TESTOSTERONE CYPIONATE 200 MG/ML
100 INJECTION, SOLUTION INTRAMUSCULAR
Status: SHIPPED | OUTPATIENT
Start: 2020-07-01 | End: 2020-09-23

## 2020-07-01 RX ADMIN — TESTOSTERONE CYPIONATE 100 MG: 200 INJECTION, SOLUTION INTRAMUSCULAR at 08:07

## 2020-07-01 NOTE — PROGRESS NOTES
Patient is extended to 4 weeks and that's when he is due. His dose is not stable for long enough time to recommend longer than that.

## 2020-07-09 DIAGNOSIS — I48.19 PERSISTENT ATRIAL FIBRILLATION: Primary | ICD-10-CM

## 2020-07-09 DIAGNOSIS — I34.0 MITRAL VALVE INSUFFICIENCY, UNSPECIFIED ETIOLOGY: ICD-10-CM

## 2020-07-10 ENCOUNTER — HOSPITAL ENCOUNTER (OUTPATIENT)
Dept: CARDIOLOGY | Facility: CLINIC | Age: 85
Discharge: HOME | End: 2020-07-10
Payer: MEDICARE

## 2020-07-10 ENCOUNTER — OFFICE VISIT (OUTPATIENT)
Dept: CARDIOLOGY | Facility: CLINIC | Age: 85
End: 2020-07-10
Payer: MEDICARE

## 2020-07-10 VITALS
DIASTOLIC BLOOD PRESSURE: 52 MMHG | BODY MASS INDEX: 28.97 KG/M2 | HEIGHT: 75 IN | OXYGEN SATURATION: 98 % | HEART RATE: 55 BPM | SYSTOLIC BLOOD PRESSURE: 106 MMHG | WEIGHT: 233 LBS

## 2020-07-10 DIAGNOSIS — I34.0 MITRAL VALVE INSUFFICIENCY, UNSPECIFIED ETIOLOGY: ICD-10-CM

## 2020-07-10 DIAGNOSIS — I10 ESSENTIAL HYPERTENSION: ICD-10-CM

## 2020-07-10 DIAGNOSIS — E78.2 MIXED HYPERLIPIDEMIA: ICD-10-CM

## 2020-07-10 DIAGNOSIS — N18.30 CHRONIC KIDNEY DISEASE, STAGE 3: ICD-10-CM

## 2020-07-10 DIAGNOSIS — Z95.828 PRESENCE OF IVC FILTER: ICD-10-CM

## 2020-07-10 DIAGNOSIS — Z79.01 LONG TERM (CURRENT) USE OF ANTICOAGULANTS: ICD-10-CM

## 2020-07-10 DIAGNOSIS — E66.3 OVERWEIGHT (BMI 25.0-29.9): ICD-10-CM

## 2020-07-10 DIAGNOSIS — R55 NEAR SYNCOPE: Primary | ICD-10-CM

## 2020-07-10 DIAGNOSIS — I48.19 PERSISTENT ATRIAL FIBRILLATION: ICD-10-CM

## 2020-07-10 DIAGNOSIS — E03.9 ACQUIRED HYPOTHYROIDISM: ICD-10-CM

## 2020-07-10 DIAGNOSIS — I25.119 CORONARY ARTERY DISEASE INVOLVING NATIVE CORONARY ARTERY OF NATIVE HEART WITH ANGINA PECTORIS: ICD-10-CM

## 2020-07-10 DIAGNOSIS — I50.22 CHRONIC SYSTOLIC HEART FAILURE: ICD-10-CM

## 2020-07-10 DIAGNOSIS — Z86.718 HISTORY OF DEEP VEIN THROMBOSIS (DVT) OF LOWER EXTREMITY: ICD-10-CM

## 2020-07-10 PROCEDURE — 3288F FALL RISK ASSESSMENT DOCD: CPT | Mod: HCNC,CPTII,S$GLB, | Performed by: NURSE PRACTITIONER

## 2020-07-10 PROCEDURE — 1126F PR PAIN SEVERITY QUANTIFIED, NO PAIN PRESENT: ICD-10-PCS | Mod: HCNC,S$GLB,, | Performed by: NURSE PRACTITIONER

## 2020-07-10 PROCEDURE — 3078F PR MOST RECENT DIASTOLIC BLOOD PRESSURE < 80 MM HG: ICD-10-PCS | Mod: HCNC,CPTII,S$GLB, | Performed by: NURSE PRACTITIONER

## 2020-07-10 PROCEDURE — 99214 PR OFFICE/OUTPT VISIT, EST, LEVL IV, 30-39 MIN: ICD-10-PCS | Mod: 25,HCNC,S$GLB, | Performed by: NURSE PRACTITIONER

## 2020-07-10 PROCEDURE — 3074F SYST BP LT 130 MM HG: CPT | Mod: HCNC,CPTII,S$GLB, | Performed by: NURSE PRACTITIONER

## 2020-07-10 PROCEDURE — 1126F AMNT PAIN NOTED NONE PRSNT: CPT | Mod: HCNC,S$GLB,, | Performed by: NURSE PRACTITIONER

## 2020-07-10 PROCEDURE — 3074F PR MOST RECENT SYSTOLIC BLOOD PRESSURE < 130 MM HG: ICD-10-PCS | Mod: HCNC,CPTII,S$GLB, | Performed by: NURSE PRACTITIONER

## 2020-07-10 PROCEDURE — 1159F MED LIST DOCD IN RCRD: CPT | Mod: HCNC,S$GLB,, | Performed by: NURSE PRACTITIONER

## 2020-07-10 PROCEDURE — 99999 PR PBB SHADOW E&M-EST. PATIENT-LVL V: CPT | Mod: PBBFAC,HCNC,, | Performed by: NURSE PRACTITIONER

## 2020-07-10 PROCEDURE — 3288F PR FALLS RISK ASSESSMENT DOCUMENTED: ICD-10-PCS | Mod: HCNC,CPTII,S$GLB, | Performed by: NURSE PRACTITIONER

## 2020-07-10 PROCEDURE — 1100F PR PT FALLS ASSESS DOC 2+ FALLS/FALL W/INJURY/YR: ICD-10-PCS | Mod: HCNC,CPTII,S$GLB, | Performed by: NURSE PRACTITIONER

## 2020-07-10 PROCEDURE — 99214 OFFICE O/P EST MOD 30 MIN: CPT | Mod: 25,HCNC,S$GLB, | Performed by: NURSE PRACTITIONER

## 2020-07-10 PROCEDURE — 1100F PTFALLS ASSESS-DOCD GE2>/YR: CPT | Mod: HCNC,CPTII,S$GLB, | Performed by: NURSE PRACTITIONER

## 2020-07-10 PROCEDURE — 3078F DIAST BP <80 MM HG: CPT | Mod: HCNC,CPTII,S$GLB, | Performed by: NURSE PRACTITIONER

## 2020-07-10 PROCEDURE — 93000 ELECTROCARDIOGRAM COMPLETE: CPT | Mod: HCNC,S$GLB,, | Performed by: INTERNAL MEDICINE

## 2020-07-10 PROCEDURE — 93000 EKG 12-LEAD: ICD-10-PCS | Mod: HCNC,S$GLB,, | Performed by: INTERNAL MEDICINE

## 2020-07-10 PROCEDURE — 99999 PR PBB SHADOW E&M-EST. PATIENT-LVL V: ICD-10-PCS | Mod: PBBFAC,HCNC,, | Performed by: NURSE PRACTITIONER

## 2020-07-10 PROCEDURE — 1159F PR MEDICATION LIST DOCUMENTED IN MEDICAL RECORD: ICD-10-PCS | Mod: HCNC,S$GLB,, | Performed by: NURSE PRACTITIONER

## 2020-07-10 RX ORDER — FUROSEMIDE 40 MG/1
40 TABLET ORAL DAILY PRN
Qty: 30 TABLET | Refills: 11 | Status: SHIPPED | OUTPATIENT
Start: 2020-07-10 | End: 2021-02-22

## 2020-07-10 RX ORDER — METOPROLOL SUCCINATE 50 MG/1
50 TABLET, EXTENDED RELEASE ORAL DAILY
Qty: 90 TABLET | Refills: 3 | Status: SHIPPED | OUTPATIENT
Start: 2020-07-10 | End: 2021-06-29

## 2020-07-10 NOTE — LETTER
July 12, 2020      Angel Manning MD  [address]  [facility] [address]           [facility] - Cardiology  [address]  [facility] [address]  Phone: [phone]          Patient: Linda Skelton   MR Number: [id number]   YOB: 1935   Date of Visit: 7/10/2020       Dear Dr. Angel Manning:    Thank you for referring Linda Skelton to me for evaluation. Attached you will find relevant portions of my assessment and plan of care.    If you have questions, please do not hesitate to call me. I look forward to following Linda Skelton along with you.    Sincerely,    Vernell Lyman, NP    Enclosure  CC:  No Recipients    If you would like to receive this communication electronically, please contact [email or web address] or [phone] to request more information on [facility] access.    For providers and/or their staff who would like to refer a patient to [facility], please contact us through our one-stop-shop provider referral line, [facility], at [phone].    If you feel you have received this communication in error or would no longer like to receive these types of communications, please e-mail [email or web address]

## 2020-07-10 NOTE — PROGRESS NOTES
Mr. Skelton is a patient of Dr. Manning and was last seen in [facility] Visit 10/16/19.      Subjective:   Patient ID:  Linda Skelton is a 85 y.o. male who presents for follow-up of Persistent atrial fibrillation and Pre Syncope    Problems:  HFrEF, EF 38%  Dilated cardiomyopathy   Pulmonary hypertension  CAD s/p NSTEMI and RCA stent in 2000  HLD  HTN  Mitral regurgitation  H/o DVT in 1997  -IVC filter  Persistent atrial fibrillation s/p ablation in 2016 and again 9/30/19  -previous use of Tikosyn, amiodarone, and sotolol    HPI  Mr. Skelton is in clinic today for HFrEF follow up.  He had an echo on 6/24/20 that revealed a moderately reduced LV systolic function with local segmental wall motion abnormalities, pulmonary hypertension, and elevated CVP at 15 mmHg.  He was subsequently started on furosemide 40 mg daily on 7/1 and has lost 7 pounds. Since Sunday he started having issues lightheadedness, hypotension with sbp in the 80-90s and weakness.  Reports having difficulty completing ADLs without stopping to rest.  His ECG today was independently reviewed and is atrial fibrillation with RVR, rate 102 bpm.   He is now in persistent atrial fibrillation and a rate controlling strategy has been undertaken d/t failure to stay in rhythm with ablation and multiple antiarrhythmics.     Review of Systems   Constitution: Positive for malaise/fatigue and weight loss. Negative for decreased appetite, diaphoresis and weight gain.   Eyes: Negative for visual disturbance.   Cardiovascular: Negative for chest pain, claudication, dyspnea on exertion, irregular heartbeat, leg swelling, near-syncope, orthopnea, palpitations, paroxysmal nocturnal dyspnea and syncope.        Denies chest pressure   Respiratory: Negative for cough, hemoptysis, shortness of breath, sleep disturbances due to breathing and snoring.    Endocrine: Negative for cold intolerance and heat intolerance.   Hematologic/Lymphatic: Negative for bleeding problem.  Does not bruise/bleed easily.   Musculoskeletal: Negative for myalgias.   Gastrointestinal: Negative for bloating, abdominal pain, anorexia, change in bowel habit, constipation, diarrhea, nausea and vomiting.   Neurological: Positive for light-headedness and weakness. Negative for difficulty with concentration, disturbances in coordination, excessive daytime sleepiness, dizziness, headaches, loss of balance and numbness.   Psychiatric/Behavioral: The patient does not have insomnia.        Allergies and current medications updated and reviewed:  Review of patient's allergies indicates:  No Known Allergies  Current Outpatient Medications   Medication Sig    aspirin 81 mg Tab Take 81 mg by mouth every evening. 1 Tablet Oral Every night    ciclopirox (PENLAC) 8 % Soln Apply daily to affected nail. Must remove and restart weekly    clotrimazole (LOTRIMIN) 1 % cream Apply topically 2 (two) times daily. To feet  For 3 weeks as needed    folic acid (FOLVITE) 1 MG tablet Take 1 tablet by mouth once daily    furosemide (LASIX) 40 MG tablet Take 1 tablet (40 mg total) by mouth daily as needed (wt gain 3 or more pounds/day. wt gain 5 or more pounds/week).    ketoconazole (NIZORAL) 2 % cream AAA bid to feet x 3 weeks    levothyroxine (SYNTHROID) 112 MCG tablet TAKE 1 TABLET BY MOUTH ONCE DAILY BEFORE BREAKFAST    metoprolol succinate (TOPROL-XL) 50 MG 24 hr tablet Take 1 tablet (50 mg total) by mouth once daily.    NITROSTAT 0.4 mg SL tablet DISSOLVE ONE TABLET UNDER THE TONGUE EVERY 5 MINUTES AS NEEDED FOR CHEST PAIN.  DO NOT EXCEED A TOTAL OF 3 DOSES IN 15 MINUTES NOW    pantoprazole (PROTONIX) 40 MG tablet Take 1 tablet (40 mg total) by mouth once daily.    predniSONE (DELTASONE) 5 MG tablet Take 1 tablet by mouth once daily    rosuvastatin (CRESTOR) 40 MG Tab Take 1 tablet (40 mg total) by mouth once daily.    tamsulosin (FLOMAX) 0.4 mg Cap Take 1 capsule by mouth once daily    triamcinolone acetonide 0.1%  "(KENALOG) 0.1 % cream Apply to affected area qd- bid for flares    warfarin (COUMADIN) 5 MG tablet Take 1 tablet (5 mg total) by mouth Daily. Current Dose ( 2.5 mg every Sun; 5 mg all other days) or as directed by coumadin clinic. (Patient taking differently: Take 2.5 mg by mouth Daily. 2.5  & Thursday; 5mg aod)    dicyclomine (BENTYL) 20 mg tablet      Current Facility-Administered Medications   Medication    testosterone cypionate injection 100 mg     Facility-Administered Medications Ordered in Other Visits   Medication    0.9%  NaCl infusion    sodium chloride 0.9% flush 5 mL       Objective:     Right Arm BP - Sittin/63 (07/10/20 1107)  Left Arm BP - Sittin/52 (07/10/20 1107)    BP (!) 106/52 (BP Location: Left arm, Patient Position: Sitting, BP Method: Medium (Automatic))   Pulse (!) 55   Ht 6' 3" (1.905 m)   Wt 105.7 kg (233 lb 0.4 oz)   SpO2 98%   BMI 29.13 kg/m²       Physical Exam   Constitutional: He is oriented to person, place, and time. Vital signs are normal. He appears well-developed and well-nourished. He is active. No distress.   HENT:   Head: Normocephalic and atraumatic.   Eyes: Conjunctivae and lids are normal. No scleral icterus.   Neck: Neck supple. Normal carotid pulses, no hepatojugular reflux and no JVD present. Carotid bruit is not present.   Cardiovascular: Normal rate, S1 normal, S2 normal and intact distal pulses. An irregularly irregular rhythm present. PMI is not displaced. Exam reveals no gallop and no friction rub.   No murmur heard.  Pulses:       Carotid pulses are 2+ on the right side and 2+ on the left side.       Radial pulses are 2+ on the right side and 2+ on the left side.        Dorsalis pedis pulses are 2+ on the right side and 2+ on the left side.        Posterior tibial pulses are 1+ on the right side and 1+ on the left side.   Pulmonary/Chest: Effort normal and breath sounds normal. No respiratory distress. He has no decreased breath sounds. " He has no wheezes. He has no rhonchi. He has no rales. He exhibits no tenderness.   Abdominal: Soft. Normal appearance and bowel sounds are normal. He exhibits no distension, no fluid wave, no abdominal bruit, no ascites and no pulsatile midline mass. There is no hepatosplenomegaly. There is no abdominal tenderness.   Musculoskeletal:         General: No edema.   Neurological: He is alert and oriented to person, place, and time. Gait normal.   Skin: Skin is warm, dry and intact. No rash noted. He is not diaphoretic. Nails show no clubbing.   Psychiatric: He has a normal mood and affect. His speech is normal and behavior is normal. Judgment and thought content normal. Cognition and memory are normal.   Nursing note and vitals reviewed.      Chemistry        Component Value Date/Time     07/10/2020 1225    K 4.3 07/10/2020 1225    CL 99 07/10/2020 1225    CO2 32 (H) 07/10/2020 1225    BUN 27 (H) 07/10/2020 1225    CREATININE 1.8 (H) 07/10/2020 1225     (H) 07/10/2020 1225        Component Value Date/Time    CALCIUM 9.8 07/10/2020 1225    ALKPHOS 67 07/10/2020 1225    AST 20 07/10/2020 1225    ALT 16 07/10/2020 1225    BILITOT 0.8 07/10/2020 1225    ESTGFRAFRICA 38.8 (A) 07/10/2020 1225    EGFRNONAA 33.6 (A) 07/10/2020 1225        Lab Results   Component Value Date    HGBA1C 6.1 09/18/2013     Recent Labs   Lab 07/01/20  0845 07/10/20  1225   WBC 5.56  --    Hemoglobin 12.8 L  --    Hematocrit 42.7  --    Mean Corpuscular Volume 104 H  --    Platelets 119 L  --    BNP  --  125 H   TSH 0.571  --    Cholesterol 99 L  --    HDL 34 L  --    LDL Cholesterol 46.8 L  --    Triglycerides 91  --    Hdl/Cholesterol Ratio 34.3  --      Lab Results   Component Value Date    LFLHZSAI46 1167 (H) 08/13/2018     Lab Results   Component Value Date    FOLATE >24 01/20/2006       Recent Labs   Lab 05/06/20  0838 05/20/20  0847 06/17/20  0810 07/01/20  0845   INR 2.0 H 3.0 H 1.8 H 2.1 H        Test(s) Reviewed  I have  reviewed the following in detail:  [] Stress test   [] Angiography   [x] Echocardiogram   [x] Labs   [] Other:         Assessment/Plan:   1. Near syncope  Orthostatic negative today.  Postural lightheadedness.  Instructed to rise slowly and wait before moving to prevent falls.  Will stop daily furosemide and have him take it PRN wt gain >3 lbs/day or >5 lbs/wk. Will increase metoprolol to 50 mg daily to control his HR which maybe a contributing factor.  Requested he call in a week with how he is doing.      2. Chronic systolic heart failure 2/2020 TTE with reduced LVEF compared to previous LVEF 45%  Euvolemic on exam.  Change furosemide to PRN wt gain.  See near syncope for details.    - Brain Natriuretic Peptide; Future  - Comprehensive metabolic panel; Future  - metoprolol succinate (TOPROL-XL) 50 MG 24 hr tablet; Take 1 tablet (50 mg total) by mouth once daily.  Dispense: 90 tablet; Refill: 3  - furosemide (LASIX) 40 MG tablet; Take 1 tablet (40 mg total) by mouth daily as needed (wt gain 3 or more pounds/day. wt gain 5 or more pounds/week).  Dispense: 30 tablet; Refill: 11    3. Coronary artery disease involving native coronary artery of native heart with angina pectoris hx NSTEMI with PCI to RCA 2000  Asymptomatic. Taking high intensity statin and ASA. No changes.      4. Mixed hyperlipidemia  LDL at goal <70. No changes       5. Essential hypertension  BP at goal <130/80. Increase metoprolol to 50 mg for better rate control.       6. Mitral valve insufficiency, unspecified etiology      7. Persistent atrial fibrillation did not want amio long term; 9/30/19 redo PVI with RFA; 11/1/19 DCCV; 12/10/19 repeat DCCV    - metoprolol succinate (TOPROL-XL) 50 MG 24 hr tablet; Take 1 tablet (50 mg total) by mouth once daily.  Dispense: 90 tablet; Refill: 3    8. Long term (current) use of anticoagulants  Denies bleeding.  Discussed when to seek immediate medical attention.     9. History of deep vein thrombosis (DVT) of  lower extremity left, 1997      10. Presence of IVC filter 1997      11. 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 F/u with PCP.     12. Chronic kidney disease, stage 3  Repeat renal function today after he started diuresis.     Addendum: labs show he is a little dehydrated confirming need to hold diuretic.  Will continue plan to stop daily lasix and make it PRN wt gain.     13. Overweight  BMI 29 Encouraged increased CV exercise to 30 minutes a day for 5 days a week.       The patient was discussed and examined by Dr. Manning    Follow up in about 6 weeks (around 8/21/2020), or or sooner if weight is increasing.

## 2020-07-10 NOTE — PATIENT INSTRUCTIONS
Stop the daily furosemide and take it as needed.  Salt restriction of 2,000mg a day. Weigh yourself every morning after you go to the restroom.  Take a dose of Lasix (furosemide) if weight increases 3 or more pounds in a 24 hr period, or 5 pounds over a 7 day period. Contact our office if weight does not return to baseline within 1-2 days.    Increase the metoprolol succinate to 50 mg by mouth daily.  You can take 2 of the 25 mg by mouth daily until you run out.  Then start the new prescription.     Call in 1 week with your weight and how you are feeling.

## 2020-07-15 ENCOUNTER — CLINICAL SUPPORT (OUTPATIENT)
Dept: FAMILY MEDICINE | Facility: CLINIC | Age: 85
End: 2020-07-15
Payer: MEDICARE

## 2020-07-15 ENCOUNTER — OFFICE VISIT (OUTPATIENT)
Dept: DERMATOLOGY | Facility: CLINIC | Age: 85
End: 2020-07-15
Payer: MEDICARE

## 2020-07-15 DIAGNOSIS — D18.01 CHERRY ANGIOMA: ICD-10-CM

## 2020-07-15 DIAGNOSIS — Z86.006 HISTORY OF MELANOMA IN SITU: ICD-10-CM

## 2020-07-15 DIAGNOSIS — L90.5 SCAR: ICD-10-CM

## 2020-07-15 DIAGNOSIS — Z85.828 PERSONAL HISTORY OF SKIN CANCER: ICD-10-CM

## 2020-07-15 DIAGNOSIS — L82.1 SK (SEBORRHEIC KERATOSIS): ICD-10-CM

## 2020-07-15 DIAGNOSIS — E29.1 HYPOGONADISM IN MALE: Primary | ICD-10-CM

## 2020-07-15 DIAGNOSIS — C84.00 MYCOSIS FUNGOIDES, UNSPECIFIED BODY REGION: ICD-10-CM

## 2020-07-15 DIAGNOSIS — L57.0 AK (ACTINIC KERATOSIS): Primary | ICD-10-CM

## 2020-07-15 DIAGNOSIS — D22.9 NEVUS: ICD-10-CM

## 2020-07-15 PROCEDURE — 96372 THER/PROPH/DIAG INJ SC/IM: CPT | Mod: HCNC,S$GLB,, | Performed by: INTERNAL MEDICINE

## 2020-07-15 PROCEDURE — 17000 PR DESTRUCTION(LASER SURGERY,CRYOSURGERY,CHEMOSURGERY),PREMALIGNANT LESIONS,FIRST LESION: ICD-10-PCS | Mod: HCNC,S$GLB,, | Performed by: DERMATOLOGY

## 2020-07-15 PROCEDURE — 99999 PR PBB SHADOW E&M-EST. PATIENT-LVL III: ICD-10-PCS | Mod: PBBFAC,HCNC,, | Performed by: DERMATOLOGY

## 2020-07-15 PROCEDURE — 96372 PR INJECTION,THERAP/PROPH/DIAG2ST, IM OR SUBCUT: ICD-10-PCS | Mod: HCNC,S$GLB,, | Performed by: INTERNAL MEDICINE

## 2020-07-15 PROCEDURE — 1159F PR MEDICATION LIST DOCUMENTED IN MEDICAL RECORD: ICD-10-PCS | Mod: HCNC,S$GLB,, | Performed by: DERMATOLOGY

## 2020-07-15 PROCEDURE — 17003 DESTRUCTION, PREMALIGNANT LESIONS; SECOND THROUGH 14 LESIONS: ICD-10-PCS | Mod: HCNC,S$GLB,, | Performed by: DERMATOLOGY

## 2020-07-15 PROCEDURE — 17000 DESTRUCT PREMALG LESION: CPT | Mod: HCNC,S$GLB,, | Performed by: DERMATOLOGY

## 2020-07-15 PROCEDURE — 99999 PR PBB SHADOW E&M-EST. PATIENT-LVL III: CPT | Mod: PBBFAC,HCNC,, | Performed by: DERMATOLOGY

## 2020-07-15 PROCEDURE — 17003 DESTRUCT PREMALG LES 2-14: CPT | Mod: HCNC,S$GLB,, | Performed by: DERMATOLOGY

## 2020-07-15 PROCEDURE — 1159F MED LIST DOCD IN RCRD: CPT | Mod: HCNC,S$GLB,, | Performed by: DERMATOLOGY

## 2020-07-15 PROCEDURE — 1101F PT FALLS ASSESS-DOCD LE1/YR: CPT | Mod: HCNC,CPTII,S$GLB, | Performed by: DERMATOLOGY

## 2020-07-15 PROCEDURE — 1126F PR PAIN SEVERITY QUANTIFIED, NO PAIN PRESENT: ICD-10-PCS | Mod: HCNC,S$GLB,, | Performed by: DERMATOLOGY

## 2020-07-15 PROCEDURE — 1101F PR PT FALLS ASSESS DOC 0-1 FALLS W/OUT INJ PAST YR: ICD-10-PCS | Mod: HCNC,CPTII,S$GLB, | Performed by: DERMATOLOGY

## 2020-07-15 PROCEDURE — 99214 OFFICE O/P EST MOD 30 MIN: CPT | Mod: 25,HCNC,S$GLB, | Performed by: DERMATOLOGY

## 2020-07-15 PROCEDURE — 1126F AMNT PAIN NOTED NONE PRSNT: CPT | Mod: HCNC,S$GLB,, | Performed by: DERMATOLOGY

## 2020-07-15 PROCEDURE — 99214 PR OFFICE/OUTPT VISIT, EST, LEVL IV, 30-39 MIN: ICD-10-PCS | Mod: 25,HCNC,S$GLB, | Performed by: DERMATOLOGY

## 2020-07-15 RX ADMIN — TESTOSTERONE CYPIONATE 100 MG: 200 INJECTION, SOLUTION INTRAMUSCULAR at 09:07

## 2020-07-15 NOTE — PROGRESS NOTES
Subjective:       Patient ID:  Linda Skelton is a 85 y.o. male who presents for   Chief Complaint   Patient presents with    Skin Check     Pt here today for a TBSE  Pt c/o dry and scaly to scalp x few months. No itching burning or bleeding. No prev tx  Pt with hx of MIS and skin cancer and mycosis fungoides tx with TAC cream 0.1%       Review of Systems   Constitutional: Negative for fever and chills.   Skin: Positive for itching, rash, dry skin and activity-related sunscreen use.   Hematologic/Lymphatic: Bruises/bleeds easily.        Objective:    Physical Exam   Constitutional: He appears well-developed and well-nourished. No distress.   Lymphadenopathy: No supraclavicular adenopathy is present.     He has no cervical adenopathy.     He has no axillary adenopathy.     He has no inguinal adenopathy.   Neurological: He is alert and oriented to person, place, and time. He is not disoriented.   Psychiatric: He has a normal mood and affect.   Skin:   Areas Examined (abnormalities noted in diagram):   Scalp / Hair Palpated and Inspected  Head / Face Inspection Performed  Neck Inspection Performed  Chest / Axilla Inspection Performed  Abdomen Inspection Performed  Genitals / Buttocks / Groin Inspection Performed  Back Inspection Performed  RUE Inspected  LUE Inspection Performed  RLE Inspected  LLE Inspection Performed  Nails and Digits Inspection Performed                           Diagram Legend     Erythematous scaling macule/papule c/w actinic keratosis       Vascular papule c/w angioma      Pigmented verrucoid papule/plaque c/w seborrheic keratosis      Yellow umbilicated papule c/w sebaceous hyperplasia      Irregularly shaped tan macule c/w lentigo     1-2 mm smooth white papules consistent with Milia      Movable subcutaneous cyst with punctum c/w epidermal inclusion cyst      Subcutaneous movable cyst c/w pilar cyst      Firm pink to brown papule c/w dermatofibroma      Pedunculated fleshy papule(s) c/w  skin tag(s)      Evenly pigmented macule c/w junctional nevus     Mildly variegated pigmented, slightly irregular-bordered macule c/w mildly atypical nevus      Flesh colored to evenly pigmented papule c/w intradermal nevus       Pink pearly papule/plaque c/w basal cell carcinoma      Erythematous hyperkeratotic cursted plaque c/w SCC      Surgical scar with no sign of skin cancer recurrence      Open and closed comedones      Inflammatory papules and pustules      Verrucoid papule consistent consistent with wart     Erythematous eczematous patches and plaques     Dystrophic onycholytic nail with subungual debris c/w onychomycosis     Umbilicated papule    Erythematous-base heme-crusted tan verrucoid plaque consistent with inflamed seborrheic keratosis     Erythematous Silvery Scaling Plaque c/w Psoriasis     See annotation      Assessment / Plan:        AK (actinic keratosis)  Cryosurgery Procedure Note    Verbal consent from the patient is obtained including, but not limited to, risk of hypopigmentation/hyperpigmentation, scar, recurrence of lesion. The patient is aware of the precancerous quality and need for treatment of these lesions. Liquid nitrogen cryosurgery is applied to the 5 actinic keratoses, as detailed in the physical exam, to produce a freeze injury. The patient is aware that blisters may form and is instructed on wound care with gentle cleansing and use of vaseline ointment to keep moist until healed. The patient is supplied a handout on cryosurgery and is instructed to call if lesions do not completely resolve.    Mycosis fungoides, unspecified body region  TAC cream 0.1% bid to flared areas  Cerave/elocon combo to dry scaly areas    Nevus  Discussed ABCDE's of nevi.  Monitor for new mole or moles that are becoming bigger, darker, irritated, or developing irregular borders. Brochure provided.    Cherry angioma  /These are benign vascular lesions that are inherited.  Treatment is not  necessary.    SK (seborrheic keratosis)  These are benign inherited growths without a malignant potential. Reassurance given to patient. No treatment is necessary.     History of melanoma in situ  Personal history of skin cancer  Scar  Area of previous melanoma in situ and NMSC examined. Site well healed with no signs of recurrence.    Total body skin examination performed today including at least 12 points as noted in physical examination. No lesions suspicious for malignancy noted.             Follow up in about 4 months (around 11/15/2020).

## 2020-07-29 ENCOUNTER — LAB VISIT (OUTPATIENT)
Dept: LAB | Facility: HOSPITAL | Age: 85
End: 2020-07-29
Attending: INTERNAL MEDICINE
Payer: MEDICARE

## 2020-07-29 ENCOUNTER — ANTI-COAG VISIT (OUTPATIENT)
Dept: CARDIOLOGY | Facility: CLINIC | Age: 85
End: 2020-07-29

## 2020-07-29 ENCOUNTER — CLINICAL SUPPORT (OUTPATIENT)
Dept: FAMILY MEDICINE | Facility: CLINIC | Age: 85
End: 2020-07-29
Payer: MEDICARE

## 2020-07-29 DIAGNOSIS — I48.19 PERSISTENT ATRIAL FIBRILLATION: ICD-10-CM

## 2020-07-29 DIAGNOSIS — I48.0 PAF (PAROXYSMAL ATRIAL FIBRILLATION): ICD-10-CM

## 2020-07-29 DIAGNOSIS — E29.1 HYPOGONADISM IN MALE: Primary | ICD-10-CM

## 2020-07-29 LAB
INR PPP: 2.9 (ref 0.8–1.2)
PROTHROMBIN TIME: 32.5 SEC (ref 9–12.5)

## 2020-07-29 PROCEDURE — 96372 THER/PROPH/DIAG INJ SC/IM: CPT | Mod: HCNC,S$GLB,, | Performed by: INTERNAL MEDICINE

## 2020-07-29 PROCEDURE — 36415 COLL VENOUS BLD VENIPUNCTURE: CPT | Mod: HCNC,PO

## 2020-07-29 PROCEDURE — 96372 PR INJECTION,THERAP/PROPH/DIAG2ST, IM OR SUBCUT: ICD-10-PCS | Mod: HCNC,S$GLB,, | Performed by: INTERNAL MEDICINE

## 2020-07-29 PROCEDURE — 85610 PROTHROMBIN TIME: CPT | Mod: HCNC

## 2020-07-29 RX ADMIN — TESTOSTERONE CYPIONATE 100 MG: 200 INJECTION, SOLUTION INTRAMUSCULAR at 08:07

## 2020-08-12 ENCOUNTER — CLINICAL SUPPORT (OUTPATIENT)
Dept: FAMILY MEDICINE | Facility: CLINIC | Age: 85
End: 2020-08-12
Payer: MEDICARE

## 2020-08-12 DIAGNOSIS — E29.1 HYPOGONADISM IN MALE: Primary | ICD-10-CM

## 2020-08-12 PROCEDURE — 96372 PR INJECTION,THERAP/PROPH/DIAG2ST, IM OR SUBCUT: ICD-10-PCS | Mod: S$GLB,,, | Performed by: INTERNAL MEDICINE

## 2020-08-12 PROCEDURE — 96372 THER/PROPH/DIAG INJ SC/IM: CPT | Mod: S$GLB,,, | Performed by: INTERNAL MEDICINE

## 2020-08-12 RX ADMIN — TESTOSTERONE CYPIONATE 100 MG: 200 INJECTION, SOLUTION INTRAMUSCULAR at 08:08

## 2020-08-26 ENCOUNTER — CLINICAL SUPPORT (OUTPATIENT)
Dept: FAMILY MEDICINE | Facility: CLINIC | Age: 85
End: 2020-08-26
Payer: MEDICARE

## 2020-08-26 ENCOUNTER — LAB VISIT (OUTPATIENT)
Dept: LAB | Facility: HOSPITAL | Age: 85
End: 2020-08-26
Attending: INTERNAL MEDICINE
Payer: MEDICARE

## 2020-08-26 ENCOUNTER — ANTI-COAG VISIT (OUTPATIENT)
Dept: CARDIOLOGY | Facility: CLINIC | Age: 85
End: 2020-08-26

## 2020-08-26 ENCOUNTER — ANTI-COAG VISIT (OUTPATIENT)
Dept: CARDIOLOGY | Facility: CLINIC | Age: 85
End: 2020-08-26
Payer: MEDICARE

## 2020-08-26 DIAGNOSIS — I48.0 PAF (PAROXYSMAL ATRIAL FIBRILLATION): ICD-10-CM

## 2020-08-26 DIAGNOSIS — I48.19 PERSISTENT ATRIAL FIBRILLATION: ICD-10-CM

## 2020-08-26 DIAGNOSIS — E29.1 HYPOGONADISM IN MALE: Primary | ICD-10-CM

## 2020-08-26 LAB
INR PPP: 3.3 (ref 0.8–1.2)
PROTHROMBIN TIME: 36.7 SEC (ref 9–12.5)

## 2020-08-26 PROCEDURE — 96372 PR INJECTION,THERAP/PROPH/DIAG2ST, IM OR SUBCUT: ICD-10-PCS | Mod: S$GLB,,, | Performed by: INTERNAL MEDICINE

## 2020-08-26 PROCEDURE — 96372 THER/PROPH/DIAG INJ SC/IM: CPT | Mod: S$GLB,,, | Performed by: INTERNAL MEDICINE

## 2020-08-26 PROCEDURE — 93793 ANTICOAG MGMT PT WARFARIN: CPT | Mod: S$GLB,,,

## 2020-08-26 PROCEDURE — 36415 COLL VENOUS BLD VENIPUNCTURE: CPT | Mod: PO

## 2020-08-26 PROCEDURE — 85610 PROTHROMBIN TIME: CPT

## 2020-08-26 PROCEDURE — 93793 PR ANTICOAGULANT MGMT FOR PT TAKING WARFARIN: ICD-10-PCS | Mod: S$GLB,,,

## 2020-08-26 RX ADMIN — TESTOSTERONE CYPIONATE 100 MG: 200 INJECTION, SOLUTION INTRAMUSCULAR at 08:08

## 2020-08-26 NOTE — PROGRESS NOTES
8/26/20 called to advise pt on dose instructions and states that he does not believe the dose for today and resume dose will solve his issue. Pt would like for you to give him a call #[phone].

## 2020-08-26 NOTE — PROGRESS NOTES
INR not at goal. Medications, chart, and patient findings reviewed. Reports he may have eaten less greens. Plan to take 2.5mg 8/26 then resume dose. Advise to resume usual diet. See calendar for adjustments to dose and follow up plan.

## 2020-09-09 ENCOUNTER — ANTI-COAG VISIT (OUTPATIENT)
Dept: CARDIOLOGY | Facility: CLINIC | Age: 85
End: 2020-09-09
Payer: MEDICARE

## 2020-09-09 ENCOUNTER — LAB VISIT (OUTPATIENT)
Dept: LAB | Facility: HOSPITAL | Age: 85
End: 2020-09-09
Attending: INTERNAL MEDICINE
Payer: MEDICARE

## 2020-09-09 ENCOUNTER — CLINICAL SUPPORT (OUTPATIENT)
Dept: FAMILY MEDICINE | Facility: CLINIC | Age: 85
End: 2020-09-09
Payer: MEDICARE

## 2020-09-09 DIAGNOSIS — I48.0 PAF (PAROXYSMAL ATRIAL FIBRILLATION): ICD-10-CM

## 2020-09-09 DIAGNOSIS — Z23 FLU VACCINE NEED: Primary | ICD-10-CM

## 2020-09-09 DIAGNOSIS — I48.19 PERSISTENT ATRIAL FIBRILLATION: ICD-10-CM

## 2020-09-09 DIAGNOSIS — E29.1 HYPOGONADISM IN MALE: ICD-10-CM

## 2020-09-09 LAB
INR PPP: 2 (ref 0.8–1.2)
PROTHROMBIN TIME: 22.8 SEC (ref 9–12.5)

## 2020-09-09 PROCEDURE — 90662 IIV NO PRSV INCREASED AG IM: CPT | Mod: HCNC,S$GLB,, | Performed by: INTERNAL MEDICINE

## 2020-09-09 PROCEDURE — 99999 PR PBB SHADOW E&M-EST. PATIENT-LVL I: ICD-10-PCS | Mod: PBBFAC,HCNC,,

## 2020-09-09 PROCEDURE — 85610 PROTHROMBIN TIME: CPT | Mod: HCNC

## 2020-09-09 PROCEDURE — 96372 THER/PROPH/DIAG INJ SC/IM: CPT | Mod: HCNC,S$GLB,, | Performed by: INTERNAL MEDICINE

## 2020-09-09 PROCEDURE — 96372 PR INJECTION,THERAP/PROPH/DIAG2ST, IM OR SUBCUT: ICD-10-PCS | Mod: HCNC,S$GLB,, | Performed by: INTERNAL MEDICINE

## 2020-09-09 PROCEDURE — 90662 FLU VACCINE - HIGH DOSE (65+) PRESERVATIVE FREE IM: ICD-10-PCS | Mod: HCNC,S$GLB,, | Performed by: INTERNAL MEDICINE

## 2020-09-09 PROCEDURE — G0008 FLU VACCINE - HIGH DOSE (65+) PRESERVATIVE FREE IM: ICD-10-PCS | Mod: 59,HCNC,S$GLB, | Performed by: INTERNAL MEDICINE

## 2020-09-09 PROCEDURE — 36415 COLL VENOUS BLD VENIPUNCTURE: CPT | Mod: HCNC,PO

## 2020-09-09 PROCEDURE — G0008 ADMIN INFLUENZA VIRUS VAC: HCPCS | Mod: 59,HCNC,S$GLB, | Performed by: INTERNAL MEDICINE

## 2020-09-09 PROCEDURE — 99999 PR PBB SHADOW E&M-EST. PATIENT-LVL I: CPT | Mod: PBBFAC,HCNC,,

## 2020-09-09 PROCEDURE — 93793 ANTICOAG MGMT PT WARFARIN: CPT | Mod: S$GLB,,,

## 2020-09-09 PROCEDURE — 93793 PR ANTICOAGULANT MGMT FOR PT TAKING WARFARIN: ICD-10-PCS | Mod: S$GLB,,,

## 2020-09-09 RX ADMIN — TESTOSTERONE CYPIONATE 100 MG: 200 INJECTION, SOLUTION INTRAMUSCULAR at 08:09

## 2020-09-09 NOTE — PROGRESS NOTES
Pt tolerated flu vaccine to right deltoid without difficulty; no adverse reaction noted; VIS given  Pt tolerated injection of depo-testosterone 100mg to left ventrogluteal without difficulty; no adverse reaction noted

## 2020-09-23 ENCOUNTER — CLINICAL SUPPORT (OUTPATIENT)
Dept: FAMILY MEDICINE | Facility: CLINIC | Age: 85
End: 2020-09-23
Payer: MEDICARE

## 2020-09-23 DIAGNOSIS — E29.1 HYPOGONADISM IN MALE: Primary | ICD-10-CM

## 2020-09-23 PROCEDURE — 96372 THER/PROPH/DIAG INJ SC/IM: CPT | Mod: HCNC,S$GLB,, | Performed by: INTERNAL MEDICINE

## 2020-09-23 PROCEDURE — 96372 PR INJECTION,THERAP/PROPH/DIAG2ST, IM OR SUBCUT: ICD-10-PCS | Mod: HCNC,S$GLB,, | Performed by: INTERNAL MEDICINE

## 2020-09-23 PROCEDURE — 99999 PR PBB SHADOW E&M-EST. PATIENT-LVL I: CPT | Mod: PBBFAC,HCNC,,

## 2020-09-23 PROCEDURE — 99999 PR PBB SHADOW E&M-EST. PATIENT-LVL I: ICD-10-PCS | Mod: PBBFAC,HCNC,,

## 2020-09-23 RX ORDER — TESTOSTERONE CYPIONATE 200 MG/ML
100 INJECTION, SOLUTION INTRAMUSCULAR
Status: COMPLETED | OUTPATIENT
Start: 2020-09-23 | End: 2020-12-02

## 2020-09-23 RX ADMIN — TESTOSTERONE CYPIONATE 100 MG: 200 INJECTION, SOLUTION INTRAMUSCULAR at 08:09

## 2020-09-23 NOTE — PROGRESS NOTES
Pt tolerated depo-testosterone 100mg to right ventrogluteal without difficulty; no adverse reaction noted

## 2020-09-28 DIAGNOSIS — E23.0 PANHYPOPITUITARISM: ICD-10-CM

## 2020-09-28 RX ORDER — LEVOTHYROXINE SODIUM 112 UG/1
TABLET ORAL
Qty: 90 TABLET | Refills: 1 | Status: SHIPPED | OUTPATIENT
Start: 2020-09-28 | End: 2020-09-30 | Stop reason: SDUPTHER

## 2020-09-30 RX ORDER — LEVOTHYROXINE SODIUM 112 UG/1
112 TABLET ORAL DAILY
Qty: 90 TABLET | Refills: 1 | Status: SHIPPED | OUTPATIENT
Start: 2020-09-30 | End: 2021-06-29

## 2020-10-07 ENCOUNTER — LAB VISIT (OUTPATIENT)
Dept: LAB | Facility: HOSPITAL | Age: 85
End: 2020-10-07
Attending: INTERNAL MEDICINE
Payer: MEDICARE

## 2020-10-07 ENCOUNTER — CLINICAL SUPPORT (OUTPATIENT)
Dept: FAMILY MEDICINE | Facility: CLINIC | Age: 85
End: 2020-10-07
Payer: MEDICARE

## 2020-10-07 ENCOUNTER — ANTI-COAG VISIT (OUTPATIENT)
Dept: CARDIOLOGY | Facility: CLINIC | Age: 85
End: 2020-10-07
Payer: MEDICARE

## 2020-10-07 DIAGNOSIS — I48.19 PERSISTENT ATRIAL FIBRILLATION: ICD-10-CM

## 2020-10-07 DIAGNOSIS — E29.1 HYPOGONADISM IN MALE: Primary | ICD-10-CM

## 2020-10-07 LAB
INR PPP: 2.9 (ref 0.8–1.2)
PROTHROMBIN TIME: 32.3 SEC (ref 9–12.5)

## 2020-10-07 PROCEDURE — 96372 THER/PROPH/DIAG INJ SC/IM: CPT | Mod: HCNC,S$GLB,, | Performed by: INTERNAL MEDICINE

## 2020-10-07 PROCEDURE — 93793 ANTICOAG MGMT PT WARFARIN: CPT | Mod: S$GLB,,,

## 2020-10-07 PROCEDURE — 85610 PROTHROMBIN TIME: CPT | Mod: HCNC

## 2020-10-07 PROCEDURE — 36415 COLL VENOUS BLD VENIPUNCTURE: CPT | Mod: HCNC,PO

## 2020-10-07 PROCEDURE — 93793 PR ANTICOAGULANT MGMT FOR PT TAKING WARFARIN: ICD-10-PCS | Mod: S$GLB,,,

## 2020-10-07 PROCEDURE — 96372 PR INJECTION,THERAP/PROPH/DIAG2ST, IM OR SUBCUT: ICD-10-PCS | Mod: HCNC,S$GLB,, | Performed by: INTERNAL MEDICINE

## 2020-10-07 RX ADMIN — TESTOSTERONE CYPIONATE 100 MG: 200 INJECTION, SOLUTION INTRAMUSCULAR at 08:10

## 2020-10-07 NOTE — PROGRESS NOTES
Pt received testosterone inj. Tolerated well. Pt instructed to wait 15mins to be assessed for adverse reactions. verbalized understanding.

## 2020-10-07 NOTE — PROGRESS NOTES
10/7/20 pt was advised of dose instructions and return date for lab, pt states that is going in 2 months he wants to schedule for 12/2/20 pt states that he within his range and he has been pushed to 2 months before and he is tired of being stuck that often.

## 2020-10-17 ENCOUNTER — PATIENT MESSAGE (OUTPATIENT)
Dept: CARDIOLOGY | Facility: CLINIC | Age: 85
End: 2020-10-17

## 2020-10-21 ENCOUNTER — CLINICAL SUPPORT (OUTPATIENT)
Dept: FAMILY MEDICINE | Facility: CLINIC | Age: 85
End: 2020-10-21
Payer: MEDICARE

## 2020-10-21 DIAGNOSIS — E29.1 HYPOGONADISM IN MALE: Primary | ICD-10-CM

## 2020-10-21 PROCEDURE — 96372 THER/PROPH/DIAG INJ SC/IM: CPT | Mod: HCNC,S$GLB,, | Performed by: INTERNAL MEDICINE

## 2020-10-21 PROCEDURE — 96372 PR INJECTION,THERAP/PROPH/DIAG2ST, IM OR SUBCUT: ICD-10-PCS | Mod: HCNC,S$GLB,, | Performed by: INTERNAL MEDICINE

## 2020-10-21 RX ADMIN — TESTOSTERONE CYPIONATE 100 MG: 200 INJECTION, SOLUTION INTRAMUSCULAR at 08:10

## 2020-11-04 ENCOUNTER — CLINICAL SUPPORT (OUTPATIENT)
Dept: FAMILY MEDICINE | Facility: CLINIC | Age: 85
End: 2020-11-04
Payer: MEDICARE

## 2020-11-04 DIAGNOSIS — E29.1 HYPOGONADISM IN MALE: Primary | ICD-10-CM

## 2020-11-04 PROCEDURE — 96372 THER/PROPH/DIAG INJ SC/IM: CPT | Mod: HCNC,S$GLB,, | Performed by: INTERNAL MEDICINE

## 2020-11-04 PROCEDURE — 96372 PR INJECTION,THERAP/PROPH/DIAG2ST, IM OR SUBCUT: ICD-10-PCS | Mod: HCNC,S$GLB,, | Performed by: INTERNAL MEDICINE

## 2020-11-04 RX ADMIN — TESTOSTERONE CYPIONATE 100 MG: 200 INJECTION, SOLUTION INTRAMUSCULAR at 08:11

## 2020-11-13 ENCOUNTER — PATIENT MESSAGE (OUTPATIENT)
Dept: GASTROENTEROLOGY | Facility: CLINIC | Age: 85
End: 2020-11-13

## 2020-11-13 ENCOUNTER — PATIENT MESSAGE (OUTPATIENT)
Dept: FAMILY MEDICINE | Facility: CLINIC | Age: 85
End: 2020-11-13

## 2020-11-18 ENCOUNTER — CLINICAL SUPPORT (OUTPATIENT)
Dept: FAMILY MEDICINE | Facility: CLINIC | Age: 85
End: 2020-11-18
Payer: MEDICARE

## 2020-11-18 DIAGNOSIS — E29.1 HYPOGONADISM IN MALE: Primary | ICD-10-CM

## 2020-11-18 PROCEDURE — 99999 PR PBB SHADOW E&M-EST. PATIENT-LVL I: ICD-10-PCS | Mod: PBBFAC,HCNC,,

## 2020-11-18 PROCEDURE — 96372 THER/PROPH/DIAG INJ SC/IM: CPT | Mod: HCNC,S$GLB,, | Performed by: INTERNAL MEDICINE

## 2020-11-18 PROCEDURE — 99999 PR PBB SHADOW E&M-EST. PATIENT-LVL I: CPT | Mod: PBBFAC,HCNC,,

## 2020-11-18 PROCEDURE — 96372 PR INJECTION,THERAP/PROPH/DIAG2ST, IM OR SUBCUT: ICD-10-PCS | Mod: HCNC,S$GLB,, | Performed by: INTERNAL MEDICINE

## 2020-11-18 RX ADMIN — TESTOSTERONE CYPIONATE 100 MG: 200 INJECTION, SOLUTION INTRAMUSCULAR at 08:11

## 2020-12-02 ENCOUNTER — CLINICAL SUPPORT (OUTPATIENT)
Dept: FAMILY MEDICINE | Facility: CLINIC | Age: 85
End: 2020-12-02
Payer: MEDICARE

## 2020-12-02 ENCOUNTER — ANTI-COAG VISIT (OUTPATIENT)
Dept: CARDIOLOGY | Facility: CLINIC | Age: 85
End: 2020-12-02
Payer: MEDICARE

## 2020-12-02 ENCOUNTER — TELEPHONE (OUTPATIENT)
Dept: FAMILY MEDICINE | Facility: CLINIC | Age: 85
End: 2020-12-02

## 2020-12-02 ENCOUNTER — PATIENT MESSAGE (OUTPATIENT)
Dept: OTOLARYNGOLOGY | Facility: CLINIC | Age: 85
End: 2020-12-02

## 2020-12-02 ENCOUNTER — PATIENT MESSAGE (OUTPATIENT)
Dept: FAMILY MEDICINE | Facility: CLINIC | Age: 85
End: 2020-12-02

## 2020-12-02 ENCOUNTER — LAB VISIT (OUTPATIENT)
Dept: LAB | Facility: HOSPITAL | Age: 85
End: 2020-12-02
Attending: INTERNAL MEDICINE
Payer: MEDICARE

## 2020-12-02 DIAGNOSIS — E29.1 HYPOGONADISM IN MALE: Primary | ICD-10-CM

## 2020-12-02 DIAGNOSIS — I48.19 PERSISTENT ATRIAL FIBRILLATION: ICD-10-CM

## 2020-12-02 LAB
INR PPP: 2.8 (ref 0.8–1.2)
PROTHROMBIN TIME: 30.9 SEC (ref 9–12.5)

## 2020-12-02 PROCEDURE — 36415 COLL VENOUS BLD VENIPUNCTURE: CPT | Mod: HCNC,PO

## 2020-12-02 PROCEDURE — 96372 PR INJECTION,THERAP/PROPH/DIAG2ST, IM OR SUBCUT: ICD-10-PCS | Mod: HCNC,S$GLB,, | Performed by: INTERNAL MEDICINE

## 2020-12-02 PROCEDURE — 99999 PR PBB SHADOW E&M-EST. PATIENT-LVL I: CPT | Mod: PBBFAC,HCNC,,

## 2020-12-02 PROCEDURE — 99999 PR PBB SHADOW E&M-EST. PATIENT-LVL I: ICD-10-PCS | Mod: PBBFAC,HCNC,,

## 2020-12-02 PROCEDURE — 96372 THER/PROPH/DIAG INJ SC/IM: CPT | Mod: HCNC,S$GLB,, | Performed by: INTERNAL MEDICINE

## 2020-12-02 PROCEDURE — 93793 PR ANTICOAGULANT MGMT FOR PT TAKING WARFARIN: ICD-10-PCS | Mod: S$GLB,,,

## 2020-12-02 PROCEDURE — 93793 ANTICOAG MGMT PT WARFARIN: CPT | Mod: S$GLB,,,

## 2020-12-02 PROCEDURE — 85610 PROTHROMBIN TIME: CPT | Mod: HCNC

## 2020-12-02 RX ORDER — TESTOSTERONE CYPIONATE 200 MG/ML
100 INJECTION, SOLUTION INTRAMUSCULAR
Status: COMPLETED | OUTPATIENT
Start: 2020-12-02 | End: 2021-02-24

## 2020-12-02 RX ADMIN — TESTOSTERONE CYPIONATE 100 MG: 200 INJECTION, SOLUTION INTRAMUSCULAR at 08:12

## 2020-12-02 NOTE — TELEPHONE ENCOUNTER
Pt came in for testosterone inj today, this was his last ordered dose, if you could please place more orders if appropriate, he is scheduled to come for next dose on 12/16

## 2020-12-03 ENCOUNTER — PATIENT MESSAGE (OUTPATIENT)
Dept: FAMILY MEDICINE | Facility: CLINIC | Age: 85
End: 2020-12-03

## 2020-12-08 ENCOUNTER — OFFICE VISIT (OUTPATIENT)
Dept: OTOLARYNGOLOGY | Facility: CLINIC | Age: 85
End: 2020-12-08
Payer: MEDICARE

## 2020-12-08 VITALS — BODY MASS INDEX: 30.72 KG/M2 | WEIGHT: 245.81 LBS

## 2020-12-08 DIAGNOSIS — H60.392 ACUTE INFECTIVE OTITIS EXTERNA, LEFT: ICD-10-CM

## 2020-12-08 DIAGNOSIS — H61.22 IMPACTED CERUMEN OF LEFT EAR: Primary | ICD-10-CM

## 2020-12-08 PROCEDURE — 69210 EAR CERUMEN REMOVAL: ICD-10-PCS | Mod: HCNC,S$GLB,, | Performed by: OTOLARYNGOLOGY

## 2020-12-08 PROCEDURE — 69210 REMOVE IMPACTED EAR WAX UNI: CPT | Mod: HCNC,S$GLB,, | Performed by: OTOLARYNGOLOGY

## 2020-12-08 PROCEDURE — 1126F PR PAIN SEVERITY QUANTIFIED, NO PAIN PRESENT: ICD-10-PCS | Mod: HCNC,S$GLB,, | Performed by: OTOLARYNGOLOGY

## 2020-12-08 PROCEDURE — 1126F AMNT PAIN NOTED NONE PRSNT: CPT | Mod: HCNC,S$GLB,, | Performed by: OTOLARYNGOLOGY

## 2020-12-08 PROCEDURE — 1159F PR MEDICATION LIST DOCUMENTED IN MEDICAL RECORD: ICD-10-PCS | Mod: HCNC,S$GLB,, | Performed by: OTOLARYNGOLOGY

## 2020-12-08 PROCEDURE — 1159F MED LIST DOCD IN RCRD: CPT | Mod: HCNC,S$GLB,, | Performed by: OTOLARYNGOLOGY

## 2020-12-08 PROCEDURE — 99999 PR PBB SHADOW E&M-EST. PATIENT-LVL II: CPT | Mod: PBBFAC,HCNC,, | Performed by: OTOLARYNGOLOGY

## 2020-12-08 PROCEDURE — 99214 OFFICE O/P EST MOD 30 MIN: CPT | Mod: 25,HCNC,S$GLB, | Performed by: OTOLARYNGOLOGY

## 2020-12-08 PROCEDURE — 1101F PT FALLS ASSESS-DOCD LE1/YR: CPT | Mod: HCNC,CPTII,S$GLB, | Performed by: OTOLARYNGOLOGY

## 2020-12-08 PROCEDURE — 1101F PR PT FALLS ASSESS DOC 0-1 FALLS W/OUT INJ PAST YR: ICD-10-PCS | Mod: HCNC,CPTII,S$GLB, | Performed by: OTOLARYNGOLOGY

## 2020-12-08 PROCEDURE — 99214 PR OFFICE/OUTPT VISIT, EST, LEVL IV, 30-39 MIN: ICD-10-PCS | Mod: 25,HCNC,S$GLB, | Performed by: OTOLARYNGOLOGY

## 2020-12-08 PROCEDURE — 99999 PR PBB SHADOW E&M-EST. PATIENT-LVL II: ICD-10-PCS | Mod: PBBFAC,HCNC,, | Performed by: OTOLARYNGOLOGY

## 2020-12-08 PROCEDURE — 3288F FALL RISK ASSESSMENT DOCD: CPT | Mod: HCNC,CPTII,S$GLB, | Performed by: OTOLARYNGOLOGY

## 2020-12-08 PROCEDURE — 3288F PR FALLS RISK ASSESSMENT DOCUMENTED: ICD-10-PCS | Mod: HCNC,CPTII,S$GLB, | Performed by: OTOLARYNGOLOGY

## 2020-12-08 RX ORDER — NEOMYCIN SULFATE, POLYMYXIN B SULFATE AND HYDROCORTISONE 10; 3.5; 1 MG/ML; MG/ML; [USP'U]/ML
3 SUSPENSION/ DROPS AURICULAR (OTIC) 3 TIMES DAILY
Qty: 6 ML | Refills: 0 | Status: SHIPPED | OUTPATIENT
Start: 2020-12-08 | End: 2020-12-18

## 2020-12-09 ENCOUNTER — LAB VISIT (OUTPATIENT)
Dept: LAB | Facility: HOSPITAL | Age: 85
End: 2020-12-09
Attending: INTERNAL MEDICINE
Payer: MEDICARE

## 2020-12-09 DIAGNOSIS — E29.1 HYPOGONADISM IN MALE: ICD-10-CM

## 2020-12-09 LAB
ALBUMIN SERPL BCP-MCNC: 3.5 G/DL (ref 3.5–5.2)
ALP SERPL-CCNC: 62 U/L (ref 55–135)
ALT SERPL W/O P-5'-P-CCNC: 17 U/L (ref 10–44)
ANION GAP SERPL CALC-SCNC: 8 MMOL/L (ref 8–16)
AST SERPL-CCNC: 18 U/L (ref 10–40)
BASOPHILS # BLD AUTO: 0.01 K/UL (ref 0–0.2)
BASOPHILS NFR BLD: 0.2 % (ref 0–1.9)
BILIRUB SERPL-MCNC: 0.6 MG/DL (ref 0.1–1)
BUN SERPL-MCNC: 23 MG/DL (ref 8–23)
CALCIUM SERPL-MCNC: 9.1 MG/DL (ref 8.7–10.5)
CHLORIDE SERPL-SCNC: 103 MMOL/L (ref 95–110)
CO2 SERPL-SCNC: 30 MMOL/L (ref 23–29)
CREAT SERPL-MCNC: 1.6 MG/DL (ref 0.5–1.4)
DIFFERENTIAL METHOD: ABNORMAL
EOSINOPHIL # BLD AUTO: 0 K/UL (ref 0–0.5)
EOSINOPHIL NFR BLD: 0.4 % (ref 0–8)
ERYTHROCYTE [DISTWIDTH] IN BLOOD BY AUTOMATED COUNT: 14.7 % (ref 11.5–14.5)
EST. GFR  (AFRICAN AMERICAN): 44.7 ML/MIN/1.73 M^2
EST. GFR  (NON AFRICAN AMERICAN): 38.7 ML/MIN/1.73 M^2
GLUCOSE SERPL-MCNC: 72 MG/DL (ref 70–110)
HCT VFR BLD AUTO: 43.6 % (ref 40–54)
HGB BLD-MCNC: 13.4 G/DL (ref 14–18)
IMM GRANULOCYTES # BLD AUTO: 0.02 K/UL (ref 0–0.04)
IMM GRANULOCYTES NFR BLD AUTO: 0.4 % (ref 0–0.5)
LYMPHOCYTES # BLD AUTO: 1 K/UL (ref 1–4.8)
LYMPHOCYTES NFR BLD: 18.2 % (ref 18–48)
MCH RBC QN AUTO: 31.5 PG (ref 27–31)
MCHC RBC AUTO-ENTMCNC: 30.7 G/DL (ref 32–36)
MCV RBC AUTO: 102 FL (ref 82–98)
MONOCYTES # BLD AUTO: 1.3 K/UL (ref 0.3–1)
MONOCYTES NFR BLD: 24.4 % (ref 4–15)
NEUTROPHILS # BLD AUTO: 3 K/UL (ref 1.8–7.7)
NEUTROPHILS NFR BLD: 56.4 % (ref 38–73)
NRBC BLD-RTO: 0 /100 WBC
PLATELET # BLD AUTO: 113 K/UL (ref 150–350)
PMV BLD AUTO: 12.5 FL (ref 9.2–12.9)
POTASSIUM SERPL-SCNC: 3.9 MMOL/L (ref 3.5–5.1)
PROT SERPL-MCNC: 6.4 G/DL (ref 6–8.4)
RBC # BLD AUTO: 4.26 M/UL (ref 4.6–6.2)
SODIUM SERPL-SCNC: 141 MMOL/L (ref 136–145)
WBC # BLD AUTO: 5.33 K/UL (ref 3.9–12.7)

## 2020-12-09 PROCEDURE — 84403 ASSAY OF TOTAL TESTOSTERONE: CPT | Mod: HCNC

## 2020-12-09 PROCEDURE — 80053 COMPREHEN METABOLIC PANEL: CPT | Mod: HCNC

## 2020-12-09 PROCEDURE — 36415 COLL VENOUS BLD VENIPUNCTURE: CPT | Mod: HCNC,PO

## 2020-12-09 PROCEDURE — 85025 COMPLETE CBC W/AUTO DIFF WBC: CPT | Mod: HCNC

## 2020-12-09 NOTE — PROGRESS NOTES
Subjective:       Patient ID: Linda Skelton is a 85 y.o. male.    Chief Complaint: Ear Fullness    HPI     Linda Skelton is a 85 y.o. male presents for ear fullness of the left ear.  He notes associated mild sharp pain.  He denies discharge.  He has a history of wax impactions and is wondering if it may be due to wax build up.      Review of Systems   HENT: Positive for ear pain and hearing loss.    Eyes: Negative.    Respiratory: Negative.    Gastrointestinal: Negative.    Endocrine: Negative.    Genitourinary: Negative.    Musculoskeletal: Negative.    Integumentary:  Negative.   Allergic/Immunologic: Negative.    Neurological: Negative.    Hematological: Bruises/bleeds easily.   Psychiatric/Behavioral: Negative.          Objective:      Physical Exam  Constitutional:       Appearance: He is well-developed.   HENT:      Head: Normocephalic and atraumatic.      Right Ear: Tympanic membrane, ear canal and external ear normal. There is impacted cerumen.      Left Ear: Tympanic membrane, ear canal and external ear normal. Swelling (mild otitis externa of EAC, squamous debris cleaned) and tenderness present.      Nose: Nose normal.      Mouth/Throat:      Pharynx: Uvula midline.   Neck:      Musculoskeletal: Normal range of motion.      Thyroid: No thyroid mass.         Assessment:       1. Impacted cerumen of left ear    2. Acute infective otitis externa, left        Plan:        impaction removed on right   patients ear fullness is secondary to AOE on left side     cortisporin Rx sent to pharmacy   Answers for HPI/ROS submitted by the patient on 12/2/2020   Fatigue (Tiredness)?: Yes  Irregular heartbeat?: Yes

## 2020-12-09 NOTE — PROGRESS NOTES
Answers for HPI/ROS submitted by the patient on 12/2/2020   Fatigue (Tiredness)?: Yes  hearing loss: Yes  ear pain: Yes  None of these : Yes  None of these: Yes  Irregular heartbeat?: Yes  None of these: Yes  None of these: Yes  None of these: Yes  None of these : Yes  None of these: Yes  None of these : Yes  None of these: Yes  Bruises or bleeds easily: Yes  None of these: Yes

## 2020-12-10 DIAGNOSIS — E29.1 HYPOGONADISM IN MALE: Primary | ICD-10-CM

## 2020-12-10 DIAGNOSIS — D64.9 ANEMIA, UNSPECIFIED TYPE: ICD-10-CM

## 2020-12-10 LAB — TESTOST SERPL-MCNC: 1040 NG/DL (ref 304–1227)

## 2020-12-10 NOTE — PROGRESS NOTES
Testosterone high normal. Done 1 week after injection, getting 100 mg every 2 weeks.  CMP with CKD stable  CBC with macrocytosis, seen previously, last b12 level 2018 was high normal.  7/2020 TSH was normal.  Results to pt via [facility] [employer]. Repeat in about 2 months.  If testosterone still high normal would decrease further.

## 2020-12-16 ENCOUNTER — CLINICAL SUPPORT (OUTPATIENT)
Dept: FAMILY MEDICINE | Facility: CLINIC | Age: 85
End: 2020-12-16
Payer: MEDICARE

## 2020-12-16 DIAGNOSIS — E29.1 HYPOGONADISM IN MALE: Primary | ICD-10-CM

## 2020-12-16 PROCEDURE — 96372 THER/PROPH/DIAG INJ SC/IM: CPT | Mod: HCNC,S$GLB,, | Performed by: INTERNAL MEDICINE

## 2020-12-16 PROCEDURE — 96372 PR INJECTION,THERAP/PROPH/DIAG2ST, IM OR SUBCUT: ICD-10-PCS | Mod: HCNC,S$GLB,, | Performed by: INTERNAL MEDICINE

## 2020-12-16 RX ADMIN — TESTOSTERONE CYPIONATE 100 MG: 200 INJECTION, SOLUTION INTRAMUSCULAR at 08:12

## 2020-12-28 DIAGNOSIS — I25.10 CORONARY ARTERY DISEASE INVOLVING NATIVE CORONARY ARTERY OF NATIVE HEART, ANGINA PRESENCE UNSPECIFIED: ICD-10-CM

## 2020-12-28 DIAGNOSIS — E78.00 PURE HYPERCHOLESTEROLEMIA: ICD-10-CM

## 2020-12-28 RX ORDER — ROSUVASTATIN CALCIUM 40 MG/1
TABLET, COATED ORAL
Qty: 90 TABLET | Refills: 3 | Status: SHIPPED | OUTPATIENT
Start: 2020-12-28

## 2020-12-29 DIAGNOSIS — N40.0 BENIGN NON-NODULAR PROSTATIC HYPERPLASIA WITHOUT LOWER URINARY TRACT SYMPTOMS: ICD-10-CM

## 2020-12-29 RX ORDER — TAMSULOSIN HYDROCHLORIDE 0.4 MG/1
CAPSULE ORAL
Qty: 90 CAPSULE | Refills: 1 | Status: SHIPPED | OUTPATIENT
Start: 2020-12-29 | End: 2021-06-30

## 2020-12-30 ENCOUNTER — CLINICAL SUPPORT (OUTPATIENT)
Dept: FAMILY MEDICINE | Facility: CLINIC | Age: 85
End: 2020-12-30
Payer: MEDICARE

## 2020-12-30 DIAGNOSIS — E29.1 HYPOGONADISM IN MALE: Primary | ICD-10-CM

## 2020-12-30 PROCEDURE — 96372 PR INJECTION,THERAP/PROPH/DIAG2ST, IM OR SUBCUT: ICD-10-PCS | Mod: HCNC,S$GLB,, | Performed by: INTERNAL MEDICINE

## 2020-12-30 PROCEDURE — 96372 THER/PROPH/DIAG INJ SC/IM: CPT | Mod: HCNC,S$GLB,, | Performed by: INTERNAL MEDICINE

## 2020-12-30 PROCEDURE — 99999 PR PBB SHADOW E&M-EST. PATIENT-LVL I: ICD-10-PCS | Mod: PBBFAC,HCNC,,

## 2020-12-30 PROCEDURE — 99999 PR PBB SHADOW E&M-EST. PATIENT-LVL I: CPT | Mod: PBBFAC,HCNC,,

## 2020-12-30 RX ADMIN — TESTOSTERONE CYPIONATE 100 MG: 200 INJECTION, SOLUTION INTRAMUSCULAR at 08:12

## 2021-01-04 PROBLEM — D53.9 MACROCYTIC ANEMIA: Status: ACTIVE | Noted: 2021-01-04

## 2021-01-05 ENCOUNTER — OFFICE VISIT (OUTPATIENT)
Dept: FAMILY MEDICINE | Facility: CLINIC | Age: 86
End: 2021-01-05
Payer: MEDICARE

## 2021-01-05 ENCOUNTER — TELEPHONE (OUTPATIENT)
Dept: FAMILY MEDICINE | Facility: CLINIC | Age: 86
End: 2021-01-05

## 2021-01-05 VITALS
BODY MASS INDEX: 30.09 KG/M2 | WEIGHT: 242 LBS | DIASTOLIC BLOOD PRESSURE: 70 MMHG | TEMPERATURE: 98 F | HEIGHT: 75 IN | HEART RATE: 83 BPM | OXYGEN SATURATION: 97 % | SYSTOLIC BLOOD PRESSURE: 130 MMHG

## 2021-01-05 DIAGNOSIS — E29.1 HYPOGONADISM IN MALE: ICD-10-CM

## 2021-01-05 DIAGNOSIS — Z79.01 LONG TERM (CURRENT) USE OF ANTICOAGULANTS: ICD-10-CM

## 2021-01-05 DIAGNOSIS — C84.A0 PLEOMORPHIC SMALL OR MEDIUM-SIZED CELL CUTANEOUS T-CELL LYMPHOMA: ICD-10-CM

## 2021-01-05 DIAGNOSIS — N18.30 STAGE 3 CHRONIC KIDNEY DISEASE, UNSPECIFIED WHETHER STAGE 3A OR 3B CKD: ICD-10-CM

## 2021-01-05 DIAGNOSIS — E23.0 PANHYPOPITUITARISM: ICD-10-CM

## 2021-01-05 DIAGNOSIS — R42 VERTIGO: ICD-10-CM

## 2021-01-05 DIAGNOSIS — Z86.718 HISTORY OF DEEP VEIN THROMBOSIS (DVT) OF LOWER EXTREMITY: ICD-10-CM

## 2021-01-05 DIAGNOSIS — D53.9 MACROCYTIC ANEMIA: ICD-10-CM

## 2021-01-05 DIAGNOSIS — E78.2 MIXED HYPERLIPIDEMIA: ICD-10-CM

## 2021-01-05 DIAGNOSIS — I50.22 CHRONIC SYSTOLIC HEART FAILURE: ICD-10-CM

## 2021-01-05 DIAGNOSIS — I48.19 PERSISTENT ATRIAL FIBRILLATION: Primary | ICD-10-CM

## 2021-01-05 DIAGNOSIS — I25.119 CORONARY ARTERY DISEASE INVOLVING NATIVE CORONARY ARTERY OF NATIVE HEART WITH ANGINA PECTORIS: ICD-10-CM

## 2021-01-05 DIAGNOSIS — I10 ESSENTIAL HYPERTENSION: ICD-10-CM

## 2021-01-05 DIAGNOSIS — E03.9 ACQUIRED HYPOTHYROIDISM: ICD-10-CM

## 2021-01-05 PROCEDURE — 99214 PR OFFICE/OUTPT VISIT, EST, LEVL IV, 30-39 MIN: ICD-10-PCS | Mod: HCNC,S$GLB,, | Performed by: INTERNAL MEDICINE

## 2021-01-05 PROCEDURE — 3078F PR MOST RECENT DIASTOLIC BLOOD PRESSURE < 80 MM HG: ICD-10-PCS | Mod: HCNC,CPTII,S$GLB, | Performed by: INTERNAL MEDICINE

## 2021-01-05 PROCEDURE — 1159F MED LIST DOCD IN RCRD: CPT | Mod: HCNC,S$GLB,, | Performed by: INTERNAL MEDICINE

## 2021-01-05 PROCEDURE — 1101F PR PT FALLS ASSESS DOC 0-1 FALLS W/OUT INJ PAST YR: ICD-10-PCS | Mod: HCNC,CPTII,S$GLB, | Performed by: INTERNAL MEDICINE

## 2021-01-05 PROCEDURE — 99999 PR PBB SHADOW E&M-EST. PATIENT-LVL V: CPT | Mod: PBBFAC,HCNC,, | Performed by: INTERNAL MEDICINE

## 2021-01-05 PROCEDURE — 99999 PR PBB SHADOW E&M-EST. PATIENT-LVL V: ICD-10-PCS | Mod: PBBFAC,HCNC,, | Performed by: INTERNAL MEDICINE

## 2021-01-05 PROCEDURE — 99214 OFFICE O/P EST MOD 30 MIN: CPT | Mod: HCNC,S$GLB,, | Performed by: INTERNAL MEDICINE

## 2021-01-05 PROCEDURE — 3288F FALL RISK ASSESSMENT DOCD: CPT | Mod: HCNC,CPTII,S$GLB, | Performed by: INTERNAL MEDICINE

## 2021-01-05 PROCEDURE — 3078F DIAST BP <80 MM HG: CPT | Mod: HCNC,CPTII,S$GLB, | Performed by: INTERNAL MEDICINE

## 2021-01-05 PROCEDURE — 3288F PR FALLS RISK ASSESSMENT DOCUMENTED: ICD-10-PCS | Mod: HCNC,CPTII,S$GLB, | Performed by: INTERNAL MEDICINE

## 2021-01-05 PROCEDURE — 3075F SYST BP GE 130 - 139MM HG: CPT | Mod: HCNC,CPTII,S$GLB, | Performed by: INTERNAL MEDICINE

## 2021-01-05 PROCEDURE — 99499 RISK ADDL DX/OHS AUDIT: ICD-10-PCS | Mod: S$GLB,,, | Performed by: INTERNAL MEDICINE

## 2021-01-05 PROCEDURE — 1159F PR MEDICATION LIST DOCUMENTED IN MEDICAL RECORD: ICD-10-PCS | Mod: HCNC,S$GLB,, | Performed by: INTERNAL MEDICINE

## 2021-01-05 PROCEDURE — 1126F PR PAIN SEVERITY QUANTIFIED, NO PAIN PRESENT: ICD-10-PCS | Mod: HCNC,S$GLB,, | Performed by: INTERNAL MEDICINE

## 2021-01-05 PROCEDURE — 1101F PT FALLS ASSESS-DOCD LE1/YR: CPT | Mod: HCNC,CPTII,S$GLB, | Performed by: INTERNAL MEDICINE

## 2021-01-05 PROCEDURE — 1126F AMNT PAIN NOTED NONE PRSNT: CPT | Mod: HCNC,S$GLB,, | Performed by: INTERNAL MEDICINE

## 2021-01-05 PROCEDURE — 99499 UNLISTED E&M SERVICE: CPT | Mod: S$GLB,,, | Performed by: INTERNAL MEDICINE

## 2021-01-05 PROCEDURE — 3075F PR MOST RECENT SYSTOLIC BLOOD PRESS GE 130-139MM HG: ICD-10-PCS | Mod: HCNC,CPTII,S$GLB, | Performed by: INTERNAL MEDICINE

## 2021-01-05 RX ORDER — OMEPRAZOLE 20 MG/1
20 TABLET, DELAYED RELEASE ORAL EVERY OTHER DAY
Status: ON HOLD | COMMUNITY
End: 2021-07-09 | Stop reason: HOSPADM

## 2021-01-09 ENCOUNTER — IMMUNIZATION (OUTPATIENT)
Dept: INTERNAL MEDICINE | Facility: CLINIC | Age: 86
End: 2021-01-09
Payer: MEDICARE

## 2021-01-09 DIAGNOSIS — Z23 NEED FOR VACCINATION: ICD-10-CM

## 2021-01-09 PROCEDURE — 91300 COVID-19, MRNA, LNP-S, PF, 30 MCG/0.3 ML DOSE VACCINE: CPT | Mod: PBBFAC | Performed by: INTERNAL MEDICINE

## 2021-01-13 ENCOUNTER — CLINICAL SUPPORT (OUTPATIENT)
Dept: FAMILY MEDICINE | Facility: CLINIC | Age: 86
End: 2021-01-13
Payer: MEDICARE

## 2021-01-13 DIAGNOSIS — E29.1 HYPOGONADISM IN MALE: Primary | ICD-10-CM

## 2021-01-13 PROCEDURE — 96372 THER/PROPH/DIAG INJ SC/IM: CPT | Mod: HCNC,S$GLB,, | Performed by: INTERNAL MEDICINE

## 2021-01-13 PROCEDURE — 96372 PR INJECTION,THERAP/PROPH/DIAG2ST, IM OR SUBCUT: ICD-10-PCS | Mod: HCNC,S$GLB,, | Performed by: INTERNAL MEDICINE

## 2021-01-13 RX ADMIN — TESTOSTERONE CYPIONATE 100 MG: 200 INJECTION, SOLUTION INTRAMUSCULAR at 08:01

## 2021-01-14 ENCOUNTER — OFFICE VISIT (OUTPATIENT)
Dept: DERMATOLOGY | Facility: CLINIC | Age: 86
End: 2021-01-14
Payer: MEDICARE

## 2021-01-14 DIAGNOSIS — L90.5 SCAR: ICD-10-CM

## 2021-01-14 DIAGNOSIS — Z86.006 HISTORY OF MELANOMA IN SITU: ICD-10-CM

## 2021-01-14 DIAGNOSIS — L82.1 SK (SEBORRHEIC KERATOSIS): ICD-10-CM

## 2021-01-14 DIAGNOSIS — D22.9 NEVUS: ICD-10-CM

## 2021-01-14 DIAGNOSIS — L57.0 AK (ACTINIC KERATOSIS): ICD-10-CM

## 2021-01-14 DIAGNOSIS — D18.01 CHERRY ANGIOMA: ICD-10-CM

## 2021-01-14 DIAGNOSIS — C84.A0 PLEOMORPHIC SMALL OR MEDIUM-SIZED CELL CUTANEOUS T-CELL LYMPHOMA: Primary | ICD-10-CM

## 2021-01-14 DIAGNOSIS — Z85.828 PERSONAL HISTORY OF SKIN CANCER: ICD-10-CM

## 2021-01-14 PROCEDURE — 17003 DESTRUCTION, PREMALIGNANT LESIONS; SECOND THROUGH 14 LESIONS: ICD-10-PCS | Mod: HCNC,S$GLB,, | Performed by: DERMATOLOGY

## 2021-01-14 PROCEDURE — 1101F PR PT FALLS ASSESS DOC 0-1 FALLS W/OUT INJ PAST YR: ICD-10-PCS | Mod: HCNC,CPTII,S$GLB, | Performed by: DERMATOLOGY

## 2021-01-14 PROCEDURE — 99214 OFFICE O/P EST MOD 30 MIN: CPT | Mod: 25,HCNC,S$GLB, | Performed by: DERMATOLOGY

## 2021-01-14 PROCEDURE — 3288F FALL RISK ASSESSMENT DOCD: CPT | Mod: HCNC,CPTII,S$GLB, | Performed by: DERMATOLOGY

## 2021-01-14 PROCEDURE — 1101F PT FALLS ASSESS-DOCD LE1/YR: CPT | Mod: HCNC,CPTII,S$GLB, | Performed by: DERMATOLOGY

## 2021-01-14 PROCEDURE — 99214 PR OFFICE/OUTPT VISIT, EST, LEVL IV, 30-39 MIN: ICD-10-PCS | Mod: 25,HCNC,S$GLB, | Performed by: DERMATOLOGY

## 2021-01-14 PROCEDURE — 1126F AMNT PAIN NOTED NONE PRSNT: CPT | Mod: HCNC,S$GLB,, | Performed by: DERMATOLOGY

## 2021-01-14 PROCEDURE — 1159F MED LIST DOCD IN RCRD: CPT | Mod: HCNC,S$GLB,, | Performed by: DERMATOLOGY

## 2021-01-14 PROCEDURE — 99999 PR PBB SHADOW E&M-EST. PATIENT-LVL IV: CPT | Mod: PBBFAC,HCNC,, | Performed by: DERMATOLOGY

## 2021-01-14 PROCEDURE — 3288F PR FALLS RISK ASSESSMENT DOCUMENTED: ICD-10-PCS | Mod: HCNC,CPTII,S$GLB, | Performed by: DERMATOLOGY

## 2021-01-14 PROCEDURE — 1126F PR PAIN SEVERITY QUANTIFIED, NO PAIN PRESENT: ICD-10-PCS | Mod: HCNC,S$GLB,, | Performed by: DERMATOLOGY

## 2021-01-14 PROCEDURE — 17000 DESTRUCT PREMALG LESION: CPT | Mod: HCNC,S$GLB,, | Performed by: DERMATOLOGY

## 2021-01-14 PROCEDURE — 1159F PR MEDICATION LIST DOCUMENTED IN MEDICAL RECORD: ICD-10-PCS | Mod: HCNC,S$GLB,, | Performed by: DERMATOLOGY

## 2021-01-14 PROCEDURE — 17003 DESTRUCT PREMALG LES 2-14: CPT | Mod: HCNC,S$GLB,, | Performed by: DERMATOLOGY

## 2021-01-14 PROCEDURE — 99999 PR PBB SHADOW E&M-EST. PATIENT-LVL IV: ICD-10-PCS | Mod: PBBFAC,HCNC,, | Performed by: DERMATOLOGY

## 2021-01-14 PROCEDURE — 17000 PR DESTRUCTION(LASER SURGERY,CRYOSURGERY,CHEMOSURGERY),PREMALIGNANT LESIONS,FIRST LESION: ICD-10-PCS | Mod: HCNC,S$GLB,, | Performed by: DERMATOLOGY

## 2021-01-20 ENCOUNTER — LAB VISIT (OUTPATIENT)
Dept: LAB | Facility: HOSPITAL | Age: 86
End: 2021-01-20
Attending: INTERNAL MEDICINE
Payer: MEDICARE

## 2021-01-20 ENCOUNTER — OFFICE VISIT (OUTPATIENT)
Dept: HEMATOLOGY/ONCOLOGY | Facility: CLINIC | Age: 86
End: 2021-01-20
Payer: MEDICARE

## 2021-01-20 VITALS
HEART RATE: 64 BPM | DIASTOLIC BLOOD PRESSURE: 84 MMHG | HEIGHT: 75 IN | OXYGEN SATURATION: 98 % | TEMPERATURE: 97 F | WEIGHT: 246.69 LBS | SYSTOLIC BLOOD PRESSURE: 130 MMHG | BODY MASS INDEX: 30.67 KG/M2

## 2021-01-20 DIAGNOSIS — I25.10 CORONARY ARTERY DISEASE, ANGINA PRESENCE UNSPECIFIED, UNSPECIFIED VESSEL OR LESION TYPE, UNSPECIFIED WHETHER NATIVE OR TRANSPLANTED HEART: ICD-10-CM

## 2021-01-20 DIAGNOSIS — I10 HYPERTENSION, UNSPECIFIED TYPE: ICD-10-CM

## 2021-01-20 DIAGNOSIS — D53.9 MACROCYTIC ANEMIA: ICD-10-CM

## 2021-01-20 DIAGNOSIS — C84.A0 PLEOMORPHIC SMALL OR MEDIUM-SIZED CELL CUTANEOUS T-CELL LYMPHOMA: ICD-10-CM

## 2021-01-20 DIAGNOSIS — I48.0 PAF (PAROXYSMAL ATRIAL FIBRILLATION): ICD-10-CM

## 2021-01-20 DIAGNOSIS — E07.9 THYROID DISEASE: ICD-10-CM

## 2021-01-20 DIAGNOSIS — D53.9 MACROCYTIC ANEMIA: Primary | ICD-10-CM

## 2021-01-20 DIAGNOSIS — Z86.718 HISTORY OF DEEP VEIN THROMBOSIS (DVT) OF LOWER EXTREMITY: ICD-10-CM

## 2021-01-20 DIAGNOSIS — N18.30 STAGE 3 CHRONIC KIDNEY DISEASE, UNSPECIFIED WHETHER STAGE 3A OR 3B CKD: ICD-10-CM

## 2021-01-20 LAB
BASOPHILS # BLD AUTO: 0.01 K/UL (ref 0–0.2)
BASOPHILS NFR BLD: 0.2 % (ref 0–1.9)
DAT IGG-SP REAG RBC-IMP: NORMAL
DIFFERENTIAL METHOD: ABNORMAL
EOSINOPHIL # BLD AUTO: 0 K/UL (ref 0–0.5)
EOSINOPHIL NFR BLD: 0.2 % (ref 0–8)
ERYTHROCYTE [DISTWIDTH] IN BLOOD BY AUTOMATED COUNT: 14.6 % (ref 11.5–14.5)
HAPTOGLOB SERPL-MCNC: 171 MG/DL (ref 30–250)
HCT VFR BLD AUTO: 41.2 % (ref 40–54)
HCYS SERPL-SCNC: 12.3 UMOL/L (ref 4–16.5)
HGB BLD-MCNC: 13 G/DL (ref 14–18)
IMM GRANULOCYTES # BLD AUTO: 0.04 K/UL (ref 0–0.04)
IMM GRANULOCYTES NFR BLD AUTO: 0.7 % (ref 0–0.5)
LDH SERPL L TO P-CCNC: 237 U/L (ref 110–260)
LYMPHOCYTES # BLD AUTO: 0.7 K/UL (ref 1–4.8)
LYMPHOCYTES NFR BLD: 11.6 % (ref 18–48)
MCH RBC QN AUTO: 31.4 PG (ref 27–31)
MCHC RBC AUTO-ENTMCNC: 31.6 G/DL (ref 32–36)
MCV RBC AUTO: 100 FL (ref 82–98)
MONOCYTES # BLD AUTO: 1.1 K/UL (ref 0.3–1)
MONOCYTES NFR BLD: 18 % (ref 4–15)
NEUTROPHILS # BLD AUTO: 4.2 K/UL (ref 1.8–7.7)
NEUTROPHILS NFR BLD: 69.3 % (ref 38–73)
NRBC BLD-RTO: 0 /100 WBC
PATH REV BLD -IMP: NORMAL
PLATELET # BLD AUTO: 137 K/UL (ref 150–350)
PMV BLD AUTO: 11.4 FL (ref 9.2–12.9)
RBC # BLD AUTO: 4.14 M/UL (ref 4.6–6.2)
RETICS/RBC NFR AUTO: 2.1 % (ref 0.4–2)
WBC # BLD AUTO: 6.04 K/UL (ref 3.9–12.7)

## 2021-01-20 PROCEDURE — 83010 ASSAY OF HAPTOGLOBIN QUANT: CPT | Mod: HCNC

## 2021-01-20 PROCEDURE — 85060 BLOOD SMEAR INTERPRETATION: CPT | Mod: HCNC,,, | Performed by: PATHOLOGY

## 2021-01-20 PROCEDURE — 86880 COOMBS TEST DIRECT: CPT | Mod: HCNC

## 2021-01-20 PROCEDURE — 83615 LACTATE (LD) (LDH) ENZYME: CPT | Mod: HCNC

## 2021-01-20 PROCEDURE — 1159F MED LIST DOCD IN RCRD: CPT | Mod: HCNC,S$GLB,, | Performed by: INTERNAL MEDICINE

## 2021-01-20 PROCEDURE — 99499 RISK ADDL DX/OHS AUDIT: ICD-10-PCS | Mod: S$GLB,,, | Performed by: INTERNAL MEDICINE

## 2021-01-20 PROCEDURE — 1126F PR PAIN SEVERITY QUANTIFIED, NO PAIN PRESENT: ICD-10-PCS | Mod: HCNC,S$GLB,, | Performed by: INTERNAL MEDICINE

## 2021-01-20 PROCEDURE — 83921 ORGANIC ACID SINGLE QUANT: CPT | Mod: HCNC

## 2021-01-20 PROCEDURE — 1159F PR MEDICATION LIST DOCUMENTED IN MEDICAL RECORD: ICD-10-PCS | Mod: HCNC,S$GLB,, | Performed by: INTERNAL MEDICINE

## 2021-01-20 PROCEDURE — 99999 PR PBB SHADOW E&M-EST. PATIENT-LVL V: ICD-10-PCS | Mod: PBBFAC,HCNC,, | Performed by: INTERNAL MEDICINE

## 2021-01-20 PROCEDURE — 3288F PR FALLS RISK ASSESSMENT DOCUMENTED: ICD-10-PCS | Mod: HCNC,CPTII,S$GLB, | Performed by: INTERNAL MEDICINE

## 2021-01-20 PROCEDURE — 3075F SYST BP GE 130 - 139MM HG: CPT | Mod: HCNC,CPTII,S$GLB, | Performed by: INTERNAL MEDICINE

## 2021-01-20 PROCEDURE — 3075F PR MOST RECENT SYSTOLIC BLOOD PRESS GE 130-139MM HG: ICD-10-PCS | Mod: HCNC,CPTII,S$GLB, | Performed by: INTERNAL MEDICINE

## 2021-01-20 PROCEDURE — 85045 AUTOMATED RETICULOCYTE COUNT: CPT | Mod: HCNC

## 2021-01-20 PROCEDURE — 99999 PR PBB SHADOW E&M-EST. PATIENT-LVL V: CPT | Mod: PBBFAC,HCNC,, | Performed by: INTERNAL MEDICINE

## 2021-01-20 PROCEDURE — 3288F FALL RISK ASSESSMENT DOCD: CPT | Mod: HCNC,CPTII,S$GLB, | Performed by: INTERNAL MEDICINE

## 2021-01-20 PROCEDURE — 3079F PR MOST RECENT DIASTOLIC BLOOD PRESSURE 80-89 MM HG: ICD-10-PCS | Mod: HCNC,CPTII,S$GLB, | Performed by: INTERNAL MEDICINE

## 2021-01-20 PROCEDURE — 3079F DIAST BP 80-89 MM HG: CPT | Mod: HCNC,CPTII,S$GLB, | Performed by: INTERNAL MEDICINE

## 2021-01-20 PROCEDURE — 1101F PR PT FALLS ASSESS DOC 0-1 FALLS W/OUT INJ PAST YR: ICD-10-PCS | Mod: HCNC,CPTII,S$GLB, | Performed by: INTERNAL MEDICINE

## 2021-01-20 PROCEDURE — 85025 COMPLETE CBC W/AUTO DIFF WBC: CPT | Mod: HCNC

## 2021-01-20 PROCEDURE — 1126F AMNT PAIN NOTED NONE PRSNT: CPT | Mod: HCNC,S$GLB,, | Performed by: INTERNAL MEDICINE

## 2021-01-20 PROCEDURE — 83090 ASSAY OF HOMOCYSTEINE: CPT | Mod: HCNC

## 2021-01-20 PROCEDURE — 99215 OFFICE O/P EST HI 40 MIN: CPT | Mod: HCNC,S$GLB,, | Performed by: INTERNAL MEDICINE

## 2021-01-20 PROCEDURE — 1101F PT FALLS ASSESS-DOCD LE1/YR: CPT | Mod: HCNC,CPTII,S$GLB, | Performed by: INTERNAL MEDICINE

## 2021-01-20 PROCEDURE — 85060 PATHOLOGIST REVIEW: ICD-10-PCS | Mod: HCNC,,, | Performed by: PATHOLOGY

## 2021-01-20 PROCEDURE — 99215 PR OFFICE/OUTPT VISIT, EST, LEVL V, 40-54 MIN: ICD-10-PCS | Mod: HCNC,S$GLB,, | Performed by: INTERNAL MEDICINE

## 2021-01-20 PROCEDURE — 99499 UNLISTED E&M SERVICE: CPT | Mod: S$GLB,,, | Performed by: INTERNAL MEDICINE

## 2021-01-21 LAB — PATH REV BLD -IMP: NORMAL

## 2021-01-23 LAB — METHYLMALONATE SERPL-SCNC: 1.18 UMOL/L

## 2021-01-27 ENCOUNTER — CLINICAL SUPPORT (OUTPATIENT)
Dept: FAMILY MEDICINE | Facility: CLINIC | Age: 86
End: 2021-01-27
Payer: MEDICARE

## 2021-01-27 ENCOUNTER — LAB VISIT (OUTPATIENT)
Dept: LAB | Facility: HOSPITAL | Age: 86
End: 2021-01-27
Attending: INTERNAL MEDICINE
Payer: MEDICARE

## 2021-01-27 ENCOUNTER — ANTI-COAG VISIT (OUTPATIENT)
Dept: CARDIOLOGY | Facility: CLINIC | Age: 86
End: 2021-01-27
Payer: MEDICARE

## 2021-01-27 DIAGNOSIS — I48.19 PERSISTENT ATRIAL FIBRILLATION: Primary | ICD-10-CM

## 2021-01-27 DIAGNOSIS — E29.1 HYPOGONADISM IN MALE: Primary | ICD-10-CM

## 2021-01-27 DIAGNOSIS — I48.19 PERSISTENT ATRIAL FIBRILLATION: ICD-10-CM

## 2021-01-27 LAB
INR PPP: 2.6 (ref 0.8–1.2)
PROTHROMBIN TIME: 26.1 SEC (ref 9–12.5)

## 2021-01-27 PROCEDURE — 85610 PROTHROMBIN TIME: CPT

## 2021-01-27 PROCEDURE — 96372 PR INJECTION,THERAP/PROPH/DIAG2ST, IM OR SUBCUT: ICD-10-PCS | Mod: S$GLB,,, | Performed by: INTERNAL MEDICINE

## 2021-01-27 PROCEDURE — 99999 PR PBB SHADOW E&M-EST. PATIENT-LVL I: ICD-10-PCS | Mod: PBBFAC,,,

## 2021-01-27 PROCEDURE — 36415 COLL VENOUS BLD VENIPUNCTURE: CPT | Mod: PO

## 2021-01-27 PROCEDURE — 96372 THER/PROPH/DIAG INJ SC/IM: CPT | Mod: S$GLB,,, | Performed by: INTERNAL MEDICINE

## 2021-01-27 PROCEDURE — 99999 PR PBB SHADOW E&M-EST. PATIENT-LVL I: CPT | Mod: PBBFAC,,,

## 2021-01-27 PROCEDURE — 93793 PR ANTICOAGULANT MGMT FOR PT TAKING WARFARIN: ICD-10-PCS | Mod: S$GLB,,,

## 2021-01-27 PROCEDURE — 93793 ANTICOAG MGMT PT WARFARIN: CPT | Mod: S$GLB,,,

## 2021-01-27 RX ADMIN — TESTOSTERONE CYPIONATE 100 MG: 200 INJECTION, SOLUTION INTRAMUSCULAR at 08:01

## 2021-01-29 DIAGNOSIS — E23.0 PANHYPOPITUITARISM: ICD-10-CM

## 2021-01-29 RX ORDER — PREDNISONE 5 MG/1
TABLET ORAL
Qty: 90 TABLET | Refills: 1 | Status: SHIPPED | OUTPATIENT
Start: 2021-01-29 | End: 2021-07-29

## 2021-01-30 ENCOUNTER — IMMUNIZATION (OUTPATIENT)
Dept: INTERNAL MEDICINE | Facility: CLINIC | Age: 86
End: 2021-01-30
Payer: MEDICARE

## 2021-01-30 DIAGNOSIS — Z23 NEED FOR VACCINATION: Primary | ICD-10-CM

## 2021-01-30 PROCEDURE — 91300 COVID-19, MRNA, LNP-S, PF, 30 MCG/0.3 ML DOSE VACCINE: CPT | Mod: PBBFAC | Performed by: INTERNAL MEDICINE

## 2021-01-30 PROCEDURE — 0002A COVID-19, MRNA, LNP-S, PF, 30 MCG/0.3 ML DOSE VACCINE: CPT | Mod: PBBFAC | Performed by: INTERNAL MEDICINE

## 2021-02-03 ENCOUNTER — OFFICE VISIT (OUTPATIENT)
Dept: HEMATOLOGY/ONCOLOGY | Facility: CLINIC | Age: 86
End: 2021-02-03
Payer: MEDICARE

## 2021-02-03 VITALS
TEMPERATURE: 98 F | WEIGHT: 239.44 LBS | BODY MASS INDEX: 29.77 KG/M2 | OXYGEN SATURATION: 99 % | DIASTOLIC BLOOD PRESSURE: 76 MMHG | HEART RATE: 98 BPM | HEIGHT: 75 IN | SYSTOLIC BLOOD PRESSURE: 133 MMHG

## 2021-02-03 DIAGNOSIS — Z86.718 HISTORY OF DEEP VEIN THROMBOSIS (DVT) OF LOWER EXTREMITY: ICD-10-CM

## 2021-02-03 DIAGNOSIS — N18.30 STAGE 3 CHRONIC KIDNEY DISEASE, UNSPECIFIED WHETHER STAGE 3A OR 3B CKD: ICD-10-CM

## 2021-02-03 DIAGNOSIS — E07.9 THYROID DISEASE: ICD-10-CM

## 2021-02-03 DIAGNOSIS — I48.0 PAF (PAROXYSMAL ATRIAL FIBRILLATION): ICD-10-CM

## 2021-02-03 DIAGNOSIS — I10 HYPERTENSION, UNSPECIFIED TYPE: ICD-10-CM

## 2021-02-03 DIAGNOSIS — I25.10 CORONARY ARTERY DISEASE, ANGINA PRESENCE UNSPECIFIED, UNSPECIFIED VESSEL OR LESION TYPE, UNSPECIFIED WHETHER NATIVE OR TRANSPLANTED HEART: ICD-10-CM

## 2021-02-03 DIAGNOSIS — D53.9 MACROCYTIC ANEMIA: Primary | ICD-10-CM

## 2021-02-03 DIAGNOSIS — C84.A0 PLEOMORPHIC SMALL OR MEDIUM-SIZED CELL CUTANEOUS T-CELL LYMPHOMA: ICD-10-CM

## 2021-02-03 PROCEDURE — 3078F DIAST BP <80 MM HG: CPT | Mod: CPTII,S$GLB,, | Performed by: INTERNAL MEDICINE

## 2021-02-03 PROCEDURE — 99213 PR OFFICE/OUTPT VISIT, EST, LEVL III, 20-29 MIN: ICD-10-PCS | Mod: S$GLB,,, | Performed by: INTERNAL MEDICINE

## 2021-02-03 PROCEDURE — 3078F PR MOST RECENT DIASTOLIC BLOOD PRESSURE < 80 MM HG: ICD-10-PCS | Mod: CPTII,S$GLB,, | Performed by: INTERNAL MEDICINE

## 2021-02-03 PROCEDURE — 1159F PR MEDICATION LIST DOCUMENTED IN MEDICAL RECORD: ICD-10-PCS | Mod: S$GLB,,, | Performed by: INTERNAL MEDICINE

## 2021-02-03 PROCEDURE — 1126F PR PAIN SEVERITY QUANTIFIED, NO PAIN PRESENT: ICD-10-PCS | Mod: S$GLB,,, | Performed by: INTERNAL MEDICINE

## 2021-02-03 PROCEDURE — 3075F PR MOST RECENT SYSTOLIC BLOOD PRESS GE 130-139MM HG: ICD-10-PCS | Mod: CPTII,S$GLB,, | Performed by: INTERNAL MEDICINE

## 2021-02-03 PROCEDURE — 99499 RISK ADDL DX/OHS AUDIT: ICD-10-PCS | Mod: S$GLB,,, | Performed by: INTERNAL MEDICINE

## 2021-02-03 PROCEDURE — 99999 PR PBB SHADOW E&M-EST. PATIENT-LVL V: CPT | Mod: PBBFAC,,, | Performed by: INTERNAL MEDICINE

## 2021-02-03 PROCEDURE — 99213 OFFICE O/P EST LOW 20 MIN: CPT | Mod: S$GLB,,, | Performed by: INTERNAL MEDICINE

## 2021-02-03 PROCEDURE — 1159F MED LIST DOCD IN RCRD: CPT | Mod: S$GLB,,, | Performed by: INTERNAL MEDICINE

## 2021-02-03 PROCEDURE — 99499 UNLISTED E&M SERVICE: CPT | Mod: S$GLB,,, | Performed by: INTERNAL MEDICINE

## 2021-02-03 PROCEDURE — 99999 PR PBB SHADOW E&M-EST. PATIENT-LVL V: ICD-10-PCS | Mod: PBBFAC,,, | Performed by: INTERNAL MEDICINE

## 2021-02-03 PROCEDURE — 1126F AMNT PAIN NOTED NONE PRSNT: CPT | Mod: S$GLB,,, | Performed by: INTERNAL MEDICINE

## 2021-02-03 PROCEDURE — 3075F SYST BP GE 130 - 139MM HG: CPT | Mod: CPTII,S$GLB,, | Performed by: INTERNAL MEDICINE

## 2021-02-10 ENCOUNTER — CLINICAL SUPPORT (OUTPATIENT)
Dept: FAMILY MEDICINE | Facility: CLINIC | Age: 86
End: 2021-02-10
Payer: MEDICARE

## 2021-02-10 DIAGNOSIS — E29.1 HYPOGONADISM IN MALE: Primary | ICD-10-CM

## 2021-02-10 PROCEDURE — 99999 PR PBB SHADOW E&M-EST. PATIENT-LVL I: CPT | Mod: PBBFAC,,,

## 2021-02-10 PROCEDURE — 96372 PR INJECTION,THERAP/PROPH/DIAG2ST, IM OR SUBCUT: ICD-10-PCS | Mod: S$GLB,,, | Performed by: INTERNAL MEDICINE

## 2021-02-10 PROCEDURE — 99999 PR PBB SHADOW E&M-EST. PATIENT-LVL I: ICD-10-PCS | Mod: PBBFAC,,,

## 2021-02-10 PROCEDURE — 96372 THER/PROPH/DIAG INJ SC/IM: CPT | Mod: S$GLB,,, | Performed by: INTERNAL MEDICINE

## 2021-02-10 RX ADMIN — TESTOSTERONE CYPIONATE 100 MG: 200 INJECTION, SOLUTION INTRAMUSCULAR at 08:02

## 2021-02-22 ENCOUNTER — HOSPITAL ENCOUNTER (OUTPATIENT)
Dept: CARDIOLOGY | Facility: HOSPITAL | Age: 86
Discharge: HOME | End: 2021-02-22
Attending: INTERNAL MEDICINE
Payer: MEDICARE

## 2021-02-22 ENCOUNTER — OFFICE VISIT (OUTPATIENT)
Dept: CARDIOLOGY | Facility: CLINIC | Age: 86
End: 2021-02-22
Payer: MEDICARE

## 2021-02-22 VITALS
SYSTOLIC BLOOD PRESSURE: 128 MMHG | WEIGHT: 244 LBS | HEART RATE: 94 BPM | DIASTOLIC BLOOD PRESSURE: 80 MMHG | HEIGHT: 74 IN | BODY MASS INDEX: 31.32 KG/M2

## 2021-02-22 VITALS
WEIGHT: 244.94 LBS | HEIGHT: 74 IN | SYSTOLIC BLOOD PRESSURE: 128 MMHG | DIASTOLIC BLOOD PRESSURE: 80 MMHG | HEART RATE: 62 BPM | BODY MASS INDEX: 31.43 KG/M2

## 2021-02-22 DIAGNOSIS — I34.0 MITRAL VALVE INSUFFICIENCY, UNSPECIFIED ETIOLOGY: ICD-10-CM

## 2021-02-22 DIAGNOSIS — I49.8 OTHER SPECIFIED CARDIAC ARRHYTHMIAS: Primary | ICD-10-CM

## 2021-02-22 DIAGNOSIS — I10 ESSENTIAL HYPERTENSION: ICD-10-CM

## 2021-02-22 DIAGNOSIS — I50.22 CHRONIC SYSTOLIC CONGESTIVE HEART FAILURE: ICD-10-CM

## 2021-02-22 DIAGNOSIS — I48.21 PERMANENT ATRIAL FIBRILLATION: ICD-10-CM

## 2021-02-22 DIAGNOSIS — E78.2 MIXED HYPERLIPIDEMIA: ICD-10-CM

## 2021-02-22 DIAGNOSIS — I50.22 CHRONIC SYSTOLIC HEART FAILURE: ICD-10-CM

## 2021-02-22 DIAGNOSIS — I50.20 SYSTOLIC CONGESTIVE HEART FAILURE, UNSPECIFIED HF CHRONICITY: Primary | ICD-10-CM

## 2021-02-22 DIAGNOSIS — I50.20 SYSTOLIC CONGESTIVE HEART FAILURE, UNSPECIFIED HF CHRONICITY: ICD-10-CM

## 2021-02-22 LAB
ASCENDING AORTA: 3.85 CM
AV INDEX (PROSTH): 0.59
AV MEAN GRADIENT: 2 MMHG
AV PEAK GRADIENT: 3 MMHG
AV VALVE AREA: 2.42 CM2
AV VELOCITY RATIO: 0.67
BSA FOR ECHO PROCEDURE: 2.4 M2
CV ECHO LV RWT: 0.29 CM
DOP CALC AO PEAK VEL: 0.89 M/S
DOP CALC AO VTI: 15.77 CM
DOP CALC LVOT AREA: 4.1 CM2
DOP CALC LVOT DIAMETER: 2.29 CM
DOP CALC LVOT PEAK VEL: 0.6 M/S
DOP CALC LVOT STROKE VOLUME: 38.2 CM3
DOP CALCLVOT PEAK VEL VTI: 9.28 CM
ECHO LV POSTERIOR WALL: 0.91 CM (ref 0.6–1.1)
FRACTIONAL SHORTENING: 20 % (ref 28–44)
INTERVENTRICULAR SEPTUM: 0.84 CM (ref 0.6–1.1)
IVRT: 82.78 MSEC
LA MAJOR: 6.58 CM
LA MINOR: 6.08 CM
LA WIDTH: 5.02 CM
LEFT ATRIUM SIZE: 5.81 CM
LEFT ATRIUM VOLUME INDEX MOD: 50.4 ML/M2
LEFT ATRIUM VOLUME INDEX: 66.4 ML/M2
LEFT ATRIUM VOLUME MOD: 118.99 CM3
LEFT ATRIUM VOLUME: 156.68 CM3
LEFT INTERNAL DIMENSION IN SYSTOLE: 5 CM (ref 2.1–4)
LEFT VENTRICLE DIASTOLIC VOLUME INDEX: 83.55 ML/M2
LEFT VENTRICLE DIASTOLIC VOLUME: 197.18 ML
LEFT VENTRICLE MASS INDEX: 94 G/M2
LEFT VENTRICLE SYSTOLIC VOLUME INDEX: 50 ML/M2
LEFT VENTRICLE SYSTOLIC VOLUME: 118.04 ML
LEFT VENTRICULAR INTERNAL DIMENSION IN DIASTOLE: 6.24 CM (ref 3.5–6)
LEFT VENTRICULAR MASS: 222.84 G
PISA MRMAX VEL: 0.05 M/S
PISA RADIUS: 0.65 CM
PISA TR MAX VEL: 3.31 M/S
PISA TR VN NYQUIST: 0.01 M/S
PULM VEIN S/D RATIO: 0.59
PV PEAK D VEL: 0.51 M/S
PV PEAK S VEL: 0.3 M/S
RA MAJOR: 6.12 CM
RA PRESSURE: 8 MMHG
RA WIDTH: 3.65 CM
RIGHT VENTRICULAR END-DIASTOLIC DIMENSION: 3.52 CM
RV TISSUE DOPPLER FREE WALL SYSTOLIC VELOCITY 1 (APICAL 4 CHAMBER VIEW): 8.31 CM/S
SINUS: 3.65 CM
STJ: 3.56 CM
TDI LATERAL: 0.09 M/S
TDI SEPTAL: 0.04 M/S
TDI: 0.07 M/S
TR MAX PG: 44 MMHG
TRICUSPID ANNULAR PLANE SYSTOLIC EXCURSION: 1.13 CM
TV REST PULMONARY ARTERY PRESSURE: 52 MMHG

## 2021-02-22 PROCEDURE — 93306 TTE W/DOPPLER COMPLETE: CPT

## 2021-02-22 PROCEDURE — 93306 ECHO (CUPID ONLY): ICD-10-PCS | Mod: 26,,, | Performed by: INTERNAL MEDICINE

## 2021-02-22 PROCEDURE — 1159F MED LIST DOCD IN RCRD: CPT | Mod: S$GLB,,, | Performed by: INTERNAL MEDICINE

## 2021-02-22 PROCEDURE — 1159F PR MEDICATION LIST DOCUMENTED IN MEDICAL RECORD: ICD-10-PCS | Mod: S$GLB,,, | Performed by: INTERNAL MEDICINE

## 2021-02-22 PROCEDURE — 1126F AMNT PAIN NOTED NONE PRSNT: CPT | Mod: S$GLB,,, | Performed by: INTERNAL MEDICINE

## 2021-02-22 PROCEDURE — 93306 TTE W/DOPPLER COMPLETE: CPT | Mod: 26,,, | Performed by: INTERNAL MEDICINE

## 2021-02-22 PROCEDURE — 99999 PR PBB SHADOW E&M-EST. PATIENT-LVL IV: CPT | Mod: PBBFAC,,, | Performed by: INTERNAL MEDICINE

## 2021-02-22 PROCEDURE — 3079F PR MOST RECENT DIASTOLIC BLOOD PRESSURE 80-89 MM HG: ICD-10-PCS | Mod: CPTII,S$GLB,, | Performed by: INTERNAL MEDICINE

## 2021-02-22 PROCEDURE — 3079F DIAST BP 80-89 MM HG: CPT | Mod: CPTII,S$GLB,, | Performed by: INTERNAL MEDICINE

## 2021-02-22 PROCEDURE — 99999 PR PBB SHADOW E&M-EST. PATIENT-LVL IV: ICD-10-PCS | Mod: PBBFAC,,, | Performed by: INTERNAL MEDICINE

## 2021-02-22 PROCEDURE — 1126F PR PAIN SEVERITY QUANTIFIED, NO PAIN PRESENT: ICD-10-PCS | Mod: S$GLB,,, | Performed by: INTERNAL MEDICINE

## 2021-02-22 PROCEDURE — 99214 OFFICE O/P EST MOD 30 MIN: CPT | Mod: S$GLB,,, | Performed by: INTERNAL MEDICINE

## 2021-02-22 PROCEDURE — 3074F PR MOST RECENT SYSTOLIC BLOOD PRESSURE < 130 MM HG: ICD-10-PCS | Mod: CPTII,S$GLB,, | Performed by: INTERNAL MEDICINE

## 2021-02-22 PROCEDURE — 3074F SYST BP LT 130 MM HG: CPT | Mod: CPTII,S$GLB,, | Performed by: INTERNAL MEDICINE

## 2021-02-22 PROCEDURE — 99214 PR OFFICE/OUTPT VISIT, EST, LEVL IV, 30-39 MIN: ICD-10-PCS | Mod: S$GLB,,, | Performed by: INTERNAL MEDICINE

## 2021-02-22 RX ORDER — FUROSEMIDE 40 MG/1
20 TABLET ORAL DAILY PRN
Qty: 30 TABLET | Refills: 11 | Status: SHIPPED | OUTPATIENT
Start: 2021-02-22 | End: 2021-05-04

## 2021-02-22 RX ORDER — SACUBITRIL AND VALSARTAN 24; 26 MG/1; MG/1
1 TABLET, FILM COATED ORAL 2 TIMES DAILY
Qty: 60 TABLET | Refills: 2 | Status: SHIPPED | OUTPATIENT
Start: 2021-02-22 | End: 2021-05-04 | Stop reason: SINTOL

## 2021-02-24 ENCOUNTER — CLINICAL SUPPORT (OUTPATIENT)
Dept: FAMILY MEDICINE | Facility: CLINIC | Age: 86
End: 2021-02-24
Payer: MEDICARE

## 2021-02-24 DIAGNOSIS — E29.1 HYPOGONADISM IN MALE: Primary | ICD-10-CM

## 2021-02-24 PROCEDURE — 96372 PR INJECTION,THERAP/PROPH/DIAG2ST, IM OR SUBCUT: ICD-10-PCS | Mod: S$GLB,,, | Performed by: INTERNAL MEDICINE

## 2021-02-24 PROCEDURE — 96372 THER/PROPH/DIAG INJ SC/IM: CPT | Mod: S$GLB,,, | Performed by: INTERNAL MEDICINE

## 2021-02-24 RX ADMIN — TESTOSTERONE CYPIONATE 100 MG: 200 INJECTION, SOLUTION INTRAMUSCULAR at 08:02

## 2021-02-25 ENCOUNTER — TELEPHONE (OUTPATIENT)
Dept: FAMILY MEDICINE | Facility: CLINIC | Age: 86
End: 2021-02-25

## 2021-02-25 ENCOUNTER — APPOINTMENT (OUTPATIENT)
Dept: RADIOLOGY | Facility: HOSPITAL | Age: 86
End: 2021-02-25
Attending: INTERNAL MEDICINE
Payer: MEDICARE

## 2021-02-25 DIAGNOSIS — M25.559 HIP PAIN: Primary | ICD-10-CM

## 2021-02-25 DIAGNOSIS — M25.559 HIP PAIN: ICD-10-CM

## 2021-02-25 PROCEDURE — 73521 X-RAY EXAM HIPS BI 2 VIEWS: CPT | Mod: TC,FY,PN

## 2021-02-25 PROCEDURE — 73521 XR HIPS BILATERAL 2 VIEW INCL AP PELVIS: ICD-10-PCS | Mod: 26,,, | Performed by: RADIOLOGY

## 2021-02-25 PROCEDURE — 73521 X-RAY EXAM HIPS BI 2 VIEWS: CPT | Mod: 26,,, | Performed by: RADIOLOGY

## 2021-03-03 ENCOUNTER — PATIENT MESSAGE (OUTPATIENT)
Dept: CARDIOLOGY | Facility: CLINIC | Age: 86
End: 2021-03-03

## 2021-03-04 ENCOUNTER — TELEPHONE (OUTPATIENT)
Dept: CARDIOLOGY | Facility: CLINIC | Age: 86
End: 2021-03-04

## 2021-03-04 ENCOUNTER — PATIENT MESSAGE (OUTPATIENT)
Dept: CARDIOLOGY | Facility: CLINIC | Age: 86
End: 2021-03-04

## 2021-03-08 ENCOUNTER — TELEPHONE (OUTPATIENT)
Dept: CARDIOLOGY | Facility: CLINIC | Age: 86
End: 2021-03-08

## 2021-03-08 ENCOUNTER — LAB VISIT (OUTPATIENT)
Dept: LAB | Facility: HOSPITAL | Age: 86
End: 2021-03-08
Attending: INTERNAL MEDICINE
Payer: MEDICARE

## 2021-03-08 DIAGNOSIS — I10 ESSENTIAL HYPERTENSION: ICD-10-CM

## 2021-03-08 LAB
ANION GAP SERPL CALC-SCNC: 7 MMOL/L (ref 8–16)
BUN SERPL-MCNC: 23 MG/DL (ref 8–23)
CALCIUM SERPL-MCNC: 9.2 MG/DL (ref 8.7–10.5)
CHLORIDE SERPL-SCNC: 104 MMOL/L (ref 95–110)
CO2 SERPL-SCNC: 30 MMOL/L (ref 23–29)
CREAT SERPL-MCNC: 1.6 MG/DL (ref 0.5–1.4)
EST. GFR  (AFRICAN AMERICAN): 45 ML/MIN/1.73 M^2
EST. GFR  (NON AFRICAN AMERICAN): 39 ML/MIN/1.73 M^2
GLUCOSE SERPL-MCNC: 76 MG/DL (ref 70–110)
POTASSIUM SERPL-SCNC: 3.4 MMOL/L (ref 3.5–5.1)
SODIUM SERPL-SCNC: 141 MMOL/L (ref 136–145)

## 2021-03-08 PROCEDURE — 80048 BASIC METABOLIC PNL TOTAL CA: CPT | Performed by: INTERNAL MEDICINE

## 2021-03-08 PROCEDURE — 36415 COLL VENOUS BLD VENIPUNCTURE: CPT | Mod: PO | Performed by: INTERNAL MEDICINE

## 2021-03-09 ENCOUNTER — PATIENT MESSAGE (OUTPATIENT)
Dept: CARDIOLOGY | Facility: CLINIC | Age: 86
End: 2021-03-09

## 2021-03-10 ENCOUNTER — TELEPHONE (OUTPATIENT)
Dept: FAMILY MEDICINE | Facility: CLINIC | Age: 86
End: 2021-03-10

## 2021-03-10 DIAGNOSIS — E29.1 HYPOGONADISM IN MALE: Primary | ICD-10-CM

## 2021-03-10 RX ORDER — TESTOSTERONE CYPIONATE 200 MG/ML
100 INJECTION, SOLUTION INTRAMUSCULAR
Status: COMPLETED | OUTPATIENT
Start: 2021-03-10 | End: 2021-04-07

## 2021-03-11 ENCOUNTER — TELEPHONE (OUTPATIENT)
Dept: CARDIOLOGY | Facility: CLINIC | Age: 86
End: 2021-03-11

## 2021-03-12 ENCOUNTER — PATIENT MESSAGE (OUTPATIENT)
Dept: CARDIOLOGY | Facility: CLINIC | Age: 86
End: 2021-03-12

## 2021-03-24 ENCOUNTER — LAB VISIT (OUTPATIENT)
Dept: LAB | Facility: HOSPITAL | Age: 86
End: 2021-03-24
Attending: INTERNAL MEDICINE
Payer: MEDICARE

## 2021-03-24 ENCOUNTER — ANTI-COAG VISIT (OUTPATIENT)
Dept: CARDIOLOGY | Facility: CLINIC | Age: 86
End: 2021-03-24
Payer: MEDICARE

## 2021-03-24 ENCOUNTER — CLINICAL SUPPORT (OUTPATIENT)
Dept: FAMILY MEDICINE | Facility: CLINIC | Age: 86
End: 2021-03-24
Payer: MEDICARE

## 2021-03-24 DIAGNOSIS — I48.19 PERSISTENT ATRIAL FIBRILLATION: ICD-10-CM

## 2021-03-24 DIAGNOSIS — I48.19 PERSISTENT ATRIAL FIBRILLATION: Primary | ICD-10-CM

## 2021-03-24 DIAGNOSIS — E29.1 HYPOGONADISM IN MALE: Primary | ICD-10-CM

## 2021-03-24 LAB
INR PPP: 2 (ref 0.8–1.2)
PROTHROMBIN TIME: 20 SEC (ref 9–12.5)

## 2021-03-24 PROCEDURE — 85610 PROTHROMBIN TIME: CPT | Performed by: INTERNAL MEDICINE

## 2021-03-24 PROCEDURE — 93793 PR ANTICOAGULANT MGMT FOR PT TAKING WARFARIN: ICD-10-PCS | Mod: S$GLB,,,

## 2021-03-24 PROCEDURE — 96372 PR INJECTION,THERAP/PROPH/DIAG2ST, IM OR SUBCUT: ICD-10-PCS | Mod: S$GLB,,, | Performed by: INTERNAL MEDICINE

## 2021-03-24 PROCEDURE — 96372 THER/PROPH/DIAG INJ SC/IM: CPT | Mod: S$GLB,,, | Performed by: INTERNAL MEDICINE

## 2021-03-24 PROCEDURE — 36415 COLL VENOUS BLD VENIPUNCTURE: CPT | Mod: PO | Performed by: INTERNAL MEDICINE

## 2021-03-24 PROCEDURE — 93793 ANTICOAG MGMT PT WARFARIN: CPT | Mod: S$GLB,,,

## 2021-03-24 RX ADMIN — TESTOSTERONE CYPIONATE 100 MG: 200 INJECTION, SOLUTION INTRAMUSCULAR at 08:03

## 2021-03-25 ENCOUNTER — PATIENT MESSAGE (OUTPATIENT)
Dept: CARDIOLOGY | Facility: CLINIC | Age: 86
End: 2021-03-25

## 2021-03-29 ENCOUNTER — OFFICE VISIT (OUTPATIENT)
Dept: CARDIOLOGY | Facility: CLINIC | Age: 86
End: 2021-03-29
Payer: MEDICARE

## 2021-03-29 ENCOUNTER — LAB VISIT (OUTPATIENT)
Dept: LAB | Facility: HOSPITAL | Age: 86
End: 2021-03-29
Attending: INTERNAL MEDICINE
Payer: MEDICARE

## 2021-03-29 VITALS
SYSTOLIC BLOOD PRESSURE: 108 MMHG | BODY MASS INDEX: 30.01 KG/M2 | DIASTOLIC BLOOD PRESSURE: 72 MMHG | HEIGHT: 75 IN | HEART RATE: 90 BPM | WEIGHT: 241.38 LBS

## 2021-03-29 DIAGNOSIS — I50.22 CHRONIC SYSTOLIC CONGESTIVE HEART FAILURE: ICD-10-CM

## 2021-03-29 DIAGNOSIS — I25.119 CORONARY ARTERY DISEASE INVOLVING NATIVE CORONARY ARTERY OF NATIVE HEART WITH ANGINA PECTORIS: ICD-10-CM

## 2021-03-29 DIAGNOSIS — I48.19 PERSISTENT ATRIAL FIBRILLATION: ICD-10-CM

## 2021-03-29 DIAGNOSIS — R06.09 DOE (DYSPNEA ON EXERTION): Primary | ICD-10-CM

## 2021-03-29 DIAGNOSIS — I95.1 ORTHOSTATIC HYPOTENSION: ICD-10-CM

## 2021-03-29 LAB
ANION GAP SERPL CALC-SCNC: 8 MMOL/L (ref 8–16)
BUN SERPL-MCNC: 24 MG/DL (ref 8–23)
CALCIUM SERPL-MCNC: 8.9 MG/DL (ref 8.7–10.5)
CHLORIDE SERPL-SCNC: 106 MMOL/L (ref 95–110)
CO2 SERPL-SCNC: 27 MMOL/L (ref 23–29)
CREAT SERPL-MCNC: 1.7 MG/DL (ref 0.5–1.4)
EST. GFR  (AFRICAN AMERICAN): 41.6 ML/MIN/1.73 M^2
EST. GFR  (NON AFRICAN AMERICAN): 36 ML/MIN/1.73 M^2
GLUCOSE SERPL-MCNC: 115 MG/DL (ref 70–110)
POTASSIUM SERPL-SCNC: 5.1 MMOL/L (ref 3.5–5.1)
SODIUM SERPL-SCNC: 141 MMOL/L (ref 136–145)

## 2021-03-29 PROCEDURE — 3078F PR MOST RECENT DIASTOLIC BLOOD PRESSURE < 80 MM HG: ICD-10-PCS | Mod: CPTII,S$GLB,, | Performed by: INTERNAL MEDICINE

## 2021-03-29 PROCEDURE — 36415 COLL VENOUS BLD VENIPUNCTURE: CPT | Performed by: INTERNAL MEDICINE

## 2021-03-29 PROCEDURE — 3074F SYST BP LT 130 MM HG: CPT | Mod: CPTII,S$GLB,, | Performed by: INTERNAL MEDICINE

## 2021-03-29 PROCEDURE — 99999 PR PBB SHADOW E&M-EST. PATIENT-LVL IV: CPT | Mod: PBBFAC,,, | Performed by: INTERNAL MEDICINE

## 2021-03-29 PROCEDURE — 99214 PR OFFICE/OUTPT VISIT, EST, LEVL IV, 30-39 MIN: ICD-10-PCS | Mod: S$GLB,,, | Performed by: INTERNAL MEDICINE

## 2021-03-29 PROCEDURE — 1126F PR PAIN SEVERITY QUANTIFIED, NO PAIN PRESENT: ICD-10-PCS | Mod: S$GLB,,, | Performed by: INTERNAL MEDICINE

## 2021-03-29 PROCEDURE — 1159F MED LIST DOCD IN RCRD: CPT | Mod: S$GLB,,, | Performed by: INTERNAL MEDICINE

## 2021-03-29 PROCEDURE — 83880 ASSAY OF NATRIURETIC PEPTIDE: CPT | Performed by: INTERNAL MEDICINE

## 2021-03-29 PROCEDURE — 1126F AMNT PAIN NOTED NONE PRSNT: CPT | Mod: S$GLB,,, | Performed by: INTERNAL MEDICINE

## 2021-03-29 PROCEDURE — 3078F DIAST BP <80 MM HG: CPT | Mod: CPTII,S$GLB,, | Performed by: INTERNAL MEDICINE

## 2021-03-29 PROCEDURE — 1159F PR MEDICATION LIST DOCUMENTED IN MEDICAL RECORD: ICD-10-PCS | Mod: S$GLB,,, | Performed by: INTERNAL MEDICINE

## 2021-03-29 PROCEDURE — 3074F PR MOST RECENT SYSTOLIC BLOOD PRESSURE < 130 MM HG: ICD-10-PCS | Mod: CPTII,S$GLB,, | Performed by: INTERNAL MEDICINE

## 2021-03-29 PROCEDURE — 80048 BASIC METABOLIC PNL TOTAL CA: CPT | Performed by: INTERNAL MEDICINE

## 2021-03-29 PROCEDURE — 99999 PR PBB SHADOW E&M-EST. PATIENT-LVL IV: ICD-10-PCS | Mod: PBBFAC,,, | Performed by: INTERNAL MEDICINE

## 2021-03-29 PROCEDURE — 99214 OFFICE O/P EST MOD 30 MIN: CPT | Mod: S$GLB,,, | Performed by: INTERNAL MEDICINE

## 2021-03-29 RX ORDER — FOLIC ACID 1 MG/1
TABLET ORAL
Qty: 90 TABLET | Refills: 3 | Status: SHIPPED | OUTPATIENT
Start: 2021-03-29

## 2021-03-31 LAB — NT-PROBNP SERPL-MCNC: 1924 PG/ML

## 2021-04-01 ENCOUNTER — HOSPITAL ENCOUNTER (OUTPATIENT)
Dept: CARDIOLOGY | Facility: HOSPITAL | Age: 86
Discharge: HOME | End: 2021-04-01
Attending: INTERNAL MEDICINE
Payer: MEDICARE

## 2021-04-01 DIAGNOSIS — I95.1 ORTHOSTATIC HYPOTENSION: ICD-10-CM

## 2021-04-01 LAB
ABDOMINAL AORTA MID EDV: 21 CM/S
ABDOMINAL AORTA MID PSV: 74 CM/S
LEFT RENAL DIST DIAS: 18 CM/S
LEFT RENAL DIST SYS: 75 CM/S
LEFT RENAL MID DIAS: 14 CM/S
LEFT RENAL MID RAR: 1.18
LEFT RENAL MID SYS: 87 CM/S
LEFT RENAL ORIGIN DIAS: 29 CM/S
LEFT RENAL ORIGIN SYS: 119 CM/S
LEFT RENAL PROX DIAS: 20 CM/S
LEFT RENAL PROX SYS: 79 CM/S
LEFT RENAL ULTRASOUND ACCELERATION TIME MEASUREMENT 1: 31 MS
LEFT RENAL ULTRASOUND ACCELERATION TIME MEASUREMENT 2: 34 MS
LEFT RENAL ULTRASOUND ACCELERATION TIME MEASUREMENT 3: 43 MS
LEFT RENAL ULTRASOUND ACCELERATION TIME MEASUREMENT AVERAGE: 43 MS
LEFT RENAL ULTRASOUND KIDNEY SIZE MEASUREMENT 1: 12 CM
LEFT RENAL ULTRASOUND KIDNEY SIZE MEASUREMENT 2: 12 CM
LEFT RENAL ULTRASOUND KIDNEY SIZE MEASUREMENT 3: 12 CM
LEFT RENAL ULTRASOUND KIDNEY SIZE MEASUREMENT AVERAGE: 12 CM
LEFT RENAL ULTRASOUND RESISTIVE INDEX MEASUREMENT 1: 0.7
LEFT RENAL ULTRASOUND RESISTIVE INDEX MEASUREMENT 2: 0.66
LEFT RENAL ULTRASOUND RESISTIVE INDEX MEASUREMENT 3: 0.69
LEFT RENAL ULTRASOUND RESISTIVE INDEX MEASUREMENT AVERAGE: 0.7
OHS CV LEFT RENAL RAR: 1.61
OHS CV RIGHT RENAL RAR: 1.54
OHS CV US LEFT RENAL HIGHEST EDV: 29
OHS CV US LEFT RENAL HIGHEST PSV: 119
OHS CV US RIGHT RENAL HIGHEST EDV: 20
OHS CV US RIGHT RENAL HIGHEST PSV: 114
RIGHT RENAL DIST DIAS: 11 CM/S
RIGHT RENAL DIST SYS: 44 CM/S
RIGHT RENAL MID DIAS: 20 CM/S
RIGHT RENAL MID RAR: 1.51
RIGHT RENAL MID SYS: 112 CM/S
RIGHT RENAL ORIGIN DIAS: 11 CM/S
RIGHT RENAL ORIGIN SYS: 114 CM/S
RIGHT RENAL PROX DIAS: 15 CM/S
RIGHT RENAL PROX SYS: 84 CM/S
RIGHT RENAL ULTRASOUND ACCELERATION TIME MEASUREMENT 1: 43 MS
RIGHT RENAL ULTRASOUND ACCELERATION TIME MEASUREMENT 2: 43 MS
RIGHT RENAL ULTRASOUND ACCELERATION TIME MEASUREMENT 3: 40 MS
RIGHT RENAL ULTRASOUND ACCELERATION TIME MEASUREMENT AVERAGE: 43 MS
RIGHT RENAL ULTRASOUND KIDNEY SIZE MEASUREMENT 1: 11 CM
RIGHT RENAL ULTRASOUND KIDNEY SIZE MEASUREMENT 2: 11 CM
RIGHT RENAL ULTRASOUND KIDNEY SIZE MEASUREMENT 3: 11 CM
RIGHT RENAL ULTRASOUND KIDNEY SIZE MEASUREMENT AVERAGE: 11 CM
RIGHT RENAL ULTRASOUND RESISTIVE INDEX MEASUREMENT 1: 0.73
RIGHT RENAL ULTRASOUND RESISTIVE INDEX MEASUREMENT 2: 0.76
RIGHT RENAL ULTRASOUND RESISTIVE INDEX MEASUREMENT 3: 0.67
RIGHT RENAL ULTRASOUND RESISTIVE INDEX MEASUREMENT AVERAGE: 0.76

## 2021-04-01 PROCEDURE — 93975 VASCULAR STUDY: CPT

## 2021-04-01 PROCEDURE — 93975 VASCULAR STUDY: CPT | Mod: 26,,, | Performed by: INTERNAL MEDICINE

## 2021-04-01 PROCEDURE — 93975 CV US RENAL ARTERY STENOSIS HYPERTENSION COMPLETE (CUPID ONLY): ICD-10-PCS | Mod: 26,,, | Performed by: INTERNAL MEDICINE

## 2021-04-06 ENCOUNTER — PATIENT MESSAGE (OUTPATIENT)
Dept: CARDIOLOGY | Facility: CLINIC | Age: 86
End: 2021-04-06

## 2021-04-07 ENCOUNTER — CLINICAL SUPPORT (OUTPATIENT)
Dept: FAMILY MEDICINE | Facility: CLINIC | Age: 86
End: 2021-04-07
Payer: MEDICARE

## 2021-04-07 ENCOUNTER — TELEPHONE (OUTPATIENT)
Dept: FAMILY MEDICINE | Facility: CLINIC | Age: 86
End: 2021-04-07

## 2021-04-07 DIAGNOSIS — E29.1 HYPOGONADISM IN MALE: Primary | ICD-10-CM

## 2021-04-07 PROCEDURE — 96372 THER/PROPH/DIAG INJ SC/IM: CPT | Mod: S$GLB,,, | Performed by: INTERNAL MEDICINE

## 2021-04-07 PROCEDURE — 99999 PR PBB SHADOW E&M-EST. PATIENT-LVL I: ICD-10-PCS | Mod: PBBFAC,,,

## 2021-04-07 PROCEDURE — 96372 PR INJECTION,THERAP/PROPH/DIAG2ST, IM OR SUBCUT: ICD-10-PCS | Mod: S$GLB,,, | Performed by: INTERNAL MEDICINE

## 2021-04-07 PROCEDURE — 99999 PR PBB SHADOW E&M-EST. PATIENT-LVL I: CPT | Mod: PBBFAC,,,

## 2021-04-07 RX ADMIN — TESTOSTERONE CYPIONATE 100 MG: 200 INJECTION, SOLUTION INTRAMUSCULAR at 08:04

## 2021-04-08 RX ORDER — TESTOSTERONE CYPIONATE 200 MG/ML
100 INJECTION, SOLUTION INTRAMUSCULAR
Status: DISCONTINUED | OUTPATIENT
Start: 2021-04-08 | End: 2021-06-16

## 2021-04-12 ENCOUNTER — TELEPHONE (OUTPATIENT)
Dept: CARDIOLOGY | Facility: CLINIC | Age: 86
End: 2021-04-12

## 2021-04-14 ENCOUNTER — HOSPITAL ENCOUNTER (OUTPATIENT)
Dept: CARDIOLOGY | Facility: HOSPITAL | Age: 86
Discharge: HOME | End: 2021-04-14
Attending: INTERNAL MEDICINE
Payer: MEDICARE

## 2021-04-14 ENCOUNTER — TELEPHONE (OUTPATIENT)
Dept: CARDIOLOGY | Facility: CLINIC | Age: 86
End: 2021-04-14

## 2021-04-14 VITALS — BODY MASS INDEX: 29.97 KG/M2 | HEIGHT: 75 IN | WEIGHT: 241 LBS

## 2021-04-14 DIAGNOSIS — I25.10 CORONARY ARTERY DISEASE, ANGINA PRESENCE UNSPECIFIED, UNSPECIFIED VESSEL OR LESION TYPE, UNSPECIFIED WHETHER NATIVE OR TRANSPLANTED HEART: Primary | ICD-10-CM

## 2021-04-14 DIAGNOSIS — I50.22 CHRONIC SYSTOLIC CONGESTIVE HEART FAILURE: ICD-10-CM

## 2021-04-14 DIAGNOSIS — I25.119 CORONARY ARTERY DISEASE INVOLVING NATIVE CORONARY ARTERY OF NATIVE HEART WITH ANGINA PECTORIS: ICD-10-CM

## 2021-04-14 LAB
CV PHARM DOSE: 0.4 MG
CV STRESS BASE HR: 88 BPM
DIASTOLIC BLOOD PRESSURE: 83 MMHG
END DIASTOLIC INDEX-HIGH: 170 ML/M2
END SYSTOLIC INDEX-HIGH: 70 ML/M2
NUC REST DIASTOLIC VOLUME INDEX: 114
NUC REST EJECTION FRACTION: 33
NUC REST SYSTOLIC VOLUME INDEX: 77
NUC STRESS DIASTOLIC VOLUME INDEX: 36
NUC STRESS EJECTION FRACTION: 32 %
NUC STRESS SYSTOLIC VOLUME INDEX: 131
OHS CV CPX 85 PERCENT MAX PREDICTED HEART RATE MALE: 115
OHS CV CPX MAX PREDICTED HEART RATE: 135
OHS CV CPX PATIENT IS FEMALE: 0
OHS CV CPX PATIENT IS MALE: 1
OHS CV CPX PEAK DIASTOLIC BLOOD PRESSURE: 84 MMHG
OHS CV CPX PEAK HEAR RATE: 104 BPM
OHS CV CPX PEAK RATE PRESSURE PRODUCT: NORMAL
OHS CV CPX PEAK SYSTOLIC BLOOD PRESSURE: 103 MMHG
OHS CV CPX PERCENT MAX PREDICTED HEART RATE ACHIEVED: 77
OHS CV CPX RATE PRESSURE PRODUCT PRESENTING: 9152
RETIRED EF AND QEF - SEE NOTES: 51 %
SYSTOLIC BLOOD PRESSURE: 104 MMHG

## 2021-04-14 PROCEDURE — 93016 STRESS TEST WITH MYOCARDIAL PERFUSION (CUPID ONLY): ICD-10-PCS | Mod: ,,, | Performed by: INTERNAL MEDICINE

## 2021-04-14 PROCEDURE — 78452 STRESS TEST WITH MYOCARDIAL PERFUSION (CUPID ONLY): ICD-10-PCS | Mod: 26,,, | Performed by: INTERNAL MEDICINE

## 2021-04-14 PROCEDURE — 63600175 PHARM REV CODE 636 W HCPCS: Performed by: INTERNAL MEDICINE

## 2021-04-14 PROCEDURE — A9502 TC99M TETROFOSMIN: HCPCS

## 2021-04-14 PROCEDURE — 93018 STRESS TEST WITH MYOCARDIAL PERFUSION (CUPID ONLY): ICD-10-PCS | Mod: ,,, | Performed by: INTERNAL MEDICINE

## 2021-04-14 PROCEDURE — 93016 CV STRESS TEST SUPVJ ONLY: CPT | Mod: ,,, | Performed by: INTERNAL MEDICINE

## 2021-04-14 PROCEDURE — 78452 HT MUSCLE IMAGE SPECT MULT: CPT | Mod: 26,,, | Performed by: INTERNAL MEDICINE

## 2021-04-14 PROCEDURE — 93017 CV STRESS TEST TRACING ONLY: CPT

## 2021-04-14 PROCEDURE — 93018 CV STRESS TEST I&R ONLY: CPT | Mod: ,,, | Performed by: INTERNAL MEDICINE

## 2021-04-14 RX ORDER — REGADENOSON 0.08 MG/ML
0.4 INJECTION, SOLUTION INTRAVENOUS
Status: COMPLETED | OUTPATIENT
Start: 2021-04-14 | End: 2021-04-14

## 2021-04-14 RX ADMIN — REGADENOSON 0.4 MG: 0.08 INJECTION, SOLUTION INTRAVENOUS at 10:04

## 2021-04-19 ENCOUNTER — PATIENT MESSAGE (OUTPATIENT)
Dept: CARDIOLOGY | Facility: CLINIC | Age: 86
End: 2021-04-19

## 2021-04-19 ENCOUNTER — OFFICE VISIT (OUTPATIENT)
Dept: CARDIOLOGY | Facility: CLINIC | Age: 86
End: 2021-04-19
Payer: MEDICARE

## 2021-04-19 VITALS
WEIGHT: 238.75 LBS | OXYGEN SATURATION: 98 % | SYSTOLIC BLOOD PRESSURE: 106 MMHG | HEART RATE: 47 BPM | DIASTOLIC BLOOD PRESSURE: 79 MMHG | HEIGHT: 75 IN | BODY MASS INDEX: 29.69 KG/M2

## 2021-04-19 DIAGNOSIS — I25.10 CORONARY ARTERY DISEASE, ANGINA PRESENCE UNSPECIFIED, UNSPECIFIED VESSEL OR LESION TYPE, UNSPECIFIED WHETHER NATIVE OR TRANSPLANTED HEART: Primary | ICD-10-CM

## 2021-04-19 PROCEDURE — 99204 OFFICE O/P NEW MOD 45 MIN: CPT | Mod: S$GLB,,, | Performed by: INTERNAL MEDICINE

## 2021-04-19 PROCEDURE — 1126F PR PAIN SEVERITY QUANTIFIED, NO PAIN PRESENT: ICD-10-PCS | Mod: S$GLB,,, | Performed by: INTERNAL MEDICINE

## 2021-04-19 PROCEDURE — 99999 PR PBB SHADOW E&M-EST. PATIENT-LVL IV: ICD-10-PCS | Mod: PBBFAC,,, | Performed by: INTERNAL MEDICINE

## 2021-04-19 PROCEDURE — 99204 PR OFFICE/OUTPT VISIT, NEW, LEVL IV, 45-59 MIN: ICD-10-PCS | Mod: S$GLB,,, | Performed by: INTERNAL MEDICINE

## 2021-04-19 PROCEDURE — 99999 PR PBB SHADOW E&M-EST. PATIENT-LVL IV: CPT | Mod: PBBFAC,,, | Performed by: INTERNAL MEDICINE

## 2021-04-19 PROCEDURE — 1159F PR MEDICATION LIST DOCUMENTED IN MEDICAL RECORD: ICD-10-PCS | Mod: S$GLB,,, | Performed by: INTERNAL MEDICINE

## 2021-04-19 PROCEDURE — 1159F MED LIST DOCD IN RCRD: CPT | Mod: S$GLB,,, | Performed by: INTERNAL MEDICINE

## 2021-04-19 PROCEDURE — 1126F AMNT PAIN NOTED NONE PRSNT: CPT | Mod: S$GLB,,, | Performed by: INTERNAL MEDICINE

## 2021-04-20 ENCOUNTER — TELEPHONE (OUTPATIENT)
Dept: CARDIOLOGY | Facility: CLINIC | Age: 86
End: 2021-04-20

## 2021-04-20 DIAGNOSIS — I25.10 CORONARY ARTERY DISEASE INVOLVING NATIVE HEART, ANGINA PRESENCE UNSPECIFIED, UNSPECIFIED VESSEL OR LESION TYPE: Primary | ICD-10-CM

## 2021-04-20 DIAGNOSIS — N18.9 CHRONIC KIDNEY DISEASE, UNSPECIFIED CKD STAGE: ICD-10-CM

## 2021-04-20 DIAGNOSIS — I25.10 CAD (CORONARY ARTERY DISEASE): ICD-10-CM

## 2021-04-20 RX ORDER — DIPHENHYDRAMINE HCL 50 MG
50 CAPSULE ORAL ONCE
Status: CANCELLED | OUTPATIENT
Start: 2021-04-20 | End: 2021-04-20

## 2021-04-20 RX ORDER — SODIUM CHLORIDE 9 MG/ML
INJECTION, SOLUTION INTRAVENOUS CONTINUOUS
Status: CANCELLED | OUTPATIENT
Start: 2021-04-20 | End: 2021-04-20

## 2021-04-21 ENCOUNTER — LAB VISIT (OUTPATIENT)
Dept: FAMILY MEDICINE | Facility: CLINIC | Age: 86
End: 2021-04-21
Payer: MEDICARE

## 2021-04-21 ENCOUNTER — CLINICAL SUPPORT (OUTPATIENT)
Dept: FAMILY MEDICINE | Facility: CLINIC | Age: 86
End: 2021-04-21
Payer: MEDICARE

## 2021-04-21 ENCOUNTER — PATIENT MESSAGE (OUTPATIENT)
Dept: CARDIOLOGY | Facility: CLINIC | Age: 86
End: 2021-04-21

## 2021-04-21 DIAGNOSIS — I25.10 CAD (CORONARY ARTERY DISEASE): ICD-10-CM

## 2021-04-21 DIAGNOSIS — E29.1 HYPOGONADISM IN MALE: Primary | ICD-10-CM

## 2021-04-21 PROCEDURE — U0005 INFEC AGEN DETEC AMPLI PROBE: HCPCS | Performed by: INTERNAL MEDICINE

## 2021-04-21 PROCEDURE — 99999 PR PBB SHADOW E&M-EST. PATIENT-LVL I: ICD-10-PCS | Mod: PBBFAC,,,

## 2021-04-21 PROCEDURE — 96372 PR INJECTION,THERAP/PROPH/DIAG2ST, IM OR SUBCUT: ICD-10-PCS | Mod: S$GLB,,, | Performed by: INTERNAL MEDICINE

## 2021-04-21 PROCEDURE — 96372 THER/PROPH/DIAG INJ SC/IM: CPT | Mod: S$GLB,,, | Performed by: INTERNAL MEDICINE

## 2021-04-21 PROCEDURE — U0003 INFECTIOUS AGENT DETECTION BY NUCLEIC ACID (DNA OR RNA); SEVERE ACUTE RESPIRATORY SYNDROME CORONAVIRUS 2 (SARS-COV-2) (CORONAVIRUS DISEASE [COVID-19]), AMPLIFIED PROBE TECHNIQUE, MAKING USE OF HIGH THROUGHPUT TECHNOLOGIES AS DESCRIBED BY CMS-2020-01-R: HCPCS | Performed by: INTERNAL MEDICINE

## 2021-04-21 PROCEDURE — 99999 PR PBB SHADOW E&M-EST. PATIENT-LVL I: CPT | Mod: PBBFAC,,,

## 2021-04-21 RX ADMIN — TESTOSTERONE CYPIONATE 100 MG: 200 INJECTION, SOLUTION INTRAMUSCULAR at 09:04

## 2021-04-22 LAB — SARS-COV-2 RNA RESP QL NAA+PROBE: NOT DETECTED

## 2021-04-23 ENCOUNTER — HOSPITAL ENCOUNTER (OUTPATIENT)
Facility: HOSPITAL | Age: 86
Discharge: HOME | End: 2021-04-23
Attending: INTERNAL MEDICINE | Admitting: INTERNAL MEDICINE
Payer: MEDICARE

## 2021-04-23 VITALS
TEMPERATURE: 98 F | DIASTOLIC BLOOD PRESSURE: 57 MMHG | BODY MASS INDEX: 29.59 KG/M2 | OXYGEN SATURATION: 98 % | WEIGHT: 238 LBS | SYSTOLIC BLOOD PRESSURE: 113 MMHG | HEART RATE: 80 BPM | RESPIRATION RATE: 16 BRPM | HEIGHT: 75 IN

## 2021-04-23 DIAGNOSIS — I25.118 CORONARY ARTERY DISEASE OF NATIVE ARTERY OF NATIVE HEART WITH STABLE ANGINA PECTORIS: ICD-10-CM

## 2021-04-23 DIAGNOSIS — Z98.61 POSTSURGICAL PERCUTANEOUS TRANSLUMINAL CORONARY ANGIOPLASTY STATUS: Primary | ICD-10-CM

## 2021-04-23 DIAGNOSIS — N18.9 CHRONIC KIDNEY DISEASE, UNSPECIFIED CKD STAGE: ICD-10-CM

## 2021-04-23 DIAGNOSIS — I25.10 CORONARY ARTERY DISEASE INVOLVING NATIVE HEART, ANGINA PRESENCE UNSPECIFIED, UNSPECIFIED VESSEL OR LESION TYPE: ICD-10-CM

## 2021-04-23 LAB
ABO + RH BLD: NORMAL
ANION GAP SERPL CALC-SCNC: 8 MMOL/L (ref 8–16)
APTT BLDCRRT: 26.8 SEC (ref 21–32)
BLD GP AB SCN CELLS X3 SERPL QL: NORMAL
BUN SERPL-MCNC: 29 MG/DL (ref 8–23)
CALCIUM SERPL-MCNC: 9.4 MG/DL (ref 8.7–10.5)
CHLORIDE SERPL-SCNC: 106 MMOL/L (ref 95–110)
CO2 SERPL-SCNC: 27 MMOL/L (ref 23–29)
CREAT SERPL-MCNC: 1.6 MG/DL (ref 0.5–1.4)
ERYTHROCYTE [DISTWIDTH] IN BLOOD BY AUTOMATED COUNT: 14.2 % (ref 11.5–14.5)
EST. GFR  (AFRICAN AMERICAN): 44.7 ML/MIN/1.73 M^2
EST. GFR  (NON AFRICAN AMERICAN): 38.7 ML/MIN/1.73 M^2
GLUCOSE SERPL-MCNC: 88 MG/DL (ref 70–110)
HCT VFR BLD AUTO: 42.2 % (ref 40–54)
HGB BLD-MCNC: 13.5 G/DL (ref 14–18)
INR PPP: 1.3 (ref 0.8–1.2)
MCH RBC QN AUTO: 31.8 PG (ref 27–31)
MCHC RBC AUTO-ENTMCNC: 32 G/DL (ref 32–36)
MCV RBC AUTO: 99 FL (ref 82–98)
PLATELET # BLD AUTO: 145 K/UL (ref 150–450)
PLATELET RESPONSE PLAVIX: 247 PRU (ref 194–418)
PMV BLD AUTO: 11.6 FL (ref 9.2–12.9)
POTASSIUM SERPL-SCNC: 3.7 MMOL/L (ref 3.5–5.1)
PROTHROMBIN TIME: 14 SEC (ref 9–12.5)
RBC # BLD AUTO: 4.25 M/UL (ref 4.6–6.2)
SODIUM SERPL-SCNC: 141 MMOL/L (ref 136–145)
WBC # BLD AUTO: 5.75 K/UL (ref 3.9–12.7)

## 2021-04-23 PROCEDURE — C1769 GUIDE WIRE: HCPCS | Performed by: INTERNAL MEDICINE

## 2021-04-23 PROCEDURE — 25500020 PHARM REV CODE 255: Performed by: INTERNAL MEDICINE

## 2021-04-23 PROCEDURE — C1887 CATHETER, GUIDING: HCPCS | Performed by: INTERNAL MEDICINE

## 2021-04-23 PROCEDURE — 86900 BLOOD TYPING SEROLOGIC ABO: CPT | Performed by: INTERNAL MEDICINE

## 2021-04-23 PROCEDURE — 93454 CORONARY ARTERY ANGIO S&I: CPT | Mod: 59 | Performed by: INTERNAL MEDICINE

## 2021-04-23 PROCEDURE — 93454 CORONARY ARTERY ANGIO S&I: CPT | Mod: 26,59,, | Performed by: INTERNAL MEDICINE

## 2021-04-23 PROCEDURE — 99152 MOD SED SAME PHYS/QHP 5/>YRS: CPT | Mod: ,,, | Performed by: INTERNAL MEDICINE

## 2021-04-23 PROCEDURE — 93010 ELECTROCARDIOGRAM REPORT: CPT | Mod: 76,,, | Performed by: STUDENT IN AN ORGANIZED HEALTH CARE EDUCATION/TRAINING PROGRAM

## 2021-04-23 PROCEDURE — 25000003 PHARM REV CODE 250: Performed by: INTERNAL MEDICINE

## 2021-04-23 PROCEDURE — 80048 BASIC METABOLIC PNL TOTAL CA: CPT | Performed by: INTERNAL MEDICINE

## 2021-04-23 PROCEDURE — 93010 ELECTROCARDIOGRAM REPORT: CPT | Mod: ,,, | Performed by: STUDENT IN AN ORGANIZED HEALTH CARE EDUCATION/TRAINING PROGRAM

## 2021-04-23 PROCEDURE — 85610 PROTHROMBIN TIME: CPT | Performed by: INTERNAL MEDICINE

## 2021-04-23 PROCEDURE — 93454 PR CATH PLACE/CORONARY ANGIO, IMG SUPER/INTERP: ICD-10-PCS | Mod: 26,59,, | Performed by: INTERNAL MEDICINE

## 2021-04-23 PROCEDURE — 63600175 PHARM REV CODE 636 W HCPCS: Performed by: INTERNAL MEDICINE

## 2021-04-23 PROCEDURE — 25000003 PHARM REV CODE 250: Performed by: STUDENT IN AN ORGANIZED HEALTH CARE EDUCATION/TRAINING PROGRAM

## 2021-04-23 PROCEDURE — C9600 PERC DRUG-EL COR STENT SING: HCPCS | Mod: RC | Performed by: INTERNAL MEDICINE

## 2021-04-23 PROCEDURE — 93005 ELECTROCARDIOGRAM TRACING: CPT

## 2021-04-23 PROCEDURE — 85027 COMPLETE CBC AUTOMATED: CPT | Performed by: INTERNAL MEDICINE

## 2021-04-23 PROCEDURE — 27201423 OPTIME MED/SURG SUP & DEVICES STERILE SUPPLY: Performed by: INTERNAL MEDICINE

## 2021-04-23 PROCEDURE — C1725 CATH, TRANSLUMIN NON-LASER: HCPCS | Performed by: INTERNAL MEDICINE

## 2021-04-23 PROCEDURE — 92928 PR STENT: ICD-10-PCS | Mod: LC,,, | Performed by: INTERNAL MEDICINE

## 2021-04-23 PROCEDURE — 99153 MOD SED SAME PHYS/QHP EA: CPT | Performed by: INTERNAL MEDICINE

## 2021-04-23 PROCEDURE — 99152 MOD SED SAME PHYS/QHP 5/>YRS: CPT | Performed by: INTERNAL MEDICINE

## 2021-04-23 PROCEDURE — C1874 STENT, COATED/COV W/DEL SYS: HCPCS | Performed by: INTERNAL MEDICINE

## 2021-04-23 PROCEDURE — 85576 BLOOD PLATELET AGGREGATION: CPT | Performed by: INTERNAL MEDICINE

## 2021-04-23 PROCEDURE — 93010 EKG 12-LEAD: ICD-10-PCS | Mod: 76,,, | Performed by: STUDENT IN AN ORGANIZED HEALTH CARE EDUCATION/TRAINING PROGRAM

## 2021-04-23 PROCEDURE — 36415 COLL VENOUS BLD VENIPUNCTURE: CPT | Performed by: INTERNAL MEDICINE

## 2021-04-23 PROCEDURE — 92928 PRQ TCAT PLMT NTRAC ST 1 LES: CPT | Mod: RC,,, | Performed by: INTERNAL MEDICINE

## 2021-04-23 PROCEDURE — 85730 THROMBOPLASTIN TIME PARTIAL: CPT | Performed by: INTERNAL MEDICINE

## 2021-04-23 PROCEDURE — C1894 INTRO/SHEATH, NON-LASER: HCPCS | Performed by: INTERNAL MEDICINE

## 2021-04-23 PROCEDURE — C1760 CLOSURE DEV, VASC: HCPCS | Performed by: INTERNAL MEDICINE

## 2021-04-23 PROCEDURE — 99152 PR MOD CONSCIOUS SEDATION, SAME PHYS, 5+ YRS, FIRST 15 MIN: ICD-10-PCS | Mod: ,,, | Performed by: INTERNAL MEDICINE

## 2021-04-23 DEVICE — STENT RONYX25008UX RESOLUTE ONYX 2.50X08
Type: IMPLANTABLE DEVICE | Site: HEART | Status: FUNCTIONAL
Brand: RESOLUTE ONYX™

## 2021-04-23 DEVICE — STENT RONYX30012UX RESOLUTE ONYX 3.00X12
Type: IMPLANTABLE DEVICE | Site: HEART | Status: FUNCTIONAL
Brand: RESOLUTE ONYX™

## 2021-04-23 DEVICE — STENT RONYX25022UX RESOLUTE ONYX 2.50X22
Type: IMPLANTABLE DEVICE | Site: HEART | Status: FUNCTIONAL
Brand: RESOLUTE ONYX™

## 2021-04-23 DEVICE — STENT RONYX30026UX RESOLUTE ONYX 3.00X26
Type: IMPLANTABLE DEVICE | Site: HEART | Status: FUNCTIONAL
Brand: RESOLUTE ONYX™

## 2021-04-23 RX ORDER — MIDAZOLAM HYDROCHLORIDE 1 MG/ML
INJECTION, SOLUTION INTRAMUSCULAR; INTRAVENOUS
Status: DISCONTINUED | OUTPATIENT
Start: 2021-04-23 | End: 2021-04-23 | Stop reason: HOSPADM

## 2021-04-23 RX ORDER — CEFAZOLIN SODIUM 1 G/3ML
INJECTION, POWDER, FOR SOLUTION INTRAMUSCULAR; INTRAVENOUS
Status: DISCONTINUED | OUTPATIENT
Start: 2021-04-23 | End: 2021-04-23 | Stop reason: HOSPADM

## 2021-04-23 RX ORDER — METOPROLOL TARTRATE 1 MG/ML
INJECTION, SOLUTION INTRAVENOUS
Status: DISCONTINUED | OUTPATIENT
Start: 2021-04-23 | End: 2021-04-23 | Stop reason: HOSPADM

## 2021-04-23 RX ORDER — PRASUGREL 10 MG/1
60 TABLET, FILM COATED ORAL ONCE
Status: COMPLETED | OUTPATIENT
Start: 2021-04-23 | End: 2021-04-23

## 2021-04-23 RX ORDER — NAPROXEN SODIUM 220 MG/1
81 TABLET, FILM COATED ORAL ONCE
Status: COMPLETED | OUTPATIENT
Start: 2021-04-23 | End: 2021-04-23

## 2021-04-23 RX ORDER — NITROGLYCERIN 5 MG/ML
INJECTION, SOLUTION INTRAVENOUS
Status: DISCONTINUED | OUTPATIENT
Start: 2021-04-23 | End: 2021-04-23 | Stop reason: HOSPADM

## 2021-04-23 RX ORDER — PHENYLEPHRINE HYDROCHLORIDE 10 MG/ML
INJECTION INTRAVENOUS
Status: DISCONTINUED | OUTPATIENT
Start: 2021-04-23 | End: 2021-04-23 | Stop reason: HOSPADM

## 2021-04-23 RX ORDER — HEPARIN SOD,PORCINE/0.9 % NACL 1000/500ML
INTRAVENOUS SOLUTION INTRAVENOUS
Status: DISCONTINUED | OUTPATIENT
Start: 2021-04-23 | End: 2021-04-23 | Stop reason: HOSPADM

## 2021-04-23 RX ORDER — HEPARIN SODIUM 1000 [USP'U]/ML
INJECTION, SOLUTION INTRAVENOUS; SUBCUTANEOUS
Status: DISCONTINUED | OUTPATIENT
Start: 2021-04-23 | End: 2021-04-23 | Stop reason: HOSPADM

## 2021-04-23 RX ORDER — PRASUGREL 10 MG/1
10 TABLET, FILM COATED ORAL DAILY
Qty: 30 TABLET | Refills: 11 | Status: SHIPPED | OUTPATIENT
Start: 2021-04-23 | End: 2021-06-28 | Stop reason: SDUPTHER

## 2021-04-23 RX ORDER — ACETAMINOPHEN 325 MG/1
650 TABLET ORAL EVERY 4 HOURS PRN
Status: DISCONTINUED | OUTPATIENT
Start: 2021-04-23 | End: 2021-04-23 | Stop reason: HOSPADM

## 2021-04-23 RX ORDER — SODIUM CHLORIDE 9 MG/ML
INJECTION, SOLUTION INTRAVENOUS CONTINUOUS
Status: ACTIVE | OUTPATIENT
Start: 2021-04-23 | End: 2021-04-23

## 2021-04-23 RX ORDER — CLOPIDOGREL 300 MG/1
TABLET, FILM COATED ORAL
Status: DISCONTINUED | OUTPATIENT
Start: 2021-04-23 | End: 2021-04-23 | Stop reason: HOSPADM

## 2021-04-23 RX ORDER — LIDOCAINE HYDROCHLORIDE 20 MG/ML
INJECTION, SOLUTION EPIDURAL; INFILTRATION; INTRACAUDAL; PERINEURAL
Status: DISCONTINUED | OUTPATIENT
Start: 2021-04-23 | End: 2021-04-23 | Stop reason: HOSPADM

## 2021-04-23 RX ORDER — FENTANYL CITRATE 50 UG/ML
INJECTION, SOLUTION INTRAMUSCULAR; INTRAVENOUS
Status: DISCONTINUED | OUTPATIENT
Start: 2021-04-23 | End: 2021-04-23 | Stop reason: HOSPADM

## 2021-04-23 RX ORDER — DIPHENHYDRAMINE HCL 50 MG
50 CAPSULE ORAL ONCE
Status: COMPLETED | OUTPATIENT
Start: 2021-04-23 | End: 2021-04-23

## 2021-04-23 RX ORDER — CLOPIDOGREL 300 MG/1
300 TABLET, FILM COATED ORAL ONCE
Status: DISCONTINUED | OUTPATIENT
Start: 2021-04-23 | End: 2021-04-23

## 2021-04-23 RX ORDER — CLOPIDOGREL 300 MG/1
300 TABLET, FILM COATED ORAL ONCE
Status: COMPLETED | OUTPATIENT
Start: 2021-04-23 | End: 2021-04-23

## 2021-04-23 RX ADMIN — PRASUGREL 60 MG: 10 TABLET, FILM COATED ORAL at 06:04

## 2021-04-23 RX ADMIN — SODIUM CHLORIDE: 0.9 INJECTION, SOLUTION INTRAVENOUS at 08:04

## 2021-04-23 RX ADMIN — DIPHENHYDRAMINE HYDROCHLORIDE 50 MG: 50 CAPSULE ORAL at 08:04

## 2021-04-23 RX ADMIN — ASPIRIN 81 MG CHEWABLE TABLET 81 MG: 81 TABLET CHEWABLE at 08:04

## 2021-04-23 RX ADMIN — CLOPIDOGREL BISULFATE 300 MG: 300 TABLET, FILM COATED ORAL at 08:04

## 2021-04-26 ENCOUNTER — PATIENT MESSAGE (OUTPATIENT)
Dept: CARDIOLOGY | Facility: CLINIC | Age: 86
End: 2021-04-26

## 2021-04-28 ENCOUNTER — LAB VISIT (OUTPATIENT)
Dept: LAB | Facility: HOSPITAL | Age: 86
End: 2021-04-28
Attending: INTERNAL MEDICINE
Payer: MEDICARE

## 2021-04-28 DIAGNOSIS — E29.1 HYPOGONADISM IN MALE: ICD-10-CM

## 2021-04-28 DIAGNOSIS — E03.9 ACQUIRED HYPOTHYROIDISM: ICD-10-CM

## 2021-04-28 DIAGNOSIS — D53.9 MACROCYTIC ANEMIA: ICD-10-CM

## 2021-04-28 DIAGNOSIS — D64.9 ANEMIA, UNSPECIFIED TYPE: ICD-10-CM

## 2021-04-28 LAB
ALBUMIN SERPL BCP-MCNC: 3.4 G/DL (ref 3.5–5.2)
ALBUMIN SERPL BCP-MCNC: 3.4 G/DL (ref 3.5–5.2)
ALP SERPL-CCNC: 56 U/L (ref 55–135)
ALP SERPL-CCNC: 56 U/L (ref 55–135)
ALT SERPL W/O P-5'-P-CCNC: 18 U/L (ref 10–44)
ALT SERPL W/O P-5'-P-CCNC: 18 U/L (ref 10–44)
ANION GAP SERPL CALC-SCNC: 7 MMOL/L (ref 8–16)
ANION GAP SERPL CALC-SCNC: 7 MMOL/L (ref 8–16)
AST SERPL-CCNC: 26 U/L (ref 10–40)
AST SERPL-CCNC: 26 U/L (ref 10–40)
BASOPHILS # BLD AUTO: 0.01 K/UL (ref 0–0.2)
BASOPHILS # BLD AUTO: 0.01 K/UL (ref 0–0.2)
BASOPHILS NFR BLD: 0.2 % (ref 0–1.9)
BASOPHILS NFR BLD: 0.2 % (ref 0–1.9)
BILIRUB SERPL-MCNC: 0.5 MG/DL (ref 0.1–1)
BILIRUB SERPL-MCNC: 0.5 MG/DL (ref 0.1–1)
BUN SERPL-MCNC: 25 MG/DL (ref 8–23)
BUN SERPL-MCNC: 25 MG/DL (ref 8–23)
CALCIUM SERPL-MCNC: 9.1 MG/DL (ref 8.7–10.5)
CALCIUM SERPL-MCNC: 9.1 MG/DL (ref 8.7–10.5)
CHLORIDE SERPL-SCNC: 107 MMOL/L (ref 95–110)
CHLORIDE SERPL-SCNC: 107 MMOL/L (ref 95–110)
CO2 SERPL-SCNC: 27 MMOL/L (ref 23–29)
CO2 SERPL-SCNC: 27 MMOL/L (ref 23–29)
CREAT SERPL-MCNC: 1.7 MG/DL (ref 0.5–1.4)
CREAT SERPL-MCNC: 1.7 MG/DL (ref 0.5–1.4)
DIFFERENTIAL METHOD: ABNORMAL
DIFFERENTIAL METHOD: ABNORMAL
EOSINOPHIL # BLD AUTO: 0.1 K/UL (ref 0–0.5)
EOSINOPHIL # BLD AUTO: 0.1 K/UL (ref 0–0.5)
EOSINOPHIL NFR BLD: 0.9 % (ref 0–8)
EOSINOPHIL NFR BLD: 0.9 % (ref 0–8)
ERYTHROCYTE [DISTWIDTH] IN BLOOD BY AUTOMATED COUNT: 14.1 % (ref 11.5–14.5)
ERYTHROCYTE [DISTWIDTH] IN BLOOD BY AUTOMATED COUNT: 14.1 % (ref 11.5–14.5)
EST. GFR  (AFRICAN AMERICAN): 41.6 ML/MIN/1.73 M^2
EST. GFR  (AFRICAN AMERICAN): 41.6 ML/MIN/1.73 M^2
EST. GFR  (NON AFRICAN AMERICAN): 36 ML/MIN/1.73 M^2
EST. GFR  (NON AFRICAN AMERICAN): 36 ML/MIN/1.73 M^2
GLUCOSE SERPL-MCNC: 71 MG/DL (ref 70–110)
GLUCOSE SERPL-MCNC: 71 MG/DL (ref 70–110)
HCT VFR BLD AUTO: 40.6 % (ref 40–54)
HCT VFR BLD AUTO: 40.6 % (ref 40–54)
HGB BLD-MCNC: 13 G/DL (ref 14–18)
HGB BLD-MCNC: 13 G/DL (ref 14–18)
IMM GRANULOCYTES # BLD AUTO: 0.02 K/UL (ref 0–0.04)
IMM GRANULOCYTES # BLD AUTO: 0.02 K/UL (ref 0–0.04)
IMM GRANULOCYTES NFR BLD AUTO: 0.4 % (ref 0–0.5)
IMM GRANULOCYTES NFR BLD AUTO: 0.4 % (ref 0–0.5)
LYMPHOCYTES # BLD AUTO: 0.9 K/UL (ref 1–4.8)
LYMPHOCYTES # BLD AUTO: 0.9 K/UL (ref 1–4.8)
LYMPHOCYTES NFR BLD: 16.2 % (ref 18–48)
LYMPHOCYTES NFR BLD: 16.2 % (ref 18–48)
MCH RBC QN AUTO: 31.3 PG (ref 27–31)
MCH RBC QN AUTO: 31.3 PG (ref 27–31)
MCHC RBC AUTO-ENTMCNC: 32 G/DL (ref 32–36)
MCHC RBC AUTO-ENTMCNC: 32 G/DL (ref 32–36)
MCV RBC AUTO: 98 FL (ref 82–98)
MCV RBC AUTO: 98 FL (ref 82–98)
MONOCYTES # BLD AUTO: 1 K/UL (ref 0.3–1)
MONOCYTES # BLD AUTO: 1 K/UL (ref 0.3–1)
MONOCYTES NFR BLD: 17.5 % (ref 4–15)
MONOCYTES NFR BLD: 17.5 % (ref 4–15)
NEUTROPHILS # BLD AUTO: 3.6 K/UL (ref 1.8–7.7)
NEUTROPHILS # BLD AUTO: 3.6 K/UL (ref 1.8–7.7)
NEUTROPHILS NFR BLD: 64.8 % (ref 38–73)
NEUTROPHILS NFR BLD: 64.8 % (ref 38–73)
NRBC BLD-RTO: 0 /100 WBC
NRBC BLD-RTO: 0 /100 WBC
PLATELET # BLD AUTO: 129 K/UL (ref 150–450)
PLATELET # BLD AUTO: 129 K/UL (ref 150–450)
PMV BLD AUTO: 11.8 FL (ref 9.2–12.9)
PMV BLD AUTO: 11.8 FL (ref 9.2–12.9)
POTASSIUM SERPL-SCNC: 4.2 MMOL/L (ref 3.5–5.1)
POTASSIUM SERPL-SCNC: 4.2 MMOL/L (ref 3.5–5.1)
PROT SERPL-MCNC: 6.3 G/DL (ref 6–8.4)
PROT SERPL-MCNC: 6.3 G/DL (ref 6–8.4)
RBC # BLD AUTO: 4.15 M/UL (ref 4.6–6.2)
RBC # BLD AUTO: 4.15 M/UL (ref 4.6–6.2)
SODIUM SERPL-SCNC: 141 MMOL/L (ref 136–145)
SODIUM SERPL-SCNC: 141 MMOL/L (ref 136–145)
T4 FREE SERPL-MCNC: 1.15 NG/DL (ref 0.71–1.51)
TESTOST SERPL-MCNC: 870 NG/DL (ref 304–1227)
TSH SERPL DL<=0.005 MIU/L-ACNC: 0.38 UIU/ML (ref 0.4–4)
WBC # BLD AUTO: 5.61 K/UL (ref 3.9–12.7)
WBC # BLD AUTO: 5.61 K/UL (ref 3.9–12.7)

## 2021-04-28 PROCEDURE — 84439 ASSAY OF FREE THYROXINE: CPT | Performed by: INTERNAL MEDICINE

## 2021-04-28 PROCEDURE — 84403 ASSAY OF TOTAL TESTOSTERONE: CPT | Performed by: INTERNAL MEDICINE

## 2021-04-28 PROCEDURE — 84443 ASSAY THYROID STIM HORMONE: CPT | Performed by: INTERNAL MEDICINE

## 2021-04-28 PROCEDURE — 36415 COLL VENOUS BLD VENIPUNCTURE: CPT | Mod: PO | Performed by: INTERNAL MEDICINE

## 2021-04-28 PROCEDURE — 83921 ORGANIC ACID SINGLE QUANT: CPT | Performed by: INTERNAL MEDICINE

## 2021-04-28 PROCEDURE — 80053 COMPREHEN METABOLIC PANEL: CPT | Performed by: INTERNAL MEDICINE

## 2021-04-28 PROCEDURE — 82747 ASSAY OF FOLIC ACID RBC: CPT | Performed by: INTERNAL MEDICINE

## 2021-04-28 PROCEDURE — 85025 COMPLETE CBC W/AUTO DIFF WBC: CPT | Performed by: INTERNAL MEDICINE

## 2021-04-30 ENCOUNTER — TELEPHONE (OUTPATIENT)
Dept: CARDIAC REHAB | Facility: CLINIC | Age: 86
End: 2021-04-30

## 2021-05-02 PROBLEM — I25.10 CORONARY ARTERY DISEASE INVOLVING NATIVE HEART: Status: RESOLVED | Noted: 2021-04-23 | Resolved: 2021-05-02

## 2021-05-03 LAB — FOLATE RBC-MCNC: 855 NG/ML (ref 280–791)

## 2021-05-04 ENCOUNTER — OFFICE VISIT (OUTPATIENT)
Dept: CARDIOLOGY | Facility: CLINIC | Age: 86
End: 2021-05-04
Payer: MEDICARE

## 2021-05-04 VITALS
WEIGHT: 236.31 LBS | SYSTOLIC BLOOD PRESSURE: 99 MMHG | BODY MASS INDEX: 29.38 KG/M2 | HEART RATE: 46 BPM | DIASTOLIC BLOOD PRESSURE: 54 MMHG | HEIGHT: 75 IN | OXYGEN SATURATION: 98 %

## 2021-05-04 DIAGNOSIS — I25.119 CORONARY ARTERY DISEASE INVOLVING NATIVE CORONARY ARTERY OF NATIVE HEART WITH ANGINA PECTORIS: ICD-10-CM

## 2021-05-04 DIAGNOSIS — I27.20 HYPERTENSIVE PULMONARY VASCULAR DISEASE: ICD-10-CM

## 2021-05-04 DIAGNOSIS — I25.10 CORONARY ARTERY DISEASE INVOLVING NATIVE CORONARY ARTERY WITHOUT ANGINA PECTORIS, UNSPECIFIED WHETHER NATIVE OR TRANSPLANTED HEART: ICD-10-CM

## 2021-05-04 DIAGNOSIS — I50.22 CHRONIC SYSTOLIC CONGESTIVE HEART FAILURE: Primary | ICD-10-CM

## 2021-05-04 DIAGNOSIS — Z98.61 POST PTCA: ICD-10-CM

## 2021-05-04 DIAGNOSIS — Z79.01 LONG TERM (CURRENT) USE OF ANTICOAGULANTS: ICD-10-CM

## 2021-05-04 DIAGNOSIS — R06.09 DOE (DYSPNEA ON EXERTION): ICD-10-CM

## 2021-05-04 DIAGNOSIS — N18.9 CHRONIC KIDNEY DISEASE, UNSPECIFIED CKD STAGE: ICD-10-CM

## 2021-05-04 DIAGNOSIS — I50.22 CHRONIC SYSTOLIC HEART FAILURE: ICD-10-CM

## 2021-05-04 LAB — METHYLMALONATE SERPL-SCNC: 0.92 UMOL/L

## 2021-05-04 PROCEDURE — 1159F PR MEDICATION LIST DOCUMENTED IN MEDICAL RECORD: ICD-10-PCS | Mod: S$GLB,,, | Performed by: INTERNAL MEDICINE

## 2021-05-04 PROCEDURE — 1101F PR PT FALLS ASSESS DOC 0-1 FALLS W/OUT INJ PAST YR: ICD-10-PCS | Mod: CPTII,S$GLB,, | Performed by: INTERNAL MEDICINE

## 2021-05-04 PROCEDURE — 99214 OFFICE O/P EST MOD 30 MIN: CPT | Mod: S$GLB,,, | Performed by: INTERNAL MEDICINE

## 2021-05-04 PROCEDURE — 1126F AMNT PAIN NOTED NONE PRSNT: CPT | Mod: S$GLB,,, | Performed by: INTERNAL MEDICINE

## 2021-05-04 PROCEDURE — 99999 PR PBB SHADOW E&M-EST. PATIENT-LVL V: ICD-10-PCS | Mod: PBBFAC,,, | Performed by: INTERNAL MEDICINE

## 2021-05-04 PROCEDURE — 99214 PR OFFICE/OUTPT VISIT, EST, LEVL IV, 30-39 MIN: ICD-10-PCS | Mod: S$GLB,,, | Performed by: INTERNAL MEDICINE

## 2021-05-04 PROCEDURE — 3288F FALL RISK ASSESSMENT DOCD: CPT | Mod: CPTII,S$GLB,, | Performed by: INTERNAL MEDICINE

## 2021-05-04 PROCEDURE — 1126F PR PAIN SEVERITY QUANTIFIED, NO PAIN PRESENT: ICD-10-PCS | Mod: S$GLB,,, | Performed by: INTERNAL MEDICINE

## 2021-05-04 PROCEDURE — 1101F PT FALLS ASSESS-DOCD LE1/YR: CPT | Mod: CPTII,S$GLB,, | Performed by: INTERNAL MEDICINE

## 2021-05-04 PROCEDURE — 3288F PR FALLS RISK ASSESSMENT DOCUMENTED: ICD-10-PCS | Mod: CPTII,S$GLB,, | Performed by: INTERNAL MEDICINE

## 2021-05-04 PROCEDURE — 1159F MED LIST DOCD IN RCRD: CPT | Mod: S$GLB,,, | Performed by: INTERNAL MEDICINE

## 2021-05-04 PROCEDURE — 99999 PR PBB SHADOW E&M-EST. PATIENT-LVL V: CPT | Mod: PBBFAC,,, | Performed by: INTERNAL MEDICINE

## 2021-05-04 RX ORDER — FUROSEMIDE 40 MG/1
20 TABLET ORAL DAILY
Qty: 30 TABLET | Refills: 11 | Status: SHIPPED | OUTPATIENT
Start: 2021-05-04 | End: 2022-02-09

## 2021-05-05 ENCOUNTER — CLINICAL SUPPORT (OUTPATIENT)
Dept: FAMILY MEDICINE | Facility: CLINIC | Age: 86
End: 2021-05-05
Payer: MEDICARE

## 2021-05-05 ENCOUNTER — OFFICE VISIT (OUTPATIENT)
Dept: HEMATOLOGY/ONCOLOGY | Facility: CLINIC | Age: 86
End: 2021-05-05
Payer: MEDICARE

## 2021-05-05 VITALS
TEMPERATURE: 98 F | HEART RATE: 75 BPM | HEIGHT: 75 IN | OXYGEN SATURATION: 97 % | WEIGHT: 235.44 LBS | BODY MASS INDEX: 29.27 KG/M2 | SYSTOLIC BLOOD PRESSURE: 100 MMHG | DIASTOLIC BLOOD PRESSURE: 64 MMHG

## 2021-05-05 DIAGNOSIS — E23.0 PANHYPOPITUITARISM: ICD-10-CM

## 2021-05-05 DIAGNOSIS — N18.32 STAGE 3B CHRONIC KIDNEY DISEASE: Primary | ICD-10-CM

## 2021-05-05 DIAGNOSIS — E03.9 ACQUIRED HYPOTHYROIDISM: ICD-10-CM

## 2021-05-05 DIAGNOSIS — I48.0 PAF (PAROXYSMAL ATRIAL FIBRILLATION): ICD-10-CM

## 2021-05-05 DIAGNOSIS — E29.1 HYPOGONADISM IN MALE: Primary | ICD-10-CM

## 2021-05-05 DIAGNOSIS — N18.30 STAGE 3 CHRONIC KIDNEY DISEASE, UNSPECIFIED WHETHER STAGE 3A OR 3B CKD: ICD-10-CM

## 2021-05-05 DIAGNOSIS — E07.9 THYROID DISEASE: ICD-10-CM

## 2021-05-05 DIAGNOSIS — Z86.718 HISTORY OF DEEP VEIN THROMBOSIS (DVT) OF LOWER EXTREMITY: ICD-10-CM

## 2021-05-05 DIAGNOSIS — C84.A0 PLEOMORPHIC SMALL OR MEDIUM-SIZED CELL CUTANEOUS T-CELL LYMPHOMA: ICD-10-CM

## 2021-05-05 DIAGNOSIS — E29.1 HYPOGONADISM IN MALE: ICD-10-CM

## 2021-05-05 DIAGNOSIS — I10 HYPERTENSION, UNSPECIFIED TYPE: ICD-10-CM

## 2021-05-05 DIAGNOSIS — I25.10 CORONARY ARTERY DISEASE, ANGINA PRESENCE UNSPECIFIED, UNSPECIFIED VESSEL OR LESION TYPE, UNSPECIFIED WHETHER NATIVE OR TRANSPLANTED HEART: ICD-10-CM

## 2021-05-05 DIAGNOSIS — D53.9 MACROCYTIC ANEMIA: ICD-10-CM

## 2021-05-05 DIAGNOSIS — D53.9 MACROCYTIC ANEMIA: Primary | ICD-10-CM

## 2021-05-05 PROCEDURE — 96372 PR INJECTION,THERAP/PROPH/DIAG2ST, IM OR SUBCUT: ICD-10-PCS | Mod: S$GLB,,, | Performed by: INTERNAL MEDICINE

## 2021-05-05 PROCEDURE — 3288F PR FALLS RISK ASSESSMENT DOCUMENTED: ICD-10-PCS | Mod: CPTII,S$GLB,, | Performed by: INTERNAL MEDICINE

## 2021-05-05 PROCEDURE — 3288F FALL RISK ASSESSMENT DOCD: CPT | Mod: CPTII,S$GLB,, | Performed by: INTERNAL MEDICINE

## 2021-05-05 PROCEDURE — 99212 PR OFFICE/OUTPT VISIT, EST, LEVL II, 10-19 MIN: ICD-10-PCS | Mod: S$GLB,,, | Performed by: INTERNAL MEDICINE

## 2021-05-05 PROCEDURE — 1101F PR PT FALLS ASSESS DOC 0-1 FALLS W/OUT INJ PAST YR: ICD-10-PCS | Mod: CPTII,S$GLB,, | Performed by: INTERNAL MEDICINE

## 2021-05-05 PROCEDURE — 99499 RISK ADDL DX/OHS AUDIT: ICD-10-PCS | Mod: S$GLB,,, | Performed by: INTERNAL MEDICINE

## 2021-05-05 PROCEDURE — 99499 UNLISTED E&M SERVICE: CPT | Mod: S$GLB,,, | Performed by: INTERNAL MEDICINE

## 2021-05-05 PROCEDURE — 99999 PR PBB SHADOW E&M-EST. PATIENT-LVL IV: CPT | Mod: PBBFAC,,, | Performed by: INTERNAL MEDICINE

## 2021-05-05 PROCEDURE — 99212 OFFICE O/P EST SF 10 MIN: CPT | Mod: S$GLB,,, | Performed by: INTERNAL MEDICINE

## 2021-05-05 PROCEDURE — 99999 PR PBB SHADOW E&M-EST. PATIENT-LVL I: CPT | Mod: PBBFAC,,,

## 2021-05-05 PROCEDURE — 1126F PR PAIN SEVERITY QUANTIFIED, NO PAIN PRESENT: ICD-10-PCS | Mod: S$GLB,,, | Performed by: INTERNAL MEDICINE

## 2021-05-05 PROCEDURE — 1159F MED LIST DOCD IN RCRD: CPT | Mod: S$GLB,,, | Performed by: INTERNAL MEDICINE

## 2021-05-05 PROCEDURE — 1159F PR MEDICATION LIST DOCUMENTED IN MEDICAL RECORD: ICD-10-PCS | Mod: S$GLB,,, | Performed by: INTERNAL MEDICINE

## 2021-05-05 PROCEDURE — 99999 PR PBB SHADOW E&M-EST. PATIENT-LVL I: ICD-10-PCS | Mod: PBBFAC,,,

## 2021-05-05 PROCEDURE — 1101F PT FALLS ASSESS-DOCD LE1/YR: CPT | Mod: CPTII,S$GLB,, | Performed by: INTERNAL MEDICINE

## 2021-05-05 PROCEDURE — 1126F AMNT PAIN NOTED NONE PRSNT: CPT | Mod: S$GLB,,, | Performed by: INTERNAL MEDICINE

## 2021-05-05 PROCEDURE — 96372 THER/PROPH/DIAG INJ SC/IM: CPT | Mod: S$GLB,,, | Performed by: INTERNAL MEDICINE

## 2021-05-05 PROCEDURE — 99999 PR PBB SHADOW E&M-EST. PATIENT-LVL IV: ICD-10-PCS | Mod: PBBFAC,,, | Performed by: INTERNAL MEDICINE

## 2021-05-05 RX ADMIN — TESTOSTERONE CYPIONATE 100 MG: 200 INJECTION, SOLUTION INTRAMUSCULAR at 08:05

## 2021-05-19 ENCOUNTER — ANTI-COAG VISIT (OUTPATIENT)
Dept: CARDIOLOGY | Facility: CLINIC | Age: 86
End: 2021-05-19
Payer: MEDICARE

## 2021-05-19 ENCOUNTER — LAB VISIT (OUTPATIENT)
Dept: LAB | Facility: HOSPITAL | Age: 86
End: 2021-05-19
Attending: INTERNAL MEDICINE
Payer: MEDICARE

## 2021-05-19 ENCOUNTER — CLINICAL SUPPORT (OUTPATIENT)
Dept: FAMILY MEDICINE | Facility: CLINIC | Age: 86
End: 2021-05-19
Payer: MEDICARE

## 2021-05-19 DIAGNOSIS — I48.19 PERSISTENT ATRIAL FIBRILLATION: ICD-10-CM

## 2021-05-19 DIAGNOSIS — E29.1 HYPOGONADISM IN MALE: Primary | ICD-10-CM

## 2021-05-19 DIAGNOSIS — I48.19 PERSISTENT ATRIAL FIBRILLATION: Primary | ICD-10-CM

## 2021-05-19 LAB
INR PPP: 1.9 (ref 0.8–1.2)
PROTHROMBIN TIME: 19.9 SEC (ref 9–12.5)

## 2021-05-19 PROCEDURE — 93793 ANTICOAG MGMT PT WARFARIN: CPT | Mod: S$GLB,,,

## 2021-05-19 PROCEDURE — 96372 THER/PROPH/DIAG INJ SC/IM: CPT | Mod: S$GLB,,, | Performed by: INTERNAL MEDICINE

## 2021-05-19 PROCEDURE — 93793 PR ANTICOAGULANT MGMT FOR PT TAKING WARFARIN: ICD-10-PCS | Mod: S$GLB,,,

## 2021-05-19 PROCEDURE — 96372 PR INJECTION,THERAP/PROPH/DIAG2ST, IM OR SUBCUT: ICD-10-PCS | Mod: S$GLB,,, | Performed by: INTERNAL MEDICINE

## 2021-05-19 PROCEDURE — 36415 COLL VENOUS BLD VENIPUNCTURE: CPT | Mod: PO | Performed by: INTERNAL MEDICINE

## 2021-05-19 PROCEDURE — 85610 PROTHROMBIN TIME: CPT | Performed by: INTERNAL MEDICINE

## 2021-05-19 RX ADMIN — TESTOSTERONE CYPIONATE 100 MG: 200 INJECTION, SOLUTION INTRAMUSCULAR at 09:05

## 2021-05-26 ENCOUNTER — PATIENT MESSAGE (OUTPATIENT)
Dept: FAMILY MEDICINE | Facility: CLINIC | Age: 86
End: 2021-05-26

## 2021-05-26 DIAGNOSIS — M62.830 BACK SPASM: Primary | ICD-10-CM

## 2021-05-26 RX ORDER — CYCLOBENZAPRINE HCL 10 MG
10 TABLET ORAL 3 TIMES DAILY PRN
Qty: 30 TABLET | Refills: 0 | Status: SHIPPED | OUTPATIENT
Start: 2021-05-26 | End: 2021-06-05

## 2021-06-02 ENCOUNTER — CLINICAL SUPPORT (OUTPATIENT)
Dept: FAMILY MEDICINE | Facility: CLINIC | Age: 86
End: 2021-06-02
Payer: MEDICARE

## 2021-06-02 ENCOUNTER — ANTI-COAG VISIT (OUTPATIENT)
Dept: CARDIOLOGY | Facility: CLINIC | Age: 86
End: 2021-06-02
Payer: MEDICARE

## 2021-06-02 ENCOUNTER — LAB VISIT (OUTPATIENT)
Dept: LAB | Facility: HOSPITAL | Age: 86
End: 2021-06-02
Attending: INTERNAL MEDICINE
Payer: MEDICARE

## 2021-06-02 DIAGNOSIS — I48.19 PERSISTENT ATRIAL FIBRILLATION: Primary | ICD-10-CM

## 2021-06-02 DIAGNOSIS — E29.1 HYPOGONADISM IN MALE: Primary | ICD-10-CM

## 2021-06-02 DIAGNOSIS — I48.19 PERSISTENT ATRIAL FIBRILLATION: ICD-10-CM

## 2021-06-02 LAB
INR PPP: 2.9 (ref 0.8–1.2)
PROTHROMBIN TIME: 29.1 SEC (ref 9–12.5)

## 2021-06-02 PROCEDURE — 93793 ANTICOAG MGMT PT WARFARIN: CPT | Mod: S$GLB,,,

## 2021-06-02 PROCEDURE — 96372 THER/PROPH/DIAG INJ SC/IM: CPT | Mod: S$GLB,,, | Performed by: INTERNAL MEDICINE

## 2021-06-02 PROCEDURE — 99999 PR PBB SHADOW E&M-EST. PATIENT-LVL I: CPT | Mod: PBBFAC,,,

## 2021-06-02 PROCEDURE — 36415 COLL VENOUS BLD VENIPUNCTURE: CPT | Mod: PO | Performed by: INTERNAL MEDICINE

## 2021-06-02 PROCEDURE — 96372 PR INJECTION,THERAP/PROPH/DIAG2ST, IM OR SUBCUT: ICD-10-PCS | Mod: S$GLB,,, | Performed by: INTERNAL MEDICINE

## 2021-06-02 PROCEDURE — 85610 PROTHROMBIN TIME: CPT | Performed by: INTERNAL MEDICINE

## 2021-06-02 PROCEDURE — 99999 PR PBB SHADOW E&M-EST. PATIENT-LVL I: ICD-10-PCS | Mod: PBBFAC,,,

## 2021-06-02 PROCEDURE — 93793 PR ANTICOAGULANT MGMT FOR PT TAKING WARFARIN: ICD-10-PCS | Mod: S$GLB,,,

## 2021-06-02 RX ADMIN — TESTOSTERONE CYPIONATE 100 MG: 200 INJECTION, SOLUTION INTRAMUSCULAR at 09:06

## 2021-06-08 ENCOUNTER — PATIENT MESSAGE (OUTPATIENT)
Dept: CARDIOLOGY | Facility: CLINIC | Age: 86
End: 2021-06-08

## 2021-06-14 ENCOUNTER — PES CALL (OUTPATIENT)
Dept: ADMINISTRATIVE | Facility: CLINIC | Age: 86
End: 2021-06-14

## 2021-06-16 ENCOUNTER — TELEPHONE (OUTPATIENT)
Dept: FAMILY MEDICINE | Facility: CLINIC | Age: 86
End: 2021-06-16

## 2021-06-16 ENCOUNTER — CLINICAL SUPPORT (OUTPATIENT)
Dept: FAMILY MEDICINE | Facility: CLINIC | Age: 86
End: 2021-06-16
Payer: MEDICARE

## 2021-06-16 DIAGNOSIS — E29.1 HYPOGONADISM IN MALE: Primary | ICD-10-CM

## 2021-06-16 DIAGNOSIS — E29.1 HYPOGONADISM IN MALE: ICD-10-CM

## 2021-06-16 PROCEDURE — 96372 THER/PROPH/DIAG INJ SC/IM: CPT | Mod: S$GLB,,, | Performed by: INTERNAL MEDICINE

## 2021-06-16 PROCEDURE — 99999 PR PBB SHADOW E&M-EST. PATIENT-LVL I: ICD-10-PCS | Mod: PBBFAC,,,

## 2021-06-16 PROCEDURE — 96372 PR INJECTION,THERAP/PROPH/DIAG2ST, IM OR SUBCUT: ICD-10-PCS | Mod: S$GLB,,, | Performed by: INTERNAL MEDICINE

## 2021-06-16 PROCEDURE — 99999 PR PBB SHADOW E&M-EST. PATIENT-LVL I: CPT | Mod: PBBFAC,,,

## 2021-06-16 RX ORDER — TESTOSTERONE CYPIONATE 200 MG/ML
100 INJECTION, SOLUTION INTRAMUSCULAR
Status: COMPLETED | OUTPATIENT
Start: 2021-07-01 | End: 2021-07-14

## 2021-06-16 RX ADMIN — TESTOSTERONE CYPIONATE 100 MG: 200 INJECTION, SOLUTION INTRAMUSCULAR at 09:06

## 2021-06-23 NOTE — ASSESSMENT & PLAN NOTE
Coumadin on hold as above   Problem: Skin Integrity:  Goal: Will show no infection signs and symptoms  Description: Will show no infection signs and symptoms  6/23/2021 0047 by Harry Castillo RN  Outcome: Ongoing     Problem: Skin Integrity:  Goal: Absence of new skin breakdown  Description: Absence of new skin breakdown  6/23/2021 0047 by Harry Castillo RN  Outcome: Ongoing     Problem: Falls - Risk of:  Goal: Will remain free from falls  Description: Will remain free from falls  6/23/2021 0047 by Harry Castillo RN  Outcome: Ongoing     Problem: Falls - Risk of:  Goal: Absence of physical injury  Description: Absence of physical injury  6/23/2021 0047 by Harry Castillo RN  Outcome: Ongoing     Problem: Discharge Planning:  Goal: Discharged to appropriate level of care  Description: Discharged to appropriate level of care  6/23/2021 0047 by Harry Castillo RN  Outcome: Ongoing     Problem: Sensory:  Goal: General experience of comfort will improve  Description: General experience of comfort will improve  6/23/2021 0047 by Harry Castillo RN  Outcome: Ongoing     Problem: Urinary Elimination:  Goal: Signs and symptoms of infection will decrease  Description: Signs and symptoms of infection will decrease  6/23/2021 0047 by Harry Castillo RN  Outcome: Ongoing     Problem: Urinary Elimination:  Goal: Ability to reestablish a normal urinary elimination pattern will improve - after catheter removal  Description: Ability to reestablish a normal urinary elimination pattern will improve  6/23/2021 0047 by Harry Castillo RN  Outcome: Ongoing     Problem: Urinary Elimination:  Goal: Complications related to the disease process, condition or treatment will be avoided or minimized  Description: Complications related to the disease process, condition or treatment will be avoided or minimized  6/23/2021 0047 by Harry Castillo RN  Outcome: Ongoing     Problem: Serum Glucose Level - Abnormal:  Goal: Ability to maintain appropriate glucose levels will improve  Description: Ability to maintain appropriate glucose levels will improve  6/23/2021 0047 by Praveen Gonzalez RN  Outcome: Ongoing     Problem: Sensory Perception - Impaired:  Goal: Ability to maintain a stable neurologic state will improve  Description: Ability to maintain a stable neurologic state will improve  6/23/2021 0047 by Praveen Gonzalez RN  Outcome: Ongoing     Problem: Pain:  Goal: Pain level will decrease  Description: Pain level will decrease  6/23/2021 0047 by Praveen Gonzalez RN  Outcome: Ongoing     Problem: SAFETY  Goal: LTG - Patient will demonstrate safety requirements appropriate to situation/environment  6/23/2021 0047 by Praveen Gonzalez RN  Outcome: Ongoing

## 2021-06-27 ENCOUNTER — PATIENT OUTREACH (OUTPATIENT)
Dept: ADMINISTRATIVE | Facility: OTHER | Age: 86
End: 2021-06-27

## 2021-06-28 ENCOUNTER — OFFICE VISIT (OUTPATIENT)
Dept: CARDIOLOGY | Facility: CLINIC | Age: 86
End: 2021-06-28
Payer: MEDICARE

## 2021-06-28 VITALS
HEART RATE: 73 BPM | HEIGHT: 75 IN | SYSTOLIC BLOOD PRESSURE: 127 MMHG | OXYGEN SATURATION: 95 % | WEIGHT: 235.44 LBS | DIASTOLIC BLOOD PRESSURE: 70 MMHG | BODY MASS INDEX: 29.27 KG/M2

## 2021-06-28 DIAGNOSIS — N18.30 STAGE 3 CHRONIC KIDNEY DISEASE, UNSPECIFIED WHETHER STAGE 3A OR 3B CKD: ICD-10-CM

## 2021-06-28 DIAGNOSIS — E78.2 MIXED HYPERLIPIDEMIA: ICD-10-CM

## 2021-06-28 DIAGNOSIS — I48.19 PERSISTENT ATRIAL FIBRILLATION: ICD-10-CM

## 2021-06-28 DIAGNOSIS — I10 ESSENTIAL HYPERTENSION: ICD-10-CM

## 2021-06-28 DIAGNOSIS — I25.119 CORONARY ARTERY DISEASE INVOLVING NATIVE CORONARY ARTERY OF NATIVE HEART WITH ANGINA PECTORIS: ICD-10-CM

## 2021-06-28 PROCEDURE — 1159F PR MEDICATION LIST DOCUMENTED IN MEDICAL RECORD: ICD-10-PCS | Mod: S$GLB,,, | Performed by: INTERNAL MEDICINE

## 2021-06-28 PROCEDURE — 99999 PR PBB SHADOW E&M-EST. PATIENT-LVL IV: ICD-10-PCS | Mod: PBBFAC,,, | Performed by: INTERNAL MEDICINE

## 2021-06-28 PROCEDURE — 1126F AMNT PAIN NOTED NONE PRSNT: CPT | Mod: S$GLB,,, | Performed by: INTERNAL MEDICINE

## 2021-06-28 PROCEDURE — 99999 PR PBB SHADOW E&M-EST. PATIENT-LVL IV: CPT | Mod: PBBFAC,,, | Performed by: INTERNAL MEDICINE

## 2021-06-28 PROCEDURE — 1159F MED LIST DOCD IN RCRD: CPT | Mod: S$GLB,,, | Performed by: INTERNAL MEDICINE

## 2021-06-28 PROCEDURE — 99214 OFFICE O/P EST MOD 30 MIN: CPT | Mod: S$GLB,,, | Performed by: INTERNAL MEDICINE

## 2021-06-28 PROCEDURE — 99214 PR OFFICE/OUTPT VISIT, EST, LEVL IV, 30-39 MIN: ICD-10-PCS | Mod: S$GLB,,, | Performed by: INTERNAL MEDICINE

## 2021-06-28 PROCEDURE — 1126F PR PAIN SEVERITY QUANTIFIED, NO PAIN PRESENT: ICD-10-PCS | Mod: S$GLB,,, | Performed by: INTERNAL MEDICINE

## 2021-06-29 ENCOUNTER — OFFICE VISIT (OUTPATIENT)
Dept: CARDIOLOGY | Facility: CLINIC | Age: 86
End: 2021-06-29
Payer: MEDICARE

## 2021-06-29 VITALS
BODY MASS INDEX: 29.36 KG/M2 | SYSTOLIC BLOOD PRESSURE: 132 MMHG | DIASTOLIC BLOOD PRESSURE: 60 MMHG | HEIGHT: 75 IN | WEIGHT: 236.13 LBS | HEART RATE: 63 BPM

## 2021-06-29 DIAGNOSIS — Z98.61 POST PTCA: ICD-10-CM

## 2021-06-29 DIAGNOSIS — I25.119 CORONARY ARTERY DISEASE INVOLVING NATIVE CORONARY ARTERY OF NATIVE HEART WITH ANGINA PECTORIS: ICD-10-CM

## 2021-06-29 DIAGNOSIS — I25.10 CORONARY ARTERY DISEASE INVOLVING NATIVE CORONARY ARTERY OF NATIVE HEART WITHOUT ANGINA PECTORIS: Primary | ICD-10-CM

## 2021-06-29 DIAGNOSIS — I10 ESSENTIAL HYPERTENSION: ICD-10-CM

## 2021-06-29 DIAGNOSIS — Z79.01 LONG TERM (CURRENT) USE OF ANTICOAGULANTS: ICD-10-CM

## 2021-06-29 DIAGNOSIS — N40.0 BENIGN NON-NODULAR PROSTATIC HYPERPLASIA WITHOUT LOWER URINARY TRACT SYMPTOMS: ICD-10-CM

## 2021-06-29 DIAGNOSIS — I25.5 ISCHEMIC CARDIOMYOPATHY: ICD-10-CM

## 2021-06-29 DIAGNOSIS — E78.2 MIXED HYPERLIPIDEMIA: ICD-10-CM

## 2021-06-29 DIAGNOSIS — I48.19 PERSISTENT ATRIAL FIBRILLATION: ICD-10-CM

## 2021-06-29 PROCEDURE — 1126F AMNT PAIN NOTED NONE PRSNT: CPT | Mod: S$GLB,,, | Performed by: INTERNAL MEDICINE

## 2021-06-29 PROCEDURE — 99214 OFFICE O/P EST MOD 30 MIN: CPT | Mod: S$GLB,,, | Performed by: INTERNAL MEDICINE

## 2021-06-29 PROCEDURE — 1159F PR MEDICATION LIST DOCUMENTED IN MEDICAL RECORD: ICD-10-PCS | Mod: S$GLB,,, | Performed by: INTERNAL MEDICINE

## 2021-06-29 PROCEDURE — 1159F MED LIST DOCD IN RCRD: CPT | Mod: S$GLB,,, | Performed by: INTERNAL MEDICINE

## 2021-06-29 PROCEDURE — 1126F PR PAIN SEVERITY QUANTIFIED, NO PAIN PRESENT: ICD-10-PCS | Mod: S$GLB,,, | Performed by: INTERNAL MEDICINE

## 2021-06-29 PROCEDURE — 99214 PR OFFICE/OUTPT VISIT, EST, LEVL IV, 30-39 MIN: ICD-10-PCS | Mod: S$GLB,,, | Performed by: INTERNAL MEDICINE

## 2021-06-29 PROCEDURE — 99999 PR PBB SHADOW E&M-EST. PATIENT-LVL IV: ICD-10-PCS | Mod: PBBFAC,,, | Performed by: INTERNAL MEDICINE

## 2021-06-29 PROCEDURE — 99999 PR PBB SHADOW E&M-EST. PATIENT-LVL IV: CPT | Mod: PBBFAC,,, | Performed by: INTERNAL MEDICINE

## 2021-06-30 ENCOUNTER — CLINICAL SUPPORT (OUTPATIENT)
Dept: FAMILY MEDICINE | Facility: CLINIC | Age: 86
End: 2021-06-30
Payer: MEDICARE

## 2021-06-30 ENCOUNTER — LAB VISIT (OUTPATIENT)
Dept: LAB | Facility: HOSPITAL | Age: 86
End: 2021-06-30
Attending: INTERNAL MEDICINE
Payer: MEDICARE

## 2021-06-30 ENCOUNTER — ANTI-COAG VISIT (OUTPATIENT)
Dept: CARDIOLOGY | Facility: CLINIC | Age: 86
End: 2021-06-30
Payer: MEDICARE

## 2021-06-30 DIAGNOSIS — E03.9 ACQUIRED HYPOTHYROIDISM: ICD-10-CM

## 2021-06-30 DIAGNOSIS — I48.19 PERSISTENT ATRIAL FIBRILLATION: ICD-10-CM

## 2021-06-30 DIAGNOSIS — E23.0 PANHYPOPITUITARISM: ICD-10-CM

## 2021-06-30 DIAGNOSIS — I48.19 PERSISTENT ATRIAL FIBRILLATION: Primary | ICD-10-CM

## 2021-06-30 DIAGNOSIS — E29.1 HYPOGONADISM IN MALE: Primary | ICD-10-CM

## 2021-06-30 DIAGNOSIS — E29.1 HYPOGONADISM IN MALE: ICD-10-CM

## 2021-06-30 DIAGNOSIS — D53.9 MACROCYTIC ANEMIA: ICD-10-CM

## 2021-06-30 LAB
INR PPP: 2.8 (ref 0.8–1.2)
PROTHROMBIN TIME: 27.8 SEC (ref 9–12.5)
T4 FREE SERPL-MCNC: 1.04 NG/DL (ref 0.71–1.51)
TESTOST SERPL-MCNC: 387 NG/DL (ref 304–1227)
TSH SERPL DL<=0.005 MIU/L-ACNC: 0.17 UIU/ML (ref 0.4–4)
VIT B12 SERPL-MCNC: 627 PG/ML (ref 210–950)

## 2021-06-30 PROCEDURE — 99999 PR PBB SHADOW E&M-EST. PATIENT-LVL I: ICD-10-PCS | Mod: PBBFAC,,,

## 2021-06-30 PROCEDURE — 36415 COLL VENOUS BLD VENIPUNCTURE: CPT | Mod: PO | Performed by: INTERNAL MEDICINE

## 2021-06-30 PROCEDURE — 84403 ASSAY OF TOTAL TESTOSTERONE: CPT | Performed by: INTERNAL MEDICINE

## 2021-06-30 PROCEDURE — 93793 PR ANTICOAGULANT MGMT FOR PT TAKING WARFARIN: ICD-10-PCS | Mod: S$GLB,,,

## 2021-06-30 PROCEDURE — 84439 ASSAY OF FREE THYROXINE: CPT | Performed by: INTERNAL MEDICINE

## 2021-06-30 PROCEDURE — 82607 VITAMIN B-12: CPT | Performed by: INTERNAL MEDICINE

## 2021-06-30 PROCEDURE — 99999 PR PBB SHADOW E&M-EST. PATIENT-LVL I: CPT | Mod: PBBFAC,,,

## 2021-06-30 PROCEDURE — 84443 ASSAY THYROID STIM HORMONE: CPT | Performed by: INTERNAL MEDICINE

## 2021-06-30 PROCEDURE — 96372 PR INJECTION,THERAP/PROPH/DIAG2ST, IM OR SUBCUT: ICD-10-PCS | Mod: S$GLB,,, | Performed by: FAMILY MEDICINE

## 2021-06-30 PROCEDURE — 96372 THER/PROPH/DIAG INJ SC/IM: CPT | Mod: S$GLB,,, | Performed by: FAMILY MEDICINE

## 2021-06-30 PROCEDURE — 93793 ANTICOAG MGMT PT WARFARIN: CPT | Mod: S$GLB,,,

## 2021-06-30 PROCEDURE — 85610 PROTHROMBIN TIME: CPT | Performed by: INTERNAL MEDICINE

## 2021-06-30 RX ORDER — TAMSULOSIN HYDROCHLORIDE 0.4 MG/1
CAPSULE ORAL
Qty: 90 CAPSULE | Refills: 3 | Status: SHIPPED | OUTPATIENT
Start: 2021-06-30

## 2021-06-30 RX ADMIN — TESTOSTERONE CYPIONATE 100 MG: 200 INJECTION, SOLUTION INTRAMUSCULAR at 09:06

## 2021-07-01 DIAGNOSIS — I10 ESSENTIAL HYPERTENSION: ICD-10-CM

## 2021-07-01 DIAGNOSIS — D53.9 MACROCYTIC ANEMIA: Primary | ICD-10-CM

## 2021-07-01 DIAGNOSIS — E03.9 ACQUIRED HYPOTHYROIDISM: ICD-10-CM

## 2021-07-01 DIAGNOSIS — E23.0 PANHYPOPITUITARISM: ICD-10-CM

## 2021-07-01 DIAGNOSIS — E29.1 HYPOGONADISM IN MALE: ICD-10-CM

## 2021-07-01 RX ORDER — LEVOTHYROXINE SODIUM 100 UG/1
100 TABLET ORAL DAILY
Qty: 60 TABLET | Refills: 0 | Status: SHIPPED | OUTPATIENT
Start: 2021-07-01

## 2021-07-02 ENCOUNTER — TELEPHONE (OUTPATIENT)
Dept: CARDIOLOGY | Facility: CLINIC | Age: 86
End: 2021-07-02

## 2021-07-02 DIAGNOSIS — I48.11 LONGSTANDING PERSISTENT ATRIAL FIBRILLATION: Primary | ICD-10-CM

## 2021-07-02 RX ORDER — DIPHENHYDRAMINE HCL 50 MG
50 CAPSULE ORAL ONCE
Status: CANCELLED | OUTPATIENT
Start: 2021-07-02 | End: 2021-07-02

## 2021-07-02 RX ORDER — SODIUM CHLORIDE 9 MG/ML
INJECTION, SOLUTION INTRAVENOUS CONTINUOUS
Status: CANCELLED | OUTPATIENT
Start: 2021-07-02 | End: 2021-07-02

## 2021-07-04 ENCOUNTER — ANESTHESIA (OUTPATIENT)
Dept: ENDOSCOPY | Facility: HOSPITAL | Age: 86
DRG: 378 | End: 2021-07-04
Payer: MEDICARE

## 2021-07-04 ENCOUNTER — ANESTHESIA EVENT (OUTPATIENT)
Dept: ENDOSCOPY | Facility: HOSPITAL | Age: 86
DRG: 378 | End: 2021-07-04
Payer: MEDICARE

## 2021-07-04 ENCOUNTER — HOSPITAL ENCOUNTER (INPATIENT)
Facility: HOSPITAL | Age: 86
LOS: 5 days | Discharge: HOME | DRG: 378 | End: 2021-07-09
Attending: EMERGENCY MEDICINE | Admitting: EMERGENCY MEDICINE
Payer: MEDICARE

## 2021-07-04 DIAGNOSIS — K92.1 GASTROINTESTINAL HEMORRHAGE WITH MELENA: ICD-10-CM

## 2021-07-04 DIAGNOSIS — R00.0 TACHYCARDIA: ICD-10-CM

## 2021-07-04 DIAGNOSIS — K92.2 ACUTE UPPER GI BLEED: Primary | ICD-10-CM

## 2021-07-04 DIAGNOSIS — E83.39 HYPOPHOSPHATEMIA: ICD-10-CM

## 2021-07-04 DIAGNOSIS — I48.91 ATRIAL FIBRILLATION, UNSPECIFIED TYPE: ICD-10-CM

## 2021-07-04 DIAGNOSIS — K25.3 ACUTE GASTRIC ULCER WITHOUT HEMORRHAGE OR PERFORATION: ICD-10-CM

## 2021-07-04 DIAGNOSIS — K57.30 DIVERTICULOSIS OF COLON, ACQUIRED: Chronic | ICD-10-CM

## 2021-07-04 LAB
ABO + RH BLD: NORMAL
BASOPHILS # BLD AUTO: 0.01 K/UL (ref 0–0.2)
BASOPHILS # BLD AUTO: 0.01 K/UL (ref 0–0.2)
BASOPHILS NFR BLD: 0.2 % (ref 0–1.9)
BASOPHILS NFR BLD: 0.2 % (ref 0–1.9)
BLD GP AB SCN CELLS X3 SERPL QL: NORMAL
BUN SERPL-MCNC: 33 MG/DL (ref 6–30)
CHLORIDE SERPL-SCNC: 104 MMOL/L (ref 95–110)
CREAT SERPL-MCNC: 1.4 MG/DL (ref 0.5–1.4)
DIFFERENTIAL METHOD: ABNORMAL
DIFFERENTIAL METHOD: ABNORMAL
EOSINOPHIL # BLD AUTO: 0 K/UL (ref 0–0.5)
EOSINOPHIL # BLD AUTO: 0 K/UL (ref 0–0.5)
EOSINOPHIL NFR BLD: 0.4 % (ref 0–8)
EOSINOPHIL NFR BLD: 0.6 % (ref 0–8)
ERYTHROCYTE [DISTWIDTH] IN BLOOD BY AUTOMATED COUNT: 13.8 % (ref 11.5–14.5)
ERYTHROCYTE [DISTWIDTH] IN BLOOD BY AUTOMATED COUNT: 13.9 % (ref 11.5–14.5)
GLUCOSE SERPL-MCNC: 82 MG/DL (ref 70–110)
HCT VFR BLD AUTO: 27.3 % (ref 40–54)
HCT VFR BLD AUTO: 28.9 % (ref 40–54)
HCT VFR BLD CALC: 25 %PCV (ref 36–54)
HGB BLD-MCNC: 8.8 G/DL (ref 14–18)
HGB BLD-MCNC: 9.2 G/DL (ref 14–18)
IMM GRANULOCYTES # BLD AUTO: 0.03 K/UL (ref 0–0.04)
IMM GRANULOCYTES # BLD AUTO: 0.06 K/UL (ref 0–0.04)
IMM GRANULOCYTES NFR BLD AUTO: 0.5 % (ref 0–0.5)
IMM GRANULOCYTES NFR BLD AUTO: 1.2 % (ref 0–0.5)
INR PPP: 3.2 (ref 0.8–1.2)
LACTATE SERPL-SCNC: 1.4 MMOL/L (ref 0.5–2.2)
LIPASE SERPL-CCNC: 12 U/L (ref 4–60)
LYMPHOCYTES # BLD AUTO: 0.6 K/UL (ref 1–4.8)
LYMPHOCYTES # BLD AUTO: 0.8 K/UL (ref 1–4.8)
LYMPHOCYTES NFR BLD: 11.8 % (ref 18–48)
LYMPHOCYTES NFR BLD: 13.7 % (ref 18–48)
MCH RBC QN AUTO: 31.9 PG (ref 27–31)
MCH RBC QN AUTO: 32.2 PG (ref 27–31)
MCHC RBC AUTO-ENTMCNC: 31.8 G/DL (ref 32–36)
MCHC RBC AUTO-ENTMCNC: 32.2 G/DL (ref 32–36)
MCV RBC AUTO: 101 FL (ref 82–98)
MCV RBC AUTO: 99 FL (ref 82–98)
MONOCYTES # BLD AUTO: 1 K/UL (ref 0.3–1)
MONOCYTES # BLD AUTO: 1.2 K/UL (ref 0.3–1)
MONOCYTES NFR BLD: 20.5 % (ref 4–15)
MONOCYTES NFR BLD: 21.7 % (ref 4–15)
NEUTROPHILS # BLD AUTO: 3.2 K/UL (ref 1.8–7.7)
NEUTROPHILS # BLD AUTO: 3.5 K/UL (ref 1.8–7.7)
NEUTROPHILS NFR BLD: 63.5 % (ref 38–73)
NEUTROPHILS NFR BLD: 65.7 % (ref 38–73)
NRBC BLD-RTO: 0 /100 WBC
NRBC BLD-RTO: 0 /100 WBC
PLATELET # BLD AUTO: 131 K/UL (ref 150–450)
PLATELET # BLD AUTO: 131 K/UL (ref 150–450)
PMV BLD AUTO: 11.6 FL (ref 9.2–12.9)
PMV BLD AUTO: 12 FL (ref 9.2–12.9)
POC IONIZED CALCIUM: 1.17 MMOL/L (ref 1.06–1.42)
POC TCO2 (MEASURED): 23 MMOL/L (ref 23–29)
POTASSIUM BLD-SCNC: 3.2 MMOL/L (ref 3.5–5.1)
PROTHROMBIN TIME: 32.2 SEC (ref 9–12.5)
RBC # BLD AUTO: 2.76 M/UL (ref 4.6–6.2)
RBC # BLD AUTO: 2.86 M/UL (ref 4.6–6.2)
SAMPLE: ABNORMAL
SODIUM BLD-SCNC: 142 MMOL/L (ref 136–145)
WBC # BLD AUTO: 4.83 K/UL (ref 3.9–12.7)
WBC # BLD AUTO: 5.54 K/UL (ref 3.9–12.7)

## 2021-07-04 PROCEDURE — 85610 PROTHROMBIN TIME: CPT

## 2021-07-04 PROCEDURE — 63600175 PHARM REV CODE 636 W HCPCS: Performed by: STUDENT IN AN ORGANIZED HEALTH CARE EDUCATION/TRAINING PROGRAM

## 2021-07-04 PROCEDURE — 25000003 PHARM REV CODE 250: Performed by: STUDENT IN AN ORGANIZED HEALTH CARE EDUCATION/TRAINING PROGRAM

## 2021-07-04 PROCEDURE — 86900 BLOOD TYPING SEROLOGIC ABO: CPT

## 2021-07-04 PROCEDURE — 93005 ELECTROCARDIOGRAM TRACING: CPT

## 2021-07-04 PROCEDURE — 96360 HYDRATION IV INFUSION INIT: CPT

## 2021-07-04 PROCEDURE — 43235 EGD DIAGNOSTIC BRUSH WASH: CPT | Mod: ,,, | Performed by: INTERNAL MEDICINE

## 2021-07-04 PROCEDURE — 99284 EMERGENCY DEPT VISIT MOD MDM: CPT | Mod: GC,,, | Performed by: EMERGENCY MEDICINE

## 2021-07-04 PROCEDURE — 11000001 HC ACUTE MED/SURG PRIVATE ROOM

## 2021-07-04 PROCEDURE — 99223 PR INITIAL HOSPITAL CARE,LEVL III: ICD-10-PCS | Mod: 25,GC,, | Performed by: INTERNAL MEDICINE

## 2021-07-04 PROCEDURE — 83690 ASSAY OF LIPASE: CPT

## 2021-07-04 PROCEDURE — C9113 INJ PANTOPRAZOLE SODIUM, VIA: HCPCS

## 2021-07-04 PROCEDURE — 37000009 HC ANESTHESIA EA ADD 15 MINS: Performed by: INTERNAL MEDICINE

## 2021-07-04 PROCEDURE — 93010 ELECTROCARDIOGRAM REPORT: CPT | Mod: ,,, | Performed by: INTERNAL MEDICINE

## 2021-07-04 PROCEDURE — 43235 EGD DIAGNOSTIC BRUSH WASH: CPT | Performed by: INTERNAL MEDICINE

## 2021-07-04 PROCEDURE — D9220A PRA ANESTHESIA: ICD-10-PCS | Mod: ANES,,, | Performed by: ANESTHESIOLOGY

## 2021-07-04 PROCEDURE — 93010 EKG 12-LEAD: ICD-10-PCS | Mod: ,,, | Performed by: INTERNAL MEDICINE

## 2021-07-04 PROCEDURE — 63600175 PHARM REV CODE 636 W HCPCS

## 2021-07-04 PROCEDURE — 99284 PR EMERGENCY DEPT VISIT,LEVEL IV: ICD-10-PCS | Mod: GC,,, | Performed by: EMERGENCY MEDICINE

## 2021-07-04 PROCEDURE — 99285 EMERGENCY DEPT VISIT HI MDM: CPT | Mod: 25

## 2021-07-04 PROCEDURE — 85025 COMPLETE CBC W/AUTO DIFF WBC: CPT | Mod: 91 | Performed by: STUDENT IN AN ORGANIZED HEALTH CARE EDUCATION/TRAINING PROGRAM

## 2021-07-04 PROCEDURE — 37000008 HC ANESTHESIA 1ST 15 MINUTES: Performed by: INTERNAL MEDICINE

## 2021-07-04 PROCEDURE — 80047 BASIC METABLC PNL IONIZED CA: CPT

## 2021-07-04 PROCEDURE — 43235 PR EGD, FLEX, DIAGNOSTIC: ICD-10-PCS | Mod: ,,, | Performed by: INTERNAL MEDICINE

## 2021-07-04 PROCEDURE — 99223 1ST HOSP IP/OBS HIGH 75: CPT | Mod: 25,GC,, | Performed by: INTERNAL MEDICINE

## 2021-07-04 PROCEDURE — D9220A PRA ANESTHESIA: Mod: ANES,,, | Performed by: ANESTHESIOLOGY

## 2021-07-04 PROCEDURE — D9220A PRA ANESTHESIA: Mod: CRNA,,, | Performed by: NURSE ANESTHETIST, CERTIFIED REGISTERED

## 2021-07-04 PROCEDURE — D9220A PRA ANESTHESIA: ICD-10-PCS | Mod: CRNA,,, | Performed by: NURSE ANESTHETIST, CERTIFIED REGISTERED

## 2021-07-04 PROCEDURE — 25000003 PHARM REV CODE 250: Performed by: NURSE ANESTHETIST, CERTIFIED REGISTERED

## 2021-07-04 PROCEDURE — 99223 PR INITIAL HOSPITAL CARE,LEVL III: ICD-10-PCS | Mod: ,,, | Performed by: STUDENT IN AN ORGANIZED HEALTH CARE EDUCATION/TRAINING PROGRAM

## 2021-07-04 PROCEDURE — 25000003 PHARM REV CODE 250

## 2021-07-04 PROCEDURE — 83605 ASSAY OF LACTIC ACID: CPT

## 2021-07-04 PROCEDURE — 99223 1ST HOSP IP/OBS HIGH 75: CPT | Mod: ,,, | Performed by: STUDENT IN AN ORGANIZED HEALTH CARE EDUCATION/TRAINING PROGRAM

## 2021-07-04 PROCEDURE — 63600175 PHARM REV CODE 636 W HCPCS: Performed by: NURSE ANESTHETIST, CERTIFIED REGISTERED

## 2021-07-04 PROCEDURE — 85025 COMPLETE CBC W/AUTO DIFF WBC: CPT

## 2021-07-04 RX ORDER — PROPOFOL 10 MG/ML
VIAL (ML) INTRAVENOUS CONTINUOUS PRN
Status: DISCONTINUED | OUTPATIENT
Start: 2021-07-04 | End: 2021-07-04

## 2021-07-04 RX ORDER — PRASUGREL 10 MG/1
10 TABLET, FILM COATED ORAL NIGHTLY
Status: DISCONTINUED | OUTPATIENT
Start: 2021-07-04 | End: 2021-07-04

## 2021-07-04 RX ORDER — PANTOPRAZOLE SODIUM 40 MG/1
40 TABLET, DELAYED RELEASE ORAL DAILY
Status: DISCONTINUED | OUTPATIENT
Start: 2021-07-05 | End: 2021-07-04

## 2021-07-04 RX ORDER — PANTOPRAZOLE SODIUM 40 MG/10ML
40 INJECTION, POWDER, LYOPHILIZED, FOR SOLUTION INTRAVENOUS 2 TIMES DAILY
Status: DISCONTINUED | OUTPATIENT
Start: 2021-07-04 | End: 2021-07-04

## 2021-07-04 RX ORDER — TALC
6 POWDER (GRAM) TOPICAL NIGHTLY PRN
Status: DISCONTINUED | OUTPATIENT
Start: 2021-07-04 | End: 2021-07-09 | Stop reason: HOSPADM

## 2021-07-04 RX ORDER — PANTOPRAZOLE SODIUM 40 MG/10ML
80 INJECTION, POWDER, LYOPHILIZED, FOR SOLUTION INTRAVENOUS
Status: DISCONTINUED | OUTPATIENT
Start: 2021-07-04 | End: 2021-07-04

## 2021-07-04 RX ORDER — LEVOTHYROXINE SODIUM 100 UG/1
100 TABLET ORAL
Status: DISCONTINUED | OUTPATIENT
Start: 2021-07-05 | End: 2021-07-09 | Stop reason: HOSPADM

## 2021-07-04 RX ORDER — LIDOCAINE HYDROCHLORIDE 20 MG/ML
INJECTION INTRAVENOUS
Status: DISCONTINUED | OUTPATIENT
Start: 2021-07-04 | End: 2021-07-04

## 2021-07-04 RX ORDER — HYDROCODONE BITARTRATE AND ACETAMINOPHEN 500; 5 MG/1; MG/1
TABLET ORAL
Status: DISCONTINUED | OUTPATIENT
Start: 2021-07-04 | End: 2021-07-09 | Stop reason: HOSPADM

## 2021-07-04 RX ORDER — SODIUM CHLORIDE 0.9 % (FLUSH) 0.9 %
10 SYRINGE (ML) INJECTION
Status: DISCONTINUED | OUTPATIENT
Start: 2021-07-04 | End: 2021-07-04

## 2021-07-04 RX ORDER — TAMSULOSIN HYDROCHLORIDE 0.4 MG/1
1 CAPSULE ORAL DAILY
Status: DISCONTINUED | OUTPATIENT
Start: 2021-07-05 | End: 2021-07-09 | Stop reason: HOSPADM

## 2021-07-04 RX ORDER — PROPOFOL 10 MG/ML
VIAL (ML) INTRAVENOUS
Status: DISCONTINUED | OUTPATIENT
Start: 2021-07-04 | End: 2021-07-04

## 2021-07-04 RX ORDER — SODIUM CHLORIDE 0.9 % (FLUSH) 0.9 %
10 SYRINGE (ML) INJECTION
Status: DISCONTINUED | OUTPATIENT
Start: 2021-07-04 | End: 2021-07-09 | Stop reason: HOSPADM

## 2021-07-04 RX ORDER — METOPROLOL SUCCINATE 25 MG/1
25 TABLET, EXTENDED RELEASE ORAL DAILY
Status: DISCONTINUED | OUTPATIENT
Start: 2021-07-05 | End: 2021-07-09 | Stop reason: HOSPADM

## 2021-07-04 RX ORDER — FOLIC ACID 1 MG/1
1000 TABLET ORAL DAILY
Status: DISCONTINUED | OUTPATIENT
Start: 2021-07-05 | End: 2021-07-09 | Stop reason: HOSPADM

## 2021-07-04 RX ORDER — FUROSEMIDE 20 MG/1
20 TABLET ORAL DAILY
Status: DISCONTINUED | OUTPATIENT
Start: 2021-07-05 | End: 2021-07-05

## 2021-07-04 RX ORDER — PREDNISONE 5 MG/1
5 TABLET ORAL DAILY
Status: DISCONTINUED | OUTPATIENT
Start: 2021-07-05 | End: 2021-07-09 | Stop reason: HOSPADM

## 2021-07-04 RX ORDER — NAPROXEN SODIUM 220 MG/1
81 TABLET, FILM COATED ORAL DAILY
Refills: 5 | Status: DISCONTINUED | OUTPATIENT
Start: 2021-07-05 | End: 2021-07-09 | Stop reason: HOSPADM

## 2021-07-04 RX ORDER — ATORVASTATIN CALCIUM 20 MG/1
80 TABLET, FILM COATED ORAL NIGHTLY
Status: DISCONTINUED | OUTPATIENT
Start: 2021-07-04 | End: 2021-07-09 | Stop reason: HOSPADM

## 2021-07-04 RX ORDER — PANTOPRAZOLE SODIUM 40 MG/10ML
80 INJECTION, POWDER, LYOPHILIZED, FOR SOLUTION INTRAVENOUS
Status: COMPLETED | OUTPATIENT
Start: 2021-07-04 | End: 2021-07-04

## 2021-07-04 RX ADMIN — PANTOPRAZOLE SODIUM 80 MG: 40 INJECTION, POWDER, FOR SOLUTION INTRAVENOUS at 01:07

## 2021-07-04 RX ADMIN — PHYTONADIONE 10 MG: 10 INJECTION, EMULSION INTRAMUSCULAR; INTRAVENOUS; SUBCUTANEOUS at 04:07

## 2021-07-04 RX ADMIN — LIDOCAINE HYDROCHLORIDE 50 MG: 20 INJECTION, SOLUTION INTRAVENOUS at 04:07

## 2021-07-04 RX ADMIN — PROPOFOL 50 MG: 10 INJECTION, EMULSION INTRAVENOUS at 04:07

## 2021-07-04 RX ADMIN — ATORVASTATIN CALCIUM 80 MG: 20 TABLET, FILM COATED ORAL at 09:07

## 2021-07-04 RX ADMIN — SODIUM CHLORIDE 500 ML: 0.9 INJECTION, SOLUTION INTRAVENOUS at 11:07

## 2021-07-04 RX ADMIN — Medication 100 MCG/KG/MIN: at 04:07

## 2021-07-04 RX ADMIN — PANTOPRAZOLE SODIUM 8 MG/HR: 40 INJECTION, POWDER, FOR SOLUTION INTRAVENOUS at 02:07

## 2021-07-05 PROBLEM — E83.39 HYPOPHOSPHATEMIA: Status: ACTIVE | Noted: 2021-07-05

## 2021-07-05 LAB
ANION GAP SERPL CALC-SCNC: 9 MMOL/L (ref 8–16)
BASOPHILS # BLD AUTO: 0.01 K/UL (ref 0–0.2)
BASOPHILS NFR BLD: 0.2 % (ref 0–1.9)
BUN SERPL-MCNC: 38 MG/DL (ref 8–23)
CALCIUM SERPL-MCNC: 8.4 MG/DL (ref 8.7–10.5)
CHLORIDE SERPL-SCNC: 108 MMOL/L (ref 95–110)
CO2 SERPL-SCNC: 23 MMOL/L (ref 23–29)
CREAT SERPL-MCNC: 1.6 MG/DL (ref 0.5–1.4)
DIFFERENTIAL METHOD: ABNORMAL
EOSINOPHIL # BLD AUTO: 0 K/UL (ref 0–0.5)
EOSINOPHIL # BLD AUTO: 0 K/UL (ref 0–0.5)
EOSINOPHIL # BLD AUTO: 0.1 K/UL (ref 0–0.5)
EOSINOPHIL NFR BLD: 0.4 % (ref 0–8)
EOSINOPHIL NFR BLD: 0.8 % (ref 0–8)
EOSINOPHIL NFR BLD: 1 % (ref 0–8)
ERYTHROCYTE [DISTWIDTH] IN BLOOD BY AUTOMATED COUNT: 13.9 % (ref 11.5–14.5)
EST. GFR  (AFRICAN AMERICAN): 44.4 ML/MIN/1.73 M^2
EST. GFR  (NON AFRICAN AMERICAN): 38.4 ML/MIN/1.73 M^2
GLUCOSE SERPL-MCNC: 105 MG/DL (ref 70–110)
HCT VFR BLD AUTO: 28.3 % (ref 40–54)
HCT VFR BLD AUTO: 28.4 % (ref 40–54)
HCT VFR BLD AUTO: 29.2 % (ref 40–54)
HGB BLD-MCNC: 8.9 G/DL (ref 14–18)
HGB BLD-MCNC: 9 G/DL (ref 14–18)
HGB BLD-MCNC: 9.1 G/DL (ref 14–18)
IMM GRANULOCYTES # BLD AUTO: 0.02 K/UL (ref 0–0.04)
IMM GRANULOCYTES # BLD AUTO: 0.03 K/UL (ref 0–0.04)
IMM GRANULOCYTES # BLD AUTO: 0.07 K/UL (ref 0–0.04)
IMM GRANULOCYTES NFR BLD AUTO: 0.4 % (ref 0–0.5)
IMM GRANULOCYTES NFR BLD AUTO: 0.6 % (ref 0–0.5)
IMM GRANULOCYTES NFR BLD AUTO: 1.3 % (ref 0–0.5)
INR PPP: 1.4 (ref 0.8–1.2)
LYMPHOCYTES # BLD AUTO: 0.7 K/UL (ref 1–4.8)
LYMPHOCYTES # BLD AUTO: 0.8 K/UL (ref 1–4.8)
LYMPHOCYTES # BLD AUTO: 0.8 K/UL (ref 1–4.8)
LYMPHOCYTES NFR BLD: 13.5 % (ref 18–48)
LYMPHOCYTES NFR BLD: 15.9 % (ref 18–48)
LYMPHOCYTES NFR BLD: 16.7 % (ref 18–48)
MAGNESIUM SERPL-MCNC: 1.9 MG/DL (ref 1.6–2.6)
MCH RBC QN AUTO: 31.2 PG (ref 27–31)
MCH RBC QN AUTO: 31.7 PG (ref 27–31)
MCH RBC QN AUTO: 32.1 PG (ref 27–31)
MCHC RBC AUTO-ENTMCNC: 31.2 G/DL (ref 32–36)
MCHC RBC AUTO-ENTMCNC: 31.3 G/DL (ref 32–36)
MCHC RBC AUTO-ENTMCNC: 31.8 G/DL (ref 32–36)
MCV RBC AUTO: 100 FL (ref 82–98)
MCV RBC AUTO: 101 FL (ref 82–98)
MCV RBC AUTO: 101 FL (ref 82–98)
MONOCYTES # BLD AUTO: 1 K/UL (ref 0.3–1)
MONOCYTES # BLD AUTO: 1.1 K/UL (ref 0.3–1)
MONOCYTES # BLD AUTO: 1.1 K/UL (ref 0.3–1)
MONOCYTES NFR BLD: 19.1 % (ref 4–15)
MONOCYTES NFR BLD: 20.1 % (ref 4–15)
MONOCYTES NFR BLD: 22.2 % (ref 4–15)
NEUTROPHILS # BLD AUTO: 3 K/UL (ref 1.8–7.7)
NEUTROPHILS # BLD AUTO: 3.1 K/UL (ref 1.8–7.7)
NEUTROPHILS # BLD AUTO: 3.6 K/UL (ref 1.8–7.7)
NEUTROPHILS NFR BLD: 60.1 % (ref 38–73)
NEUTROPHILS NFR BLD: 61.8 % (ref 38–73)
NEUTROPHILS NFR BLD: 65.5 % (ref 38–73)
NRBC BLD-RTO: 0 /100 WBC
PHOSPHATE SERPL-MCNC: 1.8 MG/DL (ref 2.7–4.5)
PLATELET # BLD AUTO: 140 K/UL (ref 150–450)
PLATELET # BLD AUTO: 141 K/UL (ref 150–450)
PLATELET # BLD AUTO: 145 K/UL (ref 150–450)
PMV BLD AUTO: 11.5 FL (ref 9.2–12.9)
PMV BLD AUTO: 11.8 FL (ref 9.2–12.9)
PMV BLD AUTO: 11.9 FL (ref 9.2–12.9)
POTASSIUM SERPL-SCNC: 3.8 MMOL/L (ref 3.5–5.1)
PROTHROMBIN TIME: 14.6 SEC (ref 9–12.5)
RBC # BLD AUTO: 2.8 M/UL (ref 4.6–6.2)
RBC # BLD AUTO: 2.81 M/UL (ref 4.6–6.2)
RBC # BLD AUTO: 2.92 M/UL (ref 4.6–6.2)
SODIUM SERPL-SCNC: 140 MMOL/L (ref 136–145)
WBC # BLD AUTO: 4.92 K/UL (ref 3.9–12.7)
WBC # BLD AUTO: 4.98 K/UL (ref 3.9–12.7)
WBC # BLD AUTO: 5.49 K/UL (ref 3.9–12.7)

## 2021-07-05 PROCEDURE — 84100 ASSAY OF PHOSPHORUS: CPT | Performed by: STUDENT IN AN ORGANIZED HEALTH CARE EDUCATION/TRAINING PROGRAM

## 2021-07-05 PROCEDURE — 11000001 HC ACUTE MED/SURG PRIVATE ROOM

## 2021-07-05 PROCEDURE — 85610 PROTHROMBIN TIME: CPT | Performed by: STUDENT IN AN ORGANIZED HEALTH CARE EDUCATION/TRAINING PROGRAM

## 2021-07-05 PROCEDURE — 63600175 PHARM REV CODE 636 W HCPCS

## 2021-07-05 PROCEDURE — 36415 COLL VENOUS BLD VENIPUNCTURE: CPT | Performed by: STUDENT IN AN ORGANIZED HEALTH CARE EDUCATION/TRAINING PROGRAM

## 2021-07-05 PROCEDURE — 99233 PR SUBSEQUENT HOSPITAL CARE,LEVL III: ICD-10-PCS | Mod: ,,, | Performed by: STUDENT IN AN ORGANIZED HEALTH CARE EDUCATION/TRAINING PROGRAM

## 2021-07-05 PROCEDURE — 83735 ASSAY OF MAGNESIUM: CPT | Performed by: STUDENT IN AN ORGANIZED HEALTH CARE EDUCATION/TRAINING PROGRAM

## 2021-07-05 PROCEDURE — 25000003 PHARM REV CODE 250

## 2021-07-05 PROCEDURE — C9113 INJ PANTOPRAZOLE SODIUM, VIA: HCPCS

## 2021-07-05 PROCEDURE — 25000003 PHARM REV CODE 250: Performed by: STUDENT IN AN ORGANIZED HEALTH CARE EDUCATION/TRAINING PROGRAM

## 2021-07-05 PROCEDURE — 85025 COMPLETE CBC W/AUTO DIFF WBC: CPT | Mod: 91 | Performed by: STUDENT IN AN ORGANIZED HEALTH CARE EDUCATION/TRAINING PROGRAM

## 2021-07-05 PROCEDURE — 99233 SBSQ HOSP IP/OBS HIGH 50: CPT | Mod: ,,, | Performed by: STUDENT IN AN ORGANIZED HEALTH CARE EDUCATION/TRAINING PROGRAM

## 2021-07-05 PROCEDURE — 63600175 PHARM REV CODE 636 W HCPCS: Performed by: STUDENT IN AN ORGANIZED HEALTH CARE EDUCATION/TRAINING PROGRAM

## 2021-07-05 PROCEDURE — 80048 BASIC METABOLIC PNL TOTAL CA: CPT | Performed by: STUDENT IN AN ORGANIZED HEALTH CARE EDUCATION/TRAINING PROGRAM

## 2021-07-05 RX ORDER — PRASUGREL 10 MG/1
10 TABLET, FILM COATED ORAL DAILY
Status: DISCONTINUED | OUTPATIENT
Start: 2021-07-05 | End: 2021-07-09 | Stop reason: HOSPADM

## 2021-07-05 RX ORDER — LANOLIN ALCOHOL/MO/W.PET/CERES
400 CREAM (GRAM) TOPICAL ONCE
Status: COMPLETED | OUTPATIENT
Start: 2021-07-05 | End: 2021-07-05

## 2021-07-05 RX ORDER — POTASSIUM CHLORIDE 20 MEQ/1
20 TABLET, EXTENDED RELEASE ORAL ONCE
Status: COMPLETED | OUTPATIENT
Start: 2021-07-05 | End: 2021-07-05

## 2021-07-05 RX ORDER — POLYETHYLENE GLYCOL 3350, SODIUM SULFATE ANHYDROUS, SODIUM BICARBONATE, SODIUM CHLORIDE, POTASSIUM CHLORIDE 236; 22.74; 6.74; 5.86; 2.97 G/4L; G/4L; G/4L; G/4L; G/4L
1000 POWDER, FOR SOLUTION ORAL ONCE
Status: COMPLETED | OUTPATIENT
Start: 2021-07-05 | End: 2021-07-05

## 2021-07-05 RX ORDER — HEPARIN SODIUM 5000 [USP'U]/ML
5000 INJECTION, SOLUTION INTRAVENOUS; SUBCUTANEOUS EVERY 8 HOURS
Status: DISCONTINUED | OUTPATIENT
Start: 2021-07-05 | End: 2021-07-05

## 2021-07-05 RX ADMIN — PANTOPRAZOLE SODIUM 8 MG/HR: 40 INJECTION, POWDER, FOR SOLUTION INTRAVENOUS at 06:07

## 2021-07-05 RX ADMIN — PANTOPRAZOLE SODIUM 8 MG/HR: 40 INJECTION, POWDER, FOR SOLUTION INTRAVENOUS at 12:07

## 2021-07-05 RX ADMIN — PRASUGREL 10 MG: 10 TABLET, FILM COATED ORAL at 05:07

## 2021-07-05 RX ADMIN — LEVOTHYROXINE SODIUM 100 MCG: 100 TABLET ORAL at 06:07

## 2021-07-05 RX ADMIN — FOLIC ACID 1000 MCG: 1 TABLET ORAL at 09:07

## 2021-07-05 RX ADMIN — TAMSULOSIN HYDROCHLORIDE 0.4 MG: 0.4 CAPSULE ORAL at 09:07

## 2021-07-05 RX ADMIN — Medication 400 MG: at 01:07

## 2021-07-05 RX ADMIN — PREDNISONE 5 MG: 5 TABLET ORAL at 09:07

## 2021-07-05 RX ADMIN — PANTOPRAZOLE SODIUM 8 MG/HR: 40 INJECTION, POWDER, FOR SOLUTION INTRAVENOUS at 01:07

## 2021-07-05 RX ADMIN — METOPROLOL SUCCINATE 25 MG: 25 TABLET, EXTENDED RELEASE ORAL at 01:07

## 2021-07-05 RX ADMIN — SODIUM PHOSPHATE, MONOBASIC, MONOHYDRATE AND SODIUM PHOSPHATE, DIBASIC, ANHYDROUS 30 MMOL: 276; 142 INJECTION, SOLUTION INTRAVENOUS at 01:07

## 2021-07-05 RX ADMIN — ATORVASTATIN CALCIUM 80 MG: 20 TABLET, FILM COATED ORAL at 08:07

## 2021-07-05 RX ADMIN — POTASSIUM CHLORIDE 20 MEQ: 1500 TABLET, EXTENDED RELEASE ORAL at 01:07

## 2021-07-05 RX ADMIN — HEPARIN SODIUM 5000 UNITS: 5000 INJECTION INTRAVENOUS; SUBCUTANEOUS at 01:07

## 2021-07-05 RX ADMIN — ASPIRIN 81 MG CHEWABLE TABLET 81 MG: 81 TABLET CHEWABLE at 09:07

## 2021-07-05 RX ADMIN — POLYETHYLENE GLYCOL 3350, SODIUM SULFATE ANHYDROUS, SODIUM BICARBONATE, SODIUM CHLORIDE, POTASSIUM CHLORIDE 1000 ML: 236; 22.74; 6.74; 5.86; 2.97 POWDER, FOR SOLUTION ORAL at 08:07

## 2021-07-06 ENCOUNTER — TELEPHONE (OUTPATIENT)
Dept: GASTROENTEROLOGY | Facility: CLINIC | Age: 86
End: 2021-07-06

## 2021-07-06 LAB
ANION GAP SERPL CALC-SCNC: 7 MMOL/L (ref 8–16)
BASOPHILS # BLD AUTO: 0.01 K/UL (ref 0–0.2)
BASOPHILS # BLD AUTO: 0.01 K/UL (ref 0–0.2)
BASOPHILS NFR BLD: 0.2 % (ref 0–1.9)
BASOPHILS NFR BLD: 0.2 % (ref 0–1.9)
BUN SERPL-MCNC: 29 MG/DL (ref 8–23)
CALCIUM SERPL-MCNC: 8.5 MG/DL (ref 8.7–10.5)
CHLORIDE SERPL-SCNC: 105 MMOL/L (ref 95–110)
CO2 SERPL-SCNC: 27 MMOL/L (ref 23–29)
CREAT SERPL-MCNC: 1.4 MG/DL (ref 0.5–1.4)
DIFFERENTIAL METHOD: ABNORMAL
DIFFERENTIAL METHOD: ABNORMAL
EOSINOPHIL # BLD AUTO: 0 K/UL (ref 0–0.5)
EOSINOPHIL # BLD AUTO: 0 K/UL (ref 0–0.5)
EOSINOPHIL NFR BLD: 0.6 % (ref 0–8)
EOSINOPHIL NFR BLD: 0.8 % (ref 0–8)
ERYTHROCYTE [DISTWIDTH] IN BLOOD BY AUTOMATED COUNT: 14.1 % (ref 11.5–14.5)
ERYTHROCYTE [DISTWIDTH] IN BLOOD BY AUTOMATED COUNT: 14.2 % (ref 11.5–14.5)
EST. GFR  (AFRICAN AMERICAN): 52.2 ML/MIN/1.73 M^2
EST. GFR  (NON AFRICAN AMERICAN): 45.2 ML/MIN/1.73 M^2
GLUCOSE SERPL-MCNC: 90 MG/DL (ref 70–110)
HCT VFR BLD AUTO: 26.2 % (ref 40–54)
HCT VFR BLD AUTO: 29.1 % (ref 40–54)
HGB BLD-MCNC: 8.5 G/DL (ref 14–18)
HGB BLD-MCNC: 9.2 G/DL (ref 14–18)
IMM GRANULOCYTES # BLD AUTO: 0.04 K/UL (ref 0–0.04)
IMM GRANULOCYTES # BLD AUTO: 0.04 K/UL (ref 0–0.04)
IMM GRANULOCYTES NFR BLD AUTO: 0.8 % (ref 0–0.5)
IMM GRANULOCYTES NFR BLD AUTO: 0.9 % (ref 0–0.5)
INR PPP: 1.1 (ref 0.8–1.2)
LYMPHOCYTES # BLD AUTO: 0.8 K/UL (ref 1–4.8)
LYMPHOCYTES # BLD AUTO: 1.1 K/UL (ref 1–4.8)
LYMPHOCYTES NFR BLD: 17.1 % (ref 18–48)
LYMPHOCYTES NFR BLD: 20.7 % (ref 18–48)
MAGNESIUM SERPL-MCNC: 2 MG/DL (ref 1.6–2.6)
MCH RBC QN AUTO: 31.4 PG (ref 27–31)
MCH RBC QN AUTO: 32 PG (ref 27–31)
MCHC RBC AUTO-ENTMCNC: 31.6 G/DL (ref 32–36)
MCHC RBC AUTO-ENTMCNC: 32.4 G/DL (ref 32–36)
MCV RBC AUTO: 99 FL (ref 82–98)
MCV RBC AUTO: 99 FL (ref 82–98)
MONOCYTES # BLD AUTO: 1.1 K/UL (ref 0.3–1)
MONOCYTES # BLD AUTO: 1.2 K/UL (ref 0.3–1)
MONOCYTES NFR BLD: 22.6 % (ref 4–15)
MONOCYTES NFR BLD: 22.6 % (ref 4–15)
NEUTROPHILS # BLD AUTO: 2.7 K/UL (ref 1.8–7.7)
NEUTROPHILS # BLD AUTO: 2.8 K/UL (ref 1.8–7.7)
NEUTROPHILS NFR BLD: 54.9 % (ref 38–73)
NEUTROPHILS NFR BLD: 58.6 % (ref 38–73)
NRBC BLD-RTO: 0 /100 WBC
NRBC BLD-RTO: 0 /100 WBC
PHOSPHATE SERPL-MCNC: 1.9 MG/DL (ref 2.7–4.5)
PLATELET # BLD AUTO: 143 K/UL (ref 150–450)
PLATELET # BLD AUTO: 155 K/UL (ref 150–450)
PMV BLD AUTO: 11.4 FL (ref 9.2–12.9)
PMV BLD AUTO: 11.7 FL (ref 9.2–12.9)
POTASSIUM SERPL-SCNC: 3.4 MMOL/L (ref 3.5–5.1)
PROTHROMBIN TIME: 12.3 SEC (ref 9–12.5)
RBC # BLD AUTO: 2.66 M/UL (ref 4.6–6.2)
RBC # BLD AUTO: 2.93 M/UL (ref 4.6–6.2)
SODIUM SERPL-SCNC: 139 MMOL/L (ref 136–145)
WBC # BLD AUTO: 4.68 K/UL (ref 3.9–12.7)
WBC # BLD AUTO: 5.13 K/UL (ref 3.9–12.7)

## 2021-07-06 PROCEDURE — 25000003 PHARM REV CODE 250: Performed by: STUDENT IN AN ORGANIZED HEALTH CARE EDUCATION/TRAINING PROGRAM

## 2021-07-06 PROCEDURE — 83735 ASSAY OF MAGNESIUM: CPT | Performed by: STUDENT IN AN ORGANIZED HEALTH CARE EDUCATION/TRAINING PROGRAM

## 2021-07-06 PROCEDURE — 97535 SELF CARE MNGMENT TRAINING: CPT

## 2021-07-06 PROCEDURE — 36415 COLL VENOUS BLD VENIPUNCTURE: CPT | Performed by: STUDENT IN AN ORGANIZED HEALTH CARE EDUCATION/TRAINING PROGRAM

## 2021-07-06 PROCEDURE — 97530 THERAPEUTIC ACTIVITIES: CPT

## 2021-07-06 PROCEDURE — 99233 PR SUBSEQUENT HOSPITAL CARE,LEVL III: ICD-10-PCS | Mod: ,,, | Performed by: STUDENT IN AN ORGANIZED HEALTH CARE EDUCATION/TRAINING PROGRAM

## 2021-07-06 PROCEDURE — C9113 INJ PANTOPRAZOLE SODIUM, VIA: HCPCS | Performed by: HOSPITALIST

## 2021-07-06 PROCEDURE — 63600175 PHARM REV CODE 636 W HCPCS: Performed by: STUDENT IN AN ORGANIZED HEALTH CARE EDUCATION/TRAINING PROGRAM

## 2021-07-06 PROCEDURE — 11000001 HC ACUTE MED/SURG PRIVATE ROOM

## 2021-07-06 PROCEDURE — 84100 ASSAY OF PHOSPHORUS: CPT | Performed by: STUDENT IN AN ORGANIZED HEALTH CARE EDUCATION/TRAINING PROGRAM

## 2021-07-06 PROCEDURE — 80048 BASIC METABOLIC PNL TOTAL CA: CPT | Performed by: STUDENT IN AN ORGANIZED HEALTH CARE EDUCATION/TRAINING PROGRAM

## 2021-07-06 PROCEDURE — 97166 OT EVAL MOD COMPLEX 45 MIN: CPT

## 2021-07-06 PROCEDURE — 85610 PROTHROMBIN TIME: CPT | Performed by: STUDENT IN AN ORGANIZED HEALTH CARE EDUCATION/TRAINING PROGRAM

## 2021-07-06 PROCEDURE — 63600175 PHARM REV CODE 636 W HCPCS: Performed by: HOSPITALIST

## 2021-07-06 PROCEDURE — 99233 SBSQ HOSP IP/OBS HIGH 50: CPT | Mod: ,,, | Performed by: STUDENT IN AN ORGANIZED HEALTH CARE EDUCATION/TRAINING PROGRAM

## 2021-07-06 PROCEDURE — 85025 COMPLETE CBC W/AUTO DIFF WBC: CPT | Performed by: STUDENT IN AN ORGANIZED HEALTH CARE EDUCATION/TRAINING PROGRAM

## 2021-07-06 RX ORDER — PANTOPRAZOLE SODIUM 40 MG/10ML
40 INJECTION, POWDER, LYOPHILIZED, FOR SOLUTION INTRAVENOUS 2 TIMES DAILY
Status: DISCONTINUED | OUTPATIENT
Start: 2021-07-06 | End: 2021-07-09 | Stop reason: HOSPADM

## 2021-07-06 RX ORDER — POTASSIUM CHLORIDE 20 MEQ/1
40 TABLET, EXTENDED RELEASE ORAL ONCE
Status: COMPLETED | OUTPATIENT
Start: 2021-07-06 | End: 2021-07-06

## 2021-07-06 RX ADMIN — POTASSIUM CHLORIDE 40 MEQ: 1500 TABLET, EXTENDED RELEASE ORAL at 05:07

## 2021-07-06 RX ADMIN — PRASUGREL 10 MG: 10 TABLET, FILM COATED ORAL at 05:07

## 2021-07-06 RX ADMIN — PANTOPRAZOLE SODIUM 40 MG: 40 INJECTION, POWDER, FOR SOLUTION INTRAVENOUS at 09:07

## 2021-07-06 RX ADMIN — LEVOTHYROXINE SODIUM 100 MCG: 100 TABLET ORAL at 06:07

## 2021-07-06 RX ADMIN — METOPROLOL SUCCINATE 25 MG: 25 TABLET, EXTENDED RELEASE ORAL at 05:07

## 2021-07-06 RX ADMIN — PREDNISONE 5 MG: 5 TABLET ORAL at 05:07

## 2021-07-06 RX ADMIN — PANTOPRAZOLE SODIUM 40 MG: 40 INJECTION, POWDER, FOR SOLUTION INTRAVENOUS at 12:07

## 2021-07-06 RX ADMIN — TAMSULOSIN HYDROCHLORIDE 0.4 MG: 0.4 CAPSULE ORAL at 05:07

## 2021-07-06 RX ADMIN — SODIUM PHOSPHATE, MONOBASIC, MONOHYDRATE AND SODIUM PHOSPHATE, DIBASIC, ANHYDROUS 30 MMOL: 276; 142 INJECTION, SOLUTION INTRAVENOUS at 09:07

## 2021-07-06 RX ADMIN — ATORVASTATIN CALCIUM 80 MG: 20 TABLET, FILM COATED ORAL at 09:07

## 2021-07-07 LAB
ANION GAP SERPL CALC-SCNC: 8 MMOL/L (ref 8–16)
BASOPHILS # BLD AUTO: 0.01 K/UL (ref 0–0.2)
BASOPHILS NFR BLD: 0.2 % (ref 0–1.9)
BLD PROD TYP BPU: NORMAL
BLOOD UNIT EXPIRATION DATE: NORMAL
BLOOD UNIT TYPE CODE: 8400
BLOOD UNIT TYPE: NORMAL
BUN SERPL-MCNC: 21 MG/DL (ref 8–23)
CALCIUM SERPL-MCNC: 8.4 MG/DL (ref 8.7–10.5)
CHLORIDE SERPL-SCNC: 109 MMOL/L (ref 95–110)
CO2 SERPL-SCNC: 24 MMOL/L (ref 23–29)
CODING SYSTEM: NORMAL
CREAT SERPL-MCNC: 1.4 MG/DL (ref 0.5–1.4)
DIFFERENTIAL METHOD: ABNORMAL
DISPENSE STATUS: NORMAL
EOSINOPHIL # BLD AUTO: 0 K/UL (ref 0–0.5)
EOSINOPHIL NFR BLD: 0.5 % (ref 0–8)
ERYTHROCYTE [DISTWIDTH] IN BLOOD BY AUTOMATED COUNT: 14.2 % (ref 11.5–14.5)
EST. GFR  (AFRICAN AMERICAN): 52.2 ML/MIN/1.73 M^2
EST. GFR  (NON AFRICAN AMERICAN): 45.2 ML/MIN/1.73 M^2
GLUCOSE SERPL-MCNC: 83 MG/DL (ref 70–110)
HCT VFR BLD AUTO: 25.1 % (ref 40–54)
HGB BLD-MCNC: 8.2 G/DL (ref 14–18)
IMM GRANULOCYTES # BLD AUTO: 0.02 K/UL (ref 0–0.04)
IMM GRANULOCYTES NFR BLD AUTO: 0.5 % (ref 0–0.5)
INR PPP: 1.1 (ref 0.8–1.2)
LYMPHOCYTES # BLD AUTO: 0.6 K/UL (ref 1–4.8)
LYMPHOCYTES NFR BLD: 14.6 % (ref 18–48)
MAGNESIUM SERPL-MCNC: 2 MG/DL (ref 1.6–2.6)
MCH RBC QN AUTO: 32.3 PG (ref 27–31)
MCHC RBC AUTO-ENTMCNC: 32.7 G/DL (ref 32–36)
MCV RBC AUTO: 99 FL (ref 82–98)
MONOCYTES # BLD AUTO: 1 K/UL (ref 0.3–1)
MONOCYTES NFR BLD: 22.8 % (ref 4–15)
NEUTROPHILS # BLD AUTO: 2.6 K/UL (ref 1.8–7.7)
NEUTROPHILS NFR BLD: 61.4 % (ref 38–73)
NRBC BLD-RTO: 0 /100 WBC
NUM UNITS TRANS FFP: NORMAL
PHOSPHATE SERPL-MCNC: 2.7 MG/DL (ref 2.7–4.5)
PLATELET # BLD AUTO: 148 K/UL (ref 150–450)
PMV BLD AUTO: 11.2 FL (ref 9.2–12.9)
POTASSIUM SERPL-SCNC: 3.7 MMOL/L (ref 3.5–5.1)
PROTHROMBIN TIME: 12.3 SEC (ref 9–12.5)
RBC # BLD AUTO: 2.54 M/UL (ref 4.6–6.2)
SODIUM SERPL-SCNC: 141 MMOL/L (ref 136–145)
WBC # BLD AUTO: 4.17 K/UL (ref 3.9–12.7)

## 2021-07-07 PROCEDURE — 99233 PR SUBSEQUENT HOSPITAL CARE,LEVL III: ICD-10-PCS | Mod: ,,, | Performed by: STUDENT IN AN ORGANIZED HEALTH CARE EDUCATION/TRAINING PROGRAM

## 2021-07-07 PROCEDURE — 11000001 HC ACUTE MED/SURG PRIVATE ROOM

## 2021-07-07 PROCEDURE — 99233 SBSQ HOSP IP/OBS HIGH 50: CPT | Mod: ,,, | Performed by: STUDENT IN AN ORGANIZED HEALTH CARE EDUCATION/TRAINING PROGRAM

## 2021-07-07 PROCEDURE — 36415 COLL VENOUS BLD VENIPUNCTURE: CPT | Performed by: STUDENT IN AN ORGANIZED HEALTH CARE EDUCATION/TRAINING PROGRAM

## 2021-07-07 PROCEDURE — 25000003 PHARM REV CODE 250: Performed by: STUDENT IN AN ORGANIZED HEALTH CARE EDUCATION/TRAINING PROGRAM

## 2021-07-07 PROCEDURE — 85610 PROTHROMBIN TIME: CPT | Performed by: STUDENT IN AN ORGANIZED HEALTH CARE EDUCATION/TRAINING PROGRAM

## 2021-07-07 PROCEDURE — 80048 BASIC METABOLIC PNL TOTAL CA: CPT | Performed by: STUDENT IN AN ORGANIZED HEALTH CARE EDUCATION/TRAINING PROGRAM

## 2021-07-07 PROCEDURE — 85025 COMPLETE CBC W/AUTO DIFF WBC: CPT | Performed by: STUDENT IN AN ORGANIZED HEALTH CARE EDUCATION/TRAINING PROGRAM

## 2021-07-07 PROCEDURE — C9113 INJ PANTOPRAZOLE SODIUM, VIA: HCPCS | Performed by: HOSPITALIST

## 2021-07-07 PROCEDURE — 84100 ASSAY OF PHOSPHORUS: CPT | Performed by: STUDENT IN AN ORGANIZED HEALTH CARE EDUCATION/TRAINING PROGRAM

## 2021-07-07 PROCEDURE — 63600175 PHARM REV CODE 636 W HCPCS: Performed by: HOSPITALIST

## 2021-07-07 PROCEDURE — 83735 ASSAY OF MAGNESIUM: CPT | Performed by: STUDENT IN AN ORGANIZED HEALTH CARE EDUCATION/TRAINING PROGRAM

## 2021-07-07 PROCEDURE — 63600175 PHARM REV CODE 636 W HCPCS: Performed by: STUDENT IN AN ORGANIZED HEALTH CARE EDUCATION/TRAINING PROGRAM

## 2021-07-07 RX ORDER — SODIUM,POTASSIUM PHOSPHATES 280-250MG
2 POWDER IN PACKET (EA) ORAL
Status: COMPLETED | OUTPATIENT
Start: 2021-07-07 | End: 2021-07-07

## 2021-07-07 RX ORDER — POTASSIUM CHLORIDE 20 MEQ/1
40 TABLET, EXTENDED RELEASE ORAL ONCE
Status: COMPLETED | OUTPATIENT
Start: 2021-07-07 | End: 2021-07-07

## 2021-07-07 RX ADMIN — TAMSULOSIN HYDROCHLORIDE 0.4 MG: 0.4 CAPSULE ORAL at 10:07

## 2021-07-07 RX ADMIN — ASPIRIN 81 MG CHEWABLE TABLET 81 MG: 81 TABLET CHEWABLE at 10:07

## 2021-07-07 RX ADMIN — PREDNISONE 5 MG: 5 TABLET ORAL at 10:07

## 2021-07-07 RX ADMIN — PRASUGREL 10 MG: 10 TABLET, FILM COATED ORAL at 10:07

## 2021-07-07 RX ADMIN — POTASSIUM & SODIUM PHOSPHATES POWDER PACK 280-160-250 MG 2 PACKET: 280-160-250 PACK at 03:07

## 2021-07-07 RX ADMIN — POTASSIUM & SODIUM PHOSPHATES POWDER PACK 280-160-250 MG 2 PACKET: 280-160-250 PACK at 05:07

## 2021-07-07 RX ADMIN — PANTOPRAZOLE SODIUM 40 MG: 40 INJECTION, POWDER, FOR SOLUTION INTRAVENOUS at 09:07

## 2021-07-07 RX ADMIN — FOLIC ACID 1000 MCG: 1 TABLET ORAL at 10:07

## 2021-07-07 RX ADMIN — METOPROLOL SUCCINATE 25 MG: 25 TABLET, EXTENDED RELEASE ORAL at 10:07

## 2021-07-07 RX ADMIN — LEVOTHYROXINE SODIUM 100 MCG: 100 TABLET ORAL at 06:07

## 2021-07-07 RX ADMIN — POTASSIUM CHLORIDE 40 MEQ: 1500 TABLET, EXTENDED RELEASE ORAL at 03:07

## 2021-07-07 RX ADMIN — ATORVASTATIN CALCIUM 80 MG: 20 TABLET, FILM COATED ORAL at 09:07

## 2021-07-07 RX ADMIN — PANTOPRAZOLE SODIUM 40 MG: 40 INJECTION, POWDER, FOR SOLUTION INTRAVENOUS at 10:07

## 2021-07-08 ENCOUNTER — ANESTHESIA EVENT (OUTPATIENT)
Dept: ENDOSCOPY | Facility: HOSPITAL | Age: 86
DRG: 378 | End: 2021-07-08
Payer: MEDICARE

## 2021-07-08 PROBLEM — K92.1 GASTROINTESTINAL HEMORRHAGE WITH MELENA: Status: ACTIVE | Noted: 2021-07-04

## 2021-07-08 PROBLEM — K25.3 ACUTE GASTRIC ULCER WITHOUT HEMORRHAGE OR PERFORATION: Status: ACTIVE | Noted: 2021-07-08

## 2021-07-08 LAB
ABO + RH BLD: NORMAL
ANION GAP SERPL CALC-SCNC: 7 MMOL/L (ref 8–16)
BASOPHILS # BLD AUTO: 0.01 K/UL (ref 0–0.2)
BASOPHILS NFR BLD: 0.2 % (ref 0–1.9)
BLD GP AB SCN CELLS X3 SERPL QL: NORMAL
BUN SERPL-MCNC: 16 MG/DL (ref 8–23)
CALCIUM SERPL-MCNC: 8.2 MG/DL (ref 8.7–10.5)
CHLORIDE SERPL-SCNC: 109 MMOL/L (ref 95–110)
CO2 SERPL-SCNC: 24 MMOL/L (ref 23–29)
CREAT SERPL-MCNC: 1.5 MG/DL (ref 0.5–1.4)
DIFFERENTIAL METHOD: ABNORMAL
EOSINOPHIL # BLD AUTO: 0.1 K/UL (ref 0–0.5)
EOSINOPHIL NFR BLD: 1.1 % (ref 0–8)
ERYTHROCYTE [DISTWIDTH] IN BLOOD BY AUTOMATED COUNT: 14.6 % (ref 11.5–14.5)
EST. GFR  (AFRICAN AMERICAN): 48 ML/MIN/1.73 M^2
EST. GFR  (NON AFRICAN AMERICAN): 41.6 ML/MIN/1.73 M^2
GLUCOSE SERPL-MCNC: 106 MG/DL (ref 70–110)
HCT VFR BLD AUTO: 26.5 % (ref 40–54)
HGB BLD-MCNC: 8.4 G/DL (ref 14–18)
IMM GRANULOCYTES # BLD AUTO: 0.03 K/UL (ref 0–0.04)
IMM GRANULOCYTES NFR BLD AUTO: 0.6 % (ref 0–0.5)
INR PPP: 1.1 (ref 0.8–1.2)
LYMPHOCYTES # BLD AUTO: 0.7 K/UL (ref 1–4.8)
LYMPHOCYTES NFR BLD: 15.4 % (ref 18–48)
MAGNESIUM SERPL-MCNC: 2.1 MG/DL (ref 1.6–2.6)
MCH RBC QN AUTO: 32.9 PG (ref 27–31)
MCHC RBC AUTO-ENTMCNC: 31.7 G/DL (ref 32–36)
MCV RBC AUTO: 104 FL (ref 82–98)
MONOCYTES # BLD AUTO: 1 K/UL (ref 0.3–1)
MONOCYTES NFR BLD: 20.9 % (ref 4–15)
NEUTROPHILS # BLD AUTO: 2.9 K/UL (ref 1.8–7.7)
NEUTROPHILS NFR BLD: 61.8 % (ref 38–73)
NRBC BLD-RTO: 0 /100 WBC
PHOSPHATE SERPL-MCNC: 2.6 MG/DL (ref 2.7–4.5)
PLATELET # BLD AUTO: 149 K/UL (ref 150–450)
PMV BLD AUTO: 11.4 FL (ref 9.2–12.9)
POTASSIUM SERPL-SCNC: 4.2 MMOL/L (ref 3.5–5.1)
PROTHROMBIN TIME: 12.3 SEC (ref 9–12.5)
RBC # BLD AUTO: 2.55 M/UL (ref 4.6–6.2)
SODIUM SERPL-SCNC: 140 MMOL/L (ref 136–145)
WBC # BLD AUTO: 4.74 K/UL (ref 3.9–12.7)

## 2021-07-08 PROCEDURE — 63600175 PHARM REV CODE 636 W HCPCS: Performed by: HOSPITALIST

## 2021-07-08 PROCEDURE — 99232 SBSQ HOSP IP/OBS MODERATE 35: CPT | Mod: ,,, | Performed by: HOSPITALIST

## 2021-07-08 PROCEDURE — 25000003 PHARM REV CODE 250: Performed by: STUDENT IN AN ORGANIZED HEALTH CARE EDUCATION/TRAINING PROGRAM

## 2021-07-08 PROCEDURE — 63600175 PHARM REV CODE 636 W HCPCS: Performed by: STUDENT IN AN ORGANIZED HEALTH CARE EDUCATION/TRAINING PROGRAM

## 2021-07-08 PROCEDURE — 99232 PR SUBSEQUENT HOSPITAL CARE,LEVL II: ICD-10-PCS | Mod: ,,, | Performed by: HOSPITALIST

## 2021-07-08 PROCEDURE — 86900 BLOOD TYPING SEROLOGIC ABO: CPT | Performed by: STUDENT IN AN ORGANIZED HEALTH CARE EDUCATION/TRAINING PROGRAM

## 2021-07-08 PROCEDURE — 36415 COLL VENOUS BLD VENIPUNCTURE: CPT | Performed by: STUDENT IN AN ORGANIZED HEALTH CARE EDUCATION/TRAINING PROGRAM

## 2021-07-08 PROCEDURE — 85025 COMPLETE CBC W/AUTO DIFF WBC: CPT | Performed by: STUDENT IN AN ORGANIZED HEALTH CARE EDUCATION/TRAINING PROGRAM

## 2021-07-08 PROCEDURE — 83735 ASSAY OF MAGNESIUM: CPT | Performed by: STUDENT IN AN ORGANIZED HEALTH CARE EDUCATION/TRAINING PROGRAM

## 2021-07-08 PROCEDURE — 11000001 HC ACUTE MED/SURG PRIVATE ROOM

## 2021-07-08 PROCEDURE — 84100 ASSAY OF PHOSPHORUS: CPT | Performed by: STUDENT IN AN ORGANIZED HEALTH CARE EDUCATION/TRAINING PROGRAM

## 2021-07-08 PROCEDURE — 85610 PROTHROMBIN TIME: CPT | Performed by: STUDENT IN AN ORGANIZED HEALTH CARE EDUCATION/TRAINING PROGRAM

## 2021-07-08 PROCEDURE — C9113 INJ PANTOPRAZOLE SODIUM, VIA: HCPCS | Performed by: HOSPITALIST

## 2021-07-08 PROCEDURE — 80048 BASIC METABOLIC PNL TOTAL CA: CPT | Performed by: STUDENT IN AN ORGANIZED HEALTH CARE EDUCATION/TRAINING PROGRAM

## 2021-07-08 RX ORDER — POLYETHYLENE GLYCOL 3350, SODIUM SULFATE ANHYDROUS, SODIUM BICARBONATE, SODIUM CHLORIDE, POTASSIUM CHLORIDE 236; 22.74; 6.74; 5.86; 2.97 G/4L; G/4L; G/4L; G/4L; G/4L
4000 POWDER, FOR SOLUTION ORAL ONCE
Status: COMPLETED | OUTPATIENT
Start: 2021-07-08 | End: 2021-07-08

## 2021-07-08 RX ORDER — POLYETHYLENE GLYCOL 3350, SODIUM SULFATE ANHYDROUS, SODIUM BICARBONATE, SODIUM CHLORIDE, POTASSIUM CHLORIDE 236; 22.74; 6.74; 5.86; 2.97 G/4L; G/4L; G/4L; G/4L; G/4L
4000 POWDER, FOR SOLUTION ORAL ONCE
Status: DISCONTINUED | OUTPATIENT
Start: 2021-07-08 | End: 2021-07-08

## 2021-07-08 RX ADMIN — ASPIRIN 81 MG CHEWABLE TABLET 81 MG: 81 TABLET CHEWABLE at 09:07

## 2021-07-08 RX ADMIN — PREDNISONE 5 MG: 5 TABLET ORAL at 09:07

## 2021-07-08 RX ADMIN — PRASUGREL 10 MG: 10 TABLET, FILM COATED ORAL at 09:07

## 2021-07-08 RX ADMIN — METOPROLOL SUCCINATE 25 MG: 25 TABLET, EXTENDED RELEASE ORAL at 09:07

## 2021-07-08 RX ADMIN — LEVOTHYROXINE SODIUM 100 MCG: 100 TABLET ORAL at 05:07

## 2021-07-08 RX ADMIN — PANTOPRAZOLE SODIUM 40 MG: 40 INJECTION, POWDER, FOR SOLUTION INTRAVENOUS at 09:07

## 2021-07-08 RX ADMIN — POLYETHYLENE GLYCOL 3350, SODIUM SULFATE ANHYDROUS, SODIUM BICARBONATE, SODIUM CHLORIDE, POTASSIUM CHLORIDE 4000 ML: 236; 22.74; 6.74; 5.86; 2.97 POWDER, FOR SOLUTION ORAL at 06:07

## 2021-07-08 RX ADMIN — ATORVASTATIN CALCIUM 80 MG: 20 TABLET, FILM COATED ORAL at 08:07

## 2021-07-08 RX ADMIN — FOLIC ACID 1000 MCG: 1 TABLET ORAL at 09:07

## 2021-07-08 RX ADMIN — TAMSULOSIN HYDROCHLORIDE 0.4 MG: 0.4 CAPSULE ORAL at 09:07

## 2021-07-09 ENCOUNTER — ANESTHESIA (OUTPATIENT)
Dept: ENDOSCOPY | Facility: HOSPITAL | Age: 86
DRG: 378 | End: 2021-07-09
Payer: MEDICARE

## 2021-07-09 VITALS
HEART RATE: 89 BPM | BODY MASS INDEX: 28.95 KG/M2 | DIASTOLIC BLOOD PRESSURE: 66 MMHG | HEIGHT: 75 IN | TEMPERATURE: 98 F | WEIGHT: 232.81 LBS | RESPIRATION RATE: 16 BRPM | OXYGEN SATURATION: 96 % | SYSTOLIC BLOOD PRESSURE: 127 MMHG

## 2021-07-09 PROBLEM — K57.30 DIVERTICULOSIS OF COLON, ACQUIRED: Chronic | Status: ACTIVE | Noted: 2021-07-09

## 2021-07-09 PROBLEM — K92.1 GASTROINTESTINAL HEMORRHAGE WITH MELENA: Status: RESOLVED | Noted: 2021-07-04 | Resolved: 2021-07-09

## 2021-07-09 LAB
ANION GAP SERPL CALC-SCNC: 12 MMOL/L (ref 8–16)
BASOPHILS # BLD AUTO: 0.01 K/UL (ref 0–0.2)
BASOPHILS NFR BLD: 0.2 % (ref 0–1.9)
BUN SERPL-MCNC: 15 MG/DL (ref 8–23)
CALCIUM SERPL-MCNC: 8.4 MG/DL (ref 8.7–10.5)
CHLORIDE SERPL-SCNC: 109 MMOL/L (ref 95–110)
CO2 SERPL-SCNC: 21 MMOL/L (ref 23–29)
CREAT SERPL-MCNC: 1.5 MG/DL (ref 0.5–1.4)
DIFFERENTIAL METHOD: ABNORMAL
EOSINOPHIL # BLD AUTO: 0 K/UL (ref 0–0.5)
EOSINOPHIL NFR BLD: 0.8 % (ref 0–8)
ERYTHROCYTE [DISTWIDTH] IN BLOOD BY AUTOMATED COUNT: 14.7 % (ref 11.5–14.5)
EST. GFR  (AFRICAN AMERICAN): 48 ML/MIN/1.73 M^2
EST. GFR  (NON AFRICAN AMERICAN): 41.6 ML/MIN/1.73 M^2
GLUCOSE SERPL-MCNC: 82 MG/DL (ref 70–110)
HCT VFR BLD AUTO: 28.7 % (ref 40–54)
HGB BLD-MCNC: 9.1 G/DL (ref 14–18)
IMM GRANULOCYTES # BLD AUTO: 0.03 K/UL (ref 0–0.04)
IMM GRANULOCYTES NFR BLD AUTO: 0.6 % (ref 0–0.5)
INR PPP: 1.1 (ref 0.8–1.2)
LYMPHOCYTES # BLD AUTO: 1.2 K/UL (ref 1–4.8)
LYMPHOCYTES NFR BLD: 22.6 % (ref 18–48)
MAGNESIUM SERPL-MCNC: 2.1 MG/DL (ref 1.6–2.6)
MCH RBC QN AUTO: 32.2 PG (ref 27–31)
MCHC RBC AUTO-ENTMCNC: 31.7 G/DL (ref 32–36)
MCV RBC AUTO: 101 FL (ref 82–98)
MONOCYTES # BLD AUTO: 1 K/UL (ref 0.3–1)
MONOCYTES NFR BLD: 19 % (ref 4–15)
NEUTROPHILS # BLD AUTO: 3 K/UL (ref 1.8–7.7)
NEUTROPHILS NFR BLD: 56.8 % (ref 38–73)
NRBC BLD-RTO: 0 /100 WBC
PHOSPHATE SERPL-MCNC: 2 MG/DL (ref 2.7–4.5)
PLATELET # BLD AUTO: 168 K/UL (ref 150–450)
PMV BLD AUTO: 11.3 FL (ref 9.2–12.9)
POTASSIUM SERPL-SCNC: 3.9 MMOL/L (ref 3.5–5.1)
PROTHROMBIN TIME: 11.8 SEC (ref 9–12.5)
RBC # BLD AUTO: 2.83 M/UL (ref 4.6–6.2)
SODIUM SERPL-SCNC: 142 MMOL/L (ref 136–145)
WBC # BLD AUTO: 5.31 K/UL (ref 3.9–12.7)

## 2021-07-09 PROCEDURE — 85610 PROTHROMBIN TIME: CPT | Performed by: STUDENT IN AN ORGANIZED HEALTH CARE EDUCATION/TRAINING PROGRAM

## 2021-07-09 PROCEDURE — 80048 BASIC METABOLIC PNL TOTAL CA: CPT | Performed by: STUDENT IN AN ORGANIZED HEALTH CARE EDUCATION/TRAINING PROGRAM

## 2021-07-09 PROCEDURE — 63600175 PHARM REV CODE 636 W HCPCS: Performed by: NURSE ANESTHETIST, CERTIFIED REGISTERED

## 2021-07-09 PROCEDURE — D9220A PRA ANESTHESIA: Mod: CRNA,,, | Performed by: NURSE ANESTHETIST, CERTIFIED REGISTERED

## 2021-07-09 PROCEDURE — 99239 HOSP IP/OBS DSCHRG MGMT >30: CPT | Mod: ,,, | Performed by: HOSPITALIST

## 2021-07-09 PROCEDURE — C9113 INJ PANTOPRAZOLE SODIUM, VIA: HCPCS | Performed by: HOSPITALIST

## 2021-07-09 PROCEDURE — 25000003 PHARM REV CODE 250: Performed by: STUDENT IN AN ORGANIZED HEALTH CARE EDUCATION/TRAINING PROGRAM

## 2021-07-09 PROCEDURE — 45380 COLONOSCOPY AND BIOPSY: CPT | Mod: ,,, | Performed by: INTERNAL MEDICINE

## 2021-07-09 PROCEDURE — 99239 PR HOSPITAL DISCHARGE DAY,>30 MIN: ICD-10-PCS | Mod: ,,, | Performed by: HOSPITALIST

## 2021-07-09 PROCEDURE — 36415 COLL VENOUS BLD VENIPUNCTURE: CPT | Performed by: STUDENT IN AN ORGANIZED HEALTH CARE EDUCATION/TRAINING PROGRAM

## 2021-07-09 PROCEDURE — 63600175 PHARM REV CODE 636 W HCPCS: Performed by: HOSPITALIST

## 2021-07-09 PROCEDURE — 45380 COLONOSCOPY AND BIOPSY: CPT | Performed by: INTERNAL MEDICINE

## 2021-07-09 PROCEDURE — 85025 COMPLETE CBC W/AUTO DIFF WBC: CPT | Performed by: STUDENT IN AN ORGANIZED HEALTH CARE EDUCATION/TRAINING PROGRAM

## 2021-07-09 PROCEDURE — 25000003 PHARM REV CODE 250: Performed by: NURSE PRACTITIONER

## 2021-07-09 PROCEDURE — 1111F PR DISCHARGE MEDS RECONCILED W/ CURRENT OUTPATIENT MED LIST: ICD-10-PCS | Mod: CPTII,,, | Performed by: HOSPITALIST

## 2021-07-09 PROCEDURE — 27201012 HC FORCEPS, HOT/COLD, DISP: Performed by: INTERNAL MEDICINE

## 2021-07-09 PROCEDURE — 63600175 PHARM REV CODE 636 W HCPCS: Performed by: STUDENT IN AN ORGANIZED HEALTH CARE EDUCATION/TRAINING PROGRAM

## 2021-07-09 PROCEDURE — 37000008 HC ANESTHESIA 1ST 15 MINUTES: Performed by: INTERNAL MEDICINE

## 2021-07-09 PROCEDURE — 45380 PR COLONOSCOPY,BIOPSY: ICD-10-PCS | Mod: ,,, | Performed by: INTERNAL MEDICINE

## 2021-07-09 PROCEDURE — 37000009 HC ANESTHESIA EA ADD 15 MINS: Performed by: INTERNAL MEDICINE

## 2021-07-09 PROCEDURE — D9220A PRA ANESTHESIA: ICD-10-PCS | Mod: CRNA,,, | Performed by: NURSE ANESTHETIST, CERTIFIED REGISTERED

## 2021-07-09 PROCEDURE — 84100 ASSAY OF PHOSPHORUS: CPT | Performed by: STUDENT IN AN ORGANIZED HEALTH CARE EDUCATION/TRAINING PROGRAM

## 2021-07-09 PROCEDURE — 25000003 PHARM REV CODE 250: Performed by: NURSE ANESTHETIST, CERTIFIED REGISTERED

## 2021-07-09 PROCEDURE — D9220A PRA ANESTHESIA: ICD-10-PCS | Mod: ANES,,, | Performed by: ANESTHESIOLOGY

## 2021-07-09 PROCEDURE — 83735 ASSAY OF MAGNESIUM: CPT | Performed by: STUDENT IN AN ORGANIZED HEALTH CARE EDUCATION/TRAINING PROGRAM

## 2021-07-09 PROCEDURE — 1111F DSCHRG MED/CURRENT MED MERGE: CPT | Mod: CPTII,,, | Performed by: HOSPITALIST

## 2021-07-09 PROCEDURE — 88305 TISSUE EXAM BY PATHOLOGIST: CPT | Mod: 26,,, | Performed by: PATHOLOGY

## 2021-07-09 PROCEDURE — 88305 TISSUE EXAM BY PATHOLOGIST: ICD-10-PCS | Mod: 26,,, | Performed by: PATHOLOGY

## 2021-07-09 PROCEDURE — 88305 TISSUE EXAM BY PATHOLOGIST: CPT | Performed by: PATHOLOGY

## 2021-07-09 PROCEDURE — D9220A PRA ANESTHESIA: Mod: ANES,,, | Performed by: ANESTHESIOLOGY

## 2021-07-09 RX ORDER — PROPOFOL 10 MG/ML
VIAL (ML) INTRAVENOUS
Status: DISCONTINUED | OUTPATIENT
Start: 2021-07-09 | End: 2021-07-09

## 2021-07-09 RX ORDER — PROPOFOL 10 MG/ML
VIAL (ML) INTRAVENOUS CONTINUOUS PRN
Status: DISCONTINUED | OUTPATIENT
Start: 2021-07-09 | End: 2021-07-09

## 2021-07-09 RX ORDER — PANTOPRAZOLE SODIUM 40 MG/1
40 TABLET, DELAYED RELEASE ORAL DAILY
Qty: 90 TABLET | Refills: 0 | Status: SHIPPED | OUTPATIENT
Start: 2021-07-09 | End: 2022-02-09

## 2021-07-09 RX ORDER — ONDANSETRON 2 MG/ML
4 INJECTION INTRAMUSCULAR; INTRAVENOUS DAILY PRN
Status: DISCONTINUED | OUTPATIENT
Start: 2021-07-09 | End: 2021-07-09 | Stop reason: HOSPADM

## 2021-07-09 RX ORDER — LIDOCAINE HYDROCHLORIDE 20 MG/ML
INJECTION INTRAVENOUS
Status: DISCONTINUED | OUTPATIENT
Start: 2021-07-09 | End: 2021-07-09

## 2021-07-09 RX ADMIN — PROPOFOL 30 MG: 10 INJECTION, EMULSION INTRAVENOUS at 10:07

## 2021-07-09 RX ADMIN — LIDOCAINE HYDROCHLORIDE 60 MG: 20 INJECTION, SOLUTION INTRAVENOUS at 10:07

## 2021-07-09 RX ADMIN — METOPROLOL SUCCINATE 25 MG: 25 TABLET, EXTENDED RELEASE ORAL at 09:07

## 2021-07-09 RX ADMIN — ASPIRIN 81 MG CHEWABLE TABLET 81 MG: 81 TABLET CHEWABLE at 09:07

## 2021-07-09 RX ADMIN — FOLIC ACID 1000 MCG: 1 TABLET ORAL at 09:07

## 2021-07-09 RX ADMIN — PANTOPRAZOLE SODIUM 40 MG: 40 INJECTION, POWDER, FOR SOLUTION INTRAVENOUS at 09:07

## 2021-07-09 RX ADMIN — Medication 50 MCG/KG/MIN: at 10:07

## 2021-07-09 RX ADMIN — PRASUGREL 10 MG: 10 TABLET, FILM COATED ORAL at 09:07

## 2021-07-09 RX ADMIN — SODIUM CHLORIDE: 0.9 INJECTION, SOLUTION INTRAVENOUS at 10:07

## 2021-07-09 RX ADMIN — TAMSULOSIN HYDROCHLORIDE 0.4 MG: 0.4 CAPSULE ORAL at 09:07

## 2021-07-09 RX ADMIN — PREDNISONE 5 MG: 5 TABLET ORAL at 09:07

## 2021-07-12 ENCOUNTER — TELEPHONE (OUTPATIENT)
Dept: CARDIOLOGY | Facility: CLINIC | Age: 86
End: 2021-07-12

## 2021-07-12 ENCOUNTER — HOSPITAL ENCOUNTER (OUTPATIENT)
Dept: CARDIOLOGY | Facility: HOSPITAL | Age: 86
Discharge: HOME | End: 2021-07-12
Attending: INTERNAL MEDICINE
Payer: MEDICARE

## 2021-07-12 ENCOUNTER — OFFICE VISIT (OUTPATIENT)
Dept: DERMATOLOGY | Facility: CLINIC | Age: 86
End: 2021-07-12
Payer: MEDICARE

## 2021-07-12 VITALS
WEIGHT: 232 LBS | BODY MASS INDEX: 28.85 KG/M2 | HEART RATE: 88 BPM | HEIGHT: 75 IN | SYSTOLIC BLOOD PRESSURE: 128 MMHG | DIASTOLIC BLOOD PRESSURE: 66 MMHG

## 2021-07-12 VITALS — WEIGHT: 232 LBS | BODY MASS INDEX: 29 KG/M2

## 2021-07-12 DIAGNOSIS — C84.00 MYCOSIS FUNGOIDES, UNSPECIFIED BODY REGION: ICD-10-CM

## 2021-07-12 DIAGNOSIS — I48.11 LONGSTANDING PERSISTENT ATRIAL FIBRILLATION: Primary | ICD-10-CM

## 2021-07-12 DIAGNOSIS — L24.9 IRRITANT CONTACT DERMATITIS, UNSPECIFIED TRIGGER: Primary | ICD-10-CM

## 2021-07-12 DIAGNOSIS — L82.1 SEBORRHEIC KERATOSES: ICD-10-CM

## 2021-07-12 DIAGNOSIS — Z85.828 PERSONAL HISTORY OF SKIN CANCER: ICD-10-CM

## 2021-07-12 DIAGNOSIS — L90.5 SCAR: ICD-10-CM

## 2021-07-12 DIAGNOSIS — I25.5 ISCHEMIC CARDIOMYOPATHY: ICD-10-CM

## 2021-07-12 DIAGNOSIS — L57.0 AK (ACTINIC KERATOSIS): ICD-10-CM

## 2021-07-12 DIAGNOSIS — Z86.006 HISTORY OF MELANOMA IN SITU: ICD-10-CM

## 2021-07-12 DIAGNOSIS — D22.9 NEVUS: ICD-10-CM

## 2021-07-12 DIAGNOSIS — D18.01 CHERRY ANGIOMA: ICD-10-CM

## 2021-07-12 DIAGNOSIS — D69.2 SENILE PURPURA: ICD-10-CM

## 2021-07-12 LAB
ASCENDING AORTA: 3.84 CM
AV INDEX (PROSTH): 0.42
AV MEAN GRADIENT: 3 MMHG
AV PEAK GRADIENT: 5 MMHG
AV VALVE AREA: 1.9 CM2
AV VELOCITY RATIO: 0.5
BSA FOR ECHO PROCEDURE: 2.36 M2
CV ECHO LV RWT: 0.4 CM
DOP CALC AO PEAK VEL: 1.17 M/S
DOP CALC AO VTI: 21.32 CM
DOP CALC LVOT AREA: 4.5 CM2
DOP CALC LVOT DIAMETER: 2.4 CM
DOP CALC LVOT PEAK VEL: 0.59 M/S
DOP CALC LVOT STROKE VOLUME: 40.6 CM3
DOP CALCLVOT PEAK VEL VTI: 8.98 CM
E/E' RATIO: 9.85 M/S
ECHO LV POSTERIOR WALL: 1.13 CM (ref 0.6–1.1)
EJECTION FRACTION: 25 %
FRACTIONAL SHORTENING: 12 % (ref 28–44)
INTERVENTRICULAR SEPTUM: 1.04 CM (ref 0.6–1.1)
LA MAJOR: 7.45 CM
LA MINOR: 7.04 CM
LA WIDTH: 5.02 CM
LEFT ATRIUM SIZE: 5.32 CM
LEFT ATRIUM VOLUME INDEX MOD: 50 ML/M2
LEFT ATRIUM VOLUME INDEX: 70.2 ML/M2
LEFT ATRIUM VOLUME MOD: 116.99 CM3
LEFT ATRIUM VOLUME: 164.33 CM3
LEFT INTERNAL DIMENSION IN SYSTOLE: 5 CM (ref 2.1–4)
LEFT VENTRICLE DIASTOLIC VOLUME INDEX: 67.43 ML/M2
LEFT VENTRICLE DIASTOLIC VOLUME: 157.79 ML
LEFT VENTRICLE MASS INDEX: 106 G/M2
LEFT VENTRICLE SYSTOLIC VOLUME INDEX: 50.5 ML/M2
LEFT VENTRICLE SYSTOLIC VOLUME: 118.23 ML
LEFT VENTRICULAR INTERNAL DIMENSION IN DIASTOLE: 5.66 CM (ref 3.5–6)
LEFT VENTRICULAR MASS: 249.14 G
LV LATERAL E/E' RATIO: 8 M/S
LV SEPTAL E/E' RATIO: 12.8 M/S
MV PEAK E VEL: 0.64 M/S
PISA MRMAX VEL: 0.04 M/S
PISA TR MAX VEL: 3.18 M/S
RA MAJOR: 6.52 CM
RA WIDTH: 5.09 CM
RIGHT VENTRICULAR END-DIASTOLIC DIMENSION: 4.89 CM
SINUS: 3.75 CM
STJ: 3.04 CM
TDI LATERAL: 0.08 M/S
TDI SEPTAL: 0.05 M/S
TDI: 0.07 M/S
TR MAX PG: 40 MMHG
TRICUSPID ANNULAR PLANE SYSTOLIC EXCURSION: 1.14 CM

## 2021-07-12 PROCEDURE — 93306 TTE W/DOPPLER COMPLETE: CPT

## 2021-07-12 PROCEDURE — 17003 DESTRUCTION, PREMALIGNANT LESIONS; SECOND THROUGH 14 LESIONS: ICD-10-PCS | Mod: S$GLB,,, | Performed by: DERMATOLOGY

## 2021-07-12 PROCEDURE — 99214 OFFICE O/P EST MOD 30 MIN: CPT | Mod: 25,S$GLB,, | Performed by: DERMATOLOGY

## 2021-07-12 PROCEDURE — 17000 DESTRUCT PREMALG LESION: CPT | Mod: S$GLB,,, | Performed by: DERMATOLOGY

## 2021-07-12 PROCEDURE — 1111F DSCHRG MED/CURRENT MED MERGE: CPT | Mod: CPTII,S$GLB,, | Performed by: DERMATOLOGY

## 2021-07-12 PROCEDURE — 1111F PR DISCHARGE MEDS RECONCILED W/ CURRENT OUTPATIENT MED LIST: ICD-10-PCS | Mod: CPTII,S$GLB,, | Performed by: DERMATOLOGY

## 2021-07-12 PROCEDURE — 99999 PR PBB SHADOW E&M-EST. PATIENT-LVL III: CPT | Mod: PBBFAC,,, | Performed by: DERMATOLOGY

## 2021-07-12 PROCEDURE — 17000 PR DESTRUCTION(LASER SURGERY,CRYOSURGERY,CHEMOSURGERY),PREMALIGNANT LESIONS,FIRST LESION: ICD-10-PCS | Mod: S$GLB,,, | Performed by: DERMATOLOGY

## 2021-07-12 PROCEDURE — 3288F FALL RISK ASSESSMENT DOCD: CPT | Mod: CPTII,S$GLB,, | Performed by: DERMATOLOGY

## 2021-07-12 PROCEDURE — 93306 ECHO (CUPID ONLY): ICD-10-PCS | Mod: 26,,, | Performed by: INTERNAL MEDICINE

## 2021-07-12 PROCEDURE — 99214 PR OFFICE/OUTPT VISIT, EST, LEVL IV, 30-39 MIN: ICD-10-PCS | Mod: 25,S$GLB,, | Performed by: DERMATOLOGY

## 2021-07-12 PROCEDURE — 99999 PR PBB SHADOW E&M-EST. PATIENT-LVL III: ICD-10-PCS | Mod: PBBFAC,,, | Performed by: DERMATOLOGY

## 2021-07-12 PROCEDURE — 1101F PT FALLS ASSESS-DOCD LE1/YR: CPT | Mod: CPTII,S$GLB,, | Performed by: DERMATOLOGY

## 2021-07-12 PROCEDURE — 3288F PR FALLS RISK ASSESSMENT DOCUMENTED: ICD-10-PCS | Mod: CPTII,S$GLB,, | Performed by: DERMATOLOGY

## 2021-07-12 PROCEDURE — 1126F PR PAIN SEVERITY QUANTIFIED, NO PAIN PRESENT: ICD-10-PCS | Mod: S$GLB,,, | Performed by: DERMATOLOGY

## 2021-07-12 PROCEDURE — 1101F PR PT FALLS ASSESS DOC 0-1 FALLS W/OUT INJ PAST YR: ICD-10-PCS | Mod: CPTII,S$GLB,, | Performed by: DERMATOLOGY

## 2021-07-12 PROCEDURE — 1159F PR MEDICATION LIST DOCUMENTED IN MEDICAL RECORD: ICD-10-PCS | Mod: S$GLB,,, | Performed by: DERMATOLOGY

## 2021-07-12 PROCEDURE — 93306 TTE W/DOPPLER COMPLETE: CPT | Mod: 26,,, | Performed by: INTERNAL MEDICINE

## 2021-07-12 PROCEDURE — 17003 DESTRUCT PREMALG LES 2-14: CPT | Mod: S$GLB,,, | Performed by: DERMATOLOGY

## 2021-07-12 PROCEDURE — 1159F MED LIST DOCD IN RCRD: CPT | Mod: S$GLB,,, | Performed by: DERMATOLOGY

## 2021-07-12 PROCEDURE — 1126F AMNT PAIN NOTED NONE PRSNT: CPT | Mod: S$GLB,,, | Performed by: DERMATOLOGY

## 2021-07-13 ENCOUNTER — TELEPHONE (OUTPATIENT)
Dept: CARDIOLOGY | Facility: CLINIC | Age: 86
End: 2021-07-13

## 2021-07-14 ENCOUNTER — CLINICAL SUPPORT (OUTPATIENT)
Dept: FAMILY MEDICINE | Facility: CLINIC | Age: 86
End: 2021-07-14
Payer: MEDICARE

## 2021-07-14 DIAGNOSIS — E29.1 HYPOGONADISM IN MALE: Primary | ICD-10-CM

## 2021-07-14 PROCEDURE — 96372 THER/PROPH/DIAG INJ SC/IM: CPT | Mod: S$GLB,,, | Performed by: FAMILY MEDICINE

## 2021-07-14 PROCEDURE — 99999 PR PBB SHADOW E&M-EST. PATIENT-LVL I: ICD-10-PCS | Mod: PBBFAC,,,

## 2021-07-14 PROCEDURE — 96372 PR INJECTION,THERAP/PROPH/DIAG2ST, IM OR SUBCUT: ICD-10-PCS | Mod: S$GLB,,, | Performed by: FAMILY MEDICINE

## 2021-07-14 PROCEDURE — 99999 PR PBB SHADOW E&M-EST. PATIENT-LVL I: CPT | Mod: PBBFAC,,,

## 2021-07-14 RX ADMIN — TESTOSTERONE CYPIONATE 100 MG: 200 INJECTION, SOLUTION INTRAMUSCULAR at 09:07

## 2021-07-19 ENCOUNTER — PATIENT MESSAGE (OUTPATIENT)
Dept: CARDIOLOGY | Facility: CLINIC | Age: 86
End: 2021-07-19

## 2021-07-19 DIAGNOSIS — I50.20 SYSTOLIC CONGESTIVE HEART FAILURE, UNSPECIFIED HF CHRONICITY: Primary | ICD-10-CM

## 2021-07-19 LAB
FINAL PATHOLOGIC DIAGNOSIS: NORMAL
Lab: NORMAL

## 2021-07-19 RX ORDER — ISOSORBIDE DINITRATE AND HYDRALAZINE HYDROCHLORIDE 37.5; 2 MG/1; MG/1
1 TABLET ORAL 3 TIMES DAILY
Qty: 90 TABLET | Refills: 11 | Status: SHIPPED | OUTPATIENT
Start: 2021-07-19 | End: 2021-08-04 | Stop reason: SINTOL

## 2021-07-20 ENCOUNTER — TELEPHONE (OUTPATIENT)
Dept: GASTROENTEROLOGY | Facility: CLINIC | Age: 86
End: 2021-07-20

## 2021-07-22 ENCOUNTER — OFFICE VISIT (OUTPATIENT)
Dept: CARDIOLOGY | Facility: CLINIC | Age: 86
End: 2021-07-22
Payer: MEDICARE

## 2021-07-22 ENCOUNTER — LAB VISIT (OUTPATIENT)
Dept: LAB | Facility: HOSPITAL | Age: 86
End: 2021-07-22
Payer: MEDICARE

## 2021-07-22 VITALS
SYSTOLIC BLOOD PRESSURE: 103 MMHG | WEIGHT: 230.19 LBS | HEIGHT: 75 IN | HEART RATE: 83 BPM | BODY MASS INDEX: 28.62 KG/M2 | OXYGEN SATURATION: 98 % | DIASTOLIC BLOOD PRESSURE: 55 MMHG

## 2021-07-22 DIAGNOSIS — I48.11 LONGSTANDING PERSISTENT ATRIAL FIBRILLATION: ICD-10-CM

## 2021-07-22 DIAGNOSIS — Z95.828 PRESENCE OF IVC FILTER: Primary | ICD-10-CM

## 2021-07-22 DIAGNOSIS — E78.2 MIXED HYPERLIPIDEMIA: ICD-10-CM

## 2021-07-22 DIAGNOSIS — I50.22 CHRONIC SYSTOLIC HEART FAILURE: ICD-10-CM

## 2021-07-22 DIAGNOSIS — Z86.718 HISTORY OF DEEP VEIN THROMBOSIS (DVT) OF LOWER EXTREMITY: ICD-10-CM

## 2021-07-22 DIAGNOSIS — I25.119 CORONARY ARTERY DISEASE INVOLVING NATIVE CORONARY ARTERY OF NATIVE HEART WITH ANGINA PECTORIS: ICD-10-CM

## 2021-07-22 DIAGNOSIS — Z87.19 HISTORY OF GASTROINTESTINAL BLEEDING: ICD-10-CM

## 2021-07-22 DIAGNOSIS — Z86.006 HISTORY OF MELANOMA IN SITU: ICD-10-CM

## 2021-07-22 DIAGNOSIS — N18.30 STAGE 3 CHRONIC KIDNEY DISEASE, UNSPECIFIED WHETHER STAGE 3A OR 3B CKD: ICD-10-CM

## 2021-07-22 DIAGNOSIS — I48.19 PERSISTENT ATRIAL FIBRILLATION: ICD-10-CM

## 2021-07-22 LAB
ALBUMIN SERPL BCP-MCNC: 3.5 G/DL (ref 3.5–5.2)
ANION GAP SERPL CALC-SCNC: 9 MMOL/L (ref 8–16)
APTT BLDCRRT: 25 SEC (ref 21–32)
BASOPHILS # BLD AUTO: 0.01 K/UL (ref 0–0.2)
BASOPHILS NFR BLD: 0.1 % (ref 0–1.9)
BUN SERPL-MCNC: 19 MG/DL (ref 8–23)
CALCIUM SERPL-MCNC: 10.4 MG/DL (ref 8.7–10.5)
CHLORIDE SERPL-SCNC: 106 MMOL/L (ref 95–110)
CO2 SERPL-SCNC: 26 MMOL/L (ref 23–29)
CREAT SERPL-MCNC: 1.9 MG/DL (ref 0.5–1.4)
DIFFERENTIAL METHOD: ABNORMAL
EOSINOPHIL # BLD AUTO: 0 K/UL (ref 0–0.5)
EOSINOPHIL NFR BLD: 0.3 % (ref 0–8)
ERYTHROCYTE [DISTWIDTH] IN BLOOD BY AUTOMATED COUNT: 15.3 % (ref 11.5–14.5)
EST. GFR  (AFRICAN AMERICAN): 36.1 ML/MIN/1.73 M^2
EST. GFR  (NON AFRICAN AMERICAN): 31.2 ML/MIN/1.73 M^2
GLUCOSE SERPL-MCNC: 99 MG/DL (ref 70–110)
HCT VFR BLD AUTO: 33.3 % (ref 40–54)
HGB BLD-MCNC: 10.1 G/DL (ref 14–18)
IMM GRANULOCYTES # BLD AUTO: 0.11 K/UL (ref 0–0.04)
IMM GRANULOCYTES NFR BLD AUTO: 1.6 % (ref 0–0.5)
INR PPP: 1 (ref 0.8–1.2)
LYMPHOCYTES # BLD AUTO: 0.9 K/UL (ref 1–4.8)
LYMPHOCYTES NFR BLD: 13 % (ref 18–48)
MCH RBC QN AUTO: 30.8 PG (ref 27–31)
MCHC RBC AUTO-ENTMCNC: 30.3 G/DL (ref 32–36)
MCV RBC AUTO: 102 FL (ref 82–98)
MONOCYTES # BLD AUTO: 1.5 K/UL (ref 0.3–1)
MONOCYTES NFR BLD: 22.1 % (ref 4–15)
NEUTROPHILS # BLD AUTO: 4.2 K/UL (ref 1.8–7.7)
NEUTROPHILS NFR BLD: 62.9 % (ref 38–73)
NRBC BLD-RTO: 0 /100 WBC
PLATELET # BLD AUTO: 174 K/UL (ref 150–450)
PMV BLD AUTO: 11.3 FL (ref 9.2–12.9)
POTASSIUM SERPL-SCNC: 4.5 MMOL/L (ref 3.5–5.1)
PROTHROMBIN TIME: 11.2 SEC (ref 9–12.5)
RBC # BLD AUTO: 3.28 M/UL (ref 4.6–6.2)
SODIUM SERPL-SCNC: 141 MMOL/L (ref 136–145)
WBC # BLD AUTO: 6.7 K/UL (ref 3.9–12.7)

## 2021-07-22 PROCEDURE — 1126F AMNT PAIN NOTED NONE PRSNT: CPT | Mod: CPTII,S$GLB,, | Performed by: INTERNAL MEDICINE

## 2021-07-22 PROCEDURE — 36415 COLL VENOUS BLD VENIPUNCTURE: CPT | Performed by: INTERNAL MEDICINE

## 2021-07-22 PROCEDURE — 99215 OFFICE O/P EST HI 40 MIN: CPT | Mod: S$GLB,,, | Performed by: INTERNAL MEDICINE

## 2021-07-22 PROCEDURE — 99999 PR PBB SHADOW E&M-EST. PATIENT-LVL III: ICD-10-PCS | Mod: PBBFAC,,,

## 2021-07-22 PROCEDURE — 1159F MED LIST DOCD IN RCRD: CPT | Mod: CPTII,S$GLB,, | Performed by: INTERNAL MEDICINE

## 2021-07-22 PROCEDURE — 80048 BASIC METABOLIC PNL TOTAL CA: CPT | Performed by: INTERNAL MEDICINE

## 2021-07-22 PROCEDURE — 99999 PR PBB SHADOW E&M-EST. PATIENT-LVL III: CPT | Mod: PBBFAC,,,

## 2021-07-22 PROCEDURE — 85025 COMPLETE CBC W/AUTO DIFF WBC: CPT | Performed by: INTERNAL MEDICINE

## 2021-07-22 PROCEDURE — 99499 RISK ADDL DX/OHS AUDIT: ICD-10-PCS | Mod: HCNC,S$GLB,, | Performed by: NURSE PRACTITIONER

## 2021-07-22 PROCEDURE — 99499 UNLISTED E&M SERVICE: CPT | Mod: HCNC,S$GLB,, | Performed by: NURSE PRACTITIONER

## 2021-07-22 PROCEDURE — 85610 PROTHROMBIN TIME: CPT | Performed by: INTERNAL MEDICINE

## 2021-07-22 PROCEDURE — 1111F DSCHRG MED/CURRENT MED MERGE: CPT | Mod: CPTII,S$GLB,, | Performed by: INTERNAL MEDICINE

## 2021-07-22 PROCEDURE — 1159F PR MEDICATION LIST DOCUMENTED IN MEDICAL RECORD: ICD-10-PCS | Mod: CPTII,S$GLB,, | Performed by: INTERNAL MEDICINE

## 2021-07-22 PROCEDURE — 82040 ASSAY OF SERUM ALBUMIN: CPT | Performed by: INTERNAL MEDICINE

## 2021-07-22 PROCEDURE — 1111F PR DISCHARGE MEDS RECONCILED W/ CURRENT OUTPATIENT MED LIST: ICD-10-PCS | Mod: CPTII,S$GLB,, | Performed by: INTERNAL MEDICINE

## 2021-07-22 PROCEDURE — 99215 PR OFFICE/OUTPT VISIT, EST, LEVL V, 40-54 MIN: ICD-10-PCS | Mod: S$GLB,,, | Performed by: INTERNAL MEDICINE

## 2021-07-22 PROCEDURE — 1126F PR PAIN SEVERITY QUANTIFIED, NO PAIN PRESENT: ICD-10-PCS | Mod: CPTII,S$GLB,, | Performed by: INTERNAL MEDICINE

## 2021-07-22 PROCEDURE — 85730 THROMBOPLASTIN TIME PARTIAL: CPT | Performed by: INTERNAL MEDICINE

## 2021-07-27 ENCOUNTER — ANESTHESIA EVENT (OUTPATIENT)
Dept: MEDSURG UNIT | Facility: HOSPITAL | Age: 86
DRG: 274 | End: 2021-07-27
Payer: MEDICARE

## 2021-07-28 ENCOUNTER — ANESTHESIA (OUTPATIENT)
Dept: MEDSURG UNIT | Facility: HOSPITAL | Age: 86
DRG: 274 | End: 2021-07-28
Payer: MEDICARE

## 2021-07-28 ENCOUNTER — HOSPITAL ENCOUNTER (INPATIENT)
Facility: HOSPITAL | Age: 86
LOS: 1 days | Discharge: HOME | DRG: 274 | End: 2021-07-28
Attending: INTERNAL MEDICINE | Admitting: INTERNAL MEDICINE
Payer: MEDICARE

## 2021-07-28 VITALS
RESPIRATION RATE: 16 BRPM | TEMPERATURE: 98 F | HEIGHT: 74 IN | WEIGHT: 227 LBS | DIASTOLIC BLOOD PRESSURE: 61 MMHG | OXYGEN SATURATION: 98 % | BODY MASS INDEX: 29.13 KG/M2 | HEART RATE: 95 BPM | SYSTOLIC BLOOD PRESSURE: 123 MMHG

## 2021-07-28 DIAGNOSIS — Z95.818 PRESENCE OF WATCHMAN LEFT ATRIAL APPENDAGE CLOSURE DEVICE: Primary | ICD-10-CM

## 2021-07-28 DIAGNOSIS — I48.11 LONGSTANDING PERSISTENT ATRIAL FIBRILLATION: Primary | ICD-10-CM

## 2021-07-28 DIAGNOSIS — I48.11 LONGSTANDING PERSISTENT ATRIAL FIBRILLATION: ICD-10-CM

## 2021-07-28 LAB
ABO + RH BLD: NORMAL
ANION GAP SERPL CALC-SCNC: 7 MMOL/L (ref 8–16)
APTT BLDCRRT: 26.3 SEC (ref 21–32)
BLD GP AB SCN CELLS X3 SERPL QL: NORMAL
BNP SERPL-MCNC: 255 PG/ML (ref 0–99)
BUN SERPL-MCNC: 21 MG/DL (ref 8–23)
CALCIUM SERPL-MCNC: 9.2 MG/DL (ref 8.7–10.5)
CHLORIDE SERPL-SCNC: 105 MMOL/L (ref 95–110)
CO2 SERPL-SCNC: 28 MMOL/L (ref 23–29)
CREAT SERPL-MCNC: 1.7 MG/DL (ref 0.5–1.4)
ERYTHROCYTE [DISTWIDTH] IN BLOOD BY AUTOMATED COUNT: 14.6 % (ref 11.5–14.5)
EST. GFR  (AFRICAN AMERICAN): 41.3 ML/MIN/1.73 M^2
EST. GFR  (NON AFRICAN AMERICAN): 35.7 ML/MIN/1.73 M^2
GLUCOSE SERPL-MCNC: 91 MG/DL (ref 70–110)
HCT VFR BLD AUTO: 29.8 % (ref 40–54)
HGB BLD-MCNC: 9.5 G/DL (ref 14–18)
INR PPP: 1.1 (ref 0.8–1.2)
MCH RBC QN AUTO: 31.9 PG (ref 27–31)
MCHC RBC AUTO-ENTMCNC: 31.9 G/DL (ref 32–36)
MCV RBC AUTO: 100 FL (ref 82–98)
PLATELET # BLD AUTO: 153 K/UL (ref 150–450)
PMV BLD AUTO: 11.2 FL (ref 9.2–12.9)
POCT GLUCOSE: 123 MG/DL (ref 70–110)
POTASSIUM SERPL-SCNC: 3.5 MMOL/L (ref 3.5–5.1)
PROTHROMBIN TIME: 11.5 SEC (ref 9–12.5)
RBC # BLD AUTO: 2.98 M/UL (ref 4.6–6.2)
SODIUM SERPL-SCNC: 140 MMOL/L (ref 136–145)
WBC # BLD AUTO: 5.88 K/UL (ref 3.9–12.7)

## 2021-07-28 PROCEDURE — D9220A PRA ANESTHESIA: ICD-10-PCS | Mod: Q0,,, | Performed by: ANESTHESIOLOGY

## 2021-07-28 PROCEDURE — 80048 BASIC METABOLIC PNL TOTAL CA: CPT | Performed by: INTERNAL MEDICINE

## 2021-07-28 PROCEDURE — 63600175 PHARM REV CODE 636 W HCPCS: Performed by: STUDENT IN AN ORGANIZED HEALTH CARE EDUCATION/TRAINING PROGRAM

## 2021-07-28 PROCEDURE — 37000008 HC ANESTHESIA 1ST 15 MINUTES: Performed by: INTERNAL MEDICINE

## 2021-07-28 PROCEDURE — 27800903 OPTIME MED/SURG SUP & DEVICES OTHER IMPLANTS: Performed by: INTERNAL MEDICINE

## 2021-07-28 PROCEDURE — 25500020 PHARM REV CODE 255: Performed by: INTERNAL MEDICINE

## 2021-07-28 PROCEDURE — 36620 ARTERIAL: ICD-10-PCS | Mod: 59,Q0,, | Performed by: ANESTHESIOLOGY

## 2021-07-28 PROCEDURE — 12000002 HC ACUTE/MED SURGE SEMI-PRIVATE ROOM

## 2021-07-28 PROCEDURE — 86900 BLOOD TYPING SEROLOGIC ABO: CPT | Performed by: INTERNAL MEDICINE

## 2021-07-28 PROCEDURE — 94761 N-INVAS EAR/PLS OXIMETRY MLT: CPT

## 2021-07-28 PROCEDURE — C1760 CLOSURE DEV, VASC: HCPCS | Performed by: INTERNAL MEDICINE

## 2021-07-28 PROCEDURE — 93010 EKG 12-LEAD: ICD-10-PCS | Mod: ,,, | Performed by: INTERNAL MEDICINE

## 2021-07-28 PROCEDURE — 27201423 OPTIME MED/SURG SUP & DEVICES STERILE SUPPLY: Performed by: INTERNAL MEDICINE

## 2021-07-28 PROCEDURE — 25000003 PHARM REV CODE 250: Performed by: INTERNAL MEDICINE

## 2021-07-28 PROCEDURE — 33340 PERQ CLSR TCAT L ATR APNDGE: CPT | Performed by: INTERNAL MEDICINE

## 2021-07-28 PROCEDURE — C1769 GUIDE WIRE: HCPCS | Performed by: INTERNAL MEDICINE

## 2021-07-28 PROCEDURE — 33340 PR TRANSCATH CLOSURE, PERC, LEFT ATRIAL APPENDAGE, W/IMPLANT: ICD-10-PCS | Mod: Q0,62,, | Performed by: INTERNAL MEDICINE

## 2021-07-28 PROCEDURE — 25000003 PHARM REV CODE 250: Performed by: STUDENT IN AN ORGANIZED HEALTH CARE EDUCATION/TRAINING PROGRAM

## 2021-07-28 PROCEDURE — 37000009 HC ANESTHESIA EA ADD 15 MINS: Performed by: INTERNAL MEDICINE

## 2021-07-28 PROCEDURE — 85730 THROMBOPLASTIN TIME PARTIAL: CPT | Performed by: INTERNAL MEDICINE

## 2021-07-28 PROCEDURE — 82962 GLUCOSE BLOOD TEST: CPT | Performed by: INTERNAL MEDICINE

## 2021-07-28 PROCEDURE — C1894 INTRO/SHEATH, NON-LASER: HCPCS | Performed by: INTERNAL MEDICINE

## 2021-07-28 PROCEDURE — 85027 COMPLETE CBC AUTOMATED: CPT | Performed by: INTERNAL MEDICINE

## 2021-07-28 PROCEDURE — 33340 PERQ CLSR TCAT L ATR APNDGE: CPT | Mod: Q0,62,, | Performed by: INTERNAL MEDICINE

## 2021-07-28 PROCEDURE — 93005 ELECTROCARDIOGRAM TRACING: CPT

## 2021-07-28 PROCEDURE — D9220A PRA ANESTHESIA: Mod: Q0,,, | Performed by: ANESTHESIOLOGY

## 2021-07-28 PROCEDURE — 83880 ASSAY OF NATRIURETIC PEPTIDE: CPT | Performed by: INTERNAL MEDICINE

## 2021-07-28 PROCEDURE — 99233 PR SUBSEQUENT HOSPITAL CARE,LEVL III: ICD-10-PCS | Mod: 25,,, | Performed by: INTERNAL MEDICINE

## 2021-07-28 PROCEDURE — 36620 INSERTION CATHETER ARTERY: CPT | Mod: 59,Q0,, | Performed by: ANESTHESIOLOGY

## 2021-07-28 PROCEDURE — A4216 STERILE WATER/SALINE, 10 ML: HCPCS | Performed by: STUDENT IN AN ORGANIZED HEALTH CARE EDUCATION/TRAINING PROGRAM

## 2021-07-28 PROCEDURE — 99233 SBSQ HOSP IP/OBS HIGH 50: CPT | Mod: 25,,, | Performed by: INTERNAL MEDICINE

## 2021-07-28 PROCEDURE — 93010 ELECTROCARDIOGRAM REPORT: CPT | Mod: ,,, | Performed by: INTERNAL MEDICINE

## 2021-07-28 PROCEDURE — 85610 PROTHROMBIN TIME: CPT | Performed by: INTERNAL MEDICINE

## 2021-07-28 DEVICE — LEFT ATRIAL APPENDAGE CLOSURE DEVICE WITH DELIVERY SYSTEM
Type: IMPLANTABLE DEVICE | Site: HEART | Status: FUNCTIONAL
Brand: WATCHMAN FLX™

## 2021-07-28 RX ORDER — SODIUM CHLORIDE 0.9 % (FLUSH) 0.9 %
10 SYRINGE (ML) INJECTION
Status: DISCONTINUED | OUTPATIENT
Start: 2021-07-28 | End: 2021-07-28 | Stop reason: HOSPADM

## 2021-07-28 RX ORDER — NOREPINEPHRINE BITARTRATE 1 MG/ML
INJECTION, SOLUTION INTRAVENOUS
Status: DISCONTINUED | OUTPATIENT
Start: 2021-07-28 | End: 2021-07-28

## 2021-07-28 RX ORDER — CEFAZOLIN SODIUM 1 G/3ML
INJECTION, POWDER, FOR SOLUTION INTRAMUSCULAR; INTRAVENOUS
Status: DISCONTINUED | OUTPATIENT
Start: 2021-07-28 | End: 2021-07-28

## 2021-07-28 RX ORDER — HEPARIN SOD,PORCINE/0.9 % NACL 1000/500ML
INTRAVENOUS SOLUTION INTRAVENOUS
Status: DISCONTINUED | OUTPATIENT
Start: 2021-07-28 | End: 2021-07-28 | Stop reason: HOSPADM

## 2021-07-28 RX ORDER — FENTANYL CITRATE 50 UG/ML
INJECTION, SOLUTION INTRAMUSCULAR; INTRAVENOUS
Status: DISCONTINUED | OUTPATIENT
Start: 2021-07-28 | End: 2021-07-28

## 2021-07-28 RX ORDER — ONDANSETRON 2 MG/ML
4 INJECTION INTRAMUSCULAR; INTRAVENOUS DAILY PRN
Status: DISCONTINUED | OUTPATIENT
Start: 2021-07-28 | End: 2021-07-28 | Stop reason: HOSPADM

## 2021-07-28 RX ORDER — PROPOFOL 10 MG/ML
VIAL (ML) INTRAVENOUS
Status: DISCONTINUED | OUTPATIENT
Start: 2021-07-28 | End: 2021-07-28

## 2021-07-28 RX ORDER — SODIUM CHLORIDE 9 MG/ML
INJECTION, SOLUTION INTRAVENOUS CONTINUOUS
Status: ACTIVE | OUTPATIENT
Start: 2021-07-28 | End: 2021-07-28

## 2021-07-28 RX ORDER — ACETAMINOPHEN 325 MG/1
650 TABLET ORAL EVERY 4 HOURS PRN
Status: DISCONTINUED | OUTPATIENT
Start: 2021-07-28 | End: 2021-07-28 | Stop reason: HOSPADM

## 2021-07-28 RX ORDER — DIPHENHYDRAMINE HCL 50 MG
50 CAPSULE ORAL ONCE
Status: COMPLETED | OUTPATIENT
Start: 2021-07-28 | End: 2021-07-28

## 2021-07-28 RX ORDER — SODIUM CHLORIDE 9 MG/ML
INJECTION, SOLUTION INTRAVENOUS CONTINUOUS PRN
Status: DISCONTINUED | OUTPATIENT
Start: 2021-07-28 | End: 2021-07-28

## 2021-07-28 RX ORDER — FENTANYL CITRATE 50 UG/ML
25 INJECTION, SOLUTION INTRAMUSCULAR; INTRAVENOUS EVERY 5 MIN PRN
Status: DISCONTINUED | OUTPATIENT
Start: 2021-07-28 | End: 2021-07-28 | Stop reason: HOSPADM

## 2021-07-28 RX ORDER — HEPARIN SODIUM 1000 [USP'U]/ML
INJECTION, SOLUTION INTRAVENOUS; SUBCUTANEOUS
Status: DISCONTINUED | OUTPATIENT
Start: 2021-07-28 | End: 2021-07-28

## 2021-07-28 RX ORDER — DEXMEDETOMIDINE HYDROCHLORIDE 100 UG/ML
INJECTION, SOLUTION INTRAVENOUS
Status: DISCONTINUED | OUTPATIENT
Start: 2021-07-28 | End: 2021-07-28

## 2021-07-28 RX ORDER — LIDOCAINE HYDROCHLORIDE 20 MG/ML
SOLUTION OROPHARYNGEAL
Status: DISCONTINUED | OUTPATIENT
Start: 2021-07-28 | End: 2021-07-28

## 2021-07-28 RX ORDER — ONDANSETRON 2 MG/ML
INJECTION INTRAMUSCULAR; INTRAVENOUS
Status: DISCONTINUED | OUTPATIENT
Start: 2021-07-28 | End: 2021-07-28

## 2021-07-28 RX ORDER — PROTAMINE SULFATE 10 MG/ML
INJECTION, SOLUTION INTRAVENOUS
Status: DISCONTINUED | OUTPATIENT
Start: 2021-07-28 | End: 2021-07-28

## 2021-07-28 RX ORDER — LIDOCAINE HYDROCHLORIDE 20 MG/ML
INJECTION, SOLUTION EPIDURAL; INFILTRATION; INTRACAUDAL; PERINEURAL
Status: DISCONTINUED | OUTPATIENT
Start: 2021-07-28 | End: 2021-07-28 | Stop reason: HOSPADM

## 2021-07-28 RX ORDER — ONDANSETRON 8 MG/1
8 TABLET, ORALLY DISINTEGRATING ORAL EVERY 8 HOURS PRN
Status: DISCONTINUED | OUTPATIENT
Start: 2021-07-28 | End: 2021-07-28 | Stop reason: HOSPADM

## 2021-07-28 RX ADMIN — FENTANYL CITRATE 25 MCG: 50 INJECTION INTRAMUSCULAR; INTRAVENOUS at 10:07

## 2021-07-28 RX ADMIN — PROPOFOL 10 MG: 10 INJECTION, EMULSION INTRAVENOUS at 11:07

## 2021-07-28 RX ADMIN — LIDOCAINE HYDROCHLORIDE 15 ML: 20 SOLUTION ORAL; TOPICAL at 09:07

## 2021-07-28 RX ADMIN — SODIUM CHLORIDE, SODIUM GLUCONATE, SODIUM ACETATE, POTASSIUM CHLORIDE, MAGNESIUM CHLORIDE, SODIUM PHOSPHATE, DIBASIC, AND POTASSIUM PHOSPHATE: .53; .5; .37; .037; .03; .012; .00082 INJECTION, SOLUTION INTRAVENOUS at 09:07

## 2021-07-28 RX ADMIN — EPINEPHRINE 0.02 MCG/KG/MIN: 1 INJECTION INTRAMUSCULAR; INTRAVENOUS; SUBCUTANEOUS at 10:07

## 2021-07-28 RX ADMIN — PROPOFOL 20 MG: 10 INJECTION, EMULSION INTRAVENOUS at 10:07

## 2021-07-28 RX ADMIN — SODIUM CHLORIDE: 0.9 INJECTION, SOLUTION INTRAVENOUS at 09:07

## 2021-07-28 RX ADMIN — NOREPINEPHRINE BITARTRATE 16 MCG: 1 INJECTION INTRAVENOUS at 10:07

## 2021-07-28 RX ADMIN — CEFAZOLIN 2 G: 330 INJECTION, POWDER, FOR SOLUTION INTRAMUSCULAR; INTRAVENOUS at 10:07

## 2021-07-28 RX ADMIN — PROTAMINE SULFATE 120 MG: 10 INJECTION, SOLUTION INTRAVENOUS at 11:07

## 2021-07-28 RX ADMIN — SODIUM CHLORIDE, SODIUM GLUCONATE, SODIUM ACETATE, POTASSIUM CHLORIDE, MAGNESIUM CHLORIDE, SODIUM PHOSPHATE, DIBASIC, AND POTASSIUM PHOSPHATE: .53; .5; .37; .037; .03; .012; .00082 INJECTION, SOLUTION INTRAVENOUS at 10:07

## 2021-07-28 RX ADMIN — FENTANYL CITRATE 50 MCG: 50 INJECTION INTRAMUSCULAR; INTRAVENOUS at 10:07

## 2021-07-28 RX ADMIN — DEXMEDETOMIDINE HYDROCHLORIDE 8 MCG: 100 INJECTION, SOLUTION INTRAVENOUS at 10:07

## 2021-07-28 RX ADMIN — HEPARIN SODIUM 2500 UNITS: 1000 INJECTION INTRAVENOUS; SUBCUTANEOUS at 11:07

## 2021-07-28 RX ADMIN — HEPARIN SODIUM 12500 UNITS: 1000 INJECTION INTRAVENOUS; SUBCUTANEOUS at 10:07

## 2021-07-28 RX ADMIN — GLYCOPYRROLATE 0.2 MG: 0.2 INJECTION, SOLUTION INTRAMUSCULAR; INTRAVITREAL at 10:07

## 2021-07-28 RX ADMIN — SODIUM CHLORIDE 1 MCG/KG/HR: 9 INJECTION, SOLUTION INTRAMUSCULAR; INTRAVENOUS; SUBCUTANEOUS at 10:07

## 2021-07-28 RX ADMIN — DEXMEDETOMIDINE HYDROCHLORIDE 12 MCG: 100 INJECTION, SOLUTION INTRAVENOUS at 10:07

## 2021-07-28 RX ADMIN — NOREPINEPHRINE BITARTRATE 0.02 MCG/KG/MIN: 1 INJECTION, SOLUTION, CONCENTRATE INTRAVENOUS at 10:07

## 2021-07-28 RX ADMIN — ONDANSETRON 4 MG: 2 INJECTION INTRAMUSCULAR; INTRAVENOUS at 11:07

## 2021-07-28 RX ADMIN — DIPHENHYDRAMINE HYDROCHLORIDE 50 MG: 50 CAPSULE ORAL at 08:07

## 2021-07-29 LAB
POC ACTIVATED CLOTTING TIME K: 125 SEC (ref 74–137)
POC ACTIVATED CLOTTING TIME K: 230 SEC (ref 74–137)
POC ACTIVATED CLOTTING TIME K: 235 SEC (ref 74–137)
SAMPLE: ABNORMAL
SAMPLE: ABNORMAL
SAMPLE: NORMAL

## 2021-07-30 ENCOUNTER — TELEPHONE (OUTPATIENT)
Dept: FAMILY MEDICINE | Facility: CLINIC | Age: 86
End: 2021-07-30

## 2021-07-30 DIAGNOSIS — K59.00 CONSTIPATION, UNSPECIFIED CONSTIPATION TYPE: Primary | ICD-10-CM

## 2021-07-30 RX ORDER — LACTULOSE 10 G/15ML
15 SOLUTION ORAL 3 TIMES DAILY
Qty: 90 ML | Refills: 0 | Status: SHIPPED | OUTPATIENT
Start: 2021-07-30 | End: 2022-02-09

## 2021-08-04 ENCOUNTER — PATIENT MESSAGE (OUTPATIENT)
Dept: CARDIOLOGY | Facility: CLINIC | Age: 86
End: 2021-08-04

## 2021-08-04 ENCOUNTER — PATIENT MESSAGE (OUTPATIENT)
Dept: FAMILY MEDICINE | Facility: CLINIC | Age: 86
End: 2021-08-04

## 2021-08-04 ENCOUNTER — PATIENT MESSAGE (OUTPATIENT)
Dept: GASTROENTEROLOGY | Facility: CLINIC | Age: 86
End: 2021-08-04

## 2021-08-05 RX ORDER — SUCRALFATE 1 G/1
1 TABLET ORAL
Qty: 120 TABLET | Refills: 3 | Status: SHIPPED | OUTPATIENT
Start: 2021-08-05 | End: 2022-02-09

## 2021-08-11 ENCOUNTER — PATIENT MESSAGE (OUTPATIENT)
Dept: FAMILY MEDICINE | Facility: CLINIC | Age: 86
End: 2021-08-11

## 2021-08-11 DIAGNOSIS — E29.1 HYPOGONADISM IN MALE: ICD-10-CM

## 2021-08-11 RX ORDER — TESTOSTERONE CYPIONATE 200 MG/ML
100 INJECTION, SOLUTION INTRAMUSCULAR
Status: SHIPPED | OUTPATIENT
Start: 2021-08-11 | End: 2021-11-03

## 2021-09-03 ENCOUNTER — PATIENT MESSAGE (OUTPATIENT)
Dept: CARDIOLOGY | Facility: CLINIC | Age: 86
End: 2021-09-03

## 2021-09-03 ENCOUNTER — TELEPHONE (OUTPATIENT)
Dept: CARDIOLOGY | Facility: CLINIC | Age: 86
End: 2021-09-03

## 2021-09-07 ENCOUNTER — PATIENT MESSAGE (OUTPATIENT)
Dept: CARDIOLOGY | Facility: CLINIC | Age: 86
End: 2021-09-07

## 2021-10-17 ENCOUNTER — PATIENT MESSAGE (OUTPATIENT)
Dept: CARDIOLOGY | Facility: CLINIC | Age: 86
End: 2021-10-17
Payer: MEDICARE

## 2021-10-18 ENCOUNTER — DOCUMENTATION ONLY (OUTPATIENT)
Dept: CARDIOLOGY | Facility: CLINIC | Age: 86
End: 2021-10-18

## 2022-02-04 ENCOUNTER — DOCUMENTATION ONLY (OUTPATIENT)
Dept: CARDIOLOGY | Facility: CLINIC | Age: 87
End: 2022-02-04
Payer: MEDICARE

## 2022-02-09 ENCOUNTER — OFFICE VISIT (OUTPATIENT)
Dept: CARDIOLOGY | Facility: CLINIC | Age: 87
End: 2022-02-09
Payer: MEDICARE

## 2022-02-09 DIAGNOSIS — I25.119 CORONARY ARTERY DISEASE INVOLVING NATIVE CORONARY ARTERY OF NATIVE HEART WITH ANGINA PECTORIS: ICD-10-CM

## 2022-02-09 DIAGNOSIS — Z95.818 PRESENCE OF WATCHMAN LEFT ATRIAL APPENDAGE CLOSURE DEVICE: ICD-10-CM

## 2022-02-09 DIAGNOSIS — I48.11 LONGSTANDING PERSISTENT ATRIAL FIBRILLATION: ICD-10-CM

## 2022-02-09 DIAGNOSIS — I10 ESSENTIAL HYPERTENSION: ICD-10-CM

## 2022-02-09 DIAGNOSIS — Z95.828 PRESENCE OF IVC FILTER: ICD-10-CM

## 2022-02-09 PROCEDURE — 99214 OFFICE O/P EST MOD 30 MIN: CPT | Mod: 95,,, | Performed by: PHYSICIAN ASSISTANT

## 2022-02-09 PROCEDURE — 99214 PR OFFICE/OUTPT VISIT, EST, LEVL IV, 30-39 MIN: ICD-10-PCS | Mod: 95,,, | Performed by: PHYSICIAN ASSISTANT

## 2022-02-09 NOTE — PROGRESS NOTES
The following visit is a virtual visit due to the Coronavirus epidemic:  Patient location: Home  Visit type: Virtual visit with realtime audio and video.  Total time spent with patient: 35 min with more than 50% of this time being spent on direct counseling and coordination of care.     Each patient to whom he or she provides medical services by telemedicine is:  (1) informed of the relationship between the physician and patient and the respective role of any other health care provider with respect to management of the patient; and (2) notified that he or she may decline to receive medical services by telemedicine and may withdraw from such care at any time.    Reason for Visit: Watchman Follow up     Referring Physician: Angel Manning MD    HPI  Mr. Skelton is an 86 YOM who presents for follow up. He presented outpatient and underwent successful placement of a 27 mm Watchman FLX for left atrial appendage occlusion. Please see full cath report for details. 45 day follow up EUGENE done in [place] showed no ender-device leakage. OAC was discontinued. He has been on DAPT with ASA/Effient. He is s/p multivessel PCI on 4/23/21 which he will need to continue DAPT for 1 year. He is without complaints today.       Review of Systems   Constitutional: Negative for chills and fever.   HENT: Negative for sore throat.    Eyes: Negative for blurred vision.   Cardiovascular: Negative for chest pain, claudication, cyanosis, dyspnea on exertion, irregular heartbeat, leg swelling, near-syncope, orthopnea, palpitations, paroxysmal nocturnal dyspnea and syncope.   Respiratory: Negative for cough and sputum production.    Hematologic/Lymphatic: Does not bruise/bleed easily.   Skin: Negative for itching, rash and suspicious lesions.   Musculoskeletal: Negative for falls.   Gastrointestinal: Negative for abdominal pain and change in bowel habit.   Genitourinary: Negative for dysuria.   Neurological: Negative for disturbances in coordination,  dizziness and loss of balance.   Psychiatric/Behavioral: Negative for altered mental status.          Past Medical History:   Diagnosis Date    ACS (acute coronary syndrome)     Acute coronary syndrome 2000    NSTEMI    Anticoagulant long-term use     Coumadin    Atrial fibrillation     Basal cell carcinoma excised     left scalp vertex    Bilateral leg edema 1/6/2014    BPH (benign prostatic hyperplasia)     CKD (chronic kidney disease)     Coronary artery disease     Diverticulosis of colon, acquired 7/9/2021    DVT of leg (deep venous thrombosis) 09/08/2014    On coumadin for years    Encounter for blood transfusion     Hyperlipidemia     Hypertension     Hypogonadism, male     Leg fracture, left     Melanoma 01/04/2017     in situ crown of scalp    MI (myocardial infarction)     Panhypopituitarism     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 1 Tablet Oral Every night 90 tablet 5    folic acid (FOLVITE) 1 MG tablet Take 1 tablet by mouth once daily 90 tablet 3    levothyroxine (EUTHYROX) 100 MCG tablet Take 1 tablet (100 mcg total) by mouth once daily. 60 tablet 0    metoprolol succinate (TOPROL-XL) 50 MG 24 hr tablet Take 1 tablet by mouth once daily 90 tablet 0    NITROSTAT 0.4 mg SL tablet DISSOLVE ONE TABLET UNDER THE TONGUE EVERY 5 MINUTES AS NEEDED FOR CHEST PAIN.  DO NOT EXCEED A TOTAL OF 3 DOSES IN 15 MINUTES NOW (Patient not taking: Reported on 2/4/2022) 25 tablet 3    prasugreL (EFFIENT) 10 mg Tab Take 1 tablet (10 mg total) by mouth once daily. 90 tablet 4    predniSONE (DELTASONE) 5 MG tablet Take 1 tablet by mouth once daily 90 tablet 0    rosuvastatin (CRESTOR) 40 MG Tab Take 1 tablet by mouth once  daily 90 tablet 3    tamsulosin (FLOMAX) 0.4 mg Cap Take 1 capsule by mouth once daily 90 capsule 3    [DISCONTINUED] furosemide (LASIX) 40 MG tablet Take 0.5 tablets (20 mg total) by mouth once daily. (Patient not taking: Reported on 2/4/2022) 30 tablet 11    [DISCONTINUED] lactulose (CHRONULAC) 20 gram/30 mL Soln Take 23 mLs (15 g total) by mouth 3 (three) times daily. (Patient not taking: Reported on 2/4/2022) 90 mL 0    [DISCONTINUED] pantoprazole (PROTONIX) 40 MG tablet Take 1 tablet (40 mg total) by mouth once daily. For Gastric Ulcers (Patient not taking: Reported on 2/4/2022) 90 tablet 0    [DISCONTINUED] sucralfate (CARAFATE) 1 gram tablet Take 1 tablet (1 g total) by mouth 4 (four) times daily before meals and nightly. (Patient not taking: Reported on 2/4/2022) 120 tablet 3     Current Facility-Administered Medications on File Prior to Visit   Medication Dose Route Frequency Provider Last Rate Last Admin    0.9%  NaCl infusion   Intravenous Continuous Renay Fernandez NP   Stopped at 07/09/21 1116    sodium chloride 0.9% flush 5 mL  5 mL Intravenous PRN Renay Fernandez NP           Assessment:     Longstanding persistent atrial fibrillation  CHANI successfully occluded with a Watchman Device. No need for OAC. Continue ASA indefinitely. SBE prophylaxis for life.     CAD; hx NSTEMI with PCI to RCA 2000; 4/23/21 [facility] with 3 stents pLAD, pLCx, pRCA  Stable, asymptomatic. S/p prox LAD, prox LCx, and Prox RCA PCI on 4/23/21. On ASA/Effient. He is a Plavix non responder. Continue Effient for at least 1 year post PCI.     Essential hypertension  Chronic. Controlled. Follow up with Cardiology/PCP.    Presence of IVC filter 1997  F/u with Dr Rios if he wishes to have removed in the future.     Presence of Watchman left atrial appendage closure device  See above.     Plan:     1. Continue DAPT with ASA/Effient until 4/23/22. After that he can discontinue Effient and continue on ASA indefinitely.    2. Refer to Dr. Rios if he wishes to have his IVC filter removed.   3. SBE prophylaxis for life.   4. Virtual follow up at 1 year.   5. Virtual follow up at 2 years.

## 2022-02-09 NOTE — ASSESSMENT & PLAN NOTE
Stable, asymptomatic. S/p prox LAD, prox LCx, and Prox RCA PCI on 4/23/21. On ASA/Effient. He is a Plavix non responder. Continue Effient for at least 1 year post PCI.

## 2022-02-09 NOTE — ASSESSMENT & PLAN NOTE
CHANI successfully occluded with a Watchman Device. No need for OAC. Continue ASA indefinitely. SBE prophylaxis for life.

## 2022-09-08 NOTE — PROGRESS NOTES
7/1/20 pt was advised on instructions and retest date, pt states that he would like to hold off until 8/12/20, his previous program he did not test as much and has these marks on his arm.   home

## 2023-01-01 NOTE — PROGRESS NOTES
..Patient given testosterone 100 mg injection right ventrogluteal, tolerated well, no complaints, no reaction noted  
2023 21:10

## 2023-09-20 NOTE — NURSING
Spoke with Buffy Fuller at urology office asking Dr. Codey Obregon to call [facility] for attribution information  Report received by ANA Patel. The pt is lying in bed resting. He is not in any pain or discomfort. Tele monitor in progress with alarms set. Safety precautions maintained and bed locked in lowest position. Side rails up X2. Call bell and personal items within reach. Will continue to monitor pt for any changes.
